# Patient Record
Sex: FEMALE | Race: WHITE | Employment: UNEMPLOYED | URBAN - METROPOLITAN AREA
[De-identification: names, ages, dates, MRNs, and addresses within clinical notes are randomized per-mention and may not be internally consistent; named-entity substitution may affect disease eponyms.]

---

## 2017-10-17 ENCOUNTER — HOSPITAL ENCOUNTER (OUTPATIENT)
Age: 57
Discharge: HOME | End: 2017-10-17
Payer: COMMERCIAL

## 2017-10-17 VITALS
SYSTOLIC BLOOD PRESSURE: 125 MMHG | DIASTOLIC BLOOD PRESSURE: 79 MMHG | OXYGEN SATURATION: 97 % | HEART RATE: 75 BPM | RESPIRATION RATE: 20 BRPM

## 2017-10-17 DIAGNOSIS — G57.02 PIRIFORMIS SYNDROME OF LEFT SIDE: Primary | ICD-10-CM

## 2017-10-17 PROCEDURE — 99203 OFFICE O/P NEW LOW 30 MIN: CPT

## 2017-10-17 PROCEDURE — 99204 OFFICE O/P NEW MOD 45 MIN: CPT

## 2017-10-17 RX ORDER — ARIPIPRAZOLE 5 MG/1
5 TABLET ORAL DAILY
COMMUNITY
End: 2018-02-16

## 2017-10-17 RX ORDER — DULOXETIN HYDROCHLORIDE 60 MG/1
60 CAPSULE, DELAYED RELEASE ORAL DAILY
COMMUNITY
End: 2018-02-16

## 2017-10-17 RX ORDER — CYCLOBENZAPRINE HCL 10 MG
10 TABLET ORAL 3 TIMES DAILY PRN
Qty: 20 TABLET | Refills: 0 | Status: SHIPPED | OUTPATIENT
Start: 2017-10-17 | End: 2017-10-23

## 2017-10-17 RX ORDER — ZOLMITRIPTAN 5 MG/1
5 TABLET, FILM COATED ORAL AS NEEDED
COMMUNITY
End: 2017-10-24

## 2017-10-17 RX ORDER — HYDROXYZINE PAMOATE 50 MG/1
50 CAPSULE ORAL DAILY
COMMUNITY
End: 2017-11-15

## 2017-10-17 RX ORDER — PREDNISONE 10 MG/1
TABLET ORAL
Qty: 30 TABLET | Refills: 0 | Status: SHIPPED | OUTPATIENT
Start: 2017-10-17 | End: 2017-11-15

## 2017-10-17 NOTE — ED PROVIDER NOTES
Patient Seen in: [facility]    History   Patient presents with:  Back Pain (musculoskeletal)    Stated Complaint: back pain x 4 days    HPI    Patient is a very pleasant 16-year-old female.   Patient has a medical history of migr reviewed from today and agreed except as otherwise stated in HPI.     Physical Exam   ED Triage Vitals [10/17/17 1142]  BP: 125/79  Pulse: 75  Resp: 20  Temp: n/a  Temp src: Oral  SpO2: 97 %  O2 Device: n/a    Current:/79   Pulse 75   Resp 20   LMP  ( possible for a visit        Medications Prescribed:  Current Discharge Medication List    START taking these medications    predniSONE 10 MG Oral Tab  60mg for two days, 50mg x1, 40mg x1, 30mg x1, 20mg x1, 10mg x2, 5mg x2.   Qty: 30 tablet Refills: 0    Cyc

## 2017-10-17 NOTE — ED INITIAL ASSESSMENT (HPI)
Left lower back pain - since Saturday. Denies any injury. Pt takes naproxen 550 mg once per day  She uses it for migraines. pain on the left buttock  Radiates to left calf. descrbed as sharp shooting constant.

## 2017-10-23 ENCOUNTER — OFFICE VISIT (OUTPATIENT)
Dept: FAMILY MEDICINE CLINIC | Facility: CLINIC | Age: 57
End: 2017-10-23

## 2017-10-23 VITALS
HEART RATE: 70 BPM | TEMPERATURE: 98 F | RESPIRATION RATE: 18 BRPM | WEIGHT: 259 LBS | OXYGEN SATURATION: 95 % | BODY MASS INDEX: 47.66 KG/M2 | DIASTOLIC BLOOD PRESSURE: 84 MMHG | HEIGHT: 62 IN | SYSTOLIC BLOOD PRESSURE: 126 MMHG

## 2017-10-23 DIAGNOSIS — M54.16 LUMBAR RADICULOPATHY: Primary | ICD-10-CM

## 2017-10-23 DIAGNOSIS — Z12.31 SCREENING MAMMOGRAM, ENCOUNTER FOR: ICD-10-CM

## 2017-10-23 DIAGNOSIS — Z23 NEEDS FLU SHOT: ICD-10-CM

## 2017-10-23 PROCEDURE — 99203 OFFICE O/P NEW LOW 30 MIN: CPT | Performed by: EMERGENCY MEDICINE

## 2017-10-23 PROCEDURE — 90471 IMMUNIZATION ADMIN: CPT | Performed by: EMERGENCY MEDICINE

## 2017-10-23 PROCEDURE — 90686 IIV4 VACC NO PRSV 0.5 ML IM: CPT | Performed by: EMERGENCY MEDICINE

## 2017-10-23 RX ORDER — NAPROXEN SODIUM 550 MG/1
1 TABLET ORAL 2 TIMES DAILY PRN
Refills: 3 | COMMUNITY
Start: 2017-10-14 | End: 2018-03-15 | Stop reason: ALTCHOICE

## 2017-10-23 RX ORDER — HYDROCODONE BITARTRATE AND ACETAMINOPHEN 5; 325 MG/1; MG/1
1-2 TABLET ORAL EVERY 6 HOURS PRN
Qty: 20 TABLET | Refills: 0 | Status: SHIPPED | OUTPATIENT
Start: 2017-10-23 | End: 2018-02-08

## 2017-10-23 RX ORDER — METAXALONE 800 MG/1
1 TABLET ORAL EVERY 8 HOURS PRN
Refills: 0 | COMMUNITY
Start: 2017-10-17 | End: 2017-11-15

## 2017-10-23 RX ORDER — PROPRANOLOL HYDROCHLORIDE 80 MG/1
1 CAPSULE, EXTENDED RELEASE ORAL DAILY
Refills: 8 | COMMUNITY
Start: 2017-10-06 | End: 2017-10-24

## 2017-10-23 RX ORDER — DIAZEPAM 5 MG/1
1 TABLET ORAL NIGHTLY
Refills: 0 | COMMUNITY
Start: 2017-09-21 | End: 2018-02-16

## 2017-10-23 NOTE — PROGRESS NOTES
Chief Complaint:   Patient presents with:  Establish Care: f/u IC sciatica LT side    HPI:   This is a 65year old female       LOW BACK PAIN  C/O low back pain. Started about 2 weeks ago. Gradual in onset. NO history of fall or injury.  No weakness or numb mg by mouth 3 (three) times daily as needed for Itching. Disp:  Rfl:    Zolmitriptan (ZOMIG) 5 MG Oral Tab Take 5 mg by mouth as needed for Migraine.  Disp:  Rfl:    predniSONE 10 MG Oral Tab 60mg for two days, 50mg x1, 40mg x1, 30mg x1, 20mg x1, 10mg x2, 5 patellar DTRs elicited bilaterally          ASSESSMENT AND PLAN:   Diagnoses and all orders for this visit:    Lumbar radiculopathy  -     MRI SPINE LUMBAR (CPT=72148); Future  -     HYDROcodone-acetaminophen (NORCO) 5-325 MG Oral Tab;  Take 1-2 tablets by

## 2017-10-23 NOTE — PATIENT INSTRUCTIONS
Thank you for choosing [facility]  To Do:  FOR HENRI LYN  1. Arrange for MRI  2. Follow up in 2 weeks for annual exam  3. Continue with muscle relaxant muscle spasm  4. Take Norco for severe pain  5.  Finish off Prednisone, then you may medical care. Always follow your healthcare professional's instructions.

## 2017-10-24 ENCOUNTER — OFFICE VISIT (OUTPATIENT)
Dept: NEUROLOGY | Facility: CLINIC | Age: 57
End: 2017-10-24

## 2017-10-24 VITALS
HEART RATE: 85 BPM | DIASTOLIC BLOOD PRESSURE: 80 MMHG | SYSTOLIC BLOOD PRESSURE: 116 MMHG | WEIGHT: 260 LBS | HEIGHT: 62 IN | RESPIRATION RATE: 18 BRPM | BODY MASS INDEX: 47.84 KG/M2

## 2017-10-24 DIAGNOSIS — G21.19 PARKINSONISM DUE TO DRUGS (HCC): ICD-10-CM

## 2017-10-24 DIAGNOSIS — G43.709 CHRONIC MIGRAINE W/O AURA W/O STATUS MIGRAINOSUS, NOT INTRACTABLE: Primary | ICD-10-CM

## 2017-10-24 PROBLEM — M54.16 LUMBAR RADICULOPATHY: Status: ACTIVE | Noted: 2017-10-24

## 2017-10-24 PROCEDURE — 99244 OFF/OP CNSLTJ NEW/EST MOD 40: CPT | Performed by: OTHER

## 2017-10-24 RX ORDER — PROPRANOLOL HYDROCHLORIDE 120 MG/1
120 CAPSULE, EXTENDED RELEASE ORAL DAILY
Qty: 30 CAPSULE | Refills: 2 | Status: SHIPPED | OUTPATIENT
Start: 2017-10-24 | End: 2018-02-22

## 2017-10-24 RX ORDER — ZOLMITRIPTAN 5 MG/1
5 TABLET, FILM COATED ORAL AS NEEDED
Qty: 9 TABLET | Refills: 2 | Status: SHIPPED | OUTPATIENT
Start: 2017-10-24 | End: 2018-02-22

## 2017-10-24 RX ORDER — BENZTROPINE MESYLATE 1 MG/1
1 TABLET ORAL 2 TIMES DAILY
COMMUNITY
End: 2018-02-16

## 2017-10-24 NOTE — PATIENT INSTRUCTIONS
Refill policies:    • Allow 2-3 business days for refills; controlled substances may take longer.   • Contact your pharmacy at least 5 days prior to running out of medication and have them send an electronic request or submit request through the [facility] have a procedure or additional testing performed. [facility]) will contact your insurance carrier to obtain pre-certification or prior authorization.     Unfortunately, [facility] has seen an increase in denial of payment even though the p

## 2017-10-25 NOTE — PROGRESS NOTES
HPI:    Patient ID: Abdiel Machuca is a 65year old female. PCP: Dr Jerod Degroot    HPI    Ygnacio Closs is a 65year old female who presents to establish care with us for migraines. She moved from [place] this summer.  She has long history of migraine hea are negative. Current Outpatient Prescriptions:  Propranolol HCl  MG Oral Capsule SR 24 Hr Take 1 capsule (120 mg total) by mouth daily.  Disp: 30 capsule Rfl: 2   Zolmitriptan (ZOMIG) 5 MG Oral Tab Take 1 tablet (5 mg total) by mouth as nee intact. V: Normal facial sensation   VII: Face is symmetric with normal strength. VIII: Normal hearing bilaterally. IX, X: Symmetric palate elevation. Uvula in midline. XI: Normal sternocleidomastoid and trapezius strength.    XII: Tongue is in midl This Visit:  Signed Prescriptions Disp Refills    Propranolol HCl  MG Oral Capsule SR 24 Hr 30 capsule 2      Sig: Take 1 capsule (120 mg total) by mouth daily.       Zolmitriptan (ZOMIG) 5 MG Oral Tab 9 tablet 2      Sig: Take 1 tablet (5 mg total) b

## 2017-10-30 ENCOUNTER — TELEPHONE (OUTPATIENT)
Dept: FAMILY MEDICINE CLINIC | Facility: CLINIC | Age: 57
End: 2017-10-30

## 2017-10-30 NOTE — TELEPHONE ENCOUNTER
Patient called back and stated she called [facility] to schedule something and see if there is anything else they are needing and was told she is to large to fit into their machine and she would need to keep her appointment with our office for the MRI.

## 2017-10-30 NOTE — TELEPHONE ENCOUNTER
Patient called about the MRI for lumbar spine that she needs to do. Her insurance called her to let her know of a much cheaper place to get her MRI done at.      [address],   [facility]  Ph: [phone]    The insu

## 2017-11-01 ENCOUNTER — HOSPITAL ENCOUNTER (OUTPATIENT)
Dept: MRI IMAGING | Age: 57
Discharge: HOME | End: 2017-11-01
Attending: EMERGENCY MEDICINE
Payer: COMMERCIAL

## 2017-11-01 DIAGNOSIS — M54.16 LUMBAR RADICULOPATHY: ICD-10-CM

## 2017-11-01 PROCEDURE — 72148 MRI LUMBAR SPINE W/O DYE: CPT | Performed by: EMERGENCY MEDICINE

## 2017-11-06 ENCOUNTER — TELEPHONE (OUTPATIENT)
Dept: FAMILY MEDICINE CLINIC | Facility: CLINIC | Age: 57
End: 2017-11-06

## 2017-11-06 NOTE — TELEPHONE ENCOUNTER
Spoke with patient regarding test results. Advised her to follow up in the clinic. Patient has appointment scheduled for 11/15/17.

## 2017-11-06 NOTE — TELEPHONE ENCOUNTER
----- Message from Shayy Hamlin MD sent at 11/6/2017 12:11 PM CST -----  Degenerative disk changes  No stenosis  Follow up in clinic for recheck and Annual physical

## 2017-11-13 ENCOUNTER — HOSPITAL ENCOUNTER (OUTPATIENT)
Dept: MAMMOGRAPHY | Age: 57
Discharge: HOME | End: 2017-11-13
Attending: EMERGENCY MEDICINE
Payer: COMMERCIAL

## 2017-11-13 DIAGNOSIS — Z12.31 SCREENING MAMMOGRAM, ENCOUNTER FOR: ICD-10-CM

## 2017-11-13 PROCEDURE — 77063 BREAST TOMOSYNTHESIS BI: CPT | Performed by: EMERGENCY MEDICINE

## 2017-11-13 PROCEDURE — 77067 SCR MAMMO BI INCL CAD: CPT | Performed by: EMERGENCY MEDICINE

## 2017-11-15 ENCOUNTER — LAB ENCOUNTER (OUTPATIENT)
Dept: LAB | Age: 57
End: 2017-11-15
Attending: EMERGENCY MEDICINE
Payer: COMMERCIAL

## 2017-11-15 ENCOUNTER — OFFICE VISIT (OUTPATIENT)
Dept: FAMILY MEDICINE CLINIC | Facility: CLINIC | Age: 57
End: 2017-11-15

## 2017-11-15 VITALS
BODY MASS INDEX: 48 KG/M2 | TEMPERATURE: 98 F | HEART RATE: 68 BPM | WEIGHT: 260.75 LBS | SYSTOLIC BLOOD PRESSURE: 124 MMHG | DIASTOLIC BLOOD PRESSURE: 76 MMHG | RESPIRATION RATE: 16 BRPM

## 2017-11-15 DIAGNOSIS — Z99.89 OSA ON CPAP: ICD-10-CM

## 2017-11-15 DIAGNOSIS — R73.03 PREDIABETES: ICD-10-CM

## 2017-11-15 DIAGNOSIS — R10.11 RUQ ABDOMINAL PAIN: Primary | ICD-10-CM

## 2017-11-15 DIAGNOSIS — G47.33 OSA ON CPAP: ICD-10-CM

## 2017-11-15 DIAGNOSIS — R10.11 RUQ ABDOMINAL PAIN: ICD-10-CM

## 2017-11-15 DIAGNOSIS — E78.5 DYSLIPIDEMIA: ICD-10-CM

## 2017-11-15 DIAGNOSIS — E55.9 VITAMIN D DEFICIENCY: ICD-10-CM

## 2017-11-15 PROCEDURE — 82150 ASSAY OF AMYLASE: CPT | Performed by: EMERGENCY MEDICINE

## 2017-11-15 PROCEDURE — 36415 COLL VENOUS BLD VENIPUNCTURE: CPT | Performed by: EMERGENCY MEDICINE

## 2017-11-15 PROCEDURE — 81003 URINALYSIS AUTO W/O SCOPE: CPT | Performed by: EMERGENCY MEDICINE

## 2017-11-15 PROCEDURE — 99214 OFFICE O/P EST MOD 30 MIN: CPT | Performed by: EMERGENCY MEDICINE

## 2017-11-15 PROCEDURE — 85025 COMPLETE CBC W/AUTO DIFF WBC: CPT | Performed by: EMERGENCY MEDICINE

## 2017-11-15 PROCEDURE — 80053 COMPREHEN METABOLIC PANEL: CPT | Performed by: EMERGENCY MEDICINE

## 2017-11-15 PROCEDURE — 83690 ASSAY OF LIPASE: CPT | Performed by: EMERGENCY MEDICINE

## 2017-11-15 RX ORDER — ACETAMINOPHEN AND CODEINE PHOSPHATE 300; 30 MG/1; MG/1
1-2 TABLET ORAL EVERY 4 HOURS PRN
Qty: 30 TABLET | Refills: 0 | Status: SHIPPED | OUTPATIENT
Start: 2017-11-15 | End: 2018-02-08

## 2017-11-15 RX ORDER — ERGOCALCIFEROL 1.25 MG/1
50000 CAPSULE ORAL WEEKLY
Qty: 12 CAPSULE | Refills: 0 | Status: SHIPPED | OUTPATIENT
Start: 2017-11-15 | End: 2018-03-15 | Stop reason: ALTCHOICE

## 2017-11-15 NOTE — PROGRESS NOTES
Chief Complaint:   Patient presents with:  Test Results  Abdominal Pain: Right side symptoms started about 4 weeks ago    HPI:   This is a 65year old female     FF UP LABS    RIGHT UP BACK PAIN  Started 2 months ago. Pain worse with eating.  Wraps around b daily. Disp:  Rfl:      No current facility-administered medications on file prior to visit.     Counseling given: Not Answered               REVIEW OF SYSTEMS:   Review of systems significant for low back pain  The rest of the review of systems is negative Just recently got CPAP machine. Start CPAP  Vitamin D deficiency  -     ergocalciferol 61728 units Oral Cap; Take 1 capsule (50,000 Units total) by mouth once a week. For 12 weeks    Start high dose Vit D once a week for 12 weeks, Rx in pharmacy.  After 12

## 2017-11-15 NOTE — PATIENT INSTRUCTIONS
Thank you for choosing [facility]  To Do:  FOR HENRI LYN  1. Start high dose Vit D once a week for 12 weeks, Rx in pharmacy. After 12 weeks start over the counter Vit D 2000 for another 12 weeks. Repeat Vit D levels in 6 months  2.  Low

## 2017-11-16 ENCOUNTER — HOSPITAL ENCOUNTER (OUTPATIENT)
Dept: ULTRASOUND IMAGING | Age: 57
Discharge: HOME | End: 2017-11-16
Attending: EMERGENCY MEDICINE
Payer: COMMERCIAL

## 2017-11-16 DIAGNOSIS — R10.11 RUQ ABDOMINAL PAIN: ICD-10-CM

## 2017-11-16 PROCEDURE — 76700 US EXAM ABDOM COMPLETE: CPT | Performed by: EMERGENCY MEDICINE

## 2017-11-20 ENCOUNTER — TELEPHONE (OUTPATIENT)
Dept: FAMILY MEDICINE CLINIC | Facility: CLINIC | Age: 57
End: 2017-11-20

## 2017-11-20 ENCOUNTER — MED REC SCAN ONLY (OUTPATIENT)
Dept: FAMILY MEDICINE CLINIC | Facility: CLINIC | Age: 57
End: 2017-11-20

## 2017-11-20 NOTE — TELEPHONE ENCOUNTER
----- Message from Reza Neal MD sent at 11/20/2017 11:37 AM CST -----  No evidence to gall stone  pls ask patient to follow up for recheck if symptoms are persistent or not improving

## 2017-11-20 NOTE — TELEPHONE ENCOUNTER
Spoke with patient who states she is still having abdominal pain, rating it a 6/10. Pt was asked to follow up in office, pt states she has an appt with Dr. Gilda Valles tomorrow morning.  Pt states she will follow up with Dr. Gilda Valles from now on due to location and

## 2017-11-21 ENCOUNTER — APPOINTMENT (OUTPATIENT)
Dept: LAB | Age: 57
End: 2017-11-21
Attending: INTERNAL MEDICINE
Payer: COMMERCIAL

## 2017-11-21 ENCOUNTER — OFFICE VISIT (OUTPATIENT)
Dept: INTERNAL MEDICINE CLINIC | Facility: CLINIC | Age: 57
End: 2017-11-21

## 2017-11-21 VITALS
HEART RATE: 76 BPM | BODY MASS INDEX: 48.17 KG/M2 | OXYGEN SATURATION: 98 % | TEMPERATURE: 98 F | DIASTOLIC BLOOD PRESSURE: 80 MMHG | WEIGHT: 261.75 LBS | HEIGHT: 62 IN | RESPIRATION RATE: 16 BRPM | SYSTOLIC BLOOD PRESSURE: 116 MMHG

## 2017-11-21 DIAGNOSIS — M51.26 BULGING LUMBAR DISC: ICD-10-CM

## 2017-11-21 DIAGNOSIS — F41.1 GAD (GENERALIZED ANXIETY DISORDER): ICD-10-CM

## 2017-11-21 DIAGNOSIS — F33.1 MDD (MAJOR DEPRESSIVE DISORDER), RECURRENT EPISODE, MODERATE (HCC): ICD-10-CM

## 2017-11-21 DIAGNOSIS — M54.16 LUMBAR RADICULOPATHY: ICD-10-CM

## 2017-11-21 DIAGNOSIS — R73.01 IFG (IMPAIRED FASTING GLUCOSE): ICD-10-CM

## 2017-11-21 DIAGNOSIS — M47.816 ARTHRITIS, LUMBAR SPINE: ICD-10-CM

## 2017-11-21 DIAGNOSIS — R10.11 POSTPRANDIAL ABDOMINAL PAIN IN RIGHT UPPER QUADRANT: Primary | ICD-10-CM

## 2017-11-21 DIAGNOSIS — E78.00 HYPERCHOLESTEROLEMIA: ICD-10-CM

## 2017-11-21 DIAGNOSIS — E66.01 MORBID OBESITY WITH BMI OF 45.0-49.9, ADULT (HCC): ICD-10-CM

## 2017-11-21 DIAGNOSIS — K57.30 DIVERTICULOSIS OF LARGE INTESTINE WITHOUT HEMORRHAGE: ICD-10-CM

## 2017-11-21 DIAGNOSIS — Z82.49 FAMILY HISTORY OF PREMATURE CORONARY HEART DISEASE: ICD-10-CM

## 2017-11-21 DIAGNOSIS — G25.0 TREMOR, ESSENTIAL: ICD-10-CM

## 2017-11-21 DIAGNOSIS — G43.709 CHRONIC MIGRAINE WITHOUT AURA WITHOUT STATUS MIGRAINOSUS, NOT INTRACTABLE: ICD-10-CM

## 2017-11-21 DIAGNOSIS — E55.9 VITAMIN D DEFICIENCY: ICD-10-CM

## 2017-11-21 PROBLEM — M51.36 BULGING LUMBAR DISC: Status: ACTIVE | Noted: 2017-11-21

## 2017-11-21 PROBLEM — M51.369 BULGING LUMBAR DISC: Status: ACTIVE | Noted: 2017-11-21

## 2017-11-21 PROCEDURE — 83036 HEMOGLOBIN GLYCOSYLATED A1C: CPT | Performed by: INTERNAL MEDICINE

## 2017-11-21 PROCEDURE — 36415 COLL VENOUS BLD VENIPUNCTURE: CPT | Performed by: INTERNAL MEDICINE

## 2017-11-21 PROCEDURE — 99204 OFFICE O/P NEW MOD 45 MIN: CPT | Performed by: INTERNAL MEDICINE

## 2017-11-21 NOTE — PROGRESS NOTES
Patient presents with:  Establish Care: chronic medical conditions      HPI: The patient is a 66 y/o WF, new patient, here to establish PCP and to discuss chronic medical conditions as follows:    1.  Chronic postprandial RUQ pain x > 2 months - Her recent of premature coronary heart disease     IFG (impaired fasting glucose)     Arthritis, lumbar spine (HCC)     Bulging lumbar disc     Tremor, essential     MDD (major depressive disorder), recurrent episode, moderate (HCC)     ISHA (generalized anxiety disor Relation Age of Onset   • Hypertension Father    • Heart Attack Father    • Heart Disease Father    • ALS [OTHER] Mother    • Cancer Sister      melanoma         Immunization History  Administered            Date(s) Administered    Flulaval 0.5 ml 6 months first.  4. Hypercholesterolemia and fam hx of premature CAD in her father - Obtain Ultrafast Heart Scan. I gave her information on scheduling and taking advantage of the current $75 offer. 5. Chronic migraines - Mgmt per Dr. Thais Villeda.   6. Lumbar arthriti

## 2017-11-21 NOTE — PATIENT INSTRUCTIONS
HIDA Scan    A HIDA scan is an imaging test. It can be used to check for problems in the liver, gallbladder, and bile ducts. During the test, a small amount of radioactive substance (tracer) is injected into a vein in your arm or hand.  Pictures are then · Based on your healthcare provider's practices, you may be given a substance by mouth or injected thru a vein that causes the gallbladder to contract and release bile. Be sure to let the technologist know if you feel discomfort.   · In some cases, pain med A coronary calcium scan is a test that looks at the arteries that supply blood to the heart muscle. These are called the coronary arteries. The scan checks for plaque buildup along the walls of these arteries.  Plaque is made up of calcium, fat, cholesterol A CT scan exposes you to a certain amount of radiation. The benefits of the scan likely outweigh the risks for you. You and your healthcare provider can discuss this further. Date Last Reviewed: 5/1/2016  © 2079-8788 [employer].  [address] [place] [address]

## 2017-11-27 ENCOUNTER — HOSPITAL ENCOUNTER (OUTPATIENT)
Dept: NUCLEAR MEDICINE | Facility: HOSPITAL | Age: 57
Discharge: HOME | End: 2017-11-27
Attending: INTERNAL MEDICINE
Payer: COMMERCIAL

## 2017-11-27 DIAGNOSIS — R10.11 POSTPRANDIAL ABDOMINAL PAIN IN RIGHT UPPER QUADRANT: ICD-10-CM

## 2017-11-27 PROCEDURE — 36410 VNPNXR 3YR/> PHY/QHP DX/THER: CPT

## 2017-11-27 PROCEDURE — 76937 US GUIDE VASCULAR ACCESS: CPT

## 2017-11-27 PROCEDURE — 78226 HEPATOBILIARY SYSTEM IMAGING: CPT | Performed by: INTERNAL MEDICINE

## 2017-11-28 ENCOUNTER — HOSPITAL ENCOUNTER (EMERGENCY)
Facility: HOSPITAL | Age: 57
Discharge: HOME | End: 2017-11-28
Attending: EMERGENCY MEDICINE
Payer: COMMERCIAL

## 2017-11-28 ENCOUNTER — APPOINTMENT (OUTPATIENT)
Dept: CT IMAGING | Facility: HOSPITAL | Age: 57
End: 2017-11-28
Attending: EMERGENCY MEDICINE
Payer: COMMERCIAL

## 2017-11-28 ENCOUNTER — APPOINTMENT (OUTPATIENT)
Dept: GENERAL RADIOLOGY | Facility: HOSPITAL | Age: 57
End: 2017-11-28
Attending: EMERGENCY MEDICINE
Payer: COMMERCIAL

## 2017-11-28 VITALS
OXYGEN SATURATION: 96 % | HEART RATE: 65 BPM | BODY MASS INDEX: 47.48 KG/M2 | RESPIRATION RATE: 18 BRPM | SYSTOLIC BLOOD PRESSURE: 103 MMHG | WEIGHT: 258 LBS | TEMPERATURE: 97 F | DIASTOLIC BLOOD PRESSURE: 65 MMHG | HEIGHT: 62 IN

## 2017-11-28 DIAGNOSIS — R10.11 CHRONIC RUQ PAIN: Primary | ICD-10-CM

## 2017-11-28 DIAGNOSIS — G89.29 CHRONIC RUQ PAIN: Primary | ICD-10-CM

## 2017-11-28 PROCEDURE — 83690 ASSAY OF LIPASE: CPT | Performed by: EMERGENCY MEDICINE

## 2017-11-28 PROCEDURE — 93010 ELECTROCARDIOGRAM REPORT: CPT

## 2017-11-28 PROCEDURE — 99285 EMERGENCY DEPT VISIT HI MDM: CPT

## 2017-11-28 PROCEDURE — 96375 TX/PRO/DX INJ NEW DRUG ADDON: CPT

## 2017-11-28 PROCEDURE — 85025 COMPLETE CBC W/AUTO DIFF WBC: CPT | Performed by: EMERGENCY MEDICINE

## 2017-11-28 PROCEDURE — 74177 CT ABD & PELVIS W/CONTRAST: CPT | Performed by: EMERGENCY MEDICINE

## 2017-11-28 PROCEDURE — 96374 THER/PROPH/DIAG INJ IV PUSH: CPT

## 2017-11-28 PROCEDURE — 81003 URINALYSIS AUTO W/O SCOPE: CPT | Performed by: EMERGENCY MEDICINE

## 2017-11-28 PROCEDURE — 96372 THER/PROPH/DIAG INJ SC/IM: CPT

## 2017-11-28 PROCEDURE — 80053 COMPREHEN METABOLIC PANEL: CPT | Performed by: EMERGENCY MEDICINE

## 2017-11-28 PROCEDURE — 85378 FIBRIN DEGRADE SEMIQUANT: CPT | Performed by: EMERGENCY MEDICINE

## 2017-11-28 PROCEDURE — 71010 XR CHEST AP PORTABLE  (CPT=71010): CPT | Performed by: EMERGENCY MEDICINE

## 2017-11-28 PROCEDURE — S0028 INJECTION, FAMOTIDINE, 20 MG: HCPCS | Performed by: EMERGENCY MEDICINE

## 2017-11-28 PROCEDURE — 93005 ELECTROCARDIOGRAM TRACING: CPT

## 2017-11-28 PROCEDURE — 84484 ASSAY OF TROPONIN QUANT: CPT | Performed by: EMERGENCY MEDICINE

## 2017-11-28 RX ORDER — MAGNESIUM HYDROXIDE/ALUMINUM HYDROXICE/SIMETHICONE 120; 1200; 1200 MG/30ML; MG/30ML; MG/30ML
30 SUSPENSION ORAL ONCE
Status: COMPLETED | OUTPATIENT
Start: 2017-11-28 | End: 2017-11-28

## 2017-11-28 RX ORDER — FAMOTIDINE 20 MG/1
20 TABLET ORAL 2 TIMES DAILY
Qty: 60 TABLET | Refills: 0 | Status: SHIPPED | OUTPATIENT
Start: 2017-11-28 | End: 2017-12-28

## 2017-11-28 RX ORDER — HYDROCODONE BITARTRATE AND ACETAMINOPHEN 5; 325 MG/1; MG/1
1-2 TABLET ORAL EVERY 4 HOURS PRN
Qty: 12 TABLET | Refills: 0 | Status: SHIPPED | OUTPATIENT
Start: 2017-11-28 | End: 2017-12-14

## 2017-11-28 RX ORDER — MORPHINE SULFATE 4 MG/ML
4 INJECTION, SOLUTION INTRAMUSCULAR; INTRAVENOUS ONCE
Status: COMPLETED | OUTPATIENT
Start: 2017-11-28 | End: 2017-11-28

## 2017-11-28 RX ORDER — DICYCLOMINE HYDROCHLORIDE 10 MG/ML
20 INJECTION INTRAMUSCULAR ONCE
Status: COMPLETED | OUTPATIENT
Start: 2017-11-28 | End: 2017-11-28

## 2017-11-28 RX ORDER — FAMOTIDINE 10 MG/ML
20 INJECTION, SOLUTION INTRAVENOUS ONCE
Status: COMPLETED | OUTPATIENT
Start: 2017-11-28 | End: 2017-11-28

## 2017-11-28 NOTE — ED NOTES
Unable to get IV x 1. Pt stated it took 7 sticks yesterday and it was a PICC nurse who placed IV. ILYA Bales, aware of need for US IV. Ultrasound placed in room at this time.

## 2017-11-28 NOTE — ED INITIAL ASSESSMENT (HPI)
Pt w/ RUQ pain for months. Increasing pain at this time. Had a nuclear med test yesterday. Pt having pain to chest upon palpation.

## 2017-11-29 NOTE — ED PROVIDER NOTES
Patient Seen in: [facility] Emergency Department    History   Patient presents with:  Abdomen/Flank Pain (GI/)    Stated Complaint:     HPI    61-year-old female, medical history as noted below, here for evaluation of right upper quadrant abdomina BMI 47.19 kg/m²         Physical Exam      Constitutional: Pt is oriented to person, place, and time. Appears well-developed and well-nourished. Head: Normocephalic and atraumatic.    Nose: Nose normal.   Eyes: EOM are normal. Pupils are equal, round, an Please view results for these tests on the individual orders. RAINBOW DRAW BLUE   RAINBOW DRAW LAVENDER   RAINBOW DRAW LIGHT GREEN   RAINBOW DRAW GOLD     EKG    Rate, intervals and axes as noted on EKG Report.   Rate: 71  Rhythm: Sinus Rhythm  Readin Prescribed:  Discharge Medication List as of 11/28/2017  6:57 PM    START taking these medications    famoTIDine 20 MG Oral Tab  Take 1 tablet (20 mg total) by mouth 2 (two) times daily. , Normal, Disp-60 tablet, R-0    !! HYDROcodone-acetaminophen 5-325 MG

## 2017-12-13 ENCOUNTER — HOSPITAL ENCOUNTER (OUTPATIENT)
Dept: CT IMAGING | Age: 57
Discharge: HOME | End: 2017-12-13
Attending: INTERNAL MEDICINE

## 2017-12-13 DIAGNOSIS — Z13.9 SPECIAL SCREENING: ICD-10-CM

## 2017-12-14 ENCOUNTER — OFFICE VISIT (OUTPATIENT)
Dept: NEUROLOGY | Facility: CLINIC | Age: 57
End: 2017-12-14

## 2017-12-14 VITALS
DIASTOLIC BLOOD PRESSURE: 80 MMHG | TEMPERATURE: 98 F | RESPIRATION RATE: 16 BRPM | BODY MASS INDEX: 48.76 KG/M2 | OXYGEN SATURATION: 96 % | HEART RATE: 80 BPM | SYSTOLIC BLOOD PRESSURE: 108 MMHG | WEIGHT: 265 LBS | HEIGHT: 62 IN

## 2017-12-14 DIAGNOSIS — G21.19 PARKINSONISM DUE TO DRUGS (HCC): ICD-10-CM

## 2017-12-14 DIAGNOSIS — G43.709 CHRONIC MIGRAINE WITHOUT AURA WITHOUT STATUS MIGRAINOSUS, NOT INTRACTABLE: Primary | ICD-10-CM

## 2017-12-14 PROCEDURE — 99213 OFFICE O/P EST LOW 20 MIN: CPT | Performed by: OTHER

## 2017-12-14 RX ORDER — LORAZEPAM 0.5 MG/1
TABLET ORAL
Refills: 0 | COMMUNITY
Start: 2017-12-01 | End: 2018-02-16

## 2017-12-14 NOTE — PROGRESS NOTES
HPI:    Patient ID: Stephon Dumont is a 65year old female. PCP: Dr Yamileth Knox    HPI    Patient presented for migraine follow up.  States migraines remains the same and no significant change with increase dose of Inderal.     Jacklyn Vann is a 65 year Respiratory: Negative. Cardiovascular: Negative. Gastrointestinal: Negative. Endocrine: Negative. Genitourinary: Negative. Musculoskeletal: Negative. Neurological: Positive for tremors and headaches. Hematological: Negative.     Psychi breath sounds normal.   Abdominal: Soft. Bowel sounds are normal.   Skin: Skin is warm and dry. Psychiatric:  normal mood and affect. Neurological  Mental status: Awake, alert and oriented to time, place and person.   Speech is fluent with intact compre

## 2017-12-21 ENCOUNTER — OFFICE VISIT (OUTPATIENT)
Dept: INTERNAL MEDICINE CLINIC | Facility: CLINIC | Age: 57
End: 2017-12-21

## 2017-12-21 VITALS
OXYGEN SATURATION: 94 % | BODY MASS INDEX: 48.58 KG/M2 | WEIGHT: 264 LBS | TEMPERATURE: 98 F | RESPIRATION RATE: 16 BRPM | HEIGHT: 62 IN | SYSTOLIC BLOOD PRESSURE: 110 MMHG | HEART RATE: 80 BPM | DIASTOLIC BLOOD PRESSURE: 80 MMHG

## 2017-12-21 DIAGNOSIS — E66.01 MORBID OBESITY WITH BMI OF 45.0-49.9, ADULT (HCC): Primary | ICD-10-CM

## 2017-12-21 DIAGNOSIS — Z51.81 THERAPEUTIC DRUG MONITORING: ICD-10-CM

## 2017-12-21 PROCEDURE — 99213 OFFICE O/P EST LOW 20 MIN: CPT | Performed by: INTERNAL MEDICINE

## 2017-12-21 RX ORDER — PHENTERMINE HYDROCHLORIDE 37.5 MG/1
37.5 TABLET ORAL
Qty: 30 TABLET | Refills: 0 | Status: SHIPPED | OUTPATIENT
Start: 2017-12-21 | End: 2018-01-22

## 2017-12-21 NOTE — PROGRESS NOTES
Patient presents with:  Obesity: Start MD-supervised medical weight loss programming  Nasal Congestion: x4 days       HPI: The pt presents today to start MD-supervised medical weight loss programming for morbid obesity.   She's tried and failed various diet capsule (120 mg total) by mouth daily. , Disp: 30 capsule, Rfl: 2  •  Zolmitriptan (ZOMIG) 5 MG Oral Tab, Take 1 tablet (5 mg total) by mouth as needed for Migraine. , Disp: 9 tablet, Rfl: 2  •  diazepam 5 MG Oral Tab, Take 1 tablet by mouth nightly., Disp: of my ability. Jeovanny Spain. Gilda Valles MD  Diplomate, American Board of Internal Medicine  [facility]  [address], [address], [facility], [address]  T: [id number]; F: [phone]     Meds & Refills for this Visit:  Signed Prescriptions Disp

## 2017-12-28 ENCOUNTER — OFFICE VISIT (OUTPATIENT)
Dept: INTERNAL MEDICINE CLINIC | Facility: CLINIC | Age: 57
End: 2017-12-28

## 2017-12-28 VITALS
HEART RATE: 98 BPM | TEMPERATURE: 98 F | BODY MASS INDEX: 48.86 KG/M2 | WEIGHT: 265.5 LBS | HEIGHT: 62 IN | SYSTOLIC BLOOD PRESSURE: 124 MMHG | RESPIRATION RATE: 18 BRPM | OXYGEN SATURATION: 94 % | DIASTOLIC BLOOD PRESSURE: 82 MMHG

## 2017-12-28 DIAGNOSIS — J40 BRONCHITIS: Primary | ICD-10-CM

## 2017-12-28 DIAGNOSIS — R30.0 DYSURIA: ICD-10-CM

## 2017-12-28 PROCEDURE — 99214 OFFICE O/P EST MOD 30 MIN: CPT | Performed by: PHYSICIAN ASSISTANT

## 2017-12-28 PROCEDURE — 81003 URINALYSIS AUTO W/O SCOPE: CPT | Performed by: PHYSICIAN ASSISTANT

## 2017-12-28 RX ORDER — ALBUTEROL SULFATE 90 UG/1
1-2 AEROSOL, METERED RESPIRATORY (INHALATION)
Qty: 1 INHALER | Refills: 0 | Status: SHIPPED | OUTPATIENT
Start: 2017-12-28 | End: 2018-01-27

## 2017-12-28 RX ORDER — CODEINE PHOSPHATE AND GUAIFENESIN 10; 100 MG/5ML; MG/5ML
5 SOLUTION ORAL EVERY 6 HOURS PRN
Qty: 118 ML | Refills: 0 | Status: SHIPPED | OUTPATIENT
Start: 2017-12-28 | End: 2018-01-27

## 2017-12-28 RX ORDER — AZITHROMYCIN 250 MG/1
TABLET, FILM COATED ORAL
Qty: 6 TABLET | Refills: 0 | Status: SHIPPED | OUTPATIENT
Start: 2017-12-28 | End: 2018-01-27

## 2017-12-28 NOTE — PROGRESS NOTES
HPI:  Russel Stockton is a 65year old female who presents for URI symptoms x 7 days. C/o sore throat, hoarse voice, nasal congestion, cough, mild wheezing. Denies fever, chills, sweats, SOB. Has had a couple episodes of diarrhea.   Denies abd pain conjunctiva are clear  HEENT: normocephalic, atraumatic, TMs clear & flat bilaterally, oropharynx clear, MMM, turbinates normal  NECK: supple, no lymphadenopathy  LUNGS: regular breathing rate and effort, mild expiratory wheezing heard throughout, no crack

## 2017-12-28 NOTE — PATIENT INSTRUCTIONS
Bronchitis:  - start using inhaler - 1-2 puffs every 4-6 hours as needed for chest tightness, shortness of breath, wheezing  - cough medicine as needed (may cause drowsiness - do not drive when taking this)  - if no improvement in the next few days start a

## 2018-01-08 ENCOUNTER — TELEPHONE (OUTPATIENT)
Dept: INTERNAL MEDICINE CLINIC | Facility: CLINIC | Age: 58
End: 2018-01-08

## 2018-01-08 DIAGNOSIS — R10.11 RIGHT UPPER QUADRANT ABDOMINAL PAIN: ICD-10-CM

## 2018-01-08 DIAGNOSIS — G43.709 CHRONIC MIGRAINE WITHOUT AURA WITHOUT STATUS MIGRAINOSUS, NOT INTRACTABLE: Primary | ICD-10-CM

## 2018-01-08 DIAGNOSIS — M54.9 BACK PAIN, UNSPECIFIED BACK LOCATION, UNSPECIFIED BACK PAIN LATERALITY, UNSPECIFIED CHRONICITY: ICD-10-CM

## 2018-01-24 NOTE — TELEPHONE ENCOUNTER
Phentermine 37.5 1 tab daily filled 12-21-17 30 with 0 refills     LOV 12-21-17     RTC 1 month for weight loss f/u    Next apt is 1-27-18

## 2018-01-25 RX ORDER — PHENTERMINE HYDROCHLORIDE 37.5 MG/1
TABLET ORAL
Qty: 30 TABLET | Refills: 0 | Status: SHIPPED | OUTPATIENT
Start: 2018-01-25 | End: 2018-03-15

## 2018-01-27 ENCOUNTER — OFFICE VISIT (OUTPATIENT)
Dept: INTERNAL MEDICINE CLINIC | Facility: CLINIC | Age: 58
End: 2018-01-27

## 2018-01-27 VITALS
TEMPERATURE: 98 F | HEIGHT: 62 IN | SYSTOLIC BLOOD PRESSURE: 90 MMHG | HEART RATE: 66 BPM | BODY MASS INDEX: 47.29 KG/M2 | RESPIRATION RATE: 13 BRPM | OXYGEN SATURATION: 98 % | DIASTOLIC BLOOD PRESSURE: 62 MMHG | WEIGHT: 257 LBS

## 2018-01-27 DIAGNOSIS — Z51.81 THERAPEUTIC DRUG MONITORING: ICD-10-CM

## 2018-01-27 DIAGNOSIS — E66.01 MORBID OBESITY WITH BMI OF 45.0-49.9, ADULT (HCC): Primary | ICD-10-CM

## 2018-01-27 PROCEDURE — 99213 OFFICE O/P EST LOW 20 MIN: CPT | Performed by: INTERNAL MEDICINE

## 2018-01-27 NOTE — PROGRESS NOTES
Patient presents with: Follow - Up  Weight Loss       HPI: The pt presents today for physician-supervised medical weight loss programming and assessment for the diagnosis of overweight and/or obesity status.   Has been on FDA-approved prescription medicati capsule, Rfl: 0  •  Propranolol HCl  MG Oral Capsule SR 24 Hr, Take 1 capsule (120 mg total) by mouth daily. , Disp: 30 capsule, Rfl: 2  •  Zolmitriptan (ZOMIG) 5 MG Oral Tab, Take 1 tablet (5 mg total) by mouth as needed for Migraine. , Disp: 9 tablet given.  3. Continue to work on diet, exercise, stressor reduction, and other formal weight loss measures. 4. RTC 1 month for physician-supervised medical weight loss programming. Zeke Honeycutt.  Nani Epps MD  Diplomate, [address] [place] [address] Board of Internal Medicine  [facility]

## 2018-02-08 ENCOUNTER — OFFICE VISIT (OUTPATIENT)
Dept: INTERNAL MEDICINE CLINIC | Facility: CLINIC | Age: 58
End: 2018-02-08

## 2018-02-08 VITALS
HEIGHT: 62 IN | RESPIRATION RATE: 22 BRPM | WEIGHT: 256.5 LBS | SYSTOLIC BLOOD PRESSURE: 110 MMHG | TEMPERATURE: 102 F | HEART RATE: 104 BPM | OXYGEN SATURATION: 95 % | BODY MASS INDEX: 47.2 KG/M2 | DIASTOLIC BLOOD PRESSURE: 68 MMHG

## 2018-02-08 DIAGNOSIS — J01.90 ACUTE RHINOSINUSITIS: Primary | ICD-10-CM

## 2018-02-08 DIAGNOSIS — M54.50 ACUTE LEFT-SIDED LOW BACK PAIN WITHOUT SCIATICA: ICD-10-CM

## 2018-02-08 PROCEDURE — 99214 OFFICE O/P EST MOD 30 MIN: CPT | Performed by: INTERNAL MEDICINE

## 2018-02-08 RX ORDER — AMOXICILLIN AND CLAVULANATE POTASSIUM 875; 125 MG/1; MG/1
1 TABLET, FILM COATED ORAL 2 TIMES DAILY
Qty: 14 TABLET | Refills: 0 | Status: SHIPPED | OUTPATIENT
Start: 2018-02-08 | End: 2018-02-15

## 2018-02-08 RX ORDER — ACETAMINOPHEN AND CODEINE PHOSPHATE 300; 30 MG/1; MG/1
1-2 TABLET ORAL EVERY 6 HOURS PRN
Qty: 30 TABLET | Refills: 0 | Status: SHIPPED | OUTPATIENT
Start: 2018-02-08 | End: 2018-02-13 | Stop reason: ALTCHOICE

## 2018-02-08 NOTE — PROGRESS NOTES
Crist Seip is a 65year old female. HPI:   Patient presents with:  Sinus Problem: Est Pt. c/c x 3 days sore throat, headache, sinus pressure, cough  Patient presents with URI sympotms for past 3 days.   Sore throat, headache, sinus pain/pressur nightly., Disp: , Rfl: 0  •  Naproxen Sodium 550 MG Oral Tab, Take 1 tablet by mouth 2 (two) times daily as needed. , Disp: , Rfl: 3  •  ARIpiprazole (ABILIFY) 5 MG Oral Tab, Take 5 mg by mouth daily. , Disp: , Rfl:   •  DULoxetine HCl 60 MG Oral Cap DR Part prescribed. Additionally recommended OTC steroid nasal sprays. 2.  Acute left-sided low back pain without sciatica  Hurt back shoveling snow. Some tenderness to palpation. Tylenol #3 refilled, #30 refills 0.     Patient Care Team:  David Gallagher MD a

## 2018-02-08 NOTE — PATIENT INSTRUCTIONS
- Start antibiotics today. Take 1 tablet twice daily with food for 1 week. - Also use steroid nasal sprays such as fluticasone, mometasone, Flonase, Nasocort, Nasonex, Rhinocort. Use twice daily for the next week.   - Use Tylenol with codeine as needed f

## 2018-02-13 ENCOUNTER — OFFICE VISIT (OUTPATIENT)
Dept: INTERNAL MEDICINE CLINIC | Facility: CLINIC | Age: 58
End: 2018-02-13

## 2018-02-13 ENCOUNTER — HOSPITAL ENCOUNTER (OUTPATIENT)
Dept: GENERAL RADIOLOGY | Age: 58
Discharge: HOME | End: 2018-02-13
Attending: PHYSICIAN ASSISTANT
Payer: COMMERCIAL

## 2018-02-13 VITALS
WEIGHT: 251.75 LBS | TEMPERATURE: 98 F | DIASTOLIC BLOOD PRESSURE: 60 MMHG | OXYGEN SATURATION: 97 % | BODY MASS INDEX: 46.33 KG/M2 | RESPIRATION RATE: 18 BRPM | SYSTOLIC BLOOD PRESSURE: 100 MMHG | HEART RATE: 76 BPM | HEIGHT: 62 IN

## 2018-02-13 DIAGNOSIS — R05.9 COUGH: ICD-10-CM

## 2018-02-13 DIAGNOSIS — J01.90 ACUTE SINUSITIS, RECURRENCE NOT SPECIFIED, UNSPECIFIED LOCATION: ICD-10-CM

## 2018-02-13 DIAGNOSIS — R05.9 COUGH: Primary | ICD-10-CM

## 2018-02-13 DIAGNOSIS — R19.7 DIARRHEA, UNSPECIFIED TYPE: ICD-10-CM

## 2018-02-13 DIAGNOSIS — R06.2 WHEEZING: ICD-10-CM

## 2018-02-13 PROCEDURE — 71046 X-RAY EXAM CHEST 2 VIEWS: CPT | Performed by: PHYSICIAN ASSISTANT

## 2018-02-13 PROCEDURE — 94640 AIRWAY INHALATION TREATMENT: CPT | Performed by: PHYSICIAN ASSISTANT

## 2018-02-13 PROCEDURE — 99214 OFFICE O/P EST MOD 30 MIN: CPT | Performed by: PHYSICIAN ASSISTANT

## 2018-02-13 RX ORDER — ALBUTEROL SULFATE 90 UG/1
1-2 AEROSOL, METERED RESPIRATORY (INHALATION)
Qty: 1 INHALER | Refills: 0 | Status: SHIPPED | OUTPATIENT
Start: 2018-02-13 | End: 2018-03-15 | Stop reason: ALTCHOICE

## 2018-02-13 RX ORDER — IPRATROPIUM BROMIDE AND ALBUTEROL SULFATE 2.5; .5 MG/3ML; MG/3ML
3 SOLUTION RESPIRATORY (INHALATION) ONCE
Status: COMPLETED | OUTPATIENT
Start: 2018-02-13 | End: 2018-02-13

## 2018-02-13 RX ORDER — CODEINE PHOSPHATE AND GUAIFENESIN 10; 100 MG/5ML; MG/5ML
5 SOLUTION ORAL EVERY 6 HOURS PRN
Qty: 118 ML | Refills: 0 | Status: SHIPPED | OUTPATIENT
Start: 2018-02-13 | End: 2018-03-15 | Stop reason: ALTCHOICE

## 2018-02-13 RX ADMIN — IPRATROPIUM BROMIDE AND ALBUTEROL SULFATE 3 ML: 2.5; .5 SOLUTION RESPIRATORY (INHALATION) at 11:16:00

## 2018-02-13 NOTE — PROGRESS NOTES
HPI:  Michelle Murphy is a 65year old female who presents for URI symptoms x 7-10. C/o nasal congestion, sinus pressure, headache, cough (mostly dry), chest tightness, SOB, wheezing. Denies fever, chills, sweats, sore throat.   Seen last week and dx no suspicious lesions  EYES: EOMI, conjunctiva are clear  HEENT: normocephalic, atraumatic, TMs clear & flat bilaterally, oropharynx clear, turbinates pink and swollen with clear rhinorrhea noted  NECK: supple, no lymphadenopathy  LUNGS: regular breathing

## 2018-02-13 NOTE — PATIENT INSTRUCTIONS
Diarrhea:  - increase fluid intake  - bland diet (toast, applesauce, bananas, crackers, broth soups, jello)  -STOP Augmentin    **follow up visit with Neeru Kidd on Friday if symptoms not improving    Viral or Bacterial Bronchitis with Wheezing (Adult)  - sta · You may use over-the-counter medicine to control fever or pain, unless another medicine was prescribed.  Note: If you have chronic liver or kidney disease or have ever had a stomach ulcer or gastrointestinal bleeding, talk with your healthcare provider be · Coughing up increasing amounts of colored sputum  · Weakness, drowsiness, headache, facial pain, ear pain, or a stiff neck  Call 911  Call 911 if any of these occur.   · Coughing up blood  · Worsening weakness, drowsiness, headache, or stiff neck  · Incre

## 2018-02-19 ENCOUNTER — TELEPHONE (OUTPATIENT)
Dept: INTERNAL MEDICINE CLINIC | Facility: CLINIC | Age: 58
End: 2018-02-19

## 2018-02-19 DIAGNOSIS — H57.9 EYE DISORDER: Primary | ICD-10-CM

## 2018-02-19 NOTE — TELEPHONE ENCOUNTER
Referral placed, notify pt. Aneesh Deng. Jean Gray MD  Diplomate, American Board of Internal Medicine  [facility]  [address] [facility], [address], [facility], [address]  T: [phone]; F: Grisel [phone]

## 2018-02-19 NOTE — TELEPHONE ENCOUNTER
[facility] called requesting referral for opthalmology - Dr. José Brock.  Patient has appointment scheduled for tomorrow  Ph: [phone]  Fax: [phone]

## 2018-02-22 ENCOUNTER — TELEPHONE (OUTPATIENT)
Dept: NEUROLOGY | Facility: CLINIC | Age: 58
End: 2018-02-22

## 2018-02-22 ENCOUNTER — OFFICE VISIT (OUTPATIENT)
Dept: NEUROLOGY | Facility: CLINIC | Age: 58
End: 2018-02-22

## 2018-02-22 VITALS
OXYGEN SATURATION: 96 % | HEART RATE: 97 BPM | RESPIRATION RATE: 20 BRPM | BODY MASS INDEX: 46.51 KG/M2 | SYSTOLIC BLOOD PRESSURE: 108 MMHG | DIASTOLIC BLOOD PRESSURE: 64 MMHG | TEMPERATURE: 99 F | HEIGHT: 62 IN | WEIGHT: 252.75 LBS

## 2018-02-22 DIAGNOSIS — G43.709 CHRONIC MIGRAINE W/O AURA W/O STATUS MIGRAINOSUS, NOT INTRACTABLE: Primary | ICD-10-CM

## 2018-02-22 DIAGNOSIS — G43.709 CHRONIC MIGRAINE WITHOUT AURA WITHOUT STATUS MIGRAINOSUS, NOT INTRACTABLE: Primary | ICD-10-CM

## 2018-02-22 PROCEDURE — 99213 OFFICE O/P EST LOW 20 MIN: CPT | Performed by: OTHER

## 2018-02-22 RX ORDER — PROPRANOLOL HYDROCHLORIDE 120 MG/1
120 CAPSULE, EXTENDED RELEASE ORAL DAILY
Qty: 30 CAPSULE | Refills: 2 | Status: SHIPPED | OUTPATIENT
Start: 2018-02-22 | End: 2018-05-25

## 2018-02-22 RX ORDER — ZOLMITRIPTAN 5 MG/1
5 TABLET, FILM COATED ORAL AS NEEDED
Qty: 9 TABLET | Refills: 2 | Status: SHIPPED | OUTPATIENT
Start: 2018-02-22 | End: 2018-06-23

## 2018-02-22 NOTE — TELEPHONE ENCOUNTER
Pt in office today and wants to restart Botox. Pt previously getting Botox injections in [place], before moving to [place].

## 2018-02-22 NOTE — PROGRESS NOTES
HPI:    Patient ID: Twin Warren is a 65year old female. HPI    Patient presented for follow up for migraines. States there is no change in her headache.  We increase dose of Inderal ER to 120 mg daily but there has been no significant improvemen Visit:  ARIpiprazole (ABILIFY) 5 MG Oral Tab Take 0.5 tablets (2.5 mg total) by mouth daily. Disp: 15 tablet Rfl: 1   diazepam 10 MG Oral Tab Take 1 tablet (10 mg total) by mouth nightly.  Take a half to a full tablet Nightly for insomnia Disp: 30 tablet Rf rigidity at left wrist. Tremors noted in both hand, more prominent and resting tremors in the left upper extremity. Strength is normal and symmetric.   Reflexes: symmetric and present  Coordination: Intact finger to nose and heel to shin test. Normal rapid trial and failure of additional medication and, if approved, potentially her  purchase of the medication to be administered in the office. No orders of the defined types were placed in this encounter.       Meds This Visit:  No prescriptions r

## 2018-02-28 NOTE — TELEPHONE ENCOUNTER
Referral #[id number] codes ,94152 for 4 visits from 2/22/18 - 8/22/18. Ok to schedule for visit Botox visit with Dr Roma Hutchinson in end of  March and let Aysha Armenta know so medication can be ordered.

## 2018-03-07 ENCOUNTER — LABORATORY ENCOUNTER (OUTPATIENT)
Dept: LAB | Age: 58
End: 2018-03-07
Attending: INTERNAL MEDICINE
Payer: COMMERCIAL

## 2018-03-07 DIAGNOSIS — R14.1 GAS PAIN: ICD-10-CM

## 2018-03-07 DIAGNOSIS — R13.10 DYSPHAGIA, UNSPECIFIED TYPE: Primary | ICD-10-CM

## 2018-03-07 DIAGNOSIS — R10.13 EPIGASTRIC PAIN: ICD-10-CM

## 2018-03-07 PROCEDURE — 88305 TISSUE EXAM BY PATHOLOGIST: CPT

## 2018-03-15 ENCOUNTER — OFFICE VISIT (OUTPATIENT)
Dept: INTERNAL MEDICINE CLINIC | Facility: CLINIC | Age: 58
End: 2018-03-15

## 2018-03-15 VITALS
HEART RATE: 72 BPM | WEIGHT: 250.75 LBS | DIASTOLIC BLOOD PRESSURE: 66 MMHG | TEMPERATURE: 99 F | RESPIRATION RATE: 16 BRPM | BODY MASS INDEX: 46.74 KG/M2 | HEIGHT: 61.5 IN | SYSTOLIC BLOOD PRESSURE: 98 MMHG

## 2018-03-15 DIAGNOSIS — E66.01 MORBID OBESITY WITH BMI OF 45.0-49.9, ADULT (HCC): Primary | ICD-10-CM

## 2018-03-15 DIAGNOSIS — Z51.81 THERAPEUTIC DRUG MONITORING: ICD-10-CM

## 2018-03-15 PROCEDURE — 99213 OFFICE O/P EST LOW 20 MIN: CPT | Performed by: INTERNAL MEDICINE

## 2018-03-15 RX ORDER — ZOLPIDEM TARTRATE 10 MG/1
TABLET ORAL
Refills: 3 | COMMUNITY
Start: 2018-01-10 | End: 2018-04-03 | Stop reason: ALTCHOICE

## 2018-03-15 RX ORDER — PHENTERMINE HYDROCHLORIDE 37.5 MG/1
TABLET ORAL
Qty: 30 TABLET | Refills: 0 | Status: SHIPPED | OUTPATIENT
Start: 2018-03-15 | End: 2018-04-11

## 2018-03-15 RX ORDER — OMEPRAZOLE 40 MG/1
CAPSULE, DELAYED RELEASE ORAL
Refills: 3 | COMMUNITY
Start: 2018-02-15 | End: 2018-04-10

## 2018-03-15 NOTE — PROGRESS NOTES
Patient presents with:  Weight Check       HPI: The pt presents today for physician-supervised medical weight loss programming and assessment for the diagnosis of overweight and/or obesity status.   Has been on FDA-approved prescription medication with Phen tablet, Rfl: 2  •  ARIpiprazole (ABILIFY) 5 MG Oral Tab, Take 0.5 tablets (2.5 mg total) by mouth daily. , Disp: 15 tablet, Rfl: 1  •  diazepam 10 MG Oral Tab, Take 1 tablet (10 mg total) by mouth nightly.  Take a half to a full tablet Nightly for insomnia, TAKE 1 TABLET BY MOUTH EVERY MORNING BEFORE BREAKFAST AS DIRECTED           Imaging & Consults:  None

## 2018-04-03 ENCOUNTER — OFFICE VISIT (OUTPATIENT)
Dept: INTERNAL MEDICINE CLINIC | Facility: CLINIC | Age: 58
End: 2018-04-03

## 2018-04-03 VITALS
RESPIRATION RATE: 16 BRPM | WEIGHT: 246.25 LBS | DIASTOLIC BLOOD PRESSURE: 70 MMHG | HEIGHT: 61.5 IN | HEART RATE: 88 BPM | OXYGEN SATURATION: 97 % | TEMPERATURE: 98 F | BODY MASS INDEX: 45.9 KG/M2 | SYSTOLIC BLOOD PRESSURE: 128 MMHG

## 2018-04-03 DIAGNOSIS — H92.01 RIGHT EAR PAIN: ICD-10-CM

## 2018-04-03 DIAGNOSIS — J02.9 PHARYNGITIS, UNSPECIFIED ETIOLOGY: Primary | ICD-10-CM

## 2018-04-03 DIAGNOSIS — J01.90 ACUTE SINUSITIS, RECURRENCE NOT SPECIFIED, UNSPECIFIED LOCATION: ICD-10-CM

## 2018-04-03 DIAGNOSIS — T14.8XXA SUPERFICIAL LACERATION OF SKIN: ICD-10-CM

## 2018-04-03 PROCEDURE — 87880 STREP A ASSAY W/OPTIC: CPT | Performed by: PHYSICIAN ASSISTANT

## 2018-04-03 PROCEDURE — 99214 OFFICE O/P EST MOD 30 MIN: CPT | Performed by: PHYSICIAN ASSISTANT

## 2018-04-03 NOTE — PATIENT INSTRUCTIONS
Nipple Bleeding / Laceration:  - clean daily with soap & water  - watch for signs of infection (redness, pain, swelling, drainage)  - call if no improvement in the next week    Viral Pharyngitis (Sore Throat)  - may take tylenol (acetaminophen) 1,000 mg Medicines for an adult: You may use acetaminophen or ibuprofen to control pain or fever, unless another medicine was prescribed for this.  If you have chronic liver or kidney disease or ever had a stomach ulcer or GI bleeding, talk with your healthcare prov

## 2018-04-03 NOTE — PROGRESS NOTES
HPI:  Early Tim is a 65year old female who presents for URI & GI symptoms x 3 days. C/o nasal congestion, sinus pressure, headache, R>L ear pain, sore throat. Denies fever, chills, sweats, cough, chest tightness, SOB, wheezing.   Has taken a si       Comment: x 2  No date: D & C  2015: NEEDLE BIOPSY RIGHT      Comment: benign breast  No date: OTHER SURGICAL HISTORY      Comment: C3-C4 anterior discectomy  2532-1462: OTHER SURGICAL HISTORY      Comment: ECT for depression  Family Histo of L nipple: clean daily with soap & water. Counseled on S&S of infection. The patient indicates understanding of these issues and agrees to the plan. The patient is asked to return if sx's worsen or persist more than 5-7 days.

## 2018-04-11 ENCOUNTER — OFFICE VISIT (OUTPATIENT)
Dept: NEUROLOGY | Facility: CLINIC | Age: 58
End: 2018-04-11

## 2018-04-11 VITALS
HEART RATE: 72 BPM | SYSTOLIC BLOOD PRESSURE: 120 MMHG | WEIGHT: 243 LBS | BODY MASS INDEX: 46 KG/M2 | DIASTOLIC BLOOD PRESSURE: 49 MMHG

## 2018-04-11 DIAGNOSIS — G43.709 CHRONIC MIGRAINE W/O AURA W/O STATUS MIGRAINOSUS, NOT INTRACTABLE: Primary | ICD-10-CM

## 2018-04-11 PROCEDURE — 64615 CHEMODENERV MUSC MIGRAINE: CPT | Performed by: OTHER

## 2018-04-11 NOTE — PATIENT INSTRUCTIONS
Refill policies:    • Allow 2-3 business days for refills; controlled substances may take longer.   • Contact your pharmacy at least 5 days prior to running out of medication and have them send an electronic request or submit request through the [facility] for the entire amount billed. Precertification and Prior Authorizations  If your physician has recommended that you have a procedure or additional testing performed.   [facility] [employer] ([facility]) will contact your insurance carrier to obtain pr

## 2018-04-12 RX ORDER — PHENTERMINE HYDROCHLORIDE 37.5 MG/1
TABLET ORAL
Qty: 30 TABLET | Refills: 0 | Status: SHIPPED | OUTPATIENT
Start: 2018-04-12 | End: 2018-05-04

## 2018-05-01 ENCOUNTER — OFFICE VISIT (OUTPATIENT)
Dept: SURGERY | Facility: CLINIC | Age: 58
End: 2018-05-01

## 2018-05-01 VITALS
SYSTOLIC BLOOD PRESSURE: 118 MMHG | BODY MASS INDEX: 45.88 KG/M2 | DIASTOLIC BLOOD PRESSURE: 84 MMHG | HEART RATE: 68 BPM | HEIGHT: 61 IN | WEIGHT: 243 LBS

## 2018-05-01 DIAGNOSIS — M47.816 ARTHRITIS, LUMBAR SPINE: ICD-10-CM

## 2018-05-01 DIAGNOSIS — M54.16 LUMBAR RADICULOPATHY: Primary | ICD-10-CM

## 2018-05-01 DIAGNOSIS — M53.3 SACROILIAC JOINT DYSFUNCTION OF BOTH SIDES: ICD-10-CM

## 2018-05-01 PROBLEM — M47.817 SPONDYLOSIS OF LUMBOSACRAL REGION WITHOUT MYELOPATHY OR RADICULOPATHY: Status: ACTIVE | Noted: 2018-05-01

## 2018-05-01 PROCEDURE — 99203 OFFICE O/P NEW LOW 30 MIN: CPT | Performed by: ANESTHESIOLOGY

## 2018-05-01 NOTE — PROGRESS NOTES
HPI:    Patient ID: Dwayne Amaya is a 65year old female. HPI    Review of Systems         Current Outpatient Prescriptions:  DULoxetine HCl 30 MG Oral Cap DR Particles Take 3 capsules (90 mg total) by mouth daily.  Disp: 270 capsule Rfl: 0   PHEN Aggravating Factors:    Sitting, Walking and Other bending    Past Treatments for Current Pain Condition:   Other pain meds    Prior diagnostic testing for your pain:  yes

## 2018-05-01 NOTE — H&P
Name: Michelle Murphy   : 1960   DOS: 2018     Chief complaint: Low back pain    History of present illness:  Michelle Murphy is a 65year old female with a one-year history of low back pain and left-sided radicular symptoms.   The patie (insomnia). Disp: 30 tablet Rfl: 1   Propranolol HCl  MG Oral Capsule SR 24 Hr Take 1 capsule (120 mg total) by mouth daily. Disp: 30 capsule Rfl: 2   Zolmitriptan (ZOMIG) 5 MG Oral Tab Take 1 tablet (5 mg total) by mouth as needed for Migraine.  Disp bilaterally. Facet loading positive bilaterally. Positive Antolin, positive pelvic thrust, positive SI joint compression.   Motor Examination:   (R)   (L)   Hip Abduction:               5    5   Hip Flexion:    5    5   Knee Extension:   5    5   Knee Fle

## 2018-05-01 NOTE — PATIENT INSTRUCTIONS
Refill policies:    • Allow 2-3 business days for refills; controlled substances may take longer.   • Contact your pharmacy at least 5 days prior to running out of medication and have them send an electronic request or submit request through the “request re entire amount billed. Precertification and Prior Authorizations: If your physician has recommended that you have a procedure or additional testing performed.   [facility]) will contact your insurance carrier to obtain pre-certi update us prior to the procedure if you are experiencing any symptoms of infection such as cough, fever, chills, urinary symptoms, or have recently been prescribed antibiotics, have open wounds, have recently had surgery or dental procedures.         Day of days  • Coumadin       5 days  • Procedure may be cancelled if INR is elevated.    • Excedrin (with aspirin) 7 days  • Plavix (Clopidogrel)  • Epidural ____ - 10 days  • Others 7 days   NSAIDs: 24 hours    • Ibuprofen (Motrin, Advil, Vicoprofen), Naproxen ( TO SEVEN DAYS PRIOR TO PROCEDURE**

## 2018-05-02 ENCOUNTER — TELEPHONE (OUTPATIENT)
Dept: SURGERY | Facility: CLINIC | Age: 58
End: 2018-05-02

## 2018-05-02 NOTE — TELEPHONE ENCOUNTER
Spoke with pt and rescheduled injections d/t to allow pt to have a  available. Questions r/t injection procedure answered. No additional needs at this time. Pt now scheduled for 5-21-18 arriving at 11:30 and 6-11-18 arriving at 11:30.   Updated pre-pr

## 2018-05-04 ENCOUNTER — OFFICE VISIT (OUTPATIENT)
Dept: INTERNAL MEDICINE CLINIC | Facility: CLINIC | Age: 58
End: 2018-05-04

## 2018-05-04 VITALS
BODY MASS INDEX: 45.27 KG/M2 | SYSTOLIC BLOOD PRESSURE: 112 MMHG | HEART RATE: 74 BPM | WEIGHT: 239.75 LBS | HEIGHT: 61 IN | RESPIRATION RATE: 22 BRPM | DIASTOLIC BLOOD PRESSURE: 76 MMHG | TEMPERATURE: 99 F

## 2018-05-04 DIAGNOSIS — Z51.81 THERAPEUTIC DRUG MONITORING: ICD-10-CM

## 2018-05-04 DIAGNOSIS — E66.01 MORBID OBESITY WITH BMI OF 45.0-49.9, ADULT (HCC): Primary | ICD-10-CM

## 2018-05-04 PROCEDURE — 99213 OFFICE O/P EST LOW 20 MIN: CPT | Performed by: INTERNAL MEDICINE

## 2018-05-04 RX ORDER — PHENTERMINE HYDROCHLORIDE 37.5 MG/1
TABLET ORAL
Qty: 30 TABLET | Refills: 0 | Status: SHIPPED | OUTPATIENT
Start: 2018-05-04 | End: 2018-06-08

## 2018-05-04 NOTE — PROGRESS NOTES
Patient presents with:  Weight Check: follow up       HPI: The pt presents today for physician-supervised medical weight loss programming and assessment for the diagnosis of overweight and/or obesity status.   Has been on FDA-approved prescription medicatio Take one capsule (20 mg total) by mouth once daily, 30 minutes prior to breakfast., Disp: 90 capsule, Rfl: 1  •  Propranolol HCl  MG Oral Capsule SR 24 Hr, Take 1 capsule (120 mg total) by mouth daily. , Disp: 30 capsule, Rfl: 2  •  Zolmitriptan (ZOMI

## 2018-05-08 ENCOUNTER — TELEPHONE (OUTPATIENT)
Dept: NEUROLOGY | Facility: CLINIC | Age: 58
End: 2018-05-08

## 2018-05-16 ENCOUNTER — TELEPHONE (OUTPATIENT)
Dept: SURGERY | Facility: CLINIC | Age: 58
End: 2018-05-16

## 2018-05-16 NOTE — TELEPHONE ENCOUNTER
Spoke to patient, confirmed procedure date of 05/21 and to be checked in at outpatient registration at 11:30 am. Patient instructed to call pre-procedure line before procedure at [phone].  Patient instructed to call office if there are additional quest

## 2018-05-16 NOTE — TELEPHONE ENCOUNTER
Spoke with pt who wanted to clarify the type of sedation she is having at her upcoming injection. Discussed sedation levels and explained IV sedation to pt. Pt had no additional questions or needs at this time.

## 2018-05-18 ENCOUNTER — TELEPHONE (OUTPATIENT)
Dept: INTERNAL MEDICINE CLINIC | Facility: CLINIC | Age: 58
End: 2018-05-18

## 2018-05-18 NOTE — TELEPHONE ENCOUNTER
Contacted patient and she provided information for her Colonoscopy. I contacted Gastroenterology in [place] Dr. Rodney Hogue. J Luis Sal ph.: [phone]. They are faxing over the colonoscopy report dated March 10, 2014.  Patient is due for repeat Colonoscopy

## 2018-05-21 ENCOUNTER — SURGERY (OUTPATIENT)
Age: 58
End: 2018-05-21

## 2018-05-21 ENCOUNTER — APPOINTMENT (OUTPATIENT)
Dept: GENERAL RADIOLOGY | Facility: HOSPITAL | Age: 58
End: 2018-05-21
Attending: ANESTHESIOLOGY
Payer: COMMERCIAL

## 2018-05-21 ENCOUNTER — HOSPITAL ENCOUNTER (OUTPATIENT)
Facility: HOSPITAL | Age: 58
Setting detail: HOSPITAL OUTPATIENT SURGERY
Discharge: HOME | End: 2018-05-21
Attending: ANESTHESIOLOGY | Admitting: ANESTHESIOLOGY
Payer: COMMERCIAL

## 2018-05-21 VITALS
OXYGEN SATURATION: 94 % | DIASTOLIC BLOOD PRESSURE: 65 MMHG | TEMPERATURE: 98 F | HEART RATE: 70 BPM | RESPIRATION RATE: 18 BRPM | SYSTOLIC BLOOD PRESSURE: 103 MMHG

## 2018-05-21 DIAGNOSIS — M47.816 ARTHRITIS, LUMBAR SPINE: ICD-10-CM

## 2018-05-21 DIAGNOSIS — M53.3 SACROILIAC JOINT DYSFUNCTION OF BOTH SIDES: ICD-10-CM

## 2018-05-21 PROCEDURE — 99152 MOD SED SAME PHYS/QHP 5/>YRS: CPT | Performed by: ANESTHESIOLOGY

## 2018-05-21 PROCEDURE — 3E0U33Z INTRODUCTION OF ANTI-INFLAMMATORY INTO JOINTS, PERCUTANEOUS APPROACH: ICD-10-PCS | Performed by: ANESTHESIOLOGY

## 2018-05-21 PROCEDURE — 3E0U3BZ INTRODUCTION OF ANESTHETIC AGENT INTO JOINTS, PERCUTANEOUS APPROACH: ICD-10-PCS | Performed by: ANESTHESIOLOGY

## 2018-05-21 RX ORDER — MIDAZOLAM HYDROCHLORIDE 1 MG/ML
INJECTION INTRAMUSCULAR; INTRAVENOUS AS NEEDED
Status: DISCONTINUED | OUTPATIENT
Start: 2018-05-21 | End: 2018-05-21 | Stop reason: HOSPADM

## 2018-05-21 RX ORDER — SODIUM CHLORIDE, SODIUM LACTATE, POTASSIUM CHLORIDE, CALCIUM CHLORIDE 600; 310; 30; 20 MG/100ML; MG/100ML; MG/100ML; MG/100ML
100 INJECTION, SOLUTION INTRAVENOUS CONTINUOUS
Status: DISCONTINUED | OUTPATIENT
Start: 2018-05-21 | End: 2018-05-21

## 2018-05-21 RX ORDER — LIDOCAINE HYDROCHLORIDE 10 MG/ML
INJECTION, SOLUTION EPIDURAL; INFILTRATION; INTRACAUDAL; PERINEURAL AS NEEDED
Status: DISCONTINUED | OUTPATIENT
Start: 2018-05-21 | End: 2018-05-21 | Stop reason: HOSPADM

## 2018-05-21 RX ORDER — METHYLPREDNISOLONE ACETATE 40 MG/ML
INJECTION, SUSPENSION INTRA-ARTICULAR; INTRALESIONAL; INTRAMUSCULAR; SOFT TISSUE AS NEEDED
Status: DISCONTINUED | OUTPATIENT
Start: 2018-05-21 | End: 2018-05-21 | Stop reason: HOSPADM

## 2018-05-21 RX ORDER — DIPHENHYDRAMINE HYDROCHLORIDE 50 MG/ML
50 INJECTION INTRAMUSCULAR; INTRAVENOUS ONCE AS NEEDED
Status: CANCELLED | OUTPATIENT
Start: 2018-05-21 | End: 2018-05-21

## 2018-05-21 RX ORDER — BUPIVACAINE HYDROCHLORIDE 5 MG/ML
INJECTION, SOLUTION EPIDURAL; INTRACAUDAL AS NEEDED
Status: DISCONTINUED | OUTPATIENT
Start: 2018-05-21 | End: 2018-05-21 | Stop reason: HOSPADM

## 2018-05-21 RX ORDER — ONDANSETRON 2 MG/ML
4 INJECTION INTRAMUSCULAR; INTRAVENOUS ONCE AS NEEDED
Status: CANCELLED | OUTPATIENT
Start: 2018-05-21 | End: 2018-05-21

## 2018-05-21 NOTE — H&P
History & Physical Examination    Patient Name: Rd Staley  MRN: [id number]  [facility]: [id number]  YOB: 1960    Pre-Operative Diagnosis:  Arthritis, lumbar spine ([address] [M46.96]  Sacroiliac joint dysfunction of both sides [M53.3]    Present History   Problem Relation Age of Onset   • Hypertension Father    • Heart Attack Father    • Heart Disease Father    • Alcohol and Other Disorders Associated Father    • Depression Father    • ALS [OTHER] Mother    • Personality Disorder Daughter    • Can

## 2018-05-21 NOTE — OPERATIVE REPORT
[facility]  Operative Report  2018     Kiana Chou Patient Status:  Hospital Outpatient Surgery    1960 MRN [id number]   [facility] SURGERY Attending Mandi Trujillo MD   Hosp Day # 0 PCP Gay Velazquez MD     Indication: D approximately 1 mL of 1% lidocaine with 10 mg of methylprednisolone was injected into each joint without complication. The needle was withdrawn with stylet in situ after being flushed with 0.5 mL lidocaine.   The contralateral injections were performed in

## 2018-05-25 DIAGNOSIS — G43.709 CHRONIC MIGRAINE WITHOUT AURA WITHOUT STATUS MIGRAINOSUS, NOT INTRACTABLE: Primary | ICD-10-CM

## 2018-05-28 RX ORDER — PROPRANOLOL HYDROCHLORIDE 120 MG/1
CAPSULE, EXTENDED RELEASE ORAL
Qty: 30 CAPSULE | Refills: 2 | Status: SHIPPED | OUTPATIENT
Start: 2018-05-28 | End: 2018-07-23

## 2018-06-06 ENCOUNTER — TELEPHONE (OUTPATIENT)
Dept: SURGERY | Facility: CLINIC | Age: 58
End: 2018-06-06

## 2018-06-06 NOTE — TELEPHONE ENCOUNTER
Spoke with pt and answered questions r/t IV sedation and holding food/drink for 6 hours prior to procedure. Pt expressed appreciation and had no additional questions or needs.

## 2018-06-06 NOTE — TELEPHONE ENCOUNTER
Left message for patient, confirmed procedure date of 6/11/18 and to be checked in at outpatient registration at 1130 am. Patient instructed to call pre-procedure line before procedure at [phone].  Patient instructed to call office if there are additio

## 2018-06-08 ENCOUNTER — OFFICE VISIT (OUTPATIENT)
Dept: INTERNAL MEDICINE CLINIC | Facility: CLINIC | Age: 58
End: 2018-06-08

## 2018-06-08 ENCOUNTER — TELEPHONE (OUTPATIENT)
Dept: INTERNAL MEDICINE CLINIC | Facility: CLINIC | Age: 58
End: 2018-06-08

## 2018-06-08 VITALS
BODY MASS INDEX: 42.91 KG/M2 | SYSTOLIC BLOOD PRESSURE: 104 MMHG | TEMPERATURE: 98 F | HEART RATE: 63 BPM | OXYGEN SATURATION: 97 % | HEIGHT: 61 IN | RESPIRATION RATE: 18 BRPM | DIASTOLIC BLOOD PRESSURE: 74 MMHG | WEIGHT: 227.25 LBS

## 2018-06-08 DIAGNOSIS — E66.01 MORBID OBESITY WITH BMI OF 45.0-49.9, ADULT (HCC): Primary | ICD-10-CM

## 2018-06-08 DIAGNOSIS — Z51.81 THERAPEUTIC DRUG MONITORING: ICD-10-CM

## 2018-06-08 PROCEDURE — 99213 OFFICE O/P EST LOW 20 MIN: CPT | Performed by: INTERNAL MEDICINE

## 2018-06-08 RX ORDER — PHENTERMINE HYDROCHLORIDE 37.5 MG/1
TABLET ORAL
Qty: 30 TABLET | Refills: 0 | Status: SHIPPED | OUTPATIENT
Start: 2018-06-08 | End: 2018-07-16

## 2018-06-08 NOTE — TELEPHONE ENCOUNTER
Information needed For medical release form: Dr. Kathy Osborne and  Dr. Idalia Hernández and fax [phone]         Documentation       Indigo Lezama.  Toya Inman [phone]  Lauren Jean-Baptiste

## 2018-06-08 NOTE — TELEPHONE ENCOUNTER
Pt signed release of record for pap. Will call our office with Doctors name so request could be faxed to them.

## 2018-06-08 NOTE — PROGRESS NOTES
Patient presents with:  Weight Management       HPI: The pt presents today for physician-supervised medical weight loss programming and assessment for the diagnosis of overweight and/or obesity status.   Has been on FDA-approved prescription medication with Particles, Take 3 capsules (90 mg total) by mouth daily. , Disp: 270 capsule, Rfl: 0  •  Dicyclomine HCl 10 MG Oral Cap, Take 1 capsule (10 mg total) by mouth 2 (two) times daily as needed. , Disp: 180 capsule, Rfl: 1  •  omeprazole 20 MG Oral Capsule Delaye MOUTH EVERY MORNING BEFORE BREKAFAST AS DIRECTED           Imaging & Consults:  None

## 2018-06-08 NOTE — TELEPHONE ENCOUNTER
Information needed For medical release form: Dr. Breanna Rosales and  Dr. Oc Dillon and fax [phone]

## 2018-06-11 ENCOUNTER — SURGERY (OUTPATIENT)
Age: 58
End: 2018-06-11

## 2018-06-11 ENCOUNTER — APPOINTMENT (OUTPATIENT)
Dept: GENERAL RADIOLOGY | Facility: HOSPITAL | Age: 58
End: 2018-06-11
Attending: ANESTHESIOLOGY
Payer: COMMERCIAL

## 2018-06-11 ENCOUNTER — HOSPITAL ENCOUNTER (OUTPATIENT)
Facility: HOSPITAL | Age: 58
Setting detail: HOSPITAL OUTPATIENT SURGERY
Discharge: HOME | End: 2018-06-11
Attending: ANESTHESIOLOGY | Admitting: ANESTHESIOLOGY
Payer: COMMERCIAL

## 2018-06-11 VITALS
TEMPERATURE: 98 F | SYSTOLIC BLOOD PRESSURE: 115 MMHG | HEART RATE: 68 BPM | DIASTOLIC BLOOD PRESSURE: 71 MMHG | RESPIRATION RATE: 18 BRPM | OXYGEN SATURATION: 98 %

## 2018-06-11 DIAGNOSIS — M53.3 SACROILIAC JOINT DYSFUNCTION OF BOTH SIDES: ICD-10-CM

## 2018-06-11 DIAGNOSIS — M47.816 ARTHRITIS, LUMBAR SPINE: ICD-10-CM

## 2018-06-11 PROCEDURE — 99152 MOD SED SAME PHYS/QHP 5/>YRS: CPT | Performed by: ANESTHESIOLOGY

## 2018-06-11 PROCEDURE — 3E0U33Z INTRODUCTION OF ANTI-INFLAMMATORY INTO JOINTS, PERCUTANEOUS APPROACH: ICD-10-PCS | Performed by: ANESTHESIOLOGY

## 2018-06-11 RX ORDER — ONDANSETRON 2 MG/ML
4 INJECTION INTRAMUSCULAR; INTRAVENOUS ONCE AS NEEDED
Status: DISCONTINUED | OUTPATIENT
Start: 2018-06-11 | End: 2018-06-11

## 2018-06-11 RX ORDER — BUPIVACAINE HYDROCHLORIDE 5 MG/ML
INJECTION, SOLUTION EPIDURAL; INTRACAUDAL AS NEEDED
Status: DISCONTINUED | OUTPATIENT
Start: 2018-06-11 | End: 2018-06-11 | Stop reason: HOSPADM

## 2018-06-11 RX ORDER — SODIUM CHLORIDE, SODIUM LACTATE, POTASSIUM CHLORIDE, CALCIUM CHLORIDE 600; 310; 30; 20 MG/100ML; MG/100ML; MG/100ML; MG/100ML
100 INJECTION, SOLUTION INTRAVENOUS CONTINUOUS
Status: DISCONTINUED | OUTPATIENT
Start: 2018-06-11 | End: 2018-06-11

## 2018-06-11 RX ORDER — LIDOCAINE HYDROCHLORIDE 10 MG/ML
INJECTION, SOLUTION EPIDURAL; INFILTRATION; INTRACAUDAL; PERINEURAL AS NEEDED
Status: DISCONTINUED | OUTPATIENT
Start: 2018-06-11 | End: 2018-06-11 | Stop reason: HOSPADM

## 2018-06-11 RX ORDER — DIPHENHYDRAMINE HYDROCHLORIDE 50 MG/ML
50 INJECTION INTRAMUSCULAR; INTRAVENOUS ONCE AS NEEDED
Status: DISCONTINUED | OUTPATIENT
Start: 2018-06-11 | End: 2018-06-11

## 2018-06-11 RX ORDER — METHYLPREDNISOLONE ACETATE 40 MG/ML
INJECTION, SUSPENSION INTRA-ARTICULAR; INTRALESIONAL; INTRAMUSCULAR; SOFT TISSUE AS NEEDED
Status: DISCONTINUED | OUTPATIENT
Start: 2018-06-11 | End: 2018-06-11 | Stop reason: HOSPADM

## 2018-06-11 RX ORDER — MIDAZOLAM HYDROCHLORIDE 1 MG/ML
INJECTION INTRAMUSCULAR; INTRAVENOUS AS NEEDED
Status: DISCONTINUED | OUTPATIENT
Start: 2018-06-11 | End: 2018-06-11 | Stop reason: HOSPADM

## 2018-06-11 NOTE — H&P
History & Physical Examination    Patient Name: Vikki Arenas  MRN: [id number]  CSN: [id number]  YOB: 1960    Pre-Operative Diagnosis:  Arthritis, lumbar spine ([facility] [address] [M46.96]  Sacroiliac joint dysfunction of both sides [M53.3]    Present anterior discectomy  [id number]: OTHER SURGICAL HISTORY      Comment: ECT for depression  Family History   Problem Relation Age of Onset   • Hypertension Father    • Heart Attack Father    • Heart Disease Father    • Alcohol and Other Disorders Associated F

## 2018-06-21 ENCOUNTER — OFFICE VISIT (OUTPATIENT)
Dept: SURGERY | Facility: CLINIC | Age: 58
End: 2018-06-21

## 2018-06-21 VITALS
OXYGEN SATURATION: 98 % | WEIGHT: 227.25 LBS | BODY MASS INDEX: 42.91 KG/M2 | DIASTOLIC BLOOD PRESSURE: 86 MMHG | SYSTOLIC BLOOD PRESSURE: 120 MMHG | HEART RATE: 66 BPM | HEIGHT: 61 IN

## 2018-06-21 DIAGNOSIS — M47.817 SPONDYLOSIS OF LUMBOSACRAL REGION WITHOUT MYELOPATHY OR RADICULOPATHY: ICD-10-CM

## 2018-06-21 DIAGNOSIS — M53.3 SACROILIAC JOINT DYSFUNCTION OF BOTH SIDES: ICD-10-CM

## 2018-06-21 DIAGNOSIS — M54.16 LUMBAR RADICULOPATHY: Primary | ICD-10-CM

## 2018-06-21 PROCEDURE — 99213 OFFICE O/P EST LOW 20 MIN: CPT | Performed by: ANESTHESIOLOGY

## 2018-06-21 NOTE — PATIENT INSTRUCTIONS
Refill policies:    • Allow 2-3 business days for refills; controlled substances may take longer.   • Contact your pharmacy at least 5 days prior to running out of medication and have them send an electronic request or submit request through the “request re entire amount billed. Precertification and Prior Authorizations: If your physician has recommended that you have a procedure or additional testing performed.   [facility]) will contact your insurance carrier to obtain pre-certi

## 2018-06-21 NOTE — PROGRESS NOTES
HPI:    Patient ID: Marisa Santamaria is a 63year old female.     HPI    Review of Systems         Current Outpatient Prescriptions:  Phentermine HCl 37.5 MG Oral Tab TAKE 1 TABLET BY MOUTH EVERY MORNING BEFORE BREKAFAST AS DIRECTED Disp: 30 tablet Rfl:

## 2018-06-21 NOTE — PROGRESS NOTES
Name: Rossi Byers   : 1960   DOS: 2018     Pain Clinic Follow Up Visit:   Patient presents with:   Follow - Up: post injection, received approximately 100%relief      Rossi Byers is a 63year old female with low back and SI join attention span intact. Inspection:  Ambulates with well-coordinated, fluid, non-antalgic gait.   Gait is normal.  Neck: Full range of motion  Back: Nontender    IMAGES:     No new imaging    ASSESSMENT AND PLAN:   Lumbar radiculopathy  (primary encounter

## 2018-06-25 RX ORDER — ZOLMITRIPTAN 5 MG/1
TABLET, FILM COATED ORAL
Qty: 9 TABLET | Refills: 0 | Status: SHIPPED | OUTPATIENT
Start: 2018-06-25 | End: 2018-07-23

## 2018-06-26 ENCOUNTER — OFFICE VISIT (OUTPATIENT)
Dept: INTERNAL MEDICINE CLINIC | Facility: CLINIC | Age: 58
End: 2018-06-26

## 2018-06-26 VITALS
WEIGHT: 220.5 LBS | SYSTOLIC BLOOD PRESSURE: 106 MMHG | BODY MASS INDEX: 41.63 KG/M2 | RESPIRATION RATE: 18 BRPM | TEMPERATURE: 98 F | HEIGHT: 61 IN | HEART RATE: 59 BPM | DIASTOLIC BLOOD PRESSURE: 78 MMHG | OXYGEN SATURATION: 98 %

## 2018-06-26 DIAGNOSIS — E66.01 MORBID OBESITY WITH BMI OF 40.0-44.9, ADULT (HCC): ICD-10-CM

## 2018-06-26 DIAGNOSIS — B37.2 INTERTRIGINOUS CANDIDIASIS: Primary | ICD-10-CM

## 2018-06-26 PROCEDURE — 99214 OFFICE O/P EST MOD 30 MIN: CPT | Performed by: PHYSICIAN ASSISTANT

## 2018-06-26 RX ORDER — NYSTATIN 100000 U/G
CREAM TOPICAL
Qty: 30 G | Refills: 2 | Status: SHIPPED | OUTPATIENT
Start: 2018-06-26 | End: 2018-08-28 | Stop reason: ALTCHOICE

## 2018-06-26 NOTE — PROGRESS NOTES
Rossi Byers is a 63year old female. HPI:   Patient presents for eval of a rash under bilat breasts and under  scar which she has had for many years. C/o red, moist skin. Denies itching, pain, burning, bleeding.   Has tried baby powde lesions  HEART: regular rate and rhythm  LUNGS: clear bilat    ASSESSMENT AND PLAN:   # Intertriginous candidiasis: start nystatin cream.  # Morbid obesity: wt loss applauded. Cont phentermine per Dr. Clay Campbell and cont to work on lifestyle changes.     The pa

## 2018-06-26 NOTE — PATIENT INSTRUCTIONS
Rash:  - start nystatin cream -- thin layer twice a day x 2-4 weeks (until clear)  - then may use twice a day as needed

## 2018-06-27 PROBLEM — E66.01 MORBID OBESITY WITH BMI OF 40.0-44.9, ADULT (HCC): Status: ACTIVE | Noted: 2017-11-21

## 2018-07-02 ENCOUNTER — TELEPHONE (OUTPATIENT)
Dept: INTERNAL MEDICINE CLINIC | Facility: CLINIC | Age: 58
End: 2018-07-02

## 2018-07-02 NOTE — TELEPHONE ENCOUNTER
Please callback patient she feels she is having allergic reaction to cream lilliana recently prescribed

## 2018-07-02 NOTE — TELEPHONE ENCOUNTER
Stop nystatin cream though I doubt she is reacting to this if the rash is only on her hands and forearms as she is using the cream under her breasts. May use OTC hydrocortisone cream BID for the current rash on hands.   If no improvement in the next few da

## 2018-07-02 NOTE — TELEPHONE ENCOUNTER
Spoke to patient, seen Elio Cobb on 6/26/18 patient  c/o cluster's of red raised rash with slight itching on B hands and B forearms has dots no itching, sx's started on Friday .  Please advise

## 2018-07-03 ENCOUNTER — OFFICE VISIT (OUTPATIENT)
Dept: INTERNAL MEDICINE CLINIC | Facility: CLINIC | Age: 58
End: 2018-07-03

## 2018-07-03 VITALS
WEIGHT: 217 LBS | OXYGEN SATURATION: 98 % | TEMPERATURE: 98 F | HEART RATE: 56 BPM | RESPIRATION RATE: 18 BRPM | SYSTOLIC BLOOD PRESSURE: 124 MMHG | HEIGHT: 61 IN | BODY MASS INDEX: 40.97 KG/M2 | DIASTOLIC BLOOD PRESSURE: 68 MMHG

## 2018-07-03 DIAGNOSIS — B37.2 CANDIDAL SKIN INFECTION: ICD-10-CM

## 2018-07-03 DIAGNOSIS — R21 RASH: Primary | ICD-10-CM

## 2018-07-03 PROCEDURE — 99213 OFFICE O/P EST LOW 20 MIN: CPT | Performed by: INTERNAL MEDICINE

## 2018-07-03 NOTE — PATIENT INSTRUCTIONS
Triamcinolone cream for 7 days.  Can extend to 10 days for arms/hands     Benadryl 25mg at bedtime as needed for drowsiness

## 2018-07-03 NOTE — PROGRESS NOTES
[facility] Internal Medicine Office Note  Chief Complaint:   Patient presents with:  Rash: follow up from last office visit.  Rash still not improving, underneath bilateral breast area and above incision line from csection       HPI:   This is a 5 Daughter    • Cancer Sister      melanoma        I reviewed her's Past Medical History, Past Surgical History, Family History and   Social History updated shows  Smoking status: Never Smoker                                                              Smok (Approximate)   SpO2 98%   Breastfeeding? No   BMI 41.00 kg/m²  Estimated body mass index is 41 kg/m² as calculated from the following:    Height as of this encounter: 61\". Weight as of this encounter: 217 lb. Vital signs reviewed.  Appears stated age acetonide 0.1 % External Cream 60 g 0      Sig: Apply topically 2 (two) times daily as needed.            Imaging & Consults:  DERM - INTERNAL    Annual Physical due on 05/21/1963  Pap Smear,3 Years due on 05/21/1992  Patient/Caregiver Education: Patient/Ca

## 2018-07-03 NOTE — TELEPHONE ENCOUNTER
Patient returning phone call. Briefly relayed message below, however patient wanted to come in to be seen, since nothing has been helping her improve her condition.     Appointment scheduled with Dr. Jena Navarrete today at 9:30 AM

## 2018-07-16 ENCOUNTER — OFFICE VISIT (OUTPATIENT)
Dept: INTERNAL MEDICINE CLINIC | Facility: CLINIC | Age: 58
End: 2018-07-16

## 2018-07-16 VITALS
HEIGHT: 61 IN | TEMPERATURE: 99 F | WEIGHT: 209.25 LBS | RESPIRATION RATE: 18 BRPM | DIASTOLIC BLOOD PRESSURE: 70 MMHG | HEART RATE: 78 BPM | SYSTOLIC BLOOD PRESSURE: 100 MMHG | BODY MASS INDEX: 39.5 KG/M2 | OXYGEN SATURATION: 97 %

## 2018-07-16 DIAGNOSIS — E66.01 SEVERE OBESITY (BMI 35.0-39.9) WITH COMORBIDITY (HCC): Primary | ICD-10-CM

## 2018-07-16 DIAGNOSIS — G43.709 CHRONIC MIGRAINE WITHOUT AURA WITHOUT STATUS MIGRAINOSUS, NOT INTRACTABLE: ICD-10-CM

## 2018-07-16 DIAGNOSIS — Z51.81 THERAPEUTIC DRUG MONITORING: ICD-10-CM

## 2018-07-16 PROCEDURE — 99213 OFFICE O/P EST LOW 20 MIN: CPT | Performed by: INTERNAL MEDICINE

## 2018-07-16 RX ORDER — PHENTERMINE HYDROCHLORIDE 37.5 MG/1
37.5 TABLET ORAL
Qty: 30 TABLET | Refills: 0 | Status: SHIPPED | OUTPATIENT
Start: 2018-07-16 | End: 2018-09-20

## 2018-07-16 NOTE — PROGRESS NOTES
Patient presents with:  Weight Management      HPI: The pt presents today for physician-supervised medical weight loss programming and assessment for the diagnosis of overweight and/or obesity status.   Has been on FDA-approved prescription medication with a thin layer to affected area twice a day as needed, Disp: 30 g, Rfl: 2  •  ZOLMITRIPTAN 5 MG Oral Tab, TAKE 1 TABLET( 5 MG TOTAL) BY MOUTH AS NEEDED FOR MIGRAINE, Disp: 9 tablet, Rfl: 0  •  diazepam 10 MG Oral Tab, Take a half tablet in the morning and fu injections, as she's out of referrals. 5. RTC 1 month for physician-supervised medical weight loss programming. Trevon Morrow. Gwendlyn Jeans, MD  Diplomate, American Board of Internal Medicine  [address] [facility]  [address] [facility],[address]Th Floor, [address], [facility], [address] [place] [address]

## 2018-07-23 ENCOUNTER — OFFICE VISIT (OUTPATIENT)
Dept: NEUROLOGY | Facility: CLINIC | Age: 58
End: 2018-07-23

## 2018-07-23 VITALS
DIASTOLIC BLOOD PRESSURE: 70 MMHG | HEART RATE: 66 BPM | RESPIRATION RATE: 16 BRPM | BODY MASS INDEX: 40.22 KG/M2 | SYSTOLIC BLOOD PRESSURE: 100 MMHG | HEIGHT: 61 IN | WEIGHT: 213 LBS

## 2018-07-23 DIAGNOSIS — G43.709 CHRONIC MIGRAINE WITHOUT AURA WITHOUT STATUS MIGRAINOSUS, NOT INTRACTABLE: ICD-10-CM

## 2018-07-23 PROCEDURE — 64615 CHEMODENERV MUSC MIGRAINE: CPT | Performed by: OTHER

## 2018-07-23 RX ORDER — PROPRANOLOL HYDROCHLORIDE 120 MG/1
CAPSULE, EXTENDED RELEASE ORAL
Qty: 30 CAPSULE | Refills: 2 | Status: SHIPPED | OUTPATIENT
Start: 2018-07-23 | End: 2018-10-19

## 2018-07-23 RX ORDER — ZOLMITRIPTAN 5 MG/1
TABLET, FILM COATED ORAL
Qty: 9 TABLET | Refills: 2 | Status: SHIPPED | OUTPATIENT
Start: 2018-07-23 | End: 2019-02-13

## 2018-07-23 NOTE — PATIENT INSTRUCTIONS
Refill policies:    • Allow 2-3 business days for refills; controlled substances may take longer.   • Contact your pharmacy at least 5 days prior to running out of medication and have them send an electronic request or submit request through the “request re entire amount billed. Precertification and Prior Authorizations: If your physician has recommended that you have a procedure or additional testing performed.   [facility]) will contact your insurance carrier to obtain pre-certi

## 2018-08-09 ENCOUNTER — TELEPHONE (OUTPATIENT)
Dept: INTERNAL MEDICINE CLINIC | Facility: CLINIC | Age: 58
End: 2018-08-09

## 2018-08-09 NOTE — TELEPHONE ENCOUNTER
Patient stated that she was prescribed Phentermine HCl 37.5 MG Oral Tab for weight loss and diazepam 10 MG Oral Tab for her anxiety and OCD. She stated that she is having a flair up with her OCD and Anxiety.  She also stated that since taking the Phentermin

## 2018-08-09 NOTE — TELEPHONE ENCOUNTER
I spoke with patient. All questions answered. Beto Leonardo. Loretta Friedman MD  Diplomate, American Board of Internal Medicine  [facility]  [address]  [address] [facility],4Th Floor, [address], [facility], [address] [place] [address]  T: [id number]; F: [address]

## 2018-08-20 ENCOUNTER — OFFICE VISIT (OUTPATIENT)
Dept: INTERNAL MEDICINE CLINIC | Facility: CLINIC | Age: 58
End: 2018-08-20

## 2018-08-20 VITALS
DIASTOLIC BLOOD PRESSURE: 70 MMHG | TEMPERATURE: 99 F | OXYGEN SATURATION: 97 % | RESPIRATION RATE: 16 BRPM | BODY MASS INDEX: 41.07 KG/M2 | HEART RATE: 68 BPM | HEIGHT: 61 IN | WEIGHT: 217.5 LBS | SYSTOLIC BLOOD PRESSURE: 102 MMHG

## 2018-08-20 DIAGNOSIS — E66.01 MORBID OBESITY WITH BMI OF 40.0-44.9, ADULT (HCC): Primary | ICD-10-CM

## 2018-08-20 DIAGNOSIS — Z51.81 THERAPEUTIC DRUG MONITORING: ICD-10-CM

## 2018-08-20 DIAGNOSIS — E66.01 MORBID OBESITY WITH BMI OF 40.0-44.9, ADULT (HCC): ICD-10-CM

## 2018-08-20 PROBLEM — F42.2 MIXED OBSESSIONAL THOUGHTS AND ACTS: Status: ACTIVE | Noted: 2018-08-20

## 2018-08-20 PROCEDURE — 99213 OFFICE O/P EST LOW 20 MIN: CPT | Performed by: INTERNAL MEDICINE

## 2018-08-20 RX ORDER — NAPROXEN 500 MG/1
500 TABLET ORAL 2 TIMES DAILY WITH MEALS
COMMUNITY
End: 2019-03-05

## 2018-08-20 RX ORDER — PHENTERMINE HYDROCHLORIDE 37.5 MG/1
37.5 TABLET ORAL
Qty: 30 TABLET | Refills: 0 | Status: SHIPPED | OUTPATIENT
Start: 2018-08-20 | End: 2018-09-20

## 2018-08-21 NOTE — PROGRESS NOTES
Patient presents with:  Weight Check       HPI: The pt presents today for physician-supervised medical weight loss programming and assessment for the diagnosis of overweight and/or obesity status.   Has been on FDA-approved prescription medication with Phen Comment: occ, 6x a year      PE:  /70 (BP Location: Left arm, Patient Position: Sitting, Cuff Size: adult)   Pulse 68   Temp 98.5 °F (36.9 °C) (Oral)   Resp 16   Ht 61\"   Wt 217 lb 8 oz   SpO2 97%   BMI 41.10 kg/m²   Wt Readings from Last 6 Encounte

## 2018-08-28 PROBLEM — F41.9 ANXIETY DISORDER, UNSPECIFIED: Status: ACTIVE | Noted: 2018-08-28

## 2018-09-20 ENCOUNTER — OFFICE VISIT (OUTPATIENT)
Dept: INTERNAL MEDICINE CLINIC | Facility: CLINIC | Age: 58
End: 2018-09-20

## 2018-09-20 VITALS
TEMPERATURE: 98 F | HEART RATE: 64 BPM | DIASTOLIC BLOOD PRESSURE: 70 MMHG | SYSTOLIC BLOOD PRESSURE: 104 MMHG | HEIGHT: 61.75 IN | BODY MASS INDEX: 36.94 KG/M2 | WEIGHT: 200.75 LBS | RESPIRATION RATE: 16 BRPM

## 2018-09-20 DIAGNOSIS — E66.09 CLASS 2 OBESITY DUE TO EXCESS CALORIES WITHOUT SERIOUS COMORBIDITY WITH BODY MASS INDEX (BMI) OF 37.0 TO 37.9 IN ADULT: Primary | ICD-10-CM

## 2018-09-20 DIAGNOSIS — Z12.31 SCREENING MAMMOGRAM, ENCOUNTER FOR: ICD-10-CM

## 2018-09-20 DIAGNOSIS — Z23 FLU VACCINE NEED: ICD-10-CM

## 2018-09-20 DIAGNOSIS — Z51.81 THERAPEUTIC DRUG MONITORING: ICD-10-CM

## 2018-09-20 PROBLEM — E66.01 MORBID OBESITY WITH BMI OF 40.0-44.9, ADULT (HCC): Status: RESOLVED | Noted: 2017-11-21 | Resolved: 2018-09-20

## 2018-09-20 PROBLEM — E66.812 CLASS 2 OBESITY DUE TO EXCESS CALORIES WITHOUT SERIOUS COMORBIDITY WITH BODY MASS INDEX (BMI) OF 37.0 TO 37.9 IN ADULT: Status: ACTIVE | Noted: 2018-07-16

## 2018-09-20 PROCEDURE — 90686 IIV4 VACC NO PRSV 0.5 ML IM: CPT | Performed by: INTERNAL MEDICINE

## 2018-09-20 PROCEDURE — 99213 OFFICE O/P EST LOW 20 MIN: CPT | Performed by: INTERNAL MEDICINE

## 2018-09-20 PROCEDURE — 90471 IMMUNIZATION ADMIN: CPT | Performed by: INTERNAL MEDICINE

## 2018-09-20 RX ORDER — PHENTERMINE HYDROCHLORIDE 37.5 MG/1
37.5 TABLET ORAL
Qty: 30 TABLET | Refills: 0 | Status: SHIPPED | OUTPATIENT
Start: 2018-09-20 | End: 2018-10-18

## 2018-09-20 NOTE — PROGRESS NOTES
Patient presents with:  Weight Check      HPI: Ivory Hernandez presents today for Obesity follow up. She's been on Phentermine for 8 months now. Starting weight was 265 pounds.   She's lost now 60 pounds on this medication in addition to many lifestyle measures she' Disp: 270 capsule, Rfl: 0  •  omeprazole 20 MG Oral Capsule Delayed Release, Take one capsule (20 mg total) by mouth once daily, 30 minutes prior to breakfast., Disp: 90 capsule, Rfl: 1  •  naproxen 500 MG Oral Tab, Take 500 mg by mouth 2 (two) times daily Signed Prescriptions Disp Refills   • Phentermine HCl 37.5 MG Oral Tab 30 tablet 0     Sig: Take 1 tablet (37.5 mg total) by mouth every morning before breakfast.       Imaging & Consults:  FLULAVAL INFLUENZA VACCINE QUAD PRESERVATIVE FREE 0.5 ML  NOEMY

## 2018-09-21 ENCOUNTER — TELEPHONE (OUTPATIENT)
Dept: SURGERY | Facility: CLINIC | Age: 58
End: 2018-09-21

## 2018-10-15 ENCOUNTER — OFFICE VISIT (OUTPATIENT)
Dept: NEUROLOGY | Facility: CLINIC | Age: 58
End: 2018-10-15

## 2018-10-15 ENCOUNTER — TELEPHONE (OUTPATIENT)
Dept: NEUROLOGY | Facility: CLINIC | Age: 58
End: 2018-10-15

## 2018-10-15 VITALS
SYSTOLIC BLOOD PRESSURE: 110 MMHG | HEART RATE: 78 BPM | WEIGHT: 195 LBS | DIASTOLIC BLOOD PRESSURE: 68 MMHG | BODY MASS INDEX: 36 KG/M2 | RESPIRATION RATE: 18 BRPM

## 2018-10-15 DIAGNOSIS — G43.709 CHRONIC MIGRAINE WITHOUT AURA WITHOUT STATUS MIGRAINOSUS, NOT INTRACTABLE: Primary | ICD-10-CM

## 2018-10-15 PROCEDURE — 64615 CHEMODENERV MUSC MIGRAINE: CPT | Performed by: OTHER

## 2018-10-15 NOTE — PATIENT INSTRUCTIONS
Refill policies:    • Allow 2-3 business days for refills; controlled substances may take longer.   • Contact your pharmacy at least 5 days prior to running out of medication and have them send an electronic request or submit request through the “request re entire amount billed. Precertification and Prior Authorizations: If your physician has recommended that you have a procedure or additional testing performed.   [facility]) will contact your insurance carrier to obtain pre-certi

## 2018-10-15 NOTE — PROGRESS NOTES
Botox Reauthorization Questions:  1. Has the patient experienced a reduction of at least 7 headache days or 100 hours per month since starting Botox? yes  a. If yes, by what percentage? 70%  2.  Has the intensity and frequency of migraines decreased since

## 2018-10-18 ENCOUNTER — OFFICE VISIT (OUTPATIENT)
Dept: INTERNAL MEDICINE CLINIC | Facility: CLINIC | Age: 58
End: 2018-10-18

## 2018-10-18 VITALS
DIASTOLIC BLOOD PRESSURE: 60 MMHG | RESPIRATION RATE: 12 BRPM | HEART RATE: 62 BPM | WEIGHT: 191 LBS | OXYGEN SATURATION: 98 % | BODY MASS INDEX: 35 KG/M2 | SYSTOLIC BLOOD PRESSURE: 88 MMHG | TEMPERATURE: 99 F

## 2018-10-18 DIAGNOSIS — Z51.81 THERAPEUTIC DRUG MONITORING: ICD-10-CM

## 2018-10-18 DIAGNOSIS — E66.09 CLASS 2 OBESITY DUE TO EXCESS CALORIES WITHOUT SERIOUS COMORBIDITY WITH BODY MASS INDEX (BMI) OF 35.0 TO 35.9 IN ADULT: ICD-10-CM

## 2018-10-18 PROCEDURE — 99213 OFFICE O/P EST LOW 20 MIN: CPT | Performed by: INTERNAL MEDICINE

## 2018-10-18 RX ORDER — PHENTERMINE HYDROCHLORIDE 37.5 MG/1
37.5 TABLET ORAL
Qty: 30 TABLET | Refills: 0 | Status: SHIPPED | OUTPATIENT
Start: 2018-10-18 | End: 2018-11-18

## 2018-10-18 NOTE — PROGRESS NOTES
Patient presents with:  Weight Check      HPI: Roxane Davis presents today for Obesity f/u. She's been on Phentermine for 9 months now. Starting weight was 265#. She's now lost #74 pounds on this medication. Working on diet/exercise.   Would like to continue w/ every morning before breakfast., Disp: 30 tablet, Rfl: 0  •  naproxen 500 MG Oral Tab, Take 500 mg by mouth 2 (two) times daily with meals.  PRN , Disp: , Rfl:   •  Zolmitriptan 5 MG Oral Tab, TAKE 1 TABLET( 5 MG TOTAL) BY MOUTH AS NEEDED FOR MIGRAINE, Disp Consults:  None

## 2018-10-19 DIAGNOSIS — G43.709 CHRONIC MIGRAINE WITHOUT AURA WITHOUT STATUS MIGRAINOSUS, NOT INTRACTABLE: ICD-10-CM

## 2018-10-19 NOTE — TELEPHONE ENCOUNTER
Medication: PROPRANOLOL HCL  MG Oral Capsule SR 24 Hr    Date of last refill: 7/23/18  (#30/2)  [place] n[place]a    Last office visit: 10/15/2018 for Botox  Due back to clinic per last office note:  3 months for Botox  Date next office visit scheduled:    Future Appointments   Date Time Provider Robyn Avelina   10/24/2018 10:40 AM Mani Simpson NP SGINP [facility] SUB GI   11/5/2018 11:40 AM Sandee Nurse, GABRIELLE LOMGWD [facility] [place]   11/26/2018  1:00 PM Carol Schafer MD EMG 8 [facility] [place]   1/16/2019  1:40 PM Eloisa Schmitt MD ENINAPER [facility] [place]       Last OV note recommendation: Per Dr. Eloisa Schmitt MD:    Procedure note for Botox    Per OV note from 2/22/18:  Plan: Continue Inderal  mg and Zomig 5 mg prn. Mild improvement. We will proceed with Botox therapy. She was getting this in [place] for past 7 years and it was very effective. We cannot increase the dose of Inderal further as her BP runs on lower side.

## 2018-10-20 RX ORDER — PROPRANOLOL HYDROCHLORIDE 120 MG/1
CAPSULE, EXTENDED RELEASE ORAL
Qty: 30 CAPSULE | Refills: 2 | Status: SHIPPED | OUTPATIENT
Start: 2018-10-20 | End: 2019-01-21

## 2018-10-24 PROBLEM — K76.0 FATTY LIVER: Status: ACTIVE | Noted: 2018-10-24

## 2018-10-24 PROBLEM — R10.13 EPIGASTRIC ABDOMINAL PAIN: Status: ACTIVE | Noted: 2018-10-24

## 2018-11-18 DIAGNOSIS — Z51.81 THERAPEUTIC DRUG MONITORING: ICD-10-CM

## 2018-11-18 DIAGNOSIS — E66.09 CLASS 2 OBESITY DUE TO EXCESS CALORIES WITHOUT SERIOUS COMORBIDITY WITH BODY MASS INDEX (BMI) OF 35.0 TO 35.9 IN ADULT: ICD-10-CM

## 2018-11-19 RX ORDER — PHENTERMINE HYDROCHLORIDE 37.5 MG/1
37.5 TABLET ORAL
Qty: 30 TABLET | Refills: 0 | Status: SHIPPED | OUTPATIENT
Start: 2018-11-19 | End: 2018-12-20

## 2018-11-19 NOTE — TELEPHONE ENCOUNTER
Refill requested:   Requested Prescriptions     Pending Prescriptions Disp Refills   • Phentermine HCl 37.5 MG Oral Tab 30 tablet 0     Sig: Take 1 tablet (37.5 mg total) by mouth every morning before breakfast.     Non protocol medication      Last refill [address]    [facility]  [facility]  [address]  [place] [phone]

## 2018-11-20 RX ORDER — PHENTERMINE HYDROCHLORIDE 37.5 MG/1
37.5 TABLET ORAL
Qty: 30 TABLET | Refills: 0 | Status: CANCELLED | OUTPATIENT
Start: 2018-11-20

## 2018-11-26 ENCOUNTER — OFFICE VISIT (OUTPATIENT)
Dept: INTERNAL MEDICINE CLINIC | Facility: CLINIC | Age: 58
End: 2018-11-26

## 2018-11-26 VITALS
HEART RATE: 68 BPM | DIASTOLIC BLOOD PRESSURE: 72 MMHG | TEMPERATURE: 98 F | RESPIRATION RATE: 16 BRPM | BODY MASS INDEX: 32.44 KG/M2 | HEIGHT: 61.75 IN | SYSTOLIC BLOOD PRESSURE: 100 MMHG | WEIGHT: 176.25 LBS

## 2018-11-26 DIAGNOSIS — R41.3 MEMORY DISORDER: ICD-10-CM

## 2018-11-26 DIAGNOSIS — E66.09 CLASS 1 OBESITY DUE TO EXCESS CALORIES WITHOUT SERIOUS COMORBIDITY WITH BODY MASS INDEX (BMI) OF 32.0 TO 32.9 IN ADULT: Primary | ICD-10-CM

## 2018-11-26 DIAGNOSIS — Z51.81 THERAPEUTIC DRUG MONITORING: ICD-10-CM

## 2018-11-26 PROBLEM — E66.811 CLASS 1 OBESITY DUE TO EXCESS CALORIES WITHOUT SERIOUS COMORBIDITY WITH BODY MASS INDEX (BMI) OF 32.0 TO 32.9 IN ADULT: Status: ACTIVE | Noted: 2018-07-16

## 2018-11-26 PROCEDURE — 96127 BRIEF EMOTIONAL/BEHAV ASSMT: CPT | Performed by: INTERNAL MEDICINE

## 2018-11-26 PROCEDURE — 99213 OFFICE O/P EST LOW 20 MIN: CPT | Performed by: INTERNAL MEDICINE

## 2018-11-26 RX ORDER — DULOXETIN HYDROCHLORIDE 30 MG/1
90 CAPSULE, DELAYED RELEASE ORAL DAILY
COMMUNITY
End: 2019-01-16

## 2018-11-26 NOTE — PROGRESS NOTES
Patient presents with:  Weight Management        HPI: Andrey Farrell presents today for 1-month f/u MD-supervised medical weight loss programming. She's been on Phentermine for 11 months now. Starting weight was 265#. She's now lost 89# during this time!   She leydi times daily as needed.  (Patient taking differently: Take 10 mg by mouth daily.  ), Disp: 60 capsule, Rfl: 3  •  omeprazole 20 MG Oral Capsule Delayed Release, Take 1 capsule (20 mg total) by mouth every other day., Disp: 48 capsule, Rfl: 1  •  Propranolol MD  Diplomate, American Board of Internal Medicine  [facility]  [address]  [address] [facility],4Th Floor, [address], [facility], [address] [place] [address]  T: [id number]; F: [address]     Meds & Refills for this Visit:  Requested Prescriptions      No prescriptions requested or o

## 2018-11-26 NOTE — PATIENT INSTRUCTIONS
Odette Brown,  1. Keep Phentermine going. We will do this for at least thru the end December and possibly extend it thru January followed by a hard stop. 2. I love the idea of integrating exercise into your life routine. Start slow. A fitness expert can help.

## 2018-11-27 ENCOUNTER — TELEPHONE (OUTPATIENT)
Dept: NEUROLOGY | Facility: CLINIC | Age: 58
End: 2018-11-27

## 2018-11-27 ENCOUNTER — HOSPITAL ENCOUNTER (OUTPATIENT)
Dept: MAMMOGRAPHY | Age: 58
Discharge: HOME | End: 2018-11-27
Attending: INTERNAL MEDICINE
Payer: COMMERCIAL

## 2018-11-27 DIAGNOSIS — Z12.31 SCREENING MAMMOGRAM, ENCOUNTER FOR: ICD-10-CM

## 2018-11-27 PROCEDURE — 77067 SCR MAMMO BI INCL CAD: CPT | Performed by: INTERNAL MEDICINE

## 2018-11-27 PROCEDURE — 77063 BREAST TOMOSYNTHESIS BI: CPT | Performed by: INTERNAL MEDICINE

## 2018-11-28 ENCOUNTER — TELEPHONE (OUTPATIENT)
Dept: INTERNAL MEDICINE CLINIC | Facility: CLINIC | Age: 58
End: 2018-11-28

## 2018-11-29 ENCOUNTER — APPOINTMENT (OUTPATIENT)
Dept: LAB | Facility: HOSPITAL | Age: 58
End: 2018-11-29
Attending: INTERNAL MEDICINE
Payer: COMMERCIAL

## 2018-11-29 DIAGNOSIS — Z12.11 SPECIAL SCREENING FOR MALIGNANT NEOPLASMS, COLON: Primary | ICD-10-CM

## 2018-11-29 PROCEDURE — 82274 ASSAY TEST FOR BLOOD FECAL: CPT

## 2018-11-30 DIAGNOSIS — R41.3 MEMORY DISORDER: Primary | ICD-10-CM

## 2018-11-30 NOTE — PROGRESS NOTES
Call patient. Insurance says that we need to use Dr. Jun Dewitt instead of Dr. Juan Carlos Marcial. Dr. Jhoan Dodge is not in-network. I've re-submitted a referral for Dr. Joan Mackenzie. Please get her Dr. Gallagher Pleasant information. Jeovanny Spain.  [address], [address] [facility] [place]

## 2018-11-30 NOTE — PROGRESS NOTES
[facility]  Referred to Provider Information:  Provider Address Phone   Mamie Márquez, [address] [facility] [address] [place] [phone]

## 2018-12-05 ENCOUNTER — OFFICE VISIT (OUTPATIENT)
Dept: INTERNAL MEDICINE CLINIC | Facility: CLINIC | Age: 58
End: 2018-12-05

## 2018-12-05 ENCOUNTER — APPOINTMENT (OUTPATIENT)
Dept: LAB | Age: 58
End: 2018-12-05
Attending: PHYSICIAN ASSISTANT
Payer: COMMERCIAL

## 2018-12-05 VITALS
TEMPERATURE: 99 F | SYSTOLIC BLOOD PRESSURE: 106 MMHG | BODY MASS INDEX: 32.57 KG/M2 | HEART RATE: 79 BPM | OXYGEN SATURATION: 97 % | DIASTOLIC BLOOD PRESSURE: 70 MMHG | RESPIRATION RATE: 16 BRPM | HEIGHT: 61 IN | WEIGHT: 172.5 LBS

## 2018-12-05 DIAGNOSIS — E66.09 CLASS 1 OBESITY DUE TO EXCESS CALORIES WITHOUT SERIOUS COMORBIDITY WITH BODY MASS INDEX (BMI) OF 32.0 TO 32.9 IN ADULT: ICD-10-CM

## 2018-12-05 DIAGNOSIS — Z11.59 NEED FOR HEPATITIS C SCREENING TEST: ICD-10-CM

## 2018-12-05 DIAGNOSIS — Z12.4 SCREENING FOR MALIGNANT NEOPLASM OF CERVIX: ICD-10-CM

## 2018-12-05 DIAGNOSIS — Z00.00 ANNUAL PHYSICAL EXAM: ICD-10-CM

## 2018-12-05 DIAGNOSIS — Z00.00 ANNUAL PHYSICAL EXAM: Primary | ICD-10-CM

## 2018-12-05 PROCEDURE — 80048 BASIC METABOLIC PNL TOTAL CA: CPT

## 2018-12-05 PROCEDURE — 84450 TRANSFERASE (AST) (SGOT): CPT

## 2018-12-05 PROCEDURE — 80061 LIPID PANEL: CPT

## 2018-12-05 PROCEDURE — 99396 PREV VISIT EST AGE 40-64: CPT | Performed by: PHYSICIAN ASSISTANT

## 2018-12-05 PROCEDURE — 84460 ALANINE AMINO (ALT) (SGPT): CPT

## 2018-12-05 PROCEDURE — 88175 CYTOPATH C/V AUTO FLUID REDO: CPT | Performed by: PHYSICIAN ASSISTANT

## 2018-12-05 PROCEDURE — 90715 TDAP VACCINE 7 YRS/> IM: CPT | Performed by: PHYSICIAN ASSISTANT

## 2018-12-05 PROCEDURE — 86803 HEPATITIS C AB TEST: CPT

## 2018-12-05 PROCEDURE — 83036 HEMOGLOBIN GLYCOSYLATED A1C: CPT

## 2018-12-05 PROCEDURE — 36415 COLL VENOUS BLD VENIPUNCTURE: CPT

## 2018-12-05 PROCEDURE — 90471 IMMUNIZATION ADMIN: CPT | Performed by: PHYSICIAN ASSISTANT

## 2018-12-05 NOTE — PATIENT INSTRUCTIONS
Continue to work on diet & exercise. Please get the updated shingles vaccine [facility] at the pharmacy if available -- this is a **2** part vaccine. Please have a copy of your record faxed to Mona's office at [phone].     Follow up with Dr. Gianna Lew

## 2018-12-05 NOTE — PROGRESS NOTES
Terry Jenkins is a 63year old female who presents for a complete physical exam.   HPI:   Pt presents for annual wellness screening. Diet - has been working on smaller portions, more fruits & veggies, mostly lean meats.   Exercise - recently joined compulsive disorder)    • Sleep apnea     has never been on CPAP   • Sleep disturbance    • Tremor    • Wears glasses    • Weight gain       Past Surgical History:   Procedure Laterality Date   •       x 2   • D & C     • LUMBAR FACET INJECTION BI Left arm, Patient Position: Sitting, Cuff Size: large)   Pulse 79   Temp 98.5 °F (36.9 °C) (Oral)   Resp 16   Ht 61\"   Wt 172 lb 8 oz   SpO2 97%   Breastfeeding?  No   BMI 32.59 kg/m²   GENERAL: A&O, well developed, well nourished, in no apparent distress will screen q 3 years  # Colon CA screening: c-scope at age 49 per pt -- record requested. Just did FIT test -- results pending. # Bone density screening: counseled on dietary ca, vit d, regular WB exercise.   # Vaccines: rec yearly flu shot, TdaP given t

## 2018-12-08 DIAGNOSIS — E87.6 HYPOKALEMIA: Primary | ICD-10-CM

## 2018-12-11 ENCOUNTER — TELEPHONE (OUTPATIENT)
Dept: INTERNAL MEDICINE CLINIC | Facility: CLINIC | Age: 58
End: 2018-12-11

## 2018-12-11 DIAGNOSIS — Z12.11 SCREENING FOR MALIGNANT NEOPLASM OF COLON: Primary | ICD-10-CM

## 2018-12-17 ENCOUNTER — APPOINTMENT (OUTPATIENT)
Dept: LAB | Age: 58
End: 2018-12-17
Attending: PHYSICIAN ASSISTANT
Payer: COMMERCIAL

## 2018-12-17 ENCOUNTER — TELEPHONE (OUTPATIENT)
Dept: INTERNAL MEDICINE CLINIC | Facility: CLINIC | Age: 58
End: 2018-12-17

## 2018-12-17 DIAGNOSIS — E87.6 HYPOKALEMIA: ICD-10-CM

## 2018-12-17 DIAGNOSIS — Z12.11 SCREENING FOR COLORECTAL CANCER: Primary | ICD-10-CM

## 2018-12-17 DIAGNOSIS — Z12.12 SCREENING FOR COLORECTAL CANCER: Primary | ICD-10-CM

## 2018-12-17 PROCEDURE — 36415 COLL VENOUS BLD VENIPUNCTURE: CPT

## 2018-12-17 PROCEDURE — 84132 ASSAY OF SERUM POTASSIUM: CPT

## 2018-12-17 NOTE — TELEPHONE ENCOUNTER
Referral placed for Dr. Moira Barboza. Sunil Ford. Kimberley Watts MD  Diplomate, American Board of Internal Medicine  [address] [facility]  [address]  [address] [facility],4Th Floor, [address], [facility] & [facility], [address]  T: [id number]; F: [address]

## 2018-12-19 ENCOUNTER — PATIENT MESSAGE (OUTPATIENT)
Dept: INTERNAL MEDICINE CLINIC | Facility: CLINIC | Age: 58
End: 2018-12-19

## 2018-12-20 DIAGNOSIS — Z51.81 THERAPEUTIC DRUG MONITORING: ICD-10-CM

## 2018-12-20 DIAGNOSIS — E66.09 CLASS 2 OBESITY DUE TO EXCESS CALORIES WITHOUT SERIOUS COMORBIDITY WITH BODY MASS INDEX (BMI) OF 35.0 TO 35.9 IN ADULT: ICD-10-CM

## 2018-12-20 RX ORDER — PHENTERMINE HYDROCHLORIDE 37.5 MG/1
TABLET ORAL
Qty: 30 TABLET | Refills: 0 | Status: SHIPPED | OUTPATIENT
Start: 2018-12-20 | End: 2019-01-04

## 2018-12-20 NOTE — TELEPHONE ENCOUNTER
Phentermine 37.5 mg 1 tab daily filled     LOV 12-5-18     return here in 1 month for weight check with Dr. Sheila Ames    Next apt 1-4-19     Labs 12-5-18

## 2018-12-21 NOTE — TELEPHONE ENCOUNTER
Referral Request     From  Michelle Murphy To  [facility] Clinical Staff Sent  12/21/2018  9:36 AM   ----- Message from Generic, Mychart sent at 12/21/2018  9:36 AM CST -----     Hello.  I still need a referral continuance for Guero Martines to manage my psy

## 2018-12-21 NOTE — TELEPHONE ENCOUNTER
1. Tell her Leighannjacqueline is an excellent Advanced Nurse Practitioner. Referral placed. 2. Ask her if her insurance has been paying for her therapist at [facility]. I wasn't aware if they were or not, plus I think it's out of network.

## 2018-12-22 ENCOUNTER — TELEPHONE (OUTPATIENT)
Dept: INTERNAL MEDICINE CLINIC | Facility: CLINIC | Age: 58
End: 2018-12-22

## 2018-12-22 NOTE — TELEPHONE ENCOUNTER
Pt would like Rx printed and singed came in to office and was wondering why her My Chart states that its approved but never got a call that its ready for  would like a call once its Redy

## 2018-12-24 NOTE — TELEPHONE ENCOUNTER
Pt informed referral was placed for [employer]. Pt states  Gabriel Walker with [address]  is in network and insurance has been paying. Would like referral placed.

## 2018-12-24 NOTE — TELEPHONE ENCOUNTER
I'm not sure how to do the referral to the outside therapist.  It looks like originally the referral may've come from Guillaume Owen to this particular Provider. Please see how we do this. Thee Ruano.  Nidhi Joseph MD  Diplomate, [address] [place] [address] Board of Internal Medicine

## 2018-12-26 NOTE — TELEPHONE ENCOUNTER
[facility] Clinical Staff; P [facility] Referral Pool             Per member's plan, Mental Health is a self-referral. Please refer member to [facility]) at [address] [facility] at [phone]

## 2018-12-27 ENCOUNTER — TELEPHONE (OUTPATIENT)
Dept: INTERNAL MEDICINE CLINIC | Facility: CLINIC | Age: 58
End: 2018-12-27

## 2018-12-27 ENCOUNTER — OFFICE VISIT (OUTPATIENT)
Dept: INTERNAL MEDICINE CLINIC | Facility: CLINIC | Age: 58
End: 2018-12-27

## 2018-12-27 VITALS
BODY MASS INDEX: 31.63 KG/M2 | HEIGHT: 61 IN | HEART RATE: 68 BPM | RESPIRATION RATE: 16 BRPM | SYSTOLIC BLOOD PRESSURE: 98 MMHG | WEIGHT: 167.5 LBS | DIASTOLIC BLOOD PRESSURE: 60 MMHG | TEMPERATURE: 98 F

## 2018-12-27 DIAGNOSIS — M25.511 ARTHRALGIA OF RIGHT ACROMIOCLAVICULAR JOINT: ICD-10-CM

## 2018-12-27 DIAGNOSIS — R42 DIZZINESS: Primary | ICD-10-CM

## 2018-12-27 DIAGNOSIS — R07.89 STERNAL PAIN: ICD-10-CM

## 2018-12-27 DIAGNOSIS — F41.1 GAD (GENERALIZED ANXIETY DISORDER): ICD-10-CM

## 2018-12-27 PROCEDURE — 99214 OFFICE O/P EST MOD 30 MIN: CPT | Performed by: INTERNAL MEDICINE

## 2018-12-27 RX ORDER — DIAZEPAM 5 MG/1
5 TABLET ORAL 3 TIMES DAILY PRN
Qty: 60 TABLET | Refills: 0 | Status: SHIPPED | OUTPATIENT
Start: 2018-12-27 | End: 2019-01-16

## 2018-12-27 RX ORDER — DIAZEPAM 5 MG/1
5 TABLET ORAL 3 TIMES DAILY PRN
Qty: 90 TABLET | Refills: 0 | Status: CANCELLED | OUTPATIENT
Start: 2018-12-27

## 2018-12-27 RX ORDER — MELOXICAM 7.5 MG/1
7.5 TABLET ORAL DAILY
Qty: 30 TABLET | Refills: 0 | Status: SHIPPED | OUTPATIENT
Start: 2018-12-27 | End: 2019-01-04

## 2018-12-27 RX ORDER — MELOXICAM 7.5 MG/1
7.5 TABLET ORAL DAILY
Qty: 90 TABLET | Refills: 0 | OUTPATIENT
Start: 2018-12-27

## 2018-12-27 NOTE — TELEPHONE ENCOUNTER
Pt stated that yesterday while she was working our, she began to feel light headed/dizzy, HA, SOB, R side neck pain and R side collar bone pain. States that she stopped working out, drank some apple juice and BP was taken which was 110/54.      Still feelin

## 2018-12-27 NOTE — TELEPHONE ENCOUNTER
Pt called stating her bp is 100/54, she almost passed out yesterday and has collar bone pain when she moves her right arm. Pt would like to be since but is unvailable between 10;30-12;30 for mental health appt. Please call to discuss.  No appt available tod

## 2018-12-27 NOTE — PATIENT INSTRUCTIONS
- Start anti-inflammatory (meloxicam). Take tablet daily with food for the next week. - Stay well hydrated and don't skip any meals  - Follow up with Dr. Joan Grace as scheduled. It was a pleasure seeing you in the clinic today.   Thank you for choosing t

## 2018-12-27 NOTE — PROGRESS NOTES
Fátima Hollingsworth is a 63year old female. HPI:   Patient presents with:  Dizziness: x2 days   Pain: x1 day sternum, collarbone and right side of neck   Patient presents with several symptoms.     Felt some dizziness/lightheadedness while walking at the mouth 2 (two) times daily as needed.  (Patient taking differently: Take 10 mg by mouth daily.  ), Disp: 60 capsule, Rfl: 3  •  omeprazole 20 MG Oral Capsule Delayed Release, Take 1 capsule (20 mg total) by mouth every other day., Disp: 48 capsule, Rfl: 1  • Pulse 68   Temp 98.3 °F (36.8 °C) (Oral)   Resp 16   Ht 61\"   Wt 167 lb 8 oz   BMI 31.65 kg/m²   GENERAL: Alert and oriented, well developed, well nourished,in no apparent distress  HEENT: atraumatic, PERRLA, EOMI, normal lid and conjunctiva  LUNGS: penelope Psychiatrist/APN (Psychiatry)  Ida Butler DO (GASTROENTEROLOGY)  The patient indicates understanding of these issues and agrees to the plan. The patient is asked to return to clinic in 1 week with Dr. Trixie Knight MD as already scheduled.     Christin

## 2019-01-04 ENCOUNTER — OFFICE VISIT (OUTPATIENT)
Dept: INTERNAL MEDICINE CLINIC | Facility: CLINIC | Age: 59
End: 2019-01-04

## 2019-01-04 ENCOUNTER — HOSPITAL ENCOUNTER (OUTPATIENT)
Dept: GENERAL RADIOLOGY | Age: 59
Discharge: HOME | End: 2019-01-04
Attending: INTERNAL MEDICINE
Payer: COMMERCIAL

## 2019-01-04 VITALS
RESPIRATION RATE: 16 BRPM | SYSTOLIC BLOOD PRESSURE: 86 MMHG | DIASTOLIC BLOOD PRESSURE: 58 MMHG | BODY MASS INDEX: 32.24 KG/M2 | HEIGHT: 61 IN | WEIGHT: 170.75 LBS | HEART RATE: 72 BPM | TEMPERATURE: 98 F

## 2019-01-04 DIAGNOSIS — Z51.81 THERAPEUTIC DRUG MONITORING: ICD-10-CM

## 2019-01-04 DIAGNOSIS — M25.511 RIGHT SHOULDER PAIN, UNSPECIFIED CHRONICITY: ICD-10-CM

## 2019-01-04 DIAGNOSIS — E66.09 CLASS 1 OBESITY DUE TO EXCESS CALORIES WITHOUT SERIOUS COMORBIDITY WITH BODY MASS INDEX (BMI) OF 32.0 TO 32.9 IN ADULT: Primary | ICD-10-CM

## 2019-01-04 DIAGNOSIS — M25.511 RIGHT SHOULDER PAIN, UNSPECIFIED CHRONICITY: Primary | ICD-10-CM

## 2019-01-04 PROCEDURE — 99214 OFFICE O/P EST MOD 30 MIN: CPT | Performed by: INTERNAL MEDICINE

## 2019-01-04 PROCEDURE — 73030 X-RAY EXAM OF SHOULDER: CPT | Performed by: INTERNAL MEDICINE

## 2019-01-04 RX ORDER — PHENTERMINE HYDROCHLORIDE 37.5 MG/1
TABLET ORAL
Qty: 30 TABLET | Refills: 0 | Status: SHIPPED | OUTPATIENT
Start: 2019-01-19 | End: 2019-02-01 | Stop reason: ALTCHOICE

## 2019-01-04 NOTE — PROGRESS NOTES
Patient presents with:  Weight Check: 1 month follow up        HPI: The pt presents today for physician-supervised medical weight loss programming and assessment for the diagnosis of overweight and/or obesity status.   Has been on FDA-approved prescription Outpatient Medications:   •  diazepam 5 MG Oral Tab, Take 1 tablet (5 mg total) by mouth 3 (three) times daily as needed for Anxiety (panic attacks, insomnia). , Disp: 60 tablet, Rfl: 0  •  Meloxicam 7.5 MG Oral Tab, Take 1 tablet (7.5 mg total) by mouth da obese  HEENT - PERRL, EOMI, OP is clear  Lungs - CTAB  CV - RRR, nl s1, s2, no M  R shoulder - (+) Empty can; (-) Yergason's; (-) Drop Arm; (+) Neer's; (+) Hawkin's; (-) Clunk  Psych - nl mood/affect      A/P:  Class 1 obesity due to excess calories withou

## 2019-01-04 NOTE — PROGRESS NOTES
Patient notified of shoulder X-ray results via [employer] and referral placed to see Dr. Danielle Wilder. Vishal Bonilla. Allan Martinez MD  Diplomate, American Board of Internal Medicine  [facility]  [address] [place] [address],[address], [address], [facility], [address] [place] [address]  T: [id number]; F:

## 2019-01-04 NOTE — PATIENT INSTRUCTIONS
Stevo Cardoso,    1. Get X-ray done of your shoulder. My suspicion is the rotator cuff. Plan B will involve seeing an Ortho doc. I will MyChart the results and updated game plan. 2. We will do 1 more month of Phentermine. This will end in mid-February.   3. See

## 2019-01-08 ENCOUNTER — OFFICE VISIT (OUTPATIENT)
Dept: SURGERY | Facility: CLINIC | Age: 59
End: 2019-01-08

## 2019-01-08 VITALS
BODY MASS INDEX: 31 KG/M2 | DIASTOLIC BLOOD PRESSURE: 64 MMHG | TEMPERATURE: 97 F | WEIGHT: 166 LBS | SYSTOLIC BLOOD PRESSURE: 90 MMHG

## 2019-01-08 DIAGNOSIS — Z86.010 ENCOUNTER FOR COLONOSCOPY DUE TO HISTORY OF ADENOMATOUS COLONIC POLYPS: Primary | ICD-10-CM

## 2019-01-08 DIAGNOSIS — Z12.11 ENCOUNTER FOR COLONOSCOPY DUE TO HISTORY OF ADENOMATOUS COLONIC POLYPS: Primary | ICD-10-CM

## 2019-01-08 PROBLEM — Z86.0101 ENCOUNTER FOR COLONOSCOPY DUE TO HISTORY OF ADENOMATOUS COLONIC POLYPS: Status: ACTIVE | Noted: 2019-01-08

## 2019-01-08 RX ORDER — POLYETHYLENE GLYCOL 3350, SODIUM CHLORIDE, SODIUM BICARBONATE, POTASSIUM CHLORIDE 420; 11.2; 5.72; 1.48 G/4L; G/4L; G/4L; G/4L
POWDER, FOR SOLUTION ORAL
Qty: 1 BOTTLE | Refills: 0 | Status: SHIPPED | OUTPATIENT
Start: 2019-01-08 | End: 2019-03-05 | Stop reason: ALTCHOICE

## 2019-01-08 NOTE — H&P
New Patient Visit Note       Active Problems      1.  Encounter for colonoscopy due to history of adenomatous colonic polyps        Chief Complaint   Patient presents with:  Colonoscopy Screening: NP, referred by Dr. Kimberley Watts; colonoscopy consult, last cscope MD at [facility] MAIN OR   • NEEDLE BIOPSY RIGHT  05/2015    benign breast   • OTHER SURGICAL HISTORY      C3-C4 anterior discectomy   • OTHER SURGICAL HISTORY  [id number]    ECT for depression   • OTHER SURGICAL HISTORY  2018    [facility]   • SACROILIAC JOINT IN 20 MG Oral Tab Take 1 tablet (20 mg total) by mouth nightly.  Disp: 30 tablet Rfl: 2   triamcinolone acetonide 0.1 % External Cream Apply thin layer to affected area twice a day as needed Disp: 45 g Rfl: 1   DULoxetine HCl 30 MG Oral Cap DR Particles Take 9 97.1 °F (36.2 °C) (Oral)   Wt 166 lb   BMI 31.37 kg/m²   Physical Exam   Constitutional: She is oriented to person, place, and time. She appears well-developed and well-nourished.    Cardiovascular: Normal rate, regular rhythm, normal heart sounds and intac

## 2019-01-14 ENCOUNTER — TELEPHONE (OUTPATIENT)
Dept: NEUROLOGY | Facility: CLINIC | Age: 59
End: 2019-01-14

## 2019-01-16 ENCOUNTER — TELEPHONE (OUTPATIENT)
Dept: SURGERY | Facility: CLINIC | Age: 59
End: 2019-01-16

## 2019-01-21 ENCOUNTER — TELEPHONE (OUTPATIENT)
Dept: INTERNAL MEDICINE CLINIC | Facility: CLINIC | Age: 59
End: 2019-01-21

## 2019-01-21 ENCOUNTER — OFFICE VISIT (OUTPATIENT)
Dept: NEUROLOGY | Facility: CLINIC | Age: 59
End: 2019-01-21

## 2019-01-21 VITALS
RESPIRATION RATE: 16 BRPM | HEART RATE: 68 BPM | DIASTOLIC BLOOD PRESSURE: 76 MMHG | BODY MASS INDEX: 31 KG/M2 | WEIGHT: 166 LBS | SYSTOLIC BLOOD PRESSURE: 124 MMHG

## 2019-01-21 DIAGNOSIS — G43.709 CHRONIC MIGRAINE WITHOUT AURA WITHOUT STATUS MIGRAINOSUS, NOT INTRACTABLE: Primary | ICD-10-CM

## 2019-01-21 PROCEDURE — 99213 OFFICE O/P EST LOW 20 MIN: CPT | Performed by: OTHER

## 2019-01-21 PROCEDURE — 64615 CHEMODENERV MUSC MIGRAINE: CPT | Performed by: OTHER

## 2019-01-21 RX ORDER — PROPRANOLOL HYDROCHLORIDE 80 MG/1
80 CAPSULE, EXTENDED RELEASE ORAL DAILY
Qty: 30 CAPSULE | Refills: 2 | Status: SHIPPED | OUTPATIENT
Start: 2019-01-21 | End: 2019-02-20

## 2019-01-21 NOTE — PROGRESS NOTES
HPI:    Patient ID: Janny Hinds is a 63year old female. HPI      Patient is a 63year old female who presented for follow up for migraines and c/o worsening tremors/anxiety. Patient is on Botox injections and states it has been very effective. Tobacco Use      Smoking status: Never Smoker      Smokeless tobacco: Never Used    Alcohol use: Yes      Comment: 6 drinks/year    Drug use: No               Review of Systems   Constitutional: Negative. HENT: Negative. Eyes: Negative.     Respirato 1/26/2019] diazepam 5 MG Oral Tab Take 1 tablet (5 mg total) by mouth 3 (three) times daily as needed for Anxiety (panic attacks, insomnia).  Disp: 90 tablet Rfl: 1   [START ON 2/26/2019] DULoxetine HCl 30 MG Oral Cap DR Particles Take 3 capsules (90 mg tot intact.   Cranial nerves II-XII: grossly intact  Sensory : Intact to all modalities including light touch, pinprick, vibration and proprioception  Motor: Mild rigidity at left wrist. Tremors noted in both hand, more prominent and resting tremors in the left

## 2019-01-28 ENCOUNTER — APPOINTMENT (OUTPATIENT)
Dept: LAB | Age: 59
End: 2019-01-28
Attending: Other
Payer: COMMERCIAL

## 2019-01-28 DIAGNOSIS — G31.84 MILD NEUROCOGNITIVE DISORDER DUE TO MULTIPLE ETIOLOGIES: ICD-10-CM

## 2019-01-28 LAB
HAV AB SERPL IA-ACNC: 308 PG/ML (ref 193–986)
TSI SER-ACNC: 1.66 MIU/ML (ref 0.35–5.5)

## 2019-01-28 PROCEDURE — 84443 ASSAY THYROID STIM HORMONE: CPT

## 2019-01-28 PROCEDURE — 82607 VITAMIN B-12: CPT

## 2019-01-28 PROCEDURE — 36415 COLL VENOUS BLD VENIPUNCTURE: CPT

## 2019-01-28 NOTE — PATIENT INSTRUCTIONS
Refill policies:    • Allow 2-3 business days for refills; controlled substances may take longer.   • Contact your pharmacy at least 5 days prior to running out of medication and have them send an electronic request or submit request through the “request re been approved by your insurer. Depending on your insurance carrier, approval may take 3-10 days. It is highly recommended patients contact their insurance carrier directly to determine coverage.   If test is done without insurance authorization, patient ma head for the EEG and 2 on your chest for an EKG tracing. · Head will be measured using a washable grease pencil. · Head will be prepped with a mild abrasive for good conductivity. · Electrodes are applied with paste and gauze.   Paste is water soluble so

## 2019-01-28 NOTE — PROGRESS NOTES
HPI:    Patient ID: Caron Thornton is a 63year old female. HPI  Allgera Sales presented for evaluation of memory and cognitive impairment. She follows with me for chronic migraines and on Botox treatment.  She noted concerns with her memory since past 6-7 y disc deterioration   • History of depression    • Irregular bowel habits    • Migraines    • Nausea    • OCD (obsessive compulsive disorder)    • Sleep apnea     has never been on CPAP   • Sleep disturbance    • Tremor    • Wears glasses    • Weight gain total) by mouth daily. Disp: 30 capsule Rfl: 2   diazepam 5 MG Oral Tab Take 1 tablet (5 mg total) by mouth 3 (three) times daily as needed for Anxiety (panic attacks, insomnia).  Disp: 90 tablet Rfl: 1   [START ON 2/26/2019] DULoxetine HCl 30 MG Oral Cap D commands. Intact naming and repetition. Normal attention and impaired short term memory. Cranial nerves:   II, III, IV, VI :Pupils round, equal and reactive to light  and accommodation bilaterally. Extraocular muscle intact. Visual fields intact.    V: Referrals:  None       [facility]#[id number]

## 2019-01-29 ENCOUNTER — TELEPHONE (OUTPATIENT)
Dept: NEUROLOGY | Facility: CLINIC | Age: 59
End: 2019-01-29

## 2019-02-01 ENCOUNTER — OFFICE VISIT (OUTPATIENT)
Dept: INTERNAL MEDICINE CLINIC | Facility: CLINIC | Age: 59
End: 2019-02-01

## 2019-02-01 VITALS
RESPIRATION RATE: 16 BRPM | DIASTOLIC BLOOD PRESSURE: 71 MMHG | HEIGHT: 61 IN | HEART RATE: 72 BPM | BODY MASS INDEX: 29.55 KG/M2 | WEIGHT: 156.5 LBS | TEMPERATURE: 98 F | SYSTOLIC BLOOD PRESSURE: 106 MMHG

## 2019-02-01 DIAGNOSIS — E66.3 OVERWEIGHT (BMI 25.0-29.9): Primary | ICD-10-CM

## 2019-02-01 DIAGNOSIS — Z51.81 THERAPEUTIC DRUG MONITORING: ICD-10-CM

## 2019-02-01 PROBLEM — E66.09 CLASS 1 OBESITY DUE TO EXCESS CALORIES WITHOUT SERIOUS COMORBIDITY WITH BODY MASS INDEX (BMI) OF 32.0 TO 32.9 IN ADULT: Status: RESOLVED | Noted: 2018-07-16 | Resolved: 2019-02-01

## 2019-02-01 PROBLEM — E66.811 CLASS 1 OBESITY DUE TO EXCESS CALORIES WITHOUT SERIOUS COMORBIDITY WITH BODY MASS INDEX (BMI) OF 32.0 TO 32.9 IN ADULT: Status: RESOLVED | Noted: 2018-07-16 | Resolved: 2019-02-01

## 2019-02-01 PROCEDURE — 99213 OFFICE O/P EST LOW 20 MIN: CPT | Performed by: INTERNAL MEDICINE

## 2019-02-01 RX ORDER — ESCITALOPRAM OXALATE 20 MG/1
20 TABLET ORAL NIGHTLY
Refills: 1 | COMMUNITY
Start: 2019-01-30 | End: 2019-05-07 | Stop reason: ALTCHOICE

## 2019-02-01 NOTE — PROGRESS NOTES
Patient presents with:  Medication Follow-Up: 1 month follow up        HPI: The pt presents today for physician-supervised medical weight loss programming and assessment for the diagnosis of overweight and/or obesity status.   Has been on FDA-approved presc Propranolol HCl ER 80 MG Oral Capsule SR 24 Hr, Take 1 capsule (80 mg total) by mouth daily. , Disp: 30 capsule, Rfl: 2  •  diazepam 5 MG Oral Tab, Take 1 tablet (5 mg total) by mouth 3 (three) times daily as needed for Anxiety (panic attacks, insomnia). , D EOMI, OP is clear  Lungs - CTAB  CV - RRR, nl s1, s2, no M  Psych - nl mood/affect      A/P:  Overweight (bmi 25.0-29.9)  (primary encounter diagnosis)  Adult bmi 29.0-29.9 kg/sq m  Therapeutic drug monitoring      1. Finish remaining Phentermine pills.   2

## 2019-02-06 ENCOUNTER — TELEPHONE (OUTPATIENT)
Dept: NEUROLOGY | Facility: CLINIC | Age: 59
End: 2019-02-06

## 2019-02-07 ENCOUNTER — PATIENT MESSAGE (OUTPATIENT)
Dept: INTERNAL MEDICINE CLINIC | Facility: CLINIC | Age: 59
End: 2019-02-07

## 2019-02-11 ENCOUNTER — TELEPHONE (OUTPATIENT)
Dept: NEUROLOGY | Facility: CLINIC | Age: 59
End: 2019-02-11

## 2019-02-11 ENCOUNTER — PROCEDURE VISIT (OUTPATIENT)
Dept: NEUROLOGY | Facility: CLINIC | Age: 59
End: 2019-02-11

## 2019-02-11 DIAGNOSIS — R41.3 MEMORY IMPAIRMENT: Primary | ICD-10-CM

## 2019-02-11 PROCEDURE — 95816 EEG AWAKE AND DROWSY: CPT | Performed by: OTHER

## 2019-02-12 ENCOUNTER — OFFICE VISIT (OUTPATIENT)
Dept: INTERNAL MEDICINE CLINIC | Facility: CLINIC | Age: 59
End: 2019-02-12

## 2019-02-12 VITALS
DIASTOLIC BLOOD PRESSURE: 70 MMHG | RESPIRATION RATE: 16 BRPM | BODY MASS INDEX: 30.02 KG/M2 | SYSTOLIC BLOOD PRESSURE: 110 MMHG | WEIGHT: 159 LBS | HEIGHT: 61 IN | HEART RATE: 86 BPM

## 2019-02-12 DIAGNOSIS — E53.8 B12 DEFICIENCY: ICD-10-CM

## 2019-02-12 DIAGNOSIS — E55.9 VITAMIN D DEFICIENCY: ICD-10-CM

## 2019-02-12 DIAGNOSIS — E66.01 OBESITY, CLASS III, BMI 40-49.9 (MORBID OBESITY) (HCC): ICD-10-CM

## 2019-02-12 DIAGNOSIS — Z51.81 THERAPEUTIC DRUG MONITORING: Primary | ICD-10-CM

## 2019-02-12 PROCEDURE — 99214 OFFICE O/P EST MOD 30 MIN: CPT | Performed by: INTERNAL MEDICINE

## 2019-02-12 RX ORDER — PHENTERMINE HYDROCHLORIDE 37.5 MG/1
37.5 TABLET ORAL
COMMUNITY
End: 2019-03-05

## 2019-02-12 RX ORDER — PEN NEEDLE, DIABETIC 30 GX3/16"
1 NEEDLE, DISPOSABLE MISCELLANEOUS DAILY
Qty: 90 EACH | Refills: 0 | Status: SHIPPED | OUTPATIENT
Start: 2019-02-12 | End: 2019-05-13

## 2019-02-12 NOTE — PROCEDURES
ELECTROENCEPHALOGRAM REPORT      Patient Name: Ion Wilkinson  Chart ID: [id number]  Ordering Physician: Louise Raymundo MD Date of Test: 2/6/2019  Referring Physician:   Patient Diagnosis: Memory loss    HISTORY  PT IS A 64 YO FEMALE WHO PRESENTS F

## 2019-02-12 NOTE — PROGRESS NOTES
Patient given printed RX to fill at pharmacy. Informed to take there and to activate savings card before she goes to pharmacy. patient teaching on [address] performed. Patient aware of the directions of how to titrate the dose on this medication.    Sta

## 2019-02-12 NOTE — PROGRESS NOTES
HISTORY OF PRESENT ILLNESS  Patient presents with:  Weight Problem: tried Phentermine still on for over a year      Melissa More is a 63year old female new to our office today for initiation of medical weight loss program.  Patient presents today w Family or personal history of Pancreatic issues / Medullary Thyroid Cancer: negative    History of bariatric surgery: negative     [address] reviewed: obesity in parent/s or sibling: YES     REVIEW OF SYSTEMS  GENERAL: feels well otherwise,   NECK: denies thic 11/28/2017    TP 7.1 11/28/2017    ALB 3.6 11/28/2017     12/05/2018    K 4.1 12/17/2018     12/05/2018    CO2 25.0 12/05/2018     Lab Results   Component Value Date    EAG 94 12/05/2018    A1C 4.9 12/05/2018     Lab Results   Component Value D mg by mouth 2 (two) times daily with meals. PRN  Disp:  Rfl:    Zolmitriptan 5 MG Oral Tab TAKE 1 TABLET( 5 MG TOTAL) BY MOUTH AS NEEDED FOR MIGRAINE Disp: 9 tablet Rfl: 2     No current facility-administered medications on file prior to visit.      ASSESSM above.  · Counseled on comprehensive weight loss plan including attention to nutrition, exercise and behavior/stress management for success. See patient instruction below for more details. · Weight Loss Consent to treat reviewed and signed.     Patient Ins

## 2019-02-13 ENCOUNTER — TELEPHONE (OUTPATIENT)
Dept: NEUROLOGY | Facility: CLINIC | Age: 59
End: 2019-02-13

## 2019-02-13 DIAGNOSIS — G43.709 CHRONIC MIGRAINE WITHOUT AURA WITHOUT STATUS MIGRAINOSUS, NOT INTRACTABLE: Primary | ICD-10-CM

## 2019-02-13 NOTE — TELEPHONE ENCOUNTER
Patient dropped off MRI disc. Uploaded to PACS, confirmed by radiology. Gave patient disc back.  Patient also dropped of MRI reports placed in Dr folder for review

## 2019-02-14 ENCOUNTER — APPOINTMENT (OUTPATIENT)
Dept: LAB | Facility: HOSPITAL | Age: 59
End: 2019-02-14
Attending: INTERNAL MEDICINE
Payer: COMMERCIAL

## 2019-02-14 DIAGNOSIS — E55.9 VITAMIN D DEFICIENCY: ICD-10-CM

## 2019-02-14 LAB — VIT D+METAB SERPL-MCNC: 17.3 NG/ML (ref 30–100)

## 2019-02-14 PROCEDURE — 82306 VITAMIN D 25 HYDROXY: CPT

## 2019-02-14 PROCEDURE — 36415 COLL VENOUS BLD VENIPUNCTURE: CPT

## 2019-02-14 RX ORDER — ZOLMITRIPTAN 5 MG/1
TABLET, FILM COATED ORAL
Qty: 9 TABLET | Refills: 0 | Status: SHIPPED | OUTPATIENT
Start: 2019-02-14 | End: 2019-05-07

## 2019-02-19 ENCOUNTER — OFFICE VISIT (OUTPATIENT)
Dept: INTERNAL MEDICINE CLINIC | Facility: CLINIC | Age: 59
End: 2019-02-19

## 2019-02-19 DIAGNOSIS — E66.9 OBESITY (BMI 30.0-34.9): Primary | ICD-10-CM

## 2019-02-19 PROCEDURE — 97802 MEDICAL NUTRITION INDIV IN: CPT | Performed by: DIETITIAN, REGISTERED

## 2019-02-19 NOTE — PROGRESS NOTES
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    with 32 and 35 yo daug THEN 1.8 mg daily for 7 days, THEN 2.4 mg daily for 7 days, THEN 3 mg daily. Disp: 5 pen Rfl: 1   Insulin Pen Needle (PEN NEEDLES) 32G X 4 MM Does not apply Misc 1 each by Does not apply route daily.  Disp: 90 each Rfl: 0   escitalopram 20 MG Oral Tab Take Desirable <100 mg/dL   Borderline 100-129 mg/dL   High     >=130mg/dL         HDL Cholesterol   Date Value Ref Range Status   12/05/2018 53 40 - 59 mg/dL Final     Comment:       Interpretive Information:   An HDL cholesterol <40 mg/dL is low and constit Comprehensive nutrition education and evaluation provided for weight loss. Pt has developed pattern of eating only lunch, dinner and typically a bedtime snack of Chex Mix.   Calorie intake is low and protein intake is highly inadequate at <20 gms on most d

## 2019-02-25 ENCOUNTER — MED REC SCAN ONLY (OUTPATIENT)
Dept: NEUROLOGY | Facility: CLINIC | Age: 59
End: 2019-02-25

## 2019-02-27 ENCOUNTER — MED REC SCAN ONLY (OUTPATIENT)
Dept: INTERNAL MEDICINE CLINIC | Facility: CLINIC | Age: 59
End: 2019-02-27

## 2019-03-01 ENCOUNTER — PATIENT MESSAGE (OUTPATIENT)
Dept: INTERNAL MEDICINE CLINIC | Facility: CLINIC | Age: 59
End: 2019-03-01

## 2019-03-04 NOTE — TELEPHONE ENCOUNTER
From: Abdiel Machuca  To: Calleen Denver, MD  Sent: 3/1/2019 11:04 AM CST  Subject: Visit Follow-up Question    Side effects of Saxenda dose 1.2 have become intolerable. I’m very disappointed. , but I am unable to function with following: migraine level he

## 2019-03-05 ENCOUNTER — OFFICE VISIT (OUTPATIENT)
Dept: NEUROLOGY | Facility: CLINIC | Age: 59
End: 2019-03-05

## 2019-03-05 VITALS
WEIGHT: 152 LBS | BODY MASS INDEX: 28.7 KG/M2 | SYSTOLIC BLOOD PRESSURE: 102 MMHG | HEART RATE: 60 BPM | HEIGHT: 61 IN | RESPIRATION RATE: 16 BRPM | DIASTOLIC BLOOD PRESSURE: 68 MMHG

## 2019-03-05 DIAGNOSIS — G43.709 CHRONIC MIGRAINE WITHOUT AURA WITHOUT STATUS MIGRAINOSUS, NOT INTRACTABLE: ICD-10-CM

## 2019-03-05 DIAGNOSIS — G31.84 MILD NEUROCOGNITIVE DISORDER DUE TO MULTIPLE ETIOLOGIES: ICD-10-CM

## 2019-03-05 PROCEDURE — 99213 OFFICE O/P EST LOW 20 MIN: CPT | Performed by: OTHER

## 2019-03-05 RX ORDER — MEMANTINE HYDROCHLORIDE 10 MG/1
10 TABLET ORAL DAILY
Qty: 30 TABLET | Refills: 2 | Status: SHIPPED | OUTPATIENT
Start: 2019-03-05 | End: 2019-06-04

## 2019-03-05 RX ORDER — PROPRANOLOL HYDROCHLORIDE 80 MG/1
80 TABLET ORAL DAILY
Qty: 30 TABLET | Refills: 2 | Status: SHIPPED | OUTPATIENT
Start: 2019-03-05 | End: 2019-07-21

## 2019-03-05 RX ORDER — NAPROXEN 500 MG/1
500 TABLET ORAL 2 TIMES DAILY WITH MEALS
Qty: 60 TABLET | Refills: 2 | Status: SHIPPED | OUTPATIENT
Start: 2019-03-05 | End: 2019-07-11

## 2019-03-05 RX ORDER — PROPRANOLOL HYDROCHLORIDE 80 MG/1
80 TABLET ORAL DAILY
COMMUNITY
End: 2019-03-05

## 2019-03-05 NOTE — PROGRESS NOTES
HPI:    Patient ID: Kala Oglesby is a 63year old female. Follow up visit  Patient here for follow up for mild neurocognitive disorder and migraines headaches.  Since last visit no changes reported  EEG negative for epileptiform discharges but s and medications      HISTORY:  Past Medical History:   Diagnosis Date   • Abdominal pain    • Anxiety    • Back pain    • Calculus of kidney    • Depression    • Diverticulosis    • Hemorrhoids    • History of cervical discectomy     Anterior Cervical Disc difficulty and headaches. Hematological: Negative. Psychiatric/Behavioral: Positive for decreased concentration. All other systems reviewed and are negative.               Current Outpatient Medications:  Cyanocobalamin (VITAMIN B 12 OR) Take 500 mcg by mouth every other day. Disp: 48 capsule Rfl: 1     Allergies:No Known Allergies   PHYSICAL EXAM:   Physical Exam    Blood pressure 102/68, pulse 60, resp. rate 16, height 61\", weight 152 lb, not currently breastfeeding.     Vitals reviewed  General: wel results. Will proceed with MRI brain w and w/o contrast  Start Memantine 10 mg daily.  Side effect explained  Continue psychotherapy and follow ups with your Psychiatrist. Minimize use of Diazepam  Continue Propronolol ER for both migraines and tremor contr

## 2019-03-11 ENCOUNTER — OFFICE VISIT (OUTPATIENT)
Dept: INTERNAL MEDICINE CLINIC | Facility: CLINIC | Age: 59
End: 2019-03-11

## 2019-03-11 VITALS
HEART RATE: 67 BPM | TEMPERATURE: 97 F | BODY MASS INDEX: 27.94 KG/M2 | WEIGHT: 148 LBS | DIASTOLIC BLOOD PRESSURE: 46 MMHG | OXYGEN SATURATION: 98 % | RESPIRATION RATE: 16 BRPM | HEIGHT: 61 IN | SYSTOLIC BLOOD PRESSURE: 103 MMHG

## 2019-03-11 DIAGNOSIS — J32.0 RIGHT MAXILLARY SINUSITIS: Primary | ICD-10-CM

## 2019-03-11 DIAGNOSIS — E66.3 OVERWEIGHT (BMI 25.0-29.9): ICD-10-CM

## 2019-03-11 PROCEDURE — 99214 OFFICE O/P EST MOD 30 MIN: CPT | Performed by: PHYSICIAN ASSISTANT

## 2019-03-11 RX ORDER — CLINDAMYCIN HYDROCHLORIDE 300 MG/1
300 CAPSULE ORAL 3 TIMES DAILY
Qty: 21 CAPSULE | Refills: 0 | Status: SHIPPED | OUTPATIENT
Start: 2019-03-11 | End: 2019-03-18

## 2019-03-11 NOTE — PROGRESS NOTES
HPI:  Kala Oglesby is a 63year old female who presents for swelling of R cheek x 3 days. Awoke with swelling of R cheek, very tender to touch. Mild R sided lymph node tenderness. C/o R upper tooth pain. No recent dental work.   Mild cough, nasal (ALS) Mother    • Personality Disorder Daughter    • Cancer Sister         melanoma     Social History    Tobacco Use      Smoking status: Never Smoker      Smokeless tobacco: Never Used    Alcohol use: Yes      Frequency: Monthly or less      Drinks per s

## 2019-03-12 ENCOUNTER — OFFICE VISIT (OUTPATIENT)
Dept: INTERNAL MEDICINE CLINIC | Facility: CLINIC | Age: 59
End: 2019-03-12

## 2019-03-12 VITALS
BODY MASS INDEX: 28.13 KG/M2 | RESPIRATION RATE: 16 BRPM | WEIGHT: 149 LBS | DIASTOLIC BLOOD PRESSURE: 80 MMHG | HEIGHT: 61 IN | HEART RATE: 80 BPM | SYSTOLIC BLOOD PRESSURE: 118 MMHG

## 2019-03-12 DIAGNOSIS — E66.9 OBESITY (BMI 30.0-34.9): ICD-10-CM

## 2019-03-12 DIAGNOSIS — Z51.81 THERAPEUTIC DRUG MONITORING: Primary | ICD-10-CM

## 2019-03-12 DIAGNOSIS — L30.4 INTERTRIGO: ICD-10-CM

## 2019-03-12 DIAGNOSIS — E55.9 VITAMIN D DEFICIENCY: ICD-10-CM

## 2019-03-12 DIAGNOSIS — L98.7 EXCESS SKIN: ICD-10-CM

## 2019-03-12 DIAGNOSIS — Z71.3 DIETARY COUNSELING: ICD-10-CM

## 2019-03-12 DIAGNOSIS — Z71.82 EXERCISE COUNSELING: ICD-10-CM

## 2019-03-12 PROCEDURE — 99214 OFFICE O/P EST MOD 30 MIN: CPT | Performed by: INTERNAL MEDICINE

## 2019-03-12 PROCEDURE — 99402 PREV MED CNSL INDIV APPRX 30: CPT | Performed by: INTERNAL MEDICINE

## 2019-03-12 NOTE — PROGRESS NOTES
Preventative Counseling for Diet and Exercise     /80   Pulse 80   Resp 16   Ht 61\"   Wt 149 lb   BMI 28.15 kg/m²   Body mass index is 28.15 kg/m².     Wt Readings from Last 6 Encounters:  03/12/19 : 149 lb  03/11/19 : 148 lb  03/05/19 : 152 lb  02/1

## 2019-03-12 NOTE — PROGRESS NOTES
HISTORY OF PRESENT ILLNESS  Patient presents with:  Weight Check: down 10 lb      Boris Machado is a 63year old female here for follow up in medical weight loss program.     Denies chest pain, shortness of breath, dizziness, blurred vision, headache 12/05/2018    OSMOCALC 288 12/05/2018    ALKPHO 95 11/28/2017    AST 25 12/05/2018    ALT 13 (L) 12/05/2018    BILT 0.6 11/28/2017    TP 7.1 11/28/2017    ALB 3.6 11/28/2017     12/05/2018    K 4.1 12/17/2018     12/05/2018    CO2 25.0 12/05/20 now 0.6 again) Disp: 5 pen Rfl: 1   Insulin Pen Needle (PEN NEEDLES) 32G X 4 MM Does not apply Misc 1 each by Does not apply route daily. Disp: 90 each Rfl: 0   escitalopram 20 MG Oral Tab Take 20 mg by mouth nightly.    Disp:  Rfl: 1   DULoxetine HCl 30 MG treat reviewed and signed. There are no Patient Instructions on file for this visit. Return in about 4 weeks (around 4/9/2019) for weight management. Patient verbalizes understanding.     Maxine Monte MD  3/12/2019

## 2019-03-14 ENCOUNTER — OFFICE VISIT (OUTPATIENT)
Dept: INTERNAL MEDICINE CLINIC | Facility: CLINIC | Age: 59
End: 2019-03-14

## 2019-03-14 VITALS
WEIGHT: 148.75 LBS | RESPIRATION RATE: 16 BRPM | SYSTOLIC BLOOD PRESSURE: 90 MMHG | BODY MASS INDEX: 28.08 KG/M2 | HEIGHT: 61 IN | HEART RATE: 68 BPM | TEMPERATURE: 98 F | DIASTOLIC BLOOD PRESSURE: 64 MMHG

## 2019-03-14 DIAGNOSIS — Z51.81 THERAPEUTIC DRUG MONITORING: ICD-10-CM

## 2019-03-14 DIAGNOSIS — E66.3 OVERWEIGHT (BMI 25.0-29.9): Primary | ICD-10-CM

## 2019-03-14 DIAGNOSIS — J01.00 ACUTE MAXILLARY SINUSITIS, RECURRENCE NOT SPECIFIED: ICD-10-CM

## 2019-03-14 DIAGNOSIS — M75.81 TENDINITIS OF RIGHT ROTATOR CUFF: ICD-10-CM

## 2019-03-14 PROBLEM — Z86.010 ENCOUNTER FOR COLONOSCOPY DUE TO HISTORY OF ADENOMATOUS COLONIC POLYPS: Status: RESOLVED | Noted: 2019-01-08 | Resolved: 2019-03-14

## 2019-03-14 PROBLEM — Z12.11 ENCOUNTER FOR COLONOSCOPY DUE TO HISTORY OF ADENOMATOUS COLONIC POLYPS: Status: RESOLVED | Noted: 2019-01-08 | Resolved: 2019-03-14

## 2019-03-14 PROBLEM — Z86.0101 ENCOUNTER FOR COLONOSCOPY DUE TO HISTORY OF ADENOMATOUS COLONIC POLYPS: Status: RESOLVED | Noted: 2019-01-08 | Resolved: 2019-03-14

## 2019-03-14 PROBLEM — Z12.11 ENCOUNTER FOR SCREENING COLONOSCOPY: Status: RESOLVED | Noted: 2019-01-08 | Resolved: 2019-03-14

## 2019-03-14 PROCEDURE — 99214 OFFICE O/P EST MOD 30 MIN: CPT | Performed by: INTERNAL MEDICINE

## 2019-03-14 RX ORDER — FLUTICASONE PROPIONATE 50 MCG
2 SPRAY, SUSPENSION (ML) NASAL
Qty: 1 BOTTLE | Refills: 0 | Status: SHIPPED | OUTPATIENT
Start: 2019-03-14 | End: 2020-01-16

## 2019-03-14 RX ORDER — DIAZEPAM 5 MG/1
5 TABLET ORAL EVERY EVENING
Refills: 0 | COMMUNITY
Start: 2019-03-11 | End: 2019-12-17 | Stop reason: ALTCHOICE

## 2019-03-14 NOTE — PROGRESS NOTES
Patient presents with:  Weight Check  Other: sinus issue; R shoulder tendonitis      HPI: Briseydadonovan Beckie presents today for eval of 3 issues:  1. Overweight status and therapeutic drug monitoring - She's established w/ Dr. Mag shrestha at [facility].   She's on [facility] Temp 97.6 °F (36.4 °C) (Oral)   Resp 16   Ht 61\"   Wt 148 lb 12 oz   BMI 28.11 kg/m²   Wt Readings from Last 6 Encounters:  03/14/19 : 148 lb 12 oz  03/12/19 : 149 lb  03/11/19 : 148 lb  03/05/19 : 152 lb  02/12/19 : 159 lb  02/01/19 : 156 lb 8 oz  Gen -

## 2019-03-21 ENCOUNTER — HOSPITAL ENCOUNTER (OUTPATIENT)
Dept: MRI IMAGING | Age: 59
Discharge: HOME | End: 2019-03-21
Attending: Other
Payer: COMMERCIAL

## 2019-03-21 DIAGNOSIS — G31.84 MILD NEUROCOGNITIVE DISORDER DUE TO MULTIPLE ETIOLOGIES: ICD-10-CM

## 2019-03-21 DIAGNOSIS — G43.709 CHRONIC MIGRAINE WITHOUT AURA WITHOUT STATUS MIGRAINOSUS, NOT INTRACTABLE: ICD-10-CM

## 2019-03-28 NOTE — TELEPHONE ENCOUNTER
Declined rx to pharmacy      Refill requested:   Requested Prescriptions     Pending Prescriptions Disp Refills   • Meloxicam 7.5 MG Oral Tab [Pharmacy Med Name: MELOXICAM 7.5MG TABLETS] 90 tablet 0     Sig: Take 1 tablet (7.5 mg total) by mouth daily.
not if taking pain medication/Yes

## 2019-03-29 ENCOUNTER — HOSPITAL ENCOUNTER (OUTPATIENT)
Dept: MRI IMAGING | Facility: HOSPITAL | Age: 59
Discharge: HOME | End: 2019-03-29
Attending: Other
Payer: COMMERCIAL

## 2019-03-29 ENCOUNTER — HOSPITAL ENCOUNTER (OUTPATIENT)
Dept: PERIOP | Facility: HOSPITAL | Age: 59
Discharge: HOME | End: 2019-03-29
Attending: Other
Payer: COMMERCIAL

## 2019-03-29 PROCEDURE — 36410 VNPNXR 3YR/> PHY/QHP DX/THER: CPT

## 2019-03-29 PROCEDURE — 70553 MRI BRAIN STEM W/O & W/DYE: CPT | Performed by: OTHER

## 2019-03-29 PROCEDURE — 76937 US GUIDE VASCULAR ACCESS: CPT

## 2019-03-29 PROCEDURE — A9575 INJ GADOTERATE MEGLUMI 0.1ML: HCPCS | Performed by: OTHER

## 2019-04-02 ENCOUNTER — TELEPHONE (OUTPATIENT)
Dept: NEUROLOGY | Facility: CLINIC | Age: 59
End: 2019-04-02

## 2019-04-02 NOTE — TELEPHONE ENCOUNTER
Per Epic review, patient is active on MyChart. Patient was sent a message with the following results and encouraged to reach out to [facility] with any questions or concerns. Oneal Claude     ----- Message from Mariia Fu MD sent at 4/1/2019  2:44 PM CDT -----  Dawood Villanueva

## 2019-04-04 ENCOUNTER — OFFICE VISIT (OUTPATIENT)
Dept: INTERNAL MEDICINE CLINIC | Facility: CLINIC | Age: 59
End: 2019-04-04

## 2019-04-04 VITALS — BODY MASS INDEX: 28 KG/M2 | WEIGHT: 145.63 LBS

## 2019-04-04 DIAGNOSIS — E66.3 OVERWEIGHT WITH BODY MASS INDEX (BMI) 25.0-29.9: Primary | ICD-10-CM

## 2019-04-04 DIAGNOSIS — R73.01 IMPAIRED FASTING GLUCOSE: ICD-10-CM

## 2019-04-04 PROCEDURE — 97803 MED NUTRITION INDIV SUBSEQ: CPT | Performed by: DIETITIAN, REGISTERED

## 2019-04-04 NOTE — PROGRESS NOTES
FOLLOW UP NUTRITION CONSULTATION    Nutrition Assessment    Number of consultations with dietitian: 2    Height:  Ht Readings from Last 1 Encounters:  03/14/19 : 61\"      Weight:   Wt Readings from Last 2 Encounters:  04/04/19 : 145 lb 9.6 oz  03/14/19

## 2019-04-05 ENCOUNTER — OFFICE VISIT (OUTPATIENT)
Dept: INTERNAL MEDICINE CLINIC | Facility: CLINIC | Age: 59
End: 2019-04-05

## 2019-04-05 VITALS
BODY MASS INDEX: 27.38 KG/M2 | OXYGEN SATURATION: 98 % | DIASTOLIC BLOOD PRESSURE: 60 MMHG | WEIGHT: 145 LBS | RESPIRATION RATE: 12 BRPM | HEART RATE: 66 BPM | TEMPERATURE: 98 F | SYSTOLIC BLOOD PRESSURE: 88 MMHG | HEIGHT: 61 IN

## 2019-04-05 DIAGNOSIS — M54.50 ACUTE MIDLINE LOW BACK PAIN WITHOUT SCIATICA: Primary | ICD-10-CM

## 2019-04-05 PROCEDURE — 99214 OFFICE O/P EST MOD 30 MIN: CPT | Performed by: PHYSICIAN ASSISTANT

## 2019-04-05 RX ORDER — SODIUM FLUORIDE 6.1 MG/ML
GEL, DENTIFRICE DENTAL
Refills: 2 | COMMUNITY
Start: 2019-03-12 | End: 2020-09-30

## 2019-04-05 NOTE — PROGRESS NOTES
HPI:   Kvng Monte is a 63year old female who is here for LBP x 2 weeks. Began after having intercourse. Denies specific injury. C/o mid low back pain - describes as an aching pain.   Denies radiation of pain, LE numbness, tingling, weakness, less      Drinks per session: 1 or 2      Binge frequency: Never      Comment: 6 drinks/year    Drug use: No         REVIEW OF SYSTEMS:   GENERAL: denies fever, chills  SKIN: denies any unusual skin lesions  : denies dysuria, hematurial  MUSCULOSKELETAL:

## 2019-04-05 NOTE — PATIENT INSTRUCTIONS
Low back pain:  - ice / heat (10-15 minutes at a time)  - naproxen twice a day with food  - may take tylenol (acetaminophen) 1,000 mg every 8 hours as needed (do not exceed 4,000 mg daily)  - start PT  - activity as tolerated

## 2019-04-08 ENCOUNTER — PATIENT MESSAGE (OUTPATIENT)
Dept: INTERNAL MEDICINE CLINIC | Facility: CLINIC | Age: 59
End: 2019-04-08

## 2019-04-08 RX ORDER — HYDROCODONE BITARTRATE AND ACETAMINOPHEN 5; 325 MG/1; MG/1
1 TABLET ORAL EVERY 6 HOURS PRN
Qty: 12 TABLET | Refills: 0 | Status: SHIPPED | OUTPATIENT
Start: 2019-04-08 | End: 2019-04-23

## 2019-04-08 NOTE — TELEPHONE ENCOUNTER
From: Abdiel Machuca  To: ONIEL Garza  Sent: 4/8/2019 8:48 AM CDT  Subject: Prescription Question    Marek Humble, My lower back pain is worsening and I want to avoid an Er visit. Are you still willing to prescribe something for the pain.  I have the

## 2019-04-09 ENCOUNTER — OFFICE VISIT (OUTPATIENT)
Dept: INTERNAL MEDICINE CLINIC | Facility: CLINIC | Age: 59
End: 2019-04-09

## 2019-04-09 VITALS
HEART RATE: 80 BPM | HEIGHT: 61 IN | DIASTOLIC BLOOD PRESSURE: 76 MMHG | BODY MASS INDEX: 27.56 KG/M2 | WEIGHT: 146 LBS | SYSTOLIC BLOOD PRESSURE: 118 MMHG | RESPIRATION RATE: 16 BRPM

## 2019-04-09 DIAGNOSIS — Z71.3 DIETARY COUNSELING: ICD-10-CM

## 2019-04-09 DIAGNOSIS — E66.01 OBESITY, CLASS III, BMI 40-49.9 (MORBID OBESITY) (HCC): ICD-10-CM

## 2019-04-09 DIAGNOSIS — Z51.81 THERAPEUTIC DRUG MONITORING: Primary | ICD-10-CM

## 2019-04-09 PROCEDURE — 99401 PREV MED CNSL INDIV APPRX 15: CPT | Performed by: INTERNAL MEDICINE

## 2019-04-09 PROCEDURE — 99213 OFFICE O/P EST LOW 20 MIN: CPT | Performed by: INTERNAL MEDICINE

## 2019-04-09 NOTE — PROGRESS NOTES
HISTORY OF PRESENT ILLNESS  Patient presents with:  Weight Check: down 3 lb      Keny Chavez is a 63year old female here for follow up in medical weight loss program.     Denies chest pain, shortness of breath, dizziness, blurred vision, headache, 11/28/2017     12/05/2018    K 4.1 12/17/2018     12/05/2018    CO2 25.0 12/05/2018     Lab Results   Component Value Date    EAG 94 12/05/2018    A1C 4.9 12/05/2018     Lab Results   Component Value Date    CHOLEST 208 (H) 12/05/2018    TRIG 1 THEN 2.4 mg daily for 7 days, THEN 3 mg daily.  (Patient taking differently: Inject 0.6 1 week 1.2 5-6 days of all 3 days now 0.6 again) Disp: 5 pen Rfl: 1   Insulin Pen Needle (PEN NEEDLES) 32G X 4 MM Does not apply Misc 1 each by Does not apply route geovanna behavior/stress management for success. See patient instruction below for more details. · Weight Loss Consent to treat reviewed and signed. There are no Patient Instructions on file for this visit. Return in about 1 month (around 5/7/2019).     P

## 2019-04-10 ENCOUNTER — HOSPITAL ENCOUNTER (OUTPATIENT)
Dept: GENERAL RADIOLOGY | Facility: HOSPITAL | Age: 59
Discharge: HOME | End: 2019-04-10
Attending: PHYSICIAN ASSISTANT
Payer: COMMERCIAL

## 2019-04-10 DIAGNOSIS — M54.50 ACUTE MIDLINE LOW BACK PAIN WITHOUT SCIATICA: ICD-10-CM

## 2019-04-10 PROCEDURE — 72114 X-RAY EXAM L-S SPINE BENDING: CPT | Performed by: PHYSICIAN ASSISTANT

## 2019-04-10 NOTE — PROGRESS NOTES
Preventative Counseling for Diet and Exercise     /76   Pulse 80   Resp 16   Ht 61\"   Wt 146 lb   BMI 27.59 kg/m²   Body mass index is 27.59 kg/m².     Wt Readings from Last 6 Encounters:  04/09/19 : 146 lb  04/05/19 : 145 lb  04/04/19 : 145 lb 9.6 o

## 2019-04-13 ENCOUNTER — PATIENT MESSAGE (OUTPATIENT)
Dept: NEUROLOGY | Facility: CLINIC | Age: 59
End: 2019-04-13

## 2019-04-15 NOTE — TELEPHONE ENCOUNTER
From: Abdiel Machuca  To: Naveed Siegel MD  Sent: 4/13/2019 7:20 AM CDT  Subject: Other    Hello, please provide info on the referral request you initiated dated 4/10/19 to 7/9/19 .  It is of course for Neurology and has a status of “pending revie

## 2019-04-20 ENCOUNTER — HOSPITAL ENCOUNTER (OUTPATIENT)
Dept: MRI IMAGING | Facility: HOSPITAL | Age: 59
Discharge: HOME | End: 2019-04-20
Attending: PHYSICIAN ASSISTANT
Payer: COMMERCIAL

## 2019-04-20 DIAGNOSIS — M51.36 DDD (DEGENERATIVE DISC DISEASE), LUMBAR: ICD-10-CM

## 2019-04-20 DIAGNOSIS — M47.816 FACET ARTHROPATHY, LUMBAR: ICD-10-CM

## 2019-04-20 DIAGNOSIS — M54.50 MIDLINE LOW BACK PAIN WITHOUT SCIATICA, UNSPECIFIED CHRONICITY: ICD-10-CM

## 2019-04-20 PROCEDURE — 72148 MRI LUMBAR SPINE W/O DYE: CPT | Performed by: PHYSICIAN ASSISTANT

## 2019-04-22 ENCOUNTER — TELEPHONE (OUTPATIENT)
Dept: INTERNAL MEDICINE CLINIC | Facility: CLINIC | Age: 59
End: 2019-04-22

## 2019-04-22 DIAGNOSIS — M54.5 LOW BACK PAIN, UNSPECIFIED BACK PAIN LATERALITY, UNSPECIFIED CHRONICITY, WITH SCIATICA PRESENCE UNSPECIFIED: Primary | ICD-10-CM

## 2019-04-22 NOTE — TELEPHONE ENCOUNTER
----- Message from ONIEL Horton sent at 4/22/2019  2:56 PM CDT -----  New disc herniation at L4-5 causing pressure on R L5 nerve root.   At her visit she was not having any symptoms in her legs but she needs to watch for this (pain, numbness, tingling,

## 2019-04-22 NOTE — TELEPHONE ENCOUNTER
Pt called in stating that she took 1 tablet of hydrocodone-acetaminophen 5-325 mg and did not feel any relief from medication. Would like to know if she can increase to 1.5 or 2 tablets of medications? Please advise. TY!

## 2019-04-23 RX ORDER — HYDROCODONE BITARTRATE AND ACETAMINOPHEN 5; 325 MG/1; MG/1
TABLET ORAL
Qty: 12 TABLET | Refills: 0 | COMMUNITY
Start: 2019-04-23 | End: 2019-07-11

## 2019-04-23 NOTE — TELEPHONE ENCOUNTER
Pt provided with new dosing instructions of hydrocodone-acetaminophen 5-325 mg 1.5-2 tabs Q8H prn. Understanding verbalized.

## 2019-04-24 PROBLEM — Z82.0: Status: ACTIVE | Noted: 2019-04-24

## 2019-04-24 PROBLEM — Z81.8: Status: ACTIVE | Noted: 2019-04-24

## 2019-04-30 ENCOUNTER — APPOINTMENT (OUTPATIENT)
Dept: LAB | Facility: HOSPITAL | Age: 59
End: 2019-04-30
Attending: INTERNAL MEDICINE
Payer: COMMERCIAL

## 2019-04-30 ENCOUNTER — HOSPITAL ENCOUNTER (OUTPATIENT)
Dept: CT IMAGING | Facility: HOSPITAL | Age: 59
Discharge: HOME | End: 2019-04-30
Attending: INTERNAL MEDICINE
Payer: COMMERCIAL

## 2019-04-30 DIAGNOSIS — R10.811 RIGHT UPPER QUADRANT ABDOMINAL TENDERNESS WITHOUT REBOUND TENDERNESS: ICD-10-CM

## 2019-04-30 PROCEDURE — 36415 COLL VENOUS BLD VENIPUNCTURE: CPT

## 2019-04-30 PROCEDURE — 80048 BASIC METABOLIC PNL TOTAL CA: CPT

## 2019-04-30 PROCEDURE — 74177 CT ABD & PELVIS W/CONTRAST: CPT | Performed by: INTERNAL MEDICINE

## 2019-05-01 ENCOUNTER — HOSPITAL ENCOUNTER (OUTPATIENT)
Dept: PHYSICAL THERAPY | Facility: HOSPITAL | Age: 59
Setting detail: THERAPIES SERIES
Discharge: HOME | End: 2019-05-01
Attending: INTERNAL MEDICINE
Payer: COMMERCIAL

## 2019-05-01 DIAGNOSIS — M75.81 TENDINITIS OF RIGHT ROTATOR CUFF: ICD-10-CM

## 2019-05-01 PROCEDURE — 97110 THERAPEUTIC EXERCISES: CPT

## 2019-05-01 PROCEDURE — 97161 PT EVAL LOW COMPLEX 20 MIN: CPT

## 2019-05-01 NOTE — PROGRESS NOTES
UPPER EXTREMITY EVALUATION:   Referring Physician: Dr. Allan Martinez  Diagnosis: Right RTC tendonitis.      Date of Service: 5/1/2019     PATIENT SUMMARY   Ion Wilkinosn is a 63year old y/o female who presents to therapy today with complaints of the onset o skilled Physical Therapy to address the above impairments to decrease pain and allow improvement with above noted functional problems. Precautions:  None  OBJECTIVE:   Observation/Posture: forward head, protracted B scapulas.     Cervical Screen:  AROM: Potential:good  Patient/Family/Caregiver was advised of these findings, precautions, and treatment options and has agreed to actively participate in planning and for this course of care. Thank you for your referral.   Please co-sign this note.    If you

## 2019-05-03 ENCOUNTER — HOSPITAL ENCOUNTER (OUTPATIENT)
Dept: PHYSICAL THERAPY | Facility: HOSPITAL | Age: 59
Setting detail: THERAPIES SERIES
Discharge: HOME | End: 2019-05-03
Attending: INTERNAL MEDICINE
Payer: COMMERCIAL

## 2019-05-03 PROCEDURE — 97110 THERAPEUTIC EXERCISES: CPT

## 2019-05-03 PROCEDURE — 97140 MANUAL THERAPY 1/> REGIONS: CPT

## 2019-05-03 NOTE — PROGRESS NOTES
Dx: Right RTC tendonitis. Authorized # of Visits:  8         Next MD visit: none scheduled  Fall Risk: standard         Precautions: n/a             Subjective: Reports that her right shoulder is tight/painful this morning.    Dull and intermittentl

## 2019-05-07 ENCOUNTER — OFFICE VISIT (OUTPATIENT)
Dept: NEUROLOGY | Facility: CLINIC | Age: 59
End: 2019-05-07

## 2019-05-07 VITALS
WEIGHT: 145 LBS | BODY MASS INDEX: 27 KG/M2 | HEART RATE: 76 BPM | DIASTOLIC BLOOD PRESSURE: 74 MMHG | SYSTOLIC BLOOD PRESSURE: 110 MMHG | RESPIRATION RATE: 16 BRPM

## 2019-05-07 DIAGNOSIS — G43.709 CHRONIC MIGRAINE WITHOUT AURA WITHOUT STATUS MIGRAINOSUS, NOT INTRACTABLE: ICD-10-CM

## 2019-05-07 PROCEDURE — 64615 CHEMODENERV MUSC MIGRAINE: CPT | Performed by: OTHER

## 2019-05-07 RX ORDER — ZOLMITRIPTAN 5 MG/1
TABLET, FILM COATED ORAL
Qty: 9 TABLET | Refills: 2 | Status: SHIPPED | OUTPATIENT
Start: 2019-05-07 | End: 2019-07-11

## 2019-05-08 ENCOUNTER — HOSPITAL ENCOUNTER (OUTPATIENT)
Dept: PHYSICAL THERAPY | Facility: HOSPITAL | Age: 59
Setting detail: THERAPIES SERIES
Discharge: HOME | End: 2019-05-08
Attending: INTERNAL MEDICINE
Payer: COMMERCIAL

## 2019-05-08 PROCEDURE — 97140 MANUAL THERAPY 1/> REGIONS: CPT

## 2019-05-08 PROCEDURE — 97110 THERAPEUTIC EXERCISES: CPT

## 2019-05-08 NOTE — PROGRESS NOTES
Dx: Right RTC tendonitis. Authorized # of Visits:  8         Next MD visit: none scheduled  Fall Risk: standard         Precautions: n/a             Subjective: Reports that her right shoulder is tight/painful this morning.    Dull and intermittentl therapy. Charges: Rosalee Holter.        Total Timed Treatment: 44 min  Total Treatment Time: 44 min

## 2019-05-10 ENCOUNTER — HOSPITAL ENCOUNTER (OUTPATIENT)
Dept: PHYSICAL THERAPY | Facility: HOSPITAL | Age: 59
Setting detail: THERAPIES SERIES
Discharge: HOME | End: 2019-05-10
Attending: INTERNAL MEDICINE
Payer: COMMERCIAL

## 2019-05-10 PROCEDURE — 97110 THERAPEUTIC EXERCISES: CPT

## 2019-05-10 PROCEDURE — 97140 MANUAL THERAPY 1/> REGIONS: CPT

## 2019-05-10 NOTE — PROGRESS NOTES
Dx: Right RTC tendonitis. Authorized # of Visits:  8         Next MD visit: none scheduled  Fall Risk: standard         Precautions: n/a             Subjective: Reports that her right shoulder is less painful overall.     Dull and intermittently sha ROM.   2 x 10.  2  X 10. R HBB and SE stretching gently while left side lying. X.  AA Scaption R x 10 with PT assist.  X.  Active R Scaption x 10 reps. .       Skilled Services: education, manual therapy. Charges: Alfonso Hernandez.        Total Timed Joe

## 2019-05-13 ENCOUNTER — HOSPITAL ENCOUNTER (OUTPATIENT)
Dept: PHYSICAL THERAPY | Facility: HOSPITAL | Age: 59
Setting detail: THERAPIES SERIES
Discharge: HOME | End: 2019-05-13
Attending: PHYSICIAN ASSISTANT
Payer: COMMERCIAL

## 2019-05-13 ENCOUNTER — OFFICE VISIT (OUTPATIENT)
Dept: INTERNAL MEDICINE CLINIC | Facility: CLINIC | Age: 59
End: 2019-05-13

## 2019-05-13 VITALS
WEIGHT: 143 LBS | DIASTOLIC BLOOD PRESSURE: 74 MMHG | BODY MASS INDEX: 27 KG/M2 | SYSTOLIC BLOOD PRESSURE: 122 MMHG | RESPIRATION RATE: 16 BRPM | HEIGHT: 61 IN | HEART RATE: 88 BPM

## 2019-05-13 DIAGNOSIS — M54.50 ACUTE MIDLINE LOW BACK PAIN WITHOUT SCIATICA: ICD-10-CM

## 2019-05-13 DIAGNOSIS — F43.9 STRESS AT HOME: ICD-10-CM

## 2019-05-13 DIAGNOSIS — Z51.81 THERAPEUTIC DRUG MONITORING: Primary | ICD-10-CM

## 2019-05-13 DIAGNOSIS — E66.01 OBESITY, CLASS III, BMI 40-49.9 (MORBID OBESITY) (HCC): ICD-10-CM

## 2019-05-13 PROCEDURE — 99214 OFFICE O/P EST MOD 30 MIN: CPT | Performed by: INTERNAL MEDICINE

## 2019-05-13 PROCEDURE — 97140 MANUAL THERAPY 1/> REGIONS: CPT

## 2019-05-13 PROCEDURE — 97110 THERAPEUTIC EXERCISES: CPT

## 2019-05-13 RX ORDER — DULOXETIN HYDROCHLORIDE 60 MG/1
60 CAPSULE, DELAYED RELEASE ORAL DAILY
Refills: 0 | COMMUNITY
Start: 2019-04-29

## 2019-05-13 NOTE — PROGRESS NOTES
HISTORY OF PRESENT ILLNESS  Patient presents with:  Weight Check: down 3 lb/saxenda 1.2 mg      Florinda Conner is a 63year old female here for follow up in medical weight loss program.     Denies chest pain, shortness of breath, dizziness, blurred vi 11/28/2017    .0 11/28/2017     Lab Results   Component Value Date    GLU 77 04/30/2019    BUN 14 04/30/2019    BUNCREA 16.7 04/30/2019    CREATSERUM 0.84 04/30/2019    ANIONGAP 2 04/30/2019    GFR 84 11/28/2017    GFRNAA 77 04/30/2019    GFRAA 89 0 times daily. Disp:  Rfl: 0   Fluticasone Propionate 50 MCG/ACT Nasal Suspension 2 sprays by Each Nare route daily as needed (sinusitis). Disp: 1 Bottle Rfl: 0   Cyanocobalamin (VITAMIN B 12 OR) Take 500 mcg by mouth daily.  Disp:  Rfl:    Propranolol HCl success. See patient instruction below for more details. · Weight Loss Consent to treat reviewed and signed.     2. Stress at home   -reviewed some stress behavior techniques  -discussed deep breathing techniques   -reviewed concept of urge jar to counter

## 2019-05-13 NOTE — PROGRESS NOTES
Dx: Right RTC tendonitis.          Authorized # of Visits:  8         Next MD visit: none scheduled  Fall Risk: standard         Precautions: n/a             Subjective:  Notes experiencing constant, uncomfortable pain right shoulder that's rated 4/10 this while supine and while left side lying. PROM R shoulder while supine. Supine protraction R x 10 with cueing prn. . 2 x 10.  2 x 10 with cueing prn. . 3 x 10 with cueing prn. .      Seated SR x 10 with cueing and as HEP. X 10.   X 10 reps with cueing to

## 2019-05-14 RX ORDER — PEN NEEDLE, DIABETIC 30 GX3/16"
1 NEEDLE, DISPOSABLE MISCELLANEOUS DAILY
Qty: 90 EACH | Refills: 2 | Status: SHIPPED | OUTPATIENT
Start: 2019-05-14 | End: 2019-06-03

## 2019-05-15 ENCOUNTER — HOSPITAL ENCOUNTER (OUTPATIENT)
Dept: PHYSICAL THERAPY | Facility: HOSPITAL | Age: 59
Setting detail: THERAPIES SERIES
Discharge: HOME | End: 2019-05-15
Attending: INTERNAL MEDICINE
Payer: COMMERCIAL

## 2019-05-15 PROCEDURE — 97140 MANUAL THERAPY 1/> REGIONS: CPT

## 2019-05-15 PROCEDURE — 97110 THERAPEUTIC EXERCISES: CPT

## 2019-05-15 NOTE — PROGRESS NOTES
Dx: Right RTC tendonitis. Authorized # of Visits:  8         Next MD visit: none scheduled  Fall Risk: standard         Precautions: n/a             Subjective:  Notes experiencing a dull, aching pain right shoulder that's rated 3/10 this morning. tendon, UT/LS mm.. X. X.     PROM right shoulder while supine and while left side lying. X. PROM R shoulder while supine and while left side lying. PROM R shoulder while supine. PROM R shoulder while supine and while L side lying.       Supine protraction

## 2019-05-16 ENCOUNTER — OFFICE VISIT (OUTPATIENT)
Dept: SURGERY | Facility: CLINIC | Age: 59
End: 2019-05-16

## 2019-05-16 ENCOUNTER — OFFICE VISIT (OUTPATIENT)
Dept: INTERNAL MEDICINE CLINIC | Facility: CLINIC | Age: 59
End: 2019-05-16

## 2019-05-16 VITALS
SYSTOLIC BLOOD PRESSURE: 90 MMHG | WEIGHT: 142.38 LBS | HEIGHT: 61 IN | BODY MASS INDEX: 26.88 KG/M2 | HEART RATE: 82 BPM | DIASTOLIC BLOOD PRESSURE: 60 MMHG

## 2019-05-16 DIAGNOSIS — E66.3 OVERWEIGHT WITH BODY MASS INDEX (BMI) 25.0-29.9: Primary | ICD-10-CM

## 2019-05-16 DIAGNOSIS — R73.01 IFG (IMPAIRED FASTING GLUCOSE): ICD-10-CM

## 2019-05-16 DIAGNOSIS — M47.817 SPONDYLOSIS OF LUMBOSACRAL REGION WITHOUT MYELOPATHY OR RADICULOPATHY: ICD-10-CM

## 2019-05-16 DIAGNOSIS — K76.0 FATTY LIVER: ICD-10-CM

## 2019-05-16 DIAGNOSIS — M47.817 SPONDYLOSIS OF LUMBOSACRAL REGION WITHOUT MYELOPATHY OR RADICULOPATHY: Primary | ICD-10-CM

## 2019-05-16 PROCEDURE — 99203 OFFICE O/P NEW LOW 30 MIN: CPT | Performed by: NEUROLOGICAL SURGERY

## 2019-05-16 PROCEDURE — 97803 MED NUTRITION INDIV SUBSEQ: CPT | Performed by: DIETITIAN, REGISTERED

## 2019-05-16 RX ORDER — CYCLOBENZAPRINE HCL 10 MG
10 TABLET ORAL 3 TIMES DAILY PRN
Qty: 90 TABLET | Refills: 1 | Status: SHIPPED | OUTPATIENT
Start: 2019-05-16 | End: 2020-11-06

## 2019-05-16 RX ORDER — MELOXICAM 15 MG/1
15 TABLET ORAL DAILY
Qty: 30 TABLET | Refills: 1 | Status: SHIPPED | OUTPATIENT
Start: 2019-05-16 | End: 2019-07-15

## 2019-05-16 RX ORDER — MELOXICAM 15 MG/1
TABLET ORAL
Qty: 90 TABLET | Refills: 1 | OUTPATIENT
Start: 2019-05-16

## 2019-05-16 NOTE — H&P
Neurosurgery Clinic Visit  2019    Frances Hanson PCP:  Marisol Pagan MD    1960 MRN [id number]       CHIEF COMPLAINT:  Patient presents with:  New Patient: Low Back Pain       HISTORY OF PRESENT ILLNESS:  Frances Hanson is a(n) 63 yea Rfl: 2   Insulin Pen Needle (PEN NEEDLES) 32G X 4 MM Does not apply Misc 1 each by Does not apply route daily. Disp: 90 each Rfl: 2   DULoxetine HCl 60 MG Oral Cap DR Particles Take 2 capsules by mouth daily.  Disp:  Rfl: 0   Liraglutide -Weight Management pain/belching    • Hemorrhoids    • History of cervical discectomy     Anterior Cervical Discectomy C3-C4 and disc deterioration   • History of depression    • Irregular bowel habits    • Loss of appetite    • Migraines    • Nausea    • OCD (obsessive comp disease. A detailed skin exam was not performed. Heart:  Regular rate and rhythm  Lungs: Clear to auscultation bilaterally. Abdomen:  Soft, non-tender to palpation. Non-distended, with positive bowel sounds. Rectal and genital:  Exams are deferred.   Quintin Bhagat anomaly      ASSESSMENT AND PLAN:  21-year-old female with low back pain  At this point would recommend some conservative treatments  I ordered her physical therapy for her back  I ordered her Flexeril for muscle relaxer and instructed to take it around th

## 2019-05-16 NOTE — TELEPHONE ENCOUNTER
90 day supply Meloxicam not apporpriate, Pt just seen in office today as new Pt     Medication denied.

## 2019-05-16 NOTE — PROGRESS NOTES
Location of Pain: Pt states that she has lower back pain. Pt states that she has no issues with radiating pain. Pt states that she has no issues with numbness and tingeing. Pt states that she has issues with balance.  Pt states that she has issues with gait

## 2019-05-20 ENCOUNTER — APPOINTMENT (OUTPATIENT)
Dept: PHYSICAL THERAPY | Facility: HOSPITAL | Age: 59
End: 2019-05-20
Payer: COMMERCIAL

## 2019-05-20 VITALS — WEIGHT: 142.38 LBS | BODY MASS INDEX: 27 KG/M2

## 2019-05-21 NOTE — PROGRESS NOTES
FOLLOW UP NUTRITION CONSULTATION    Nutrition Assessment    Number of consultations with dietitian: 3    Height:  Ht Readings from Last 1 Encounters:  05/16/19 : 61\"      Weight:   Wt Readings from Last 2 Encounters:  05/16/19 : 142 lb 6.4 oz  05/20/19

## 2019-05-22 ENCOUNTER — HOSPITAL ENCOUNTER (OUTPATIENT)
Dept: PHYSICAL THERAPY | Facility: HOSPITAL | Age: 59
Setting detail: THERAPIES SERIES
Discharge: HOME | End: 2019-05-22
Attending: INTERNAL MEDICINE
Payer: COMMERCIAL

## 2019-05-22 PROCEDURE — 97140 MANUAL THERAPY 1/> REGIONS: CPT

## 2019-05-22 PROCEDURE — 97110 THERAPEUTIC EXERCISES: CPT

## 2019-05-29 ENCOUNTER — HOSPITAL ENCOUNTER (OUTPATIENT)
Dept: PHYSICAL THERAPY | Facility: HOSPITAL | Age: 59
Setting detail: THERAPIES SERIES
Discharge: HOME | End: 2019-05-29
Attending: INTERNAL MEDICINE
Payer: COMMERCIAL

## 2019-05-29 PROCEDURE — 97110 THERAPEUTIC EXERCISES: CPT

## 2019-05-29 PROCEDURE — 97140 MANUAL THERAPY 1/> REGIONS: CPT

## 2019-05-29 NOTE — PROGRESS NOTES
Dx: Right RTC tendonitis.          Authorized # of Visits:  8         Next MD visit: none scheduled  Fall Risk: standard         Precautions: n/a            Progress Note:     Subjective:  Notes experiencing a constant throbbing pain right shoulder that's r low back at the next PT session.        Date: 5/3/2019 TX#: 2/8 Date:  5/8/19             TX#: 3/8   Date: 5/10/19               TX#: 4/8 Date:  5/13/19             TX#: 5/ Date: 5/15/19              TX#: 6/8 Date: 5/22/19              TX#: 7/8 Date:  5/29/ R HBB and SE stretching gently while left side lying. X.  AA Scaption R x 10 with PT assist.  X.  Active R Scaption x 10 reps. . R HBB and SE stretching by PT. X. Gently. Gentle HBB and SE stretching by PT and as HEP.       Skilled Services: education,

## 2019-06-03 NOTE — PROGRESS NOTES
HPI:    Patient ID: Uyen Pacheco is a 63year old female. Follow up visit  Patient is here for follow up along with her  for mild neurocognitive disorder and migraines headaches. MRI brain and  EEG obtained was negative.  Reports since pa SURGICAL HISTORY      C3-C4 anterior discectomy   • OTHER SURGICAL HISTORY  [id number]    ECT for depression   • OTHER SURGICAL HISTORY  [facility]   • SACROILIAC JOINT INJECTION BILATERAL Bilateral 6/11/2018    Performed by Noelle Cool MD at [facility] Rfl: 2   Dicyclomine HCl 10 MG Oral Cap Take 1 capsule (10 mg total) by mouth 2 (two) times daily as needed.  (Patient taking differently: Take 10 mg by mouth daily.  ) Disp: 60 capsule Rfl: 3   omeprazole 20 MG Oral Capsule Delayed Release Take one capsule naming and repetition. Normal attention and impaired short term memory. Cranial nerves 2-12: grossly intact  Sensory : Intact to all modalities including light touch, pinprick, vibration and proprioception  Motor: Normal tone in all extremities.  Mild pos

## 2019-06-03 NOTE — PROGRESS NOTES
The patient is here for migraines. Patient has noticed a decrease in frequency of migraines since her last OV.

## 2019-06-04 DIAGNOSIS — F06.70 MILD NEUROCOGNITIVE DISORDER DUE TO MULTIPLE ETIOLOGIES: Primary | ICD-10-CM

## 2019-06-06 ENCOUNTER — HOSPITAL ENCOUNTER (OUTPATIENT)
Dept: PHYSICAL THERAPY | Facility: HOSPITAL | Age: 59
Setting detail: THERAPIES SERIES
Discharge: HOME | End: 2019-06-06
Attending: PHYSICIAN ASSISTANT
Payer: COMMERCIAL

## 2019-06-06 DIAGNOSIS — M54.50 ACUTE MIDLINE LOW BACK PAIN WITHOUT SCIATICA: ICD-10-CM

## 2019-06-06 PROCEDURE — 97110 THERAPEUTIC EXERCISES: CPT

## 2019-06-06 PROCEDURE — 97162 PT EVAL MOD COMPLEX 30 MIN: CPT

## 2019-06-06 RX ORDER — MEMANTINE HYDROCHLORIDE 10 MG/1
TABLET ORAL
Qty: 30 TABLET | Refills: 2 | Status: SHIPPED | OUTPATIENT
Start: 2019-06-06 | End: 2019-07-11

## 2019-06-07 ENCOUNTER — TELEPHONE (OUTPATIENT)
Dept: INTERNAL MEDICINE CLINIC | Facility: CLINIC | Age: 59
End: 2019-06-07

## 2019-06-07 NOTE — PROGRESS NOTES
SPINE EVALUATION:   Referring Physician: Dr. Imelda Negro  Diagnosis: Low back pain; lumbar spondylosis.       Date of Service: 6/6/2019     PATIENT SUMMARY   Vikki Arenas is a 63year old y/o female who presents to therapy today with complaints of th with strong pain. Extension: 5 with strong pain. Supine passive HF is 110 degrees B. .. Accessory motion: hypomobile L1-L5  and R UPAs. Palpation: tenderness B piriformis muscle, R TL paraspinal mm. .     Strength:   LE   Hip flexion: R 5/5 with education; Home exercise program instruction.     Education or treatment limitation: None  Rehab Potential:good    FOTO: 54/100  Patient/Family/Caregiver was advised of these findings, precautions, and treatment options and has agreed to actively participat

## 2019-06-07 NOTE — TELEPHONE ENCOUNTER
Patient calling for same day appointment with lilliana.  She found lump in her breast this am please callback to triage

## 2019-06-07 NOTE — TELEPHONE ENCOUNTER
Pt stated that this AM she found a palpable, lump which moves under L breast that is tender to the touch.      Does not have a family hx of breast cancer, recent mammogram 11/2018 was WNL, denies swelling/redness/drainage to/from site, recent injury, nipple

## 2019-06-10 ENCOUNTER — OFFICE VISIT (OUTPATIENT)
Dept: INTERNAL MEDICINE CLINIC | Facility: CLINIC | Age: 59
End: 2019-06-10

## 2019-06-10 VITALS
HEIGHT: 61 IN | DIASTOLIC BLOOD PRESSURE: 57 MMHG | WEIGHT: 165.5 LBS | OXYGEN SATURATION: 99 % | BODY MASS INDEX: 31.25 KG/M2 | RESPIRATION RATE: 12 BRPM | SYSTOLIC BLOOD PRESSURE: 97 MMHG | HEART RATE: 72 BPM | TEMPERATURE: 98 F

## 2019-06-10 DIAGNOSIS — F50.81 BINGE EATING DISORDER: ICD-10-CM

## 2019-06-10 DIAGNOSIS — N63.24 BREAST LUMP ON LEFT SIDE AT 7 O'CLOCK POSITION: Primary | ICD-10-CM

## 2019-06-10 DIAGNOSIS — F41.1 GAD (GENERALIZED ANXIETY DISORDER): ICD-10-CM

## 2019-06-10 DIAGNOSIS — F33.1 MDD (MAJOR DEPRESSIVE DISORDER), RECURRENT EPISODE, MODERATE (HCC): ICD-10-CM

## 2019-06-10 DIAGNOSIS — E66.9 OBESITY (BMI 30.0-34.9): ICD-10-CM

## 2019-06-10 DIAGNOSIS — R41.9 NEUROCOGNITIVE DISORDER: ICD-10-CM

## 2019-06-10 PROBLEM — E66.811 OBESITY (BMI 30.0-34.9): Status: ACTIVE | Noted: 2019-06-10

## 2019-06-10 PROCEDURE — 99214 OFFICE O/P EST MOD 30 MIN: CPT | Performed by: PHYSICIAN ASSISTANT

## 2019-06-10 RX ORDER — BUPROPION HYDROCHLORIDE 150 MG/1
TABLET ORAL
Refills: 0 | COMMUNITY
Start: 2019-06-07 | End: 2019-06-20 | Stop reason: DRUGHIGH

## 2019-06-10 NOTE — PATIENT INSTRUCTIONS
Please call Jaden Flores at [phone] to set up the following tests:  - diagnostic mammogram of left breast

## 2019-06-10 NOTE — PROGRESS NOTES
Fátima Hollingsworth is a 63year old female. HPI:   Patient presents for multiple issues. Mood - very down over the past month. Sig family stress. Both daughters dealing with mental health issues.   Her oldest daughter has moved out and left her four COLONOSCOPY     • D & C     • EGD     • LUMBAR FACET INJECTION BILATERAL Bilateral 5/21/2018    Performed by Shauna Bingham MD at [facility] MAIN OR   • NEEDLE BIOPSY RIGHT  05/2015    benign breast   • OTHER SURGICAL HISTORY      C3-C4 anterior discectomy   • OTHER cm mass palpated at 7:00 position of inferior/medial L breast  AXILLA: no LAD  EXTREMITIES: no cyanosis, clubbing, or edema  NEURO: no focal deficits  PSYCH: flattened speech and affect, sig psychomotor slowing, appropriate judgement and insight, pt is ple

## 2019-06-18 ENCOUNTER — OFFICE VISIT (OUTPATIENT)
Dept: UROLOGY | Facility: HOSPITAL | Age: 59
End: 2019-06-18
Attending: OBSTETRICS & GYNECOLOGY
Payer: COMMERCIAL

## 2019-06-18 VITALS
DIASTOLIC BLOOD PRESSURE: 70 MMHG | WEIGHT: 165 LBS | HEIGHT: 61 IN | SYSTOLIC BLOOD PRESSURE: 100 MMHG | BODY MASS INDEX: 31.15 KG/M2

## 2019-06-18 DIAGNOSIS — N81.84 PELVIC MUSCLE WASTING: ICD-10-CM

## 2019-06-18 DIAGNOSIS — R35.1 NOCTURIA: ICD-10-CM

## 2019-06-18 DIAGNOSIS — N95.2 POSTMENOPAUSAL ATROPHIC VAGINITIS: ICD-10-CM

## 2019-06-18 DIAGNOSIS — M79.10 MYALGIA: ICD-10-CM

## 2019-06-18 DIAGNOSIS — N94.10 DYSPAREUNIA, FEMALE: Primary | ICD-10-CM

## 2019-06-18 DIAGNOSIS — R39.11 URINARY HESITANCY: ICD-10-CM

## 2019-06-18 PROCEDURE — 99201 HC OUTPT EVAL AND MGNT NEW PT LEVEL 1: CPT

## 2019-06-18 PROCEDURE — 87086 URINE CULTURE/COLONY COUNT: CPT | Performed by: OBSTETRICS & GYNECOLOGY

## 2019-06-18 RX ORDER — ESTRADIOL 0.1 MG/G
CREAM VAGINAL
Qty: 1 TUBE | Refills: 0 | Status: SHIPPED | OUTPATIENT
Start: 2019-06-18 | End: 2019-06-28

## 2019-06-18 NOTE — PROGRESS NOTES
Josh Sevilla,   6/18/2019     Referred by Dr. Nani Epps  Pt here with self    Patient presents with: Other: Refered by Dr Nani Epps;  takes several minutes to void. has been going on for 6 months Previous ECT  30 treatments over 15 months.      She presents w Depression Father    • Other (ALS) Mother    • Personality Disorder Daughter    • Dementia Maternal Grandmother    • Cancer Sister         melanoma      Social History    Tobacco Use      Smoking status: Never Smoker      Smokeless tobacco: Never Used    A (sinusitis). Disp: 1 Bottle Rfl: 0   Cyanocobalamin (VITAMIN B 12 OR) Take 500 mcg by mouth daily. Disp:  Rfl:    Propranolol HCl 80 MG Oral Tab Take 1 tablet (80 mg total) by mouth daily.  Disp: 30 tablet Rfl: 2   naproxen 500 MG Oral Tab Take 1 tablet (50 wasting    (M79.10) Myalgia      Discussion Items:   Urodynamics and cystoscopy for evaluation of LUTS  Pelvic muscle rehabilitation including pelvic floor PT  Topical estrogen therapy for treating UGA  Discussed dietary and behavioral modification, discus

## 2019-06-19 ENCOUNTER — TELEPHONE (OUTPATIENT)
Dept: INTERNAL MEDICINE CLINIC | Facility: CLINIC | Age: 59
End: 2019-06-19

## 2019-06-19 NOTE — TELEPHONE ENCOUNTER
Patient called left vmail would like to know if there are any guidance programs for binge eating pt stated she has an appt this week but this is an urgent matter

## 2019-06-19 NOTE — TELEPHONE ENCOUNTER
Gave patient # for 24 hour help line Binge Eating - she is outside and cannot write down. Would like sent to her in my chart. Will also forward this to Lizette Delgado per Dr. Leonora Saucedo to ask for help.

## 2019-06-20 ENCOUNTER — OFFICE VISIT (OUTPATIENT)
Dept: INTERNAL MEDICINE CLINIC | Facility: CLINIC | Age: 59
End: 2019-06-20

## 2019-06-20 ENCOUNTER — HOSPITAL ENCOUNTER (OUTPATIENT)
Dept: PHYSICAL THERAPY | Facility: HOSPITAL | Age: 59
Setting detail: THERAPIES SERIES
Discharge: HOME | End: 2019-06-20
Attending: PHYSICIAN ASSISTANT
Payer: COMMERCIAL

## 2019-06-20 VITALS
HEIGHT: 61 IN | HEART RATE: 76 BPM | BODY MASS INDEX: 30.78 KG/M2 | RESPIRATION RATE: 16 BRPM | SYSTOLIC BLOOD PRESSURE: 96 MMHG | DIASTOLIC BLOOD PRESSURE: 60 MMHG | WEIGHT: 163 LBS

## 2019-06-20 DIAGNOSIS — F33.1 MDD (MAJOR DEPRESSIVE DISORDER), RECURRENT EPISODE, MODERATE (HCC): ICD-10-CM

## 2019-06-20 DIAGNOSIS — F41.1 GAD (GENERALIZED ANXIETY DISORDER): ICD-10-CM

## 2019-06-20 DIAGNOSIS — Z51.81 THERAPEUTIC DRUG MONITORING: Primary | ICD-10-CM

## 2019-06-20 DIAGNOSIS — F50.81 BINGE EATING DISORDER: ICD-10-CM

## 2019-06-20 DIAGNOSIS — E66.9 OBESITY (BMI 30.0-34.9): ICD-10-CM

## 2019-06-20 PROCEDURE — 97140 MANUAL THERAPY 1/> REGIONS: CPT

## 2019-06-20 PROCEDURE — 97110 THERAPEUTIC EXERCISES: CPT

## 2019-06-20 PROCEDURE — 99214 OFFICE O/P EST MOD 30 MIN: CPT | Performed by: INTERNAL MEDICINE

## 2019-06-20 RX ORDER — BUPROPION HYDROCHLORIDE 300 MG/1
150 TABLET ORAL DAILY
Refills: 0 | COMMUNITY
Start: 2019-06-19 | End: 2020-02-04

## 2019-06-20 RX ORDER — LISDEXAMFETAMINE DIMESYLATE 30 MG/1
30 CAPSULE ORAL DAILY
Refills: 0 | COMMUNITY
Start: 2019-06-11 | End: 2019-08-12

## 2019-06-20 NOTE — PROGRESS NOTES
Dx: Lumbar spondylosis without sciatica. Insurance (Authorized # of Visits):  8 initially. Authorizing Physician: Dr. Kayce Harvey.   Next MD visit: none scheduled  Fall Risk: standard         Precautions: n/a             Subjective: States hook lying; Piriformis stretch; ADIM hook lying, and seated sciatic nerve glides. Charges: ex2.man1.        Total Timed Treatment: 44 min  Total Treatment Time: 44 min

## 2019-06-20 NOTE — PROGRESS NOTES
HISTORY OF PRESENT ILLNESS  Patient presents with:  Weight Check: up 20 lbs      Kiana Chou is a 63year old female here for follow up in medical weight loss program.     Denies chest pain, shortness of breath, dizziness, blurred vision, headache, 12/05/2018    BILT 0.6 11/28/2017    TP 7.1 11/28/2017    ALB 3.6 11/28/2017     04/30/2019    K 3.7 04/30/2019     04/30/2019    CO2 33.0 (H) 04/30/2019     Lab Results   Component Value Date    EAG 94 12/05/2018    A1C 4.9 12/05/2018     Lab prior to breakfast. Disp: 45 capsule Rfl: 3   HYDROcodone-acetaminophen 5-325 MG Oral Tab Take 1.5 or 2 tabs every 8 hours as needed for pain Disp: 12 tablet Rfl: 0   PREVIDENT 5000 DRY MOUTH 1.1 % Dental Gel USE UTD Disp:  Rfl: 2   diazepam 5 MG Oral Tab dietitian and psychologist as recommended. · Discussed the role of sleep and stress in weight management. · Labs orders as above.   · Counseled on comprehensive weight loss plan including attention to nutrition, exercise and behavior/stress management for

## 2019-06-26 ENCOUNTER — OFFICE VISIT (OUTPATIENT)
Dept: INTERNAL MEDICINE CLINIC | Facility: CLINIC | Age: 59
End: 2019-06-26

## 2019-06-26 VITALS
WEIGHT: 160 LBS | DIASTOLIC BLOOD PRESSURE: 70 MMHG | HEIGHT: 61 IN | RESPIRATION RATE: 16 BRPM | SYSTOLIC BLOOD PRESSURE: 112 MMHG | HEART RATE: 70 BPM | BODY MASS INDEX: 30.21 KG/M2

## 2019-06-26 DIAGNOSIS — E66.9 OBESITY (BMI 30.0-34.9): ICD-10-CM

## 2019-06-26 DIAGNOSIS — Z51.81 THERAPEUTIC DRUG MONITORING: Primary | ICD-10-CM

## 2019-06-26 DIAGNOSIS — T73.3XXA FATIGUE DUE TO EXCESSIVE EXERTION, INITIAL ENCOUNTER: ICD-10-CM

## 2019-06-26 DIAGNOSIS — F50.81 BINGE EATING DISORDER: ICD-10-CM

## 2019-06-26 PROCEDURE — 99214 OFFICE O/P EST MOD 30 MIN: CPT | Performed by: INTERNAL MEDICINE

## 2019-06-26 RX ORDER — LISDEXAMFETAMINE DIMESYLATE 30 MG/1
30 CAPSULE ORAL DAILY
Qty: 30 CAPSULE | Refills: 0 | Status: CANCELLED | OUTPATIENT
Start: 2019-06-26

## 2019-06-26 NOTE — PROGRESS NOTES
HISTORY OF PRESENT ILLNESS  Patient presents with:  Weight Check: lost 3 pounds      Marisa Santamaria is a 63year old female here for follow up in medical weight loss program.     Denies chest pain, shortness of breath, dizziness, blurred vision, heada 11/28/2017     04/30/2019    K 3.7 04/30/2019     04/30/2019    CO2 33.0 (H) 04/30/2019     Lab Results   Component Value Date    EAG 94 12/05/2018    A1C 4.9 12/05/2018     Lab Results   Component Value Date    CHOLEST 208 (H) 12/05/2018    TR HYDROcodone-acetaminophen 5-325 MG Oral Tab Take 1.5 or 2 tabs every 8 hours as needed for pain Disp: 12 tablet Rfl: 0   PREVIDENT 5000 DRY MOUTH 1.1 % Dental Gel USE UTD Disp:  Rfl: 2   diazepam 5 MG Oral Tab Take 5 mg by mouth.  3 and 5PM 7 Mg at bedtim and behavior/stress management for success. See patient instruction below for more details. · Weight Loss Consent to treat reviewed and signed. There are no Patient Instructions on file for this visit. Return in about 1 month (around 7/24/2019).

## 2019-06-28 ENCOUNTER — TELEPHONE (OUTPATIENT)
Dept: UROLOGY | Facility: HOSPITAL | Age: 59
End: 2019-06-28

## 2019-06-28 RX ORDER — ESTRADIOL 0.1 MG/G
CREAM VAGINAL
Qty: 1 TUBE | Refills: 0 | Status: SHIPPED | OUTPATIENT
Start: 2019-06-28 | End: 2019-09-28

## 2019-06-28 NOTE — TELEPHONE ENCOUNTER
Pt called, LM that her new prescription for Estrace was not available at pharmacy. Receipt confirmed by pharmacy not received per Epic, -orders resent via Epic, receipt confirmed message received.   Called pt and LMOR that rx re-sent

## 2019-07-01 ENCOUNTER — HOSPITAL ENCOUNTER (OUTPATIENT)
Dept: MAMMOGRAPHY | Facility: HOSPITAL | Age: 59
Discharge: HOME | End: 2019-07-01
Attending: PHYSICIAN ASSISTANT
Payer: COMMERCIAL

## 2019-07-01 DIAGNOSIS — N63.24 BREAST LUMP ON LEFT SIDE AT 7 O'CLOCK POSITION: ICD-10-CM

## 2019-07-01 PROCEDURE — 77065 DX MAMMO INCL CAD UNI: CPT | Performed by: PHYSICIAN ASSISTANT

## 2019-07-01 PROCEDURE — 77061 BREAST TOMOSYNTHESIS UNI: CPT | Performed by: PHYSICIAN ASSISTANT

## 2019-07-01 PROCEDURE — 76642 ULTRASOUND BREAST LIMITED: CPT | Performed by: PHYSICIAN ASSISTANT

## 2019-07-05 DIAGNOSIS — G31.84 MILD NEUROCOGNITIVE DISORDER DUE TO MULTIPLE ETIOLOGIES: ICD-10-CM

## 2019-07-05 RX ORDER — MEMANTINE HYDROCHLORIDE 10 MG/1
TABLET ORAL
Qty: 30 TABLET | Refills: 0 | OUTPATIENT
Start: 2019-07-05

## 2019-07-05 NOTE — TELEPHONE ENCOUNTER
Spoke with the pharmacist who states that there are refills on file and to disregard.      Medication: MEMANTINE HCL 10 MG Oral Tab    Date of last refill: 06/06/19 (#30/2)  [place]    Last office visit: 6/3/2019  Due Propronolol ER for both migraines and tremor control  Due for Botox injections for migraines in August     Discussed the above with patient and her  and answer all questions to the best of my ability

## 2019-07-09 ENCOUNTER — HOSPITAL ENCOUNTER (OUTPATIENT)
Dept: PHYSICAL THERAPY | Facility: HOSPITAL | Age: 59
Setting detail: THERAPIES SERIES
Discharge: HOME | End: 2019-07-09
Attending: PHYSICIAN ASSISTANT
Payer: COMMERCIAL

## 2019-07-09 ENCOUNTER — OFFICE VISIT (OUTPATIENT)
Dept: SURGERY | Facility: CLINIC | Age: 59
End: 2019-07-09

## 2019-07-09 VITALS — HEART RATE: 72 BPM | SYSTOLIC BLOOD PRESSURE: 122 MMHG | DIASTOLIC BLOOD PRESSURE: 74 MMHG

## 2019-07-09 DIAGNOSIS — M47.817 SPONDYLOSIS OF LUMBOSACRAL REGION WITHOUT MYELOPATHY OR RADICULOPATHY: Primary | ICD-10-CM

## 2019-07-09 DIAGNOSIS — M54.16 LUMBAR RADICULOPATHY: ICD-10-CM

## 2019-07-09 PROCEDURE — 97110 THERAPEUTIC EXERCISES: CPT

## 2019-07-09 PROCEDURE — 97140 MANUAL THERAPY 1/> REGIONS: CPT

## 2019-07-09 PROCEDURE — 99213 OFFICE O/P EST LOW 20 MIN: CPT | Performed by: PHYSICIAN ASSISTANT

## 2019-07-09 NOTE — PROGRESS NOTES
Neurosurgery Clinic Visit  2019    Rsusel Stockton PCP:  Luba Myaberry MD    1960 MRN [id number]     HISTORY OF PRESENT ILLNESS:  Russel Stockton is a(n) 63year old female who is here for follow-up for low back pain.    She has been [employer]

## 2019-07-09 NOTE — PROGRESS NOTES
Pt is here for follow up. Pt states that she has been having left sided pain in the lower back for the past week. Pt states that she has issues with radiaing pain in the left leg. Pt states that she has no issues with numbness and tingling.  Pt states that

## 2019-07-09 NOTE — PROGRESS NOTES
Dx: Lumbar spondylosis without sciatica. Insurance (Authorized # of Visits):  8 initially. Authorizing Physician: Dr. Jennifer Ford.   Next MD visit: none scheduled  Fall Risk: standard         Precautions: n/a             Subjective: States by PT.  30 seconds x 2 l/r. [employer] Printer HSS with ankle pumps while hook lying x 10 l/r. .. X 20 l/r. Amy Printer ADIM hook lying with ball squeezing 5 seconds x 10 reps. .. 5 second holds x 10 reps. .        RACHEL hook lying with green band x 10.   2 x 10 reps. .      Left s

## 2019-07-11 ENCOUNTER — APPOINTMENT (OUTPATIENT)
Dept: PHYSICAL THERAPY | Facility: HOSPITAL | Age: 59
End: 2019-07-11
Attending: PHYSICIAN ASSISTANT
Payer: COMMERCIAL

## 2019-07-11 ENCOUNTER — ANESTHESIA EVENT (OUTPATIENT)
Dept: ENDOSCOPY | Facility: HOSPITAL | Age: 59
End: 2019-07-11
Payer: COMMERCIAL

## 2019-07-11 ENCOUNTER — HOSPITAL ENCOUNTER (OUTPATIENT)
Facility: HOSPITAL | Age: 59
Setting detail: OBSERVATION
Discharge: HOME | End: 2019-07-12
Attending: EMERGENCY MEDICINE | Admitting: HOSPITALIST
Payer: COMMERCIAL

## 2019-07-11 ENCOUNTER — ANESTHESIA (OUTPATIENT)
Dept: ENDOSCOPY | Facility: HOSPITAL | Age: 59
End: 2019-07-11
Payer: COMMERCIAL

## 2019-07-11 ENCOUNTER — TELEPHONE (OUTPATIENT)
Dept: INTERNAL MEDICINE CLINIC | Facility: CLINIC | Age: 59
End: 2019-07-11

## 2019-07-11 ENCOUNTER — TELEPHONE (OUTPATIENT)
Dept: PHYSICAL THERAPY | Facility: HOSPITAL | Age: 59
End: 2019-07-11

## 2019-07-11 DIAGNOSIS — R10.13 EPIGASTRIC PAIN: ICD-10-CM

## 2019-07-11 DIAGNOSIS — K92.0 HEMATEMESIS, PRESENCE OF NAUSEA NOT SPECIFIED: Primary | ICD-10-CM

## 2019-07-11 DIAGNOSIS — K92.0 HEMATEMESIS WITH NAUSEA: ICD-10-CM

## 2019-07-11 LAB
ALBUMIN SERPL-MCNC: 4 G/DL (ref 3.4–5)
ALBUMIN/GLOB SERPL: 1.1 {RATIO} (ref 1–2)
ALP LIVER SERPL-CCNC: 102 U/L (ref 46–118)
ALT SERPL-CCNC: 16 U/L (ref 13–56)
ANION GAP SERPL CALC-SCNC: 6 MMOL/L (ref 0–18)
ANTIBODY SCREEN: NEGATIVE
AST SERPL-CCNC: 18 U/L (ref 15–37)
BASOPHILS # BLD AUTO: 0.03 X10(3) UL (ref 0–0.2)
BASOPHILS NFR BLD AUTO: 0.4 %
BILIRUB SERPL-MCNC: 0.6 MG/DL (ref 0.1–2)
BILIRUB UR QL STRIP.AUTO: NEGATIVE
BUN BLD-MCNC: 17 MG/DL (ref 7–18)
BUN/CREAT SERPL: 16.2 (ref 10–20)
CALCIUM BLD-MCNC: 9.4 MG/DL (ref 8.5–10.1)
CHLORIDE SERPL-SCNC: 103 MMOL/L (ref 98–112)
CLARITY UR REFRACT.AUTO: CLEAR
CO2 SERPL-SCNC: 30 MMOL/L (ref 21–32)
CREAT BLD-MCNC: 1.05 MG/DL (ref 0.55–1.02)
DEPRECATED RDW RBC AUTO: 43.9 FL (ref 35.1–46.3)
EOSINOPHIL # BLD AUTO: 0.24 X10(3) UL (ref 0–0.7)
EOSINOPHIL NFR BLD AUTO: 3 %
ERYTHROCYTE [DISTWIDTH] IN BLOOD BY AUTOMATED COUNT: 12.9 % (ref 11–15)
EST. AVERAGE GLUCOSE BLD GHB EST-MCNC: 91 MG/DL (ref 68–126)
GLOBULIN PLAS-MCNC: 3.6 G/DL (ref 2.8–4.4)
GLUCOSE BLD-MCNC: 76 MG/DL (ref 70–99)
GLUCOSE UR STRIP.AUTO-MCNC: NEGATIVE MG/DL
HBA1C MFR BLD HPLC: 4.8 % (ref ?–5.7)
HCT VFR BLD AUTO: 38.6 % (ref 35–48)
HGB BLD-MCNC: 11.8 G/DL (ref 12–16)
HGB BLD-MCNC: 12.4 G/DL (ref 12–16)
IMM GRANULOCYTES # BLD AUTO: 0.03 X10(3) UL (ref 0–1)
IMM GRANULOCYTES NFR BLD: 0.4 %
KETONES UR STRIP.AUTO-MCNC: NEGATIVE MG/DL
LEUKOCYTE ESTERASE UR QL STRIP.AUTO: NEGATIVE
LYMPHOCYTES # BLD AUTO: 0.7 X10(3) UL (ref 1–4)
LYMPHOCYTES NFR BLD AUTO: 8.6 %
M PROTEIN MFR SERPL ELPH: 7.6 G/DL (ref 6.4–8.2)
MCH RBC QN AUTO: 29.8 PG (ref 26–34)
MCHC RBC AUTO-ENTMCNC: 32.1 G/DL (ref 31–37)
MCV RBC AUTO: 92.8 FL (ref 80–100)
MONOCYTES # BLD AUTO: 0.62 X10(3) UL (ref 0.1–1)
MONOCYTES NFR BLD AUTO: 7.6 %
NEUTROPHILS # BLD AUTO: 6.5 X10 (3) UL (ref 1.5–7.7)
NEUTROPHILS # BLD AUTO: 6.5 X10(3) UL (ref 1.5–7.7)
NEUTROPHILS NFR BLD AUTO: 80 %
NITRITE UR QL STRIP.AUTO: NEGATIVE
OSMOLALITY SERPL CALC.SUM OF ELEC: 288 MOSM/KG (ref 275–295)
PH UR STRIP.AUTO: 8 [PH] (ref 4.5–8)
PLATELET # BLD AUTO: 222 10(3)UL (ref 150–450)
POTASSIUM SERPL-SCNC: 4 MMOL/L (ref 3.5–5.1)
PROT UR STRIP.AUTO-MCNC: NEGATIVE MG/DL
RBC # BLD AUTO: 4.16 X10(6)UL (ref 3.8–5.3)
RH BLOOD TYPE: POSITIVE
SODIUM SERPL-SCNC: 139 MMOL/L (ref 136–145)
SP GR UR STRIP.AUTO: 1.01 (ref 1–1.03)
UROBILINOGEN UR STRIP.AUTO-MCNC: <2 MG/DL
WBC # BLD AUTO: 8.1 X10(3) UL (ref 4–11)

## 2019-07-11 PROCEDURE — 99220 INITIAL OBSERVATION CARE,LEVL III: CPT | Performed by: HOSPITALIST

## 2019-07-11 RX ORDER — DIAZEPAM 5 MG/1
5 TABLET ORAL ONCE
Status: COMPLETED | OUTPATIENT
Start: 2019-07-11 | End: 2019-07-11

## 2019-07-11 RX ORDER — MEMANTINE HYDROCHLORIDE 10 MG/1
10 TABLET ORAL DAILY
COMMUNITY
End: 2019-08-27

## 2019-07-11 RX ORDER — DIAZEPAM 5 MG/1
5 TABLET ORAL NIGHTLY
COMMUNITY
End: 2019-12-10

## 2019-07-11 RX ORDER — RIZATRIPTAN BENZOATE 10 MG/1
10 TABLET, ORALLY DISINTEGRATING ORAL ONCE
Status: COMPLETED | OUTPATIENT
Start: 2019-07-11 | End: 2019-07-11

## 2019-07-11 RX ORDER — ZOLMITRIPTAN 5 MG/1
5 TABLET, FILM COATED ORAL AS NEEDED
COMMUNITY
End: 2019-12-26

## 2019-07-11 RX ORDER — SODIUM CHLORIDE 9 MG/ML
INJECTION, SOLUTION INTRAVENOUS CONTINUOUS
Status: DISCONTINUED | OUTPATIENT
Start: 2019-07-11 | End: 2019-07-12

## 2019-07-11 RX ORDER — DEXTROSE MONOHYDRATE 25 G/50ML
50 INJECTION, SOLUTION INTRAVENOUS
Status: DISCONTINUED | OUTPATIENT
Start: 2019-07-11 | End: 2019-07-11

## 2019-07-11 RX ORDER — DIAZEPAM 5 MG/1
7.5 TABLET ORAL NIGHTLY
Status: DISCONTINUED | OUTPATIENT
Start: 2019-07-11 | End: 2019-07-12

## 2019-07-11 RX ORDER — ONDANSETRON 2 MG/ML
4 INJECTION INTRAMUSCULAR; INTRAVENOUS EVERY 6 HOURS PRN
Status: DISCONTINUED | OUTPATIENT
Start: 2019-07-11 | End: 2019-07-12

## 2019-07-11 RX ORDER — SODIUM CHLORIDE 9 MG/ML
INJECTION, SOLUTION INTRAVENOUS CONTINUOUS
Status: ACTIVE | OUTPATIENT
Start: 2019-07-11 | End: 2019-07-11

## 2019-07-11 RX ORDER — ONDANSETRON 2 MG/ML
4 INJECTION INTRAMUSCULAR; INTRAVENOUS EVERY 4 HOURS PRN
Status: DISCONTINUED | OUTPATIENT
Start: 2019-07-11 | End: 2019-07-11

## 2019-07-11 NOTE — ANESTHESIA PREPROCEDURE EVALUATION
PRE-OP EVALUATION    Patient Name: Boris Machado    Pre-op Diagnosis: * No pre-op diagnosis entered *    * No procedures listed *    * No surgeons found in log *    Pre-op vitals reviewed.   Temp: 97.5 °F (36.4 °C)  Pulse: 56  Resp: 16  BP: 108/72  Sp prior to breakfast. Disp: 45 capsule Rfl: 3   PREVIDENT 5000 DRY MOUTH 1.1 % Dental Gel USE UTD Disp:  Rfl: 2   diazepam 5 MG Oral Tab Take 5 mg by mouth every evening.  At 1500 PM.  Disp:  Rfl: 0   Fluticasone Propionate 50 MCG/ACT Nasal Suspension 2 spray 07/11/2019    MCH 29.8 07/11/2019    MCHC 32.1 07/11/2019    RDW 12.9 07/11/2019    .0 07/11/2019     Lab Results   Component Value Date     07/11/2019    K 4.0 07/11/2019     07/11/2019    CO2 30.0 07/11/2019    BUN 17 07/11/2019    CRE

## 2019-07-11 NOTE — ED NOTES
Up to bathroom steady gait. Urine obtained and sent. Pt denies diarrhea or vomiting at this time.  Denies abd. pain

## 2019-07-11 NOTE — PLAN OF CARE
Patient here with blood in emesis and diarrhea x4 within past 24 hours. No blood reported in stool. Stool sample to be collected; contact plus precautions initiated per BPA. No emesis since arrival to floor.   Started on clear liquids, became nauseous an tolerated  - Evaluate effectiveness of GI medications  - Encourage mobilization and activity  - Obtain nutritional consult as needed  - Establish a toileting routine/schedule  - Consider collaborating with pharmacy to review patient's medication profile  O

## 2019-07-11 NOTE — ED PROVIDER NOTES
Patient Seen in: [facility] Emergency Department    History   Patient presents with:  GI Bleeding (gastrointestinal)  Nausea/Vomiting/Diarrhea (gastrointestinal)    Stated Complaint: Vomiting blood    HPI    61-year-old female presents to the emerge History    Tobacco Use      Smoking status: Never Smoker      Smokeless tobacco: Never Used    Alcohol use: Yes      Frequency: Monthly or less      Drinks per session: 1 or 2      Binge frequency: Never      Comment: 6 drinks/year    Drug use: No      Rev 0.70 (*)     All other components within normal limits   CBC WITH DIFFERENTIAL WITH PLATELET    Narrative: The following orders were created for panel order CBC WITH DIFFERENTIAL WITH PLATELET.   Procedure                               Abnormality List              Present on Admission  Date Reviewed: 7/9/2019          ICD-10-CM Noted POA    * (Principal) Hematemesis, presence of nausea not specified K92.0 7/11/2019     Hematemesis K92.0 7/11/2019 Unknown

## 2019-07-11 NOTE — PROGRESS NOTES
NURSING ADMISSION NOTE      Patient admitted via Cart  Oriented to room. Safety precautions initiated. Bed in low position. Call light in reach. Patient is able to return demonstration of correct use of call light. Admission navigator completed.

## 2019-07-11 NOTE — H&P
[facility] HOSPITALIST  History and Physical     [facility] Patient Status:  Observation    1960 MRN [id number]   [facility] 3NE-A Attending Ann Casillas MD   Hosp Day # 0 PCP Andrew Talbot MD     Chief Complaint: hematemesis NEEDLE BIOPSY RIGHT  05/2015    benign breast   • OTHER SURGICAL HISTORY      C3-C4 anterior discectomy   • OTHER SURGICAL HISTORY  [id number]    ECT for depression   • OTHER SURGICAL HISTORY  2018    [facility]   • SACROILIAC JOINT INJECTION BILATERAL Geraldo 2 (two) times daily as needed.  Disp: 60 capsule Rfl: 3   omeprazole 20 MG Oral Capsule Delayed Release Take one capsule (20 mg total) by mouth every other day, 30 minutes prior to breakfast. Disp: 45 capsule Rfl: 3   PREVIDENT 5000 DRY MOUTH 1.1 % Dental G 4.0      K 4.0      CO2 30.0   ALKPHO 102   AST 18   ALT 16   BILT 0.6   TP 7.6     No results for input(s): PTP, INR in the last 168 hours. No results for input(s): TROP, CK in the last 168 hours. Imaging: Imaging data reviewed in Epic.   ASS

## 2019-07-11 NOTE — CONSULTS
[facility]                       Gastroenterology Consultation-[facility] Gastroenterology    Zuleika Gandhi Patient Status:  Emergency    1960 MRN [id number]   Location [address] Attending Christiane Cali MD depression    • Irregular bowel habits    • Loss of appetite    • Migraines    • Nausea    • OCD (obsessive compulsive disorder)    • Pain in joints    • Sleep apnea     has never been on CPAP   • Sleep disturbance    • Tendonitis of shoulder, right 03/201 Rheumatologic: + chronic arthritis, myalgias, arthralgias            Genitourinary:  The patient reports no history of recurrent urinary tract infections, hematuria, dysuria, or nephrolithiasis           Psychiatric: + depression, anxiety           Oncolog RBC 4.16   HGB 12.4   HCT 38.6   MCV 92.8   MCH 29.8   MCHC 32.1   RDW 12.9   NEPRELIM 6.50   WBC 8.1   .0       Recent Labs   Lab 07/11/19  0856   ALT 16   AST 18       Imaging:     EGD: 3/2018: Dr Shiv Blanchard  Indication: dysphagia, abd bloating, ep tomorrow. Rest of management per APN note.        Alice Pretty  Gastroenterology/[facility]

## 2019-07-11 NOTE — ED INITIAL ASSESSMENT (HPI)
Pt here after stating she has been vomiting blood x 4 since last night with loose stool x 4. +LUQ pain . Increased stress at home.

## 2019-07-11 NOTE — TELEPHONE ENCOUNTER
Pt paged this morning at 7:30am that she has had 4 episodes of vomiting since 1am with \"red tomato like\" emesis and she notes she did not eat any red foods yesterday. She estimates 8 oz of emesis at each episode.     She sees Dr. Calero and has a history

## 2019-07-12 VITALS
TEMPERATURE: 98 F | RESPIRATION RATE: 15 BRPM | SYSTOLIC BLOOD PRESSURE: 124 MMHG | HEART RATE: 63 BPM | OXYGEN SATURATION: 99 % | HEIGHT: 61 IN | DIASTOLIC BLOOD PRESSURE: 73 MMHG | WEIGHT: 163 LBS | BODY MASS INDEX: 30.78 KG/M2

## 2019-07-12 LAB
ANION GAP SERPL CALC-SCNC: 4 MMOL/L (ref 0–18)
BASOPHILS # BLD AUTO: 0.02 X10(3) UL (ref 0–0.2)
BASOPHILS NFR BLD AUTO: 0.3 %
BUN BLD-MCNC: 13 MG/DL (ref 7–18)
BUN/CREAT SERPL: 14.4 (ref 10–20)
CALCIUM BLD-MCNC: 8.7 MG/DL (ref 8.5–10.1)
CHLORIDE SERPL-SCNC: 110 MMOL/L (ref 98–112)
CO2 SERPL-SCNC: 28 MMOL/L (ref 21–32)
CREAT BLD-MCNC: 0.9 MG/DL (ref 0.55–1.02)
DEPRECATED RDW RBC AUTO: 44.8 FL (ref 35.1–46.3)
EOSINOPHIL # BLD AUTO: 0.33 X10(3) UL (ref 0–0.7)
EOSINOPHIL NFR BLD AUTO: 5.2 %
ERYTHROCYTE [DISTWIDTH] IN BLOOD BY AUTOMATED COUNT: 13 % (ref 11–15)
GLUCOSE BLD-MCNC: 82 MG/DL (ref 70–99)
HAV IGM SER QL: 2.5 MG/DL (ref 1.6–2.6)
HCT VFR BLD AUTO: 35.5 % (ref 35–48)
HGB BLD-MCNC: 11.3 G/DL (ref 12–16)
HGB BLD-MCNC: 11.3 G/DL (ref 12–16)
IMM GRANULOCYTES # BLD AUTO: 0.02 X10(3) UL (ref 0–1)
IMM GRANULOCYTES NFR BLD: 0.3 %
LYMPHOCYTES # BLD AUTO: 0.79 X10(3) UL (ref 1–4)
LYMPHOCYTES NFR BLD AUTO: 12.5 %
MCH RBC QN AUTO: 29.8 PG (ref 26–34)
MCHC RBC AUTO-ENTMCNC: 31.8 G/DL (ref 31–37)
MCV RBC AUTO: 93.7 FL (ref 80–100)
MONOCYTES # BLD AUTO: 0.5 X10(3) UL (ref 0.1–1)
MONOCYTES NFR BLD AUTO: 7.9 %
NEUTROPHILS # BLD AUTO: 4.67 X10 (3) UL (ref 1.5–7.7)
NEUTROPHILS # BLD AUTO: 4.67 X10(3) UL (ref 1.5–7.7)
NEUTROPHILS NFR BLD AUTO: 73.8 %
OSMOLALITY SERPL CALC.SUM OF ELEC: 293 MOSM/KG (ref 275–295)
PLATELET # BLD AUTO: 179 10(3)UL (ref 150–450)
POTASSIUM SERPL-SCNC: 4.3 MMOL/L (ref 3.5–5.1)
RBC # BLD AUTO: 3.79 X10(6)UL (ref 3.8–5.3)
SODIUM SERPL-SCNC: 142 MMOL/L (ref 136–145)
WBC # BLD AUTO: 6.3 X10(3) UL (ref 4–11)

## 2019-07-12 PROCEDURE — 99217 OBSERVATION CARE DISCHARGE: CPT | Performed by: HOSPITALIST

## 2019-07-12 PROCEDURE — 0DJ08ZZ INSPECTION OF UPPER INTESTINAL TRACT, VIA NATURAL OR ARTIFICIAL OPENING ENDOSCOPIC: ICD-10-PCS | Performed by: INTERNAL MEDICINE

## 2019-07-12 RX ORDER — RIZATRIPTAN BENZOATE 10 MG/1
10 TABLET, ORALLY DISINTEGRATING ORAL ONCE
Status: COMPLETED | OUTPATIENT
Start: 2019-07-12 | End: 2019-07-12

## 2019-07-12 RX ORDER — OMEPRAZOLE 20 MG/1
20 CAPSULE, DELAYED RELEASE ORAL
Qty: 60 CAPSULE | Refills: 1 | Status: SHIPPED | OUTPATIENT
Start: 2019-07-12 | End: 2019-07-15

## 2019-07-12 RX ORDER — PANTOPRAZOLE SODIUM 40 MG/1
40 TABLET, DELAYED RELEASE ORAL
Qty: 60 TABLET | Refills: 1 | Status: SHIPPED | OUTPATIENT
Start: 2019-07-12 | End: 2019-07-12

## 2019-07-12 RX ORDER — DIAZEPAM 5 MG/ML
2.5 INJECTION, SOLUTION INTRAMUSCULAR; INTRAVENOUS EVERY 6 HOURS PRN
Status: DISCONTINUED | OUTPATIENT
Start: 2019-07-12 | End: 2019-07-12

## 2019-07-12 RX ORDER — SODIUM CHLORIDE, SODIUM LACTATE, POTASSIUM CHLORIDE, CALCIUM CHLORIDE 600; 310; 30; 20 MG/100ML; MG/100ML; MG/100ML; MG/100ML
INJECTION, SOLUTION INTRAVENOUS CONTINUOUS
Status: CANCELLED | OUTPATIENT
Start: 2019-07-12

## 2019-07-12 RX ORDER — NALOXONE HYDROCHLORIDE 0.4 MG/ML
80 INJECTION, SOLUTION INTRAMUSCULAR; INTRAVENOUS; SUBCUTANEOUS AS NEEDED
Status: CANCELLED | OUTPATIENT
Start: 2019-07-12 | End: 2019-07-12

## 2019-07-12 NOTE — PLAN OF CARE
NURSING DISCHARGE NOTE    Discharged Home via Wheelchair. Accompanied by Spouse  Belongings Taken by patient/family. Discharge paperwork discussed with patient, stated understanding. IV removed.  Patient was in stable condition when patient left the

## 2019-07-12 NOTE — PLAN OF CARE
Assumed care at 0730. A&O x 4. On RA tolerating well. NSR on tele. EGD is planned for 1530. Possible d/c today debating on results of EGD. Hgb q8h. NPO at midnight. IV valium ordered for patient. Staff will continue to monitor.        Problem: Patient/Famil medication profile  Outcome: Progressing

## 2019-07-12 NOTE — ANESTHESIA POSTPROCEDURE EVALUATION
1530 [facility] Patient Status:  Observation   Age/Gender 63year old female MRN [id number]   Location [facility]. Attending Farzana Owusu MD   [facility] Day # 0 PCP Gay Velazquez MD       Anesthesia Post-op Note    Procedure

## 2019-07-12 NOTE — PROGRESS NOTES
[facility] HOSPITALIST  Progress Note     Early Tim Patient Status:  Observation    1960 MRN [id number]   [facility] 3NE-A Attending Kathy Barnes MD   Hosp Day # 0 PCP Licha Driscoll MD     Chief Complaint: hematemesis   S:  St results and GI Severiano Leash, MD

## 2019-07-12 NOTE — PLAN OF CARE
Resumed care at 1930  Pt A&Ox4 calm and cooperative  VS WNL, NSR, RA  Up ad karissa steady gait  Denies emesis and or bowel movements  EGD planned for today  Consent on chart  Pt remains CARRIEO  [email or web address] left ac      Problem: ANXIETY  Goal: Will report anxiet

## 2019-07-12 NOTE — PLAN OF CARE
Problem: Patient/Family Goals  Goal: Patient/Family Long Term Goal  Description  Patient's Long Term Goal: manage anxiety and depression    Interventions:  - psych liason consult  - See additional Care Plan goals for specific interventions  - resume depr mobilization and activity  - Obtain nutritional consult as needed  - Establish a toileting routine/schedule  - Consider collaborating with pharmacy to review patient's medication profile  7/12/2019 1634 by Christiano Jackson RN  Outcome: Completed  7/12/2019

## 2019-07-13 NOTE — DISCHARGE SUMMARY
[facility] HOSPITALIST  DISCHARGE SUMMARY     Crist Seip Patient Status:  Observation    1960 MRN [id number]   [facility] 3NE-A Attending No att. providers found   Hosp Day # 0 PCP Parveen Seo MD     Date of Admission: 2019 discharged in good condition.     Procedures during hospitalization:   • EGD     Incidental or significant findings and recommendations (brief descriptions):  • Per Brief Synopsis of Hospital Course    Lab/Test results pending at Discharge:   · bx of EGD Meloxicam 15 MG Tabs      Take 1 tablet (15 mg total) by mouth daily. Quantity:  30 tablet  Refills:  1     Memantine HCl 10 MG Tabs  Commonly known as:  NAMENDA      Take 10 mg by mouth daily.    Refills:  0     PREVIDENT 5000 DRY MOUTH 1.1 % Gel  Gene

## 2019-07-15 ENCOUNTER — PATIENT OUTREACH (OUTPATIENT)
Dept: CASE MANAGEMENT | Age: 59
End: 2019-07-15

## 2019-07-15 ENCOUNTER — OFFICE VISIT (OUTPATIENT)
Dept: INTERNAL MEDICINE CLINIC | Facility: CLINIC | Age: 59
End: 2019-07-15

## 2019-07-15 VITALS
DIASTOLIC BLOOD PRESSURE: 72 MMHG | HEART RATE: 76 BPM | WEIGHT: 156.25 LBS | SYSTOLIC BLOOD PRESSURE: 98 MMHG | OXYGEN SATURATION: 99 % | TEMPERATURE: 98 F | HEIGHT: 61 IN | BODY MASS INDEX: 29.5 KG/M2 | RESPIRATION RATE: 12 BRPM

## 2019-07-15 DIAGNOSIS — K27.9 PUD (PEPTIC ULCER DISEASE): ICD-10-CM

## 2019-07-15 DIAGNOSIS — K92.0 HEMATEMESIS, PRESENCE OF NAUSEA NOT SPECIFIED: Primary | ICD-10-CM

## 2019-07-15 PROBLEM — E66.9 OBESITY (BMI 30.0-34.9): Status: RESOLVED | Noted: 2019-06-10 | Resolved: 2019-07-15

## 2019-07-15 PROBLEM — E66.811 OBESITY (BMI 30.0-34.9): Status: RESOLVED | Noted: 2019-06-10 | Resolved: 2019-07-15

## 2019-07-15 PROCEDURE — 99495 TRANSJ CARE MGMT MOD F2F 14D: CPT | Performed by: INTERNAL MEDICINE

## 2019-07-16 ENCOUNTER — TELEPHONE (OUTPATIENT)
Dept: SURGERY | Facility: CLINIC | Age: 59
End: 2019-07-16

## 2019-07-16 ENCOUNTER — OFFICE VISIT (OUTPATIENT)
Dept: UROLOGY | Facility: HOSPITAL | Age: 59
End: 2019-07-16
Attending: OBSTETRICS & GYNECOLOGY
Payer: COMMERCIAL

## 2019-07-16 ENCOUNTER — HOSPITAL ENCOUNTER (OUTPATIENT)
Dept: PHYSICAL THERAPY | Facility: HOSPITAL | Age: 59
Setting detail: THERAPIES SERIES
End: 2019-07-16
Attending: PHYSICIAN ASSISTANT
Payer: COMMERCIAL

## 2019-07-16 VITALS — SYSTOLIC BLOOD PRESSURE: 118 MMHG | DIASTOLIC BLOOD PRESSURE: 78 MMHG

## 2019-07-16 DIAGNOSIS — R39.11 URINARY HESITANCY: Primary | ICD-10-CM

## 2019-07-16 LAB
CONTROL RUN WITHIN 24 HOURS?: YES
LEUKOCYTE ESTERASE URINE: NEGATIVE
NITRITE URINE: NEGATIVE

## 2019-07-16 PROCEDURE — 51741 ELECTRO-UROFLOWMETRY FIRST: CPT

## 2019-07-16 PROCEDURE — 51729 CYSTOMETROGRAM W/VP&UP: CPT

## 2019-07-16 PROCEDURE — 81002 URINALYSIS NONAUTO W/O SCOPE: CPT

## 2019-07-16 PROCEDURE — 51797 INTRAABDOMINAL PRESSURE TEST: CPT

## 2019-07-16 PROCEDURE — 51784 ANAL/URINARY MUSCLE STUDY: CPT

## 2019-07-16 NOTE — TELEPHONE ENCOUNTER
Spoke to pt to confirm that there is only an XR for rt shoulder. Advised that she would need to be evaluated and then will possibly need an MRI. Dr Nikole Rossi will determine at [facility]. Pt verbalized understanding.

## 2019-07-16 NOTE — TELEPHONE ENCOUNTER
Pt states PCP would like physician to review rt rotator cuff prior to coming in for OV.  Any questions please contact pt

## 2019-07-16 NOTE — TELEPHONE ENCOUNTER
X-ray reviewed from January. X-ray will not show any rotator cuff pathology. It is negative for any acute fracture. If concern for rotator cuff pathology, will need MRI.

## 2019-07-16 NOTE — PROCEDURES
Patient here for urodynamic testing. Procedure explained and confirmed by patient. See evaluation form for results. Both verbal and written discharge instructions were given.   Patient tolerated procedure well and will follow up with Dr. Joanna Tubbs PM.  Disp:  Rfl: 0   Fluticasone Propionate 50 MCG/ACT Nasal Suspension 2 sprays by Each Nare route daily as needed (sinusitis). Disp: 1 Bottle Rfl: 0   Propranolol HCl 80 MG Oral Tab Take 1 tablet (80 mg total) by mouth daily.  Disp: 30 tablet Rfl: 2 Pressures:    Volume Leak Point Pressure Leak?     Cough Valsalva      100mL 125 84    cm H2O []  Yes [x]  No   200mL 132 85    cm H2O []  Yes [x]  No   300mL 130 72    cm H2O []  Yes [x]  No   350mL 116 67cm H2O []  Yes [x]  No   Maximum Cystomatric Capaci

## 2019-07-16 NOTE — PATIENT INSTRUCTIONS
[facility]  [address] [place] [address],[address]  [place], [address] [facility]  Office: [phone]      Urodynamic Testing Discharge Instructions: There are NO dietary or activity restrictions. You may resume your normal schedule.       You may hav

## 2019-07-17 ENCOUNTER — OFFICE VISIT (OUTPATIENT)
Dept: INTERNAL MEDICINE CLINIC | Facility: CLINIC | Age: 59
End: 2019-07-17

## 2019-07-17 VITALS
RESPIRATION RATE: 16 BRPM | DIASTOLIC BLOOD PRESSURE: 70 MMHG | HEIGHT: 61 IN | SYSTOLIC BLOOD PRESSURE: 110 MMHG | WEIGHT: 156 LBS | HEART RATE: 70 BPM | BODY MASS INDEX: 29.45 KG/M2

## 2019-07-17 DIAGNOSIS — Z51.81 THERAPEUTIC DRUG MONITORING: Primary | ICD-10-CM

## 2019-07-17 DIAGNOSIS — F50.81 BINGE EATING DISORDER: ICD-10-CM

## 2019-07-17 DIAGNOSIS — F33.1 MDD (MAJOR DEPRESSIVE DISORDER), RECURRENT EPISODE, MODERATE (HCC): ICD-10-CM

## 2019-07-17 DIAGNOSIS — E66.9 OBESITY (BMI 30.0-34.9): ICD-10-CM

## 2019-07-17 PROCEDURE — 99214 OFFICE O/P EST MOD 30 MIN: CPT | Performed by: INTERNAL MEDICINE

## 2019-07-17 NOTE — PROGRESS NOTES
HISTORY OF PRESENT ILLNESS  Patient presents with:  Weight Check: lost 4 pounds      Caron Thornton is a 63year old female here for follow up in medical weight loss program.     Denies chest pain, shortness of breath, dizziness, blurred vision, heada 293 07/12/2019    ALKPHO 102 07/11/2019    AST 18 07/11/2019    ALT 16 07/11/2019    BILT 0.6 07/11/2019    TP 7.6 07/11/2019    ALB 4.0 07/11/2019    GLOBULIN 3.6 07/11/2019     07/12/2019    K 4.3 07/12/2019     07/12/2019    CO2 28.0 07/12/2 PREVIDENT 5000 DRY MOUTH 1.1 % Dental Gel USE UTD Disp:  Rfl: 2   diazepam 5 MG Oral Tab Take 5 mg by mouth every evening.  At 1500 PM.  Disp:  Rfl: 0   Fluticasone Propionate 50 MCG/ACT Nasal Suspension 2 sprays by Each Nare route daily as needed (sinusi

## 2019-07-18 ENCOUNTER — OFFICE VISIT (OUTPATIENT)
Dept: PAIN CLINIC | Facility: CLINIC | Age: 59
End: 2019-07-18

## 2019-07-18 ENCOUNTER — TELEPHONE (OUTPATIENT)
Dept: PAIN CLINIC | Facility: CLINIC | Age: 59
End: 2019-07-18

## 2019-07-18 ENCOUNTER — APPOINTMENT (OUTPATIENT)
Dept: PHYSICAL THERAPY | Facility: HOSPITAL | Age: 59
End: 2019-07-18
Attending: PHYSICIAN ASSISTANT
Payer: COMMERCIAL

## 2019-07-18 VITALS
HEART RATE: 78 BPM | SYSTOLIC BLOOD PRESSURE: 104 MMHG | DIASTOLIC BLOOD PRESSURE: 68 MMHG | WEIGHT: 156 LBS | HEIGHT: 61 IN | BODY MASS INDEX: 29.45 KG/M2

## 2019-07-18 DIAGNOSIS — M47.817 SPONDYLOSIS OF LUMBOSACRAL REGION WITHOUT MYELOPATHY OR RADICULOPATHY: ICD-10-CM

## 2019-07-18 DIAGNOSIS — M25.511 CHRONIC RIGHT SHOULDER PAIN: Primary | ICD-10-CM

## 2019-07-18 DIAGNOSIS — G89.29 CHRONIC RIGHT SHOULDER PAIN: Primary | ICD-10-CM

## 2019-07-18 PROCEDURE — 99213 OFFICE O/P EST LOW 20 MIN: CPT | Performed by: ANESTHESIOLOGY

## 2019-07-18 NOTE — PROGRESS NOTES
Spoke with patient regarding injection(s). Reviewed prior auth process and pre-op instructions.  Patient verbalized understanding, and agreeable to holding NSAIDs medications as indicated in instructions listed in AVS, although patient states he does not ta

## 2019-07-18 NOTE — PATIENT INSTRUCTIONS
[address]  PRE-PROCEDURE INSTRUCTIONS WITH IV SEDATION            ** TO AVOID CANCELLATION AND/OR RESCHEDULING: PLEASE CALL [facility] PRE-PROCEDURE LINE -[phone] FOR DETAILED INSTRUCTIONS FIVE TO SEVEN DAYS PRIOR TO PROCEDURE**        Pr Number of days you need to be off for the following medications:  ? Aggrenox 10 days   ? Agrylin (Anagrelide) 10 days   ? Enbrel (Etanercept) 24 hours   ? Fragmin (Dalteparin) 24 hours   ? Pletal (Cilostazol) 7 days  ? Effient (Prasugrel) 7 days  ?  Pradax If you are a diabetic, please increase the frequency of your glucose monitoring after the procedure as this may cause a temporary increase in your blood sugar.   Contact your primary care physician if your blood sugar rises as you may require some medicatio The facet joint injection involves inserting a needle through skin and deeper tissues. There is some pain involved. However the skin and deeper tissues are numbed with a local anesthetic before inserting the injection needle.  Once numbed, placing the injec If the first facet joint injection does not relieve your symptoms within one week, a second injection maybe recommended.  Similarly, if the second facet joint injection does not completely relieve your symptoms in 1 to 2 weeks a third injection maybe recomm

## 2019-07-18 NOTE — TELEPHONE ENCOUNTER
Medical clearance needed- no    Pt seen in OV today by Dr. Amos Smith and recommended for facet (X 1). Please begin PA process for procedure(s).      Laterality: bilateral  Level(s): L3-5    Pt informed callback will be given when PA feedback received and proced

## 2019-07-18 NOTE — PROGRESS NOTES
Patient presents in office today with reported pain in bilateral low back, left side radiating to left hip and thigh. Right rotator cuff pain. Would like to discuss injection interventions.      Current pain level reported = 6/10 in low back, 8/10 in right

## 2019-07-18 NOTE — TELEPHONE ENCOUNTER
Created [facility] Referral for Bilateral FACET ([id number])   Uploaded Clinicals   Referral #:[id number]  Case Pending

## 2019-07-18 NOTE — PROGRESS NOTES
Name: Kiki Warren   : 1960   DOS: 2019     Pain Clinic Follow Up Visit:   Patient presents with: Follow - Up      Kiki Warren is a 63year old female who presents today for evaluation of axial low back pain.   The patient had s Solution Pen-injector Inject 3 mg into the skin daily. As directed  Disp:  Rfl:    Dicyclomine HCl 10 MG Oral Cap Take 1 capsule (10 mg total) by mouth 2 (two) times daily as needed.  Disp: 60 capsule Rfl: 3   PREVIDENT 5000 DRY MOUTH 1.1 % Dental Gel USE U order MRI of the right shoulder. Radiology orders and consultations:MRI SHOULDER, RIGHT (CPT=73221)  The patient indicates understanding of these issues and agrees to the plan. No follow-ups on file.     Kaleb Madden MD, 7/18/2019, 10:00 AM

## 2019-07-21 DIAGNOSIS — G43.709 CHRONIC MIGRAINE WITHOUT AURA WITHOUT STATUS MIGRAINOSUS, NOT INTRACTABLE: Primary | ICD-10-CM

## 2019-07-21 DIAGNOSIS — M47.817 SPONDYLOSIS OF LUMBOSACRAL REGION WITHOUT MYELOPATHY OR RADICULOPATHY: ICD-10-CM

## 2019-07-22 ENCOUNTER — TELEPHONE (OUTPATIENT)
Dept: SURGERY | Facility: CLINIC | Age: 59
End: 2019-07-22

## 2019-07-22 RX ORDER — PROPRANOLOL HYDROCHLORIDE 80 MG/1
TABLET ORAL
Qty: 30 TABLET | Refills: 2 | Status: SHIPPED | OUTPATIENT
Start: 2019-07-22 | End: 2019-09-25

## 2019-07-22 RX ORDER — MELOXICAM 15 MG/1
TABLET ORAL
Qty: 90 TABLET | Refills: 2 | Status: SHIPPED | OUTPATIENT
Start: 2019-07-22 | End: 2019-07-23 | Stop reason: SINTOL

## 2019-07-22 NOTE — TELEPHONE ENCOUNTER
pt has some questions regarding the upcoming injections. She wants to know the experience without anesthia vs with.  You can call or mychart your response

## 2019-07-22 NOTE — TELEPHONE ENCOUNTER
Medication: Meloxicam 15 Mg     Date of last refill: 5/16/19  30 Tabs 1 Refill     [place]     Last office visit: 7/9/19     Due back to clinic per last office note: Post injections     Date next office visit schedule

## 2019-07-23 ENCOUNTER — OFFICE VISIT (OUTPATIENT)
Dept: UROLOGY | Facility: HOSPITAL | Age: 59
End: 2019-07-23
Attending: OBSTETRICS & GYNECOLOGY
Payer: COMMERCIAL

## 2019-07-23 ENCOUNTER — APPOINTMENT (OUTPATIENT)
Dept: PHYSICAL THERAPY | Facility: HOSPITAL | Age: 59
End: 2019-07-23
Attending: PHYSICIAN ASSISTANT
Payer: COMMERCIAL

## 2019-07-23 VITALS
SYSTOLIC BLOOD PRESSURE: 106 MMHG | HEIGHT: 61 IN | WEIGHT: 156 LBS | BODY MASS INDEX: 29.45 KG/M2 | DIASTOLIC BLOOD PRESSURE: 68 MMHG

## 2019-07-23 DIAGNOSIS — N81.84 PELVIC MUSCLE WASTING: ICD-10-CM

## 2019-07-23 DIAGNOSIS — R39.11 URINARY HESITANCY: Primary | ICD-10-CM

## 2019-07-23 DIAGNOSIS — N95.2 POSTMENOPAUSAL ATROPHIC VAGINITIS: ICD-10-CM

## 2019-07-23 DIAGNOSIS — R35.1 NOCTURIA: ICD-10-CM

## 2019-07-23 PROCEDURE — 99211 OFF/OP EST MAY X REQ PHY/QHP: CPT

## 2019-07-23 NOTE — TELEPHONE ENCOUNTER
Spoke with patient and scheduled injection(s). Reviewed pre-op instructions. Patient verbalized understanding, and agreeable to holding NSAIDs (although patient states she does not take any) medications as indicated in instructions listed below.   Encourage ? Please bring your Insurance Card, Photo ID, List of Current Medications and Referral (if applicable) to your appointment. ? Please park in the [address] and follow the signs to the [employer]. ? Check in at [facility] ([address]  Zackary Jeffery Most insurances are now requiring a preauthorization for all procedures. In the event that your insurance does not authorize your procedure within 48 hours of the scheduled date, your procedure will be cancelled and rescheduled to a later date.   Please c A facet joint injection is an injection of an anti-inflammatory medication, or steroid into the facet joints. The facet joints are part of the bony framework of the spine.  They are small bony projections from one vertebra meeting with similar bony projecti How is the facet joint injection performed? It is done with the patient on their stomach. All patients receiving sedation are monitored with EKG, blood pressure cuff and oxygen monitoring device. Patients not receiving sedation are monitored as needed.  Ritika Million Most patients do not receive more than three injections in a six-month period. This is because the effect of the medication injected frequently lasts for six months or more.  If three injections have not helped you very much, you may be a candidate for a di

## 2019-07-23 NOTE — TELEPHONE ENCOUNTER
Prior authorization request completed for: Bilateral FACET   Authorization #[id number]     Authorization dates: 07/18/19 thru 10/16/19  CPT codes approved: 86864,94186,65898  Number of visits/dates of service approved: 1  Physician: Luma Telles     Patient can be s

## 2019-07-23 NOTE — PROGRESS NOTES
Pt presents w/ initial c/o hesitancy  Urodynamic testing undergone without complication.   Results reviewed with patient  85/20cc & 450/8cc  No DO  No ELLIS  [facility] 400cc    Discussed with patient mgmt options for bladder sx    Thorough discussion of surgical ri

## 2019-07-25 ENCOUNTER — APPOINTMENT (OUTPATIENT)
Dept: PHYSICAL THERAPY | Facility: HOSPITAL | Age: 59
End: 2019-07-25
Attending: PHYSICIAN ASSISTANT
Payer: COMMERCIAL

## 2019-07-25 ENCOUNTER — TELEPHONE (OUTPATIENT)
Dept: NEUROLOGY | Facility: CLINIC | Age: 59
End: 2019-07-25

## 2019-07-26 NOTE — TELEPHONE ENCOUNTER
Per reason for call comment from [facility], pt wishes to do local for upcoming procedure. Updated case info for 8/5/19 at 1100 to reflect local anesthesia instead of conscious sedation. [employer] message sent w/ pre-procedure instructions.

## 2019-07-29 ENCOUNTER — TELEPHONE (OUTPATIENT)
Dept: SURGERY | Facility: CLINIC | Age: 59
End: 2019-07-29

## 2019-07-29 NOTE — TELEPHONE ENCOUNTER
Spoke w/ pt regarding question about MRI. Encouraged pt to contact central scheduling to confirm open option available to patient. Pt verbalized understanding and agreeable to plan. Pt inquiring about upcoming procedure and instructions.   Reviewed loc Medication:   Number of days you need to be off for the following medications:  ? Aggrenox 10 days   ? Agrylin (Anagrelide) 10 days   ? Enbrel (Etanercept) 24 hours   ? Fragmin (Dalteparin) 24 hours   ? Pletal (Cilostazol) 7 days  ?  Effient (Prasugrel) 7 If you are a diabetic, please increase the frequency of your glucose monitoring after the procedure as this may cause a temporary increase in your blood sugar.   Contact your primary care physician if your blood sugar rises as you may require some medicatio

## 2019-07-31 ENCOUNTER — TELEPHONE (OUTPATIENT)
Dept: PAIN CLINIC | Facility: CLINIC | Age: 59
End: 2019-07-31

## 2019-07-31 NOTE — TELEPHONE ENCOUNTER
Spoke to patient, confirmed procedure date of 8/5/19 and to be checked in at outpatient registration at 10:00am am/pm.Patient instructed to call pre-procedure line before procedure at [phone].  Patient instructed to call office if there are additional

## 2019-08-05 ENCOUNTER — HOSPITAL ENCOUNTER (OUTPATIENT)
Facility: HOSPITAL | Age: 59
Setting detail: HOSPITAL OUTPATIENT SURGERY
Discharge: HOME | End: 2019-08-05
Attending: ANESTHESIOLOGY | Admitting: ANESTHESIOLOGY
Payer: COMMERCIAL

## 2019-08-05 ENCOUNTER — APPOINTMENT (OUTPATIENT)
Dept: GENERAL RADIOLOGY | Facility: HOSPITAL | Age: 59
End: 2019-08-05
Attending: ANESTHESIOLOGY
Payer: COMMERCIAL

## 2019-08-05 VITALS
RESPIRATION RATE: 16 BRPM | DIASTOLIC BLOOD PRESSURE: 67 MMHG | OXYGEN SATURATION: 100 % | HEART RATE: 80 BPM | TEMPERATURE: 98 F | SYSTOLIC BLOOD PRESSURE: 107 MMHG

## 2019-08-05 DIAGNOSIS — G89.29 CHRONIC RIGHT SHOULDER PAIN: ICD-10-CM

## 2019-08-05 DIAGNOSIS — M25.511 CHRONIC RIGHT SHOULDER PAIN: ICD-10-CM

## 2019-08-05 DIAGNOSIS — M47.817 SPONDYLOSIS OF LUMBOSACRAL REGION WITHOUT MYELOPATHY OR RADICULOPATHY: ICD-10-CM

## 2019-08-05 PROCEDURE — 3E0U3BZ INTRODUCTION OF ANESTHETIC AGENT INTO JOINTS, PERCUTANEOUS APPROACH: ICD-10-PCS | Performed by: ANESTHESIOLOGY

## 2019-08-05 PROCEDURE — 3E0U33Z INTRODUCTION OF ANTI-INFLAMMATORY INTO JOINTS, PERCUTANEOUS APPROACH: ICD-10-PCS | Performed by: ANESTHESIOLOGY

## 2019-08-05 RX ORDER — DIPHENHYDRAMINE HYDROCHLORIDE 50 MG/ML
50 INJECTION INTRAMUSCULAR; INTRAVENOUS ONCE AS NEEDED
Status: DISCONTINUED | OUTPATIENT
Start: 2019-08-05 | End: 2019-08-05

## 2019-08-05 RX ORDER — ONDANSETRON 2 MG/ML
4 INJECTION INTRAMUSCULAR; INTRAVENOUS ONCE AS NEEDED
Status: DISCONTINUED | OUTPATIENT
Start: 2019-08-05 | End: 2019-08-05

## 2019-08-05 RX ORDER — BUPIVACAINE HYDROCHLORIDE 5 MG/ML
INJECTION, SOLUTION EPIDURAL; INTRACAUDAL AS NEEDED
Status: DISCONTINUED | OUTPATIENT
Start: 2019-08-05 | End: 2019-08-05

## 2019-08-05 RX ORDER — METHYLPREDNISOLONE ACETATE 40 MG/ML
INJECTION, SUSPENSION INTRA-ARTICULAR; INTRALESIONAL; INTRAMUSCULAR; SOFT TISSUE AS NEEDED
Status: DISCONTINUED | OUTPATIENT
Start: 2019-08-05 | End: 2019-08-05

## 2019-08-05 RX ORDER — LIDOCAINE HYDROCHLORIDE 10 MG/ML
INJECTION, SOLUTION EPIDURAL; INFILTRATION; INTRACAUDAL; PERINEURAL AS NEEDED
Status: DISCONTINUED | OUTPATIENT
Start: 2019-08-05 | End: 2019-08-05

## 2019-08-05 NOTE — OPERATIVE REPORT
[facility]  Operative Report  2019     Zuleika Gandhi Patient Status:  Hospital Outpatient Surgery    1960 MRN [id number]   [facility] ENDOSCOPY Attending Amrik Warren MD   Hosp Day # 0 PCP Arminda Novoa MD     Indication: patient tolerated procedure very well. The patient was observed until discharge criteria met. Discharge instructions were given and patient was released to a responsible adult. Complications: None. Follow up:  The patient was followed in the pain c

## 2019-08-05 NOTE — H&P
History & Physical Examination    Patient Name: Wolf Lora  MRN: [id number]  [facility]: [id number]  YOB: 1960    Pre-Operative Diagnosis:  Chronic right shoulder pain [M25.511, G89.29]  Spondylosis of lumbosacral region without myelopathy o daily as needed (sinusitis).  Disp: 1 Bottle Rfl: 0 Not Taking   triamcinolone acetonide 0.1 % External Cream Apply thin layer to affected area twice a day as needed Disp: 45 g Rfl: 1 Taking       No current facility-administered medications for this encoun History    Tobacco Use      Smoking status: Never Smoker      Smokeless tobacco: Never Used    Alcohol use: Yes      Frequency: Monthly or less      Drinks per session: 1 or 2      Binge frequency: Never      Comment: 6 drinks/year      SYSTEM Check if Rev

## 2019-08-09 ENCOUNTER — HOSPITAL ENCOUNTER (OUTPATIENT)
Dept: MRI IMAGING | Age: 59
Discharge: HOME | End: 2019-08-09
Attending: ANESTHESIOLOGY
Payer: COMMERCIAL

## 2019-08-09 DIAGNOSIS — G89.29 CHRONIC RIGHT SHOULDER PAIN: ICD-10-CM

## 2019-08-09 DIAGNOSIS — F06.70 MILD NEUROCOGNITIVE DISORDER DUE TO MULTIPLE ETIOLOGIES: ICD-10-CM

## 2019-08-09 DIAGNOSIS — M25.511 CHRONIC RIGHT SHOULDER PAIN: ICD-10-CM

## 2019-08-09 PROCEDURE — 73221 MRI JOINT UPR EXTREM W/O DYE: CPT | Performed by: ANESTHESIOLOGY

## 2019-08-09 RX ORDER — MEMANTINE HYDROCHLORIDE 10 MG/1
TABLET ORAL
Qty: 30 TABLET | Refills: 0 | Status: SHIPPED | OUTPATIENT
Start: 2019-08-09 | End: 2019-08-21

## 2019-08-12 ENCOUNTER — OFFICE VISIT (OUTPATIENT)
Dept: INTERNAL MEDICINE CLINIC | Facility: CLINIC | Age: 59
End: 2019-08-12

## 2019-08-12 VITALS
DIASTOLIC BLOOD PRESSURE: 62 MMHG | WEIGHT: 150 LBS | RESPIRATION RATE: 16 BRPM | BODY MASS INDEX: 28.32 KG/M2 | HEIGHT: 61 IN | SYSTOLIC BLOOD PRESSURE: 110 MMHG | HEART RATE: 70 BPM

## 2019-08-12 DIAGNOSIS — E66.9 OBESITY (BMI 30.0-34.9): ICD-10-CM

## 2019-08-12 DIAGNOSIS — F50.81 BINGE EATING DISORDER: ICD-10-CM

## 2019-08-12 DIAGNOSIS — Z51.81 THERAPEUTIC DRUG MONITORING: Primary | ICD-10-CM

## 2019-08-12 DIAGNOSIS — F33.1 MDD (MAJOR DEPRESSIVE DISORDER), RECURRENT EPISODE, MODERATE (HCC): ICD-10-CM

## 2019-08-12 PROCEDURE — 99214 OFFICE O/P EST MOD 30 MIN: CPT | Performed by: INTERNAL MEDICINE

## 2019-08-12 RX ORDER — BUSPIRONE HYDROCHLORIDE 7.5 MG/1
TABLET ORAL
Refills: 0 | COMMUNITY
Start: 2019-07-29 | End: 2019-10-18 | Stop reason: ALTCHOICE

## 2019-08-12 NOTE — PROGRESS NOTES
HISTORY OF PRESENT ILLNESS  Patient presents with:  Weight Check: lost 6 pounds      Zuleika Gandhi is a 63year old female here for follow up in medical weight loss program.     Denies chest pain, shortness of breath, dizziness, blurred vision, heada AST 18 07/11/2019    ALT 16 07/11/2019    BILT 0.6 07/11/2019    TP 7.6 07/11/2019    ALB 4.0 07/11/2019    GLOBULIN 3.6 07/11/2019     07/12/2019    K 4.3 07/12/2019     07/12/2019    CO2 28.0 07/12/2019     Lab Results   Component Value Date 5000 DRY MOUTH 1.1 % Dental Gel USE UTD Disp:  Rfl: 2   diazepam 5 MG Oral Tab Take 5 mg by mouth every evening. At 1500 PM.  Disp:  Rfl: 0   Fluticasone Propionate 50 MCG/ACT Nasal Suspension 2 sprays by Each Nare route daily as needed (sinusitis).  Disp:

## 2019-08-15 ENCOUNTER — TELEPHONE (OUTPATIENT)
Dept: SURGERY | Facility: CLINIC | Age: 59
End: 2019-08-15

## 2019-08-15 RX ORDER — HYDROCODONE BITARTRATE AND ACETAMINOPHEN 5; 325 MG/1; MG/1
1 TABLET ORAL EVERY 8 HOURS PRN
Qty: 10 TABLET | Refills: 0 | Status: SHIPPED | OUTPATIENT
Start: 2019-08-15 | End: 2019-08-27

## 2019-08-15 NOTE — TELEPHONE ENCOUNTER
Patient calling with pain to:  Same area as facet injections (lumbar), but is now radiating to front of left thigh, hip area, down about 6 inches in center and slightly to the left. Also has pain to right thigh, but to a lesser degree.  Pain increases with

## 2019-08-15 NOTE — TELEPHONE ENCOUNTER
Spoke to pt to advise a short-term script for Norco will be provided and Dr Esposito May will re-evaluate on . Advised against taking w/ cyclobenzaprine, but can take one or the other if she obtains relief from the muscle relaxer.  Advised pt script must be picked

## 2019-08-21 NOTE — PROGRESS NOTES
HPI:    Patient ID: Lion Reyes is a 63year old female. Patient presents for follow up for mild neurocognitive disorder and migraines headaches. Reports since last week her anxiety flared up due to some personal issues at home.  She also noti Attack Father    • Heart Disease Father    • Alcohol and Other Disorders Associated Father    • Depression Father    • Other (ALS) Mother    • Personality Disorder Daughter    • Dementia Maternal Grandmother    • Cancer Sister         melanoma      Social MG/GM Vaginal Cream Apply 1/2 gram vaginally 2 times per week. Disp: 1 Tube Rfl: 0   buPROPion HCl ER, XL, 300 MG Oral Tablet 24 Hr Take 300 mg by mouth daily.  Disp:  Rfl: 0   Cyclobenzaprine HCl 10 MG Oral Tab Take 1 tablet (10 mg total) by mouth 3 (three present  Coordination: Intact   Gait: Normal based and steady             ASSESSMENT/PLAN:   (G31.84) Mild neurocognitive disorder due to multiple etiologies  (primary encounter diagnosis)    (G43.709) Chronic migraine without aura without status migrainos

## 2019-08-27 ENCOUNTER — TELEPHONE (OUTPATIENT)
Dept: PAIN CLINIC | Facility: CLINIC | Age: 59
End: 2019-08-27

## 2019-08-27 ENCOUNTER — OFFICE VISIT (OUTPATIENT)
Dept: PAIN CLINIC | Facility: CLINIC | Age: 59
End: 2019-08-27

## 2019-08-27 VITALS
SYSTOLIC BLOOD PRESSURE: 104 MMHG | WEIGHT: 148 LBS | HEIGHT: 61 IN | BODY MASS INDEX: 27.94 KG/M2 | HEART RATE: 80 BPM | OXYGEN SATURATION: 99 % | DIASTOLIC BLOOD PRESSURE: 72 MMHG

## 2019-08-27 DIAGNOSIS — M47.817 SPONDYLOSIS OF LUMBOSACRAL REGION WITHOUT MYELOPATHY OR RADICULOPATHY: Primary | ICD-10-CM

## 2019-08-27 PROCEDURE — 99214 OFFICE O/P EST MOD 30 MIN: CPT | Performed by: ANESTHESIOLOGY

## 2019-08-27 RX ORDER — HYDROCODONE BITARTRATE AND ACETAMINOPHEN 5; 325 MG/1; MG/1
1 TABLET ORAL EVERY 8 HOURS PRN
Qty: 10 TABLET | Refills: 0 | Status: SHIPPED | OUTPATIENT
Start: 2019-08-29 | End: 2020-01-16

## 2019-08-27 NOTE — PROGRESS NOTES
Spoke with patient regarding injection(s). Reviewed prior auth process and pre-op instructions. Patient verbalized understanding. Discussed medications with patient. No  listed (in pre-op instructions) medications to hold.  Patient declined use of  OTC NSAI

## 2019-08-27 NOTE — PROGRESS NOTES
Name: Frances Hanson   : 1960   DOS: 2019     Pain Clinic Follow Up Visit:   Patient presents with:   Follow - Up      Frances Hanson is a 63year old female with a history of lumbar degenerative disc disease and facet pain, status po as needed for Muscle spasms. Disp: 90 tablet Rfl: 1   DULoxetine HCl 60 MG Oral Cap DR Particles Take 120 mg by mouth daily. Disp:  Rfl: 0   Dicyclomine HCl 10 MG Oral Cap Take 1 capsule (10 mg total) by mouth 2 (two) times daily as needed.  Disp: 60 caps recommended rest, ice, and time. We can follow-up to discuss possible subacromial bursa injection after her RFA's are completed. She has been using a half a tablet of Norco per day for symptomatic relief.   She is going to [place] over the Labor Day wemarilyn

## 2019-08-27 NOTE — PATIENT INSTRUCTIONS
Refill policies:    • Allow 2-3 business days for refills; controlled substances may take longer.   • Contact your pharmacy at least 5 days prior to running out of medication and have them send an electronic request or submit request through the “request re Depending on your insurance carrier, approval may take 3-10 days. It is highly recommended patients contact their insurance carrier directly to determine coverage.   If test is done without insurance authorization, patient may be responsible for the entire AND/OR RESCHEDULING: PLEASE CALL [facility] PRE-PROCEDURE LINE [phone] FOR DETAILED INSTRUCTIONS FIVE TO SEVEN DAYS PRIOR TO PROCEDURE**        Prior to the procedure:  Please update us prior to the procedure if you are experiencing any symptoms of infect (Etanercept) 24 hours   • Fragmin (Dalteparin) 24 hours   • Pletal (Cilostazol) 7 days  • Effient (Prasugrel) 7 days  • Pradaxa 10 days  • Trental 7 days  • Eliquis (Apixaban) 3 days  • Xarelto (Rivaroxaban) 3 days  • Lovenox (Enoxaparin) 24 hours  • Aspir sugar rises as you may require some medication adjustment. It is normal to have increased pain at injection site for up to 3-5 days after procedure, you can        use heat or ice (20 minutes on 20 minutes off) for comfort.     ** TO AVOID CANCELLATI usually are located. Thus, the X-ray is used to identify those bony landmarks. Once the introducer needle is in a good position by X-ray, a special electrically active needle tip is inserted.  With this special needle tip in good position, electrical stimul radiofrequency ablation? Initially there will be muscle soreness for up to a week afterward. Ice or heat packs will usually control this discomfort. After that first several days, your pain may be gone or diminished.    What should I do after the radiofreq occasionally bruising. The nerves to be ablated may be near blood vessels or other nerves that can be potentially damaged. Electricity is also used during the procedure raising the possibility of an electrical burn.  Great care is taken when placing the rad

## 2019-08-27 NOTE — TELEPHONE ENCOUNTER
Medical clearance needed- no    Pt seen in OV today by Dr. Bob Pérez and recommended for RFAs (X 2). Please begin PA process for procedure(s).      Laterality: right then left  Level(s): L2-5    Pt informed callback will be given when PA feedback received and p

## 2019-08-27 NOTE — TELEPHONE ENCOUNTER
Created [facility] Referral for RFA [id number]) x2  Uploaded Clinicals   Referral #:[id number]  Case Pending

## 2019-08-27 NOTE — PROGRESS NOTES
Patient presents in office today with reported pain in low back middle to right side and into left and right buttocks and into bilateral thighs. Constant pain is in middle/lower back.      Current pain level reported = 8/10    Last reported dose of HYDROCOD

## 2019-08-29 NOTE — TELEPHONE ENCOUNTER
Prior authorization request completed for: Radiofrequency Ablations of Lumbar Medial Branches, Right then Left   Authorization #[id number]  Authorization dates: 08/27/2019-11/25/2019  CPT codes approved: 35641, 86839  Number of visits/dates of service approv

## 2019-08-29 NOTE — TELEPHONE ENCOUNTER
Called patient. Spoke with patient and scheduled injection(s). Reviewed pre-op instructions. Patient verbalized understanding. Discussed medications with patient. No  listed (in pre-op instructions) medications to hold.  Patient declined use of  OTC NSAIDs **To reduce the risk of infection, we ask that you bathe within 24 hours prior to your procedure. **      Day of Procedure:   Do not eat or drink anything (including water) 8 hours prior to your procedure.   ? If you take morning blood pressure medicat ? Plavix (Clopidogrel)  ? Epidural ____ - 10 days  ? Others 7 days   NSAIDs: 24 hours  Meloxicam -- Cervical procedures require 3 days  ?  Ibuprofen (Motrin, Advil, Vicoprofen), Naproxen (Naprosyn, Aleve), Piroxcam (Feldene), Meloxicam (Mobic)--(Cervical pr Radiofrequency Ablation  What is radiofrequency ablation? Radiofrequency ablation is a procedure using radio waves or electric current to generate sufficient heat to interrupt nerve conduction on a semi-permanent basis.   Am I a candidate for radiofrequen The skin on the back or neck is cleaned with antiseptic solution and then the procedure is carried out. The skin is numbed with a local anesthetic. Then X-ray or fluoroscopy is used to guide placement of the introducer needles.  Since nerves cannot actually No. This procedure is done under local anesthesia. Most of the patients also receive intravenous sedation, which makes the procedure easier to tolerate.  It is necessary for you to be awake enough to communicate easily with the physician during the procedur It is sometimes difficult to predict if the radiofrequency ablation will indeed help you or not.  Generally speaking, the patients who have responded well to trial blocks will have better results than those who responded less well from diagnostic or trial i

## 2019-09-03 ENCOUNTER — PATIENT MESSAGE (OUTPATIENT)
Dept: INTERNAL MEDICINE CLINIC | Facility: CLINIC | Age: 59
End: 2019-09-03

## 2019-09-03 NOTE — TELEPHONE ENCOUNTER
From: Anthony Baker  To: Kalyn Cueto MD  Sent: 9/3/2019 1:12 PM CDT  Subject: Non-Urgent Medical Question    Hello! My lower back pain [id number] days after my last steroid injection by Dr. Isabel Sandoval.  I returned to exercise at the [address] [place] [address]

## 2019-09-04 ENCOUNTER — TELEPHONE (OUTPATIENT)
Dept: PAIN CLINIC | Facility: CLINIC | Age: 59
End: 2019-09-04

## 2019-09-04 NOTE — TELEPHONE ENCOUNTER
Left message for patient, confirmed procedure date of 9/9 and to be checked in at outpatient registration at 10:30am. Patient instructed to call pre-procedure line before procedure at [phone].  Patient instructed to call office if there are additional

## 2019-09-09 ENCOUNTER — OFFICE VISIT (OUTPATIENT)
Dept: INTERNAL MEDICINE CLINIC | Facility: CLINIC | Age: 59
End: 2019-09-09

## 2019-09-09 ENCOUNTER — APPOINTMENT (OUTPATIENT)
Dept: GENERAL RADIOLOGY | Facility: HOSPITAL | Age: 59
End: 2019-09-09
Attending: ANESTHESIOLOGY
Payer: COMMERCIAL

## 2019-09-09 ENCOUNTER — HOSPITAL ENCOUNTER (OUTPATIENT)
Facility: HOSPITAL | Age: 59
Setting detail: HOSPITAL OUTPATIENT SURGERY
Discharge: HOME | End: 2019-09-09
Attending: ANESTHESIOLOGY | Admitting: ANESTHESIOLOGY
Payer: COMMERCIAL

## 2019-09-09 VITALS
HEIGHT: 61 IN | HEART RATE: 76 BPM | DIASTOLIC BLOOD PRESSURE: 70 MMHG | SYSTOLIC BLOOD PRESSURE: 104 MMHG | RESPIRATION RATE: 16 BRPM | WEIGHT: 145 LBS | BODY MASS INDEX: 27.38 KG/M2

## 2019-09-09 VITALS
HEART RATE: 66 BPM | SYSTOLIC BLOOD PRESSURE: 108 MMHG | TEMPERATURE: 98 F | OXYGEN SATURATION: 100 % | DIASTOLIC BLOOD PRESSURE: 65 MMHG | RESPIRATION RATE: 18 BRPM

## 2019-09-09 DIAGNOSIS — F50.81 BINGE EATING DISORDER: ICD-10-CM

## 2019-09-09 DIAGNOSIS — E66.9 OBESITY (BMI 30-39.9): ICD-10-CM

## 2019-09-09 DIAGNOSIS — Z51.81 ENCOUNTER FOR THERAPEUTIC DRUG MONITORING: Primary | ICD-10-CM

## 2019-09-09 DIAGNOSIS — F33.1 MDD (MAJOR DEPRESSIVE DISORDER), RECURRENT EPISODE, MODERATE (HCC): ICD-10-CM

## 2019-09-09 DIAGNOSIS — M47.817 SPONDYLOSIS OF LUMBOSACRAL REGION WITHOUT MYELOPATHY OR RADICULOPATHY: ICD-10-CM

## 2019-09-09 PROCEDURE — 99152 MOD SED SAME PHYS/QHP 5/>YRS: CPT | Performed by: ANESTHESIOLOGY

## 2019-09-09 PROCEDURE — 99214 OFFICE O/P EST MOD 30 MIN: CPT | Performed by: INTERNAL MEDICINE

## 2019-09-09 PROCEDURE — 3E0T3TZ INTRODUCTION OF DESTRUCTIVE AGENT INTO PERIPHERAL NERVES AND PLEXI, PERCUTANEOUS APPROACH: ICD-10-PCS | Performed by: ANESTHESIOLOGY

## 2019-09-09 RX ORDER — METHYLPREDNISOLONE ACETATE 40 MG/ML
INJECTION, SUSPENSION INTRA-ARTICULAR; INTRALESIONAL; INTRAMUSCULAR; SOFT TISSUE AS NEEDED
Status: DISCONTINUED | OUTPATIENT
Start: 2019-09-09 | End: 2019-09-09

## 2019-09-09 RX ORDER — MIDAZOLAM HYDROCHLORIDE 1 MG/ML
INJECTION INTRAMUSCULAR; INTRAVENOUS AS NEEDED
Status: DISCONTINUED | OUTPATIENT
Start: 2019-09-09 | End: 2019-09-09

## 2019-09-09 RX ORDER — LIRAGLUTIDE 6 MG/ML
INJECTION, SOLUTION SUBCUTANEOUS
Refills: 0 | OUTPATIENT
Start: 2019-09-09

## 2019-09-09 RX ORDER — NALOXONE HYDROCHLORIDE 0.4 MG/ML
80 INJECTION, SOLUTION INTRAMUSCULAR; INTRAVENOUS; SUBCUTANEOUS AS NEEDED
Status: DISCONTINUED | OUTPATIENT
Start: 2019-09-09 | End: 2019-09-09

## 2019-09-09 RX ORDER — SODIUM CHLORIDE, SODIUM LACTATE, POTASSIUM CHLORIDE, CALCIUM CHLORIDE 600; 310; 30; 20 MG/100ML; MG/100ML; MG/100ML; MG/100ML
100 INJECTION, SOLUTION INTRAVENOUS CONTINUOUS
Status: DISCONTINUED | OUTPATIENT
Start: 2019-09-09 | End: 2019-09-09

## 2019-09-09 RX ORDER — ONDANSETRON 2 MG/ML
4 INJECTION INTRAMUSCULAR; INTRAVENOUS ONCE AS NEEDED
Status: DISCONTINUED | OUTPATIENT
Start: 2019-09-09 | End: 2019-09-09

## 2019-09-09 RX ORDER — SODIUM CHLORIDE, SODIUM LACTATE, POTASSIUM CHLORIDE, CALCIUM CHLORIDE 600; 310; 30; 20 MG/100ML; MG/100ML; MG/100ML; MG/100ML
INJECTION, SOLUTION INTRAVENOUS CONTINUOUS
Status: DISCONTINUED | OUTPATIENT
Start: 2019-09-09 | End: 2019-09-09

## 2019-09-09 RX ORDER — DIPHENHYDRAMINE HYDROCHLORIDE 50 MG/ML
50 INJECTION INTRAMUSCULAR; INTRAVENOUS ONCE AS NEEDED
Status: DISCONTINUED | OUTPATIENT
Start: 2019-09-09 | End: 2019-09-09

## 2019-09-09 RX ORDER — LIDOCAINE HYDROCHLORIDE 10 MG/ML
INJECTION, SOLUTION EPIDURAL; INFILTRATION; INTRACAUDAL; PERINEURAL AS NEEDED
Status: DISCONTINUED | OUTPATIENT
Start: 2019-09-09 | End: 2019-09-09

## 2019-09-09 NOTE — PROGRESS NOTES
HISTORY OF PRESENT ILLNESS  Patient presents with:  Weight Check: down 5 lbs      Castillo Gabbi is a 63year old female here for follow up in medical weight loss program.     Denies chest pain, shortness of breath, dizziness, blurred vision, headache 8.7 07/12/2019    OSMOCALC 293 07/12/2019    ALKPHO 102 07/11/2019    AST 18 07/11/2019    ALT 16 07/11/2019    BILT 0.6 07/11/2019    TP 7.6 07/11/2019    ALB 4.0 07/11/2019    GLOBULIN 3.6 07/11/2019     07/12/2019    K 4.3 07/12/2019     07/ Dicyclomine HCl 10 MG Oral Cap Take 1 capsule (10 mg total) by mouth 2 (two) times daily as needed. Disp: 60 capsule Rfl: 3   PREVIDENT 5000 DRY MOUTH 1.1 % Dental Gel USE UTD Disp:  Rfl: 2   diazepam 5 MG Oral Tab Take 5 mg by mouth every evening.  At 15 Patient Instructions on file for this visit. Return in about 6 weeks (around 10/21/2019) for weight management. Patient verbalizes understanding.     Katherine Mcpherson MD  3/12/2019

## 2019-09-09 NOTE — TELEPHONE ENCOUNTER
Requesting Feliz  LOV: 9/9/19  RTC: one month  Last Relevant Labs: na  Filled: 9/9/19 #5 pens with 0 refills    Future Appointments   Date Time Provider Robyn Quan   9/10/2019  1:00 PM Clement Block MD [facility] [place]   10/22/2019  9:30

## 2019-09-09 NOTE — H&P
History & Physical Examination    Patient Name: Ana Bryan  MRN: [id number]  CSN: [id number]  YOB: 1960    Pre-Operative Diagnosis:  Spondylosis of lumbosacral region without myelopathy or radiculopathy [M47.817]    Present Illness: P Take 5 mg by mouth every evening. At 1500 PM.  Disp:  Rfl: 0 Taking   Fluticasone Propionate 50 MCG/ACT Nasal Suspension 2 sprays by Each Nare route daily as needed (sinusitis).  Disp: 1 Bottle Rfl: 0 Taking   triamcinolone acetonide 0.1 % External Cream Ap MD at [facility] MAIN OR     Family History   Problem Relation Age of Onset   • Hypertension Father    • Heart Attack Father    • Heart Disease Father    • Alcohol and Other Disorders Associated Father    • Depression Father    • Other (ALS) Mother    • Personalit

## 2019-09-10 ENCOUNTER — OFFICE VISIT (OUTPATIENT)
Dept: NEUROLOGY | Facility: CLINIC | Age: 59
End: 2019-09-10

## 2019-09-10 VITALS
RESPIRATION RATE: 16 BRPM | BODY MASS INDEX: 27 KG/M2 | WEIGHT: 145 LBS | SYSTOLIC BLOOD PRESSURE: 120 MMHG | HEART RATE: 66 BPM | DIASTOLIC BLOOD PRESSURE: 68 MMHG

## 2019-09-10 DIAGNOSIS — G43.709 CHRONIC MIGRAINE WITHOUT AURA WITHOUT STATUS MIGRAINOSUS, NOT INTRACTABLE: Primary | ICD-10-CM

## 2019-09-10 PROCEDURE — 64615 CHEMODENERV MUSC MIGRAINE: CPT | Performed by: OTHER

## 2019-09-18 ENCOUNTER — TELEPHONE (OUTPATIENT)
Dept: PAIN CLINIC | Facility: CLINIC | Age: 59
End: 2019-09-18

## 2019-09-18 NOTE — TELEPHONE ENCOUNTER
Spoke to patient, confirmed procedure date of 9/23/19 and to be checked in at outpatient registration at 8:00am. Patient instructed to call pre-procedure line before procedure at [phone].  Patient instructed to call office if there are additional quest

## 2019-09-23 ENCOUNTER — APPOINTMENT (OUTPATIENT)
Dept: GENERAL RADIOLOGY | Facility: HOSPITAL | Age: 59
End: 2019-09-23
Attending: ANESTHESIOLOGY
Payer: COMMERCIAL

## 2019-09-23 ENCOUNTER — HOSPITAL ENCOUNTER (OUTPATIENT)
Facility: HOSPITAL | Age: 59
Setting detail: HOSPITAL OUTPATIENT SURGERY
Discharge: HOME | End: 2019-09-23
Attending: ANESTHESIOLOGY | Admitting: ANESTHESIOLOGY
Payer: COMMERCIAL

## 2019-09-23 VITALS
OXYGEN SATURATION: 100 % | RESPIRATION RATE: 18 BRPM | DIASTOLIC BLOOD PRESSURE: 62 MMHG | TEMPERATURE: 97 F | HEART RATE: 70 BPM | SYSTOLIC BLOOD PRESSURE: 107 MMHG

## 2019-09-23 DIAGNOSIS — M47.817 SPONDYLOSIS OF LUMBOSACRAL REGION WITHOUT MYELOPATHY OR RADICULOPATHY: ICD-10-CM

## 2019-09-23 PROCEDURE — 3E0T3TZ INTRODUCTION OF DESTRUCTIVE AGENT INTO PERIPHERAL NERVES AND PLEXI, PERCUTANEOUS APPROACH: ICD-10-PCS | Performed by: ANESTHESIOLOGY

## 2019-09-23 PROCEDURE — 99152 MOD SED SAME PHYS/QHP 5/>YRS: CPT | Performed by: ANESTHESIOLOGY

## 2019-09-23 RX ORDER — DIPHENHYDRAMINE HYDROCHLORIDE 50 MG/ML
50 INJECTION INTRAMUSCULAR; INTRAVENOUS ONCE AS NEEDED
Status: DISCONTINUED | OUTPATIENT
Start: 2019-09-23 | End: 2019-09-23

## 2019-09-23 RX ORDER — METHYLPREDNISOLONE ACETATE 40 MG/ML
INJECTION, SUSPENSION INTRA-ARTICULAR; INTRALESIONAL; INTRAMUSCULAR; SOFT TISSUE AS NEEDED
Status: DISCONTINUED | OUTPATIENT
Start: 2019-09-23 | End: 2019-09-23

## 2019-09-23 RX ORDER — SODIUM CHLORIDE, SODIUM LACTATE, POTASSIUM CHLORIDE, CALCIUM CHLORIDE 600; 310; 30; 20 MG/100ML; MG/100ML; MG/100ML; MG/100ML
100 INJECTION, SOLUTION INTRAVENOUS CONTINUOUS
Status: DISCONTINUED | OUTPATIENT
Start: 2019-09-23 | End: 2019-09-23

## 2019-09-23 RX ORDER — ONDANSETRON 2 MG/ML
4 INJECTION INTRAMUSCULAR; INTRAVENOUS ONCE AS NEEDED
Status: DISCONTINUED | OUTPATIENT
Start: 2019-09-23 | End: 2019-09-23

## 2019-09-23 RX ORDER — LIDOCAINE HYDROCHLORIDE 10 MG/ML
INJECTION, SOLUTION EPIDURAL; INFILTRATION; INTRACAUDAL; PERINEURAL AS NEEDED
Status: DISCONTINUED | OUTPATIENT
Start: 2019-09-23 | End: 2019-09-23

## 2019-09-23 RX ORDER — MIDAZOLAM HYDROCHLORIDE 1 MG/ML
INJECTION INTRAMUSCULAR; INTRAVENOUS AS NEEDED
Status: DISCONTINUED | OUTPATIENT
Start: 2019-09-23 | End: 2019-09-23

## 2019-09-23 NOTE — OPERATIVE REPORT
[facility]  Operative Report  2019     Early Tim Patient Status:  Hospital Outpatient Surgery    1960 MRN [id number]   [facility] ENDOSCOPY Attending Dania Marr MD   Hosp Day # 0 PCP Licha Driscoll MD     Indication: superior articular process at left L2-L3 level . The needle was then walked off the bony tissue and advanced 2 to 3 mm to lie along the path of the L2 medial branch nerve. AP and lateral radiographs were obtained to document proper needle position.   Sens

## 2019-09-23 NOTE — H&P
History & Physical Examination    Patient Name: Rossi Byers  MRN: [id number]  [facility]: [id number]  YOB: 1960    Pre-Operative Diagnosis:  Spondylosis of lumbosacral region without myelopathy or radiculopathy [M47.817]    Present Illness: P MG Oral Tab Take 5 mg by mouth every evening. At 1500 PM.  Disp:  Rfl: 0 Taking   Fluticasone Propionate 50 MCG/ACT Nasal Suspension 2 sprays by Each Nare route daily as needed (sinusitis).  Disp: 1 Bottle Rfl: 0 Taking   triamcinolone acetonide 0.1 % Exter RADIOFREQUENCY ABLATION OF THORACIC OR LUMBAR MEDIAL BRANCH Right 9/9/2019    Performed by Amrik Warren MD at [address] Bilateral 6/11/2018    Performed by Amrik Warren MD at [place] [address]

## 2019-09-24 ENCOUNTER — TELEPHONE (OUTPATIENT)
Dept: SURGERY | Facility: CLINIC | Age: 59
End: 2019-09-24

## 2019-09-24 NOTE — TELEPHONE ENCOUNTER
Spoke to pt who states she has a constant throbbing HA in rt temple area. Has been taking ES Tylenol, was able to sleep. Does not present like migraine.  Spoke to Dr Vaibhav Almanza who states it is most likely a SE of steroid, but not a significant concern as pt did

## 2019-09-25 DIAGNOSIS — G43.709 CHRONIC MIGRAINE WITHOUT AURA WITHOUT STATUS MIGRAINOSUS, NOT INTRACTABLE: ICD-10-CM

## 2019-09-25 RX ORDER — PROPRANOLOL HYDROCHLORIDE 80 MG/1
TABLET ORAL
Qty: 30 TABLET | Refills: 2 | Status: SHIPPED | OUTPATIENT
Start: 2019-09-25 | End: 2019-12-24

## 2019-09-25 NOTE — TELEPHONE ENCOUNTER
Medication: PROPRANOLOL HCL 80 MG Oral Tab    Date of last refill: 07/22/19 (#30/2)  [place]    Last office visit: 9/10/2019  Due back to clinic per last office note:  Around 12/03/19  Date next office visit scheduled:    Future Appointments   Date Time Provider Robyn Avelina   10/18/2019  8:30 AM Arin Alba MD [facility] [place]   10/22/2019  9:30 AM Erica Epps MD [facility] [place]   10/22/2019 12:20 PM Manolo Escamilla MD [facility] [address]   12/17/2019  9:40 AM Valentin Betancourt MD ENINAPER [facility] [place]       Last OV note recommendation: Per Dr. Anuradha Watson MD:    BOTOX injection for chronic migraine

## 2019-10-01 ENCOUNTER — TELEPHONE (OUTPATIENT)
Dept: INTERNAL MEDICINE CLINIC | Facility: CLINIC | Age: 59
End: 2019-10-01

## 2019-10-01 NOTE — PROGRESS NOTES
Patient presents with:  Hospital F/U: [facility]/ possible panic attack      HPI: Nava Aaron presents today for [facility] f/u. She presented over the week on 9/27/19 w/ a possible panic attack versus a TIA. Brief episode duration.   Workup capsule, Rfl: 1  •  diazepam 5 MG Oral Tab, Take 7.5 mg by mouth nightly., Disp: , Rfl:   •  Zolmitriptan 5 MG Oral Tab, Take 5 mg by mouth as needed for Migraine. , Disp: , Rfl:   •  Estradiol (ESTRACE) 0.1 MG/GM Vaginal Cream, Apply 1/2 gram vaginally 2 t kg)  08/21/19 : 148 lb (67.1 kg)  08/12/19 : 150 lb (68 kg)  Gen - A&Ox3, NAD  HEENT - PERRL, EOMI, OP is clear  Neck - supple, no JVD, no thyromegaly; 2+ carotids and no bruits  Lungs - CTAB  CV - RRR, nl s1, s2  Abd - soft, NABS, NT, ND  Ext - no c/c/e

## 2019-10-01 NOTE — TELEPHONE ENCOUNTER
Records Requested from: [facility]  Fax: [phone]      Confirmation was received. Copy of form made and placed in teal accordion file. Original form sent to scanning.

## 2019-10-01 NOTE — TELEPHONE ENCOUNTER
Received medical records from [address] [place] [address] in [address] [place] [address] for review

## 2019-10-02 RX ORDER — ESTRADIOL 0.1 MG/G
CREAM VAGINAL
Qty: 42.5 G | Refills: 3 | Status: SHIPPED | OUTPATIENT
Start: 2019-10-02 | End: 2022-01-14 | Stop reason: ALTCHOICE

## 2019-10-13 ENCOUNTER — PATIENT MESSAGE (OUTPATIENT)
Dept: PAIN CLINIC | Facility: CLINIC | Age: 59
End: 2019-10-13

## 2019-10-18 ENCOUNTER — OFFICE VISIT (OUTPATIENT)
Dept: NEUROLOGY | Facility: CLINIC | Age: 59
End: 2019-10-18

## 2019-10-18 ENCOUNTER — OFFICE VISIT (OUTPATIENT)
Dept: PAIN CLINIC | Facility: CLINIC | Age: 59
End: 2019-10-18

## 2019-10-18 VITALS — WEIGHT: 153 LBS | BODY MASS INDEX: 28 KG/M2

## 2019-10-18 VITALS — HEART RATE: 64 BPM | DIASTOLIC BLOOD PRESSURE: 66 MMHG | SYSTOLIC BLOOD PRESSURE: 104 MMHG | RESPIRATION RATE: 16 BRPM

## 2019-10-18 DIAGNOSIS — G31.84 MILD NEUROCOGNITIVE DISORDER DUE TO MULTIPLE ETIOLOGIES: ICD-10-CM

## 2019-10-18 DIAGNOSIS — M53.3 SACROILIAC JOINT DYSFUNCTION OF BOTH SIDES: ICD-10-CM

## 2019-10-18 DIAGNOSIS — M47.817 SPONDYLOSIS OF LUMBOSACRAL REGION WITHOUT MYELOPATHY OR RADICULOPATHY: ICD-10-CM

## 2019-10-18 DIAGNOSIS — M47.816 ARTHRITIS, LUMBAR SPINE: ICD-10-CM

## 2019-10-18 DIAGNOSIS — R29.6 FREQUENT FALLS: ICD-10-CM

## 2019-10-18 DIAGNOSIS — G89.4 CHRONIC PAIN SYNDROME: Primary | ICD-10-CM

## 2019-10-18 DIAGNOSIS — R41.3 MEMORY DISORDER: ICD-10-CM

## 2019-10-18 DIAGNOSIS — G43.709 CHRONIC MIGRAINE WITHOUT AURA WITHOUT STATUS MIGRAINOSUS, NOT INTRACTABLE: ICD-10-CM

## 2019-10-18 PROCEDURE — 99214 OFFICE O/P EST MOD 30 MIN: CPT | Performed by: ANESTHESIOLOGY

## 2019-10-18 PROCEDURE — 99214 OFFICE O/P EST MOD 30 MIN: CPT | Performed by: OTHER

## 2019-10-18 RX ORDER — DEXTROAMPHETAMINE SACCHARATE, AMPHETAMINE ASPARTATE, DEXTROAMPHETAMINE SULFATE AND AMPHETAMINE SULFATE 5; 5; 5; 5 MG/1; MG/1; MG/1; MG/1
TABLET ORAL DAILY
COMMUNITY
End: 2020-01-16

## 2019-10-18 RX ORDER — BUSPIRONE HYDROCHLORIDE 10 MG/1
5 TABLET ORAL 2 TIMES DAILY
COMMUNITY
End: 2020-03-26 | Stop reason: DRUGHIGH

## 2019-10-18 NOTE — PROGRESS NOTES
Patient presents in office today with reported pain in lumbar region with current pain level reported = 2/10. Right shoulder pain also reported, pain moves to middle of breastbone. 5/10 reported pain.  Patient presents with vomiting and states she has a margie

## 2019-10-18 NOTE — PROGRESS NOTES
Name: Jonathan Seo   : 1960   DOS: 10/18/2019     Pain Clinic Follow Up Visit:   Patient presents with:   Follow - Up      Jonathan Seo is a 63year old female with a history of axial low back pain, following up after radiofrequency ab DR Particles, Take 120 mg by mouth daily. , Disp: , Rfl: 0  Dicyclomine HCl 10 MG Oral Cap, Take 1 capsule (10 mg total) by mouth 2 (two) times daily as needed. , Disp: 60 capsule, Rfl: 3  PREVIDENT 5000 DRY MOUTH 1.1 % Dental Gel, USE UTD, Disp: , Rfl: 2 methods to managing her pain. Given that she does have anxiety and depression, I did refer her to Dr. Gaudencio Fortune for evaluation. From an interventional standpoint, I think she is doing excellent. She has great response to radiofrequency ablation.   At this

## 2019-10-18 NOTE — PROGRESS NOTES
HPI:    Patient ID: Kiana Chou is a 63year old female. Patient presents for follow up for mild neurocognitive disorder and migraines headaches.  States memory problem remains the same She saw her Psychiatrist and made some changes in the [facility] breast   • OTHER SURGICAL HISTORY      C3-C4 anterior discectomy   • OTHER SURGICAL HISTORY  [id number]    ECT for depression   • OTHER SURGICAL HISTORY  2018    [facility]   • OTHER SURGICAL HISTORY  09/09/2019    radiofrequency abalsion lumbar   • RADIOF WEEK, Disp: 42.5 g, Rfl: 3  PROPRANOLOL HCL 80 MG Oral Tab, TAKE 1 TABLET(80 MG) BY MOUTH DAILY, Disp: 30 tablet, Rfl: 2  Liraglutide -Weight Management (SAXENDA) 18 MG/3ML Subcutaneous Solution Pen-injector, Inject 3 mg into the skin daily.  As directed, D Normal rate, regular rhythm and normal heart sounds. Pulmonary/Chest: Effort normal and breath sounds normal.   Abdominal: Soft. Bowel sounds are normal.   Skin: Skin is warm and dry. Psychiatric:  normal mood and affect.    Neurological   Awake, alert in counseling and coordination of care      Vidhya Rojas MD  [facility]          No orders of the defined types were placed in this encounter.       Meds This Visit:  Requested Prescriptions      No prescriptions requested or ordere

## 2019-10-22 ENCOUNTER — OFFICE VISIT (OUTPATIENT)
Dept: INTERNAL MEDICINE CLINIC | Facility: CLINIC | Age: 59
End: 2019-10-22

## 2019-10-22 VITALS
RESPIRATION RATE: 16 BRPM | BODY MASS INDEX: 28.89 KG/M2 | HEART RATE: 80 BPM | WEIGHT: 153 LBS | DIASTOLIC BLOOD PRESSURE: 68 MMHG | SYSTOLIC BLOOD PRESSURE: 118 MMHG | HEIGHT: 61 IN

## 2019-10-22 DIAGNOSIS — G31.84 MCI (MILD COGNITIVE IMPAIRMENT): ICD-10-CM

## 2019-10-22 DIAGNOSIS — F50.81 BINGE EATING DISORDER: ICD-10-CM

## 2019-10-22 DIAGNOSIS — E66.9 OBESITY (BMI 30-39.9): ICD-10-CM

## 2019-10-22 DIAGNOSIS — M51.26 BULGING LUMBAR DISC: ICD-10-CM

## 2019-10-22 DIAGNOSIS — Z51.81 ENCOUNTER FOR THERAPEUTIC DRUG MONITORING: Primary | ICD-10-CM

## 2019-10-22 PROCEDURE — 99214 OFFICE O/P EST MOD 30 MIN: CPT | Performed by: INTERNAL MEDICINE

## 2019-10-22 NOTE — PROGRESS NOTES
HISTORY OF PRESENT ILLNESS  Patient presents with:  Weight Check: up 8 lb      Regina Salazar is a 63year old female here for follow up in medical weight loss program.     Denies chest pain, shortness of breath, dizziness, blurred vision, headache, p 07/12/2019    GFRAA 82 07/12/2019    CA 8.7 07/12/2019    OSMOCALC 293 07/12/2019    ALKPHO 102 07/11/2019    AST 18 07/11/2019    ALT 16 07/11/2019    BILT 0.6 07/11/2019    TP 7.6 07/11/2019    ALB 4.0 07/11/2019    GLOBULIN 3.6 07/11/2019     07/1 Rfl: 1  DULoxetine HCl 60 MG Oral Cap DR Particles, Take 120 mg by mouth daily. , Disp: , Rfl: 0  Dicyclomine HCl 10 MG Oral Cap, Take 1 capsule (10 mg total) by mouth 2 (two) times daily as needed. , Disp: 60 capsule, Rfl: 3  PREVIDENT 5000 DRY MOUTH 1.1 recommended. · Discussed the role of sleep and stress in weight management. · Labs orders as above. · Counseled on comprehensive weight loss plan including attention to nutrition, exercise and behavior/stress management for success.  See patient instruct

## 2019-10-22 NOTE — PROGRESS NOTES
Patient presents with: Follow - Up: 3 month follow up - ISHA and MDD  Other: patient is falling on weekly basis and having memory issues  Weight Check: Has gained weight. Will be seeing Dr. Mary Davis later today.       HPI: Jacky eBrnard presents today for eval of multi Vaginal Cream, APPLY AS DIRECTED VAGINALLY 2 TIMES PER WEEK, Disp: 42.5 g, Rfl: 3  •  PROPRANOLOL HCL 80 MG Oral Tab, TAKE 1 TABLET(80 MG) BY MOUTH DAILY, Disp: 30 tablet, Rfl: 2  •  Liraglutide -Weight Management (SAXENDA) 18 MG/3ML Subcutaneous Solution Binge frequency: Never      Comment: 6 drinks/year    Drug use: No      PE:  /52 (BP Location: Right arm, Patient Position: Sitting, Cuff Size: large)   Pulse 71   Temp 98.1 °F (36.7 °C) (Oral)   Resp 16   Ht 62\"   Wt 157 lb 12 oz (71.6 kg)   SpO as outlined above. She was also afforded the time and opportunity to ask questions, which were then answered to the best of my ability. Gretchen Alfaro.  Puneet Carolina MD  Diplomate, [address] [place] [address] Board of Internal Medicine  Member, [facility]

## 2019-10-29 ENCOUNTER — HOSPITAL ENCOUNTER (OUTPATIENT)
Dept: BONE DENSITY | Age: 59
Discharge: HOME | End: 2019-10-29
Attending: INTERNAL MEDICINE
Payer: COMMERCIAL

## 2019-10-29 DIAGNOSIS — Z13.820 SCREENING FOR OSTEOPOROSIS: ICD-10-CM

## 2019-10-29 PROCEDURE — 77080 DXA BONE DENSITY AXIAL: CPT | Performed by: INTERNAL MEDICINE

## 2019-11-06 ENCOUNTER — HOSPITAL ENCOUNTER (OUTPATIENT)
Dept: MRI IMAGING | Facility: HOSPITAL | Age: 59
Discharge: HOME | End: 2019-11-06
Attending: Other
Payer: COMMERCIAL

## 2019-11-06 DIAGNOSIS — R29.6 FREQUENT FALLS: ICD-10-CM

## 2019-11-06 PROCEDURE — 72141 MRI NECK SPINE W/O DYE: CPT | Performed by: OTHER

## 2019-11-06 PROCEDURE — 72146 MRI CHEST SPINE W/O DYE: CPT | Performed by: OTHER

## 2019-11-08 ENCOUNTER — TELEPHONE (OUTPATIENT)
Dept: NEUROLOGY | Facility: CLINIC | Age: 59
End: 2019-11-08

## 2019-11-08 NOTE — TELEPHONE ENCOUNTER
Called pt and left VM (ok per HIPAA) relaying the results below and also reminder her of her next appt scheduled for 12/17/19 at 9:40 AM and advised her to call the office with any further questions.     ----- Message from Danni Bonds MD sent at 11/8

## 2019-11-12 NOTE — TELEPHONE ENCOUNTER
From: Florinda Conner  To: Liza Washington MD  Sent: 11/12/2019 10:44 AM CST  Subject: Visit Follow-up Question    All will be well! I am seeking contact info for [employer].  It is still my desire to move my mental health care to [facility]

## 2019-11-12 NOTE — TELEPHONE ENCOUNTER
Please make sure she has Meggan's info to schedule an appt. Referral placed. Vince Fisher. Claudine Elliott MD  Diplomate, American Board of Internal Medicine  Member, [employer] of [address] [facility]  [address] [facility],[address], [address], [employer]

## 2019-11-14 NOTE — PROGRESS NOTES
Dx: Right RTC tendonitis.          Authorized # of Visits:  8         Next MD visit: none scheduled  Fall Risk: standard         Precautions: n/a             Subjective:  Notes experiencing a dull, aching pain right shoulder that's ranged from 3-7/10 over t mobs while left side lying. Right scapula mobs while left side lying. Right scapula mobs by PT.     stm right shoulder area by PT gently.    stm R shoulder area by PT.  stm R shoulder area including pectoralis tendon, UT/LS mm.. X. X. stm R shoulder area i none

## 2019-11-19 ENCOUNTER — OFFICE VISIT (OUTPATIENT)
Dept: NEUROLOGY | Facility: CLINIC | Age: 59
End: 2019-11-19

## 2019-11-19 VITALS
WEIGHT: 155 LBS | BODY MASS INDEX: 29 KG/M2 | DIASTOLIC BLOOD PRESSURE: 68 MMHG | SYSTOLIC BLOOD PRESSURE: 124 MMHG | RESPIRATION RATE: 16 BRPM | HEART RATE: 70 BPM

## 2019-11-19 DIAGNOSIS — G31.84 MILD NEUROCOGNITIVE DISORDER DUE TO MULTIPLE ETIOLOGIES: ICD-10-CM

## 2019-11-19 DIAGNOSIS — R26.89 IMPAIRMENT OF BALANCE: ICD-10-CM

## 2019-11-19 DIAGNOSIS — M50.30 DEGENERATIVE DISC DISEASE, CERVICAL: ICD-10-CM

## 2019-11-19 PROCEDURE — 99213 OFFICE O/P EST LOW 20 MIN: CPT | Performed by: OTHER

## 2019-11-19 NOTE — PROGRESS NOTES
HPI:    Patient ID: Kvng Monte is a 63year old female. Patient presents for follow up and to discuss MRI cervical and thoracic spine results. States balance problem remains the same.  MRI shows mild to moderate multilevel degenerative changes discectomy   • OTHER SURGICAL HISTORY  [id number]    ECT for depression   • OTHER SURGICAL HISTORY  2018    [facility]   • OTHER SURGICAL HISTORY  09/09/2019    radiofrequency abalsion lumbar   • RADIOFREQUENCY ABLATION OF THORACIC OR LUMBAR MEDIAL BRANCH Management (SAXENDA) 18 MG/3ML Subcutaneous Solution Pen-injector Inject 3 mg into the skin daily. As directed 5 pen 2   • amphetamine-dextroamphetamine 20 MG Oral Tab Take by mouth daily.      • busPIRone HCl 10 MG Oral Tab Take 5 mg by mouth 2 (two) times Effort normal and breath sounds normal.   Abdominal: Soft. Bowel sounds are normal.   Skin: Skin is warm and dry. Psychiatric:  normal mood and affect. Neurological   Awake, alert and oriented to time, place and person.  Speech is dysfluent with some st

## 2019-12-02 ENCOUNTER — HOSPITAL ENCOUNTER (OUTPATIENT)
Dept: PHYSICAL THERAPY | Facility: HOSPITAL | Age: 59
Setting detail: THERAPIES SERIES
Discharge: HOME | End: 2019-12-02
Attending: INTERNAL MEDICINE
Payer: COMMERCIAL

## 2019-12-02 DIAGNOSIS — R29.6 FREQUENT FALLS: ICD-10-CM

## 2019-12-02 PROCEDURE — 97162 PT EVAL MOD COMPLEX 30 MIN: CPT

## 2019-12-02 PROCEDURE — 97110 THERAPEUTIC EXERCISES: CPT

## 2019-12-05 ENCOUNTER — APPOINTMENT (OUTPATIENT)
Dept: PHYSICAL THERAPY | Facility: HOSPITAL | Age: 59
End: 2019-12-05
Payer: COMMERCIAL

## 2019-12-09 ENCOUNTER — APPOINTMENT (OUTPATIENT)
Dept: PHYSICAL THERAPY | Facility: HOSPITAL | Age: 59
End: 2019-12-09
Attending: Other
Payer: COMMERCIAL

## 2019-12-10 ENCOUNTER — OFFICE VISIT (OUTPATIENT)
Dept: INTERNAL MEDICINE CLINIC | Facility: CLINIC | Age: 59
End: 2019-12-10

## 2019-12-10 ENCOUNTER — APPOINTMENT (OUTPATIENT)
Dept: LAB | Age: 59
End: 2019-12-10
Attending: NURSE PRACTITIONER
Payer: COMMERCIAL

## 2019-12-10 VITALS
HEIGHT: 61 IN | WEIGHT: 146.5 LBS | HEART RATE: 82 BPM | OXYGEN SATURATION: 98 % | BODY MASS INDEX: 27.66 KG/M2 | TEMPERATURE: 98 F | RESPIRATION RATE: 18 BRPM | SYSTOLIC BLOOD PRESSURE: 98 MMHG | DIASTOLIC BLOOD PRESSURE: 60 MMHG

## 2019-12-10 DIAGNOSIS — B07.9 WART ON THUMB: ICD-10-CM

## 2019-12-10 DIAGNOSIS — K11.7 XEROSTOMIA: ICD-10-CM

## 2019-12-10 DIAGNOSIS — R79.89 LOW VITAMIN D LEVEL: ICD-10-CM

## 2019-12-10 DIAGNOSIS — L72.0 EPIDERMOID CYST OF SKIN OF CHEST: ICD-10-CM

## 2019-12-10 DIAGNOSIS — K59.00 CONSTIPATION, UNSPECIFIED CONSTIPATION TYPE: ICD-10-CM

## 2019-12-10 DIAGNOSIS — J06.9 VIRAL UPPER RESPIRATORY TRACT INFECTION: ICD-10-CM

## 2019-12-10 DIAGNOSIS — Z12.4 PAP SMEAR FOR CERVICAL CANCER SCREENING: ICD-10-CM

## 2019-12-10 DIAGNOSIS — D64.9 ANEMIA, UNSPECIFIED TYPE: ICD-10-CM

## 2019-12-10 DIAGNOSIS — Z01.419 WELL WOMAN EXAM WITH ROUTINE GYNECOLOGICAL EXAM: Primary | ICD-10-CM

## 2019-12-10 DIAGNOSIS — R13.10 DYSPHAGIA, UNSPECIFIED TYPE: ICD-10-CM

## 2019-12-10 PROCEDURE — 88175 CYTOPATH C/V AUTO FLUID REDO: CPT | Performed by: NURSE PRACTITIONER

## 2019-12-10 PROCEDURE — 82607 VITAMIN B-12: CPT

## 2019-12-10 PROCEDURE — 99396 PREV VISIT EST AGE 40-64: CPT | Performed by: NURSE PRACTITIONER

## 2019-12-10 PROCEDURE — 36415 COLL VENOUS BLD VENIPUNCTURE: CPT

## 2019-12-10 PROCEDURE — 87624 HPV HI-RISK TYP POOLED RSLT: CPT | Performed by: NURSE PRACTITIONER

## 2019-12-10 PROCEDURE — 99214 OFFICE O/P EST MOD 30 MIN: CPT | Performed by: NURSE PRACTITIONER

## 2019-12-10 PROCEDURE — 82306 VITAMIN D 25 HYDROXY: CPT

## 2019-12-10 RX ORDER — ZOLPIDEM TARTRATE 10 MG/1
12.5 TABLET ORAL
Refills: 0 | COMMUNITY
Start: 2019-11-27 | End: 2020-01-15 | Stop reason: ALTCHOICE

## 2019-12-10 NOTE — PROGRESS NOTES
Patient presents with:  Wellness Visit: WWE and PAP        HPI:  Pt c/o maxillary sinus pain, sore throat, ear pressure, low energy, headaches, and body aches since yesterday. Vomit x 1 yesterday. No fevers, mild chills.     Pt c/o right palmar thumb wart p • Irregular bowel habits    • Loss of appetite    • Migraines    • Nausea    • OCD (obsessive compulsive disorder)    • Pain in joints    • Sleep apnea     has never been on CPAP   • Sleep disturbance    • Tendonitis of shoulder, right 03/2019   • Tremor (two) times daily. • Estradiol 0.1 MG/GM Vaginal Cream APPLY AS DIRECTED VAGINALLY 2 TIMES PER WEEK 42.5 g 3   • HYDROcodone-acetaminophen 5-325 MG Oral Tab Take 1 tablet by mouth every 8 (eight) hours as needed for Pain.  10 tablet 0   • Memantine HCl acuity. CARDIOVASCULAR: Denies chest pain with exertion, no PND, orthopnea, or edema. No decrease in exercise tolerance. PULMONARY: Denies extreme shortness of breath with activity. Denies wheezing. GASTROINTESTINAL: Constipation as above.  Denies odynop rales.   BREASTS: no masses, no nipple discharge, no nodes; normal skin  ABDOMEN: soft without distention, NABS x 4 quadrants. No HSM; no masses; no bruits.  Chronic mild tenderness to LLQ  GENITOURINARY: normal external genitalia; no vaginal discharge; bim continues can f/u with her GI      RHM  Pap smear done. Clinical Breast exam done.   Mammogram done 7/2019  Colonoscopy: due 2021  Labs ordered  DEXA: done 10/2019      Patient understood above plan and agreed to do labs within the next 30 days as well as hydrated by drinking 6 to 8 glasses of fluids per day (water, soft drinks, juices, tea, or soup). Extra fluids will help loosen secretions in the nose and lungs.   · Over-the-counter cold medicines will not shorten the length of time you’re sick, but they m particularly true for smaller warts that are not causing symptoms. · Cryotherapy (liquid nitrogen). This kills skin cells by freezing them. It kills the warts and destroys skin infected by the wart-causing virus.  This is done in your healthcare provider’s sexual contact. And they may cause genital or cervical cancer. After having your warts treated, new warts may still appear. Don’t be discouraged. Warts often come back. See your healthcare provider again to discuss this.  Your provider can tell you about t

## 2019-12-10 NOTE — PATIENT INSTRUCTIONS
Viral Upper Respiratory Illness (Adult)    You have a viral upper respiratory illness (URI), which is another term for the common cold. This illness is contagious during the first few days. It is spread through the air by coughing and sneezing.  It may al decongestants if you have high blood pressure.)  Follow-up care  Follow up with your healthcare provider, or as advised.   When to seek medical advice  Call your healthcare provider right away if any of these occur:  · Cough with lots of colored sputum (muc provider's office. But some prescriptions may be applied at home. · Over-the-counter (OTC) topical treatments. OTC medicines that most often contain salicylic acid may be an option. These patches, liquids, and creams are used at home.  The medicine is appl as a substitute for professional medical care. Always follow your healthcare professional's instructions.     **May try Salivea toothpaste for dry mouth

## 2019-12-12 ENCOUNTER — HOSPITAL ENCOUNTER (OUTPATIENT)
Dept: PHYSICAL THERAPY | Facility: HOSPITAL | Age: 59
Setting detail: THERAPIES SERIES
Discharge: HOME | End: 2019-12-12
Attending: Other
Payer: COMMERCIAL

## 2019-12-12 PROCEDURE — 97112 NEUROMUSCULAR REEDUCATION: CPT

## 2019-12-12 PROCEDURE — 97110 THERAPEUTIC EXERCISES: CPT

## 2019-12-12 NOTE — PROGRESS NOTES
Dx: frequent falls         Insurance (Authorized # of Visits):  8 visits until 12/31           Authorizing Physician: Dr. Devan Monsalve  Next MD visit: none scheduled  Fall Risk: standard         Precautions: n/a              Progress Summary  Pt has attended 2 letter via fax as soon as possible to [phone]. I certify the need for these services furnished under this plan of treatment and while under my care.     X___________________________________________________ Date____________________    Certification Fr

## 2019-12-16 ENCOUNTER — HOSPITAL ENCOUNTER (OUTPATIENT)
Dept: PHYSICAL THERAPY | Facility: HOSPITAL | Age: 59
Setting detail: THERAPIES SERIES
Discharge: HOME | End: 2019-12-16
Attending: INTERNAL MEDICINE
Payer: COMMERCIAL

## 2019-12-16 PROCEDURE — 97110 THERAPEUTIC EXERCISES: CPT

## 2019-12-16 PROCEDURE — 97112 NEUROMUSCULAR REEDUCATION: CPT

## 2019-12-16 NOTE — PROGRESS NOTES
Dx: frequent falls         Insurance (Authorized # of Visits):  8 visits until 12/31           Authorizing Physician: Dr. Bob Little  Next MD visit: none scheduled  Fall Risk: standard         Precautions: n/a             Subjective: Patient reports that she step ups x2'  Small tilt board side to side and A/P x2' each  Narrow base of support perturbations x2'                    HEP: sit to stand, heel raises, toe raises, static balance tandem stance    Charges: therex x1, neuro x2       Total Timed Treatment:

## 2019-12-17 ENCOUNTER — OFFICE VISIT (OUTPATIENT)
Dept: NEUROLOGY | Facility: CLINIC | Age: 59
End: 2019-12-17

## 2019-12-17 VITALS
DIASTOLIC BLOOD PRESSURE: 62 MMHG | WEIGHT: 145 LBS | HEART RATE: 80 BPM | SYSTOLIC BLOOD PRESSURE: 94 MMHG | BODY MASS INDEX: 27 KG/M2 | RESPIRATION RATE: 16 BRPM

## 2019-12-17 DIAGNOSIS — G43.709 CHRONIC MIGRAINE WITHOUT AURA WITHOUT STATUS MIGRAINOSUS, NOT INTRACTABLE: Primary | ICD-10-CM

## 2019-12-17 PROCEDURE — 64615 CHEMODENERV MUSC MIGRAINE: CPT | Performed by: OTHER

## 2019-12-17 RX ORDER — LORAZEPAM 2 MG/1
2 TABLET ORAL EVERY 4 HOURS PRN
COMMUNITY
End: 2020-09-30

## 2019-12-18 ENCOUNTER — OFFICE VISIT (OUTPATIENT)
Dept: INTERNAL MEDICINE CLINIC | Facility: CLINIC | Age: 59
End: 2019-12-18

## 2019-12-18 VITALS
BODY MASS INDEX: 27.56 KG/M2 | DIASTOLIC BLOOD PRESSURE: 70 MMHG | SYSTOLIC BLOOD PRESSURE: 122 MMHG | RESPIRATION RATE: 16 BRPM | HEIGHT: 61 IN | WEIGHT: 146 LBS | HEART RATE: 74 BPM

## 2019-12-18 DIAGNOSIS — F41.1 GAD (GENERALIZED ANXIETY DISORDER): ICD-10-CM

## 2019-12-18 DIAGNOSIS — E66.9 OBESITY (BMI 30-39.9): ICD-10-CM

## 2019-12-18 DIAGNOSIS — Z51.81 ENCOUNTER FOR THERAPEUTIC DRUG MONITORING: Primary | ICD-10-CM

## 2019-12-18 DIAGNOSIS — F50.81 BINGE EATING DISORDER: ICD-10-CM

## 2019-12-18 PROCEDURE — 99214 OFFICE O/P EST MOD 30 MIN: CPT | Performed by: INTERNAL MEDICINE

## 2019-12-18 NOTE — PROGRESS NOTES
HISTORY OF PRESENT ILLNESS  Patient presents with:  Weight Check: down 7 lb      Twin Warren is a 63year old female here for follow up in medical weight loss program.     Denies chest pain, shortness of breath, dizziness, blurred vision, headache, GLU 82 07/12/2019    BUN 13 07/12/2019    BUNCREA 14.4 07/12/2019    CREATSERUM 0.90 07/12/2019    ANIONGAP 4 07/12/2019    GFR 84 11/28/2017    GFRNAA 71 07/12/2019    GFRAA 82 07/12/2019    CA 8.7 07/12/2019    OSMOCALC 293 07/12/2019    ALKPHO 102 07 BY MOUTH DAILY, Disp: 30 tablet, Rfl: 5  OMEPRAZOLE 20 MG Oral Capsule Delayed Release, TAKE ONE CAPSULE BY MOUTH TWICE DAILY BEFORE A MEAL, Disp: 180 capsule, Rfl: 1  Zolmitriptan 5 MG Oral Tab, Take 5 mg by mouth as needed for Migraine. , Disp: , Rfl:   b reviewed with patient. Medication contraindications: n/a  · Follow up with dietitian and psychologist as recommended. · Discussed the role of sleep and stress in weight management. · Labs orders as above.   · Counseled on comprehensive weight loss plan i

## 2019-12-19 ENCOUNTER — HOSPITAL ENCOUNTER (OUTPATIENT)
Dept: PHYSICAL THERAPY | Facility: HOSPITAL | Age: 59
Setting detail: THERAPIES SERIES
Discharge: HOME | End: 2019-12-19
Attending: INTERNAL MEDICINE
Payer: COMMERCIAL

## 2019-12-19 PROCEDURE — 97112 NEUROMUSCULAR REEDUCATION: CPT

## 2019-12-19 PROCEDURE — 97110 THERAPEUTIC EXERCISES: CPT

## 2019-12-19 RX ORDER — CEFDINIR 300 MG/1
300 CAPSULE ORAL 2 TIMES DAILY
Qty: 20 CAPSULE | Refills: 0 | Status: SHIPPED | OUTPATIENT
Start: 2019-12-19 | End: 2019-12-29

## 2019-12-19 NOTE — PROGRESS NOTES
Dx: frequent falls         Insurance (Authorized # of Visits):  8 visits until 12/31           Authorizing Physician: Dr. Jeronimo Perez  Next MD visit: none scheduled  Fall Risk: standard         Precautions: n/a             Subjective: Patient reports that she x2'  Small tilt board side to side and A/P x2' each  Narrow base of support perturbations x2' Neuro:  Static balance tandem stance x3'  Dynamic balance ex: high stepping, sidestepping, tandem walking  Airex marching x2'  Airex step ups x2'  Tandem stance b

## 2019-12-23 ENCOUNTER — HOSPITAL ENCOUNTER (OUTPATIENT)
Dept: PHYSICAL THERAPY | Facility: HOSPITAL | Age: 59
Setting detail: THERAPIES SERIES
Discharge: HOME | End: 2019-12-23
Attending: INTERNAL MEDICINE
Payer: COMMERCIAL

## 2019-12-23 ENCOUNTER — APPOINTMENT (OUTPATIENT)
Dept: PHYSICAL THERAPY | Facility: HOSPITAL | Age: 59
End: 2019-12-23
Payer: COMMERCIAL

## 2019-12-23 PROCEDURE — 97110 THERAPEUTIC EXERCISES: CPT

## 2019-12-23 PROCEDURE — 97112 NEUROMUSCULAR REEDUCATION: CPT

## 2019-12-23 NOTE — PROGRESS NOTES
Dx: frequent falls         Insurance (Authorized # of Visits):  8 visits until 12/31           Authorizing Physician: Dr. Sia Hillman  Next MD visit: none scheduled  Fall Risk: standard         Precautions: n/a             Subjective: Patient reports that Noelle Wakefield side to side and A/P x2' each  Narrow base of support perturbations x2' Neuro:  Static balance tandem stance x3'  Dynamic balance ex: high stepping, sidestepping, tandem walking  Airex marching x2'  Airex step ups x2'  Tandem stance ball toss x3'  BOSU for

## 2019-12-24 DIAGNOSIS — G43.709 CHRONIC MIGRAINE WITHOUT AURA WITHOUT STATUS MIGRAINOSUS, NOT INTRACTABLE: ICD-10-CM

## 2019-12-24 RX ORDER — PROPRANOLOL HYDROCHLORIDE 80 MG/1
TABLET ORAL
Qty: 30 TABLET | Refills: 2 | Status: SHIPPED | OUTPATIENT
Start: 2019-12-24 | End: 2020-03-23

## 2019-12-24 NOTE — TELEPHONE ENCOUNTER
Medication: Propranolol     Date of last refill: 9/25/19 for #30/2 additional refills  [place]    Last office visit: 12/17/19  Due back to clinic per last office note:  3 months  Date next office visit scheduled:  3/

## 2019-12-25 DIAGNOSIS — G43.009 MIGRAINE WITHOUT AURA AND WITHOUT STATUS MIGRAINOSUS, NOT INTRACTABLE: Primary | ICD-10-CM

## 2019-12-26 DIAGNOSIS — E55.9 VITAMIN D DEFICIENCY: Primary | ICD-10-CM

## 2019-12-26 RX ORDER — ZOLMITRIPTAN 5 MG/1
TABLET, FILM COATED ORAL
Qty: 9 TABLET | Refills: 2 | Status: SHIPPED | OUTPATIENT
Start: 2019-12-26 | End: 2020-06-26

## 2019-12-26 RX ORDER — ERGOCALCIFEROL 1.25 MG/1
50000 CAPSULE ORAL WEEKLY
Qty: 4 CAPSULE | Refills: 5 | Status: SHIPPED | OUTPATIENT
Start: 2019-12-26 | End: 2020-06-29

## 2019-12-26 NOTE — TELEPHONE ENCOUNTER
Medication: ZOLMITRIPTAN 5 MG Oral Tab    Date of last refill: 05/07/19 (#9/2)  [place]    Last office visit: 12/17/2019  Due back to clinic per last office note:  Around 03/17/20  Date next office visit scheduled:

## 2019-12-31 ENCOUNTER — TELEPHONE (OUTPATIENT)
Dept: INTERNAL MEDICINE CLINIC | Facility: CLINIC | Age: 59
End: 2019-12-31

## 2019-12-31 DIAGNOSIS — M54.16 LUMBAR RADICULOPATHY: Primary | ICD-10-CM

## 2019-12-31 DIAGNOSIS — R29.6 FREQUENT FALLS: ICD-10-CM

## 2019-12-31 NOTE — TELEPHONE ENCOUNTER
Orders signed for 4 more PT visits. Notify PT team.    Thee Ruano. Nidhi Joseph MD  Diplomate, American Board of Internal Medicine  Member, [employer] College of [address] [facility]  [address] [facility],[address], [address], [facility], [address] [place] [address]  T: [phone].

## 2019-12-31 NOTE — TELEPHONE ENCOUNTER
----- Message from Meggan Gooden PTA sent at 12/29/2019  3:46 PM CST -----  Regarding: Requesting date extension and an additional 4 visits to the [facility] INsurance authorization  PT is requesting an date extension into 2020 as well as an additio

## 2020-01-12 ENCOUNTER — APPOINTMENT (OUTPATIENT)
Dept: ULTRASOUND IMAGING | Age: 60
End: 2020-01-12
Attending: FAMILY MEDICINE
Payer: COMMERCIAL

## 2020-01-12 ENCOUNTER — HOSPITAL ENCOUNTER (OUTPATIENT)
Age: 60
Discharge: HOME | End: 2020-01-12
Attending: FAMILY MEDICINE
Payer: COMMERCIAL

## 2020-01-12 VITALS
BODY MASS INDEX: 28.32 KG/M2 | HEART RATE: 74 BPM | TEMPERATURE: 97 F | WEIGHT: 150 LBS | HEIGHT: 61 IN | RESPIRATION RATE: 16 BRPM | SYSTOLIC BLOOD PRESSURE: 114 MMHG | OXYGEN SATURATION: 100 % | DIASTOLIC BLOOD PRESSURE: 65 MMHG

## 2020-01-12 DIAGNOSIS — R10.11 ABDOMINAL PAIN, RIGHT UPPER QUADRANT: ICD-10-CM

## 2020-01-12 DIAGNOSIS — H10.021 OTHER MUCOPURULENT CONJUNCTIVITIS OF RIGHT EYE: Primary | ICD-10-CM

## 2020-01-12 LAB
#MXD IC: 0.7 X10ˆ3/UL (ref 0.1–1)
ALBUMIN SERPL-MCNC: 3.2 G/DL (ref 3.4–5)
ALBUMIN/GLOB SERPL: 0.8 {RATIO} (ref 1–2)
ALP LIVER SERPL-CCNC: 62 U/L (ref 46–118)
ALT SERPL-CCNC: 13 U/L (ref 13–56)
ANION GAP SERPL CALC-SCNC: 6 MMOL/L (ref 0–18)
AST SERPL-CCNC: 17 U/L (ref 15–37)
BILIRUB SERPL-MCNC: 0.4 MG/DL (ref 0.1–2)
BUN BLD-MCNC: 14 MG/DL (ref 7–18)
BUN/CREAT SERPL: 16.5 (ref 10–20)
CALCIUM BLD-MCNC: 8.7 MG/DL (ref 8.5–10.1)
CHLORIDE SERPL-SCNC: 109 MMOL/L (ref 98–112)
CO2 SERPL-SCNC: 29 MMOL/L (ref 21–32)
CREAT BLD-MCNC: 0.7 MG/DL (ref 0.55–1.02)
CREAT BLD-MCNC: 0.85 MG/DL (ref 0.55–1.02)
GLOBULIN PLAS-MCNC: 3.9 G/DL (ref 2.8–4.4)
GLUCOSE BLD-MCNC: 109 MG/DL (ref 70–99)
GLUCOSE BLD-MCNC: 114 MG/DL (ref 70–99)
HCT VFR BLD AUTO: 38.9 % (ref 35–48)
HGB BLD-MCNC: 12.4 G/DL (ref 12–16)
ISTAT BUN: 13 MG/DL (ref 8–20)
ISTAT CHLORIDE: 104 MMOL/L (ref 101–111)
ISTAT HEMATOCRIT: 39 % (ref 34–50)
ISTAT IONIZED CALCIUM FOR CHEM 8: 1.16 MMOL/L (ref 1.12–1.32)
ISTAT POTASSIUM: 3.7 MMOL/L (ref 3.6–5.1)
ISTAT SODIUM: 141 MMOL/L (ref 136–145)
LIPASE SERPL-CCNC: 182 U/L (ref 73–393)
LYMPHOCYTES # BLD AUTO: 1.2 X10ˆ3/UL (ref 1–4)
LYMPHOCYTES NFR BLD AUTO: 18.6 %
M PROTEIN MFR SERPL ELPH: 7.1 G/DL (ref 6.4–8.2)
MCH RBC QN AUTO: 28.6 PG (ref 26–34)
MCHC RBC AUTO-ENTMCNC: 31.9 G/DL (ref 31–37)
MCV RBC AUTO: 89.8 FL (ref 80–100)
MIXED CELL %: 11.2 %
NEUTROPHILS # BLD AUTO: 4.3 X10ˆ3/UL (ref 1.5–7.7)
NEUTROPHILS NFR BLD AUTO: 70.2 %
OSMOLALITY SERPL CALC.SUM OF ELEC: 299 MOSM/KG (ref 275–295)
PATIENT FASTING Y/N/NP: YES
PLATELET # BLD AUTO: 258 X10ˆ3/UL (ref 150–450)
POCT BILIRUBIN URINE: NEGATIVE
POCT GLUCOSE URINE: NEGATIVE MG/DL
POCT KETONE URINE: NEGATIVE MG/DL
POCT LEUKOCYTE ESTERASE URINE: NEGATIVE
POCT NITRITE URINE: NEGATIVE
POCT PH URINE: 7.5 (ref 5–8)
POCT PROTEIN URINE: NEGATIVE MG/DL
POCT SPECIFIC GRAVITY URINE: 1.01
POCT URINE COLOR: YELLOW
POCT UROBILINOGEN URINE: 0.2 MG/DL
POTASSIUM SERPL-SCNC: 3.8 MMOL/L (ref 3.5–5.1)
RBC # BLD AUTO: 4.33 X10ˆ6/UL (ref 3.8–5.3)
SODIUM SERPL-SCNC: 144 MMOL/L (ref 136–145)
WBC # BLD AUTO: 6.2 X10ˆ3/UL (ref 4–11)

## 2020-01-12 PROCEDURE — 76700 US EXAM ABDOM COMPLETE: CPT | Performed by: FAMILY MEDICINE

## 2020-01-12 PROCEDURE — 81002 URINALYSIS NONAUTO W/O SCOPE: CPT | Performed by: FAMILY MEDICINE

## 2020-01-12 PROCEDURE — 36415 COLL VENOUS BLD VENIPUNCTURE: CPT

## 2020-01-12 PROCEDURE — 80047 BASIC METABLC PNL IONIZED CA: CPT

## 2020-01-12 PROCEDURE — 99214 OFFICE O/P EST MOD 30 MIN: CPT

## 2020-01-12 PROCEDURE — 85025 COMPLETE CBC W/AUTO DIFF WBC: CPT | Performed by: FAMILY MEDICINE

## 2020-01-12 PROCEDURE — 83690 ASSAY OF LIPASE: CPT | Performed by: FAMILY MEDICINE

## 2020-01-12 PROCEDURE — 80053 COMPREHEN METABOLIC PANEL: CPT | Performed by: FAMILY MEDICINE

## 2020-01-12 RX ORDER — ONDANSETRON 4 MG/1
4 TABLET, ORALLY DISINTEGRATING ORAL EVERY 4 HOURS PRN
Qty: 10 TABLET | Refills: 0 | Status: SHIPPED | OUTPATIENT
Start: 2020-01-12 | End: 2020-01-16

## 2020-01-12 RX ORDER — TOBRAMYCIN 3 MG/ML
2 SOLUTION/ DROPS OPHTHALMIC EVERY 4 HOURS
Qty: 1 BOTTLE | Refills: 0 | Status: SHIPPED | OUTPATIENT
Start: 2020-01-12 | End: 2020-02-04 | Stop reason: ALTCHOICE

## 2020-01-12 NOTE — ED INITIAL ASSESSMENT (HPI)
Complains of right eye irritation with some mild drainage onset yesterday. Also, complains of bilateral eye itching. States awoke today with right neck soreness and discomfort to the RUQ. Denies injury or fever.

## 2020-01-12 NOTE — ED NOTES
Patient called due to a couple of questions. Patient questioned what type of foods she can eat and would like to verify that she needs to follow up with a surgeon. Patient instructed to call her surgeon tomorrow for a appointment  this week.  To go to the E

## 2020-01-12 NOTE — ED PROVIDER NOTES
Patient Seen in: [facility]      History   Patient presents with:   Eye Visual Problem  Neck Pain  Abdomen/Flank Pain    Stated Complaint: RIGHT EYE SWOLLEN    HPI  59-year-old female with a history of anxiety, depression and margie DO Abdelrahman at [facility] ENDOSCOPY   • LUMBAR FACET INJECTION BILATERAL Bilateral 8/5/2019    Performed by Sami Alfonso MD at [facility] ENDOSCOPY   • LUMBAR FACET INJECTION BILATERAL Bilateral 5/21/2018    Performed by Sami Alfonso MD at [facility] MAIN OR   • NEEDLE BIOPSY [address] [place] [address] Drive External ear normal.      Left Ear: External ear normal.      Nose: Nose normal.   Eyes:      Conjunctiva/sclera:      Right eye: Right conjunctiva is injected. Pupils: Pupils are equal, round, and reactive to light.    Neck:      Musculoskeletal: Norm ultrasound technologist the sonographic Bruce Bardwell sign is negative. 2. Incidental simple cyst along the inferior pole the left kidney measuring 0.8 cm. I discussed results with patient, patient was recommended to have [place] diet.  Take Zofran ODT as needed

## 2020-01-13 ENCOUNTER — TELEPHONE (OUTPATIENT)
Dept: INTERNAL MEDICINE CLINIC | Facility: CLINIC | Age: 60
End: 2020-01-13

## 2020-01-13 ENCOUNTER — OFFICE VISIT (OUTPATIENT)
Dept: PHYSICAL THERAPY | Facility: HOSPITAL | Age: 60
End: 2020-01-13
Attending: INTERNAL MEDICINE
Payer: COMMERCIAL

## 2020-01-13 DIAGNOSIS — K80.10 CALCULUS OF GALLBLADDER WITH CHOLECYSTITIS WITHOUT BILIARY OBSTRUCTION, UNSPECIFIED CHOLECYSTITIS ACUITY: ICD-10-CM

## 2020-01-13 DIAGNOSIS — R10.11 RIGHT UPPER QUADRANT PAIN: Primary | ICD-10-CM

## 2020-01-13 PROCEDURE — 97112 NEUROMUSCULAR REEDUCATION: CPT

## 2020-01-13 PROCEDURE — 97110 THERAPEUTIC EXERCISES: CPT

## 2020-01-13 NOTE — TELEPHONE ENCOUNTER
Patient is requesting a referral to Dr. Adi Ruiz [address] [facility],[address], [place], [address] [phone]. She was in the urgent care for a luan bladder attack. She was told that she has luan stones and that she needs to see a surgeon.  She stated th

## 2020-01-13 NOTE — TELEPHONE ENCOUNTER
Order signed, notify pt. If she can't get in to see Dr. Petra Marr then she can see one of his partners. Kathia Fernando. Marita Paul MD  Diplomate, American Board of Internal Medicine  Member, [employer] of [address]  [address]

## 2020-01-13 NOTE — PROGRESS NOTES
Dx: frequent falls         Insurance (Authorized # of Visits):  8 visits until 12/31           Authorizing Physician: Dr. Adeline Enrique  Next MD visit: none scheduled  Fall Risk: standard         Precautions: n/a             Subjective: Patient reports that she tandem walking  Forward BOSU lunges x20  Airex marching x2'  Airex step ups x2'  Small tilt board side to side and A/P x2' each  Narrow base of support perturbations x2' Neuro:  Static balance tandem stance x3'  Dynamic balance ex: high stepping, sidestepp

## 2020-01-14 ENCOUNTER — OFFICE VISIT (OUTPATIENT)
Dept: PHYSICAL THERAPY | Facility: HOSPITAL | Age: 60
End: 2020-01-14
Attending: INTERNAL MEDICINE
Payer: COMMERCIAL

## 2020-01-14 DIAGNOSIS — R29.6 FREQUENT FALLS: ICD-10-CM

## 2020-01-14 PROCEDURE — 97112 NEUROMUSCULAR REEDUCATION: CPT

## 2020-01-14 PROCEDURE — 97110 THERAPEUTIC EXERCISES: CPT

## 2020-01-14 NOTE — PROGRESS NOTES
Dx: frequent falls         Insurance (Authorized # of Visits):  8 visits until 12/31           Authorizing Physician: Dr. Cheyenne Bolaños  Next MD visit: none scheduled  Fall Risk: standard         Precautions: n/a             Subjective: Patient reports she is a high stepping, sidestepping, tandem walking  Forward BOSU lunges x20  Airex marching x2'  Airex step ups x2'  Small tilt board side to side and A/P x2' each  Narrow base of support perturbations x2' Neuro:  Static balance tandem stance x3'  Dynamic balance

## 2020-01-15 ENCOUNTER — OFFICE VISIT (OUTPATIENT)
Dept: SURGERY | Facility: CLINIC | Age: 60
End: 2020-01-15

## 2020-01-15 VITALS
DIASTOLIC BLOOD PRESSURE: 76 MMHG | WEIGHT: 148 LBS | HEIGHT: 61 IN | SYSTOLIC BLOOD PRESSURE: 115 MMHG | TEMPERATURE: 98 F | BODY MASS INDEX: 27.94 KG/M2 | HEART RATE: 76 BPM

## 2020-01-15 DIAGNOSIS — R10.13 EPIGASTRIC ABDOMINAL PAIN: Primary | ICD-10-CM

## 2020-01-15 DIAGNOSIS — K81.1 CHRONIC CHOLECYSTITIS: ICD-10-CM

## 2020-01-15 PROCEDURE — 99243 OFF/OP CNSLTJ NEW/EST LOW 30: CPT | Performed by: SURGERY

## 2020-01-15 RX ORDER — METHYLPHENIDATE HYDROCHLORIDE 20 MG/1
TABLET ORAL
COMMUNITY
Start: 2020-01-03 | End: 2020-06-15

## 2020-01-15 RX ORDER — PEN NEEDLE, DIABETIC 32GX 5/32"
NEEDLE, DISPOSABLE MISCELLANEOUS
COMMUNITY
Start: 2019-12-29 | End: 2021-04-22

## 2020-01-15 RX ORDER — ONDANSETRON 4 MG/1
4 TABLET, FILM COATED ORAL EVERY 8 HOURS PRN
Qty: 20 TABLET | Refills: 0 | Status: SHIPPED | OUTPATIENT
Start: 2020-01-15 | End: 2020-02-27

## 2020-01-15 RX ORDER — ZOLPIDEM TARTRATE 12.5 MG/1
1 TABLET, FILM COATED, EXTENDED RELEASE ORAL NIGHTLY
COMMUNITY
Start: 2020-01-08 | End: 2021-02-16

## 2020-01-15 NOTE — H&P
New Patient Visit Note       Active Problems      1. Epigastric abdominal pain    2. Chronic cholecystitis        Chief Complaint   Patient presents with:  Gallstones: NEW PATIENT REFERRED FOR GALLSTONES.  ULTRASOUND COMPLETED 1/12 - PT STATES HER LAST AND been reviewed by me today.     Past Medical History:   Diagnosis Date   • Abdominal pain    • Anxiety    • Arthritis    • Back pain    • Calculus of kidney    • Constipation    • Depression    • Diverticulosis    • Fatigue    • Flatulence/gas pain/belching • Heart Disease Father    • Alcohol and Other Disorders Associated Father    • Depression Father    • Other (ALS) Mother    • Personality Disorder Daughter    • Dementia Maternal Grandmother    • Cancer Sister         melanoma     Social History    Socio [place]) Oral Cap, Take 1 capsule (50,000 Units total) by mouth once a week., Disp: 4 capsule, Rfl: 5  •  PROPRANOLOL HCL 80 MG Oral Tab, TAKE 1 TABLET(80 MG) BY MOUTH DAILY, Disp: 30 tablet, Rfl: 2  •  OMEPRAZOLE 20 MG Oral Capsule Delayed Release, TAKE ONE CA tablet, Rfl: 1  •  Fluticasone Propionate 50 MCG/ACT Nasal Suspension, 2 sprays by Each Nare route daily as needed (sinusitis).  (Patient not taking: Reported on 1/12/2020 ), Disp: 1 Bottle, Rfl: 0      Review of Systems  The Review of Systems has been revi no fluid wave, no abdominal bruit, no ascites, no pulsatile midline mass and no mass. There is no splenomegaly or hepatomegaly. There is tenderness.  There is no rigidity, no rebound, no guarding, no CVA tenderness, no tenderness at McBurney's point and neg

## 2020-01-15 NOTE — H&P (VIEW-ONLY)
New Patient Visit Note       Active Problems      1. Epigastric abdominal pain    2. Chronic cholecystitis        Chief Complaint   Patient presents with:  Gallstones: NEW PATIENT REFERRED FOR GALLSTONES.  ULTRASOUND COMPLETED 1/12 - PT STATES HER LAST AND The past medical and past surgical history have been reviewed by me today.     Past Medical History:   Diagnosis Date   • Abdominal pain    • Anxiety    • Arthritis    • Back pain    • Calculus of kidney    • Constipation    • Depression    • Diverticulosis • Hypertension Father    • Heart Attack Father    • Heart Disease Father    • Alcohol and Other Disorders Associated Father    • Depression Father    • Other (ALS) Mother    • Personality Disorder Daughter    • Dementia Maternal Grandmother    • Cancer Sis •  ZOLMITRIPTAN 5 MG Oral Tab, TAKE 1 TABLET BY MOUTH AS NEEDED FOR MIGRAINE, Disp: 9 tablet, Rfl: 2  •  ergocalciferol 1.25 MG (50934 UT) Oral Cap, Take 1 capsule (50,000 Units total) by mouth once a week., Disp: 4 capsule, Rfl: 5  •  PROPRANOLOL HCL 80 M •  Cyclobenzaprine HCl 10 MG Oral Tab, Take 1 tablet (10 mg total) by mouth 3 (three) times daily as needed for Muscle spasms.  (Patient not taking: Reported on 1/12/2020 ), Disp: 90 tablet, Rfl: 1  •  Fluticasone Propionate 50 MCG/ACT Nasal Suspension, 2 s Pulmonary/Chest: No accessory muscle usage. No respiratory distress. She has no decreased breath sounds. She has no wheezes. She has no rhonchi. She has no rales. Abdominal: Soft. Normal appearance.  She exhibits no shifting dullness, no distension, no pu None    Follow Up  Return in about 4 weeks (around 2/12/2020).     Karina Pruett MD

## 2020-01-16 ENCOUNTER — APPOINTMENT (OUTPATIENT)
Dept: LAB | Facility: REFERENCE LAB | Age: 60
End: 2020-01-16
Attending: FAMILY MEDICINE
Payer: COMMERCIAL

## 2020-01-16 ENCOUNTER — TELEPHONE (OUTPATIENT)
Dept: SURGERY | Facility: CLINIC | Age: 60
End: 2020-01-16

## 2020-01-16 DIAGNOSIS — F41.9 ANXIETY: ICD-10-CM

## 2020-01-16 DIAGNOSIS — K81.1 CHRONIC CHOLECYSTITIS: ICD-10-CM

## 2020-01-16 DIAGNOSIS — R41.3 MEMORY DISORDER: ICD-10-CM

## 2020-01-16 DIAGNOSIS — K81.1 CHRONIC CHOLECYSTITIS: Primary | ICD-10-CM

## 2020-01-16 DIAGNOSIS — F32.A DEPRESSION, UNSPECIFIED DEPRESSION TYPE: ICD-10-CM

## 2020-01-16 DIAGNOSIS — K25.9 MULTIPLE GASTRIC ULCERS: ICD-10-CM

## 2020-01-16 DIAGNOSIS — R53.82 CHRONIC FATIGUE: ICD-10-CM

## 2020-01-16 PROBLEM — K80.20 GALL BLADDER STONES: Status: ACTIVE | Noted: 2020-01-12

## 2020-01-16 LAB — DHEA-S SERPL-MCNC: 25.7 UG/DL

## 2020-01-16 PROCEDURE — 82627 DEHYDROEPIANDROSTERONE: CPT

## 2020-01-16 PROCEDURE — 84140 ASSAY OF PREGNENOLONE: CPT

## 2020-01-16 PROCEDURE — 36415 COLL VENOUS BLD VENIPUNCTURE: CPT

## 2020-01-16 PROCEDURE — 86628 CANDIDA ANTIBODY: CPT

## 2020-01-16 NOTE — PROGRESS NOTES
Boris Machado is a 63year old female. Patient presents with: Integrative Medicine Consult: referred by Dr Margaux Carranza      HPI:     Has been working with Dr. Jose Rafael Ureña for the past year. Has lost over 100 lbs. Has been using Saxenda.   Would like to los • History of cervical discectomy     Anterior Cervical Discectomy C3-C4 and disc deterioration   • History of depression    • Irregular bowel habits    • Loss of appetite    • Migraines    • Nausea    • OCD (obsessive compulsive disorder)    • Pain in join • Estradiol 0.1 MG/GM Vaginal Cream APPLY AS DIRECTED VAGINALLY 2 TIMES PER WEEK 42.5 g 3   • Memantine HCl 10 MG Oral Tab TAKE 1 TABLET(10 MG) BY MOUTH DAILY 30 tablet 5   • buPROPion HCl ER, XL, 300 MG Oral Tablet 24 Hr Take 150 mg by mouth daily.     0 Intimate partner violence:        Fear of current or ex partner: Not on file        Emotionally abused: Not on file        Physically abused: Not on file        Forced sexual activity: Not on file    Other Topics      Concerns:        Caffeine Concern: Neurological: She is alert and oriented to person, place, and time. No cranial nerve deficit. Gait normal.   Skin: Skin is warm and dry. Psychiatric: Affect normal.       ASSESSMENT AND PLAN:       1.  Anxiety  - DEHYDROEPIANDROSTERONE SULFATE; Future  - The products and items listed below (the “Products”)  and their claims have not been evaluated by the Food and Drug Administration. Dietary products are not intended to treat, prevent, mitigate or cure disease.  Ultimately, you must draw your own conclusion An exercise program that I love is called Classical Stretch. It can work for a variety of fitness levels. It tones and stretches in alignment with the way your body functions. And it works the whole body.  You can find it on [employer] (Lauralyn Dan This test is not covered by insurance and costs $275. See [email or web address] [email or web address]/ for more information. PLEASE NOTE: As this is a lab test done by an outside company, these results do not pull into your [employer] lab results online.  The results w

## 2020-01-16 NOTE — PATIENT INSTRUCTIONS
I have complete tone in the body's ability to heal and transform. The products and items listed below (the “Products”)  and their claims have not been evaluated by the Food and Drug Administration.  Dietary products are not intended to treat, prevent, m An exercise program that I love is called Classical Stretch. It can work for a variety of fitness levels. It tones and stretches in alignment with the way your body functions. And it works the whole body.  You can find it on [employer] (Thornell Free This test is not covered by insurance and costs $275. See [email or web address] for more information. PLEASE NOTE: As this is a lab test done by an outside company, these results do not pull into your [employer] lab results online.  The results w

## 2020-01-18 LAB
CANDIDA ANTIBODY IGA: 1.46 EV
CANDIDA ANTIBODY IGG: 1.55 EV
CANDIDA ANTIBODY IGM: 0.81 EV

## 2020-01-19 LAB — PREGNENOLONE BY MS/MS, SERUM: 28 NG/DL

## 2020-01-28 ENCOUNTER — APPOINTMENT (OUTPATIENT)
Dept: PHYSICAL THERAPY | Facility: HOSPITAL | Age: 60
End: 2020-01-28
Attending: INTERNAL MEDICINE
Payer: COMMERCIAL

## 2020-01-29 ENCOUNTER — TELEPHONE (OUTPATIENT)
Dept: PHYSICAL THERAPY | Facility: HOSPITAL | Age: 60
End: 2020-01-29

## 2020-01-30 ENCOUNTER — APPOINTMENT (OUTPATIENT)
Dept: PHYSICAL THERAPY | Facility: HOSPITAL | Age: 60
End: 2020-01-30
Attending: INTERNAL MEDICINE
Payer: COMMERCIAL

## 2020-02-04 ENCOUNTER — OFFICE VISIT (OUTPATIENT)
Dept: INTERNAL MEDICINE CLINIC | Facility: CLINIC | Age: 60
End: 2020-02-04

## 2020-02-04 VITALS
RESPIRATION RATE: 16 BRPM | WEIGHT: 162 LBS | HEIGHT: 61 IN | SYSTOLIC BLOOD PRESSURE: 118 MMHG | DIASTOLIC BLOOD PRESSURE: 70 MMHG | BODY MASS INDEX: 30.58 KG/M2 | HEART RATE: 90 BPM

## 2020-02-04 DIAGNOSIS — Z51.81 ENCOUNTER FOR THERAPEUTIC DRUG MONITORING: Primary | ICD-10-CM

## 2020-02-04 DIAGNOSIS — F50.81 BINGE EATING DISORDER: ICD-10-CM

## 2020-02-04 DIAGNOSIS — E66.9 OBESITY (BMI 30-39.9): ICD-10-CM

## 2020-02-04 DIAGNOSIS — R41.3 MEMORY DISORDER: ICD-10-CM

## 2020-02-04 DIAGNOSIS — F41.9 ANXIETY: ICD-10-CM

## 2020-02-04 DIAGNOSIS — F41.1 GAD (GENERALIZED ANXIETY DISORDER): ICD-10-CM

## 2020-02-04 PROBLEM — R10.13 EPIGASTRIC ABDOMINAL PAIN: Status: RESOLVED | Noted: 2018-10-24 | Resolved: 2020-02-04

## 2020-02-04 PROCEDURE — 99214 OFFICE O/P EST MOD 30 MIN: CPT | Performed by: INTERNAL MEDICINE

## 2020-02-04 RX ORDER — QUETIAPINE 25 MG/1
50 TABLET, FILM COATED ORAL NIGHTLY
COMMUNITY
End: 2020-06-29 | Stop reason: DRUGHIGH

## 2020-02-04 NOTE — PROGRESS NOTES
HISTORY OF PRESENT ILLNESS  Patient presents with:  Weight Check: up 16 lb, holdind saxenda gallbladder sx scheduled 2/6/2020      Abdiel Machuca is a 63year old female here for follow up in medical weight loss program.     Denies chest pain, shortne CREATSERUM 0.85 01/12/2020    ANIONGAP 6 01/12/2020    GFR 84 11/28/2017    GFRNAA 96 01/12/2020    GFRAA 110 01/12/2020    CA 8.7 01/12/2020    OSMOCALC 299 (H) 01/12/2020    ALKPHO 62 01/12/2020    AST 17 01/12/2020    ALT 13 01/12/2020    BILT 0.4 01/12 capsule, Rfl: 1  LORazepam 2 MG Oral Tab, Take 2 mg by mouth every 4 (four) hours as needed for Anxiety. , Disp: , Rfl:   OnabotulinumtoxinA (BOTOX) 200 units Injection Recon Soln, Inject 200 Units as directed once., Disp: , Rfl:   DICYCLOMINE HCL 10 MG Ora · Up 16 lb from previous   · Net negative: 103 lb   · Currently undergoing RFA and struggling with pain  · -advised of side effects and adverse effects of this medication  · Memory issues have been worse with worsening mood.    · Medication use and side e

## 2020-02-04 NOTE — PROGRESS NOTES
Patient presents with:  Medication Follow-Up      HPI: Dianacaleb Harvey presents today for MDD and ISHA f/u. Still working w/ I (LCSW and Psychiatry). She's gotten into Integrative Medicine as well w/ Dr. Jayne Grullon.   She and I connect every few months on her overall w 5 MG Oral Tab, TAKE 1 TABLET BY MOUTH AS NEEDED FOR MIGRAINE, Disp: 9 tablet, Rfl: 2  •  ergocalciferol 1.25 MG (28895 UT) Oral Cap, Take 1 capsule (50,000 Units total) by mouth once a week., Disp: 4 capsule, Rfl: 5  •  PROPRANOLOL HCL 80 MG Oral Tab, TAKE Used    Alcohol use: Yes      Frequency: Monthly or less      Drinks per session: 1 or 2      Binge frequency: Never      Comment: 6 drinks/year    Drug use: No      PE:  BP 94/60 (BP Location: Right arm, Patient Position: Sitting, Cuff Size: adult)   Puls Prescriptions      No prescriptions requested or ordered in this encounter       Imaging & Consults:  None

## 2020-02-06 ENCOUNTER — HOSPITAL ENCOUNTER (OUTPATIENT)
Facility: HOSPITAL | Age: 60
Setting detail: HOSPITAL OUTPATIENT SURGERY
Discharge: HOME | End: 2020-02-06
Attending: SURGERY | Admitting: SURGERY
Payer: COMMERCIAL

## 2020-02-06 ENCOUNTER — APPOINTMENT (OUTPATIENT)
Dept: GENERAL RADIOLOGY | Facility: HOSPITAL | Age: 60
End: 2020-02-06
Attending: SURGERY
Payer: COMMERCIAL

## 2020-02-06 ENCOUNTER — ANESTHESIA (OUTPATIENT)
Dept: SURGERY | Facility: HOSPITAL | Age: 60
End: 2020-02-06
Payer: COMMERCIAL

## 2020-02-06 ENCOUNTER — ANESTHESIA EVENT (OUTPATIENT)
Dept: SURGERY | Facility: HOSPITAL | Age: 60
End: 2020-02-06
Payer: COMMERCIAL

## 2020-02-06 VITALS
RESPIRATION RATE: 18 BRPM | HEART RATE: 76 BPM | HEIGHT: 61 IN | OXYGEN SATURATION: 97 % | TEMPERATURE: 98 F | WEIGHT: 160.94 LBS | BODY MASS INDEX: 30.39 KG/M2 | DIASTOLIC BLOOD PRESSURE: 58 MMHG | SYSTOLIC BLOOD PRESSURE: 114 MMHG

## 2020-02-06 DIAGNOSIS — K81.1 CHRONIC CHOLECYSTITIS: ICD-10-CM

## 2020-02-06 PROCEDURE — 0FT44ZZ RESECTION OF GALLBLADDER, PERCUTANEOUS ENDOSCOPIC APPROACH: ICD-10-PCS | Performed by: SURGERY

## 2020-02-06 PROCEDURE — 47563 LAPARO CHOLECYSTECTOMY/GRAPH: CPT | Performed by: PHYSICIAN ASSISTANT

## 2020-02-06 PROCEDURE — 74300 X-RAY BILE DUCTS/PANCREAS: CPT | Performed by: SURGERY

## 2020-02-06 PROCEDURE — 47563 LAPARO CHOLECYSTECTOMY/GRAPH: CPT | Performed by: SURGERY

## 2020-02-06 PROCEDURE — BF031ZZ PLAIN RADIOGRAPHY OF GALLBLADDER AND BILE DUCTS USING LOW OSMOLAR CONTRAST: ICD-10-PCS | Performed by: SURGERY

## 2020-02-06 RX ORDER — HYDROMORPHONE HYDROCHLORIDE 1 MG/ML
0.4 INJECTION, SOLUTION INTRAMUSCULAR; INTRAVENOUS; SUBCUTANEOUS EVERY 5 MIN PRN
Status: DISCONTINUED | OUTPATIENT
Start: 2020-02-06 | End: 2020-02-06

## 2020-02-06 RX ORDER — MIDAZOLAM HYDROCHLORIDE 1 MG/ML
1 INJECTION INTRAMUSCULAR; INTRAVENOUS EVERY 5 MIN PRN
Status: DISCONTINUED | OUTPATIENT
Start: 2020-02-06 | End: 2020-02-06

## 2020-02-06 RX ORDER — ONDANSETRON 2 MG/ML
4 INJECTION INTRAMUSCULAR; INTRAVENOUS AS NEEDED
Status: DISCONTINUED | OUTPATIENT
Start: 2020-02-06 | End: 2020-02-06

## 2020-02-06 RX ORDER — KETOROLAC TROMETHAMINE 30 MG/ML
INJECTION, SOLUTION INTRAMUSCULAR; INTRAVENOUS AS NEEDED
Status: DISCONTINUED | OUTPATIENT
Start: 2020-02-06 | End: 2020-02-06 | Stop reason: SURG

## 2020-02-06 RX ORDER — ACETAMINOPHEN 500 MG
1000 TABLET ORAL ONCE
Status: CANCELLED | OUTPATIENT
Start: 2020-02-06 | End: 2020-02-06

## 2020-02-06 RX ORDER — HYDROCODONE BITARTRATE AND ACETAMINOPHEN 5; 325 MG/1; MG/1
TABLET ORAL
Qty: 20 TABLET | Refills: 0 | Status: SHIPPED | OUTPATIENT
Start: 2020-02-06 | End: 2020-02-27

## 2020-02-06 RX ORDER — SODIUM CHLORIDE, SODIUM LACTATE, POTASSIUM CHLORIDE, CALCIUM CHLORIDE 600; 310; 30; 20 MG/100ML; MG/100ML; MG/100ML; MG/100ML
INJECTION, SOLUTION INTRAVENOUS CONTINUOUS
Status: DISCONTINUED | OUTPATIENT
Start: 2020-02-06 | End: 2020-02-06

## 2020-02-06 RX ORDER — DOCUSATE SODIUM 100 MG/1
100 CAPSULE, LIQUID FILLED ORAL 3 TIMES DAILY
Qty: 90 CAPSULE | Refills: 0 | Status: SHIPPED | OUTPATIENT
Start: 2020-02-06 | End: 2020-02-27

## 2020-02-06 RX ORDER — MEPERIDINE HYDROCHLORIDE 25 MG/ML
12.5 INJECTION INTRAMUSCULAR; INTRAVENOUS; SUBCUTANEOUS AS NEEDED
Status: DISCONTINUED | OUTPATIENT
Start: 2020-02-06 | End: 2020-02-06

## 2020-02-06 RX ORDER — INDOCYANINE GREEN AND WATER 25 MG
5 KIT INJECTION ONCE
Status: COMPLETED | OUTPATIENT
Start: 2020-02-06 | End: 2020-02-06

## 2020-02-06 RX ORDER — DIPHENHYDRAMINE HYDROCHLORIDE 50 MG/ML
12.5 INJECTION INTRAMUSCULAR; INTRAVENOUS AS NEEDED
Status: DISCONTINUED | OUTPATIENT
Start: 2020-02-06 | End: 2020-02-06

## 2020-02-06 RX ORDER — ONDANSETRON 2 MG/ML
INJECTION INTRAMUSCULAR; INTRAVENOUS
Status: COMPLETED
Start: 2020-02-06 | End: 2020-02-06

## 2020-02-06 RX ORDER — NEOSTIGMINE METHYLSULFATE 1 MG/ML
INJECTION INTRAVENOUS AS NEEDED
Status: DISCONTINUED | OUTPATIENT
Start: 2020-02-06 | End: 2020-02-06 | Stop reason: SURG

## 2020-02-06 RX ORDER — HYDROCODONE BITARTRATE AND ACETAMINOPHEN 5; 325 MG/1; MG/1
2 TABLET ORAL AS NEEDED
Status: DISCONTINUED | OUTPATIENT
Start: 2020-02-06 | End: 2020-02-06

## 2020-02-06 RX ORDER — METOPROLOL TARTRATE 5 MG/5ML
2.5 INJECTION INTRAVENOUS ONCE
Status: DISCONTINUED | OUTPATIENT
Start: 2020-02-06 | End: 2020-02-06

## 2020-02-06 RX ORDER — HEPARIN SODIUM 5000 [USP'U]/ML
INJECTION, SOLUTION INTRAVENOUS; SUBCUTANEOUS
Status: COMPLETED
Start: 2020-02-06 | End: 2020-02-06

## 2020-02-06 RX ORDER — DEXAMETHASONE SODIUM PHOSPHATE 4 MG/ML
VIAL (ML) INJECTION AS NEEDED
Status: DISCONTINUED | OUTPATIENT
Start: 2020-02-06 | End: 2020-02-06 | Stop reason: SURG

## 2020-02-06 RX ORDER — NALOXONE HYDROCHLORIDE 0.4 MG/ML
80 INJECTION, SOLUTION INTRAMUSCULAR; INTRAVENOUS; SUBCUTANEOUS AS NEEDED
Status: DISCONTINUED | OUTPATIENT
Start: 2020-02-06 | End: 2020-02-06

## 2020-02-06 RX ORDER — ACETAMINOPHEN 500 MG
1000 TABLET ORAL ONCE
Status: DISCONTINUED | OUTPATIENT
Start: 2020-02-06 | End: 2020-02-06 | Stop reason: HOSPADM

## 2020-02-06 RX ORDER — ACETAMINOPHEN 500 MG
1000 TABLET ORAL ONCE AS NEEDED
Status: DISCONTINUED | OUTPATIENT
Start: 2020-02-06 | End: 2020-02-06

## 2020-02-06 RX ORDER — ONDANSETRON 2 MG/ML
INJECTION INTRAMUSCULAR; INTRAVENOUS AS NEEDED
Status: DISCONTINUED | OUTPATIENT
Start: 2020-02-06 | End: 2020-02-06 | Stop reason: SURG

## 2020-02-06 RX ORDER — LABETALOL HYDROCHLORIDE 5 MG/ML
5 INJECTION, SOLUTION INTRAVENOUS EVERY 5 MIN PRN
Status: DISCONTINUED | OUTPATIENT
Start: 2020-02-06 | End: 2020-02-06

## 2020-02-06 RX ORDER — BUPIVACAINE HYDROCHLORIDE AND EPINEPHRINE 5; 5 MG/ML; UG/ML
INJECTION, SOLUTION EPIDURAL; INTRACAUDAL; PERINEURAL AS NEEDED
Status: DISCONTINUED | OUTPATIENT
Start: 2020-02-06 | End: 2020-02-06 | Stop reason: HOSPADM

## 2020-02-06 RX ORDER — ROCURONIUM BROMIDE 10 MG/ML
INJECTION, SOLUTION INTRAVENOUS AS NEEDED
Status: DISCONTINUED | OUTPATIENT
Start: 2020-02-06 | End: 2020-02-06 | Stop reason: SURG

## 2020-02-06 RX ORDER — GLYCOPYRROLATE 0.2 MG/ML
INJECTION, SOLUTION INTRAMUSCULAR; INTRAVENOUS AS NEEDED
Status: DISCONTINUED | OUTPATIENT
Start: 2020-02-06 | End: 2020-02-06 | Stop reason: SURG

## 2020-02-06 RX ORDER — HYDROCODONE BITARTRATE AND ACETAMINOPHEN 5; 325 MG/1; MG/1
1 TABLET ORAL AS NEEDED
Status: DISCONTINUED | OUTPATIENT
Start: 2020-02-06 | End: 2020-02-06

## 2020-02-06 RX ORDER — LIDOCAINE HYDROCHLORIDE 10 MG/ML
INJECTION, SOLUTION EPIDURAL; INFILTRATION; INTRACAUDAL; PERINEURAL AS NEEDED
Status: DISCONTINUED | OUTPATIENT
Start: 2020-02-06 | End: 2020-02-06 | Stop reason: SURG

## 2020-02-06 RX ORDER — HEPARIN SODIUM 5000 [USP'U]/ML
5000 INJECTION, SOLUTION INTRAVENOUS; SUBCUTANEOUS ONCE
Status: COMPLETED | OUTPATIENT
Start: 2020-02-06 | End: 2020-02-06

## 2020-02-06 RX ORDER — HYDROMORPHONE HYDROCHLORIDE 1 MG/ML
0.5 INJECTION, SOLUTION INTRAMUSCULAR; INTRAVENOUS; SUBCUTANEOUS EVERY 5 MIN PRN
Status: DISCONTINUED | OUTPATIENT
Start: 2020-02-06 | End: 2020-02-06

## 2020-02-06 RX ADMIN — LIDOCAINE HYDROCHLORIDE 50 MG: 10 INJECTION, SOLUTION EPIDURAL; INFILTRATION; INTRACAUDAL; PERINEURAL at 15:06:00

## 2020-02-06 RX ADMIN — ONDANSETRON 4 MG: 2 INJECTION INTRAMUSCULAR; INTRAVENOUS at 16:27:00

## 2020-02-06 RX ADMIN — DEXAMETHASONE SODIUM PHOSPHATE 8 MG: 4 MG/ML VIAL (ML) INJECTION at 15:06:00

## 2020-02-06 RX ADMIN — ROCURONIUM BROMIDE 10 MG: 10 INJECTION, SOLUTION INTRAVENOUS at 15:55:00

## 2020-02-06 RX ADMIN — NEOSTIGMINE METHYLSULFATE 5 MG: 1 INJECTION INTRAVENOUS at 16:34:00

## 2020-02-06 RX ADMIN — ROCURONIUM BROMIDE 40 MG: 10 INJECTION, SOLUTION INTRAVENOUS at 15:06:00

## 2020-02-06 RX ADMIN — KETOROLAC TROMETHAMINE 30 MG: 30 INJECTION, SOLUTION INTRAMUSCULAR; INTRAVENOUS at 16:26:00

## 2020-02-06 RX ADMIN — SODIUM CHLORIDE, SODIUM LACTATE, POTASSIUM CHLORIDE, CALCIUM CHLORIDE: 600; 310; 30; 20 INJECTION, SOLUTION INTRAVENOUS at 16:22:00

## 2020-02-06 RX ADMIN — GLYCOPYRROLATE 0.8 MG: 0.2 INJECTION, SOLUTION INTRAMUSCULAR; INTRAVENOUS at 16:34:00

## 2020-02-06 NOTE — ANESTHESIA POSTPROCEDURE EVALUATION
[address] Patient Status:  Hospital Outpatient Surgery   Age/Gender 63year old female MRN [id number]   [facility] SURGERY Attending Lucas Arteaga MD   Hosp Day # 0 PCP Beatrice Peck MD       Anesthesia Post-op

## 2020-02-06 NOTE — ANESTHESIA PREPROCEDURE EVALUATION
PRE-OP EVALUATION    Patient Name: No Kelly    Pre-op Diagnosis: Chronic cholecystitis [K81.1]    Procedure(s):  ROBOTIC LAPAROSCOPIC CHOLECYSTECTOMY WITH CHOLANGIOGRAM    Surgeon(s) and Role:     Rachelle Mendez MD - Primary    Pre-op annie Tartrate ER 12.5 MG Oral Tab CR, Take 1 tablet by mouth nightly., Disp: , Rfl:   Methylphenidate HCl 20 MG Oral Tab, , Disp: , Rfl:   [DISCONTINUED] Brexpiprazole (REXULTI) 2 MG Oral Tab, Take 1 mg by mouth daily.   , Disp: , Rfl:   [DISCONTINUED] Tobramyci 2        Allergies: Patient has no known allergies. Anesthesia Evaluation    Patient summary reviewed. Anesthetic Complications  (-) history of anesthetic complications         GI/Hepatic/Renal    Negative GI/hepatic/renal ROS.                     C Available pre-op labs reviewed.   Lab Results   Component Value Date    MCV 89.8 01/12/2020    MCHC 31.9 01/12/2020     Lab Results   Component Value Date     01/12/2020    K 3.8 01/12/2020     01/12/2020    CO2 29.0 01/12/2020    BUN 14 01/

## 2020-02-06 NOTE — BRIEF OP NOTE
Pre-Operative Diagnosis: Chronic cholecystitis [K81.1]     Post-Operative Diagnosis: Chronic cholecystitis [K81.1]      Procedure Performed:   Procedure(s):  ROBOTIC LAPAROSCOPIC CHOLECYSTECTOMY WITH CHOLANGIOGRAM    Surgeon(s) and Role:     * Jordi Vasquez

## 2020-02-06 NOTE — ANESTHESIA PROCEDURE NOTES
Airway  Urgency: elective    Airway not difficult    General Information and Staff    Patient location during procedure: OR  Anesthesiologist: Leidy Apodaca MD  Performed: anesthesiologist     Indications and Patient Condition  Indications for airway manag

## 2020-02-06 NOTE — OPERATIVE REPORT
OPERATIVE REPORT   PREOPERATIVE DIAGNOSIS: Chronic Cholecystitis and cholelithiasis. POSTOPERATIVE DIAGNOSIS: Chronic Cholecystitis and cholelithiasis.    PROCEDURE PERFORMED: Laparoscopic cholecystectomy with intraoperative cholangiogram.   ASSISTANT: Ms essential to the proper conduct of this case, particularly in the performance of the cholangiogram, as well as the careful dissection necessary in and around the hilum of the gallbladder.    DESCRIPTION OF PROCEDURE: The patient was brought to the operating was placed on the cystic artery on the specimen side, three towards the patient side, and the cystic artery was divided with the Endoshears. Next, the gallbladder was elevated from the gallbladder fossa using electrocautery.  Once the gallbladder was elevat

## 2020-02-06 NOTE — INTERVAL H&P NOTE
Pre-op Diagnosis: Chronic cholecystitis [K81.1]    The above referenced H&P was reviewed by Aliza Ibrahim MD on 2/6/2020, the patient was examined and no significant changes have occurred in the patient's condition since the H&P was performed.   I discu

## 2020-02-10 ENCOUNTER — MED REC SCAN ONLY (OUTPATIENT)
Dept: SURGERY | Facility: CLINIC | Age: 60
End: 2020-02-10

## 2020-02-11 ENCOUNTER — OFFICE VISIT (OUTPATIENT)
Dept: SURGERY | Facility: CLINIC | Age: 60
End: 2020-02-11

## 2020-02-11 VITALS — TEMPERATURE: 98 F | SYSTOLIC BLOOD PRESSURE: 110 MMHG | DIASTOLIC BLOOD PRESSURE: 75 MMHG | HEART RATE: 78 BPM

## 2020-02-11 DIAGNOSIS — Z90.49 HISTORY OF CHOLECYSTECTOMY: Primary | ICD-10-CM

## 2020-02-11 PROBLEM — K81.1 CHRONIC CHOLECYSTITIS: Status: RESOLVED | Noted: 2020-01-15 | Resolved: 2020-02-11

## 2020-02-11 PROBLEM — K80.20 GALL BLADDER STONES: Status: RESOLVED | Noted: 2020-01-12 | Resolved: 2020-02-11

## 2020-02-11 PROCEDURE — 99024 POSTOP FOLLOW-UP VISIT: CPT | Performed by: PHYSICIAN ASSISTANT

## 2020-02-11 NOTE — PROGRESS NOTES
Post Operative Visit Note       Active Problems  1. History of cholecystectomy         Chief Complaint   Patient presents with:  Gallbladder: post op robotic lap melina PBP 2/6 -- States pain has been managable.  Denies constipation, diarrhea, n/v, fever or ENDOSCOPY   • LUMBAR FACET INJECTION BILATERAL Bilateral 5/21/2018    Performed by Zane Gonzales MD at [address]   • NEEDLE BIOPSY RIGHT  05/2015    benign breast   • OTHER SURGICAL HISTORY      C3-C4 anterior discectomy   • OTHER SURGICAL HISTORY  2018 passed        Weight Concern: Yes          due to depression meds    Social History Narrative      Caring for grandson James Sal - aged 12 yo [1/2020])       Current Outpatient Medications:   •  HYDROcodone-acetaminophen (NORCO) 5-325 MG Oral Tab, Take 1 or 2 Rfl:   •  DICYCLOMINE HCL 10 MG Oral Cap, TAKE 1 CAPSULE(10 MG) BY MOUTH TWICE DAILY AS NEEDED, Disp: 60 capsule, Rfl: 5  •  busPIRone HCl 10 MG Oral Tab, Take 5 mg by mouth 2 (two) times daily. , Disp: , Rfl:   •  Estradiol 0.1 MG/GM Vaginal Cream, APPLY A is oriented to person, place, and time. She appears well-developed and well-nourished. HENT:   Head: Normocephalic and atraumatic. Cardiovascular: Normal rate and regular rhythm.    Pulmonary/Chest: Effort normal and breath sounds normal.   Abdominal: S

## 2020-02-13 ENCOUNTER — TELEPHONE (OUTPATIENT)
Dept: INTERNAL MEDICINE CLINIC | Facility: CLINIC | Age: 60
End: 2020-02-13

## 2020-02-13 ENCOUNTER — OFFICE VISIT (OUTPATIENT)
Dept: INTERNAL MEDICINE CLINIC | Facility: CLINIC | Age: 60
End: 2020-02-13

## 2020-02-13 VITALS
WEIGHT: 162.5 LBS | SYSTOLIC BLOOD PRESSURE: 98 MMHG | BODY MASS INDEX: 30.68 KG/M2 | DIASTOLIC BLOOD PRESSURE: 68 MMHG | HEIGHT: 61 IN | HEART RATE: 72 BPM | TEMPERATURE: 99 F

## 2020-02-13 DIAGNOSIS — K14.9 DISORDER OF TONGUE: Primary | ICD-10-CM

## 2020-02-13 DIAGNOSIS — L98.9 SKIN DISORDER: ICD-10-CM

## 2020-02-13 PROCEDURE — 99213 OFFICE O/P EST LOW 20 MIN: CPT | Performed by: INTERNAL MEDICINE

## 2020-02-13 NOTE — PROGRESS NOTES
Patient presents with: Other: dark spot under tongue      HPI: Fiona Land presents today for dark spot under tongue. Noticed on Thursday last week while she was getting Gen Surg as the nurse told her about it.   Size of an eraser top, round, smooth edges, dark QUEtiapine Fumarate 25 MG Oral Tab, Take 25 mg by mouth nightly., Disp: , Rfl:   •  REXULTI 1 MG Oral Tab, TK 1 T PO QAM, Disp: , Rfl:   •  Calcium Carbonate-Vitamin D 500-125 MG-UNIT Oral Tab, Take by mouth., Disp: , Rfl:   •  Zolpidem Tartrate ER 12.5 M tablet (10 mg total) by mouth 3 (three) times daily as needed for Muscle spasms. , Disp: 90 tablet, Rfl: 1  •  DULoxetine HCl 60 MG Oral Cap DR Particles, Take 120 mg by mouth daily.   , Disp: , Rfl: 0  •  PREVIDENT 5000 DRY MOUTH 1.1 % Dental Gel, USE UTD, encounter       Imaging & Consults:  DERM - INTERNAL

## 2020-02-27 NOTE — PROGRESS NOTES
Abdiel Machuca is a 63year old female. Patient presents with: Integrative Medicine Consult: 6 wk f/u      HPI:     Caring for her grandson who has ADHD. Had her GB removed and recovering well. Having some pain with physical activity.    Applying Medication Sig Dispense Refill   • MISC NATURAL PRODUCTS OR Take by mouth. Deplin L-Methyfolate CA      • QUEtiapine Fumarate 25 MG Oral Tab Take 25 mg by mouth nightly.      • REXULTI 1 MG Oral Tab TK 1 T PO QAM     • Calcium Carbonate-Vitamin D 500-125 MG • triamcinolone acetonide 0.1 % External Cream Apply thin layer to affected area twice a day as needed 45 g 1       SOCIAL HISTORY:   Social History    Socioeconomic History      Marital status:       Spouse name: Not on file      Number of children Occupational Exposure: Not Asked        Hobby Hazards: Not Asked        Sleep Concern: Not Asked        Back Care: Not Asked        Bike Helmet: Not Asked        Self-Exams: Not Asked    Social History Narrative      Caring for grandson Fabiana Cross - aged Counseled on and discussed diet and supplement recommendations in detail. Referred to Biofeedback for further management of symptoms. Lura Nyhan and information regarding this treatment provided.    Referred for MNT to support and guide diet plan and lifestyl Where to Find: [employer] Eat LocalparvinGezlong/quincyealcharbel  Directions: 5 drops under the tongue twice daily  Why: Bradley Chase is a blend of full spectrum hemp oil and other essential nutrients    Here are you recommendations based on your micronutrient results:    1.  Ziggy Parada Return in about 2 months (around 4/27/2020) for Integrative Medicine - Established (30 min). Patient affirmed understanding of plan and all questions were answered.      Brenda Price, DO

## 2020-02-27 NOTE — PATIENT INSTRUCTIONS
I have complete tone in the body's ability to heal and transform. The products and items listed below (the “Products”)  and their claims have not been evaluated by the Food and Drug Administration.  Dietary products are not intended to treat, prevent, m 2. Super K by [employer]    Directions: 1 softgel daily  Where to Find: www.lifeextension. com  Why: Vitamin K helps maintain your body’s calcium balance, which affects everything from arterial health to bone density.  But it’s hard to get sufficient vit

## 2020-02-29 DIAGNOSIS — G31.84 MILD NEUROCOGNITIVE DISORDER DUE TO MULTIPLE ETIOLOGIES: ICD-10-CM

## 2020-03-02 RX ORDER — MEMANTINE HYDROCHLORIDE 10 MG/1
TABLET ORAL
Qty: 30 TABLET | Refills: 2 | Status: SHIPPED | OUTPATIENT
Start: 2020-03-02 | End: 2020-05-26

## 2020-03-02 NOTE — TELEPHONE ENCOUNTER
Medication: MEMANTINE HCL 10 MG Oral Tab    Date of last refill: 08/21/19 (#30/5)  [place]    Last office visit: 12/17/2019  Due back to clinic per last office note:  Around 03/17/2020  Date next office visit schedul

## 2020-03-10 ENCOUNTER — OFFICE VISIT (OUTPATIENT)
Dept: INTERNAL MEDICINE CLINIC | Facility: CLINIC | Age: 60
End: 2020-03-10

## 2020-03-10 VITALS
BODY MASS INDEX: 30.78 KG/M2 | SYSTOLIC BLOOD PRESSURE: 118 MMHG | DIASTOLIC BLOOD PRESSURE: 78 MMHG | WEIGHT: 163 LBS | RESPIRATION RATE: 16 BRPM | HEART RATE: 78 BPM | HEIGHT: 61 IN

## 2020-03-10 DIAGNOSIS — F33.1 MDD (MAJOR DEPRESSIVE DISORDER), RECURRENT EPISODE, MODERATE (HCC): ICD-10-CM

## 2020-03-10 DIAGNOSIS — E66.09 CLASS 1 OBESITY DUE TO EXCESS CALORIES WITHOUT SERIOUS COMORBIDITY WITH BODY MASS INDEX (BMI) OF 30.0 TO 30.9 IN ADULT: ICD-10-CM

## 2020-03-10 DIAGNOSIS — Z51.81 ENCOUNTER FOR THERAPEUTIC DRUG MONITORING: Primary | ICD-10-CM

## 2020-03-10 DIAGNOSIS — R63.2 BINGE EATING: ICD-10-CM

## 2020-03-10 PROCEDURE — 99214 OFFICE O/P EST MOD 30 MIN: CPT | Performed by: INTERNAL MEDICINE

## 2020-03-10 NOTE — PROGRESS NOTES
HISTORY OF PRESENT ILLNESS  Patient presents with:  Weight Check: up 1 lb      Lion Reyes is a 63year old female here for follow up in medical weight loss program.     Denies chest pain, shortness of breath, dizziness, blurred vision, headache, p 01/12/2020    GFR 84 11/28/2017    GFRNAA 96 01/12/2020    GFRAA 110 01/12/2020    CA 8.7 01/12/2020    OSMOCALC 299 (H) 01/12/2020    ALKPHO 62 01/12/2020    AST 17 01/12/2020    ALT 13 01/12/2020    BILT 0.4 01/12/2020    TP 7.1 01/12/2020    ALB 3.2 (L) MOUTH TWICE DAILY BEFORE A MEAL, Disp: 180 capsule, Rfl: 1  LORazepam 2 MG Oral Tab, Take 2 mg by mouth every 4 (four) hours as needed for Anxiety. , Disp: , Rfl:   OnabotulinumtoxinA (BOTOX) 200 units Injection Recon Soln, Inject 200 Units as directed once brain over binge. · Continue saxenda at this time. · -advised of side effects and adverse effects of this medication  · Medication use and side effects reviewed with patient.   Medication contraindications: n/a  · Follow up with dietitian and psychologi

## 2020-03-23 DIAGNOSIS — G43.709 CHRONIC MIGRAINE WITHOUT AURA WITHOUT STATUS MIGRAINOSUS, NOT INTRACTABLE: ICD-10-CM

## 2020-03-23 NOTE — TELEPHONE ENCOUNTER
Medication: Propranolol    Date of last refill: 12/24/19 (#30/2)  [place]     Last office visit: 12/17/2019  Due back to clinic per last office note:  3 months  Date next office visit scheduled:    Future Appointments

## 2020-03-24 RX ORDER — PROPRANOLOL HYDROCHLORIDE 80 MG/1
TABLET ORAL
Qty: 30 TABLET | Refills: 2 | Status: SHIPPED | OUTPATIENT
Start: 2020-03-24 | End: 2020-06-18

## 2020-03-26 ENCOUNTER — OFFICE VISIT (OUTPATIENT)
Dept: NEUROLOGY | Facility: CLINIC | Age: 60
End: 2020-03-26

## 2020-03-26 VITALS
HEART RATE: 74 BPM | DIASTOLIC BLOOD PRESSURE: 72 MMHG | BODY MASS INDEX: 33 KG/M2 | RESPIRATION RATE: 16 BRPM | SYSTOLIC BLOOD PRESSURE: 124 MMHG | WEIGHT: 172 LBS

## 2020-03-26 DIAGNOSIS — G43.709 CHRONIC MIGRAINE WITHOUT AURA WITHOUT STATUS MIGRAINOSUS, NOT INTRACTABLE: Primary | ICD-10-CM

## 2020-03-26 PROCEDURE — 64615 CHEMODENERV MUSC MIGRAINE: CPT | Performed by: OTHER

## 2020-03-26 PROCEDURE — 99212 OFFICE O/P EST SF 10 MIN: CPT | Performed by: OTHER

## 2020-03-26 RX ORDER — LISDEXAMFETAMINE DIMESYLATE 30 MG/1
1 CAPSULE ORAL DAILY
COMMUNITY
Start: 2020-03-19 | End: 2020-06-29 | Stop reason: DRUGHIGH

## 2020-03-26 RX ORDER — BUSPIRONE HYDROCHLORIDE 15 MG/1
1 TABLET ORAL 2 TIMES DAILY
COMMUNITY
Start: 2020-03-16 | End: 2020-08-04 | Stop reason: DRUGHIGH

## 2020-03-26 NOTE — PROGRESS NOTES
HPI:    Patient ID: Jonathan Seo is a 63year old female. Patient presents for follow up for chronic migraines. States headache have increased little bit but could be due to Botox wearing off and weather changes. No major migraines.    States m 9/23/2019    Performed by Zane Gonzales MD at [address] [place] [address] 9/9/2019    Performed by Zane Gonzales MD at [facility] ENDOSCOPY   • REMOVAL GALLBLADDER  02/2020   • SACROILIAC JOINT INJECTION BILATER MG Oral Cap Take 1 tablet by mouth daily. • Propranolol HCl 80 MG Oral Tab TAKE 1 TABLET(80 MG) BY MOUTH DAILY 30 tablet 2   • Liraglutide -Weight Management (SAXENDA) 18 MG/3ML Subcutaneous Solution Pen-injector Inject 3 mg into the skin daily.  As dir Physical Exam    Blood pressure 124/72, pulse 74, resp. rate 16, weight 172 lb (78 kg), not currently breastfeeding. Vitals reviewed  General: well developed, well nourished  HEENT: Normocephalic and atraumatic.  Moist mucus membrane  Cardiovascular: N encounter.       Meds This Visit:  Requested Prescriptions      No prescriptions requested or ordered in this encounter       Imaging & Referrals:  None       ID#[id number]    Migraine

## 2020-03-26 NOTE — PROCEDURES
PROCEDURE:   BOTOX injection for chronic migraine: Using alcohol prep, fixed site protocol performed.  Botox was reconstituted to the following concentration: 5 units per 0.1 ml.   Muscles, number of sites, units used and Side injected were as follows: number of sites, units used and Side injected were as follows:   Units used Side   Procerus 5 units center   Corrugators 2 sites 10 units R/L   Frontalis 4 sites 20 units R/L   Temporalis   4 sites each side 20 units R/L   Cervical paraspinals   2 sites ea

## 2020-04-01 ENCOUNTER — MED REC SCAN ONLY (OUTPATIENT)
Dept: INTERNAL MEDICINE CLINIC | Facility: CLINIC | Age: 60
End: 2020-04-01

## 2020-04-17 ENCOUNTER — TELEPHONE (OUTPATIENT)
Dept: INTERNAL MEDICINE CLINIC | Facility: CLINIC | Age: 60
End: 2020-04-17

## 2020-04-17 NOTE — OPERATIVE REPORT
"Subjective:   Jordon Mclean is a 35 y.o. male here today for anxiety and depression in a recent history of a possible anal fissure.    MERCEDEZ (generalized anxiety disorder)  This is a 35-year-old male here today to discuss anxiety.  Chronic history of of both anxiety and depression.  He is doing better on Effexor.  Currently on 75 mg daily.  Has no side effects taking the medication.  Depression is stable.  Still has some anxiety.  Believes anxiety may be more affected now with the pandemic.  He is interested potentially an increase in the dose.  He is currently working.  His wife was furloughed.  She is home with the 2 children for schooling.    Anal fissure  Last month he had a week history of rectal bleeding with searing pain.  Thought maybe had a hemorrhoid.  Symptoms have resolved.  States that there was blood on the stool with bowel movements.       Current medicines (including changes today)  Current Outpatient Medications   Medication Sig Dispense Refill   • venlafaxine XR (EFFEXOR-XR) 150 MG extended-release capsule Take 1 Cap by mouth every day. 90 Cap 1   • traZODone (DESYREL) 50 MG Tab Take 1 Tab by mouth every bedtime. 90 Tab 3     No current facility-administered medications for this visit.      He  has no past medical history on file.    Social History and Family History were reviewed and updated.    ROS   No chest pain, no shortness of breath, no abdominal pain and all other systems were reviewed and are negative.       Objective:     /72   Pulse 80   Temp 36.9 °C (98.5 °F) (Temporal)   Resp 16   Ht 1.753 m (5' 9\")   Wt 102.1 kg (225 lb)   SpO2 94%  Body mass index is 33.23 kg/m².   Physical Exam:  Constitutional: Alert, no distress.  Skin: Warm, dry, good turgor, no rashes in visible areas.  Eye: Equal, round and reactive, conjunctiva clear, lids normal.  ENMT: Lips without lesions, good dentition, oropharynx clear.  Neck: Trachea midline, no masses.   Respiratory: Unlabored " [facility]  Operative Report  2018     [address] Patient Status:  Hospital Outpatient Surgery    1960 MRN [id number]   [facility] SURGERY Attending Abel Maurer MD   Hosp Day # 0 PCP Meir Rodriguez MD     Indication: D respiratory effort, lungs appear clear, no wheezes.  Cardiovascular: Regular rate and rhythm.  Psych: Alert and oriented x3, normal affect and mood.        Assessment and Plan:   The following treatment plan was discussed    1. MERCEDEZ (generalized anxiety disorder)  Chronic condition.  Stable but still symptomatic therefore will increase venlafaxine 150 mg daily.  Expect no significant side effects.  May contact me if medication too strong possibly provide a 37.5 mg capsule with the 75 mg capsule.  - venlafaxine XR (EFFEXOR-XR) 150 MG extended-release capsule; Take 1 Cap by mouth every day.  Dispense: 90 Cap; Refill: 1    2. Current mild episode of major depressive disorder without prior episode (HCC)  Chronic condition.  Stable.  Renewed medication but increase dose given his anxiety 250 mg daily.  - venlafaxine XR (EFFEXOR-XR) 150 MG extended-release capsule; Take 1 Cap by mouth every day.  Dispense: 90 Cap; Refill: 1    3. Anal fissure  Acute, new onset condition.  Resolved.  Appears that he had an anal fissure.  Advised take a stool softener such as Colace.  Increase dietary fiber.  Exercise routinely.      Followup: Return in about 6 months (around 10/17/2020), or if symptoms worsen or fail to improve.    Please note that this dictation was created using voice recognition software. I have made every reasonable attempt to correct obvious errors, but I expect that there are errors of grammar and possibly content that I did not discover before finalizing the note.            were confirmed by AP and lateral views of fluoroscopy, 1 cc Omnipaque-240 was injected into the sacroiliac joint. There was a nice sacroiliac joint arthrogram revealed. After that methylprednisolone 40 mg with 2 cc of 1% lidocaine was injected.  The needle

## 2020-04-17 NOTE — TELEPHONE ENCOUNTER
I spoke w/ Caron Rivera this afternoon to verify a few facts as they relate to her disability form. My questions were answered. Form completed. Zeke Honeycutt.  Nani Epps MD  Diplomate, [address] [place] [address] Board of Internal Medicine  Member, [address] [facility]

## 2020-04-21 ENCOUNTER — TELEPHONE (OUTPATIENT)
Dept: INTERNAL MEDICINE CLINIC | Facility: CLINIC | Age: 60
End: 2020-04-21

## 2020-04-21 ENCOUNTER — VIRTUAL PHONE E/M (OUTPATIENT)
Dept: INTERNAL MEDICINE CLINIC | Facility: CLINIC | Age: 60
End: 2020-04-21

## 2020-04-21 DIAGNOSIS — Z51.81 ENCOUNTER FOR THERAPEUTIC DRUG MONITORING: Primary | ICD-10-CM

## 2020-04-21 DIAGNOSIS — R63.2 BINGE EATING: ICD-10-CM

## 2020-04-21 DIAGNOSIS — E66.09 CLASS 1 OBESITY DUE TO EXCESS CALORIES WITHOUT SERIOUS COMORBIDITY WITH BODY MASS INDEX (BMI) OF 30.0 TO 30.9 IN ADULT: ICD-10-CM

## 2020-04-21 DIAGNOSIS — K57.30 DIVERTICULOSIS OF LARGE INTESTINE WITHOUT HEMORRHAGE: ICD-10-CM

## 2020-04-21 DIAGNOSIS — R10.11 ABDOMINAL PAIN, RIGHT UPPER QUADRANT: Primary | ICD-10-CM

## 2020-04-21 PROCEDURE — 99213 OFFICE O/P EST LOW 20 MIN: CPT | Performed by: INTERNAL MEDICINE

## 2020-04-21 NOTE — PROGRESS NOTES
[facility] Weight Management check in/follow up encounter  Virtual/Telephone Check-In    Alex Katz verbally consents to a Virtual/Telephone Check-In service on 04/21/20      Patient understands and accepts financial responsibility for any de

## 2020-04-21 NOTE — TELEPHONE ENCOUNTER
Reason for the order/referral:Follow Up   PCP: Alex Jordan   Refer to Provider: Paula Rojo   Specialty:Gastro   Patient Insurance: Payor: [facility] DINA/ROX / Plan: Mari PATEL / Product Type: HMO /   Duration/Summary of Symptoms: Ongoing   Is this a result of an injury:

## 2020-04-22 NOTE — TELEPHONE ENCOUNTER
Referral signed. Notify pt. Sanjuanita English. Oseas Cantu MD  Diplomate, American Board of Internal Medicine  Member, American College of [address] [facility]  [address] [facility],[address] Floor, [address], [address], [address]  T: [phone]; F: Grisel 5

## 2020-04-24 ENCOUNTER — LABORATORY ENCOUNTER (OUTPATIENT)
Dept: LAB | Age: 60
End: 2020-04-24
Attending: INTERNAL MEDICINE
Payer: COMMERCIAL

## 2020-04-24 DIAGNOSIS — R10.13 EPIGASTRIC PAIN: ICD-10-CM

## 2020-04-24 PROCEDURE — 36415 COLL VENOUS BLD VENIPUNCTURE: CPT

## 2020-04-24 PROCEDURE — 85025 COMPLETE CBC W/AUTO DIFF WBC: CPT

## 2020-04-24 PROCEDURE — 83690 ASSAY OF LIPASE: CPT

## 2020-04-24 PROCEDURE — 80053 COMPREHEN METABOLIC PANEL: CPT

## 2020-05-05 PROBLEM — M51.36 BULGING LUMBAR DISC: Status: RESOLVED | Noted: 2017-11-21 | Resolved: 2020-05-05

## 2020-05-05 PROBLEM — M53.3 SACROILIAC JOINT DYSFUNCTION OF BOTH SIDES: Status: RESOLVED | Noted: 2018-05-01 | Resolved: 2020-05-05

## 2020-05-05 PROBLEM — M54.16 LUMBAR RADICULOPATHY: Status: RESOLVED | Noted: 2017-10-24 | Resolved: 2020-05-05

## 2020-05-05 PROBLEM — M54.50 CHRONIC BILATERAL LOW BACK PAIN WITHOUT SCIATICA: Status: ACTIVE | Noted: 2018-05-01

## 2020-05-05 PROBLEM — K92.0 HEMATEMESIS, PRESENCE OF NAUSEA NOT SPECIFIED: Status: RESOLVED | Noted: 2019-07-11 | Resolved: 2020-05-05

## 2020-05-05 PROBLEM — M51.26 BULGING LUMBAR DISC: Status: RESOLVED | Noted: 2017-11-21 | Resolved: 2020-05-05

## 2020-05-05 PROBLEM — G89.29 CHRONIC BILATERAL LOW BACK PAIN WITHOUT SCIATICA: Status: ACTIVE | Noted: 2018-05-01

## 2020-05-05 PROBLEM — M51.369 BULGING LUMBAR DISC: Status: RESOLVED | Noted: 2017-11-21 | Resolved: 2020-05-05

## 2020-05-05 PROBLEM — M47.816 ARTHRITIS, LUMBAR SPINE: Status: RESOLVED | Noted: 2017-11-21 | Resolved: 2020-05-05

## 2020-05-07 NOTE — PROGRESS NOTES
Wolf Lora is a 63year old female. Patient presents with: Follow - Up      HPI:     Having more depression and anxiety over the past 4 mos. Feels that she finally has her mental health medications in a good place.    Working to eliminate dairy • Back pain    • Calculus of kidney    • Constipation    • Depression    • Diverticulosis    • Fatigue    • Flatulence/gas pain/belching    • Hemorrhoids    • History of cervical discectomy     Anterior Cervical Discectomy C3-C4 and disc deterioration   • • ZOLMITRIPTAN 5 MG Oral Tab TAKE 1 TABLET BY MOUTH AS NEEDED FOR MIGRAINE 9 tablet 2   • ergocalciferol 1.25 MG (41834 UT) Oral Cap Take 1 capsule (50,000 Units total) by mouth once a week.  4 capsule 5   • OMEPRAZOLE 20 MG Oral Capsule Delayed Release RASHAAD Gets together: Not on file        Attends Orthodox service: Not on file        Active member of club or organization: Not on file        Attends meetings of clubs or organizations: Not on file        Relationship status: Not on file      Intimate pa General: No edema. Neurological: She is alert and oriented to person, place, and time. No cranial nerve deficit. Gait normal.   Skin: Skin is warm and dry. Psychiatric: Affect normal.       ASSESSMENT AND PLAN:       1. Anxiety    2.  Memory disorder The products and items listed below (the “Products”)  and their claims have not been evaluated by the Food and Drug Administration. Dietary products are not intended to treat, prevent, mitigate or cure disease.  Ultimately, you must draw your own conclusion Evelyn Nunez [employer]  Magno [address] [place]    Directions: Start taking 1 drop daily in water. Increase dose by 1 drop every week until taking 3 drops three times per day  Where: [employer], [employer] or www.[employer]. [employer]        Return in about 2 mon

## 2020-05-07 NOTE — PATIENT INSTRUCTIONS
I have complete tone in the body's ability to heal and transform. The products and items listed below (the “Products”)  and their claims have not been evaluated by the Food and Drug Administration.  Dietary products are not intended to treat, prevent, m Science  Directions: 1 tablet daily  Where to Find: www.[employer]    Rosa Susi [employer]  Cecy Reusing [place]    Directions: Start taking 1 drop daily in water.  Increase dose by 1 drop every week until taking 3 drops three times per day  Where: [employer],

## 2020-05-08 ENCOUNTER — LAB ENCOUNTER (OUTPATIENT)
Dept: LAB | Age: 60
End: 2020-05-08
Attending: INTERNAL MEDICINE
Payer: COMMERCIAL

## 2020-05-08 DIAGNOSIS — E55.9 VITAMIN D DEFICIENCY: ICD-10-CM

## 2020-05-08 DIAGNOSIS — R73.01 IFG (IMPAIRED FASTING GLUCOSE): ICD-10-CM

## 2020-05-08 DIAGNOSIS — E66.09 CLASS 1 OBESITY DUE TO EXCESS CALORIES WITHOUT SERIOUS COMORBIDITY WITH BODY MASS INDEX (BMI) OF 34.0 TO 34.9 IN ADULT: ICD-10-CM

## 2020-05-08 PROCEDURE — 82306 VITAMIN D 25 HYDROXY: CPT

## 2020-05-08 PROCEDURE — 83036 HEMOGLOBIN GLYCOSYLATED A1C: CPT

## 2020-05-08 PROCEDURE — 36415 COLL VENOUS BLD VENIPUNCTURE: CPT

## 2020-05-08 PROCEDURE — 80061 LIPID PANEL: CPT

## 2020-05-08 PROCEDURE — 84443 ASSAY THYROID STIM HORMONE: CPT

## 2020-05-08 PROCEDURE — 81001 URINALYSIS AUTO W/SCOPE: CPT

## 2020-05-08 PROCEDURE — 87086 URINE CULTURE/COLONY COUNT: CPT

## 2020-05-23 DIAGNOSIS — G31.84 MILD NEUROCOGNITIVE DISORDER DUE TO MULTIPLE ETIOLOGIES: ICD-10-CM

## 2020-05-26 RX ORDER — MEMANTINE HYDROCHLORIDE 10 MG/1
TABLET ORAL
Qty: 30 TABLET | Refills: 2 | Status: SHIPPED | OUTPATIENT
Start: 2020-05-26 | End: 2020-08-17

## 2020-05-26 NOTE — TELEPHONE ENCOUNTER
Medication: MEMANTINE HCL 10 MG    Date of last refill: 3/2/20 (#30/2)  [place]     Last office visit: 3/26/2020  Due back to clinic per last office note:  3 MONTHS  Date next office visit scheduled:    Future Appointmen

## 2020-06-11 NOTE — TELEPHONE ENCOUNTER
This patient needs referral for Intensive Outpatient Program at [facility]. This was recommended by her [facility] Psychiatrist. Jacinda Johnson is aware. Apparently we have to generate the referral. Please inquire how we are to proceed.

## 2020-06-14 DIAGNOSIS — E55.9 VITAMIN D DEFICIENCY: ICD-10-CM

## 2020-06-14 NOTE — TELEPHONE ENCOUNTER
NotedSamuel Ramires MD  Diplomate, American Board of Internal Medicine  Member, American College of [address] [facility]  [address] [facility],4[address] Floor, [address], [address], [address]  T: [phone]; F: Hafmitzy 5

## 2020-06-17 ENCOUNTER — TELEPHONE (OUTPATIENT)
Dept: SURGERY | Facility: CLINIC | Age: 60
End: 2020-06-17

## 2020-06-17 RX ORDER — ERGOCALCIFEROL 1.25 MG/1
CAPSULE ORAL
Qty: 4 CAPSULE | Refills: 5 | OUTPATIENT
Start: 2020-06-17

## 2020-06-18 DIAGNOSIS — G43.709 CHRONIC MIGRAINE WITHOUT AURA WITHOUT STATUS MIGRAINOSUS, NOT INTRACTABLE: ICD-10-CM

## 2020-06-18 RX ORDER — PROPRANOLOL HYDROCHLORIDE 80 MG/1
TABLET ORAL
Qty: 30 TABLET | Refills: 2 | Status: SHIPPED | OUTPATIENT
Start: 2020-06-18 | End: 2020-09-16

## 2020-06-18 NOTE — TELEPHONE ENCOUNTER
Medication: PROPRANOLOL HCL 80 MG Oral Tab    Date of last refill: 03/24/2020 (#30/2)  [place]    Last office visit: 3/26/2020  Due back to clinic per last office note:  Around 06/26/2020  Date next office visit sche etiologies, likely related to long standing depression and previous multiple ECT treatment and psychotropic medications as reported in the neuropsychology evaluation results. ContinueMemantine 10 mg daily.  Continue psychotherapy and follow ups with your P

## 2020-06-26 ENCOUNTER — TELEPHONE (OUTPATIENT)
Dept: NEUROLOGY | Facility: CLINIC | Age: 60
End: 2020-06-26

## 2020-06-26 DIAGNOSIS — G43.009 MIGRAINE WITHOUT AURA AND WITHOUT STATUS MIGRAINOSUS, NOT INTRACTABLE: ICD-10-CM

## 2020-06-26 RX ORDER — ZOLMITRIPTAN 5 MG/1
5 TABLET, FILM COATED ORAL AS NEEDED
Qty: 9 TABLET | Refills: 2 | Status: SHIPPED | OUTPATIENT
Start: 2020-06-26 | End: 2020-09-30

## 2020-06-26 NOTE — TELEPHONE ENCOUNTER
Medication: Zolmitriptan     Date of last refill: 12/26/19 for #9/2 additional refills  [place]    Last office visit: 3/26/2020  Due back to clinic per last office note:  3 months  Date next office visit scheduled:

## 2020-06-29 ENCOUNTER — OFFICE VISIT (OUTPATIENT)
Dept: NEUROLOGY | Facility: CLINIC | Age: 60
End: 2020-06-29

## 2020-06-29 VITALS
DIASTOLIC BLOOD PRESSURE: 72 MMHG | SYSTOLIC BLOOD PRESSURE: 114 MMHG | WEIGHT: 189 LBS | RESPIRATION RATE: 16 BRPM | BODY MASS INDEX: 35 KG/M2 | HEART RATE: 74 BPM

## 2020-06-29 DIAGNOSIS — G43.709 CHRONIC MIGRAINE WITHOUT AURA WITHOUT STATUS MIGRAINOSUS, NOT INTRACTABLE: Primary | ICD-10-CM

## 2020-06-29 PROCEDURE — 64615 CHEMODENERV MUSC MIGRAINE: CPT | Performed by: OTHER

## 2020-06-29 PROCEDURE — 3074F SYST BP LT 130 MM HG: CPT | Performed by: OTHER

## 2020-06-29 PROCEDURE — 3078F DIAST BP <80 MM HG: CPT | Performed by: OTHER

## 2020-06-29 RX ORDER — LISDEXAMFETAMINE DIMESYLATE 50 MG
50 CAPSULE ORAL EVERY MORNING
COMMUNITY
Start: 2020-06-24 | End: 2020-09-30

## 2020-06-29 RX ORDER — QUETIAPINE 50 MG/1
50 TABLET, FILM COATED ORAL NIGHTLY
COMMUNITY
Start: 2020-06-25 | End: 2021-03-19

## 2020-07-14 ENCOUNTER — TELEPHONE (OUTPATIENT)
Dept: ADMINISTRATIVE | Age: 60
End: 2020-07-14

## 2020-07-22 ENCOUNTER — TELEPHONE (OUTPATIENT)
Dept: INTERNAL MEDICINE CLINIC | Facility: CLINIC | Age: 60
End: 2020-07-22

## 2020-07-22 NOTE — TELEPHONE ENCOUNTER
Pt states since Monday, she has been experiencing nausea, diarrhea, HA, fatigues, body aches, ST and temp of 99.4 w/o meds. Daughter was exposed to covid and is avaiting test resutls.      Denies chills, tez, rash, cough, sob, loss of taste/smell, vomiting a

## 2020-07-22 NOTE — TELEPHONE ENCOUNTER
Patient experiencing nausea, headache, body aches ans sore throat, fever around 99.4 ongoing.  She wants to discuss symptoms further with nurse to triage

## 2020-07-23 ENCOUNTER — TELEMEDICINE (OUTPATIENT)
Dept: INTERNAL MEDICINE CLINIC | Facility: CLINIC | Age: 60
End: 2020-07-23

## 2020-07-23 DIAGNOSIS — Z12.31 SCREENING MAMMOGRAM, ENCOUNTER FOR: ICD-10-CM

## 2020-07-23 DIAGNOSIS — Z20.822 ENCOUNTER BY TELEHEALTH FOR SUSPECTED COVID-19: Primary | ICD-10-CM

## 2020-07-23 PROCEDURE — 99213 OFFICE O/P EST LOW 20 MIN: CPT | Performed by: INTERNAL MEDICINE

## 2020-07-24 ENCOUNTER — LAB ENCOUNTER (OUTPATIENT)
Dept: LAB | Facility: HOSPITAL | Age: 60
End: 2020-07-24
Attending: INTERNAL MEDICINE
Payer: COMMERCIAL

## 2020-07-24 DIAGNOSIS — Z20.822 ENCOUNTER BY TELEHEALTH FOR SUSPECTED COVID-19: ICD-10-CM

## 2020-07-25 LAB — SARS-COV-2 RNA RESP QL NAA+PROBE: NOT DETECTED

## 2020-07-27 ENCOUNTER — PATIENT MESSAGE (OUTPATIENT)
Dept: INTERNAL MEDICINE CLINIC | Facility: CLINIC | Age: 60
End: 2020-07-27

## 2020-07-27 NOTE — TELEPHONE ENCOUNTER
From: Kala Oglesby  To: Sole Schneider MD  Sent: 7/27/2020 8:42 AM CDT  Subject: Test Results Question    I have a question about SARS-CoV-2 (COVID-19) resulted on 7/25/20, 9:18 PM.    Are you confident in the test result?  Still exhibiting symptoms lik

## 2020-08-04 ENCOUNTER — OFFICE VISIT (OUTPATIENT)
Dept: INTERNAL MEDICINE CLINIC | Facility: CLINIC | Age: 60
End: 2020-08-04

## 2020-08-04 VITALS
OXYGEN SATURATION: 97 % | HEIGHT: 61 IN | RESPIRATION RATE: 16 BRPM | WEIGHT: 182 LBS | SYSTOLIC BLOOD PRESSURE: 90 MMHG | DIASTOLIC BLOOD PRESSURE: 60 MMHG | HEART RATE: 80 BPM | TEMPERATURE: 98 F | BODY MASS INDEX: 34.36 KG/M2

## 2020-08-04 DIAGNOSIS — E78.00 HYPERCHOLESTEROLEMIA: Primary | ICD-10-CM

## 2020-08-04 DIAGNOSIS — F33.1 MDD (MAJOR DEPRESSIVE DISORDER), RECURRENT EPISODE, MODERATE (HCC): ICD-10-CM

## 2020-08-04 DIAGNOSIS — M54.50 CHRONIC BILATERAL LOW BACK PAIN WITHOUT SCIATICA: ICD-10-CM

## 2020-08-04 DIAGNOSIS — F41.1 GAD (GENERALIZED ANXIETY DISORDER): ICD-10-CM

## 2020-08-04 DIAGNOSIS — E66.09 CLASS 1 OBESITY DUE TO EXCESS CALORIES WITHOUT SERIOUS COMORBIDITY WITH BODY MASS INDEX (BMI) OF 34.0 TO 34.9 IN ADULT: ICD-10-CM

## 2020-08-04 DIAGNOSIS — G89.29 CHRONIC BILATERAL LOW BACK PAIN WITHOUT SCIATICA: ICD-10-CM

## 2020-08-04 DIAGNOSIS — Z82.49 FAMILY HISTORY OF PREMATURE CORONARY HEART DISEASE: ICD-10-CM

## 2020-08-04 DIAGNOSIS — F42.2 MIXED OBSESSIONAL THOUGHTS AND ACTS: ICD-10-CM

## 2020-08-04 PROBLEM — R73.01 IFG (IMPAIRED FASTING GLUCOSE): Status: RESOLVED | Noted: 2017-11-21 | Resolved: 2020-08-04

## 2020-08-04 PROBLEM — E66.811 CLASS 1 OBESITY DUE TO EXCESS CALORIES WITHOUT SERIOUS COMORBIDITY WITH BODY MASS INDEX (BMI) OF 34.0 TO 34.9 IN ADULT: Status: ACTIVE | Noted: 2020-08-04

## 2020-08-04 PROCEDURE — 3074F SYST BP LT 130 MM HG: CPT | Performed by: INTERNAL MEDICINE

## 2020-08-04 PROCEDURE — 99214 OFFICE O/P EST MOD 30 MIN: CPT | Performed by: INTERNAL MEDICINE

## 2020-08-04 PROCEDURE — 3008F BODY MASS INDEX DOCD: CPT | Performed by: INTERNAL MEDICINE

## 2020-08-04 PROCEDURE — 3078F DIAST BP <80 MM HG: CPT | Performed by: INTERNAL MEDICINE

## 2020-08-04 RX ORDER — BUSPIRONE HYDROCHLORIDE 10 MG/1
20 TABLET ORAL DAILY
COMMUNITY
Start: 2020-07-20

## 2020-08-04 NOTE — PROGRESS NOTES
Patient presents with: Follow - Up: 3-month follow-up      HPI: Sara Mayo presents today for 3-month f/u select chronic medical conditions as follows:    1. Hypercholesterolemia - Not on statin. She does have a fam hx of CAD. Due for updated FLP.  Last Heart Sc QUEtiapine Fumarate 50 MG Oral Tab, Take 50 mg by mouth nightly., Disp: , Rfl:   •  VYVANSE 50 MG Oral Cap, Take 50 mg by mouth every morning., Disp: , Rfl:   •  Zolmitriptan 5 MG Oral Tab, Take 1 tablet (5 mg total) by mouth as needed for Migraine. , Disp: triamcinolone acetonide 0.1 % External Cream, Apply thin layer to affected area twice a day as needed, Disp: 45 g, Rfl: 1    Social History    Tobacco Use      Smoking status: Never Smoker      Smokeless tobacco: Never Used    Alcohol use: Yes      Magalie Pagan prescription medication and w/o significant SEs. 3. Class 1 obesity - She's had some weight loss success again since late June. Down 7#. Trying to improve lifestyle habits. She did reach out to Dr. Isabel Benedict, her weight loss doctor.    4. Chronic B LBP 2/2 lumb

## 2020-08-17 DIAGNOSIS — G31.84 MILD NEUROCOGNITIVE DISORDER DUE TO MULTIPLE ETIOLOGIES: ICD-10-CM

## 2020-08-17 NOTE — TELEPHONE ENCOUNTER
Medication: MEMANTINE HCL 10 MG    Date of last refill: 5/26/20 (#30/2)  [place]     Last office visit: 6/29/2020  Due back to clinic per last office note:  3 months  Date next office visit scheduled:    Future Appointme [facility]   1/11/2021  6:00 PM [facility] ADULT [address] [facility]   1/18/2021  6:00 PM [facility] ADULT [facility]       Last OV note recommendation: Per Dr. Santo Tejada MD:    Post injections instructions:   Expect response to start o

## 2020-08-18 RX ORDER — MEMANTINE HYDROCHLORIDE 10 MG/1
TABLET ORAL
Qty: 30 TABLET | Refills: 1 | Status: SHIPPED | OUTPATIENT
Start: 2020-08-18 | End: 2020-10-22

## 2020-09-06 ENCOUNTER — HOSPITAL ENCOUNTER (OUTPATIENT)
Age: 60
Discharge: HOME | End: 2020-09-06
Attending: EMERGENCY MEDICINE
Payer: COMMERCIAL

## 2020-09-06 VITALS
WEIGHT: 190 LBS | HEART RATE: 81 BPM | DIASTOLIC BLOOD PRESSURE: 71 MMHG | TEMPERATURE: 97 F | HEIGHT: 61 IN | SYSTOLIC BLOOD PRESSURE: 126 MMHG | RESPIRATION RATE: 20 BRPM | OXYGEN SATURATION: 98 % | BODY MASS INDEX: 35.87 KG/M2

## 2020-09-06 DIAGNOSIS — J01.90 ACUTE SINUSITIS, RECURRENCE NOT SPECIFIED, UNSPECIFIED LOCATION: Primary | ICD-10-CM

## 2020-09-06 PROCEDURE — 99213 OFFICE O/P EST LOW 20 MIN: CPT

## 2020-09-06 PROCEDURE — 99214 OFFICE O/P EST MOD 30 MIN: CPT

## 2020-09-06 RX ORDER — AZITHROMYCIN 250 MG/1
TABLET, FILM COATED ORAL
Qty: 1 PACKAGE | Refills: 0 | Status: SHIPPED | OUTPATIENT
Start: 2020-09-06 | End: 2020-09-11

## 2020-09-06 NOTE — ED PROVIDER NOTES
Patient Seen in: [facility] [place]      History   Patient presents with:  Cough/URI    Stated Complaint: SINUS PRESSURE/HEADACHE X 3 DAYS    HPI    64-year-old female complaining of sinus pressure headache the patient states that start • RADIOFREQUENCY ABLATION OF THORACIC OR LUMBAR MEDIAL BRANCH Left 9/23/2019    Performed by Abel Maurer MD at [facility] ENDOSCOPY   • [address] Right 9/9/2019    Performed by Abel Maurer MD at [facility] ENDOSCOPY MDM     Patient was given a prescription for Zithromax advised her to start this in 2 to 3 days if not better meanwhile use over-the-counter medication such as Mucinex with decongestant Flonase Tylenol. This is probable viral sinusitis.

## 2020-09-10 ENCOUNTER — TELEPHONE (OUTPATIENT)
Dept: INTERNAL MEDICINE CLINIC | Facility: CLINIC | Age: 60
End: 2020-09-10

## 2020-09-10 RX ORDER — AZITHROMYCIN 250 MG/1
TABLET, FILM COATED ORAL
Qty: 6 TABLET | Refills: 0 | Status: SHIPPED | OUTPATIENT
Start: 2020-09-10 | End: 2020-09-15

## 2020-09-10 RX ORDER — DEXTROMETHORPHAN HYDROBROMIDE AND PROMETHAZINE HYDROCHLORIDE 15; 6.25 MG/5ML; MG/5ML
5 SYRUP ORAL 4 TIMES DAILY PRN
Qty: 180 ML | Refills: 0 | Status: SHIPPED | OUTPATIENT
Start: 2020-09-10 | End: 2020-09-30

## 2020-09-10 NOTE — TELEPHONE ENCOUNTER
I spoke w/ patient today. She's been c/o sinus pressure and congestion x > 1 week. There is yellow-green sputum from cough. Script for Promethazine-DM and Z-lori given. Sanjuanita English.  Oseas Cantu MD  Diplomate, [address] [place] [address] Board of Internal Medicine  Member, [facility]

## 2020-09-16 DIAGNOSIS — G43.709 CHRONIC MIGRAINE WITHOUT AURA WITHOUT STATUS MIGRAINOSUS, NOT INTRACTABLE: ICD-10-CM

## 2020-09-16 RX ORDER — PROPRANOLOL HYDROCHLORIDE 80 MG/1
TABLET ORAL
Qty: 30 TABLET | Refills: 2 | Status: SHIPPED | OUTPATIENT
Start: 2020-09-16 | End: 2020-12-15

## 2020-09-16 NOTE — TELEPHONE ENCOUNTER
Medication: PROPRANOLOL HCL 80 MG Oral Tab    Date of last refill: 06/18/2020 (#30/2)  [place]    Last office visit: 6/29/2020  Due back to clinic per last office note:  Around 09/29/2020  Date next office visit sche Jovani   1/18/2021  6:00 PM [facility] ADULT [address] [facility]       Last OV note recommendation: Per Dr. Kirsten Alberto MD:    BOTOX injection for chronic migraine

## 2020-09-19 ENCOUNTER — HOSPITAL ENCOUNTER (OUTPATIENT)
Age: 60
Discharge: HOME | End: 2020-09-19
Attending: EMERGENCY MEDICINE
Payer: COMMERCIAL

## 2020-09-19 VITALS
HEIGHT: 61 IN | HEART RATE: 89 BPM | RESPIRATION RATE: 20 BRPM | WEIGHT: 190.06 LBS | OXYGEN SATURATION: 98 % | DIASTOLIC BLOOD PRESSURE: 79 MMHG | SYSTOLIC BLOOD PRESSURE: 138 MMHG | BODY MASS INDEX: 35.88 KG/M2 | TEMPERATURE: 98 F

## 2020-09-19 DIAGNOSIS — R68.84 JAW PAIN: Primary | ICD-10-CM

## 2020-09-19 DIAGNOSIS — R19.7 DIARRHEA, UNSPECIFIED TYPE: ICD-10-CM

## 2020-09-19 DIAGNOSIS — R10.13 EPIGASTRIC PAIN: ICD-10-CM

## 2020-09-19 LAB
#MXD IC: 0.8 X10ˆ3/UL (ref 0.1–1)
CREAT BLD-MCNC: 0.8 MG/DL (ref 0.55–1.02)
GLUCOSE BLD-MCNC: 98 MG/DL (ref 70–99)
HCT VFR BLD AUTO: 40.7 %
HGB BLD-MCNC: 13.2 G/DL
ISTAT BUN: 18 MG/DL (ref 7–18)
ISTAT CHLORIDE: 105 MMOL/L (ref 98–112)
ISTAT HEMATOCRIT: 40 %
ISTAT IONIZED CALCIUM FOR CHEM 8: 1.19 MMOL/L (ref 1.12–1.32)
ISTAT POTASSIUM: 3.9 MMOL/L (ref 3.6–5.1)
ISTAT SODIUM: 141 MMOL/L (ref 136–145)
ISTAT TCO2: 26 MMOL/L (ref 21–32)
LYMPHOCYTES # BLD AUTO: 1.4 X10ˆ3/UL (ref 1–4)
LYMPHOCYTES NFR BLD AUTO: 18.6 %
MCH RBC QN AUTO: 29.3 PG (ref 26–34)
MCHC RBC AUTO-ENTMCNC: 32.4 G/DL (ref 31–37)
MCV RBC AUTO: 90.2 FL (ref 80–100)
MIXED CELL %: 11 %
NEUTROPHILS # BLD AUTO: 5.5 X10ˆ3/UL (ref 1.5–7.7)
NEUTROPHILS NFR BLD AUTO: 70.4 %
PLATELET # BLD AUTO: 269 X10ˆ3/UL (ref 150–450)
RBC # BLD AUTO: 4.51 X10ˆ6/UL
WBC # BLD AUTO: 7.7 X10ˆ3/UL (ref 4–11)

## 2020-09-19 PROCEDURE — 99213 OFFICE O/P EST LOW 20 MIN: CPT

## 2020-09-19 PROCEDURE — 36415 COLL VENOUS BLD VENIPUNCTURE: CPT

## 2020-09-19 PROCEDURE — 99214 OFFICE O/P EST MOD 30 MIN: CPT

## 2020-09-19 PROCEDURE — 85025 COMPLETE CBC W/AUTO DIFF WBC: CPT | Performed by: EMERGENCY MEDICINE

## 2020-09-19 PROCEDURE — 80047 BASIC METABLC PNL IONIZED CA: CPT

## 2020-09-19 RX ORDER — OMEPRAZOLE 40 MG/1
40 CAPSULE, DELAYED RELEASE ORAL 2 TIMES DAILY
Qty: 30 CAPSULE | Refills: 0 | Status: SHIPPED | OUTPATIENT
Start: 2020-09-19 | End: 2020-09-30 | Stop reason: DRUGHIGH

## 2020-09-19 NOTE — ED INITIAL ASSESSMENT (HPI)
Left jaw pain -  Pt was seen here 3 weeks ago. Pt had 2  Doses of of zpack. Pt clenches  Jaw at night. States she is anxious  Abdominal pain - had diarrhea today,  3x today. Took imodium.

## 2020-09-19 NOTE — ED INITIAL ASSESSMENT (HPI)
Left jaw pain -  Pt was seen here 3 weeks ago. Pt had 2  Doses of of zpack. Pt clenches  Jaw at night. States she is anxious  Abdominal pain - x 3 weeks ,had diarrhea today,  3x today. Took imodium.

## 2020-09-19 NOTE — ED PROVIDER NOTES
Patient Seen in: [facility]      History   Patient presents with:  Jaw Pain  Abdominal Pain    Stated Complaint: Facial and abdominal pain 3 wks    HPI    26-year-old female presents to ED with 2 separate complaints, #1) left-si cervical discectomy     Anterior Cervical Discectomy C3-C4 and disc deterioration   • History of depression    • Irregular bowel habits    • Loss of appetite    • Migraines    • Nausea    • OCD (obsessive compulsive disorder)    • Pain in joints    • Parkpiper less      Drinks per session: 1 or 2      Binge frequency: Never      Comment: 6 drinks/year    Drug use: No             Review of Systems    Positive for stated complaint: Facial and abdominal pain 3 wks  Other systems are as noted in HPI.   Constitutional Mental Status: She is alert and oriented to person, place, and time. Mental status is at baseline.       Comments: Lifts all extremities against gravity, face symmetric, speech clear    Psychiatric:         Mood and Affect: Mood normal.             [facility] appointment as soon as possible for a visit             Medications Prescribed:  Discharge Medication List as of 9/19/2020  3:09 PM    START taking these medications    !!  Omeprazole 40 MG Oral Capsule Delayed Release  Take 1 capsule (40 mg total) by mouth

## 2020-09-24 ENCOUNTER — APPOINTMENT (OUTPATIENT)
Dept: GENERAL RADIOLOGY | Age: 60
End: 2020-09-24
Payer: COMMERCIAL

## 2020-09-24 ENCOUNTER — APPOINTMENT (OUTPATIENT)
Dept: GENERAL RADIOLOGY | Age: 60
End: 2020-09-24
Attending: PHYSICIAN ASSISTANT
Payer: COMMERCIAL

## 2020-09-24 ENCOUNTER — HOSPITAL ENCOUNTER (OUTPATIENT)
Age: 60
Discharge: HOME | End: 2020-09-24
Payer: COMMERCIAL

## 2020-09-24 VITALS
HEART RATE: 83 BPM | TEMPERATURE: 98 F | RESPIRATION RATE: 20 BRPM | BODY MASS INDEX: 36.82 KG/M2 | OXYGEN SATURATION: 99 % | WEIGHT: 195 LBS | SYSTOLIC BLOOD PRESSURE: 96 MMHG | HEIGHT: 61 IN | DIASTOLIC BLOOD PRESSURE: 54 MMHG

## 2020-09-24 DIAGNOSIS — S62.617A CLOSED DISPLACED FRACTURE OF PROXIMAL PHALANX OF LEFT LITTLE FINGER, INITIAL ENCOUNTER: Primary | ICD-10-CM

## 2020-09-24 DIAGNOSIS — S50.02XA CONTUSION OF LEFT ELBOW, INITIAL ENCOUNTER: ICD-10-CM

## 2020-09-24 PROCEDURE — 73080 X-RAY EXAM OF ELBOW: CPT | Performed by: PHYSICIAN ASSISTANT

## 2020-09-24 PROCEDURE — 29125 APPL SHORT ARM SPLINT STATIC: CPT

## 2020-09-24 PROCEDURE — 99214 OFFICE O/P EST MOD 30 MIN: CPT

## 2020-09-24 PROCEDURE — 73130 X-RAY EXAM OF HAND: CPT

## 2020-09-24 NOTE — ED PROVIDER NOTES
Patient Seen in: [facility]      History   Patient presents with:  Arm or Hand Injury    Stated Complaint: Left hand injury 4 days    HPI  CHIEF COMPLAINT: Left hand pain status post fall    HISTORY OF PRESENT ILLNESS: Patient i • Irregular bowel habits    • Loss of appetite    • Migraines    • Nausea    • OCD (obsessive compulsive disorder)    • Pain in joints    • Parkinsonism (HCC)    • Sleep apnea     has never been on CPAP   • Sleep disturbance    • Tendonitis of shoulder, ri Review of Systems    Positive for stated complaint: Left hand injury 4 days  Other systems are as noted in HPI. Constitutional and vital signs reviewed. All other systems reviewed and negative except as noted above.     Physical Exam     ED Triage Patient was screened and evaluated during this visit. I determined, within reasonable clinical confidence and prior to discharge, that an emergency medical condition was not or was no longer present.   There was no indication for further evaluation, treat

## 2020-09-24 NOTE — ED INITIAL ASSESSMENT (HPI)
On Monday pt was parked next to a curb and tripped on curb and broke fall with L hand; pt also injured L thigh, L cheek and R hand - all pain resolving except L hand    No loc/vom

## 2020-09-27 ENCOUNTER — TELEPHONE (OUTPATIENT)
Dept: INTERNAL MEDICINE CLINIC | Facility: CLINIC | Age: 60
End: 2020-09-27

## 2020-09-27 DIAGNOSIS — S62.619A CLOSED FRACTURE OF PROXIMAL PHALANX OF DIGIT OF LEFT HAND, INITIAL ENCOUNTER: Primary | ICD-10-CM

## 2020-09-27 NOTE — TELEPHONE ENCOUNTER
Lona Barr, [address] [facility] Clinical Staff   Cc: P [facility] Referral Pool             PCP: Steven   Refer to Provider (first and last name): Briscoe Bernheim   Specialty: orthopedic hand specialist   Patient Insurance: No coverage found.  [facility]   Duration/

## 2020-09-28 NOTE — TELEPHONE ENCOUNTER
Order signed. Javid Hermosillo. Heather Joyce MD  Diplomate, American Board of Internal Medicine  Member, [employer] College of [address] [facility]  [address] [facility], [address], [address]  T: [phone]; F: Grisel 5

## 2020-09-30 ENCOUNTER — OFFICE VISIT (OUTPATIENT)
Dept: NEUROLOGY | Facility: CLINIC | Age: 60
End: 2020-09-30

## 2020-09-30 VITALS
BODY MASS INDEX: 39 KG/M2 | WEIGHT: 205 LBS | DIASTOLIC BLOOD PRESSURE: 68 MMHG | SYSTOLIC BLOOD PRESSURE: 118 MMHG | HEART RATE: 70 BPM | RESPIRATION RATE: 16 BRPM

## 2020-09-30 DIAGNOSIS — G43.009 MIGRAINE WITHOUT AURA AND WITHOUT STATUS MIGRAINOSUS, NOT INTRACTABLE: ICD-10-CM

## 2020-09-30 DIAGNOSIS — F03.90 MAJOR NEUROCOGNITIVE DISORDER (HCC): ICD-10-CM

## 2020-09-30 PROCEDURE — 3078F DIAST BP <80 MM HG: CPT | Performed by: OTHER

## 2020-09-30 PROCEDURE — 3074F SYST BP LT 130 MM HG: CPT | Performed by: OTHER

## 2020-09-30 PROCEDURE — 99213 OFFICE O/P EST LOW 20 MIN: CPT | Performed by: OTHER

## 2020-09-30 PROCEDURE — 64615 CHEMODENERV MUSC MIGRAINE: CPT | Performed by: OTHER

## 2020-09-30 RX ORDER — ZOLMITRIPTAN 5 MG/1
5 TABLET, FILM COATED ORAL AS NEEDED
Qty: 9 TABLET | Refills: 2 | Status: SHIPPED | OUTPATIENT
Start: 2020-09-30 | End: 2021-05-19

## 2020-09-30 NOTE — PROCEDURES
Procedure: Botox injection -chemodenervation  Consent: obtained after explanation of procedure detail, benefits and risks     Indication:   -chronic migraine patient who suffers 15 or more days with headache lasting 4 hours a day or longer.      Procedu

## 2020-09-30 NOTE — PROGRESS NOTES
HPI:    Patient ID: Hamida Lopez is a 62year old female. PCP: Dr Misa Murphy      Patient presents for follow up for chronic migraines and to discuss with Neuro-psychology results. States headaches has improved significantly with Botox injections.   States m • LUMBAR FACET INJECTION BILATERAL Bilateral 5/21/2018    Performed by Ru Burnett MD at [address]   • NEEDLE BIOPSY RIGHT  05/2015    benign breast   • OTHER SURGICAL HISTORY      C3-C4 anterior discectomy   • OTHER SURGICAL HISTORY  2018    [facility] systems reviewed and are negative. Current Outpatient Medications   Medication Sig Dispense Refill   • Zolmitriptan 5 MG Oral Tab Take 1 tablet (5 mg total) by mouth as needed for Migraine.  9 tablet 2   • DICYCLOMINE HCL 10 MG Oral Cap TAKE 1 CA normal mood and affect. Neurological   Awake, alert and oriented to time, place and person. Speech is dysfluent with some stuttering. Able to follow commands. Intact naming and repetition. Normal attention and impaired short term memory.    Cranial nerve

## 2020-10-05 ENCOUNTER — HOSPITAL ENCOUNTER (OUTPATIENT)
Dept: MAMMOGRAPHY | Age: 60
Discharge: HOME | End: 2020-10-05
Attending: INTERNAL MEDICINE
Payer: COMMERCIAL

## 2020-10-05 DIAGNOSIS — Z12.31 SCREENING MAMMOGRAM, ENCOUNTER FOR: ICD-10-CM

## 2020-10-05 PROCEDURE — 77067 SCR MAMMO BI INCL CAD: CPT | Performed by: INTERNAL MEDICINE

## 2020-10-05 PROCEDURE — 77063 BREAST TOMOSYNTHESIS BI: CPT | Performed by: INTERNAL MEDICINE

## 2020-10-07 ENCOUNTER — IMMUNIZATION (OUTPATIENT)
Dept: INTERNAL MEDICINE CLINIC | Facility: CLINIC | Age: 60
End: 2020-10-07

## 2020-10-07 DIAGNOSIS — Z23 NEED FOR VACCINATION: ICD-10-CM

## 2020-10-07 PROCEDURE — 90471 IMMUNIZATION ADMIN: CPT | Performed by: INTERNAL MEDICINE

## 2020-10-07 PROCEDURE — 90686 IIV4 VACC NO PRSV 0.5 ML IM: CPT | Performed by: INTERNAL MEDICINE

## 2020-10-12 ENCOUNTER — TELEPHONE (OUTPATIENT)
Dept: INTERNAL MEDICINE CLINIC | Facility: CLINIC | Age: 60
End: 2020-10-12

## 2020-10-12 DIAGNOSIS — H40.9 GLAUCOMA, UNSPECIFIED GLAUCOMA TYPE, UNSPECIFIED LATERALITY: Primary | ICD-10-CM

## 2020-10-12 NOTE — TELEPHONE ENCOUNTER
Referral pending approval if appropriate. Thank you! PCP: Steven   Refer to Provider (first and last name): David Sosa   Specialty:opjthalmology   Patient Insurance: No coverage found.  [facility]   Duration/Summary of Symptoms:   Is this a result of an

## 2020-10-13 NOTE — TELEPHONE ENCOUNTER
Order signed, notify pt. Thee Ruano. Nidhi Joseph MD  Diplomate, American Board of Internal Medicine  Member, [address] [place] [address] [email or web address] ([email or web address])  Affiliate Physician, [facility] ([email or web address])

## 2020-10-21 DIAGNOSIS — G31.84 MILD NEUROCOGNITIVE DISORDER DUE TO MULTIPLE ETIOLOGIES: ICD-10-CM

## 2020-10-22 RX ORDER — MEMANTINE HYDROCHLORIDE 10 MG/1
TABLET ORAL
Qty: 30 TABLET | Refills: 2 | Status: SHIPPED | OUTPATIENT
Start: 2020-10-22 | End: 2021-01-14

## 2020-10-22 NOTE — TELEPHONE ENCOUNTER
Medication: MEMANTINE HCL 10 MG Oral Tab    Date of last refill: 08/18/2020 (#30/1)  [place]    Last office visit: 9/30/2020  Due back to clinic per last office note:  Around 12/23/2020  Date next office visit [address]   1/11/2021  6:00 PM [facility] ADULT [facility]   1/18/2021  6:00 PM [facility] ADULT [facility]       Last OV note recommendation: Per Dr. Arnold Barrera MD:    (F01.50) Major neurocognitive disorder [facility]

## 2020-11-03 ENCOUNTER — LAB ENCOUNTER (OUTPATIENT)
Dept: LAB | Age: 60
End: 2020-11-03
Attending: INTERNAL MEDICINE
Payer: COMMERCIAL

## 2020-11-03 DIAGNOSIS — E78.00 HYPERCHOLESTEROLEMIA: ICD-10-CM

## 2020-11-03 PROCEDURE — 80061 LIPID PANEL: CPT

## 2020-11-03 PROCEDURE — 36415 COLL VENOUS BLD VENIPUNCTURE: CPT

## 2020-11-06 ENCOUNTER — OFFICE VISIT (OUTPATIENT)
Dept: INTERNAL MEDICINE CLINIC | Facility: CLINIC | Age: 60
End: 2020-11-06

## 2020-11-06 VITALS
HEIGHT: 61 IN | HEART RATE: 80 BPM | OXYGEN SATURATION: 99 % | SYSTOLIC BLOOD PRESSURE: 110 MMHG | TEMPERATURE: 98 F | WEIGHT: 202 LBS | DIASTOLIC BLOOD PRESSURE: 80 MMHG | BODY MASS INDEX: 38.14 KG/M2

## 2020-11-06 DIAGNOSIS — H53.9 VISUAL DISORDER: ICD-10-CM

## 2020-11-06 DIAGNOSIS — E66.09 CLASS 2 OBESITY DUE TO EXCESS CALORIES WITHOUT SERIOUS COMORBIDITY WITH BODY MASS INDEX (BMI) OF 38.0 TO 38.9 IN ADULT: Primary | ICD-10-CM

## 2020-11-06 PROBLEM — E66.812 CLASS 2 OBESITY DUE TO EXCESS CALORIES WITHOUT SERIOUS COMORBIDITY WITH BODY MASS INDEX (BMI) OF 38.0 TO 38.9 IN ADULT: Status: ACTIVE | Noted: 2020-11-06

## 2020-11-06 PROBLEM — E66.811 CLASS 1 OBESITY DUE TO EXCESS CALORIES WITHOUT SERIOUS COMORBIDITY WITH BODY MASS INDEX (BMI) OF 34.0 TO 34.9 IN ADULT: Status: RESOLVED | Noted: 2020-08-04 | Resolved: 2020-11-06

## 2020-11-06 PROBLEM — E66.811 CLASS 1 OBESITY DUE TO EXCESS CALORIES WITHOUT SERIOUS COMORBIDITY WITH BODY MASS INDEX (BMI) OF 32.0 TO 32.9 IN ADULT: Status: RESOLVED | Noted: 2018-07-16 | Resolved: 2020-11-06

## 2020-11-06 PROCEDURE — 99213 OFFICE O/P EST LOW 20 MIN: CPT | Performed by: INTERNAL MEDICINE

## 2020-11-06 PROCEDURE — 3079F DIAST BP 80-89 MM HG: CPT | Performed by: INTERNAL MEDICINE

## 2020-11-06 PROCEDURE — 3074F SYST BP LT 130 MM HG: CPT | Performed by: INTERNAL MEDICINE

## 2020-11-06 PROCEDURE — 3008F BODY MASS INDEX DOCD: CPT | Performed by: INTERNAL MEDICINE

## 2020-11-06 RX ORDER — PHENTERMINE HYDROCHLORIDE 37.5 MG/1
37.5 TABLET ORAL
Qty: 30 TABLET | Refills: 2 | Status: SHIPPED | OUTPATIENT
Start: 2020-11-06 | End: 2021-02-16

## 2020-11-06 RX ORDER — CYCLOBENZAPRINE HCL 10 MG
10 TABLET ORAL 3 TIMES DAILY PRN
Qty: 90 TABLET | Refills: 1 | Status: SHIPPED | OUTPATIENT
Start: 2020-11-06

## 2020-11-06 NOTE — PROGRESS NOTES
Patient presents with: Follow - Up      HPI: Damion Hoover presents today for Class 2 obesity f/u. She's been struggling w/ her weight and would like to get back on Phentermine. She is healthy enough to restart this medication and is w/o contraindications.  She's b kg)  09/30/20 : 205 lb (93 kg)  09/29/20 : 200 lb (90.7 kg)  09/24/20 : 195 lb (88.5 kg)  09/19/20 : 190 lb 0.6 oz (86.2 kg)  09/06/20 : 190 lb (86.2 kg)  Gen - A&Ox3, NAD, obese  HEENT - PERRL, EOMI, OP is clear  Lungs - CTAB  CV - RRR, nl s1, s2  Abd - s

## 2020-11-16 ENCOUNTER — PATIENT MESSAGE (OUTPATIENT)
Dept: INTERNAL MEDICINE CLINIC | Facility: CLINIC | Age: 60
End: 2020-11-16

## 2020-11-16 DIAGNOSIS — G43.709 CHRONIC MIGRAINE WITHOUT AURA WITHOUT STATUS MIGRAINOSUS, NOT INTRACTABLE: Primary | ICD-10-CM

## 2020-11-16 DIAGNOSIS — E66.09 CLASS 2 OBESITY DUE TO EXCESS CALORIES WITHOUT SERIOUS COMORBIDITY WITH BODY MASS INDEX (BMI) OF 38.0 TO 38.9 IN ADULT: ICD-10-CM

## 2020-11-16 NOTE — TELEPHONE ENCOUNTER
From: Jeanie Smith  To: Luba Mayberry MD  Sent: 11/16/2020 11:38 AM CST  Subject: Referral Request    Hi, has a referral to Dr. Jeramie Richards [facility] ophthalmologist been approved? Thanks.     --Seema Santos  5/21/1960

## 2020-11-24 ENCOUNTER — PATIENT MESSAGE (OUTPATIENT)
Dept: INTERNAL MEDICINE CLINIC | Facility: CLINIC | Age: 60
End: 2020-11-24

## 2020-11-25 NOTE — TELEPHONE ENCOUNTER
From: Marilyn Ferrara  To: Conor Monahan MD  Sent: 11/24/2020 6:56 PM CST  Subject: Other    Canceling 12/7 appointment and will reschedule for January when new PPO insurance takes effect. Be well.   --Stiven Sanchez

## 2020-12-12 ENCOUNTER — LAB ENCOUNTER (OUTPATIENT)
Dept: LAB | Age: 60
End: 2020-12-12
Attending: INTERNAL MEDICINE
Payer: COMMERCIAL

## 2020-12-12 DIAGNOSIS — Z01.818 PREOP EXAMINATION: ICD-10-CM

## 2020-12-15 DIAGNOSIS — G43.709 CHRONIC MIGRAINE WITHOUT AURA WITHOUT STATUS MIGRAINOSUS, NOT INTRACTABLE: ICD-10-CM

## 2020-12-15 PROBLEM — R10.13 ABDOMINAL PAIN, EPIGASTRIC: Status: ACTIVE | Noted: 2020-12-15

## 2020-12-15 PROBLEM — Z12.11 SPECIAL SCREENING FOR MALIGNANT NEOPLASMS, COLON: Status: ACTIVE | Noted: 2020-12-15

## 2020-12-15 PROCEDURE — 88305 TISSUE EXAM BY PATHOLOGIST: CPT | Performed by: INTERNAL MEDICINE

## 2020-12-15 RX ORDER — PROPRANOLOL HYDROCHLORIDE 80 MG/1
TABLET ORAL
Qty: 30 TABLET | Refills: 2 | Status: SHIPPED | OUTPATIENT
Start: 2020-12-15 | End: 2020-12-30

## 2020-12-15 RX ORDER — PROPRANOLOL HYDROCHLORIDE 80 MG/1
TABLET ORAL
Qty: 30 TABLET | Refills: 2 | Status: SHIPPED | OUTPATIENT
Start: 2020-12-15 | End: 2020-12-30 | Stop reason: DRUGHIGH

## 2020-12-15 NOTE — TELEPHONE ENCOUNTER
Medication: PROPRANOLOL HCL 80 MG Oral Tab    Date of last refill: 9/16/20 (#30/2)  [place]    Last office visit: 9/30/20  Due back to clinic per last office note:  12 weeks  Date next office visit scheduled:     Iain

## 2020-12-15 NOTE — TELEPHONE ENCOUNTER
Medication: PROPRANOLOL HCL 80 MG Oral Tab    Date of last refill: 9/16/20 (#30/2)  [place]    Last office visit: 9/30/20  Due back to clinic per last office note:  12 weeks  Date next office visit scheduled:     Iain the plan.

## 2020-12-25 NOTE — PATIENT INSTRUCTIONS
Maxillary sinus pain:  - start antibiotic (clindamycin) 300 mg - 1 capsule three times a day with food  - naproxen 500 mg - 1 tablet twice a day with food as needed  - ice (10-15 minutes at a time) as needed S/P endoscopy  (2009)

## 2020-12-30 ENCOUNTER — OFFICE VISIT (OUTPATIENT)
Dept: NEUROLOGY | Facility: CLINIC | Age: 60
End: 2020-12-30

## 2020-12-30 VITALS
RESPIRATION RATE: 18 BRPM | HEART RATE: 72 BPM | WEIGHT: 212 LBS | BODY MASS INDEX: 40 KG/M2 | DIASTOLIC BLOOD PRESSURE: 72 MMHG | SYSTOLIC BLOOD PRESSURE: 128 MMHG

## 2020-12-30 DIAGNOSIS — G43.709 CHRONIC MIGRAINE WITHOUT AURA WITHOUT STATUS MIGRAINOSUS, NOT INTRACTABLE: Primary | ICD-10-CM

## 2020-12-30 PROCEDURE — 3078F DIAST BP <80 MM HG: CPT | Performed by: OTHER

## 2020-12-30 PROCEDURE — 64615 CHEMODENERV MUSC MIGRAINE: CPT | Performed by: OTHER

## 2020-12-30 PROCEDURE — 3074F SYST BP LT 130 MM HG: CPT | Performed by: OTHER

## 2020-12-30 RX ORDER — LORAZEPAM 2 MG/1
1 TABLET ORAL AS NEEDED
COMMUNITY
Start: 2020-11-20

## 2020-12-30 RX ORDER — PROPRANOLOL HYDROCHLORIDE 60 MG/1
60 TABLET ORAL DAILY
Qty: 30 TABLET | Refills: 5 | Status: SHIPPED | OUTPATIENT
Start: 2020-12-30 | End: 2021-06-09

## 2020-12-30 RX ORDER — BUSPIRONE HYDROCHLORIDE 30 MG/1
1 TABLET ORAL DAILY
COMMUNITY
Start: 2020-11-22

## 2020-12-30 NOTE — PROGRESS NOTES
Botox Reauthorization Questions:  1. Has the patient experienced a reduction in frequency of migraines since starting Botox? yes  a. If yes, by what percentage? 75%  2. Has the intensity of migraines decreased since starting Botox? yes  a.  If yes, by what

## 2021-01-12 ENCOUNTER — ORDER TRANSCRIPTION (OUTPATIENT)
Dept: ADMINISTRATIVE | Facility: HOSPITAL | Age: 61
End: 2021-01-12

## 2021-01-12 DIAGNOSIS — Z01.818 PREOP EXAMINATION: ICD-10-CM

## 2021-01-12 DIAGNOSIS — Z11.59 ENCOUNTER FOR SCREENING FOR OTHER VIRAL DISEASES: Primary | ICD-10-CM

## 2021-01-13 PROBLEM — F33.9 CHRONIC RECURRENT MAJOR DEPRESSIVE DISORDER: Status: ACTIVE | Noted: 2021-01-13

## 2021-01-13 PROBLEM — F33.9 CHRONIC RECURRENT MAJOR DEPRESSIVE DISORDER (HCC): Status: ACTIVE | Noted: 2021-01-13

## 2021-01-13 PROBLEM — F41.1 GENERALIZED ANXIETY DISORDER: Status: ACTIVE | Noted: 2021-01-13

## 2021-01-13 PROBLEM — F33.2 SEVERE RECURRENT MAJOR DEPRESSION WITHOUT PSYCHOTIC FEATURES (HCC): Status: ACTIVE | Noted: 2021-01-13

## 2021-01-14 DIAGNOSIS — G31.84 MILD NEUROCOGNITIVE DISORDER DUE TO MULTIPLE ETIOLOGIES: ICD-10-CM

## 2021-01-14 RX ORDER — MEMANTINE HYDROCHLORIDE 10 MG/1
TABLET ORAL
Qty: 30 TABLET | Refills: 2 | Status: SHIPPED | OUTPATIENT
Start: 2021-01-14 | End: 2021-04-14

## 2021-01-14 NOTE — TELEPHONE ENCOUNTER
Medication: MEMANTINE HCL 10 MG Oral Tab    Date of last refill: 10/22/2020 (#30/2)  [place]    Last office visit: 12/30/2020  Due back to clinic per last office note:  12 weeks  Date next office visit scheduled:

## 2021-01-20 ENCOUNTER — LAB ENCOUNTER (OUTPATIENT)
Dept: LAB | Age: 61
End: 2021-01-20
Attending: INTERNAL MEDICINE
Payer: COMMERCIAL

## 2021-01-20 DIAGNOSIS — R19.7 DIARRHEA, UNSPECIFIED TYPE: ICD-10-CM

## 2021-01-20 DIAGNOSIS — R10.13 EPIGASTRIC PAIN: ICD-10-CM

## 2021-01-20 LAB
ALBUMIN SERPL-MCNC: 3.6 G/DL (ref 3.4–5)
ALBUMIN/GLOB SERPL: 1.1 {RATIO} (ref 1–2)
ALP LIVER SERPL-CCNC: 108 U/L
ALT SERPL-CCNC: 12 U/L
ANION GAP SERPL CALC-SCNC: 3 MMOL/L (ref 0–18)
AST SERPL-CCNC: 21 U/L (ref 15–37)
BASOPHILS # BLD AUTO: 0.02 X10(3) UL (ref 0–0.2)
BASOPHILS NFR BLD AUTO: 0.3 %
BILIRUB SERPL-MCNC: 0.5 MG/DL (ref 0.1–2)
BUN BLD-MCNC: 12 MG/DL (ref 7–18)
BUN/CREAT SERPL: 11.8 (ref 10–20)
CALCIUM BLD-MCNC: 9.1 MG/DL (ref 8.5–10.1)
CHLORIDE SERPL-SCNC: 107 MMOL/L (ref 98–112)
CO2 SERPL-SCNC: 29 MMOL/L (ref 21–32)
CREAT BLD-MCNC: 1.02 MG/DL
DEPRECATED RDW RBC AUTO: 42.5 FL (ref 35.1–46.3)
EOSINOPHIL # BLD AUTO: 0.15 X10(3) UL (ref 0–0.7)
EOSINOPHIL NFR BLD AUTO: 2.5 %
ERYTHROCYTE [DISTWIDTH] IN BLOOD BY AUTOMATED COUNT: 12.8 % (ref 11–15)
GLOBULIN PLAS-MCNC: 3.2 G/DL (ref 2.8–4.4)
GLUCOSE BLD-MCNC: 82 MG/DL (ref 70–99)
HCT VFR BLD AUTO: 41.5 %
HGB BLD-MCNC: 12.7 G/DL
IMM GRANULOCYTES # BLD AUTO: 0.02 X10(3) UL (ref 0–1)
IMM GRANULOCYTES NFR BLD: 0.3 %
LIPASE SERPL-CCNC: 139 U/L (ref 73–393)
LYMPHOCYTES # BLD AUTO: 1.23 X10(3) UL (ref 1–4)
LYMPHOCYTES NFR BLD AUTO: 20.5 %
M PROTEIN MFR SERPL ELPH: 6.8 G/DL (ref 6.4–8.2)
MCH RBC QN AUTO: 28 PG (ref 26–34)
MCHC RBC AUTO-ENTMCNC: 30.6 G/DL (ref 31–37)
MCV RBC AUTO: 91.4 FL
MONOCYTES # BLD AUTO: 0.53 X10(3) UL (ref 0.1–1)
MONOCYTES NFR BLD AUTO: 8.8 %
NEUTROPHILS # BLD AUTO: 4.06 X10 (3) UL (ref 1.5–7.7)
NEUTROPHILS # BLD AUTO: 4.06 X10(3) UL (ref 1.5–7.7)
NEUTROPHILS NFR BLD AUTO: 67.6 %
OSMOLALITY SERPL CALC.SUM OF ELEC: 287 MOSM/KG (ref 275–295)
PATIENT FASTING Y/N/NP: YES
PLATELET # BLD AUTO: 234 10(3)UL (ref 150–450)
POTASSIUM SERPL-SCNC: 4.3 MMOL/L (ref 3.5–5.1)
RBC # BLD AUTO: 4.54 X10(6)UL
SODIUM SERPL-SCNC: 139 MMOL/L (ref 136–145)
WBC # BLD AUTO: 6 X10(3) UL (ref 4–11)

## 2021-01-20 PROCEDURE — 85025 COMPLETE CBC W/AUTO DIFF WBC: CPT

## 2021-01-20 PROCEDURE — 87045 FECES CULTURE AEROBIC BACT: CPT

## 2021-01-20 PROCEDURE — 87427 SHIGA-LIKE TOXIN AG IA: CPT

## 2021-01-20 PROCEDURE — 87493 C DIFF AMPLIFIED PROBE: CPT

## 2021-01-20 PROCEDURE — 80053 COMPREHEN METABOLIC PANEL: CPT

## 2021-01-20 PROCEDURE — 87046 STOOL CULTR AEROBIC BACT EA: CPT

## 2021-01-20 PROCEDURE — 83690 ASSAY OF LIPASE: CPT

## 2021-01-20 PROCEDURE — 36415 COLL VENOUS BLD VENIPUNCTURE: CPT

## 2021-01-21 LAB — C DIFF TOX B STL QL: NEGATIVE

## 2021-01-22 ENCOUNTER — OFFICE VISIT (OUTPATIENT)
Dept: INTERNAL MEDICINE CLINIC | Facility: CLINIC | Age: 61
End: 2021-01-22
Payer: COMMERCIAL

## 2021-01-22 VITALS
HEART RATE: 73 BPM | WEIGHT: 211 LBS | DIASTOLIC BLOOD PRESSURE: 82 MMHG | OXYGEN SATURATION: 99 % | RESPIRATION RATE: 12 BRPM | TEMPERATURE: 98 F | BODY MASS INDEX: 39.84 KG/M2 | HEIGHT: 61 IN | SYSTOLIC BLOOD PRESSURE: 114 MMHG

## 2021-01-22 DIAGNOSIS — S39.012A ACUTE MYOFASCIAL STRAIN OF LUMBAR REGION, INITIAL ENCOUNTER: Primary | ICD-10-CM

## 2021-01-22 PROCEDURE — 3079F DIAST BP 80-89 MM HG: CPT | Performed by: INTERNAL MEDICINE

## 2021-01-22 PROCEDURE — 3008F BODY MASS INDEX DOCD: CPT | Performed by: INTERNAL MEDICINE

## 2021-01-22 PROCEDURE — 3074F SYST BP LT 130 MM HG: CPT | Performed by: INTERNAL MEDICINE

## 2021-01-22 PROCEDURE — 99213 OFFICE O/P EST LOW 20 MIN: CPT | Performed by: INTERNAL MEDICINE

## 2021-01-22 NOTE — PATIENT INSTRUCTIONS
Back Sprain or Strain     Injury to the muscles (strain) or ligaments (sprain) around the spine can be troubling.  Injury may occur after a sudden forceful twisting or bending such as in a car accident, after a simple awkward movement, or after lifting so · You can alternate the ice and heat. Talk with your healthcare provider to find out the best treatment or therapy for your back pain. · Therapeutic massage can help relax the back muscles without stretching them. · Be aware of safe lifting methods.  Nkechi Block Call your healthcare provider right away if any of the following occur:  · Pain gets worse or spreads to your arms or legs  · Weakness or numbness in one or both arms or legs  · Numbness in the groin or genital area  Sarai last reviewed this educational

## 2021-01-22 NOTE — PROGRESS NOTES
Patient presents with:  Low Back Pain      HPI: The patient presents for back pain evaluation:  Onset/Duration = at least 1 week  Location = L-sided, low back  Radiation = not much  Mechanism of injury = repetition of bending  Quality of pain = ache  Pain 3  •  MEMANTINE HCL 10 MG Oral Tab, TAKE 1 TABLET BY MOUTH DAILY, Disp: 30 tablet, Rfl: 2  •  busPIRone HCl 30 MG Oral Tab, Take 1 tablet by mouth daily. , Disp: , Rfl:   •  LORazepam 2 MG Oral Tab, Take 1 tablet by mouth as needed. , Disp: , Rfl:   •  Propr Currently      Alcohol/week: 0.0 standard drinks      Frequency: Monthly or less      Drinks per session: 1 or 2      Binge frequency: Never      Comment: 6 drinks/year    Drug use: Never      PE:  /82 (BP Location: Left arm, Patient Position: Sittin

## 2021-01-24 ENCOUNTER — LAB ENCOUNTER (OUTPATIENT)
Dept: LAB | Facility: HOSPITAL | Age: 61
End: 2021-01-24
Attending: OTOLARYNGOLOGY
Payer: COMMERCIAL

## 2021-01-24 DIAGNOSIS — Z01.818 PREOP EXAMINATION: ICD-10-CM

## 2021-01-24 DIAGNOSIS — Z11.59 ENCOUNTER FOR SCREENING FOR OTHER VIRAL DISEASES: ICD-10-CM

## 2021-01-25 LAB — SARS-COV-2 RNA RESP QL NAA+PROBE: NOT DETECTED

## 2021-01-27 ENCOUNTER — HOSPITAL ENCOUNTER (OUTPATIENT)
Dept: GENERAL RADIOLOGY | Facility: HOSPITAL | Age: 61
Discharge: HOME | End: 2021-01-27
Attending: OTOLARYNGOLOGY
Payer: COMMERCIAL

## 2021-01-27 DIAGNOSIS — R19.8 GLOBUS PHARYNGEUS: ICD-10-CM

## 2021-01-27 PROCEDURE — 92611 MOTION FLUOROSCOPY/SWALLOW: CPT

## 2021-01-27 PROCEDURE — 74230 X-RAY XM SWLNG FUNCJ C+: CPT | Performed by: OTOLARYNGOLOGY

## 2021-01-27 NOTE — PROGRESS NOTES
ADULT VIDEOFLUOROSCOPIC SWALLOWING STUDY    Admission Date: 1/27/2021  Evaluation Date: 01/27/21  Radiologist: Dr. Sheeba Dang: Regular  Diet Recommendations - Liquid:  Thin    Further Follow-up:              Me 2x/week)  History obtained from patient and chart. Patient reported choking on food 2x/weekly, reflux and globus sensation for a few months. She reports chronic throat clearing not only during the context of swallowing, increased effort to swallow.   She observed retrograde flow. No observed deficits for patients reported sensory concerns. Question whether there may be some esophageal involvement and possible referred sensation.                 PLAN: Follow up with referring physician; consider Shanna Farooq

## 2021-02-04 ENCOUNTER — LAB ENCOUNTER (OUTPATIENT)
Dept: LAB | Age: 61
End: 2021-02-04
Attending: INTERNAL MEDICINE
Payer: COMMERCIAL

## 2021-02-04 ENCOUNTER — NURSE ONLY (OUTPATIENT)
Dept: INTERNAL MEDICINE CLINIC | Facility: CLINIC | Age: 61
End: 2021-02-04
Payer: COMMERCIAL

## 2021-02-04 DIAGNOSIS — Z00.00 LABORATORY EXAMINATION ORDERED AS PART OF A ROUTINE GENERAL MEDICAL EXAMINATION: Primary | ICD-10-CM

## 2021-02-04 DIAGNOSIS — R69 DIAGNOSIS UNKNOWN: Primary | ICD-10-CM

## 2021-02-04 LAB
AMB EXT CHOLESTEROL, TOTAL: 175 MG/DL
AMB EXT COVID-19 IGG: NEGATIVE
AMB EXT CREATININE: 1.06 MG/DL
AMB EXT GLUCOSE: 90 MG/DL
AMB EXT HDL CHOLESTEROL: 56 MG/DL
AMB EXT HEMATOCRIT: 40.3
AMB EXT HEMOGLOBIN: 13.3
AMB EXT HGBA1C: 5 %
AMB EXT LDL CHOLESTEROL, DIRECT: 96 MG/DL
AMB EXT MCV: 86.3
AMB EXT PLATELETS: 248
AMB EXT TRIGLYCERIDES: 125 MG/DL
AMB EXT TSH: 1.96 MIU/ML
AMB EXT WBC: 5.5 X10(3)UL
BILIRUB UR QL STRIP.AUTO: NEGATIVE
CLARITY UR REFRACT.AUTO: CLEAR
COLOR UR AUTO: COLORLESS
GLUCOSE UR STRIP.AUTO-MCNC: NEGATIVE MG/DL
KETONES UR STRIP.AUTO-MCNC: NEGATIVE MG/DL
LEUKOCYTE ESTERASE UR QL STRIP.AUTO: NEGATIVE
NITRITE UR QL STRIP.AUTO: NEGATIVE
PH UR STRIP.AUTO: 6 [PH] (ref 4.5–8)
PROT UR STRIP.AUTO-MCNC: NEGATIVE MG/DL
RBC UR QL AUTO: NEGATIVE
SP GR UR STRIP.AUTO: <1.005 (ref 1–1.03)
UROBILINOGEN UR STRIP.AUTO-MCNC: <2 MG/DL

## 2021-02-04 PROCEDURE — 92551 PURE TONE HEARING TEST AIR: CPT | Performed by: INTERNAL MEDICINE

## 2021-02-04 PROCEDURE — 93000 ELECTROCARDIOGRAM COMPLETE: CPT | Performed by: INTERNAL MEDICINE

## 2021-02-04 PROCEDURE — 81003 URINALYSIS AUTO W/O SCOPE: CPT | Performed by: INTERNAL MEDICINE

## 2021-02-04 NOTE — PROGRESS NOTES
*LOOKIN' BODY RESULTS    YES:       NO:x       REASON TEST NOT PERFORMED: Lookin body not working today        HEIGHT:5'2   WEIGHT:209.9 LB  _____________________________________________________________________________  *VENIPUNCTURE    YES: x      NO:

## 2021-02-12 ENCOUNTER — MED REC SCAN ONLY (OUTPATIENT)
Dept: INTERNAL MEDICINE CLINIC | Facility: CLINIC | Age: 61
End: 2021-02-12

## 2021-02-16 ENCOUNTER — OFFICE VISIT (OUTPATIENT)
Dept: INTERNAL MEDICINE CLINIC | Facility: CLINIC | Age: 61
End: 2021-02-16
Payer: COMMERCIAL

## 2021-02-16 VITALS
TEMPERATURE: 97 F | HEIGHT: 62 IN | OXYGEN SATURATION: 97 % | BODY MASS INDEX: 38.62 KG/M2 | HEART RATE: 79 BPM | RESPIRATION RATE: 16 BRPM | SYSTOLIC BLOOD PRESSURE: 120 MMHG | WEIGHT: 209.88 LBS | DIASTOLIC BLOOD PRESSURE: 76 MMHG

## 2021-02-16 DIAGNOSIS — Z23 NEED FOR SHINGLES VACCINE: ICD-10-CM

## 2021-02-16 DIAGNOSIS — E55.9 VITAMIN D INSUFFICIENCY: ICD-10-CM

## 2021-02-16 DIAGNOSIS — E66.09 CLASS 2 OBESITY DUE TO EXCESS CALORIES WITHOUT SERIOUS COMORBIDITY WITH BODY MASS INDEX (BMI) OF 38.0 TO 38.9 IN ADULT: ICD-10-CM

## 2021-02-16 DIAGNOSIS — F33.2 SEVERE RECURRENT MAJOR DEPRESSION WITHOUT PSYCHOTIC FEATURES (HCC): ICD-10-CM

## 2021-02-16 DIAGNOSIS — F41.1 GAD (GENERALIZED ANXIETY DISORDER): ICD-10-CM

## 2021-02-16 DIAGNOSIS — Z00.00 ROUTINE GENERAL MEDICAL EXAMINATION AT A HEALTH CARE FACILITY: Primary | ICD-10-CM

## 2021-02-16 DIAGNOSIS — R79.82 ELEVATED C-REACTIVE PROTEIN (CRP): ICD-10-CM

## 2021-02-16 PROCEDURE — 90750 HZV VACC RECOMBINANT IM: CPT | Performed by: INTERNAL MEDICINE

## 2021-02-16 PROCEDURE — 3008F BODY MASS INDEX DOCD: CPT | Performed by: INTERNAL MEDICINE

## 2021-02-16 PROCEDURE — 3078F DIAST BP <80 MM HG: CPT | Performed by: INTERNAL MEDICINE

## 2021-02-16 PROCEDURE — 3074F SYST BP LT 130 MM HG: CPT | Performed by: INTERNAL MEDICINE

## 2021-02-16 PROCEDURE — 90471 IMMUNIZATION ADMIN: CPT | Performed by: INTERNAL MEDICINE

## 2021-02-16 PROCEDURE — 99396 PREV VISIT EST AGE 40-64: CPT | Performed by: INTERNAL MEDICINE

## 2021-02-16 RX ORDER — TRAZODONE HYDROCHLORIDE 50 MG/1
100 TABLET ORAL NIGHTLY
COMMUNITY
Start: 2021-02-11 | End: 2021-03-19 | Stop reason: DRUGHIGH

## 2021-02-16 NOTE — PROGRESS NOTES
Patient presents with: Well Adult      HPI: Amina Grimm presents for [facility]. [facility] (see results below).  She's been dealing with some ongoing health challenges but is making it 2018    [facility]   • OTHER SURGICAL HISTORY  09/09/2019    radiofrequency abalsion lumbar   • RADIOFREQUENCY ABLATION OF THORACIC OR LUMBAR MEDIAL BRANCH Left 9/23/2019    Performed by Roberto Milan MD at [facility] ENDOSCOPY   • [address] capsule, Rfl: 1  •  BD PEN NEEDLE BHAVESH U/F 32G X 4 MM Does not apply Misc, U D UTD, Disp: , Rfl:   •  Estradiol 0.1 MG/GM Vaginal Cream, APPLY AS DIRECTED VAGINALLY 2 TIMES PER WEEK, Disp: 42.5 g, Rfl: 3  •  DULoxetine HCl 60 MG Oral Cap DR Particles, adult)   Pulse 79   Temp 97.4 °F (36.3 °C) (Temporal)   Resp 16   Ht 5' 2\" (1.575 m)   Wt 209 lb 14.4 oz (95.2 kg)   SpO2 97%   BMI 38.39 kg/m²   Wt Readings from Last 6 Encounters:  02/16/21 : 209 lb 14.4 oz (95.2 kg)  01/22/21 : 211 lb (95.7 kg)  01/13/ body mass index (bmi) of 38.0 to 38.9 in adult  Severe recurrent major depression without psychotic features (hcc)  Keshawn (generalized anxiety disorder)    1. Female CPX - Stable health. Still some opportunities to make health improvements.   2. HM/Stefani - Rony Harvey

## 2021-02-16 NOTE — PATIENT INSTRUCTIONS
Devyn Lujan,  1. Think of the low hanging fruit --> Move more, eat better, stress less, get some sleep, continue to nurture social connections, and the mind-body relationship.   2. Probiotics yes, Vitamin D yes, and fish oil yes!!!  3. Regarding the Saxenda, go ah

## 2021-02-19 PROBLEM — F33.1 MDD (MAJOR DEPRESSIVE DISORDER), RECURRENT EPISODE, MODERATE (HCC): Status: RESOLVED | Noted: 2017-11-21 | Resolved: 2021-02-19

## 2021-02-19 PROBLEM — E78.00 HYPERCHOLESTEROLEMIA: Status: RESOLVED | Noted: 2020-08-04 | Resolved: 2021-02-19

## 2021-02-19 PROBLEM — F33.9 CHRONIC RECURRENT MAJOR DEPRESSIVE DISORDER (HCC): Status: RESOLVED | Noted: 2021-01-13 | Resolved: 2021-02-19

## 2021-02-19 PROBLEM — Z12.11 SPECIAL SCREENING FOR MALIGNANT NEOPLASMS, COLON: Status: RESOLVED | Noted: 2020-12-15 | Resolved: 2021-02-19

## 2021-02-19 PROBLEM — E55.9 VITAMIN D INSUFFICIENCY: Status: ACTIVE | Noted: 2017-11-21

## 2021-02-19 PROBLEM — R79.82 ELEVATED C-REACTIVE PROTEIN (CRP): Status: ACTIVE | Noted: 2021-02-19

## 2021-02-19 PROBLEM — F33.9 CHRONIC RECURRENT MAJOR DEPRESSIVE DISORDER: Status: RESOLVED | Noted: 2021-01-13 | Resolved: 2021-02-19

## 2021-02-19 PROBLEM — F41.1 GENERALIZED ANXIETY DISORDER: Status: RESOLVED | Noted: 2021-01-13 | Resolved: 2021-02-19

## 2021-02-19 PROBLEM — R10.13 ABDOMINAL PAIN, EPIGASTRIC: Status: RESOLVED | Noted: 2020-12-15 | Resolved: 2021-02-19

## 2021-03-09 ENCOUNTER — LAB ENCOUNTER (OUTPATIENT)
Dept: LAB | Age: 61
End: 2021-03-09
Attending: INTERNAL MEDICINE
Payer: COMMERCIAL

## 2021-03-09 DIAGNOSIS — R19.7 DIARRHEA, UNSPECIFIED TYPE: ICD-10-CM

## 2021-03-09 DIAGNOSIS — K21.9 GASTROESOPHAGEAL REFLUX DISEASE WITHOUT ESOPHAGITIS: ICD-10-CM

## 2021-03-09 PROCEDURE — 83993 ASSAY FOR CALPROTECTIN FECAL: CPT

## 2021-03-09 PROCEDURE — 82656 EL-1 FECAL QUAL/SEMIQ: CPT

## 2021-03-09 PROCEDURE — 82705 FATS/LIPIDS FECES QUAL: CPT

## 2021-03-09 PROCEDURE — 87493 C DIFF AMPLIFIED PROBE: CPT

## 2021-03-10 LAB — C DIFF TOX B STL QL: NEGATIVE

## 2021-03-11 LAB
CALPROTECTIN STL-MCNT: 29.2 ΜG/G (ref ?–50)
NEUTRAL FAT, FECAL: NORMAL
SPLIT FAT, FECAL: NORMAL

## 2021-03-12 ENCOUNTER — HOSPITAL ENCOUNTER (OUTPATIENT)
Dept: MRI IMAGING | Facility: HOSPITAL | Age: 61
Discharge: HOME | End: 2021-03-12
Attending: INTERNAL MEDICINE
Payer: COMMERCIAL

## 2021-03-12 DIAGNOSIS — R10.13 EPIGASTRIC PAIN: ICD-10-CM

## 2021-03-12 LAB — PANCREATIC ELASTASE , FECAL: 763 UG/G

## 2021-03-12 PROCEDURE — 74183 MRI ABD W/O CNTR FLWD CNTR: CPT | Performed by: INTERNAL MEDICINE

## 2021-03-12 PROCEDURE — 76376 3D RENDER W/INTRP POSTPROCES: CPT | Performed by: INTERNAL MEDICINE

## 2021-03-12 PROCEDURE — A9575 INJ GADOTERATE MEGLUMI 0.1ML: HCPCS | Performed by: INTERNAL MEDICINE

## 2021-03-15 DIAGNOSIS — G43.709 CHRONIC MIGRAINE WITHOUT AURA WITHOUT STATUS MIGRAINOSUS, NOT INTRACTABLE: ICD-10-CM

## 2021-03-15 RX ORDER — PROPRANOLOL HYDROCHLORIDE 80 MG/1
TABLET ORAL
Qty: 30 TABLET | Refills: 2 | OUTPATIENT
Start: 2021-03-15

## 2021-03-15 NOTE — TELEPHONE ENCOUNTER
Per Epic review, 80mg of Propranolol discontinued by provider and lower dosing, 60mg ordered. Spoke with pharmacy. Verified that they have 60mg on file. Will fill that.        Refused Rx for 80mg

## 2021-03-19 ENCOUNTER — OFFICE VISIT (OUTPATIENT)
Dept: INTERNAL MEDICINE CLINIC | Facility: CLINIC | Age: 61
End: 2021-03-19
Payer: COMMERCIAL

## 2021-03-19 VITALS
SYSTOLIC BLOOD PRESSURE: 112 MMHG | WEIGHT: 218.38 LBS | DIASTOLIC BLOOD PRESSURE: 70 MMHG | TEMPERATURE: 97 F | HEIGHT: 61 IN | RESPIRATION RATE: 16 BRPM | HEART RATE: 74 BPM | OXYGEN SATURATION: 96 % | BODY MASS INDEX: 41.23 KG/M2

## 2021-03-19 DIAGNOSIS — E66.01 CLASS 3 SEVERE OBESITY DUE TO EXCESS CALORIES WITHOUT SERIOUS COMORBIDITY WITH BODY MASS INDEX (BMI) OF 40.0 TO 44.9 IN ADULT (HCC): ICD-10-CM

## 2021-03-19 DIAGNOSIS — K91.5 POST-CHOLECYSTECTOMY SYNDROME: Primary | ICD-10-CM

## 2021-03-19 DIAGNOSIS — R16.0 HEPATOMEGALY: ICD-10-CM

## 2021-03-19 PROBLEM — E66.813 CLASS 3 SEVERE OBESITY DUE TO EXCESS CALORIES WITHOUT SERIOUS COMORBIDITY WITH BODY MASS INDEX (BMI) OF 40.0 TO 44.9 IN ADULT (HCC): Status: ACTIVE | Noted: 2020-11-06

## 2021-03-19 PROBLEM — E66.813 CLASS 3 SEVERE OBESITY DUE TO EXCESS CALORIES WITHOUT SERIOUS COMORBIDITY WITH BODY MASS INDEX (BMI) OF 40.0 TO 44.9 IN ADULT: Status: ACTIVE | Noted: 2020-11-06

## 2021-03-19 PROCEDURE — 3078F DIAST BP <80 MM HG: CPT | Performed by: INTERNAL MEDICINE

## 2021-03-19 PROCEDURE — 3008F BODY MASS INDEX DOCD: CPT | Performed by: INTERNAL MEDICINE

## 2021-03-19 PROCEDURE — 3074F SYST BP LT 130 MM HG: CPT | Performed by: INTERNAL MEDICINE

## 2021-03-19 PROCEDURE — 99215 OFFICE O/P EST HI 40 MIN: CPT | Performed by: INTERNAL MEDICINE

## 2021-03-19 RX ORDER — TRAZODONE HYDROCHLORIDE 100 MG/1
100 TABLET ORAL NIGHTLY
COMMUNITY
Start: 2021-03-15 | End: 2021-09-07

## 2021-03-19 RX ORDER — CHOLESTYRAMINE 4 G/9G
4 POWDER, FOR SUSPENSION ORAL DAILY
Qty: 90 EACH | Refills: 0 | Status: SHIPPED | OUTPATIENT
Start: 2021-03-19 | End: 2021-04-18

## 2021-03-19 NOTE — PATIENT INSTRUCTIONS
Iman Kearney,  1. Continue taking your Dicyclomine 4 times per day as scheduled. 2. Add peppermint oil 2 capsules BID to the mix. This should add more anti-spasm technique to the situation.   3. Start Cholestyramine oral powder once daily and this will serve as a

## 2021-03-19 NOTE — PROGRESS NOTES
Patient presents with:  Abdominal Pain: Chronic RUQ pain      HPI: Conrado Jo presents today for eval of ongoing RUQ that's been intermittent for at least the last several months, if not a bit longer. She gets flare-ups from time to time.  Pain severity is 4-5/10  No visible dilatation or filling defect.     PANCREAS:  Pancreas reveals no evidence of focal mass lesions, pancreatitis or abnormal enhancement.  There is suggestion of a persisting dorsal duct traversing anterior to the CBD suggestive of pancreas divisu Maternal family history of dementia     Multiple gastric ulcers     Class 3 severe obesity due to excess calories without serious comorbidity with body mass index (BMI) of 40.0 to 44.9 in adult [facility])     Severe recurrent major depression without psychotic to breakfast., Disp: 90 tablet, Rfl: 3  •  Dicyclomine HCl 10 MG Oral Cap, Take 1 capsule (10 mg total) by mouth 4 (four) times daily before meals and nightly., Disp: 120 capsule, Rfl: 3  •  MEMANTINE HCL 10 MG Oral Tab, TAKE 1 TABLET BY MOUTH DAILY, Disp: SpO2 96%   BMI 41.26 kg/m²     Body Composition Analysis via [employer] Professional machine done on 3/18/21:  Current Body Weight: 219.6 lbs. Total Body Fat: 46.0 % and 100.1 lbs. Visceral Fat: 18.  Fat-Free Mass: 53.9 % and 117.4 lbs.   Total Body my ability. Sadiq Hurley. Clay Campbell MD  Diplomate, American Board of Internal Medicine  Member, [address] [place] [address] ([email or web address])  Affiliate Physician, [facility] ([email or web address])     Meds & Refill

## 2021-03-24 ENCOUNTER — IMMUNIZATION (OUTPATIENT)
Dept: LAB | Age: 61
End: 2021-03-24
Attending: HOSPITALIST
Payer: COMMERCIAL

## 2021-03-24 DIAGNOSIS — Z23 NEED FOR VACCINATION: Primary | ICD-10-CM

## 2021-03-24 PROCEDURE — 0001A SARSCOV2 VAC 30MCG/0.3ML IM: CPT

## 2021-04-07 ENCOUNTER — OFFICE VISIT (OUTPATIENT)
Dept: NEUROLOGY | Facility: CLINIC | Age: 61
End: 2021-04-07
Payer: COMMERCIAL

## 2021-04-07 ENCOUNTER — TELEPHONE (OUTPATIENT)
Dept: SURGERY | Facility: CLINIC | Age: 61
End: 2021-04-07

## 2021-04-07 VITALS
DIASTOLIC BLOOD PRESSURE: 68 MMHG | RESPIRATION RATE: 16 BRPM | BODY MASS INDEX: 39 KG/M2 | WEIGHT: 209 LBS | SYSTOLIC BLOOD PRESSURE: 128 MMHG | HEART RATE: 70 BPM

## 2021-04-07 DIAGNOSIS — G43.709 CHRONIC MIGRAINE WITHOUT AURA WITHOUT STATUS MIGRAINOSUS, NOT INTRACTABLE: Primary | ICD-10-CM

## 2021-04-07 PROCEDURE — 3078F DIAST BP <80 MM HG: CPT | Performed by: OTHER

## 2021-04-07 PROCEDURE — 3074F SYST BP LT 130 MM HG: CPT | Performed by: OTHER

## 2021-04-07 PROCEDURE — 64615 CHEMODENERV MUSC MIGRAINE: CPT | Performed by: OTHER

## 2021-04-07 NOTE — PROGRESS NOTES
Botox Reauthorization Questions:  1. Has the patient experienced a reduction in frequency of migraines since starting Botox? yes  a. If yes, by what percentage? 50%  2. Has the intensity of migraines decreased since starting Botox? yes  a.  If yes, by what

## 2021-04-14 ENCOUNTER — IMMUNIZATION (OUTPATIENT)
Dept: LAB | Age: 61
End: 2021-04-14
Attending: HOSPITALIST
Payer: COMMERCIAL

## 2021-04-14 DIAGNOSIS — G31.84 MILD NEUROCOGNITIVE DISORDER DUE TO MULTIPLE ETIOLOGIES: ICD-10-CM

## 2021-04-14 DIAGNOSIS — Z23 NEED FOR VACCINATION: Primary | ICD-10-CM

## 2021-04-14 PROCEDURE — 0002A SARSCOV2 VAC 30MCG/0.3ML IM: CPT

## 2021-04-14 RX ORDER — MEMANTINE HYDROCHLORIDE 10 MG/1
TABLET ORAL
Qty: 30 TABLET | Refills: 2 | Status: SHIPPED | OUTPATIENT
Start: 2021-04-14 | End: 2021-06-07

## 2021-04-14 NOTE — TELEPHONE ENCOUNTER
Medication: MEMANTINE HCL 10 MG     Date of last refill: 1/14/21 (#30/2)  [place]    Last office visit: 4/7/21  Due back to clinic per last office note:  3 months  Date next office visit scheduled:    Future Appointme

## 2021-04-22 ENCOUNTER — OFFICE VISIT (OUTPATIENT)
Dept: INTERNAL MEDICINE CLINIC | Facility: CLINIC | Age: 61
End: 2021-04-22
Payer: COMMERCIAL

## 2021-04-22 VITALS
HEIGHT: 61 IN | WEIGHT: 206.5 LBS | BODY MASS INDEX: 38.99 KG/M2 | RESPIRATION RATE: 16 BRPM | DIASTOLIC BLOOD PRESSURE: 78 MMHG | HEART RATE: 78 BPM | OXYGEN SATURATION: 96 % | SYSTOLIC BLOOD PRESSURE: 100 MMHG | TEMPERATURE: 98 F

## 2021-04-22 DIAGNOSIS — E66.01 CLASS 2 SEVERE OBESITY DUE TO EXCESS CALORIES WITH SERIOUS COMORBIDITY AND BODY MASS INDEX (BMI) OF 39.0 TO 39.9 IN ADULT (HCC): Primary | ICD-10-CM

## 2021-04-22 DIAGNOSIS — Z51.81 THERAPEUTIC DRUG MONITORING: ICD-10-CM

## 2021-04-22 PROBLEM — E66.812 CLASS 2 SEVERE OBESITY DUE TO EXCESS CALORIES WITH SERIOUS COMORBIDITY AND BODY MASS INDEX (BMI) OF 39.0 TO 39.9 IN ADULT (HCC): Status: ACTIVE | Noted: 2020-11-06

## 2021-04-22 PROCEDURE — 3078F DIAST BP <80 MM HG: CPT | Performed by: INTERNAL MEDICINE

## 2021-04-22 PROCEDURE — 3008F BODY MASS INDEX DOCD: CPT | Performed by: INTERNAL MEDICINE

## 2021-04-22 PROCEDURE — 3074F SYST BP LT 130 MM HG: CPT | Performed by: INTERNAL MEDICINE

## 2021-04-22 PROCEDURE — 99213 OFFICE O/P EST LOW 20 MIN: CPT | Performed by: INTERNAL MEDICINE

## 2021-04-22 RX ORDER — DICYCLOMINE HYDROCHLORIDE 10 MG/1
10 CAPSULE ORAL
COMMUNITY
Start: 2021-04-14

## 2021-04-22 RX ORDER — PEN NEEDLE, DIABETIC 32GX 5/32"
NEEDLE, DISPOSABLE MISCELLANEOUS
Qty: 100 EACH | Refills: 0 | Status: SHIPPED | OUTPATIENT
Start: 2021-04-22 | End: 2021-08-26 | Stop reason: ALTCHOICE

## 2021-04-22 NOTE — PROGRESS NOTES
Patient presents with:  Weight Check: follow up        HPI: The pt presents today for physician-supervised medical weight loss programming and assessment for the diagnosis of overweight and/or obesity status.   Has been on FDA-approved prescription medicati weeks, hold 2 weeks, repeat PRN. , Disp: 30 g, Rfl: 2  •  ketoconazole 2 % External Cream, Apply to AA of FACE BID when not using Hydrocortisone Cream., Disp: 30 g, Rfl: 2  •  traZODone HCl 100 MG Oral Tab, Take 150 mg by mouth nightly., Disp: , Rfl:   •  P SpO2 96%   BMI 39.02 kg/m²   Wt Readings from Last 6 Encounters:  04/22/21 : 206 lb 8 oz (93.7 kg)  04/07/21 : 209 lb (94.8 kg)  03/19/21 : 218 lb 6.2 oz (99.1 kg)  02/24/21 : 221 lb (100.2 kg)  02/16/21 : 209 lb 14.4 oz (95.2 kg)  01/22/21 : 211 lb (95.7

## 2021-05-18 ENCOUNTER — OFFICE VISIT (OUTPATIENT)
Dept: INTERNAL MEDICINE CLINIC | Facility: CLINIC | Age: 61
End: 2021-05-18
Payer: COMMERCIAL

## 2021-05-18 VITALS
BODY MASS INDEX: 38 KG/M2 | TEMPERATURE: 98 F | OXYGEN SATURATION: 96 % | HEART RATE: 84 BPM | DIASTOLIC BLOOD PRESSURE: 62 MMHG | SYSTOLIC BLOOD PRESSURE: 92 MMHG | HEIGHT: 61 IN

## 2021-05-18 DIAGNOSIS — Z51.81 THERAPEUTIC DRUG MONITORING: ICD-10-CM

## 2021-05-18 DIAGNOSIS — E66.01 CLASS 2 SEVERE OBESITY DUE TO EXCESS CALORIES WITH SERIOUS COMORBIDITY AND BODY MASS INDEX (BMI) OF 37.0 TO 37.9 IN ADULT (HCC): Primary | ICD-10-CM

## 2021-05-18 DIAGNOSIS — Z23 NEED FOR SHINGLES VACCINE: ICD-10-CM

## 2021-05-18 PROBLEM — G93.32 CHRONIC FATIGUE SYNDROME: Status: ACTIVE | Noted: 2021-05-18

## 2021-05-18 PROBLEM — R53.82 CHRONIC FATIGUE SYNDROME: Status: ACTIVE | Noted: 2021-05-18

## 2021-05-18 PROCEDURE — 3078F DIAST BP <80 MM HG: CPT | Performed by: INTERNAL MEDICINE

## 2021-05-18 PROCEDURE — 90471 IMMUNIZATION ADMIN: CPT | Performed by: INTERNAL MEDICINE

## 2021-05-18 PROCEDURE — 99213 OFFICE O/P EST LOW 20 MIN: CPT | Performed by: INTERNAL MEDICINE

## 2021-05-18 PROCEDURE — 3074F SYST BP LT 130 MM HG: CPT | Performed by: INTERNAL MEDICINE

## 2021-05-18 PROCEDURE — 90750 HZV VACC RECOMBINANT IM: CPT | Performed by: INTERNAL MEDICINE

## 2021-05-18 NOTE — PROGRESS NOTES
Patient presents with:  Medication Follow-Up: 1 month follow up        HPI: Fredi Sanjanaivan presents today for 1-month f/u MD-supervised medical weight loss programming. She remain son Jc Gillis + lifestyle measures.  She is sharing great news with me today that she and hydrocortisone 2.5 % External Cream, Apply to AA of FACE BID x 2 weeks, hold 2 weeks, repeat PRN. , Disp: 30 g, Rfl: 2  •  ketoconazole 2 % External Cream, Apply to AA of FACE BID when not using Hydrocortisone Cream., Disp: 30 g, Rfl: 2  •  traZODone HCl 10 Ht 5' 1\" (1.549 m)   SpO2 96%   BMI 37.79 kg/m²   Wt Readings from Last 6 Encounters:  04/26/21 : 200 lb (90.7 kg)  04/22/21 : 206 lb 8 oz (93.7 kg)  04/07/21 : 209 lb (94.8 kg)  03/19/21 : 218 lb 6.2 oz (99.1 kg)  02/24/21 : 221 lb (100.2 kg)  02/16/21

## 2021-05-19 DIAGNOSIS — G43.009 MIGRAINE WITHOUT AURA AND WITHOUT STATUS MIGRAINOSUS, NOT INTRACTABLE: ICD-10-CM

## 2021-05-19 RX ORDER — ZOLMITRIPTAN 5 MG/1
TABLET, FILM COATED ORAL
Qty: 9 TABLET | Refills: 2 | Status: SHIPPED | OUTPATIENT
Start: 2021-05-19 | End: 2021-07-12

## 2021-05-19 NOTE — TELEPHONE ENCOUNTER
Medication: Zolmitriptan 5 MG Oral Tab    Date of last refill: 9/30/2020 (#9/2)  [place]    Last office visit: 4/7/2021  Due back to clinic per last office note:  12 weeks  Date next office visit scheduled:    Future

## 2021-05-23 ENCOUNTER — TELEPHONE (OUTPATIENT)
Dept: INTERNAL MEDICINE CLINIC | Facility: CLINIC | Age: 61
End: 2021-05-23

## 2021-05-23 DIAGNOSIS — G43.709 CHRONIC MIGRAINE WITHOUT AURA WITHOUT STATUS MIGRAINOSUS, NOT INTRACTABLE: Primary | ICD-10-CM

## 2021-05-23 NOTE — TELEPHONE ENCOUNTER
I was paged by Michael Harvey this morning. She is complaining of ongoing headaches. Sees Dr. Miguelangel Johnson from Neurology. Headaches are becoming more consistent and frequent despite her being on various prophylaxis and treatment options. Plan:  1.  Obtain CT head w/o

## 2021-05-25 ENCOUNTER — HOSPITAL ENCOUNTER (OUTPATIENT)
Dept: NUCLEAR MEDICINE | Facility: HOSPITAL | Age: 61
Discharge: HOME | End: 2021-05-25
Attending: NURSE PRACTITIONER
Payer: COMMERCIAL

## 2021-05-25 DIAGNOSIS — R11.0 NAUSEA: ICD-10-CM

## 2021-05-25 PROCEDURE — 78264 GASTRIC EMPTYING IMG STUDY: CPT | Performed by: NURSE PRACTITIONER

## 2021-05-28 ENCOUNTER — HOSPITAL ENCOUNTER (OUTPATIENT)
Dept: CT IMAGING | Age: 61
Discharge: HOME | End: 2021-05-28
Attending: INTERNAL MEDICINE
Payer: COMMERCIAL

## 2021-05-28 DIAGNOSIS — G43.709 CHRONIC MIGRAINE WITHOUT AURA WITHOUT STATUS MIGRAINOSUS, NOT INTRACTABLE: ICD-10-CM

## 2021-05-28 PROCEDURE — 70450 CT HEAD/BRAIN W/O DYE: CPT | Performed by: INTERNAL MEDICINE

## 2021-06-02 ENCOUNTER — TELEPHONE (OUTPATIENT)
Dept: NEUROLOGY | Facility: CLINIC | Age: 61
End: 2021-06-02

## 2021-06-02 NOTE — TELEPHONE ENCOUNTER
Received medical records request from the law offices of [employer]. Requesting all records, except imaging, 3/27/20 - present. Sent to [employer]. Original sent to scanning.

## 2021-06-05 DIAGNOSIS — F06.70 MILD NEUROCOGNITIVE DISORDER DUE TO MULTIPLE ETIOLOGIES: ICD-10-CM

## 2021-06-07 RX ORDER — MEMANTINE HYDROCHLORIDE 10 MG/1
TABLET ORAL
Qty: 30 TABLET | Refills: 2 | Status: SHIPPED | OUTPATIENT
Start: 2021-06-07 | End: 2021-10-11

## 2021-06-09 ENCOUNTER — OFFICE VISIT (OUTPATIENT)
Dept: NEUROLOGY | Facility: CLINIC | Age: 61
End: 2021-06-09
Payer: COMMERCIAL

## 2021-06-09 ENCOUNTER — TELEPHONE (OUTPATIENT)
Dept: NEUROLOGY | Facility: CLINIC | Age: 61
End: 2021-06-09

## 2021-06-09 VITALS
SYSTOLIC BLOOD PRESSURE: 120 MMHG | WEIGHT: 196 LBS | BODY MASS INDEX: 37 KG/M2 | DIASTOLIC BLOOD PRESSURE: 76 MMHG | HEART RATE: 76 BPM | RESPIRATION RATE: 16 BRPM

## 2021-06-09 DIAGNOSIS — R51.9 NOCTURNAL HEADACHES: ICD-10-CM

## 2021-06-09 DIAGNOSIS — G43.709 CHRONIC MIGRAINE WITHOUT AURA WITHOUT STATUS MIGRAINOSUS, NOT INTRACTABLE: ICD-10-CM

## 2021-06-09 DIAGNOSIS — G43.709 CHRONIC MIGRAINE WITHOUT AURA WITHOUT STATUS MIGRAINOSUS, NOT INTRACTABLE: Primary | ICD-10-CM

## 2021-06-09 PROCEDURE — 99213 OFFICE O/P EST LOW 20 MIN: CPT | Performed by: OTHER

## 2021-06-09 PROCEDURE — 3074F SYST BP LT 130 MM HG: CPT | Performed by: OTHER

## 2021-06-09 PROCEDURE — 3078F DIAST BP <80 MM HG: CPT | Performed by: OTHER

## 2021-06-09 RX ORDER — DULOXETIN HYDROCHLORIDE 30 MG/1
30 CAPSULE, DELAYED RELEASE ORAL DAILY
COMMUNITY
End: 2021-08-15

## 2021-06-09 RX ORDER — PROPRANOLOL HYDROCHLORIDE 80 MG/1
1 CAPSULE, EXTENDED RELEASE ORAL EVERY EVENING
Qty: 30 CAPSULE | Refills: 2 | Status: SHIPPED | OUTPATIENT
Start: 2021-06-09 | End: 2021-09-01

## 2021-06-09 RX ORDER — UBROGEPANT 50 MG/1
TABLET ORAL
Qty: 10 TABLET | Refills: 0 | Status: SHIPPED | OUTPATIENT
Start: 2021-06-09 | End: 2021-07-09

## 2021-06-09 NOTE — PROGRESS NOTES
HPI:    Patient ID: Crist Seip is a 62year old female. HPI     For past couple months waking up headaches in middle of night pain behind eye and forehead, severe headache 7-8/10,   Patient presents with complaints of headaches.  She has history Painful swallowing 2020   • Painful urination 2018    Weak pelvic floor   • Parkinsonism (HCC)    • Peptic ulcer disease    • Problems with swallowing 2020    Occasional choking on food & need to clear throat   • Sleep apnea     has never been on CPAP   • Sig Dispense Refill   • Ondansetron HCl (ZOFRAN) 4 mg tablet Take 1 tablet (4 mg total) by mouth every 8 (eight) hours as needed for Nausea. 20 tablet 2   • DULoxetine HCl 30 MG Oral Cap DR Particles Take 30 mg by mouth daily.      • MEMANTINE HCL 10 MG Ora Metoclopramide HCl 5 MG Oral Tab Take 1 tablet (5 mg total) by mouth daily. (Patient not taking: Reported on 6/9/2021 ) 30 tablet 2     Allergies:No Known Allergies  PHYSICAL EXAM:   Physical Exam  Blood pressure 120/76, pulse 76, resp.  rate 16, weight 196 call if you have any questions. I will continue to follow with you and will make further recommendations based on her progress.      30 total minutes spent with patient >50% of visit was spent in counseling and coordination of care      James Betancourt,

## 2021-06-09 NOTE — PROGRESS NOTES
Patient states increase in headaches. Patient states for the past two month she is having headaches daily. Patient states pain behind the eyes. Patient states a \"clicking\" sound when turning her head, denies pain.

## 2021-06-09 NOTE — TELEPHONE ENCOUNTER
PA for [facility] TATTNALL submitted on CMM, Key: [facility].

## 2021-06-12 ENCOUNTER — LAB ENCOUNTER (OUTPATIENT)
Dept: LAB | Age: 61
End: 2021-06-12
Attending: OTOLARYNGOLOGY
Payer: COMMERCIAL

## 2021-06-12 DIAGNOSIS — K14.6 BURNING MOUTH SYNDROME: ICD-10-CM

## 2021-06-12 PROCEDURE — 82306 VITAMIN D 25 HYDROXY: CPT

## 2021-06-12 PROCEDURE — 84630 ASSAY OF ZINC: CPT

## 2021-06-12 PROCEDURE — 84207 ASSAY OF VITAMIN B-6: CPT

## 2021-06-12 PROCEDURE — 82607 VITAMIN B-12: CPT

## 2021-06-12 PROCEDURE — 36415 COLL VENOUS BLD VENIPUNCTURE: CPT

## 2021-06-15 ENCOUNTER — HOSPITAL ENCOUNTER (EMERGENCY)
Facility: HOSPITAL | Age: 61
Discharge: HOME | End: 2021-06-15
Attending: EMERGENCY MEDICINE
Payer: COMMERCIAL

## 2021-06-15 VITALS
HEIGHT: 61 IN | BODY MASS INDEX: 36.82 KG/M2 | WEIGHT: 195 LBS | HEART RATE: 75 BPM | TEMPERATURE: 97 F | RESPIRATION RATE: 18 BRPM | OXYGEN SATURATION: 98 % | DIASTOLIC BLOOD PRESSURE: 81 MMHG | SYSTOLIC BLOOD PRESSURE: 124 MMHG

## 2021-06-15 DIAGNOSIS — K31.84 GASTROPARESIS: Primary | ICD-10-CM

## 2021-06-15 PROCEDURE — 81001 URINALYSIS AUTO W/SCOPE: CPT | Performed by: EMERGENCY MEDICINE

## 2021-06-15 PROCEDURE — 85025 COMPLETE CBC W/AUTO DIFF WBC: CPT | Performed by: EMERGENCY MEDICINE

## 2021-06-15 PROCEDURE — 87086 URINE CULTURE/COLONY COUNT: CPT | Performed by: EMERGENCY MEDICINE

## 2021-06-15 PROCEDURE — 96361 HYDRATE IV INFUSION ADD-ON: CPT

## 2021-06-15 PROCEDURE — 80053 COMPREHEN METABOLIC PANEL: CPT | Performed by: EMERGENCY MEDICINE

## 2021-06-15 PROCEDURE — 99284 EMERGENCY DEPT VISIT MOD MDM: CPT

## 2021-06-15 PROCEDURE — 99285 EMERGENCY DEPT VISIT HI MDM: CPT

## 2021-06-15 PROCEDURE — 87147 CULTURE TYPE IMMUNOLOGIC: CPT | Performed by: EMERGENCY MEDICINE

## 2021-06-15 PROCEDURE — 96374 THER/PROPH/DIAG INJ IV PUSH: CPT

## 2021-06-15 PROCEDURE — 96375 TX/PRO/DX INJ NEW DRUG ADDON: CPT

## 2021-06-15 PROCEDURE — 83690 ASSAY OF LIPASE: CPT | Performed by: EMERGENCY MEDICINE

## 2021-06-15 RX ORDER — METOCLOPRAMIDE HYDROCHLORIDE 5 MG/ML
10 INJECTION INTRAMUSCULAR; INTRAVENOUS ONCE
Status: COMPLETED | OUTPATIENT
Start: 2021-06-15 | End: 2021-06-15

## 2021-06-15 RX ORDER — DIPHENHYDRAMINE HYDROCHLORIDE 50 MG/ML
25 INJECTION INTRAMUSCULAR; INTRAVENOUS ONCE
Status: COMPLETED | OUTPATIENT
Start: 2021-06-15 | End: 2021-06-15

## 2021-06-15 RX ORDER — METOCLOPRAMIDE 10 MG/1
10 TABLET ORAL 3 TIMES DAILY PRN
Qty: 20 TABLET | Refills: 0 | Status: SHIPPED | OUTPATIENT
Start: 2021-06-15 | End: 2021-07-15

## 2021-06-15 NOTE — ED QUICK NOTES
Patient up to restroom with steady gait. Urine sample obtained and sent to lab. Patient given ice chips.

## 2021-06-15 NOTE — ED INITIAL ASSESSMENT (HPI)
Patient here with c/o vomiting that started at [address] [place] [address], hx of gastroparesis. Patient also reports that she has a migraine and is unable to keep down her migraine medications.

## 2021-06-15 NOTE — ED PROVIDER NOTES
Patient Seen in: [facility] Emergency Department      History   Patient presents with:  Vomiting    Stated Complaint: Diagnosed with gastroparesis since 8PM and has been vomiting since then, also h*    HPI/Subjective:   HPI    Patient is a 57-year-o bleeding ulcer   • Wears glasses    • Weight gain    • Weight loss 2018    Lost 120 lbs under  supervision with phentermine              Past Surgical History:   Procedure Laterality Date   •       x 2   • CHOLECYSTECTOMY     • COLONOSCOPY Rhythm: Normal rate. Pulmonary:      Effort: Pulmonary effort is normal. No respiratory distress. Abdominal:      General: There is no distension. Palpations: Abdomen is soft. Tenderness: There is no abdominal tenderness.    Musculoskeletal: examination room and examined immediately due to patient's complex condition. The patient was then placed on a cardiac monitor and pulse oximetry was applied.   Patient had an IV established and labs were drawn.    -Labs were ordered and reviewed by myself 20 tablet Refills: 0    !! - Potential duplicate medications found. Please discuss with provider.

## 2021-06-15 NOTE — ED QUICK NOTES
Patient discharged to home with follow-up and reglan prescription discussed. Patient AOx3 with no signs of acute distress.

## 2021-06-15 NOTE — ED QUICK NOTES
[facility]. Patient here with c/o emesis. Patient sitting on cart, reports feeling \"better. \"   at bedside. Will continue to monitor.

## 2021-06-23 NOTE — PROGRESS NOTES
LMTCB on unidentified VM. Will inform pt to start daily supplement, will enter lab order upon pt's return call.

## 2021-06-23 NOTE — PROGRESS NOTES
Called pt at [phone] her of  lab report,is not on any supplementation.     Call pt w/ Estella's response at  above number,may leave message on recorder

## 2021-06-23 NOTE — PROGRESS NOTES
Please inform vitamin b6 is normal zinc is a little low please find out if taking any supplementation

## 2021-07-12 ENCOUNTER — OFFICE VISIT (OUTPATIENT)
Dept: NEUROLOGY | Facility: CLINIC | Age: 61
End: 2021-07-12
Payer: COMMERCIAL

## 2021-07-12 ENCOUNTER — TELEPHONE (OUTPATIENT)
Dept: NEUROLOGY | Facility: CLINIC | Age: 61
End: 2021-07-12

## 2021-07-12 VITALS
SYSTOLIC BLOOD PRESSURE: 110 MMHG | BODY MASS INDEX: 39 KG/M2 | RESPIRATION RATE: 16 BRPM | HEART RATE: 72 BPM | DIASTOLIC BLOOD PRESSURE: 68 MMHG | WEIGHT: 205 LBS

## 2021-07-12 DIAGNOSIS — G43.709 CHRONIC MIGRAINE WITHOUT AURA WITHOUT STATUS MIGRAINOSUS, NOT INTRACTABLE: Primary | ICD-10-CM

## 2021-07-12 PROCEDURE — 64615 CHEMODENERV MUSC MIGRAINE: CPT | Performed by: OTHER

## 2021-07-12 PROCEDURE — 3078F DIAST BP <80 MM HG: CPT | Performed by: OTHER

## 2021-07-12 PROCEDURE — 3074F SYST BP LT 130 MM HG: CPT | Performed by: OTHER

## 2021-07-12 NOTE — TELEPHONE ENCOUNTER
Spoke with provider, to dispense autoinjectors. Called pharmacy back and spoke with pharmacist, relayed above.  Verbalized understanding

## 2021-07-13 ENCOUNTER — OFFICE VISIT (OUTPATIENT)
Dept: INTERNAL MEDICINE CLINIC | Facility: CLINIC | Age: 61
End: 2021-07-13
Payer: COMMERCIAL

## 2021-07-13 VITALS
HEART RATE: 95 BPM | TEMPERATURE: 98 F | BODY MASS INDEX: 39 KG/M2 | DIASTOLIC BLOOD PRESSURE: 74 MMHG | HEIGHT: 61 IN | SYSTOLIC BLOOD PRESSURE: 110 MMHG

## 2021-07-13 DIAGNOSIS — E66.01 CLASS 2 SEVERE OBESITY DUE TO EXCESS CALORIES WITH SERIOUS COMORBIDITY AND BODY MASS INDEX (BMI) OF 38.0 TO 38.9 IN ADULT (HCC): Primary | ICD-10-CM

## 2021-07-13 PROCEDURE — 99214 OFFICE O/P EST MOD 30 MIN: CPT | Performed by: INTERNAL MEDICINE

## 2021-07-13 PROCEDURE — 3074F SYST BP LT 130 MM HG: CPT | Performed by: INTERNAL MEDICINE

## 2021-07-13 PROCEDURE — 3078F DIAST BP <80 MM HG: CPT | Performed by: INTERNAL MEDICINE

## 2021-07-13 RX ORDER — SEMAGLUTIDE 0.5 MG/.5ML
0.5 INJECTION, SOLUTION SUBCUTANEOUS WEEKLY
Qty: 2 ML | Refills: 0 | Status: SHIPPED | OUTPATIENT
Start: 2021-07-13 | End: 2021-08-06

## 2021-07-13 RX ORDER — SUMATRIPTAN SUCCINATE 6 MG/.5ML
INJECTION SUBCUTANEOUS
COMMUNITY
Start: 2021-07-12 | End: 2021-10-13

## 2021-07-13 NOTE — PROGRESS NOTES
Patient presents with:  Weight Check      HPI: Caron Rivera presents for MD-supervised medical weight loss programming follow up for the diagnosis of Class 2 obesity.  She's tried various prescription medication + intensive lifestyle therapies during her weight los every evening., Disp: 30 capsule, Rfl: 2  •  MEMANTINE HCL 10 MG Oral Tab, TAKE 1 TABLET BY MOUTH DAILY, Disp: 30 tablet, Rfl: 2  •  famotidine 40 MG Oral Tab, Take 1 tablet (40 mg total) by mouth 2 (two) times daily as needed for Heartburn., Disp: 90 tabl Position: Sitting, Cuff Size: adult)   Pulse 95   Temp 98.1 °F (36.7 °C) (Temporal)   Ht 5' 1\" (1.549 m)   BMI 38.73 kg/m²   Wt Readings from Last 6 Encounters:  07/12/21 : 205 lb (93 kg)  06/15/21 : 195 lb (88.5 kg)  06/09/21 : 196 lb (88.9 kg)  06/09/21

## 2021-07-30 ENCOUNTER — TELEPHONE (OUTPATIENT)
Dept: NEUROLOGY | Facility: CLINIC | Age: 61
End: 2021-07-30

## 2021-07-30 DIAGNOSIS — G43.709 CHRONIC MIGRAINE WITHOUT AURA WITHOUT STATUS MIGRAINOSUS, NOT INTRACTABLE: Primary | ICD-10-CM

## 2021-07-30 RX ORDER — METHYLPREDNISOLONE 4 MG/1
TABLET ORAL
Qty: 1 EACH | Refills: 0 | Status: SHIPPED | OUTPATIENT
Start: 2021-07-30 | End: 2021-08-15

## 2021-07-30 NOTE — TELEPHONE ENCOUNTER
Acute Migraine assessment:    Is this your typical migraine? Describe any change in character from past migraines.       Location of Pain (select all that apply):  moving around  # Days pain has been present:  3    Describe the pain:  Pressure and Throbbin

## 2021-08-07 ENCOUNTER — OFFICE VISIT (OUTPATIENT)
Dept: NUTRITION | Facility: HOSPITAL | Age: 61
End: 2021-08-07
Attending: INTERNAL MEDICINE
Payer: COMMERCIAL

## 2021-08-07 PROCEDURE — 97802 MEDICAL NUTRITION INDIV IN: CPT

## 2021-08-07 NOTE — PROGRESS NOTES
ADULT INITIAL OUTPATIENT NUTRITION CONSULTATION    Nutrition Assessment    Medical Diagnosis: gastroparesis    PMH: IBS, SIBO, obesity, enlarged liver, anxiety/depression, migraines, diverticulosis     Client Hx: 62year old female    Meds: noted    Labs: the FODMAP guidelines for future use and provided written materials and web sites for review. Encouraged logging intake and symptoms.  Pt aware of need to increase activity and does have self care coping mechanisms in place for stress/anxiety and depression

## 2021-08-15 ENCOUNTER — HOSPITAL ENCOUNTER (EMERGENCY)
Facility: HOSPITAL | Age: 61
Discharge: HOME | End: 2021-08-15
Attending: EMERGENCY MEDICINE
Payer: COMMERCIAL

## 2021-08-15 VITALS
TEMPERATURE: 98 F | HEART RATE: 71 BPM | SYSTOLIC BLOOD PRESSURE: 111 MMHG | DIASTOLIC BLOOD PRESSURE: 62 MMHG | BODY MASS INDEX: 38.89 KG/M2 | HEIGHT: 61 IN | OXYGEN SATURATION: 98 % | RESPIRATION RATE: 19 BRPM | WEIGHT: 206 LBS

## 2021-08-15 DIAGNOSIS — T78.40XA ALLERGIC REACTION, INITIAL ENCOUNTER: Primary | ICD-10-CM

## 2021-08-15 PROCEDURE — 99283 EMERGENCY DEPT VISIT LOW MDM: CPT

## 2021-08-15 RX ORDER — FAMOTIDINE 20 MG/1
20 TABLET ORAL 2 TIMES DAILY PRN
Qty: 30 TABLET | Refills: 0 | Status: SHIPPED | OUTPATIENT
Start: 2021-08-15 | End: 2021-08-23 | Stop reason: DRUGHIGH

## 2021-08-15 RX ORDER — LAMOTRIGINE 25 MG/1
25 TABLET ORAL 2 TIMES DAILY
COMMUNITY
End: 2021-08-26 | Stop reason: ALTCHOICE

## 2021-08-15 RX ORDER — PREDNISONE 20 MG/1
60 TABLET ORAL DAILY
Qty: 15 TABLET | Refills: 0 | Status: SHIPPED | OUTPATIENT
Start: 2021-08-15 | End: 2021-08-20

## 2021-08-15 RX ORDER — PREDNISONE 20 MG/1
60 TABLET ORAL ONCE
Status: COMPLETED | OUTPATIENT
Start: 2021-08-15 | End: 2021-08-15

## 2021-08-15 RX ORDER — DIPHENHYDRAMINE HCL 50 MG
50 CAPSULE ORAL ONCE
Status: COMPLETED | OUTPATIENT
Start: 2021-08-15 | End: 2021-08-15

## 2021-08-15 NOTE — ED PROVIDER NOTES
Patient Seen in: [facility] Emergency Department      History   Patient presents with:   Allergic Rxn Allergies    Stated Complaint: Allergic reaction    HPI/Subjective:   HPI    This is a 73-year-old female who arrives with rash that started about 2 with phentermine              Past Surgical History:   Procedure Laterality Date   •       x 2   • CHOLECYSTECTOMY     • COLONOSCOPY     • D & C     • ECT PROVIDED  [id number]   • EGD     • LAPAROSCOPIC CHOLECYSTECTOMY     • NEEDLE BIOPSY RIGHT  05 moving all extremities there is no focal findings. ED Course   Labs Reviewed - No data to display                MDM            The patient was given prednisone, Benadryl here.   No findings of Linder-Shahbaz's or difficulty breathing for this patien

## 2021-08-15 NOTE — ED INITIAL ASSESSMENT (HPI)
Pt to ED c/o \"raised rashes\" noted to abdomen since 0230 this AM, itching noted since 2300. Pt recently started on Lamotrigine PO. Denies SOB.

## 2021-08-16 ENCOUNTER — TELEPHONE (OUTPATIENT)
Dept: INTERNAL MEDICINE CLINIC | Facility: CLINIC | Age: 61
End: 2021-08-16

## 2021-08-17 NOTE — TELEPHONE ENCOUNTER
NotedPhoejoyce Arellano MD  Diplomate, American Board of Internal Medicine; Member, [facility] of Lifestyle Medicine  [address] [facility]  [address] [facility], [address] [place] [address],[address], [place], [address]  [phone] (phone); [phone] (fax)  Yasmeen Kelley

## 2021-08-17 NOTE — TELEPHONE ENCOUNTER
Pt called back and stated to be doing fine.   Pt seen at ER on 8/15/21 for possible allergic reaction to lamictal.     - Rash has subsided  - Compliant w prednisone / otc benadryl / pepcid     Pt stated no concerns at the moment and has a f/u apt scheduled

## 2021-09-01 DIAGNOSIS — G43.709 CHRONIC MIGRAINE WITHOUT AURA WITHOUT STATUS MIGRAINOSUS, NOT INTRACTABLE: Primary | ICD-10-CM

## 2021-09-01 RX ORDER — PROPRANOLOL HYDROCHLORIDE 80 MG/1
CAPSULE, EXTENDED RELEASE ORAL
Qty: 30 CAPSULE | Refills: 2 | Status: SHIPPED | OUTPATIENT
Start: 2021-09-01 | End: 2021-11-22

## 2021-09-01 NOTE — TELEPHONE ENCOUNTER
Medication: PROPRANOLOL HCL ER 80 MG Oral Capsule SR 24 Hr    Date of last refill: 06/09/2021 (#30/2)  [place]    Last office visit: 07/12/2021  Due back to clinic per last office note:  12 weeks  Date next office vi

## 2021-09-30 ENCOUNTER — OFFICE VISIT (OUTPATIENT)
Dept: INTERNAL MEDICINE CLINIC | Facility: CLINIC | Age: 61
End: 2021-09-30
Payer: COMMERCIAL

## 2021-09-30 VITALS
RESPIRATION RATE: 16 BRPM | HEIGHT: 61 IN | BODY MASS INDEX: 39.27 KG/M2 | TEMPERATURE: 98 F | SYSTOLIC BLOOD PRESSURE: 98 MMHG | DIASTOLIC BLOOD PRESSURE: 64 MMHG | WEIGHT: 208 LBS | HEART RATE: 68 BPM | OXYGEN SATURATION: 98 %

## 2021-09-30 DIAGNOSIS — Z51.81 THERAPEUTIC DRUG MONITORING: ICD-10-CM

## 2021-09-30 DIAGNOSIS — Z12.31 SCREENING MAMMOGRAM, ENCOUNTER FOR: ICD-10-CM

## 2021-09-30 DIAGNOSIS — E66.01 CLASS 2 SEVERE OBESITY DUE TO EXCESS CALORIES WITH SERIOUS COMORBIDITY AND BODY MASS INDEX (BMI) OF 39.0 TO 39.9 IN ADULT (HCC): Primary | ICD-10-CM

## 2021-09-30 DIAGNOSIS — G89.29 CHRONIC BILATERAL LOW BACK PAIN WITHOUT SCIATICA: ICD-10-CM

## 2021-09-30 DIAGNOSIS — M54.50 CHRONIC BILATERAL LOW BACK PAIN WITHOUT SCIATICA: ICD-10-CM

## 2021-09-30 DIAGNOSIS — Z23 FLU VACCINE NEED: ICD-10-CM

## 2021-09-30 PROCEDURE — 90686 IIV4 VACC NO PRSV 0.5 ML IM: CPT | Performed by: INTERNAL MEDICINE

## 2021-09-30 PROCEDURE — 99214 OFFICE O/P EST MOD 30 MIN: CPT | Performed by: INTERNAL MEDICINE

## 2021-09-30 PROCEDURE — 3078F DIAST BP <80 MM HG: CPT | Performed by: INTERNAL MEDICINE

## 2021-09-30 PROCEDURE — 90471 IMMUNIZATION ADMIN: CPT | Performed by: INTERNAL MEDICINE

## 2021-09-30 PROCEDURE — 3008F BODY MASS INDEX DOCD: CPT | Performed by: INTERNAL MEDICINE

## 2021-09-30 PROCEDURE — 3074F SYST BP LT 130 MM HG: CPT | Performed by: INTERNAL MEDICINE

## 2021-09-30 RX ORDER — CARBOXYMETHYLCELLULOSE/CITRIC 0.75 G
2.25 CAPSULE ORAL 2 TIMES DAILY
Qty: 168 CAPSULE | Refills: 2 | Status: SHIPPED | OUTPATIENT
Start: 2021-09-30 | End: 2022-01-06

## 2021-09-30 RX ORDER — ONDANSETRON 4 MG/1
4 TABLET, FILM COATED ORAL AS NEEDED
COMMUNITY
Start: 2021-08-28

## 2021-09-30 NOTE — PROGRESS NOTES
Patient presents with:  Weight Check      HPI: Michael Harvey presents today for 3-month f/u Class 2 obesity. She's about the same as previous assessment regarding her weight. She remains compliant w/ FFOWNU but might be at somewhat of a plateau.  There are other fac PROPRANOLOL HCL ER 80 MG Oral Capsule SR 24 Hr, TAKE 1 CAPSULE(80 MG) BY MOUTH EVERY EVENING, Disp: 30 capsule, Rfl: 2  •  DULoxetine 30 MG Oral Cap DR Particles, Take 30 mg by mouth daily. , Disp: , Rfl:   •  famoTIDine 40 MG Oral Tab, Take 1 tablet (40 mg Auto-injector, Inject 0.5 mL (0.5 mg total) into the skin once a week.  Month #2 (Patient not taking: Reported on 9/30/2021), Disp: 2 mL, Rfl: 0  •  semaglutide-weight management (WEGOVY) 1.7 MG/0.75ML Subcutaneous Solution Auto-injector, Inject 0.75 mL (1. acquired in sagittal and axial planes.         PATIENT STATED HISTORY: (As transcribed by Technologist)  Worsening low back pain radiating to buttocks which increases when bending or standing still.        FINDINGS:   Alignment of the lumbar spine is tej Janis Latif MD               Study Result    Narrative   PROCEDURE:  XR LUMBAR SPINE COMPLETE W/FLEX + EXT (CPT=72114)       TECHNIQUE:  AP, lateral, obliques, coned down view, and flexion/extension views of the spine were obtained.       COMPARISON:  None. opportunity to ask questions, which were then answered to the best of my ability. Gretchen Alfaro.  Puneet Carolina MD  Diplomate, [address] [place] [address] Board of Internal Medicine; Member, [employer] of [facility]  [facility]5 [facility], [address] [facility]

## 2021-10-10 ENCOUNTER — HOSPITAL ENCOUNTER (OUTPATIENT)
Dept: GENERAL RADIOLOGY | Age: 61
Discharge: HOME | End: 2021-10-10
Attending: INTERNAL MEDICINE
Payer: COMMERCIAL

## 2021-10-10 DIAGNOSIS — M54.50 CHRONIC BILATERAL LOW BACK PAIN WITHOUT SCIATICA: ICD-10-CM

## 2021-10-10 DIAGNOSIS — G89.29 CHRONIC BILATERAL LOW BACK PAIN WITHOUT SCIATICA: ICD-10-CM

## 2021-10-10 PROCEDURE — 72100 X-RAY EXAM L-S SPINE 2/3 VWS: CPT | Performed by: INTERNAL MEDICINE

## 2021-10-11 DIAGNOSIS — G31.84 MILD NEUROCOGNITIVE DISORDER DUE TO MULTIPLE ETIOLOGIES: ICD-10-CM

## 2021-10-11 RX ORDER — MEMANTINE HYDROCHLORIDE 10 MG/1
TABLET ORAL
Qty: 30 TABLET | Refills: 2 | Status: SHIPPED | OUTPATIENT
Start: 2021-10-11 | End: 2022-01-10

## 2021-10-11 NOTE — TELEPHONE ENCOUNTER
Medication: MEMANTINE HCL 10 MG Oral Tab    Date of last refill: 6/7/2021 (#30/2)  [place]    Last office visit: 7/12/2021  Due back to clinic per last office note:  12 weeks  Date next office visit scheduled:     Fut

## 2021-10-13 ENCOUNTER — OFFICE VISIT (OUTPATIENT)
Dept: NEUROLOGY | Facility: CLINIC | Age: 61
End: 2021-10-13
Payer: COMMERCIAL

## 2021-10-13 VITALS
HEART RATE: 70 BPM | RESPIRATION RATE: 16 BRPM | WEIGHT: 205 LBS | BODY MASS INDEX: 39 KG/M2 | DIASTOLIC BLOOD PRESSURE: 62 MMHG | SYSTOLIC BLOOD PRESSURE: 104 MMHG

## 2021-10-13 DIAGNOSIS — G43.709 CHRONIC MIGRAINE WITHOUT AURA WITHOUT STATUS MIGRAINOSUS, NOT INTRACTABLE: Primary | ICD-10-CM

## 2021-10-13 PROCEDURE — 3074F SYST BP LT 130 MM HG: CPT | Performed by: OTHER

## 2021-10-13 PROCEDURE — 3078F DIAST BP <80 MM HG: CPT | Performed by: OTHER

## 2021-10-13 PROCEDURE — 64615 CHEMODENERV MUSC MIGRAINE: CPT | Performed by: OTHER

## 2021-10-13 RX ORDER — SUMATRIPTAN SUCCINATE 6 MG/.5ML
6 INJECTION SUBCUTANEOUS ONCE AS NEEDED
Qty: 15 ML | Refills: 0 | Status: SHIPPED | OUTPATIENT
Start: 2021-10-13 | End: 2021-10-13

## 2021-10-13 RX ORDER — LURASIDONE HYDROCHLORIDE 60 MG/1
60 TABLET, FILM COATED ORAL NIGHTLY
COMMUNITY
Start: 2021-10-06

## 2021-10-23 ENCOUNTER — IMMUNIZATION (OUTPATIENT)
Dept: LAB | Facility: HOSPITAL | Age: 61
End: 2021-10-23
Attending: EMERGENCY MEDICINE
Payer: COMMERCIAL

## 2021-10-23 DIAGNOSIS — Z23 NEED FOR VACCINATION: Primary | ICD-10-CM

## 2021-10-23 PROCEDURE — 0003A SARSCOV2 VAC 30MCG/0.3ML IM: CPT

## 2021-10-25 NOTE — PROGRESS NOTES
Virtual Telephone Check-In    Early Tim verbally consents to a Virtual/Telephone Check-In visit on 05/05/20. Patient understands and accepts financial responsibility for any deductible, co-insurance and/or co-pays associated with this service. ulcers      Patient has no known allergies.       Current Outpatient Medications:   •  DICYCLOMINE HCL 10 MG Oral Cap, TAKE 1 CAPSULE(10 MG) BY MOUTH TWICE DAILY AS NEEDED, Disp: 60 capsule, Rfl: 0  •  Phytonadione (SUPERIORSOURCE K1 OR), Take by mouth., Maya Bradley Take 2 mg by mouth every 4 (four) hours as needed for Anxiety. , Disp: , Rfl:   •  Estradiol 0.1 MG/GM Vaginal Cream, APPLY AS DIRECTED VAGINALLY 2 TIMES PER WEEK, Disp: 42.5 g, Rfl: 3  •  Cyclobenzaprine HCl 10 MG Oral Tab, Take 1 tablet (10 mg total) by m MD  Diplomate, American Board of Internal Medicine  Member, [facility]  [address] [facility],[address], [address], [facility], [address] [place] [address]  T: [id number]; F: Grisel Koroma Xerosis Aggressive Treatment: I recommended application of Cetaphil or CeraVe numerous times a day going to bed to all dry areas. I also prescribed a topical steroid for twice daily use.

## 2021-11-05 ENCOUNTER — LAB ENCOUNTER (OUTPATIENT)
Dept: LAB | Age: 61
End: 2021-11-05
Attending: NURSE PRACTITIONER
Payer: COMMERCIAL

## 2021-11-05 DIAGNOSIS — R89.9 ABNORMAL LABORATORY TEST: ICD-10-CM

## 2021-11-05 DIAGNOSIS — R16.0 HEPATOMEGALY: ICD-10-CM

## 2021-11-05 PROCEDURE — 84630 ASSAY OF ZINC: CPT

## 2021-11-05 PROCEDURE — 36415 COLL VENOUS BLD VENIPUNCTURE: CPT

## 2021-11-05 PROCEDURE — 80053 COMPREHEN METABOLIC PANEL: CPT

## 2021-11-22 DIAGNOSIS — G43.709 CHRONIC MIGRAINE WITHOUT AURA WITHOUT STATUS MIGRAINOSUS, NOT INTRACTABLE: ICD-10-CM

## 2021-11-22 RX ORDER — PROPRANOLOL HYDROCHLORIDE 80 MG/1
CAPSULE, EXTENDED RELEASE ORAL
Qty: 30 CAPSULE | Refills: 2 | Status: SHIPPED | OUTPATIENT
Start: 2021-11-22

## 2021-11-22 NOTE — TELEPHONE ENCOUNTER
Medication: PROPRANOLOL HCL ER 80 MG Oral Capsule SR 24 Hr     Date of last refill: 09/01/2021 (#30/2)  [place]     Last office visit: 10/13/2021  Due back to clinic per last office note:  12 weeks  Date next office

## 2021-11-23 ENCOUNTER — HOSPITAL ENCOUNTER (OUTPATIENT)
Dept: MAMMOGRAPHY | Age: 61
Discharge: HOME | End: 2021-11-23
Attending: INTERNAL MEDICINE
Payer: COMMERCIAL

## 2021-11-23 DIAGNOSIS — Z12.31 SCREENING MAMMOGRAM, ENCOUNTER FOR: ICD-10-CM

## 2021-11-23 PROCEDURE — 77067 SCR MAMMO BI INCL CAD: CPT | Performed by: INTERNAL MEDICINE

## 2021-11-23 PROCEDURE — 77063 BREAST TOMOSYNTHESIS BI: CPT | Performed by: INTERNAL MEDICINE

## 2021-11-26 ENCOUNTER — TELEPHONE (OUTPATIENT)
Dept: INTERNAL MEDICINE CLINIC | Facility: CLINIC | Age: 61
End: 2021-11-26

## 2021-11-26 NOTE — TELEPHONE ENCOUNTER
Paperwork at Dr Mendy Staley. - DPIQJU not covered by pt's plan  - Aden Spicer is covered by pt's plan w prior-auth.

## 2021-11-30 NOTE — TELEPHONE ENCOUNTER
Please notify patient of what's going on. Arda Staff is the only option for coverage at this time. She tried it in the past. Would she be willing to give it another try? Kevin Lomeli. Jack Echeverria MD  Diplomate, American Board of Internal Medicine; Member, [employer] of [address], [address] [place] [address],[address], [facility], [address]  [phone] (phone); [phone] (fax)  [email or web address]

## 2021-12-10 PROBLEM — F33.9 RECURRENT MAJOR DEPRESSION RESISTANT TO TREATMENT (HCC): Status: ACTIVE | Noted: 2021-01-13

## 2021-12-10 PROBLEM — F33.2 SEVERE RECURRENT MAJOR DEPRESSION WITHOUT PSYCHOTIC FEATURES (HCC): Status: RESOLVED | Noted: 2021-01-13 | Resolved: 2021-12-10

## 2021-12-10 PROBLEM — F33.9 RECURRENT MAJOR DEPRESSION RESISTANT TO TREATMENT: Status: ACTIVE | Noted: 2021-01-13

## 2021-12-10 NOTE — PROGRESS NOTES
This is a telemedicine visit with live, interactive video and audio. Patient understands and accepts financial responsibility for any deductible, co-insurance and/or co-pays associated with this service. Patient presents with:   Other: Treatment resi Gastroparesis     Irritable bowel syndrome      HISTORY:  Past Medical History:   Diagnosis Date   • Abdominal pain    • Anxiety    • Arthritis    • Back pain    • Bad breath    • Bloating    • Calculus of kidney    • Constipation    • Depression    • Diar UNLISTED      Anterior cervical discectomy   • TONSILLECTOMY  ?       Family History   Problem Relation Age of Onset   • Hypertension Father    • Heart Attack Father          at 50 years   • Heart Disease Father    • Alcohol and Other Disorders Solution Auto-injector Inject 0.75 mL (1.7 mg total) into the skin once a week. Month #4 3 mL 0   • semaglutide-weight management (WEGOVY) 1 MG/0.5ML Subcutaneous Solution Auto-injector Inject 0.5 mL (1 mg total) into the skin once a week.  Month #3 2 mL 0 discussed the possibility of adding Brain Mapping/Neurofeedback with Dr. Eren Estes (resources given) and/or transcranial magnetic imaging. Resources given.    2. HM/Prev - She's interested in the [facility] Multi-Cancer Early Detection Test. PLAN = I've i

## 2021-12-27 ENCOUNTER — TELEPHONE (OUTPATIENT)
Dept: SURGERY | Facility: CLINIC | Age: 61
End: 2021-12-27

## 2022-01-03 ENCOUNTER — MED REC SCAN ONLY (OUTPATIENT)
Dept: INTERNAL MEDICINE CLINIC | Facility: CLINIC | Age: 62
End: 2022-01-03

## 2022-01-04 ENCOUNTER — TELEPHONE (OUTPATIENT)
Dept: SURGERY | Facility: CLINIC | Age: 62
End: 2022-01-04

## 2022-01-04 DIAGNOSIS — G43.709 CHRONIC MIGRAINE WITHOUT AURA WITHOUT STATUS MIGRAINOSUS, NOT INTRACTABLE: Primary | ICD-10-CM

## 2022-01-04 NOTE — TELEPHONE ENCOUNTER
To: MARIANNE MORRISON NURSE      From: Lester Mckenzie      Created: 1/4/2022 1:15 PM          1. Yes, 80%     2.  Yes, 70%

## 2022-01-05 DIAGNOSIS — E66.01 CLASS 2 SEVERE OBESITY DUE TO EXCESS CALORIES WITH SERIOUS COMORBIDITY AND BODY MASS INDEX (BMI) OF 39.0 TO 39.9 IN ADULT: ICD-10-CM

## 2022-01-06 RX ORDER — CARBOXYMETHYLCELLULOSE/CITRIC 0.75 G
CAPSULE ORAL
Qty: 180 CAPSULE | Refills: 0 | Status: SHIPPED | OUTPATIENT
Start: 2022-01-06 | End: 2022-05-06

## 2022-01-06 NOTE — TELEPHONE ENCOUNTER
Plenity welcome kit take 3 cap (1) pod by mouth bid    LOV 9/30/21   Add Plenity at the dose of 3 capsules before lunch and 3 capsules before dinner. Push lifestyle measures.   RTC 3 months  Next apt 1/14/22  Labs 6/15/21

## 2022-01-07 NOTE — TELEPHONE ENCOUNTER
Approved with new insurance [employer] as buy and bill    Approval #[id number] 4 visits from 1/7/22-1/7/23

## 2022-01-07 NOTE — TELEPHONE ENCOUNTER
Approved with new insurance [employer] as buy and bill    Approval #[id number] 4 visits from 1/7/22-1/7/23

## 2022-01-09 DIAGNOSIS — G31.84 MILD NEUROCOGNITIVE DISORDER DUE TO MULTIPLE ETIOLOGIES: ICD-10-CM

## 2022-01-10 RX ORDER — MEMANTINE HYDROCHLORIDE 10 MG/1
TABLET ORAL
Qty: 30 TABLET | Refills: 0 | Status: SHIPPED | OUTPATIENT
Start: 2022-01-10

## 2022-01-10 NOTE — TELEPHONE ENCOUNTER
Medication: MEMANTINE HCL 10 MG      Date of last refill: 10/11/21 (#30/2)  [place]      Last office visit: 10/13/2021  Due back to clinic per last office note:  12 weeks  Date next office visit scheduled:    Future Appo

## 2022-01-14 ENCOUNTER — OFFICE VISIT (OUTPATIENT)
Dept: NEUROLOGY | Facility: CLINIC | Age: 62
End: 2022-01-14
Payer: MEDICARE

## 2022-01-14 ENCOUNTER — OFFICE VISIT (OUTPATIENT)
Dept: INTERNAL MEDICINE CLINIC | Facility: CLINIC | Age: 62
End: 2022-01-14
Payer: MEDICARE

## 2022-01-14 VITALS
TEMPERATURE: 98 F | SYSTOLIC BLOOD PRESSURE: 118 MMHG | HEART RATE: 70 BPM | DIASTOLIC BLOOD PRESSURE: 82 MMHG | OXYGEN SATURATION: 93 % | HEIGHT: 61 IN | RESPIRATION RATE: 16 BRPM | BODY MASS INDEX: 41.5 KG/M2 | WEIGHT: 219.81 LBS

## 2022-01-14 VITALS
HEART RATE: 77 BPM | SYSTOLIC BLOOD PRESSURE: 90 MMHG | HEIGHT: 61 IN | BODY MASS INDEX: 40.97 KG/M2 | WEIGHT: 217 LBS | DIASTOLIC BLOOD PRESSURE: 60 MMHG | RESPIRATION RATE: 16 BRPM

## 2022-01-14 DIAGNOSIS — G43.709 CHRONIC MIGRAINE WITHOUT AURA WITHOUT STATUS MIGRAINOSUS, NOT INTRACTABLE: Primary | ICD-10-CM

## 2022-01-14 DIAGNOSIS — E66.01 CLASS 3 SEVERE OBESITY DUE TO EXCESS CALORIES WITH SERIOUS COMORBIDITY AND BODY MASS INDEX (BMI) OF 40.0 TO 44.9 IN ADULT (HCC): Primary | ICD-10-CM

## 2022-01-14 PROCEDURE — 3078F DIAST BP <80 MM HG: CPT | Performed by: OTHER

## 2022-01-14 PROCEDURE — 3008F BODY MASS INDEX DOCD: CPT | Performed by: INTERNAL MEDICINE

## 2022-01-14 PROCEDURE — 3074F SYST BP LT 130 MM HG: CPT | Performed by: INTERNAL MEDICINE

## 2022-01-14 PROCEDURE — 99213 OFFICE O/P EST LOW 20 MIN: CPT | Performed by: INTERNAL MEDICINE

## 2022-01-14 PROCEDURE — 3008F BODY MASS INDEX DOCD: CPT | Performed by: OTHER

## 2022-01-14 PROCEDURE — 3079F DIAST BP 80-89 MM HG: CPT | Performed by: INTERNAL MEDICINE

## 2022-01-14 PROCEDURE — 3074F SYST BP LT 130 MM HG: CPT | Performed by: OTHER

## 2022-01-14 PROCEDURE — 64615 CHEMODENERV MUSC MIGRAINE: CPT | Performed by: OTHER

## 2022-01-14 RX ORDER — PHENTERMINE HYDROCHLORIDE 37.5 MG/1
37.5 TABLET ORAL
Qty: 30 TABLET | Refills: 2 | Status: SHIPPED | OUTPATIENT
Start: 2022-01-14

## 2022-01-14 RX ORDER — ESKETAMINE HYDROCHLORIDE 28 MG/.2ML
SOLUTION NASAL
COMMUNITY
Start: 2022-01-06

## 2022-01-14 NOTE — PROGRESS NOTES
Patient presents with:  Weight Problem: MD-supervised medical weight loss programming      HPI: Gena Ming presents today for 3-month f/u MD-supervised medical weight loss programming.  She'd been on a combination of Wegovy + Plenity, although these are not neces Tab, Take 60 mg by mouth nightly., Disp: , Rfl:   ondansetron (ZOFRAN) 4 mg tablet, Take 4 mg by mouth as needed. , Disp: , Rfl:   traZODone 150 MG Oral Tab, Take 1 tablet (150 mg total) by mouth nightly., Disp: 30 tablet, Rfl: 0  Dicyclomine HCl 10 MG Oral (1 mg total) into the skin once a week. Month #3 (Patient not taking: Reported on 1/14/2022), Disp: 2 mL, Rfl: 0    No current facility-administered medications on file prior to visit.     Social History    Tobacco Use      Smoking status: Never Smoker [address], [address] [place] [address],[address], [place], [address]  [phone] (phone); [phone] (fax)  Arnold Morales. [email or web address]       Meds & Refills for this Visit:  Requested Prescriptions

## 2022-02-08 RX ORDER — MEMANTINE HYDROCHLORIDE 10 MG/1
TABLET ORAL
Qty: 90 TABLET | Refills: 0 | Status: SHIPPED | OUTPATIENT
Start: 2022-02-08

## 2022-03-15 RX ORDER — PROPRANOLOL HYDROCHLORIDE 80 MG/1
CAPSULE, EXTENDED RELEASE ORAL
Qty: 30 CAPSULE | Refills: 2 | Status: SHIPPED | OUTPATIENT
Start: 2022-03-15

## 2022-03-16 ENCOUNTER — TELEPHONE (OUTPATIENT)
Dept: INTERNAL MEDICINE CLINIC | Facility: CLINIC | Age: 62
End: 2022-03-16

## 2022-05-06 ENCOUNTER — OFFICE VISIT (OUTPATIENT)
Dept: NEUROLOGY | Facility: CLINIC | Age: 62
End: 2022-05-06
Payer: MEDICARE

## 2022-05-06 VITALS
DIASTOLIC BLOOD PRESSURE: 60 MMHG | SYSTOLIC BLOOD PRESSURE: 120 MMHG | BODY MASS INDEX: 41 KG/M2 | RESPIRATION RATE: 16 BRPM | HEART RATE: 64 BPM | WEIGHT: 218.19 LBS

## 2022-05-06 DIAGNOSIS — G31.84 MILD NEUROCOGNITIVE DISORDER DUE TO MULTIPLE ETIOLOGIES: ICD-10-CM

## 2022-05-06 DIAGNOSIS — G43.709 CHRONIC MIGRAINE WITHOUT AURA WITHOUT STATUS MIGRAINOSUS, NOT INTRACTABLE: ICD-10-CM

## 2022-05-06 PROBLEM — G30.9 ALZHEIMER'S DISEASE, UNSPECIFIED (HCC): Status: ACTIVE | Noted: 2022-05-06

## 2022-05-06 PROBLEM — F02.80 ALZHEIMER'S DISEASE, UNSPECIFIED (HCC): Status: ACTIVE | Noted: 2022-05-06

## 2022-05-06 PROCEDURE — 64615 CHEMODENERV MUSC MIGRAINE: CPT | Performed by: OTHER

## 2022-05-06 PROCEDURE — 99213 OFFICE O/P EST LOW 20 MIN: CPT | Performed by: OTHER

## 2022-05-06 PROCEDURE — 3074F SYST BP LT 130 MM HG: CPT | Performed by: OTHER

## 2022-05-06 PROCEDURE — 3078F DIAST BP <80 MM HG: CPT | Performed by: OTHER

## 2022-05-06 RX ORDER — MEMANTINE HYDROCHLORIDE 10 MG/1
10 TABLET ORAL DAILY
Qty: 90 TABLET | Refills: 1 | Status: SHIPPED | OUTPATIENT
Start: 2022-05-06

## 2022-05-06 NOTE — PROCEDURES
Procedure: Botox injection -chemodenervation  Consent: obtained after explanation of procedure detail, benefits and risks     Indication:   -chronic migraine patient who suffers 15 or more days with headache lasting 4 hours a day or longer. Procedure description:  -Chronic Migraine  Dilution is 100 units/2 ml with a final concentration of 5 units per 0.1 ml. A sterile 30-gauge, 0.5 inch needle as 0.1 ml (5 units) injection per each site. Injections were divided across 7 specific head/neck muscle areas as specified in the table below. All muscles were injected bilaterally with half the number of injection sites administered to the left, and half to the right side of the head and neck. 45 units wasted. Head/Neck Area Dose (number of sites)   Frontalis bilaterally 20 units divided in 4 sites    bilaterally 10 units divided in 2 sites   Procerus 5 unit in 1 site   Occipitalis bilaterally 30 units divided in 6 sites   Temporalis bilaterally 40 units divided in 8 sites   Trapezius bilaterally 30 units divided in 6 sites   Cervical Paraspinal bilaterally 20 units divided in 2 sites   Total Dose 155 units divided in 31 sites         The procedure was completed successfully without complication during and after the injections, and the patient tolerated well throughout the procedure. The recommended re-treatment schedule should be ~12 weeks from today.

## 2022-05-09 RX ORDER — MEMANTINE HYDROCHLORIDE 10 MG/1
TABLET ORAL
Qty: 90 TABLET | Refills: 1 | OUTPATIENT
Start: 2022-05-09

## 2022-05-09 NOTE — TELEPHONE ENCOUNTER
Sent patient message via [email or web address] to see if they would like to switch pharmacies. Awaiting reply for further action.

## 2022-05-11 ENCOUNTER — IMMUNIZATION (OUTPATIENT)
Dept: LAB | Age: 62
End: 2022-05-11
Attending: EMERGENCY MEDICINE
Payer: MEDICARE

## 2022-05-11 DIAGNOSIS — Z23 NEED FOR VACCINATION: Primary | ICD-10-CM

## 2022-05-11 PROCEDURE — 0054A SARSCOV2 VAC 30MCG TRS SUCR: CPT

## 2022-06-21 DIAGNOSIS — G43.709 CHRONIC MIGRAINE WITHOUT AURA WITHOUT STATUS MIGRAINOSUS, NOT INTRACTABLE: ICD-10-CM

## 2022-06-21 RX ORDER — PROPRANOLOL HYDROCHLORIDE 80 MG/1
1 CAPSULE, EXTENDED RELEASE ORAL EVERY EVENING
Qty: 30 CAPSULE | Refills: 2 | Status: SHIPPED | OUTPATIENT
Start: 2022-06-21

## 2022-08-08 ENCOUNTER — OFFICE VISIT (OUTPATIENT)
Dept: NEUROLOGY | Facility: CLINIC | Age: 62
End: 2022-08-08
Payer: MEDICARE

## 2022-08-08 VITALS
DIASTOLIC BLOOD PRESSURE: 68 MMHG | RESPIRATION RATE: 16 BRPM | SYSTOLIC BLOOD PRESSURE: 100 MMHG | HEIGHT: 61 IN | HEART RATE: 60 BPM | WEIGHT: 228.81 LBS | BODY MASS INDEX: 43.2 KG/M2

## 2022-08-08 DIAGNOSIS — G43.709 CHRONIC MIGRAINE WITHOUT AURA WITHOUT STATUS MIGRAINOSUS, NOT INTRACTABLE: Primary | ICD-10-CM

## 2022-08-08 NOTE — PROGRESS NOTES
Paperwork noting that patient may bear financial responsibility for procedure(s) performed in clinic today signed prior to proceeding with procedure(s). Furthermore, patient notified that they should contact their insurer to verify that your procedure/test has been approved and is a COVERED benefit. Although the [facility] staff does its due diligence, the insurance carrier gives the disclaimer that \"Although the procedure is authorized, this does not guarantee payment. \"    Ultimately the patient is responsible for payment.     Botox is:  [x] Buy and Bill  [] Patient Supplied

## 2022-08-10 ENCOUNTER — TELEPHONE (OUTPATIENT)
Dept: NEUROLOGY | Facility: CLINIC | Age: 62
End: 2022-08-10

## 2022-08-11 ENCOUNTER — LAB ENCOUNTER (OUTPATIENT)
Dept: LAB | Age: 62
End: 2022-08-11
Attending: INTERNAL MEDICINE
Payer: MEDICARE

## 2022-08-11 ENCOUNTER — OFFICE VISIT (OUTPATIENT)
Dept: INTERNAL MEDICINE CLINIC | Facility: CLINIC | Age: 62
End: 2022-08-11
Payer: MEDICARE

## 2022-08-11 VITALS
HEIGHT: 61 IN | BODY MASS INDEX: 43.43 KG/M2 | RESPIRATION RATE: 16 BRPM | SYSTOLIC BLOOD PRESSURE: 120 MMHG | HEART RATE: 78 BPM | DIASTOLIC BLOOD PRESSURE: 80 MMHG | WEIGHT: 230 LBS

## 2022-08-11 DIAGNOSIS — F33.9 RECURRENT MAJOR DEPRESSION RESISTANT TO TREATMENT (HCC): ICD-10-CM

## 2022-08-11 DIAGNOSIS — Z51.81 THERAPEUTIC DRUG MONITORING: ICD-10-CM

## 2022-08-11 DIAGNOSIS — E66.01 CLASS 2 SEVERE OBESITY DUE TO EXCESS CALORIES WITH SERIOUS COMORBIDITY AND BODY MASS INDEX (BMI) OF 39.0 TO 39.9 IN ADULT (HCC): Primary | ICD-10-CM

## 2022-08-11 DIAGNOSIS — R63.5 WEIGHT GAIN: ICD-10-CM

## 2022-08-11 DIAGNOSIS — E66.01 CLASS 2 SEVERE OBESITY DUE TO EXCESS CALORIES WITH SERIOUS COMORBIDITY AND BODY MASS INDEX (BMI) OF 39.0 TO 39.9 IN ADULT (HCC): ICD-10-CM

## 2022-08-11 DIAGNOSIS — K31.84 GASTROPARESIS: ICD-10-CM

## 2022-08-11 LAB
ALBUMIN SERPL-MCNC: 3.6 G/DL (ref 3.4–5)
ALBUMIN/GLOB SERPL: 1.1 {RATIO} (ref 1–2)
ALP LIVER SERPL-CCNC: 81 U/L
ALT SERPL-CCNC: 13 U/L
ANION GAP SERPL CALC-SCNC: 6 MMOL/L (ref 0–18)
AST SERPL-CCNC: 13 U/L (ref 15–37)
BASOPHILS # BLD AUTO: 0.03 X10(3) UL (ref 0–0.2)
BASOPHILS NFR BLD AUTO: 0.4 %
BILIRUB SERPL-MCNC: 0.4 MG/DL (ref 0.1–2)
BUN BLD-MCNC: 23 MG/DL (ref 7–18)
BUN/CREAT SERPL: 23.7 (ref 10–20)
CALCIUM BLD-MCNC: 8.9 MG/DL (ref 8.5–10.1)
CHLORIDE SERPL-SCNC: 108 MMOL/L (ref 98–112)
CO2 SERPL-SCNC: 25 MMOL/L (ref 21–32)
CREAT BLD-MCNC: 0.97 MG/DL
DEPRECATED RDW RBC AUTO: 43 FL (ref 35.1–46.3)
EOSINOPHIL # BLD AUTO: 0.16 X10(3) UL (ref 0–0.7)
EOSINOPHIL NFR BLD AUTO: 2.3 %
ERYTHROCYTE [DISTWIDTH] IN BLOOD BY AUTOMATED COUNT: 12.9 % (ref 11–15)
EST. AVERAGE GLUCOSE BLD GHB EST-MCNC: 103 MG/DL (ref 68–126)
FASTING STATUS PATIENT QL REPORTED: YES
FOLATE SERPL-MCNC: 12.1 NG/ML (ref 8.7–?)
GFR SERPLBLD BASED ON 1.73 SQ M-ARVRAT: 66 ML/MIN/1.73M2 (ref 60–?)
GLOBULIN PLAS-MCNC: 3.4 G/DL (ref 2.8–4.4)
GLUCOSE BLD-MCNC: 90 MG/DL (ref 70–99)
HBA1C MFR BLD: 5.2 % (ref ?–5.7)
HCT VFR BLD AUTO: 42.3 %
HGB BLD-MCNC: 13.2 G/DL
IMM GRANULOCYTES # BLD AUTO: 0.02 X10(3) UL (ref 0–1)
IMM GRANULOCYTES NFR BLD: 0.3 %
LYMPHOCYTES # BLD AUTO: 1.3 X10(3) UL (ref 1–4)
LYMPHOCYTES NFR BLD AUTO: 18.5 %
MCH RBC QN AUTO: 28.4 PG (ref 26–34)
MCHC RBC AUTO-ENTMCNC: 31.2 G/DL (ref 31–37)
MCV RBC AUTO: 91.2 FL
MONOCYTES # BLD AUTO: 0.73 X10(3) UL (ref 0.1–1)
MONOCYTES NFR BLD AUTO: 10.4 %
NEUTROPHILS # BLD AUTO: 4.78 X10 (3) UL (ref 1.5–7.7)
NEUTROPHILS # BLD AUTO: 4.78 X10(3) UL (ref 1.5–7.7)
NEUTROPHILS NFR BLD AUTO: 68.1 %
OSMOLALITY SERPL CALC.SUM OF ELEC: 291 MOSM/KG (ref 275–295)
PLATELET # BLD AUTO: 234 10(3)UL (ref 150–450)
POTASSIUM SERPL-SCNC: 4.4 MMOL/L (ref 3.5–5.1)
PROT SERPL-MCNC: 7 G/DL (ref 6.4–8.2)
RBC # BLD AUTO: 4.64 X10(6)UL
SODIUM SERPL-SCNC: 139 MMOL/L (ref 136–145)
T4 FREE SERPL-MCNC: 0.9 NG/DL (ref 0.8–1.7)
TSI SER-ACNC: 1.95 MIU/ML (ref 0.36–3.74)
VIT B12 SERPL-MCNC: 301 PG/ML (ref 193–986)
VIT D+METAB SERPL-MCNC: 34.7 NG/ML (ref 30–100)
WBC # BLD AUTO: 7 X10(3) UL (ref 4–11)

## 2022-08-11 PROCEDURE — 83036 HEMOGLOBIN GLYCOSYLATED A1C: CPT

## 2022-08-11 PROCEDURE — 36415 COLL VENOUS BLD VENIPUNCTURE: CPT

## 2022-08-11 PROCEDURE — 84439 ASSAY OF FREE THYROXINE: CPT

## 2022-08-11 PROCEDURE — 82607 VITAMIN B-12: CPT

## 2022-08-11 PROCEDURE — 82306 VITAMIN D 25 HYDROXY: CPT

## 2022-08-11 PROCEDURE — 85025 COMPLETE CBC W/AUTO DIFF WBC: CPT

## 2022-08-11 PROCEDURE — 3079F DIAST BP 80-89 MM HG: CPT | Performed by: INTERNAL MEDICINE

## 2022-08-11 PROCEDURE — 99214 OFFICE O/P EST MOD 30 MIN: CPT | Performed by: INTERNAL MEDICINE

## 2022-08-11 PROCEDURE — 84443 ASSAY THYROID STIM HORMONE: CPT

## 2022-08-11 PROCEDURE — 82746 ASSAY OF FOLIC ACID SERUM: CPT

## 2022-08-11 PROCEDURE — 3008F BODY MASS INDEX DOCD: CPT | Performed by: INTERNAL MEDICINE

## 2022-08-11 PROCEDURE — 3074F SYST BP LT 130 MM HG: CPT | Performed by: INTERNAL MEDICINE

## 2022-08-11 PROCEDURE — 80053 COMPREHEN METABOLIC PANEL: CPT

## 2022-08-18 ENCOUNTER — TELEPHONE (OUTPATIENT)
Dept: INTERNAL MEDICINE CLINIC | Facility: CLINIC | Age: 62
End: 2022-08-18

## 2022-08-18 RX ORDER — CYANOCOBALAMIN 500 UG/1
1 SPRAY NASAL WEEKLY
Qty: 4 EACH | Refills: 5 | Status: SHIPPED | OUTPATIENT
Start: 2022-08-18

## 2022-08-18 NOTE — TELEPHONE ENCOUNTER
Patient would like nascobal for treatment of her low B12 instead of shots. She is on Spravato which is another nasal spray for depression.   Checked for interaction and there is none on Web Md

## 2022-09-18 DIAGNOSIS — G43.709 CHRONIC MIGRAINE WITHOUT AURA WITHOUT STATUS MIGRAINOSUS, NOT INTRACTABLE: ICD-10-CM

## 2022-09-19 RX ORDER — PROPRANOLOL HYDROCHLORIDE 80 MG/1
1 CAPSULE, EXTENDED RELEASE ORAL EVERY EVENING
Qty: 90 CAPSULE | Refills: 0 | Status: SHIPPED | OUTPATIENT
Start: 2022-09-19

## 2022-09-24 ENCOUNTER — IMMUNIZATION (OUTPATIENT)
Dept: LAB | Age: 62
End: 2022-09-24
Attending: EMERGENCY MEDICINE

## 2022-09-24 DIAGNOSIS — Z23 NEED FOR VACCINATION: Primary | ICD-10-CM

## 2022-09-24 PROCEDURE — 0124A SARSCOV2 VAC BVL 30MCG/0.3ML: CPT

## 2022-10-14 ENCOUNTER — TELEPHONE (OUTPATIENT)
Dept: INTERNAL MEDICINE CLINIC | Facility: CLINIC | Age: 62
End: 2022-10-14

## 2022-10-14 ENCOUNTER — OFFICE VISIT (OUTPATIENT)
Dept: INTERNAL MEDICINE CLINIC | Facility: CLINIC | Age: 62
End: 2022-10-14
Payer: MEDICARE

## 2022-10-14 VITALS
RESPIRATION RATE: 16 BRPM | HEART RATE: 73 BPM | OXYGEN SATURATION: 97 % | SYSTOLIC BLOOD PRESSURE: 100 MMHG | WEIGHT: 225 LBS | BODY MASS INDEX: 42.48 KG/M2 | DIASTOLIC BLOOD PRESSURE: 68 MMHG | HEIGHT: 61 IN

## 2022-10-14 DIAGNOSIS — K76.0 FATTY LIVER: ICD-10-CM

## 2022-10-14 DIAGNOSIS — Z51.81 THERAPEUTIC DRUG MONITORING: Primary | ICD-10-CM

## 2022-10-14 DIAGNOSIS — F41.9 ANXIETY AND DEPRESSION: ICD-10-CM

## 2022-10-14 DIAGNOSIS — E66.01 CLASS 3 SEVERE OBESITY WITH BODY MASS INDEX (BMI) OF 40.0 TO 44.9 IN ADULT, UNSPECIFIED OBESITY TYPE, UNSPECIFIED WHETHER SERIOUS COMORBIDITY PRESENT (HCC): ICD-10-CM

## 2022-10-14 DIAGNOSIS — F32.A ANXIETY AND DEPRESSION: ICD-10-CM

## 2022-10-14 DIAGNOSIS — E53.8 VITAMIN B12 DEFICIENCY: ICD-10-CM

## 2022-10-14 PROCEDURE — 96372 THER/PROPH/DIAG INJ SC/IM: CPT | Performed by: PHYSICIAN ASSISTANT

## 2022-10-14 PROCEDURE — 3078F DIAST BP <80 MM HG: CPT | Performed by: PHYSICIAN ASSISTANT

## 2022-10-14 PROCEDURE — 3074F SYST BP LT 130 MM HG: CPT | Performed by: PHYSICIAN ASSISTANT

## 2022-10-14 PROCEDURE — 3008F BODY MASS INDEX DOCD: CPT | Performed by: PHYSICIAN ASSISTANT

## 2022-10-14 PROCEDURE — 99213 OFFICE O/P EST LOW 20 MIN: CPT | Performed by: PHYSICIAN ASSISTANT

## 2022-10-14 RX ORDER — CYANOCOBALAMIN 1000 UG/ML
1000 INJECTION INTRAMUSCULAR; SUBCUTANEOUS ONCE
Status: COMPLETED | OUTPATIENT
Start: 2022-10-14 | End: 2022-10-14

## 2022-10-14 RX ADMIN — CYANOCOBALAMIN 1000 MCG: 1000 INJECTION INTRAMUSCULAR; SUBCUTANEOUS at 10:01:00

## 2022-10-19 ENCOUNTER — ORDER TRANSCRIPTION (OUTPATIENT)
Dept: ADMINISTRATIVE | Facility: HOSPITAL | Age: 62
End: 2022-10-19

## 2022-10-19 DIAGNOSIS — Z12.31 ENCOUNTER FOR SCREENING MAMMOGRAM FOR MALIGNANT NEOPLASM OF BREAST: Primary | ICD-10-CM

## 2022-10-26 ENCOUNTER — LAB ENCOUNTER (OUTPATIENT)
Dept: LAB | Age: 62
End: 2022-10-26
Attending: INTERNAL MEDICINE
Payer: MEDICARE

## 2022-10-26 ENCOUNTER — NURSE ONLY (OUTPATIENT)
Dept: INTERNAL MEDICINE CLINIC | Facility: CLINIC | Age: 62
End: 2022-10-26
Payer: MEDICARE

## 2022-10-26 DIAGNOSIS — R69 DIAGNOSIS UNKNOWN: Primary | ICD-10-CM

## 2022-10-26 DIAGNOSIS — Z00.00 LABORATORY EXAMINATION ORDERED AS PART OF A ROUTINE GENERAL MEDICAL EXAMINATION: Primary | ICD-10-CM

## 2022-10-26 DIAGNOSIS — E60 ZINC DEFICIENCY: ICD-10-CM

## 2022-10-26 LAB
AMB EXT CHLORIDE: 104
AMB EXT CHOL/HDL RATIO: 3.4
AMB EXT CHOLESTEROL, TOTAL: 186 MG/DL
AMB EXT CMP ALT: 6 U/L
AMB EXT CMP AST: 18 U/L
AMB EXT GLUCOSE: 88 MG/DL
AMB EXT HDL CHOLESTEROL: 55 MG/DL
AMB EXT HEMATOCRIT: 39.7
AMB EXT HEMOGLOBIN: 12.9
AMB EXT HGBA1C: 4.8 %
AMB EXT LDL CHOLESTEROL, DIRECT: 108 MG/DL
AMB EXT MCV: 88
AMB EXT NON HDL CHOL: 131 MG/DL
AMB EXT POSTASSIUM: 4.3 MMOL/L
AMB EXT SODIUM: 139 MMOL/L
AMB EXT TRIGLYCERIDES: 119 MG/DL
AMB EXT TSH: 1.52 MIU/ML
AMB EXT WBC: 6.7 X10(3)UL

## 2022-10-26 PROCEDURE — 93923 UPR/LXTR ART STDY 3+ LVLS: CPT | Performed by: INTERNAL MEDICINE

## 2022-10-26 PROCEDURE — 92551 PURE TONE HEARING TEST AIR: CPT | Performed by: INTERNAL MEDICINE

## 2022-10-26 PROCEDURE — 84630 ASSAY OF ZINC: CPT

## 2022-10-26 PROCEDURE — 36415 COLL VENOUS BLD VENIPUNCTURE: CPT

## 2022-10-26 NOTE — PROGRESS NOTES
VENIPUNCTURE:x [facility]     ADD-ON TEST:    EKG:    SPIROMETRY:    URINE:@ CPE      VISION: @ CPE     Right Eye 20/      Left Eye 20/     Both Eyes: 20/      DAI:      Right Tibial Foot _130__ divided by highest arm _130__ = ___1       Right Dorsal Foot ___ divided by highest arm ___ = ___       Left Tibial Foot _134__ divided by highest arm __130_ = ___1.0       Left Dorsal Foot ___ divided by highest arm ___ = ___      ARM:   Right Brachial-122     Left Brachial-130      FOOT:   Right Tibial (Side)-130    Right Dorsal (Top)-      Left Tibial (Side)-134    Left Dorsal (Top)-      Greater than 1.3: results not reliable  1.01 to 1.3: correlated with history, especially if the patient has diabetes  0.97 to 1: normal  0.8 to 0.96: mild ischemia  0.4 to 0.79: moderate to severe ischemia  0.39 or less: severe ischemia; danger of limb loss

## 2022-10-28 ENCOUNTER — IMMUNIZATION (OUTPATIENT)
Dept: LAB | Age: 62
End: 2022-10-28
Attending: EMERGENCY MEDICINE
Payer: MEDICARE

## 2022-10-28 DIAGNOSIS — Z23 NEED FOR VACCINATION: Primary | ICD-10-CM

## 2022-10-28 LAB — ZINC SERUM: 68.4 UG/DL

## 2022-10-28 PROCEDURE — 90686 IIV4 VACC NO PRSV 0.5 ML IM: CPT

## 2022-10-28 PROCEDURE — 90471 IMMUNIZATION ADMIN: CPT

## 2022-10-30 DIAGNOSIS — F06.70 MILD NEUROCOGNITIVE DISORDER DUE TO MULTIPLE ETIOLOGIES: ICD-10-CM

## 2022-10-31 RX ORDER — MEMANTINE HYDROCHLORIDE 10 MG/1
TABLET ORAL
Qty: 30 TABLET | Refills: 0 | Status: SHIPPED | OUTPATIENT
Start: 2022-10-31

## 2022-11-03 NOTE — TELEPHONE ENCOUNTER
Called patient and informed her of the below lab results. Informed patient she should begin taking a 500 mcg B12 supplement. Patient gave verbal read back and denied and further questions or concerns.     ----- Message from Fátima Victor MD sent at 1/29 NPO from 7 pm until present  Meds @ 3:30 am w/ sip of water

## 2022-11-15 ENCOUNTER — NURSE ONLY (OUTPATIENT)
Dept: INTERNAL MEDICINE CLINIC | Facility: CLINIC | Age: 62
End: 2022-11-15
Payer: MEDICARE

## 2022-11-15 DIAGNOSIS — E53.8 VITAMIN B12 DEFICIENCY: Primary | ICD-10-CM

## 2022-11-16 PROCEDURE — 96372 THER/PROPH/DIAG INJ SC/IM: CPT | Performed by: PHYSICIAN ASSISTANT

## 2022-11-16 RX ORDER — CYANOCOBALAMIN 1000 UG/ML
1000 INJECTION, SOLUTION INTRAMUSCULAR; SUBCUTANEOUS ONCE
Status: COMPLETED | OUTPATIENT
Start: 2022-11-16 | End: 2022-11-16

## 2022-11-16 RX ADMIN — CYANOCOBALAMIN 1000 MCG: 1000 INJECTION, SOLUTION INTRAMUSCULAR; SUBCUTANEOUS at 08:14:00

## 2022-11-18 ENCOUNTER — OFFICE VISIT (OUTPATIENT)
Dept: INTERNAL MEDICINE CLINIC | Facility: CLINIC | Age: 62
End: 2022-11-18
Payer: MEDICARE

## 2022-11-18 VITALS
HEIGHT: 61 IN | BODY MASS INDEX: 42.31 KG/M2 | OXYGEN SATURATION: 98 % | SYSTOLIC BLOOD PRESSURE: 114 MMHG | TEMPERATURE: 97 F | HEART RATE: 62 BPM | DIASTOLIC BLOOD PRESSURE: 82 MMHG | WEIGHT: 224.13 LBS

## 2022-11-18 DIAGNOSIS — G89.29 CHRONIC BILATERAL LOW BACK PAIN WITHOUT SCIATICA: ICD-10-CM

## 2022-11-18 DIAGNOSIS — G25.0 TREMOR, ESSENTIAL: ICD-10-CM

## 2022-11-18 DIAGNOSIS — R79.82 ELEVATED C-REACTIVE PROTEIN (CRP): ICD-10-CM

## 2022-11-18 DIAGNOSIS — Z00.00 ENCOUNTER FOR ANNUAL HEALTH EXAMINATION: ICD-10-CM

## 2022-11-18 DIAGNOSIS — Z13.820 SCREENING FOR OSTEOPOROSIS: ICD-10-CM

## 2022-11-18 DIAGNOSIS — F03.90 MAJOR NEUROCOGNITIVE DISORDER (HCC): ICD-10-CM

## 2022-11-18 DIAGNOSIS — K31.84 GASTROPARESIS: ICD-10-CM

## 2022-11-18 DIAGNOSIS — Z12.31 SCREENING MAMMOGRAM FOR BREAST CANCER: ICD-10-CM

## 2022-11-18 DIAGNOSIS — F33.9 RECURRENT MAJOR DEPRESSION RESISTANT TO TREATMENT (HCC): ICD-10-CM

## 2022-11-18 DIAGNOSIS — M79.642 BILATERAL HAND PAIN: ICD-10-CM

## 2022-11-18 DIAGNOSIS — G43.709 CHRONIC MIGRAINE WITHOUT AURA WITHOUT STATUS MIGRAINOSUS, NOT INTRACTABLE: ICD-10-CM

## 2022-11-18 DIAGNOSIS — R43.1 ABNORMAL SMELL: ICD-10-CM

## 2022-11-18 DIAGNOSIS — M79.641 BILATERAL HAND PAIN: ICD-10-CM

## 2022-11-18 DIAGNOSIS — E66.01 CLASS 3 SEVERE OBESITY DUE TO EXCESS CALORIES WITH SERIOUS COMORBIDITY AND BODY MASS INDEX (BMI) OF 40.0 TO 44.9 IN ADULT (HCC): Primary | ICD-10-CM

## 2022-11-18 DIAGNOSIS — Z82.49 FAMILY HISTORY OF PREMATURE CORONARY HEART DISEASE: ICD-10-CM

## 2022-11-18 DIAGNOSIS — M54.50 CHRONIC BILATERAL LOW BACK PAIN WITHOUT SCIATICA: ICD-10-CM

## 2022-11-18 DIAGNOSIS — M79.672 BILATERAL FOOT PAIN: ICD-10-CM

## 2022-11-18 DIAGNOSIS — K25.9 MULTIPLE GASTRIC ULCERS: ICD-10-CM

## 2022-11-18 DIAGNOSIS — K57.30 DIVERTICULOSIS OF LARGE INTESTINE WITHOUT HEMORRHAGE: ICD-10-CM

## 2022-11-18 DIAGNOSIS — F41.1 GAD (GENERALIZED ANXIETY DISORDER): ICD-10-CM

## 2022-11-18 DIAGNOSIS — K58.2 IRRITABLE BOWEL SYNDROME WITH BOTH CONSTIPATION AND DIARRHEA: ICD-10-CM

## 2022-11-18 DIAGNOSIS — G93.32 CHRONIC FATIGUE SYNDROME: ICD-10-CM

## 2022-11-18 DIAGNOSIS — F42.2 MIXED OBSESSIONAL THOUGHTS AND ACTS: ICD-10-CM

## 2022-11-18 DIAGNOSIS — N95.1 MENOPAUSAL SYMPTOMS: ICD-10-CM

## 2022-11-18 DIAGNOSIS — K76.0 NAFLD (NONALCOHOLIC FATTY LIVER DISEASE): ICD-10-CM

## 2022-11-18 DIAGNOSIS — M79.671 BILATERAL FOOT PAIN: ICD-10-CM

## 2022-11-18 PROBLEM — Z81.8: Status: RESOLVED | Noted: 2019-04-24 | Resolved: 2022-11-18

## 2022-11-18 PROBLEM — R41.3 MEMORY DISORDER: Status: RESOLVED | Noted: 2018-11-26 | Resolved: 2022-11-18

## 2022-11-18 PROBLEM — G30.9 ALZHEIMER'S DISEASE, UNSPECIFIED (HCC): Status: RESOLVED | Noted: 2022-05-06 | Resolved: 2022-11-18

## 2022-11-18 PROBLEM — E55.9 VITAMIN D INSUFFICIENCY: Status: RESOLVED | Noted: 2017-11-21 | Resolved: 2022-11-18

## 2022-11-18 PROBLEM — E66.813 CLASS 3 SEVERE OBESITY DUE TO EXCESS CALORIES WITH SERIOUS COMORBIDITY AND BODY MASS INDEX (BMI) OF 40.0 TO 44.9 IN ADULT (HCC): Status: ACTIVE | Noted: 2020-11-06

## 2022-11-18 PROBLEM — E66.813 CLASS 3 SEVERE OBESITY DUE TO EXCESS CALORIES WITH SERIOUS COMORBIDITY AND BODY MASS INDEX (BMI) OF 40.0 TO 44.9 IN ADULT: Status: ACTIVE | Noted: 2020-11-06

## 2022-11-18 PROBLEM — F02.80 ALZHEIMER'S DISEASE, UNSPECIFIED (HCC): Status: RESOLVED | Noted: 2022-05-06 | Resolved: 2022-11-18

## 2022-11-18 LAB
ATRIAL RATE: 55 BPM
P AXIS: 52 DEGREES
P-R INTERVAL: 152 MS
Q-T INTERVAL: 418 MS
QRS DURATION: 86 MS
QTC CALCULATION (BEZET): 399 MS
R AXIS: 55 DEGREES
T AXIS: 13 DEGREES
VENTRICULAR RATE: 55 BPM

## 2022-11-18 PROCEDURE — 81001 URINALYSIS AUTO W/SCOPE: CPT | Performed by: INTERNAL MEDICINE

## 2022-11-25 ENCOUNTER — HOSPITAL ENCOUNTER (OUTPATIENT)
Dept: MAMMOGRAPHY | Age: 62
Discharge: HOME | End: 2022-11-25
Attending: INTERNAL MEDICINE
Payer: MEDICARE

## 2022-11-25 DIAGNOSIS — Z12.31 ENCOUNTER FOR SCREENING MAMMOGRAM FOR MALIGNANT NEOPLASM OF BREAST: ICD-10-CM

## 2022-11-25 PROCEDURE — 77067 SCR MAMMO BI INCL CAD: CPT | Performed by: INTERNAL MEDICINE

## 2022-11-25 PROCEDURE — 77063 BREAST TOMOSYNTHESIS BI: CPT | Performed by: INTERNAL MEDICINE

## 2022-11-30 ENCOUNTER — HOSPITAL ENCOUNTER (OUTPATIENT)
Dept: GENERAL RADIOLOGY | Age: 62
Discharge: HOME | End: 2022-11-30
Attending: INTERNAL MEDICINE
Payer: MEDICARE

## 2022-11-30 ENCOUNTER — APPOINTMENT (OUTPATIENT)
Dept: GENERAL RADIOLOGY | Age: 62
End: 2022-11-30
Attending: INTERNAL MEDICINE
Payer: MEDICARE

## 2022-11-30 ENCOUNTER — OFFICE VISIT (OUTPATIENT)
Dept: NEUROLOGY | Facility: CLINIC | Age: 62
End: 2022-11-30
Payer: MEDICARE

## 2022-11-30 VITALS
WEIGHT: 224 LBS | DIASTOLIC BLOOD PRESSURE: 64 MMHG | BODY MASS INDEX: 42 KG/M2 | HEART RATE: 60 BPM | RESPIRATION RATE: 16 BRPM | SYSTOLIC BLOOD PRESSURE: 104 MMHG

## 2022-11-30 DIAGNOSIS — G43.709 CHRONIC MIGRAINE WITHOUT AURA WITHOUT STATUS MIGRAINOSUS, NOT INTRACTABLE: Primary | ICD-10-CM

## 2022-11-30 DIAGNOSIS — M79.671 BILATERAL FOOT PAIN: ICD-10-CM

## 2022-11-30 DIAGNOSIS — M79.672 BILATERAL FOOT PAIN: ICD-10-CM

## 2022-11-30 DIAGNOSIS — M79.641 BILATERAL HAND PAIN: ICD-10-CM

## 2022-11-30 DIAGNOSIS — M79.642 BILATERAL HAND PAIN: ICD-10-CM

## 2022-11-30 PROCEDURE — 3074F SYST BP LT 130 MM HG: CPT | Performed by: OTHER

## 2022-11-30 PROCEDURE — 73630 X-RAY EXAM OF FOOT: CPT | Performed by: INTERNAL MEDICINE

## 2022-11-30 PROCEDURE — 3078F DIAST BP <80 MM HG: CPT | Performed by: OTHER

## 2022-11-30 PROCEDURE — 64615 CHEMODENERV MUSC MIGRAINE: CPT | Performed by: OTHER

## 2022-11-30 PROCEDURE — 73130 X-RAY EXAM OF HAND: CPT | Performed by: INTERNAL MEDICINE

## 2022-11-30 RX ORDER — VIT C/HESPERIDIN/BIOFLAVONOIDS 500-100 MG
1 TABLET ORAL AS DIRECTED
COMMUNITY
Start: 2021-03-13

## 2022-11-30 NOTE — PROGRESS NOTES
Paperwork noting that patient may bear financial responsibility for procedure(s) performed in clinic today signed prior to proceeding with procedure(s). Furthermore, patient notified that they should contact their insurer to verify that your procedure/test has been approved and is a COVERED benefit. Although the [facility] staff does its due diligence, the insurance carrier gives the disclaimer that \"Although the procedure is authorized, this does not guarantee payment. \"    Ultimately the patient is responsible for payment. Botox is:  [x] Buy and Bill  [] Patient Supplied      Botox Reauthorization Questions:  1. Has the patient experienced a reduction in frequency of migraines since starting Botox? yes  a. If yes, by what percentage? 75%  2. Has the intensity of migraines decreased since starting Botox? yes  a. If yes, by what percentage?  50%

## 2022-12-01 ENCOUNTER — OFFICE VISIT (OUTPATIENT)
Dept: INTERNAL MEDICINE CLINIC | Facility: CLINIC | Age: 62
End: 2022-12-01
Payer: MEDICARE

## 2022-12-01 ENCOUNTER — HOSPITAL ENCOUNTER (OUTPATIENT)
Dept: GENERAL RADIOLOGY | Age: 62
Discharge: HOME | End: 2022-12-01
Attending: INTERNAL MEDICINE
Payer: MEDICARE

## 2022-12-01 ENCOUNTER — HOSPITAL ENCOUNTER (OUTPATIENT)
Dept: BONE DENSITY | Age: 62
Discharge: HOME | End: 2022-12-01
Attending: INTERNAL MEDICINE
Payer: MEDICARE

## 2022-12-01 VITALS
WEIGHT: 218 LBS | HEIGHT: 61 IN | OXYGEN SATURATION: 98 % | HEART RATE: 82 BPM | BODY MASS INDEX: 41.16 KG/M2 | TEMPERATURE: 98 F | SYSTOLIC BLOOD PRESSURE: 106 MMHG | RESPIRATION RATE: 16 BRPM | DIASTOLIC BLOOD PRESSURE: 74 MMHG

## 2022-12-01 DIAGNOSIS — M25.462 PAIN AND SWELLING OF LEFT KNEE: ICD-10-CM

## 2022-12-01 DIAGNOSIS — M25.562 PAIN AND SWELLING OF LEFT KNEE: Primary | ICD-10-CM

## 2022-12-01 DIAGNOSIS — Z13.820 SCREENING FOR OSTEOPOROSIS: ICD-10-CM

## 2022-12-01 DIAGNOSIS — M25.462 PAIN AND SWELLING OF LEFT KNEE: Primary | ICD-10-CM

## 2022-12-01 DIAGNOSIS — Z01.89 ENCOUNTER FOR LOWER EXTREMITY COMPARISON IMAGING STUDY: ICD-10-CM

## 2022-12-01 DIAGNOSIS — M25.562 PAIN AND SWELLING OF LEFT KNEE: ICD-10-CM

## 2022-12-01 PROCEDURE — 3008F BODY MASS INDEX DOCD: CPT | Performed by: INTERNAL MEDICINE

## 2022-12-01 PROCEDURE — 99214 OFFICE O/P EST MOD 30 MIN: CPT | Performed by: INTERNAL MEDICINE

## 2022-12-01 PROCEDURE — 3074F SYST BP LT 130 MM HG: CPT | Performed by: INTERNAL MEDICINE

## 2022-12-01 PROCEDURE — 77080 DXA BONE DENSITY AXIAL: CPT | Performed by: INTERNAL MEDICINE

## 2022-12-01 PROCEDURE — 3078F DIAST BP <80 MM HG: CPT | Performed by: INTERNAL MEDICINE

## 2022-12-01 PROCEDURE — 73564 X-RAY EXAM KNEE 4 OR MORE: CPT | Performed by: INTERNAL MEDICINE

## 2022-12-01 PROCEDURE — 73562 X-RAY EXAM OF KNEE 3: CPT | Performed by: INTERNAL MEDICINE

## 2022-12-01 NOTE — PATIENT INSTRUCTIONS
Doroteo Cornell,     Let's get an X-ray of the knee done today. Let's keep in mind the possibility of additional knee imaging including an ultrasound or an MRI, but the X-ray should give us a foundation of what's going on. Nobody ever said anything bad about ice and compression when it comes to the knees. I will MyChart you the result of the knee X-ray with my take on it. 5. We might do a trial of some oral steroids (aka, a steroid pack) based on the results. I'll let you know and I can easily E-prescribe it to your pharmacy. 6. Last but not least, if we're not improving, then this would be a reason to either seek more detailed imaging and/or see a specialist.    I look forward to discussing more health opportunities with you in the near future. As always, I appreciate being part of your journey and I am here for any questions along the way. On behalf of all of our staff here with [facility], we sincerely thank you for placing your trust in our medical practice. Your partner in health,     Russell Spain. Анна Herzog MD  Diplomate, American Board of Internal Medicine  Member, American College of Lifestyle Medicine  Member, American Association for [address], [address] [place] [address],[address], Xiomara Kelsey, [address]  [phone] (phone); [phone] (fax)  Bartholome Litten. [email or web address]

## 2022-12-02 ENCOUNTER — HOSPITAL ENCOUNTER (OUTPATIENT)
Dept: CT IMAGING | Age: 62
Discharge: HOME | End: 2022-12-02
Attending: INTERNAL MEDICINE

## 2022-12-02 DIAGNOSIS — Z13.6 SCREENING FOR ISCHEMIC HEART DISEASE: ICD-10-CM

## 2022-12-03 PROBLEM — R91.1 SOLITARY PULMONARY NODULE ON LUNG CT: Status: ACTIVE | Noted: 2022-12-03

## 2022-12-13 ENCOUNTER — NURSE ONLY (OUTPATIENT)
Dept: INTERNAL MEDICINE CLINIC | Facility: CLINIC | Age: 62
End: 2022-12-13
Payer: MEDICARE

## 2022-12-13 DIAGNOSIS — E53.8 VITAMIN B12 DEFICIENCY: Primary | ICD-10-CM

## 2022-12-13 DIAGNOSIS — Z51.81 THERAPEUTIC DRUG MONITORING: ICD-10-CM

## 2022-12-13 PROCEDURE — 96372 THER/PROPH/DIAG INJ SC/IM: CPT | Performed by: INTERNAL MEDICINE

## 2022-12-13 RX ORDER — CYANOCOBALAMIN 1000 UG/ML
1000 INJECTION, SOLUTION INTRAMUSCULAR; SUBCUTANEOUS ONCE
Status: COMPLETED | OUTPATIENT
Start: 2022-12-13 | End: 2022-12-13

## 2022-12-13 RX ADMIN — CYANOCOBALAMIN 1000 MCG: 1000 INJECTION, SOLUTION INTRAMUSCULAR; SUBCUTANEOUS at 13:29:00

## 2022-12-18 ENCOUNTER — PATIENT MESSAGE (OUTPATIENT)
Dept: INTERNAL MEDICINE CLINIC | Facility: CLINIC | Age: 62
End: 2022-12-18

## 2022-12-19 ENCOUNTER — TELEPHONE (OUTPATIENT)
Dept: ORTHOPEDICS CLINIC | Facility: CLINIC | Age: 62
End: 2022-12-19

## 2022-12-19 ENCOUNTER — TELEPHONE (OUTPATIENT)
Dept: INTERNAL MEDICINE CLINIC | Facility: CLINIC | Age: 62
End: 2022-12-19

## 2022-12-19 NOTE — TELEPHONE ENCOUNTER
S/w pt   Instructions reviewed   [employer] message sent with contact info for Dr Cathy Jimenez RN

## 2022-12-19 NOTE — TELEPHONE ENCOUNTER
I would have her see Ortho (Dr. Laura Mcnamara from [facility]). [employer]. Gigi Vargas MD  Diplomate, American Board of Internal Medicine  Member, American College of Lifestyle Medicine  Member, American Association for [address], [address] [place] [address],[address], [facility], [address]  [phone] (phone); [phone] (fax)  Angella Bobo. [email or web address]

## 2022-12-19 NOTE — TELEPHONE ENCOUNTER
From: Augie Cisse  To: Gilberto Peralta MD  Sent: 12/18/2022 2:55 PM CST  Subject: Left Knee Pain/Inflamation    After 2 weeks L knee discomfort is progressing to pain. Despite RICE swelling has increased. Was continuing walking on treadmill but no incline and decreased pace/distance. Most recently rested 3 days, resumed walking, discomfort progressing to pain. You mentioned oral steroids, steroid shot, draining fluid, orthopedic. Next steps?   Thankfully, Wendy Esparza

## 2023-01-19 ENCOUNTER — OFFICE VISIT (OUTPATIENT)
Dept: INTERNAL MEDICINE CLINIC | Facility: CLINIC | Age: 63
End: 2023-01-19
Payer: MEDICARE

## 2023-01-19 VITALS
RESPIRATION RATE: 16 BRPM | BODY MASS INDEX: 41.54 KG/M2 | HEIGHT: 61 IN | WEIGHT: 220 LBS | SYSTOLIC BLOOD PRESSURE: 122 MMHG | HEART RATE: 78 BPM | DIASTOLIC BLOOD PRESSURE: 80 MMHG

## 2023-01-19 DIAGNOSIS — E53.8 VITAMIN B12 DEFICIENCY: ICD-10-CM

## 2023-01-19 DIAGNOSIS — Z51.81 THERAPEUTIC DRUG MONITORING: Primary | ICD-10-CM

## 2023-01-19 DIAGNOSIS — E66.01 CLASS 3 SEVERE OBESITY WITH BODY MASS INDEX (BMI) OF 40.0 TO 44.9 IN ADULT, UNSPECIFIED OBESITY TYPE, UNSPECIFIED WHETHER SERIOUS COMORBIDITY PRESENT (HCC): ICD-10-CM

## 2023-01-19 PROCEDURE — 99214 OFFICE O/P EST MOD 30 MIN: CPT | Performed by: INTERNAL MEDICINE

## 2023-01-19 PROCEDURE — 96372 THER/PROPH/DIAG INJ SC/IM: CPT | Performed by: INTERNAL MEDICINE

## 2023-01-19 RX ORDER — CYANOCOBALAMIN 1000 UG/ML
1000 INJECTION, SOLUTION INTRAMUSCULAR; SUBCUTANEOUS ONCE
Status: COMPLETED | OUTPATIENT
Start: 2023-01-19 | End: 2023-01-19

## 2023-01-19 RX ADMIN — CYANOCOBALAMIN 1000 MCG: 1000 INJECTION, SOLUTION INTRAMUSCULAR; SUBCUTANEOUS at 14:11:00

## 2023-01-22 ENCOUNTER — APPOINTMENT (OUTPATIENT)
Dept: GENERAL RADIOLOGY | Facility: HOSPITAL | Age: 63
End: 2023-01-22
Attending: EMERGENCY MEDICINE
Payer: MEDICARE

## 2023-01-22 ENCOUNTER — HOSPITAL ENCOUNTER (EMERGENCY)
Facility: HOSPITAL | Age: 63
Discharge: HOME | End: 2023-01-22
Attending: EMERGENCY MEDICINE
Payer: MEDICARE

## 2023-01-22 VITALS
BODY MASS INDEX: 41.16 KG/M2 | SYSTOLIC BLOOD PRESSURE: 107 MMHG | OXYGEN SATURATION: 96 % | RESPIRATION RATE: 16 BRPM | HEIGHT: 61 IN | HEART RATE: 71 BPM | WEIGHT: 218 LBS | TEMPERATURE: 99 F | DIASTOLIC BLOOD PRESSURE: 55 MMHG

## 2023-01-22 DIAGNOSIS — M25.562 ACUTE PAIN OF LEFT KNEE: ICD-10-CM

## 2023-01-22 DIAGNOSIS — M17.12 OSTEOARTHRITIS OF LEFT KNEE, UNSPECIFIED OSTEOARTHRITIS TYPE: Primary | ICD-10-CM

## 2023-01-22 PROCEDURE — 73562 X-RAY EXAM OF KNEE 3: CPT | Performed by: EMERGENCY MEDICINE

## 2023-01-22 PROCEDURE — 99283 EMERGENCY DEPT VISIT LOW MDM: CPT

## 2023-01-22 PROCEDURE — 99285 EMERGENCY DEPT VISIT HI MDM: CPT

## 2023-01-22 RX ORDER — HYDROCODONE BITARTRATE AND ACETAMINOPHEN 5; 325 MG/1; MG/1
2 TABLET ORAL ONCE
Status: COMPLETED | OUTPATIENT
Start: 2023-01-22 | End: 2023-01-22

## 2023-01-22 RX ORDER — HYDROCODONE BITARTRATE AND ACETAMINOPHEN 5; 325 MG/1; MG/1
1-2 TABLET ORAL EVERY 6 HOURS PRN
Qty: 20 TABLET | Refills: 0 | Status: SHIPPED | OUTPATIENT
Start: 2023-01-22

## 2023-01-22 NOTE — ED QUICK NOTES
Pt states she has had knee pain x 2 months, she rested for 6 days and then yesterday walked on the treadmill which caused an increase in pain and difficulty ambulating. Pt has a scheduled ortho apt on Tuesday.

## 2023-01-24 ENCOUNTER — OFFICE VISIT (OUTPATIENT)
Dept: ORTHOPEDICS CLINIC | Facility: CLINIC | Age: 63
End: 2023-01-24
Payer: MEDICARE

## 2023-01-24 ENCOUNTER — PATIENT MESSAGE (OUTPATIENT)
Dept: INTERNAL MEDICINE CLINIC | Facility: CLINIC | Age: 63
End: 2023-01-24

## 2023-01-24 VITALS — WEIGHT: 218 LBS | BODY MASS INDEX: 41.16 KG/M2 | HEIGHT: 61 IN

## 2023-01-24 DIAGNOSIS — S83.232A COMPLEX TEAR OF MEDIAL MENISCUS OF LEFT KNEE AS CURRENT INJURY, INITIAL ENCOUNTER: Primary | ICD-10-CM

## 2023-01-24 PROCEDURE — 99203 OFFICE O/P NEW LOW 30 MIN: CPT | Performed by: ORTHOPAEDIC SURGERY

## 2023-01-24 PROCEDURE — 3008F BODY MASS INDEX DOCD: CPT | Performed by: ORTHOPAEDIC SURGERY

## 2023-01-24 NOTE — TELEPHONE ENCOUNTER
From: Suzanne Hsu  To: Malik Villarreal MD  Sent: 1/24/2023 11:51 AM CST  Subject: Intention to prescribe . . .    Did you determine if you could prescribe a stimulant (other than Phentermine) while I take Spravato for treatment resistant depression?  TY DISPLAY PLAN FREE TEXT DISPLAY PLAN FREE TEXT DISPLAY PLAN FREE TEXT DISPLAY PLAN FREE TEXT DISPLAY PLAN FREE TEXT DISPLAY PLAN FREE TEXT DISPLAY PLAN FREE TEXT DISPLAY PLAN FREE TEXT DISPLAY PLAN FREE TEXT DISPLAY PLAN FREE TEXT DISPLAY PLAN FREE TEXT DISPLAY PLAN FREE TEXT DISPLAY PLAN FREE TEXT DISPLAY PLAN FREE TEXT DISPLAY PLAN FREE TEXT DISPLAY PLAN FREE TEXT DISPLAY PLAN FREE TEXT DISPLAY PLAN FREE TEXT DISPLAY PLAN FREE TEXT DISPLAY PLAN FREE TEXT DISPLAY PLAN FREE TEXT DISPLAY PLAN FREE TEXT DISPLAY PLAN FREE TEXT DISPLAY PLAN FREE TEXT DISPLAY PLAN FREE TEXT DISPLAY PLAN FREE TEXT DISPLAY PLAN FREE TEXT DISPLAY PLAN FREE TEXT DISPLAY PLAN FREE TEXT DISPLAY PLAN FREE TEXT DISPLAY PLAN FREE TEXT DISPLAY PLAN FREE TEXT DISPLAY PLAN FREE TEXT DISPLAY PLAN FREE TEXT DISPLAY PLAN FREE TEXT DISPLAY PLAN FREE TEXT DISPLAY PLAN FREE TEXT DISPLAY PLAN FREE TEXT DISPLAY PLAN FREE TEXT DISPLAY PLAN FREE TEXT DISPLAY PLAN FREE TEXT DISPLAY PLAN FREE TEXT DISPLAY PLAN FREE TEXT DISPLAY PLAN FREE TEXT DISPLAY PLAN FREE TEXT DISPLAY PLAN FREE TEXT DISPLAY PLAN FREE TEXT DISPLAY PLAN FREE TEXT DISPLAY PLAN FREE TEXT DISPLAY PLAN FREE TEXT DISPLAY PLAN FREE TEXT DISPLAY PLAN FREE TEXT DISPLAY PLAN FREE TEXT DISPLAY PLAN FREE TEXT DISPLAY PLAN FREE TEXT DISPLAY PLAN FREE TEXT DISPLAY PLAN FREE TEXT DISPLAY PLAN FREE TEXT DISPLAY PLAN FREE TEXT DISPLAY PLAN FREE TEXT

## 2023-01-24 NOTE — PROGRESS NOTES
Patient is a 78-year-old white female here for evaluation of her left knee. Patient states she has had some problems with her knees off and on but over the last several months she has had some increased pain and rather abrupt pain of her left knee. Patient did go to the ER recently because of the increased pain and had x-rays taken. I did review these x-rays as well as previous x-rays with her at which show her to have minimal degenerative changes of the knee. There is perhaps some joint space narrowing medially but the x-rays were not very well taken on the last images and ones that were taken earlier in the year showed minimal degenerative changes. Patient's exam shows her to have a mildly antalgic gait. She has tenderness on the medial aspect of the left knee. Loco test is positive. Lachman's negative. Collateral ligaments are stable to varus and valgus stress. Range of motion shows near full extension but lacking about 30 degrees of flexion. Patient guards substantially with attempted range of motion or Loco test.    Impression is that of a medial meniscus tear of the left knee. Second impression is that of chondromalacia patella and medial femoral condyle left knee. Recommendation is to go ahead with an MRI scan of the left knee. Follow-up after that is completed. Patient understands she might need to consider an arthroscopy of the knee. I do not think that arthritis is the cause of her present pain.

## 2023-01-25 ENCOUNTER — TELEPHONE (OUTPATIENT)
Dept: INTERNAL MEDICINE CLINIC | Facility: CLINIC | Age: 63
End: 2023-01-25

## 2023-01-25 RX ORDER — PHENTERMINE HYDROCHLORIDE 15 MG/1
15 CAPSULE ORAL EVERY MORNING
Qty: 30 CAPSULE | Refills: 1 | Status: SHIPPED | OUTPATIENT
Start: 2023-01-25

## 2023-01-25 NOTE — TELEPHONE ENCOUNTER
Izaiah campbell tried to call twice and the message keeps looping but doesn't let me leave a message  Does she have an extension for provdier or different way to get ahold of her

## 2023-01-31 ENCOUNTER — HOSPITAL ENCOUNTER (OUTPATIENT)
Dept: MRI IMAGING | Age: 63
Discharge: HOME | End: 2023-01-31
Attending: ORTHOPAEDIC SURGERY
Payer: MEDICARE

## 2023-01-31 DIAGNOSIS — S83.232A COMPLEX TEAR OF MEDIAL MENISCUS OF LEFT KNEE AS CURRENT INJURY, INITIAL ENCOUNTER: ICD-10-CM

## 2023-01-31 PROCEDURE — 73721 MRI JNT OF LWR EXTRE W/O DYE: CPT | Performed by: ORTHOPAEDIC SURGERY

## 2023-02-01 ENCOUNTER — TELEPHONE (OUTPATIENT)
Dept: ORTHOPEDICS CLINIC | Facility: CLINIC | Age: 63
End: 2023-02-01

## 2023-02-01 ENCOUNTER — OFFICE VISIT (OUTPATIENT)
Dept: ORTHOPEDICS CLINIC | Facility: CLINIC | Age: 63
End: 2023-02-01
Payer: MEDICARE

## 2023-02-01 VITALS — BODY MASS INDEX: 41.16 KG/M2 | HEIGHT: 61 IN | WEIGHT: 218 LBS

## 2023-02-01 DIAGNOSIS — M94.262 CHONDROMALACIA OF LEFT KNEE: ICD-10-CM

## 2023-02-01 DIAGNOSIS — M65.9 SYNOVITIS OF KNEE: ICD-10-CM

## 2023-02-01 DIAGNOSIS — S83.232A COMPLEX TEAR OF MEDIAL MENISCUS OF LEFT KNEE AS CURRENT INJURY, INITIAL ENCOUNTER: Primary | ICD-10-CM

## 2023-02-01 PROCEDURE — 3008F BODY MASS INDEX DOCD: CPT | Performed by: ORTHOPAEDIC SURGERY

## 2023-02-01 PROCEDURE — 99213 OFFICE O/P EST LOW 20 MIN: CPT | Performed by: ORTHOPAEDIC SURGERY

## 2023-02-01 PROCEDURE — 20610 DRAIN/INJ JOINT/BURSA W/O US: CPT | Performed by: ORTHOPAEDIC SURGERY

## 2023-02-01 RX ORDER — TRIAMCINOLONE ACETONIDE 40 MG/ML
40 INJECTION, SUSPENSION INTRA-ARTICULAR; INTRAMUSCULAR ONCE
Status: COMPLETED | OUTPATIENT
Start: 2023-02-01 | End: 2023-02-01

## 2023-02-01 RX ADMIN — TRIAMCINOLONE ACETONIDE 40 MG: 40 INJECTION, SUSPENSION INTRA-ARTICULAR; INTRAMUSCULAR at 10:14:00

## 2023-02-01 NOTE — TELEPHONE ENCOUNTER
Patient called regarding her knee. Patient had an injection of the left knee few hours ago and was doing well for the first couple hours and then started having some increased pain and some stiffness of the knee. Patient was requesting information or guidance on what to do at this time. I explained to her that it may be that the meniscus tear that is presumed to be there might have shifted positions with her walking with less pain and now giving her more problems. I did advise her to go ahead with ice for 15 to 20 minutes several times a day for the next day and then call me tomorrow to let me know how she is doing. She also has some hydrocodone available at home that she is going to use as needed. If the patient was getting worse I advised her to give me a call sooner.

## 2023-02-01 NOTE — PROGRESS NOTES
Patient is a 42-year-old female here for follow-up on her left knee. Patient had the MRI scan of her knee which shows her to have a moderate plus synovitis of the knee but very mild chondromalacia and some very small meniscal tears. Patient states that she is feeling better but still having some difficulties in walking. She was having substantial pain and difficulties and was requiring a walker to get around. Patient's exam shows a some tenderness on the medial aspect of the left knee. She has a moderately antalgic gait. She has short stride gait. Range of motion shows her to have near full extension and flexion to about 130 degrees. Ligaments are stable. Loco test is plus minus. Lachman's negative. Impression is that of medial meniscus tear with synovitis of the left knee. Recommendation is to go ahead with a cortisone injection which was done under sterile technique through an anterolateral approach. Patient tolerated the injection well and had some relief of her pain after the injection. Patient was advised that if this does not give her long-lasting relief and substantial relief then we should go ahead with an arthroscopy of her knee. Patient understands and will call back in a few weeks if not getting better.

## 2023-02-03 NOTE — TELEPHONE ENCOUNTER
SURGERY SCHEDULING SHEET    Natasha Reza  5/21/1960  [id number]    Procedure: Left  Knee Arthroscopy, Partial Medial Meniscectomy (56479) and Chondroplasty (26405)     Diagnosis:    Complex tear of medial meniscus of left knee as current injury, initial encounter S83.232A     Synovitis of knee M65.9     Chondromalacia of left knee M94.262      Anesthesia: General    Length of Surgery: 1 hr    Disposition: Outpatient    Special Equipment: NONE    Assist: Yes Keo Good PA-C    Pre-op Testing: PER ANESTHESIA GUIDELINES    Clearance: HISTORY AND PHYSICAL    Post op: Day after surgery    Mireya Segura MD  [facility] Orthopedic Surgery  Phone: [phone]  Fax: [phone]

## 2023-02-07 ENCOUNTER — PATIENT MESSAGE (OUTPATIENT)
Dept: ORTHOPEDICS CLINIC | Facility: CLINIC | Age: 63
End: 2023-02-07

## 2023-02-13 DIAGNOSIS — F06.70 MILD NEUROCOGNITIVE DISORDER DUE TO MULTIPLE ETIOLOGIES: ICD-10-CM

## 2023-02-13 DIAGNOSIS — G43.709 CHRONIC MIGRAINE WITHOUT AURA WITHOUT STATUS MIGRAINOSUS, NOT INTRACTABLE: ICD-10-CM

## 2023-02-14 RX ORDER — MEMANTINE HYDROCHLORIDE 10 MG/1
10 TABLET ORAL DAILY
Qty: 30 TABLET | Refills: 2 | Status: SHIPPED | OUTPATIENT
Start: 2023-02-14

## 2023-02-14 RX ORDER — PROPRANOLOL HYDROCHLORIDE 80 MG/1
1 CAPSULE, EXTENDED RELEASE ORAL EVERY EVENING
Qty: 90 CAPSULE | Refills: 0 | Status: SHIPPED | OUTPATIENT
Start: 2023-02-14

## 2023-02-21 ENCOUNTER — OFFICE VISIT (OUTPATIENT)
Dept: INTERNAL MEDICINE CLINIC | Facility: CLINIC | Age: 63
End: 2023-02-21
Payer: MEDICARE

## 2023-02-21 VITALS
WEIGHT: 212 LBS | DIASTOLIC BLOOD PRESSURE: 72 MMHG | OXYGEN SATURATION: 93 % | SYSTOLIC BLOOD PRESSURE: 110 MMHG | BODY MASS INDEX: 40.02 KG/M2 | HEIGHT: 61 IN | TEMPERATURE: 97 F | HEART RATE: 81 BPM | RESPIRATION RATE: 16 BRPM

## 2023-02-21 DIAGNOSIS — M94.262 CHONDROMALACIA, LEFT KNEE: ICD-10-CM

## 2023-02-21 DIAGNOSIS — E66.01 CLASS 3 SEVERE OBESITY DUE TO EXCESS CALORIES WITH SERIOUS COMORBIDITY AND BODY MASS INDEX (BMI) OF 40.0 TO 44.9 IN ADULT (HCC): ICD-10-CM

## 2023-02-21 DIAGNOSIS — S83.232A COMPLEX TEAR OF MEDIAL MENISCUS OF LEFT KNEE AS CURRENT INJURY, INITIAL ENCOUNTER: ICD-10-CM

## 2023-02-21 DIAGNOSIS — F33.9 RECURRENT MAJOR DEPRESSION RESISTANT TO TREATMENT (HCC): ICD-10-CM

## 2023-02-21 DIAGNOSIS — F03.90 MAJOR NEUROCOGNITIVE DISORDER (HCC): ICD-10-CM

## 2023-02-21 DIAGNOSIS — M65.9 SYNOVITIS OF LEFT KNEE: ICD-10-CM

## 2023-02-21 DIAGNOSIS — Z01.818 PREOPERATIVE CLEARANCE: Primary | ICD-10-CM

## 2023-02-21 LAB
ATRIAL RATE: 70 BPM
P AXIS: 38 DEGREES
P-R INTERVAL: 144 MS
Q-T INTERVAL: 420 MS
QRS DURATION: 86 MS
QTC CALCULATION (BEZET): 453 MS
R AXIS: 55 DEGREES
T AXIS: 15 DEGREES
VENTRICULAR RATE: 70 BPM

## 2023-02-21 PROCEDURE — 93000 ELECTROCARDIOGRAM COMPLETE: CPT | Performed by: INTERNAL MEDICINE

## 2023-02-21 PROCEDURE — 99214 OFFICE O/P EST MOD 30 MIN: CPT | Performed by: INTERNAL MEDICINE

## 2023-02-22 ENCOUNTER — TELEPHONE (OUTPATIENT)
Dept: ORTHOPEDICS CLINIC | Facility: CLINIC | Age: 63
End: 2023-02-22

## 2023-02-22 NOTE — TELEPHONE ENCOUNTER
Spoke with Katie Nam to inform her that the hospital would not allow Dr. Estefana Spurling to add on her case on 3/2/23 but we were able to add her case on 3/9/23. She stated understanding and would like to proceed with this as her surgical date. I was also able to get her rescheduled for her post op appointment as well. She stated understanding and had no further questions or concerns.

## 2023-02-22 NOTE — TELEPHONE ENCOUNTER
Called and left a message for Drew Mccoy asking that she give me a call back in regards to her upcoming surgery with Dr. jP Palm

## 2023-02-24 ENCOUNTER — TELEPHONE (OUTPATIENT)
Dept: INTERNAL MEDICINE CLINIC | Facility: CLINIC | Age: 63
End: 2023-02-24

## 2023-02-24 PROBLEM — M65.9 SYNOVITIS OF LEFT KNEE: Status: ACTIVE | Noted: 2023-02-24

## 2023-02-24 PROBLEM — M65.962 SYNOVITIS OF LEFT KNEE: Status: ACTIVE | Noted: 2023-02-24

## 2023-02-24 PROBLEM — S83.232A COMPLEX TEAR OF MEDIAL MENISCUS OF LEFT KNEE AS CURRENT INJURY: Status: ACTIVE | Noted: 2023-02-24

## 2023-02-24 PROBLEM — M94.262 CHONDROMALACIA, LEFT KNEE: Status: ACTIVE | Noted: 2023-02-24

## 2023-02-24 NOTE — TELEPHONE ENCOUNTER
Faxed patient's Pre-Operative Clearance to Cornelius Guevara fax number [phone] Attn: Buddy Brothers

## 2023-02-28 ENCOUNTER — OFFICE VISIT (OUTPATIENT)
Dept: ORTHOPEDICS CLINIC | Facility: CLINIC | Age: 63
End: 2023-02-28
Payer: MEDICARE

## 2023-02-28 VITALS — BODY MASS INDEX: 40.02 KG/M2 | WEIGHT: 212 LBS | HEIGHT: 61 IN

## 2023-02-28 DIAGNOSIS — M94.262 CHONDROMALACIA OF LEFT KNEE: ICD-10-CM

## 2023-02-28 DIAGNOSIS — S83.232A COMPLEX TEAR OF MEDIAL MENISCUS OF LEFT KNEE AS CURRENT INJURY, INITIAL ENCOUNTER: Primary | ICD-10-CM

## 2023-02-28 PROCEDURE — 99213 OFFICE O/P EST LOW 20 MIN: CPT | Performed by: ORTHOPAEDIC SURGERY

## 2023-02-28 PROCEDURE — 3008F BODY MASS INDEX DOCD: CPT | Performed by: ORTHOPAEDIC SURGERY

## 2023-02-28 RX ORDER — MEMANTINE HYDROCHLORIDE 10 MG/1
10 TABLET ORAL DAILY
COMMUNITY

## 2023-02-28 NOTE — PROGRESS NOTES
Patient is a 50-year-old white female here for follow-up on her left knee. Patient continues to have pain of the knee. Patient is planning on going ahead with arthroscopy as scheduled. Patient here with questions about the procedure as well as about recovery. Patient notes that this problem has been going on now probably for 3 to 4 months. Patient's exam shows have a mildly antalgic gait. Exam of her left knee shows her to have tenderness medially. She has positive Robert's test.  She has a mild effusion of the knee. Ligaments are stable. Lachman's negative. 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 Second impression is that of possible plica left knee. Recommendations are to go ahead with the arthroscopy of the knee. We did talk at length about the nature of the procedure as well as the risks and benefits. She understands that she may not have complete resolution of her symptoms however I expect her because of the fact that she has x-rays that showed minimal degenerative changes that she should do well.

## 2023-03-01 ENCOUNTER — OFFICE VISIT (OUTPATIENT)
Dept: NEUROLOGY | Facility: CLINIC | Age: 63
End: 2023-03-01
Payer: MEDICARE

## 2023-03-01 VITALS
HEART RATE: 72 BPM | RESPIRATION RATE: 16 BRPM | WEIGHT: 213 LBS | SYSTOLIC BLOOD PRESSURE: 120 MMHG | BODY MASS INDEX: 40 KG/M2 | DIASTOLIC BLOOD PRESSURE: 70 MMHG

## 2023-03-01 DIAGNOSIS — G43.709 CHRONIC MIGRAINE WITHOUT AURA WITHOUT STATUS MIGRAINOSUS, NOT INTRACTABLE: Primary | ICD-10-CM

## 2023-03-01 PROCEDURE — 99213 OFFICE O/P EST LOW 20 MIN: CPT | Performed by: OTHER

## 2023-03-01 PROCEDURE — 3074F SYST BP LT 130 MM HG: CPT | Performed by: OTHER

## 2023-03-01 PROCEDURE — 3078F DIAST BP <80 MM HG: CPT | Performed by: OTHER

## 2023-03-01 PROCEDURE — 64615 CHEMODENERV MUSC MIGRAINE: CPT | Performed by: OTHER

## 2023-03-01 NOTE — PROGRESS NOTES
Paperwork noting that patient may bear financial responsibility for procedure(s) performed in clinic today signed prior to proceeding with procedure(s). Furthermore, patient notified that they should contact their insurer to verify that your procedure/test has been approved and is a COVERED benefit. Although the [facility] staff does its due diligence, the insurance carrier gives the disclaimer that \"Although the procedure is authorized, this does not guarantee payment. \"    Ultimately the patient is responsible for payment. Botox is:  [x] Buy and Bill  [] Patient Supplied      Botox Reauthorization Questions:  1. Has the patient experienced a reduction in frequency of migraines since starting Botox? yes  a. If yes, by what percentage? 80%  2. Has the intensity of migraines decreased since starting Botox? yes  a. If yes, by what percentage?  80%

## 2023-03-02 ENCOUNTER — LABORATORY ENCOUNTER (OUTPATIENT)
Dept: LAB | Age: 63
End: 2023-03-02
Payer: MEDICARE

## 2023-03-02 DIAGNOSIS — Z01.818 PRE-OP TESTING: ICD-10-CM

## 2023-03-02 LAB
CHLORIDE SERPL-SCNC: 110 MMOL/L (ref 98–112)
CO2 SERPL-SCNC: 25 MMOL/L (ref 21–32)
POTASSIUM SERPL-SCNC: 4.9 MMOL/L (ref 3.5–5.1)
SODIUM SERPL-SCNC: 139 MMOL/L (ref 136–145)

## 2023-03-02 PROCEDURE — 36415 COLL VENOUS BLD VENIPUNCTURE: CPT

## 2023-03-02 PROCEDURE — 80051 ELECTROLYTE PANEL: CPT

## 2023-03-07 ENCOUNTER — LAB ENCOUNTER (OUTPATIENT)
Dept: LAB | Age: 63
End: 2023-03-07
Attending: ORTHOPAEDIC SURGERY
Payer: MEDICARE

## 2023-03-07 DIAGNOSIS — Z01.818 PRE-OP TESTING: ICD-10-CM

## 2023-03-08 LAB — SARS-COV-2 RNA RESP QL NAA+PROBE: NOT DETECTED

## 2023-03-09 ENCOUNTER — HOSPITAL ENCOUNTER (OUTPATIENT)
Facility: HOSPITAL | Age: 63
Setting detail: HOSPITAL OUTPATIENT SURGERY
Discharge: HOME | End: 2023-03-09
Attending: ORTHOPAEDIC SURGERY | Admitting: ORTHOPAEDIC SURGERY
Payer: MEDICARE

## 2023-03-09 ENCOUNTER — ANESTHESIA EVENT (OUTPATIENT)
Dept: SURGERY | Facility: HOSPITAL | Age: 63
End: 2023-03-09
Payer: MEDICARE

## 2023-03-09 ENCOUNTER — ANESTHESIA (OUTPATIENT)
Dept: SURGERY | Facility: HOSPITAL | Age: 63
End: 2023-03-09
Payer: MEDICARE

## 2023-03-09 VITALS
WEIGHT: 217.13 LBS | DIASTOLIC BLOOD PRESSURE: 46 MMHG | OXYGEN SATURATION: 96 % | RESPIRATION RATE: 18 BRPM | TEMPERATURE: 98 F | HEIGHT: 61 IN | SYSTOLIC BLOOD PRESSURE: 108 MMHG | BODY MASS INDEX: 40.99 KG/M2 | HEART RATE: 68 BPM

## 2023-03-09 DIAGNOSIS — Z20.822 ENCOUNTER FOR PREOPERATIVE SCREENING LABORATORY TESTING FOR COVID-19 VIRUS: Primary | ICD-10-CM

## 2023-03-09 DIAGNOSIS — S83.232A COMPLEX TEAR OF MEDIAL MENISCUS OF LEFT KNEE AS CURRENT INJURY, INITIAL ENCOUNTER: ICD-10-CM

## 2023-03-09 DIAGNOSIS — Z01.818 PRE-OP TESTING: ICD-10-CM

## 2023-03-09 DIAGNOSIS — Z01.812 ENCOUNTER FOR PREOPERATIVE SCREENING LABORATORY TESTING FOR COVID-19 VIRUS: Primary | ICD-10-CM

## 2023-03-09 PROCEDURE — 0SBD4ZZ EXCISION OF LEFT KNEE JOINT, PERCUTANEOUS ENDOSCOPIC APPROACH: ICD-10-PCS | Performed by: ORTHOPAEDIC SURGERY

## 2023-03-09 PROCEDURE — 0MNP4ZZ RELEASE LEFT KNEE BURSA AND LIGAMENT, PERCUTANEOUS ENDOSCOPIC APPROACH: ICD-10-PCS | Performed by: ORTHOPAEDIC SURGERY

## 2023-03-09 RX ORDER — MEPERIDINE HYDROCHLORIDE 25 MG/ML
12.5 INJECTION INTRAMUSCULAR; INTRAVENOUS; SUBCUTANEOUS AS NEEDED
Status: DISCONTINUED | OUTPATIENT
Start: 2023-03-09 | End: 2023-03-09

## 2023-03-09 RX ORDER — HYDROMORPHONE HYDROCHLORIDE 1 MG/ML
0.2 INJECTION, SOLUTION INTRAMUSCULAR; INTRAVENOUS; SUBCUTANEOUS EVERY 5 MIN PRN
Status: DISCONTINUED | OUTPATIENT
Start: 2023-03-09 | End: 2023-03-09

## 2023-03-09 RX ORDER — HYDROMORPHONE HYDROCHLORIDE 1 MG/ML
0.4 INJECTION, SOLUTION INTRAMUSCULAR; INTRAVENOUS; SUBCUTANEOUS EVERY 5 MIN PRN
Status: DISCONTINUED | OUTPATIENT
Start: 2023-03-09 | End: 2023-03-09

## 2023-03-09 RX ORDER — ACETAMINOPHEN 500 MG
1000 TABLET ORAL ONCE AS NEEDED
Status: COMPLETED | OUTPATIENT
Start: 2023-03-09 | End: 2023-03-09

## 2023-03-09 RX ORDER — MIDAZOLAM HYDROCHLORIDE 1 MG/ML
INJECTION INTRAMUSCULAR; INTRAVENOUS AS NEEDED
Status: DISCONTINUED | OUTPATIENT
Start: 2023-03-09 | End: 2023-03-09 | Stop reason: SURG

## 2023-03-09 RX ORDER — SCOLOPAMINE TRANSDERMAL SYSTEM 1 MG/1
1 PATCH, EXTENDED RELEASE TRANSDERMAL ONCE
Status: DISCONTINUED | OUTPATIENT
Start: 2023-03-09 | End: 2023-03-09 | Stop reason: HOSPADM

## 2023-03-09 RX ORDER — SODIUM CHLORIDE, SODIUM LACTATE, POTASSIUM CHLORIDE, CALCIUM CHLORIDE 600; 310; 30; 20 MG/100ML; MG/100ML; MG/100ML; MG/100ML
INJECTION, SOLUTION INTRAVENOUS CONTINUOUS
Status: DISCONTINUED | OUTPATIENT
Start: 2023-03-09 | End: 2023-03-09

## 2023-03-09 RX ORDER — HYDROCODONE BITARTRATE AND ACETAMINOPHEN 5; 325 MG/1; MG/1
1 TABLET ORAL EVERY 4 HOURS PRN
Qty: 30 TABLET | Refills: 0 | Status: SHIPPED | OUTPATIENT
Start: 2023-03-09 | End: 2023-03-14 | Stop reason: RX

## 2023-03-09 RX ORDER — HYDROMORPHONE HYDROCHLORIDE 1 MG/ML
INJECTION, SOLUTION INTRAMUSCULAR; INTRAVENOUS; SUBCUTANEOUS
Status: COMPLETED
Start: 2023-03-09 | End: 2023-03-09

## 2023-03-09 RX ORDER — ACETAMINOPHEN 500 MG
1000 TABLET ORAL ONCE
Status: DISCONTINUED | OUTPATIENT
Start: 2023-03-09 | End: 2023-03-09 | Stop reason: HOSPADM

## 2023-03-09 RX ORDER — LIDOCAINE HYDROCHLORIDE 10 MG/ML
INJECTION, SOLUTION EPIDURAL; INFILTRATION; INTRACAUDAL; PERINEURAL AS NEEDED
Status: DISCONTINUED | OUTPATIENT
Start: 2023-03-09 | End: 2023-03-09 | Stop reason: SURG

## 2023-03-09 RX ORDER — HYDROMORPHONE HYDROCHLORIDE 1 MG/ML
0.6 INJECTION, SOLUTION INTRAMUSCULAR; INTRAVENOUS; SUBCUTANEOUS EVERY 5 MIN PRN
Status: DISCONTINUED | OUTPATIENT
Start: 2023-03-09 | End: 2023-03-09

## 2023-03-09 RX ORDER — PROCHLORPERAZINE EDISYLATE 5 MG/ML
5 INJECTION INTRAMUSCULAR; INTRAVENOUS EVERY 8 HOURS PRN
Status: DISCONTINUED | OUTPATIENT
Start: 2023-03-09 | End: 2023-03-09

## 2023-03-09 RX ORDER — MIDAZOLAM HYDROCHLORIDE 1 MG/ML
1 INJECTION INTRAMUSCULAR; INTRAVENOUS EVERY 5 MIN PRN
Status: DISCONTINUED | OUTPATIENT
Start: 2023-03-09 | End: 2023-03-09

## 2023-03-09 RX ORDER — NALOXONE HYDROCHLORIDE 0.4 MG/ML
80 INJECTION, SOLUTION INTRAMUSCULAR; INTRAVENOUS; SUBCUTANEOUS AS NEEDED
Status: DISCONTINUED | OUTPATIENT
Start: 2023-03-09 | End: 2023-03-09

## 2023-03-09 RX ORDER — HYDROCODONE BITARTRATE AND ACETAMINOPHEN 5; 325 MG/1; MG/1
1 TABLET ORAL ONCE AS NEEDED
Status: COMPLETED | OUTPATIENT
Start: 2023-03-09 | End: 2023-03-09

## 2023-03-09 RX ORDER — POVIDONE-IODINE 10 MG/G
OINTMENT TOPICAL AS NEEDED
Status: DISCONTINUED | OUTPATIENT
Start: 2023-03-09 | End: 2023-03-09 | Stop reason: HOSPADM

## 2023-03-09 RX ORDER — CEFAZOLIN SODIUM/WATER 2 G/20 ML
SYRINGE (ML) INTRAVENOUS AS NEEDED
Status: DISCONTINUED | OUTPATIENT
Start: 2023-03-09 | End: 2023-03-09 | Stop reason: SURG

## 2023-03-09 RX ORDER — BUPIVACAINE HYDROCHLORIDE 5 MG/ML
INJECTION, SOLUTION EPIDURAL; INTRACAUDAL AS NEEDED
Status: DISCONTINUED | OUTPATIENT
Start: 2023-03-09 | End: 2023-03-09 | Stop reason: HOSPADM

## 2023-03-09 RX ORDER — METOPROLOL TARTRATE 5 MG/5ML
2.5 INJECTION INTRAVENOUS ONCE
Status: DISCONTINUED | OUTPATIENT
Start: 2023-03-09 | End: 2023-03-09

## 2023-03-09 RX ORDER — HYDROCODONE BITARTRATE AND ACETAMINOPHEN 5; 325 MG/1; MG/1
2 TABLET ORAL ONCE AS NEEDED
Status: COMPLETED | OUTPATIENT
Start: 2023-03-09 | End: 2023-03-09

## 2023-03-09 RX ORDER — KETOROLAC TROMETHAMINE 30 MG/ML
INJECTION, SOLUTION INTRAMUSCULAR; INTRAVENOUS AS NEEDED
Status: DISCONTINUED | OUTPATIENT
Start: 2023-03-09 | End: 2023-03-09 | Stop reason: SURG

## 2023-03-09 RX ORDER — ONDANSETRON 2 MG/ML
INJECTION INTRAMUSCULAR; INTRAVENOUS AS NEEDED
Status: DISCONTINUED | OUTPATIENT
Start: 2023-03-09 | End: 2023-03-09 | Stop reason: SURG

## 2023-03-09 RX ORDER — ONDANSETRON 2 MG/ML
4 INJECTION INTRAMUSCULAR; INTRAVENOUS EVERY 6 HOURS PRN
Status: DISCONTINUED | OUTPATIENT
Start: 2023-03-09 | End: 2023-03-09

## 2023-03-09 RX ORDER — DEXAMETHASONE SODIUM PHOSPHATE 4 MG/ML
VIAL (ML) INJECTION AS NEEDED
Status: DISCONTINUED | OUTPATIENT
Start: 2023-03-09 | End: 2023-03-09 | Stop reason: SURG

## 2023-03-09 RX ORDER — CEFAZOLIN SODIUM/WATER 2 G/20 ML
2 SYRINGE (ML) INTRAVENOUS ONCE
Status: DISCONTINUED | OUTPATIENT
Start: 2023-03-09 | End: 2023-03-09 | Stop reason: HOSPADM

## 2023-03-09 RX ADMIN — LIDOCAINE HYDROCHLORIDE 100 MG: 10 INJECTION, SOLUTION EPIDURAL; INFILTRATION; INTRACAUDAL; PERINEURAL at 16:04:00

## 2023-03-09 RX ADMIN — MIDAZOLAM HYDROCHLORIDE 2 MG: 1 INJECTION INTRAMUSCULAR; INTRAVENOUS at 16:01:00

## 2023-03-09 RX ADMIN — CEFAZOLIN SODIUM/WATER 2 G: 2 G/20 ML SYRINGE (ML) INTRAVENOUS at 16:07:00

## 2023-03-09 RX ADMIN — ONDANSETRON 4 MG: 2 INJECTION INTRAMUSCULAR; INTRAVENOUS at 16:10:00

## 2023-03-09 RX ADMIN — DEXAMETHASONE SODIUM PHOSPHATE 4 MG: 4 MG/ML VIAL (ML) INJECTION at 16:10:00

## 2023-03-09 RX ADMIN — KETOROLAC TROMETHAMINE 30 MG: 30 INJECTION, SOLUTION INTRAMUSCULAR; INTRAVENOUS at 16:50:00

## 2023-03-09 NOTE — INTERVAL H&P NOTE
Pre-op Diagnosis: Complex tear of medial meniscus of left knee as current injury, initial encounter [S83.232A]  Synovitis of knee [M65.9]  Chondromalacia of left knee [M94.262]    The above referenced H&P was reviewed by Julia Gutiérrez MD on 3/9/2023, the patient was examined and no significant changes have occurred in the patient's condition since the H&P was performed. I discussed with the patient and/or legal representative the potential benefits, risks and side effects of this procedure; the likelihood of the patient achieving goals; and potential problems that might occur during recuperation. I discussed reasonable alternatives to the procedure, including risks, benefits and side effects related to the alternatives and risks related to not receiving this procedure. We will proceed with procedure as planned.

## 2023-03-09 NOTE — ANESTHESIA PROCEDURE NOTES
Airway  Date/Time: 3/9/2023 4:05 PM  Urgency: elective      General Information and Staff    Patient location during procedure: OR  Anesthesiologist: Bev Hsu MD  Performed: anesthesiologist   Performed by: Bev Hsu MD  Authorized by: Bev Hsu MD      Indications and Patient Condition  Indications for airway management: anesthesia  Sedation level: deep  Preoxygenated: yes  Patient position: sniffing  Mask difficulty assessment: 0 - not attempted    Final Airway Details  Final airway type: supraglottic airway      Successful airway: classic  Size 3       Number of attempts at approach: 1

## 2023-03-09 NOTE — BRIEF OP NOTE
Pre-Operative Diagnosis: Complex tear of medial meniscus of left knee as current injury, initial encounter [S83.232A]  Synovitis of knee [M65.9]  Chondromalacia of left knee [M94.262]     Post-Operative Diagnosis: Left knee chondromalacia medial femoral condyle and patella, lateral pressure syndrome      Procedure Performed:   LEFT KNEE ARTHROSCOPY,  AND CHONDROPLASTY of patella and medial femoral condyle and lateral retinacular release    Surgeon(s) and Role:     Amarjit Kiran MD - Primary    Assistant(s):  PA: Shilpi Benton PA-C     Surgical Findings: See operative report      Specimen: na      Estimated Blood Loss: Blood Output: 5 mL (3/9/2023  4:53 PM)        Ochoa Bean PA-C  3/9/2023  5:07 PM

## 2023-03-09 NOTE — DISCHARGE INSTRUCTIONS
Post-operative Arthroscopy    Medication: Before you leave the hospital, you will be given a prescription for a pain reliever. This medication contains a narcotic with acetaminophen (Tylenol), so do not take any additional Tylenol. You may take Tylenol or Ibuprofen instead of the prescription as the pain subsides. If you take a daily Aspirin you may resume taking your Aspirin the evening of surgery. You received a drug called Toradol which is an Anti Inflammatory at: 4:50 pm  If you are allowed to take Anti inflammatories:    Do not take any Anti Inflammatory like Motrin, Aleve or Ibuprophen until after: 10:50 pm  Please report any suspected allergic reactions or bleeding issues to your doctor       Day of Surgery: When you return home, elevate the extremity on some pillows, if applicable. For shoulder arthroscopy, sleeping upright (such as in a recliner) may be more comfortable for the first few days. Place an ice bag over the dressing for about 20 minutes three or four times a day for the first 5 days while protecting the skin with a sterile dressing plus Ace wrap (for knee) or towel (for shoulder). Dressings: The dressing should remain in place for 48 hours. After 48 hours, remove the bulky Ace wrap and the gauze dressings. Apply a small amount of Betadine ointment to sutures and cover with a breathable band-aid. Showering: Before showering, apply waterproof band-aids to sutures to ensure these areas do not get wet. Once finished, remove band-aids, let area air dry, and apply another small amount of Betadine ointment and regular band-aids. Do not immerse or soak the surgical wound (no baths). You may continue to use the Ace bandage if it makes you more comfortable. Activity: You may walk on the leg as tolerated, unless instructed differently. You may use crutches or a cane as needed to minimize discomfort.      For knee arthroscopy: Flex and extend the knee, foot, and ankle to full range of motion as soon as possible to prevent stiffness. For shoulder arthroscopy: Flex and extend the hand, wrist, and elbow and begin pendulums as instructed for underarm hygiene and dressing. Follow up: You will be scheduled for a follow up office visit in 7-10 days to have your sutures taken out. Your plan for physical therapy, driving, and returning to work will be discussed at this appointment. Please don't hesitate to contact our office at [phone] if you have any post-operative questions or concerns.        HOW TO EMPTY YOUR DRAIN    Wash hands with soap and water  Hold drain so plug is upright and release it  Turn drain upside down into measuring container and squeeze drain until all the liquid is removed  While squeezing the drain, replace the plug into drain  Measure the liquid and record the amount in your diary twice a day  Be sure to bring your diary to your next visit with the doctor    Drain Record Sheet    Date Time Drain #1 Drain #2

## 2023-03-10 ENCOUNTER — OFFICE VISIT (OUTPATIENT)
Dept: ORTHOPEDICS CLINIC | Facility: CLINIC | Age: 63
End: 2023-03-10
Payer: MEDICARE

## 2023-03-10 ENCOUNTER — TELEPHONE (OUTPATIENT)
Dept: ORTHOPEDICS CLINIC | Facility: CLINIC | Age: 63
End: 2023-03-10

## 2023-03-10 VITALS — BODY MASS INDEX: 40.97 KG/M2 | HEIGHT: 61 IN | WEIGHT: 217 LBS

## 2023-03-10 DIAGNOSIS — Z48.89 AFTERCARE FOLLOWING SURGERY: ICD-10-CM

## 2023-03-10 DIAGNOSIS — M94.262 CHONDROMALACIA OF LEFT KNEE: Primary | ICD-10-CM

## 2023-03-10 PROCEDURE — 3008F BODY MASS INDEX DOCD: CPT | Performed by: ORTHOPAEDIC SURGERY

## 2023-03-10 PROCEDURE — 99024 POSTOP FOLLOW-UP VISIT: CPT | Performed by: ORTHOPAEDIC SURGERY

## 2023-03-10 RX ORDER — HYDROCODONE BITARTRATE AND ACETAMINOPHEN 5; 325 MG/1; MG/1
1 TABLET ORAL EVERY 4 HOURS PRN
Qty: 30 TABLET | Refills: 0 | Status: SHIPPED | OUTPATIENT
Start: 2023-03-10

## 2023-03-10 NOTE — PROGRESS NOTES
Patient is a 27-year-old white female here for follow-up on her left knee. Patient had the arthroscopy of the knee with chondroplasty of the patella and medial femoral condyle. She also had the lateral retinacular release performed. Patient daughter is with her today. Patient's exam shows her to have a minimally antalgic gait. Patient has intact straight leg raising. Good quad control. Wounds are clean and dry. Negative Homans. Flexion to about 90 degrees. Full extension. Patient's drain is in place. Impression is that of normal healing status post left knee arthroscopy with endoscopic surgery and chondroplasty as well as lateral retinacular release. Recommendations are for her to follow-up in a week for suture removal.  I did give her exercises for range of motion and strengthening. I did advise her to limit her activities for the next couple days to minimize swelling. Drain was pulled today as well. We will follow-up with her after the sutures are removed in an additional 2 or 3 weeks. Presently I do not think the patient is going to need physical therapy but we can reevaluate that in a week's time.

## 2023-03-10 NOTE — TELEPHONE ENCOUNTER
Patient is requesting prescription be sent to the [facility] at [address] in [place], Phone number is [phone]. States [facility] is out of medication. Please advise.

## 2023-03-10 NOTE — OPERATIVE REPORT
[address]    PATIENT'S NAME: Ann Perez   ATTENDING PHYSICIAN: Ignacio Hylton M.D. OPERATING PHYSICIAN: Ignacio Hylton M.D. PATIENT ACCOUNT#:   [de-identified]    LOCATION:  PACU [facility] PACU [facility]  MEDICAL RECORD #:   [id number]       YOB: 1960  ADMISSION DATE:       03/09/2023      OPERATION DATE:  03/09/2023    OPERATIVE REPORT      PREOPERATIVE DIAGNOSIS:  Medial meniscus tear; chondromalacia, patella. POSTOPERATIVE DIAGNOSIS:    1. Chondromalacia, medial femoral condyle and patella. 2.   Lateral pressure syndrome of the left knee. PROCEDURE:   Arthroscopy of the left knee, endoscopic chondroplasty of the patella and medial femoral condyle, lateral retinacular release. ASSISTANT:  Armando Rios PA-C and ONIEL Ni student. OPERATIVE TECHNIQUE:  Following administration of general anesthesia, the tourniquet and leg ochoa placed around the proximal left thigh, and the right leg was placed in a well leg ochoa. Left lower extremity was prepped and draped in the usual sterile fashion. Leg was exsanguinated with a Bolivar bandage and the tourniquet was inflated to 300 mmHg. Using the #11 blade, standard arthroscopic skin incisions were made and the semi-blunt trocar and inflow cannula were inserted through the superior medial incision to the knee joint. A mild amount of inflammatory fluid was expressed from the knee. The knee was inflated with irrigation fluid. The arthroscopic cannula and semi-blunt trocar were introduced through the anterolateral incision to the knee joint. The arthroscope was then inserted. The knee was examined beginning in thein the suprapatellar pouch. The pouch itself was free of loose bodies. The patella demonstrated coarse fibrillated cartilage predominantly along the lateral facet. However, there was also some fibrillated cartilage on the medial facet. The patella did track laterally and had a lateral tilt.   The trochlea demonstrated minor fine fibrillated cartilage in the trochlear groove. The gutters were free of loose bodies. The medial joint line was examined and a spinal needle was placed superior to the anterior horn of the medial meniscus. A #11 blade was used to make a 7 mm skin incision at this site. A semi-blunt trocar was used to establish a portal and a probe was inserted. The medial meniscus was probed, found to be free of any tears. The articular surface in the posterior aspect of the medial femoral condyle demonstrated coarse fibrillated cartilage. No subchondral bone was exposed. The intercondylar notch was examined. The anterior and posterior cruciate ligaments were intact. The ligamentum mucosum was debrided to facilitate visualization and passage of instruments. The lateral joint line was then examined. Lateral femoral condyle, lateral tibial plateau, and lateral meniscus were all probed and found to be free of any pathology. The attention was then at this time paid to the medial femoral condyle and a chondroplasty was performed using the 4 mm straight shaver. After this was completed, no subchondral bone was exposed and the remaining articular cartilage was stable. Attention was then paid to the patella and this was debrided through anteromedial, anterolateral, and superomedial approaches with both straight and angled crystal. After this was completed, the patella was debrided down to stable cartilage. No subchondral bone was exposed. The medial and lateral tilt of the patella pronounced lateral injury. It was elected to proceed with a lateral retinacular release. This was done using the arthroscopic meniscectomy tool through the anterolateral approach and the release was then performed beginning at the superior lateral corner of the patella at the juncture of the retinaculum and the lateral vastus lateralis muscle. The lateral release was performed without difficulty.   The patella was easily everted after this was completed. The knee was then irrigated free of any loose debris. The medium Hemovac was placed in the knee through the superior medial portal.  The wounds were closed with 3-0 nylon sutures in horizontal mattress fashion. The knee was injected with 30 mL 0.5% Marcaine plain. The wounds were covered with Betadine ointment, Adaptic gauze, 4 x 4 gauze, ABD dressing, sterile Webril, cotton roll and Ace bandage. Tourniquet was deflated after less than 1 hour tourniquet time. No complications. Sponge and needle counts were correct at the end of the procedure. Blood loss 5 mL. IV fluids, all crystalloid. Patient transferred to postanesthesia recovery room in stable condition.       Dictated By Louis Silva M.D.  d: 03/09/2023 17:02:58  t: 03/09/2023 18:37:20  Job [id number]  GT/

## 2023-03-22 ENCOUNTER — OFFICE VISIT (OUTPATIENT)
Dept: ORTHOPEDICS CLINIC | Facility: CLINIC | Age: 63
End: 2023-03-22
Payer: MEDICARE

## 2023-03-22 VITALS — WEIGHT: 217 LBS | HEIGHT: 61 IN | BODY MASS INDEX: 40.97 KG/M2

## 2023-03-22 DIAGNOSIS — Z98.890 STATUS POST ARTHROSCOPY OF LEFT KNEE: ICD-10-CM

## 2023-03-22 DIAGNOSIS — Z48.89 AFTERCARE FOLLOWING SURGERY: Primary | ICD-10-CM

## 2023-03-22 PROCEDURE — 99024 POSTOP FOLLOW-UP VISIT: CPT | Performed by: PHYSICIAN ASSISTANT

## 2023-03-22 PROCEDURE — 3008F BODY MASS INDEX DOCD: CPT | Performed by: PHYSICIAN ASSISTANT

## 2023-03-22 RX ORDER — OMEGA-3 FATTY ACIDS/FISH OIL 300-1000MG
CAPSULE ORAL
COMMUNITY

## 2023-03-22 NOTE — PROGRESS NOTES
[facility] Post Op Progress Note    Date of Surgery: 03/09/2023      Subjective: Rosendo Cortés is a 65year old female who is here for reevaluation of her left knee. She is approximately 2 weeks status post left knee arthroscopy with chondroplasty of the patella and medial femoral condyle and lateral retinacular release. She overall is doing well but notes some persistent swelling in the knee, and mild discomfort. She is here today for suture removal.  She has no other complaints. Objective: Exam of the left knee and lower extremity reveals that the portals are clean, dry, and intact. Sutures were removed. She has full extension and flexion to 125 degrees. She has moderate swelling and effusion of the knee. No calf tenderness upon palpation. Sensation is present to light touch. Assessment: Status post left knee arthroscopy with chondroplasty and lateral retinacular release      Plan: I discussed with the patient that typically the lateral release may cause some persistent swelling of the knee. She will continue to ice and elevate and slowly advance with activities. If pain and swelling worsens, she can consider going ahead with an aspiration of her effusion however overall she is doing well in the right now. She will follow-up with me in 2 to 4 weeks to see how she is progressing or sooner with questions or concerns.       Ovi Light, VANESA, PAGarettC Orthopedic Surgery   [facility]  [address]   t: [phone]  f: [phone]

## 2023-04-11 ENCOUNTER — OFFICE VISIT (OUTPATIENT)
Dept: ORTHOPEDICS CLINIC | Facility: CLINIC | Age: 63
End: 2023-04-11
Payer: MEDICARE

## 2023-04-11 VITALS — BODY MASS INDEX: 40.97 KG/M2 | HEIGHT: 61 IN | WEIGHT: 217 LBS

## 2023-04-11 DIAGNOSIS — Z48.89 AFTERCARE FOLLOWING SURGERY: Primary | ICD-10-CM

## 2023-04-11 PROCEDURE — 3008F BODY MASS INDEX DOCD: CPT | Performed by: ORTHOPAEDIC SURGERY

## 2023-04-11 PROCEDURE — 99024 POSTOP FOLLOW-UP VISIT: CPT | Performed by: ORTHOPAEDIC SURGERY

## 2023-04-11 NOTE — PROGRESS NOTES
Patient is a 58-year-old female here for follow-up on her left knee. Patient had the arthroscopy of her knee for a chondroplasty of the patella and also had a lateral retinacular release performed. Patient is doing fairly well. Still little concerned that she cannot go up and down stairs as normally as she would like. Patient's exam shows her to have a nonantalgic gait. She has straight leg raising intact without lag. She has mild swelling of the left knee. She has full extension of the knee and flexions about 130 degrees. Surgical scars well-healed. Impression is that of well-healing left knee status post chondroplasty of the patella and lateral retinacular release. I explained to the patient that with the lateral release and the chondroplasty of the patella that recovery from the arthroscopies can take little longer. I explained to her that she is doing very well at this stage and that she will continue to improve over the another 4 weeks time. Patient should follow-up with me in around 3 to 4 weeks time. Encouraged her to increase her activity slowly to tolerance. Stay away from high impact activities or sudden change of direction activities.

## 2023-04-13 ENCOUNTER — OFFICE VISIT (OUTPATIENT)
Dept: INTERNAL MEDICINE CLINIC | Facility: CLINIC | Age: 63
End: 2023-04-13
Payer: MEDICARE

## 2023-04-13 VITALS
WEIGHT: 218 LBS | SYSTOLIC BLOOD PRESSURE: 108 MMHG | HEART RATE: 70 BPM | HEIGHT: 61 IN | BODY MASS INDEX: 41.16 KG/M2 | RESPIRATION RATE: 16 BRPM | DIASTOLIC BLOOD PRESSURE: 64 MMHG

## 2023-04-13 DIAGNOSIS — E53.8 VITAMIN B12 DEFICIENCY: ICD-10-CM

## 2023-04-13 DIAGNOSIS — E66.01 CLASS 3 SEVERE OBESITY WITH BODY MASS INDEX (BMI) OF 40.0 TO 44.9 IN ADULT, UNSPECIFIED OBESITY TYPE, UNSPECIFIED WHETHER SERIOUS COMORBIDITY PRESENT (HCC): Primary | ICD-10-CM

## 2023-04-13 DIAGNOSIS — M94.262 CHONDROMALACIA, LEFT KNEE: ICD-10-CM

## 2023-04-13 DIAGNOSIS — F41.9 ANXIETY AND DEPRESSION: ICD-10-CM

## 2023-04-13 DIAGNOSIS — F32.A ANXIETY AND DEPRESSION: ICD-10-CM

## 2023-04-13 DIAGNOSIS — Z51.81 THERAPEUTIC DRUG MONITORING: ICD-10-CM

## 2023-04-13 PROCEDURE — 3074F SYST BP LT 130 MM HG: CPT | Performed by: INTERNAL MEDICINE

## 2023-04-13 PROCEDURE — 99214 OFFICE O/P EST MOD 30 MIN: CPT | Performed by: INTERNAL MEDICINE

## 2023-04-13 PROCEDURE — 3078F DIAST BP <80 MM HG: CPT | Performed by: INTERNAL MEDICINE

## 2023-04-13 PROCEDURE — 3008F BODY MASS INDEX DOCD: CPT | Performed by: INTERNAL MEDICINE

## 2023-04-13 RX ORDER — PHENTERMINE HYDROCHLORIDE 15 MG/1
15 CAPSULE ORAL EVERY MORNING
Qty: 30 CAPSULE | Refills: 1 | Status: CANCELLED | OUTPATIENT
Start: 2023-04-13

## 2023-04-13 RX ORDER — CYANOCOBALAMIN 1000 UG/ML
1000 INJECTION, SOLUTION INTRAMUSCULAR; SUBCUTANEOUS ONCE
Status: SHIPPED | OUTPATIENT
Start: 2023-04-13

## 2023-04-13 RX ORDER — PHENTERMINE HYDROCHLORIDE 15 MG/1
15 CAPSULE ORAL 2 TIMES DAILY
Qty: 60 CAPSULE | Refills: 2 | Status: SHIPPED | OUTPATIENT
Start: 2023-04-13

## 2023-05-02 ENCOUNTER — TELEPHONE (OUTPATIENT)
Dept: ORTHOPEDICS CLINIC | Facility: CLINIC | Age: 63
End: 2023-05-02

## 2023-05-02 NOTE — TELEPHONE ENCOUNTER
Future Appointments   Date Time Provider Robyn Quan   5/2/2023 11:00 AM Yudith Smith MD [facility] [id number]       [facility] to reschedule appt may be added to [facility] as per previous message.

## 2023-05-03 ENCOUNTER — OFFICE VISIT (OUTPATIENT)
Dept: ORTHOPEDICS CLINIC | Facility: CLINIC | Age: 63
End: 2023-05-03
Payer: MEDICARE

## 2023-05-03 VITALS — WEIGHT: 215 LBS | HEIGHT: 61 IN | BODY MASS INDEX: 40.59 KG/M2

## 2023-05-03 DIAGNOSIS — Z98.890 STATUS POST ARTHROSCOPY OF LEFT KNEE: ICD-10-CM

## 2023-05-03 DIAGNOSIS — Z48.89 AFTERCARE FOLLOWING SURGERY: Primary | ICD-10-CM

## 2023-05-03 PROCEDURE — 99024 POSTOP FOLLOW-UP VISIT: CPT | Performed by: PHYSICIAN ASSISTANT

## 2023-05-03 PROCEDURE — 3008F BODY MASS INDEX DOCD: CPT | Performed by: PHYSICIAN ASSISTANT

## 2023-05-03 NOTE — PROGRESS NOTES
[facility] Post Op Progress Note    Date of Surgery: 03/09/2023      Subjective: Minh Caban is a 65year old female who is here for follow-up of her left knee. She is approximately 2 months status post left knee arthroscopy with chondroplasty of the patella and medial femoral condyle and lateral retinacular release. She overall notes improvements but still feels difficulty getting in and out of a car or into bed. Pain is typically localized to the back of the knee. Objective: Exam of the left knee and lower extremity reveals that the portals are well-healed. She has no pain with flexion and extension. She has mild swelling and effusion. Sensation is present to light touch. No medial or lateral joint line tenderness. Assessment: Status post left knee arthroscopy with partial medial meniscectomy and chondroplasty with lateral retinacular release      Plan: Overall she is progressing well. She is walking 2 miles at a time on her treadmill with no issues. She will continue to slowly advance with activities. I gave her reassurance that she is doing well. If she would like to go ahead with physical therapy she will let us know prior to her next visit. We will otherwise follow-up with her in 1 month for final examination or sooner with questions or concerns. A student was present during the visit after the patient's consent.       Andres King, VANESA, PA-C Orthopedic Surgery   [facility]  [address]   t: [phone]  f: [phone]

## 2023-05-04 DIAGNOSIS — G43.709 CHRONIC MIGRAINE WITHOUT AURA WITHOUT STATUS MIGRAINOSUS, NOT INTRACTABLE: ICD-10-CM

## 2023-05-04 RX ORDER — MEMANTINE HYDROCHLORIDE 10 MG/1
10 TABLET ORAL DAILY
Qty: 60 TABLET | Refills: 2 | Status: SHIPPED | OUTPATIENT
Start: 2023-05-04

## 2023-05-04 RX ORDER — PROPRANOLOL HYDROCHLORIDE 80 MG/1
1 CAPSULE, EXTENDED RELEASE ORAL EVERY EVENING
Qty: 90 CAPSULE | Refills: 0 | Status: SHIPPED | OUTPATIENT
Start: 2023-05-04

## 2023-05-15 ENCOUNTER — NURSE ONLY (OUTPATIENT)
Dept: INTERNAL MEDICINE CLINIC | Facility: CLINIC | Age: 63
End: 2023-05-15
Payer: MEDICARE

## 2023-05-15 DIAGNOSIS — E53.8 VITAMIN B12 DEFICIENCY: Primary | ICD-10-CM

## 2023-05-15 PROCEDURE — 96372 THER/PROPH/DIAG INJ SC/IM: CPT | Performed by: PHYSICIAN ASSISTANT

## 2023-05-15 RX ORDER — CYANOCOBALAMIN 1000 UG/ML
1000 INJECTION, SOLUTION INTRAMUSCULAR; SUBCUTANEOUS ONCE
Status: COMPLETED | OUTPATIENT
Start: 2023-05-15 | End: 2023-05-15

## 2023-05-15 RX ADMIN — CYANOCOBALAMIN 1000 MCG: 1000 INJECTION, SOLUTION INTRAMUSCULAR; SUBCUTANEOUS at 09:17:00

## 2023-05-31 ENCOUNTER — OFFICE VISIT (OUTPATIENT)
Dept: ORTHOPEDICS CLINIC | Facility: CLINIC | Age: 63
End: 2023-05-31
Payer: MEDICARE

## 2023-05-31 DIAGNOSIS — Z98.890 STATUS POST ARTHROSCOPY OF LEFT KNEE: ICD-10-CM

## 2023-05-31 DIAGNOSIS — Z48.89 AFTERCARE FOLLOWING SURGERY: Primary | ICD-10-CM

## 2023-05-31 PROCEDURE — 20610 DRAIN/INJ JOINT/BURSA W/O US: CPT | Performed by: PHYSICIAN ASSISTANT

## 2023-05-31 PROCEDURE — 99024 POSTOP FOLLOW-UP VISIT: CPT | Performed by: PHYSICIAN ASSISTANT

## 2023-05-31 RX ORDER — TRIAMCINOLONE ACETONIDE 40 MG/ML
40 INJECTION, SUSPENSION INTRA-ARTICULAR; INTRAMUSCULAR ONCE
Status: COMPLETED | OUTPATIENT
Start: 2023-05-31 | End: 2023-05-31

## 2023-05-31 RX ADMIN — TRIAMCINOLONE ACETONIDE 40 MG: 40 INJECTION, SUSPENSION INTRA-ARTICULAR; INTRAMUSCULAR at 15:01:00

## 2023-05-31 NOTE — PROGRESS NOTES
[facility] Post Op Progress Note    Date of Surgery: 03/09/2023      Subjective: Gabriela Maldonado is a 59year old female who is here for follow-up of her left knee. She is approximately 3 months status post left knee arthroscopy with chondroplasty of the patella and medial femoral condyle and lateral retinacular release. She has continued to struggle with postoperative pain and swelling to the left knee. She states pain is localized to the anterior and medial aspect of the knee and is worse with going up and down stairs and getting up from a seated position. She states that at times it is debilitating and significantly affects her activities of daily living. She does take ibuprofen with no significant improvement in symptoms. She is here for further evaluation. Objective: Exam the left knee and lower extremity reveals the portals are well-healed. She has full extension and pain with flexion past 110 degrees. She has medial joint line tenderness, no lateral joint line tenderness. She has patellofemoral crepitus with range of motion. She does have a mild effusion. Sensation is present to light touch. Assessment: Left knee pain status post arthroscopy with chondroplasty and lateral retinacular release      Plan: I reviewed her intraoperative pictures with her did show some mild degenerative changes at the medial patellofemoral compartment. Due to her persistent pain I did offer cortisone injection versus formal therapy. She would like to try the cortisone injection today and this is reasonable. She will let me know if she would like to proceed with formal therapy as well for strengthening and conditioning of the the leg and knee. She will follow-up in 4 to 6 weeks for final recheck otherwise as needed if she is doing well. Risks, benefits, and alternatives to the cortisone injection were discussed including but not limited to needle infection, steroid flare up or failure to improve.  The patient would like to proceed and under meticulous sterile technique 4cc of Xylocaine, 4cc of Marcaine, and 40 mg of Kenalog were injected to the left knee via an anterolateral approach. A band aid was placed over the injection site and they tolerated the procedure well. Patient was instructed to follow up with any adverse reactions.           Karina Delong, VANESA, PA-C Orthopedic Surgery   [facility]  [address]   t: [phone]  f: [phone]

## 2023-06-05 ENCOUNTER — OFFICE VISIT (OUTPATIENT)
Dept: INTERNAL MEDICINE CLINIC | Facility: CLINIC | Age: 63
End: 2023-06-05
Payer: MEDICARE

## 2023-06-05 VITALS
OXYGEN SATURATION: 98 % | RESPIRATION RATE: 16 BRPM | SYSTOLIC BLOOD PRESSURE: 116 MMHG | WEIGHT: 217 LBS | DIASTOLIC BLOOD PRESSURE: 80 MMHG | BODY MASS INDEX: 40.97 KG/M2 | HEART RATE: 88 BPM | HEIGHT: 61 IN

## 2023-06-05 DIAGNOSIS — F41.9 ANXIETY AND DEPRESSION: ICD-10-CM

## 2023-06-05 DIAGNOSIS — G89.29 CHRONIC BILATERAL LOW BACK PAIN WITHOUT SCIATICA: ICD-10-CM

## 2023-06-05 DIAGNOSIS — Z51.81 THERAPEUTIC DRUG MONITORING: Primary | ICD-10-CM

## 2023-06-05 DIAGNOSIS — K76.0 FATTY LIVER: ICD-10-CM

## 2023-06-05 DIAGNOSIS — M54.50 CHRONIC BILATERAL LOW BACK PAIN WITHOUT SCIATICA: ICD-10-CM

## 2023-06-05 DIAGNOSIS — F32.A ANXIETY AND DEPRESSION: ICD-10-CM

## 2023-06-05 DIAGNOSIS — E66.01 CLASS 3 SEVERE OBESITY WITH BODY MASS INDEX (BMI) OF 40.0 TO 44.9 IN ADULT, UNSPECIFIED OBESITY TYPE, UNSPECIFIED WHETHER SERIOUS COMORBIDITY PRESENT (HCC): ICD-10-CM

## 2023-06-05 DIAGNOSIS — E53.8 VITAMIN B12 DEFICIENCY: ICD-10-CM

## 2023-06-05 DIAGNOSIS — F33.9 RECURRENT MAJOR DEPRESSION RESISTANT TO TREATMENT (HCC): ICD-10-CM

## 2023-06-05 PROCEDURE — 99214 OFFICE O/P EST MOD 30 MIN: CPT | Performed by: NURSE PRACTITIONER

## 2023-06-05 RX ORDER — FLUOXETINE 10 MG/1
CAPSULE ORAL
COMMUNITY
Start: 2023-05-17

## 2023-06-05 RX ORDER — CYANOCOBALAMIN 1000 UG/ML
1000 INJECTION, SOLUTION INTRAMUSCULAR; SUBCUTANEOUS ONCE
Status: SHIPPED | OUTPATIENT
Start: 2023-06-05

## 2023-06-06 NOTE — PATIENT INSTRUCTIONS
Next steps:  1. Fill your prescribed medication and take as discussed and prescribed: phentermine (try changing the timing)   2. Schedule a personal nutrition consultation with one of our registered dieticians  3. Look into aqua therapy  4. Get vitamin b12 injection today      Please try to work on the following dietary changes:  Daily protein recommendation to start:  grams  Daily carbohydrate: <105g  Daily calories: 1,300  1. Drink water with meals and throughout the day, cut down on soda and/or juice if consumed. Consider flavored water options like Bubbly, Spindrift, Hint and Pipe. 2.  Eat breakfast daily and focus on having protein with each meal, examples include: greek yogurt, cottage cheese, hard boiled egg, whole grain toast with peanut butter. 3.  Reduce refined carbohydrates and sugars which includes items such as sweets, as well as rice, pasta, and bread and make sure to choose whole grain options when having them with just 1 serving per meal about the size of your inner palm. 4.  Consume non starchy veggies daily working towards making them a good 50% of your daily food intake. Add them to lunch and dinner consistently. 5.  Start a daily probiotic: VSL#3 is recommended, (order on line at www.vsl3. com). Take 1 capsule daily with water for 30 days, then reduce to 1 every other day (this will reduce the cost). Capsules can be left out for 2 weeks, but then must be refrigerated. Please download oly My F?rsat Bu F?rsat Kellee Chang! Or Net Diary to monitor daily dietary intake and you will be able to see if you are eating the right amount of calories or too much or too little which would hinder weight loss. Additionally this will help to see your daily carbohydrate and protein intake. When you set the oly up choose 1-2 lbs/week as a goal.  Keeping a paper food journal is an option as well to remain accountable for your choices- this is the start to mindful eating!  A low calorie diet has been consistently shown to support weight loss. Continue or start exercising to help establish a routine. If not already exercising begin with 1 day and progress as able with long-term goal of 30 minutes 5 days a week at a minimum. Meditation daily can help manage and control stress. Chronic stress can make weight loss difficult. Exercising is one way to help with stress, but meditation using the CALM Rose or another comparable alternative can be done in your home or place of work with little time commitment. This Rose can also help work on behavior change and improve sleep. Check out the segment under Calm Masterclass and listen to The 4 Pillars of Health. A great way to begin learning about the foundation of lifestyle with practical tips to use in your every day. Check out www.yourweightmatters. org blog for continued daily support and education along this weight loss journey! Patient Resources:     Personal Training/Fitness Classes/Health Coaching     Jose R Austin and Judd Sophiaside @ [email or web address] Full fitness center with group fitness and personal training. Discount available as client of [facility]. Health Coaching and Personal Training with Nyasia Williamson at our [facility]- individual weekly coaching with option to add personal training and small group fitness classes targeted at weight loss- [phone] and/or email @ [email or web address]  360FIT Estela [email or web address] Group Fitness [phone] and/or email Catherine Coronado at [email or web address]  [address] [facility] with multiple locations: [employer] (www.Crispify. StudyApps), Eat The Frog Fitness (www.Shareaholic. StudyApps), Fit Body Bootcamp (www.Naehasbodybootcamp.StudyApps), [employer] (www.Mediamind. StudyApps), The Exercise  (www.exercisecoach.StudyApps)     Online Fitness  Fitness  on [employer] in 10 DVD series- www.bdqmk66OXA. "Ripl.io, Inc."  Sit and Be Fit - Chair exercise series Www.sitandbefit. org  Hip Hop Fit with You Liang at www.hiphopfit. net     Apps for on Landpoint 7 Minute Workout (orange box with white 7) - free on the go HIIT training rose  Peloton Rose @ wwwWhois     Nutrition Trackers and Tools  LoseIT! And My Fitness Pal apps and on line for tracking nutrition  NOOM - virtual health coaching  FitFoundation (healthy meals on the go) in [employer] @ www. njleluodwpnaz8c. Didier Trinidad MD @ www.Electricite du [place] and Rokatty Zenobia (keto and low carb plans recommended) @ www. Foodzie.SNY, Metabolic Meals @ www. Mercy ShipsMetabolicMeals. com - individual prepared meals to go  THE EMPTY JOINT, CNG-One, [employer], Every Plate, Klick2Contact- on line meal delivery programs for preparation at home  AK Stroz Friedberg in [place] for homemade meals to go @ www.mealISH. "Ripl.io, Inc."  Diet Doctor @ www. dietdoctor. "Ripl.io, Inc." - low carb swaps  YuEndocyte - meal prep and planning rose (www.yummly. com)     Stress Management/Behavior/Mindful Eating  CALM meditation rose (www.calmMetroGames)  Headspace  Am I Hungry? Mindful eating virtual  rose  Www.yourweightmatters. org - Obesity Action Coalition sponsored Blog posts daily  Motivation rose (black box with white \")- daily supportive messages sent to your phone     Books/Video Education/Podcasts  Mindless Eating by Isai Ortiz  Why We Get Sick by Louisa Driscoll (a book about insulin resistance)  Atomic Habits by Douglas Hughes (a book about taking small steps to promote greater behavior change)   Can't Hurt Me by Ilene Whitaker (a book exploring the power of discipline in achieving your goals)  The End of Dieting: How to Live for Life by Dr. Christian Weems M.D. or listen to The 1995 Selatra Episode 61: Understanding \"Nutritarian\" Eating w/Dr. Christian Weems  Your Body in Balance:  The [employer] of Food, Hormones, and Health by Dr. Sriram Loots  The Menopause Diet Plan by Sebastian Rodriguez and [facility] AT [facility]  The Complete Guide to fasting by Dr. Mary Frias, [address] [facility] by Luis A Potter, Ph.D, R.D. Weight Loss Surgery Will Not Treat Food Addiction by Carley Joiner Ph.D  The [address] [place] [address] on plant based nutrition  Fed Up - documentary about obesity (Free on [place])  The Truth About Sugar - documentary on sugar (Free on Utube, https://youtu. be/3T2wkgjRQ8v)  The Dr. Madina Jolly by Dr. Desire Riddle MD  Fitlosophy Fitspiration - journal to better health (found at [employer] in fitness aisle)  What Happened to You?- a look at the impact trauma has on behavior written by Rachel Copeland and Dr. Granado Staff Again by Yael Rose - discovering your true self after trauma  Carlo Cameron talk on NetTailored Fit, The Call to Courage  Podcasts: The Exam Room by the Physician's Committee, Nutrition Facts by Dr. Jennifer Garcia    We are here to support you with weight loss, but please remember that you still need your primary care provider for your routine health maintenance.

## 2023-06-19 ENCOUNTER — TELEPHONE (OUTPATIENT)
Dept: NEUROLOGY | Facility: CLINIC | Age: 63
End: 2023-06-19

## 2023-06-19 NOTE — TELEPHONE ENCOUNTER
Pt was told Provider's new schedule wasn't out at her last appt, placed on wait list. She was due on 5/24. Scheduled for 10/18, added to wait list as high priority. Endorsed to  to watch for sooner appt.

## 2023-06-27 RX ORDER — CEFUROXIME AXETIL 250 MG/1
6 TABLET ORAL ONCE AS NEEDED
Qty: 15 ML | Refills: 2 | Status: SHIPPED | OUTPATIENT
Start: 2023-06-27 | End: 2023-06-27

## 2023-07-10 ENCOUNTER — OFFICE VISIT (OUTPATIENT)
Dept: NEUROLOGY | Facility: CLINIC | Age: 63
End: 2023-07-10
Payer: MEDICARE

## 2023-07-10 VITALS
WEIGHT: 215 LBS | SYSTOLIC BLOOD PRESSURE: 122 MMHG | HEART RATE: 58 BPM | DIASTOLIC BLOOD PRESSURE: 68 MMHG | RESPIRATION RATE: 16 BRPM | BODY MASS INDEX: 41 KG/M2

## 2023-07-10 DIAGNOSIS — G43.709 CHRONIC MIGRAINE WITHOUT AURA WITHOUT STATUS MIGRAINOSUS, NOT INTRACTABLE: Primary | ICD-10-CM

## 2023-07-10 NOTE — PROGRESS NOTES
Paperwork noting that patient may bear financial responsibility for procedure(s) performed in clinic today signed prior to proceeding with procedure(s). Furthermore, patient notified that they should contact their insurer to verify that your procedure/test has been approved and is a COVERED benefit. Although the [facility] staff does its due diligence, the insurance carrier gives the disclaimer that \"Although the procedure is authorized, this does not guarantee payment. \"    Ultimately the patient is responsible for payment. Botox is:  [x] Buy and Bill  [] Patient Supplied        Botox Reauthorization Questions:  Has the patient experienced a reduction in frequency of migraines since starting Botox? yes  If yes, by what percentage? 80%  Has the intensity of migraines decreased since starting Botox?  yes  If yes, by what percentage? 75%

## 2023-08-10 ENCOUNTER — OFFICE VISIT (OUTPATIENT)
Dept: INTERNAL MEDICINE CLINIC | Facility: CLINIC | Age: 63
End: 2023-08-10
Payer: MEDICARE

## 2023-08-10 VITALS
DIASTOLIC BLOOD PRESSURE: 68 MMHG | RESPIRATION RATE: 16 BRPM | SYSTOLIC BLOOD PRESSURE: 104 MMHG | HEART RATE: 70 BPM | BODY MASS INDEX: 39.65 KG/M2 | WEIGHT: 210 LBS | HEIGHT: 61 IN

## 2023-08-10 DIAGNOSIS — E66.01 CLASS 3 SEVERE OBESITY WITH BODY MASS INDEX (BMI) OF 40.0 TO 44.9 IN ADULT, UNSPECIFIED OBESITY TYPE, UNSPECIFIED WHETHER SERIOUS COMORBIDITY PRESENT (HCC): ICD-10-CM

## 2023-08-10 DIAGNOSIS — Z51.81 THERAPEUTIC DRUG MONITORING: ICD-10-CM

## 2023-08-10 DIAGNOSIS — E53.8 B12 DEFICIENCY: Primary | ICD-10-CM

## 2023-08-10 PROCEDURE — 99214 OFFICE O/P EST MOD 30 MIN: CPT | Performed by: INTERNAL MEDICINE

## 2023-08-10 PROCEDURE — 96372 THER/PROPH/DIAG INJ SC/IM: CPT | Performed by: NURSE PRACTITIONER

## 2023-08-10 RX ORDER — CYANOCOBALAMIN 1000 UG/ML
1000 INJECTION, SOLUTION INTRAMUSCULAR; SUBCUTANEOUS ONCE
Status: CANCELLED | OUTPATIENT
Start: 2023-08-10 | End: 2023-08-10

## 2023-08-10 RX ORDER — FLUOXETINE HYDROCHLORIDE 20 MG/1
CAPSULE ORAL
COMMUNITY
Start: 2023-07-17

## 2023-08-10 RX ORDER — PHENTERMINE HYDROCHLORIDE 30 MG/1
30 CAPSULE ORAL EVERY MORNING
Qty: 30 CAPSULE | Refills: 2 | Status: SHIPPED | OUTPATIENT
Start: 2023-08-10

## 2023-08-10 RX ORDER — PHENTERMINE HYDROCHLORIDE 15 MG/1
15 CAPSULE ORAL 2 TIMES DAILY
Qty: 60 CAPSULE | Refills: 2 | Status: CANCELLED | OUTPATIENT
Start: 2023-08-10

## 2023-08-10 RX ADMIN — CYANOCOBALAMIN 1000 MCG: 1000 INJECTION, SOLUTION INTRAMUSCULAR; SUBCUTANEOUS at 14:38:00

## 2023-08-10 NOTE — PATIENT INSTRUCTIONS
GENETIUHK   Ozempic   Trulicity   Saxenda  Mounjaro    Fill out chart:     Covered    Covered after Step therapy?     Covered with prior authorization     Plan exclusion

## 2023-09-06 ENCOUNTER — DOCUMENTATION ONLY (OUTPATIENT)
Dept: SURGERY | Facility: CLINIC | Age: 63
End: 2023-09-06

## 2023-09-06 ENCOUNTER — OFFICE VISIT (OUTPATIENT)
Dept: SURGERY | Facility: CLINIC | Age: 63
End: 2023-09-06
Payer: MEDICARE

## 2023-09-06 VITALS
DIASTOLIC BLOOD PRESSURE: 72 MMHG | BODY MASS INDEX: 40.16 KG/M2 | HEART RATE: 75 BPM | OXYGEN SATURATION: 99 % | HEIGHT: 60.7 IN | SYSTOLIC BLOOD PRESSURE: 108 MMHG | WEIGHT: 210 LBS

## 2023-09-06 DIAGNOSIS — K25.9 MULTIPLE GASTRIC ULCERS: ICD-10-CM

## 2023-09-06 DIAGNOSIS — K31.84 GASTROPARESIS: ICD-10-CM

## 2023-09-06 DIAGNOSIS — K76.0 NAFLD (NONALCOHOLIC FATTY LIVER DISEASE): ICD-10-CM

## 2023-09-06 DIAGNOSIS — M54.50 CHRONIC BILATERAL LOW BACK PAIN WITHOUT SCIATICA: ICD-10-CM

## 2023-09-06 DIAGNOSIS — G89.29 CHRONIC BILATERAL LOW BACK PAIN WITHOUT SCIATICA: ICD-10-CM

## 2023-09-06 DIAGNOSIS — E66.01 CLASS 3 SEVERE OBESITY DUE TO EXCESS CALORIES WITH SERIOUS COMORBIDITY AND BODY MASS INDEX (BMI) OF 40.0 TO 44.9 IN ADULT (HCC): Primary | ICD-10-CM

## 2023-09-06 NOTE — PROGRESS NOTES
Oriented pt to the bariatric program; provided/reviewed bariatric packet of info, time line, referrals, etc; pt agreed and verbalized understanding; attended seminar on 7/17/23.

## 2023-09-07 ENCOUNTER — HOSPITAL ENCOUNTER (EMERGENCY)
Facility: HOSPITAL | Age: 63
Discharge: HOME | End: 2023-09-07
Attending: EMERGENCY MEDICINE
Payer: MEDICARE

## 2023-09-07 VITALS
DIASTOLIC BLOOD PRESSURE: 43 MMHG | HEIGHT: 61 IN | TEMPERATURE: 99 F | WEIGHT: 210 LBS | HEART RATE: 67 BPM | BODY MASS INDEX: 39.65 KG/M2 | OXYGEN SATURATION: 97 % | RESPIRATION RATE: 18 BRPM | SYSTOLIC BLOOD PRESSURE: 98 MMHG

## 2023-09-07 DIAGNOSIS — R51.9 NONINTRACTABLE HEADACHE, UNSPECIFIED CHRONICITY PATTERN, UNSPECIFIED HEADACHE TYPE: Primary | ICD-10-CM

## 2023-09-07 LAB
ALBUMIN SERPL-MCNC: 3.7 G/DL (ref 3.4–5)
ALBUMIN/GLOB SERPL: 1.2 {RATIO} (ref 1–2)
ALP LIVER SERPL-CCNC: 78 U/L
ALT SERPL-CCNC: 11 U/L
ANION GAP SERPL CALC-SCNC: 6 MMOL/L (ref 0–18)
AST SERPL-CCNC: 16 U/L (ref 15–37)
BASOPHILS # BLD AUTO: 0.02 X10(3) UL (ref 0–0.2)
BASOPHILS NFR BLD AUTO: 0.2 %
BILIRUB SERPL-MCNC: 0.8 MG/DL (ref 0.1–2)
BILIRUB UR QL STRIP.AUTO: NEGATIVE
BUN BLD-MCNC: 12 MG/DL (ref 7–18)
CALCIUM BLD-MCNC: 8.6 MG/DL (ref 8.5–10.1)
CHLORIDE SERPL-SCNC: 108 MMOL/L (ref 98–112)
CLARITY UR REFRACT.AUTO: CLEAR
CO2 SERPL-SCNC: 24 MMOL/L (ref 21–32)
COLOR UR AUTO: YELLOW
CREAT BLD-MCNC: 0.89 MG/DL
EGFRCR SERPLBLD CKD-EPI 2021: 73 ML/MIN/1.73M2 (ref 60–?)
EOSINOPHIL # BLD AUTO: 0.13 X10(3) UL (ref 0–0.7)
EOSINOPHIL NFR BLD AUTO: 1.6 %
ERYTHROCYTE [DISTWIDTH] IN BLOOD BY AUTOMATED COUNT: 13.1 %
GLOBULIN PLAS-MCNC: 3 G/DL (ref 2.8–4.4)
GLUCOSE BLD-MCNC: 107 MG/DL (ref 70–99)
GLUCOSE UR STRIP.AUTO-MCNC: NORMAL MG/DL
HCT VFR BLD AUTO: 39.1 %
HGB BLD-MCNC: 12.7 G/DL
HYALINE CASTS #/AREA URNS AUTO: PRESENT /LPF
IMM GRANULOCYTES # BLD AUTO: 0.04 X10(3) UL (ref 0–1)
IMM GRANULOCYTES NFR BLD: 0.5 %
KETONES UR STRIP.AUTO-MCNC: 10 MG/DL
LEUKOCYTE ESTERASE UR QL STRIP.AUTO: 75
LIPASE SERPL-CCNC: 32 U/L (ref 13–75)
LYMPHOCYTES # BLD AUTO: 0.37 X10(3) UL (ref 1–4)
LYMPHOCYTES NFR BLD AUTO: 4.5 %
MCH RBC QN AUTO: 29.1 PG (ref 26–34)
MCHC RBC AUTO-ENTMCNC: 32.5 G/DL (ref 31–37)
MCV RBC AUTO: 89.5 FL
MONOCYTES # BLD AUTO: 1.11 X10(3) UL (ref 0.1–1)
MONOCYTES NFR BLD AUTO: 13.4 %
NEUTROPHILS # BLD AUTO: 6.62 X10 (3) UL (ref 1.5–7.7)
NEUTROPHILS # BLD AUTO: 6.62 X10(3) UL (ref 1.5–7.7)
NEUTROPHILS NFR BLD AUTO: 79.8 %
NITRITE UR QL STRIP.AUTO: NEGATIVE
OSMOLALITY SERPL CALC.SUM OF ELEC: 286 MOSM/KG (ref 275–295)
PH UR STRIP.AUTO: 6 [PH] (ref 5–8)
PLATELET # BLD AUTO: 185 10(3)UL (ref 150–450)
POTASSIUM SERPL-SCNC: 4 MMOL/L (ref 3.5–5.1)
PROT SERPL-MCNC: 6.7 G/DL (ref 6.4–8.2)
PROT UR STRIP.AUTO-MCNC: NEGATIVE MG/DL
RBC # BLD AUTO: 4.37 X10(6)UL
RBC UR QL AUTO: NEGATIVE
SARS-COV-2 RNA RESP QL NAA+PROBE: NOT DETECTED
SODIUM SERPL-SCNC: 138 MMOL/L (ref 136–145)
SP GR UR STRIP.AUTO: 1.01 (ref 1–1.03)
UROBILINOGEN UR STRIP.AUTO-MCNC: 3 MG/DL
WBC # BLD AUTO: 8.3 X10(3) UL (ref 4–11)

## 2023-09-07 PROCEDURE — 99284 EMERGENCY DEPT VISIT MOD MDM: CPT

## 2023-09-07 PROCEDURE — 96361 HYDRATE IV INFUSION ADD-ON: CPT

## 2023-09-07 PROCEDURE — 96374 THER/PROPH/DIAG INJ IV PUSH: CPT

## 2023-09-07 PROCEDURE — 87086 URINE CULTURE/COLONY COUNT: CPT | Performed by: EMERGENCY MEDICINE

## 2023-09-07 PROCEDURE — 85025 COMPLETE CBC W/AUTO DIFF WBC: CPT | Performed by: EMERGENCY MEDICINE

## 2023-09-07 PROCEDURE — 81001 URINALYSIS AUTO W/SCOPE: CPT | Performed by: EMERGENCY MEDICINE

## 2023-09-07 PROCEDURE — 80053 COMPREHEN METABOLIC PANEL: CPT | Performed by: EMERGENCY MEDICINE

## 2023-09-07 PROCEDURE — 96375 TX/PRO/DX INJ NEW DRUG ADDON: CPT

## 2023-09-07 PROCEDURE — 83690 ASSAY OF LIPASE: CPT | Performed by: EMERGENCY MEDICINE

## 2023-09-07 RX ORDER — BUTALBITAL, ACETAMINOPHEN AND CAFFEINE 50; 325; 40 MG/1; MG/1; MG/1
2 TABLET ORAL ONCE
Status: COMPLETED | OUTPATIENT
Start: 2023-09-07 | End: 2023-09-07

## 2023-09-07 RX ORDER — METOCLOPRAMIDE HYDROCHLORIDE 5 MG/ML
10 INJECTION INTRAMUSCULAR; INTRAVENOUS ONCE
Status: COMPLETED | OUTPATIENT
Start: 2023-09-07 | End: 2023-09-07

## 2023-09-07 RX ORDER — BUTALBITAL, ACETAMINOPHEN AND CAFFEINE 50; 325; 40 MG/1; MG/1; MG/1
1-2 TABLET ORAL EVERY 6 HOURS PRN
Qty: 12 TABLET | Refills: 0 | Status: SHIPPED | OUTPATIENT
Start: 2023-09-07 | End: 2023-09-12

## 2023-09-07 RX ORDER — KETOROLAC TROMETHAMINE 15 MG/ML
15 INJECTION, SOLUTION INTRAMUSCULAR; INTRAVENOUS ONCE
Status: COMPLETED | OUTPATIENT
Start: 2023-09-07 | End: 2023-09-07

## 2023-09-07 RX ORDER — DIPHENHYDRAMINE HYDROCHLORIDE 50 MG/ML
25 INJECTION INTRAMUSCULAR; INTRAVENOUS ONCE
Status: COMPLETED | OUTPATIENT
Start: 2023-09-07 | End: 2023-09-07

## 2023-09-07 NOTE — ED INITIAL ASSESSMENT (HPI)
Patient reports she has some hay fever symptoms, describes as headache, facial pain when clenching her jaw.  + nausea, more unsteady on her feet. Thinks she has been having increased confusion since this spring, and worse since Tuesday.

## 2023-09-07 NOTE — ED QUICK NOTES
Patient reports 8/10 headache since yesterday, cough and nausea since this AM. Hx of migraines. No pain meds taken today. \"Difficulty connecting thoughts progressively worse since June\" Hx of cognitive impairment but this is \"more than that\" Denies changes in vision. Denies abd pain, dysuria, fever. Admits to frequent urination.

## 2023-09-08 ENCOUNTER — TELEPHONE (OUTPATIENT)
Dept: INTERNAL MEDICINE CLINIC | Facility: CLINIC | Age: 63
End: 2023-09-08

## 2023-09-08 NOTE — TELEPHONE ENCOUNTER
S/w pt   The migraine is much better. Has not required any medication since in the ER   Pt has appt with Dr Kassandra Mayer next month. Pt will be calling today to set up her annual visit with Dr Zora Mahoney    Pt to call with any further issues    Pt appreciated the call.      Janelle Cuello RN

## 2023-09-11 ENCOUNTER — TELEPHONE (OUTPATIENT)
Dept: SURGERY | Facility: CLINIC | Age: 63
End: 2023-09-11

## 2023-09-11 ENCOUNTER — OFFICE VISIT (OUTPATIENT)
Dept: INTERNAL MEDICINE CLINIC | Facility: CLINIC | Age: 63
End: 2023-09-11
Payer: MEDICARE

## 2023-09-11 VITALS — BODY MASS INDEX: 39.5 KG/M2 | HEIGHT: 61 IN | WEIGHT: 209.19 LBS

## 2023-09-11 DIAGNOSIS — K31.84 GASTROPARESIS: ICD-10-CM

## 2023-09-11 DIAGNOSIS — G89.29 CHRONIC BILATERAL LOW BACK PAIN WITHOUT SCIATICA: ICD-10-CM

## 2023-09-11 DIAGNOSIS — K76.0 NAFLD (NONALCOHOLIC FATTY LIVER DISEASE): ICD-10-CM

## 2023-09-11 DIAGNOSIS — K25.9 MULTIPLE GASTRIC ULCERS: ICD-10-CM

## 2023-09-11 DIAGNOSIS — E66.01 CLASS 3 SEVERE OBESITY DUE TO EXCESS CALORIES WITH SERIOUS COMORBIDITY AND BODY MASS INDEX (BMI) OF 40.0 TO 44.9 IN ADULT (HCC): Primary | ICD-10-CM

## 2023-09-11 DIAGNOSIS — M54.50 CHRONIC BILATERAL LOW BACK PAIN WITHOUT SCIATICA: ICD-10-CM

## 2023-09-11 PROCEDURE — 3008F BODY MASS INDEX DOCD: CPT

## 2023-09-11 PROCEDURE — 97802 MEDICAL NUTRITION INDIV IN: CPT

## 2023-09-11 NOTE — PROGRESS NOTES
[facility]  [address] [place] [address]  Dept: [phone]  Loc: [phone]    09/11/23    Bariatric Initial Nutrition Assessment    Dian Mccauley is a 59year old female.     Referring Physician: Katie Dos Santos  Reason for MNT Referral: Initial assessment for: Gastric Bypass      Assessment   Medical Diagnosis:  obesity grade III    Patient Active Problem List:     Chronic migraine without aura without status migrainosus, not intractable     Family history of premature coronary heart disease     Tremor, essential     ISHA (generalized anxiety disorder)     Diverticulosis of large intestine without hemorrhage     Chronic bilateral low back pain secondary to lumbar radiculopathy, inflammatory spondylopathy of lumbar spine, bulging lumbar discs, and sacroiliiac dysfunction     Mixed obsessional thoughts and acts     NAFLD (nonalcoholic fatty liver disease)     Multiple gastric ulcers     Class 3 severe obesity due to excess calories with serious comorbidity and body mass index (BMI) of 40.0 to 44.9 in adult [facility])     Recurrent major depression resistant to treatment (HCC)     Elevated high sensitivity C-reactive protein (hs-CRP)     Chronic fatigue syndrome     Gastroparesis     Irritable bowel syndrome     Major neurocognitive disorder (HCC)     Solitary pulmonary nodule on lung CT, R lung, 7 mm - seen on CT Heart Scan protocol dated 12/2/22     Complex tear of medial meniscus of left knee as current injury     Synovitis of left knee     Chondromalacia, left knee      Past Medical History:   Past Medical History:   Diagnosis Date    Abdominal pain     Anxiety     Arthritis     Back pain     Bad breath     Bleeding nose     Bloating     Calculus of kidney     Constipation     Depression     Diarrhea, unspecified     Disorder of liver     Fatty liver    Diverticulosis     Fatigue     Flatulence/gas pain/belching     Food intolerance 2019 Gastroparesis     Headache disorder 2005? Migraines    Heartburn     Hemorrhoids     History of cervical discectomy     Anterior Cervical Discectomy C3-C4 and disc deterioration    History of depression     History of eating disorder 2020    Binge eating    History of mental disorder 1994? Hoarseness, chronic     Indigestion     Irregular bowel habits     Itch of skin     Left knee injury     using walker    Loss of appetite     Migraines     Mouth sores 2010    Triggered by stress or poor diet    Nausea     Night sweats     OCD (obsessive compulsive disorder)     Pain in joints     Painful swallowing 2020    Painful urination 2018    Weak pelvic floor    Parkinsonism ([address]     Peptic ulcer disease     Problems with swallowing 2020    Occasional choking on food & need to clear throat    Sleep apnea     has never been on CPAP    Sleep disturbance     Stress 2010    Tendonitis of shoulder, right 03/2019    Tremor     Vomiting     Vomiting blood 2018? ER visit bleeding ulcer    Wears glasses     Weight gain     Weight loss 2018    Lost 120 lbs under  supervision with phentermine       Weight history:  Struggled with weight  most of adult life, Pt's highest adult weight 268 lbs at 63 y/o, Pt's lowest adult weight 125 lbs at 22 y/o, and Reason given for weight gain:  Struggled with weight most of his/her life, Physical or medical reasons: knee surgery this past Spring , and Emotional, stress eating    Patient has tried: Calorie counting : on own, Slim Fast, and Nutri-systems    Patient's most successful weight loss attempt was 2020. Lost 120 lbs  and kept it off 36 months. Patient has tried these medications for weight loss: Ionamin/Adipex/Phentermine and Saxenda    Patient has received these behavioral treatments for weight loss: None    Patient is being followed by Dr. Fran Woods for medical weight loss.     Patient has completed medical weight management No    Patient has support from: Family, Primary care physician, and Friends    Patient acknowledges binge eating behavior: Eats a larger amount of food than normal in a short period of time. , Feels a lack of control over the amount of food or rate of food eaten. , and Feels disgusted, depressed, or guilty after overeating. Patient engages in binge-purge behavior: no    Patient uses laxatives or vomits as a means of purging: no    Patient complains of: nausea diarrhea constipation heart burn flatuence stomach grumbling    Patient's motivation for bariatric surgery: \"Why not now?!  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 I want to be best suited to do that whenever the time comes. I will feel better, my mood is better (when I have lost weight)\". Allergies:    Sulfa Antibiotics       NAUSEA ONLY    Height:  Ht Readings from Last 1 Encounters:  09/07/23 : 5' 1\" (1.549 m)      Weight:  Wt Readings from Last 6 Encounters:  09/07/23 : 210 lb  09/06/23 : 210 lb  08/10/23 : 211 lb 6.4 oz  08/10/23 : 210 lb  07/10/23 : 215 lb  06/05/23 : 217 lb      IBW:  Patient weight not recorded  BMI: There is no height or weight on file to calculate BMI.  Obesity Grade III, greater than or equal to 40    Diet Rx: calorie restriction    Labs:    Lab Results   Component Value Date     (H) 09/07/2023    BUN 12 09/07/2023    BUNCREA 23.7 (H) 08/11/2022    CREATSERUM 0.89 09/07/2023    ANIONGAP 6 09/07/2023    GFR 84 11/28/2017    GFRNAA 54 (L) 11/05/2021    GFRAA 62 11/05/2021    CA 8.6 09/07/2023    OSMOCALC 286 09/07/2023    ALKPHO 78 09/07/2023    AST 16 09/07/2023    ALT 11 (L) 09/07/2023    BILT 0.8 09/07/2023    TP 6.7 09/07/2023    ALB 3.7 09/07/2023    GLOBULIN 3.0 09/07/2023     09/07/2023    K 4.0 09/07/2023     09/07/2023    CO2 24.0 09/07/2023     Lab Results   Component Value Date    MG 2.5 07/12/2019     No results found for: PHOS    Thyroid:    Lab Results   Component Value Date TSH 1.520 10/26/2022    TSH 1.950 08/11/2022    TSH 1.960 02/04/2021    T4F 0.9 08/11/2022       Iron Panel: No results found for: IRON, IRONTOT, TIBC, IRONSAT, TRANSFERRIN, TIBCP, IRONBIND, SAT, SATUR    Diabetes:    Lab Results   Component Value Date     08/11/2022    A1C 4.8 10/26/2022       Lipid Panel:   Lab Results   Component Value Date    XFTZCLI 133 10/26/2022    TRIG 119 10/26/2022    HDL 55 10/26/2022    LDL 95 11/03/2020    VLDL 19 11/03/2020    TCHDLRATIO 3.40 10/26/2022    [place] 131 10/26/2022       Vitamins/Minerals:  Lab Results   Component Value Date    B12 301 08/11/2022     Lab Results   Component Value Date    VITD 34.7 08/11/2022     No results found for: THIAMINE   No results found for: VITB1  Lab Results   Component Value Date/Time    FOLIC 52.8 43/97/0547 29:65 AM       Relevant Meds:      Current Outpatient Medications:     butalbital-acetaminophen-caffeine -40 MG Oral Tab, Take 1-2 tablets by mouth every 6 (six) hours as needed for Pain., Disp: 12 tablet, Rfl: 0    FLUoxetine 20 MG Oral Cap, TAKE 1 CAPSULE BY MOUTH EVERY MORNING LOWERING DOSE TO 20MG, Disp: , Rfl:     Phentermine HCl 30 MG Oral Cap, Take 1 capsule (30 mg total) by mouth every morning., Disp: 30 capsule, Rfl: 2    dicyclomine 10 MG Oral Cap, Take 1 capsule (10 mg total) by mouth 2 (two) times daily as needed. , Disp: 180 capsule, Rfl: 3    metoclopramide 5 MG Oral Tab, Take 1 tablet (5 mg total) by mouth daily. , Disp: 30 tablet, Rfl: 2    pantoprazole 40 MG Oral Tab EC, Take 1 tablet (40 mg total) by mouth before breakfast., Disp: 90 tablet, Rfl: 3    famotidine 40 MG Oral Tab, Take 1 tablet (40 mg total) by mouth daily as needed for Heartburn., Disp: 90 tablet, Rfl: 3    Propranolol HCl ER 80 MG Oral Capsule SR 24 Hr, Take 1 capsule (80 mg total) by mouth every evening., Disp: 90 capsule, Rfl: 0    memantine 10 MG Oral Tab, Take 1 tablet (10 mg total) by mouth daily. , Disp: 60 tablet, Rfl: 2    Ibuprofen (ADVIL) 200 MG Oral Cap, Take by mouth as needed. Taking 400mg BID, Disp: , Rfl:     SUMATRIPTAN SUCCINATE SC, Inject into the skin as needed. , Disp: , Rfl:     SPRAVATO, 84 MG DOSE, 28 MG/DEVICE Nasal Solution Therapy Pack, every 14 (fourteen) days. , Disp: , Rfl:     triamcinolone 0.1 % External Cream, Apply thin layer to affected area twice a day as needed, Disp: 45 g, Rfl: 1    traZODone 150 MG Oral Tab, Take 1 tablet (150 mg total) by mouth nightly., Disp: 30 tablet, Rfl: 0    hydrocortisone 2.5 % External Cream, Apply to AA of FACE BID x 2 weeks, hold 2 weeks, repeat PRN. , Disp: 30 g, Rfl: 2    ketoconazole 2 % External Cream, Apply to AA of FACE BID when not using Hydrocortisone Cream., Disp: 30 g, Rfl: 2    LORazepam 2 MG Oral Tab, Take 1 tablet (2 mg total) by mouth as needed. , Disp: , Rfl:     cyclobenzaprine 10 MG Oral Tab, Take 1 tablet (10 mg total) by mouth 3 (three) times daily as needed for Muscle spasms. , Disp: 90 tablet, Rfl: 1    busPIRone HCl 10 MG Oral Tab, Take 5 tablets (50 mg total) by mouth daily. 2 tab in the morning and 3 in the afternoon, Disp: , Rfl:     Vitamins/Minerals: Other: no  Food Intolerances:  Other: no  Food Allergies:  nkfa  Smoker:     Smoking status:   Never      Smokeless tobacco:   Never       Alcohol Intake:    Alcohol use   Not Currently      Comment:   6 drinks or less/year          Drugs:    Drug use:   Unknown       Estimated Kcal Needs (Brisa Taryn): 1425 kcal/day   Estimated Protein Needs:  48-57 grams/day  Estimated Fluid Needs: Aim for 64 ozs per day    Diet history:       Breakfast      AM Snack       Lunch     PM Snack     Dinner Evening Snack   2 scr eggs or peanut butter on  wheat toast skip [place] packs Crackers-wheat thins or triscuit Sweet potato with smart balance and Trivia brown sugar or soup broth based minestrone or vegetable (low sodium)           Bite size candy bars (2)   Meal pattern consists of 3 meals, 1-2 snacks and 0 protein supplements. Total Kcal: 1500  Excessive in: nothing  Inadequate in:  protein, vegetables, and fiber   Trigger Foods: no  Patient currently consumes caffeine: occasional  Patient currently consumes soda: more often than 3 times/week    Activity Level: active  Type: walk  Duration: 180-240 minutes  Frequency: per week    Other:  Met with pt for initial visit. Pt is being followed by Dr. Nilson Lucio for medical weight management. Per pt is taking phentermine and feels it is taking some of the edge off her appetite. Per pt has tried various diets in the past including but not limited to calorie counting : on own, Slim Fast, and Nutri-systems. Per pt has lost 120 lbs since 2020 and has gained back 1/3 of the weight since then. Per pt would like to continue to lose weight and be able to keep it off. Per pt struggles with sweet cravings. Discussed with patient proteins role with helping with limiting sweets. Pt verbalized understanding. Reviewed program binder with pt. Answered patient questions. Pt with follow up and body comp scheduled for next month. Nutrition Diagnosis: Morbid obesity: Improvement shown    Intervention     1. Nutrition Rx:  Reviewed  3 meal plan, Discussed portion control, Reviewed Bariatric Diet Stages 1-4, Discussed goals, and Obtain MVI  2. Coordination of care: attend support group prior to surgery; reminder for importance of multidisciplinary consultations. 3.  Materials given: [facility] Bariatric Manual and Goals Handout    Goals:   1. Keep a food record, My Net Diary, select the macros dashboard. 2.  Strive to consume at least 4-6 meals/snacks per day. Include protein and produce when you eat. Aim for 48-57 grams of protein (20-25% of overall calories) per day. Try to keep the carbohydrates at 120 grams per day or less. (Try edamame or black bean spaghetti versus regular pasta).   3.  Practice eating strategies, eat separately from drinking, avoid straws, chew food 20-30 times before swallowing. Make the meals last 30 minutes. 4.  Aim for 64 oz per day of water. (Try  Protein water, adding True Lemon, Crystal Light). 5.  Taper caffeine and carbonation. 6.  Exercise with a goal of 30 minutes per day for exercise (for example,walking). 7.  Add strength training 10 minutes 3 days per week. [employer]'s [employer] 10 minute workout or 7 minute workout    Monitor/Evaluate     Food/fluid intake/choices  Nutrition Rx  Anthropometric measurements  Body composition-InBody Testing at next appointment per surgeon recommendation  Updated labs  F/U MD plan of care  F/U to reinforce goals  F/U on vitamin/mineral supplementation  Review quizzes   for liquid protein diet prior to surgery      Additional RD visits required to review concepts? yes  Patient understands protein requirements? yes  Patient understand fluid requirements (amount and method of intake)? yes  Patient understands post-operative diet? yes  Patient ready for Liquid Protein Education?  no    Shasta Kennedy, DEREK, LDN  Face-to-face time spent with pt: 75 minutes

## 2023-09-11 NOTE — TELEPHONE ENCOUNTER
Informed pt, okay to see Dr Sanam Garcia or Dr Kori Ellison for cardiac clearance. Pt reported other cardiologists not available until next year. Pt thankful.

## 2023-09-11 NOTE — PATIENT INSTRUCTIONS
Goals: 1. Keep a food record, My Net Diary, select the macros dashboard. 2.  Strive to consume at least 4-6 meals/snacks per day. Include protein and produce when you eat. Aim for 48-57 grams of protein (20-25% of overall calories) per day. Try to keep the carbohydrates at 120 grams per day or less. (Try edamame or black bean spaghetti versus regular pasta). 3.  Practice eating strategies, eat separately from drinking, avoid straws, chew food 20-30 times before swallowing. Make the meals last 30 minutes. 4.  Aim for 64 oz per day of water. (Try  Protein water, adding True Lemon, Crystal Light). 5.  Taper caffeine and carbonation. 6.  Exercise with a goal of 30 minutes per day for exercise (for example,walking). 7.  Add strength training 10 minutes 3 days per week.    [employer] Tristine 10 minute workout or 7 minute workout

## 2023-09-14 ENCOUNTER — TELEPHONE (OUTPATIENT)
Dept: NEUROLOGY | Facility: CLINIC | Age: 63
End: 2023-09-14

## 2023-09-14 NOTE — PROCEDURES
PROCEDURE:   BOTOX injection for chronic migraine: Using alcohol prep, fixed site protocol performed.  Botox was reconstituted to the following concentration: 5 units per 0.1 ml.   Muscles, number of sites, units used and Side injected were as follows: neck with collar in place, no back midline tenderness, moving all extremities well

## 2023-09-18 ENCOUNTER — OFFICE VISIT (OUTPATIENT)
Facility: CLINIC | Age: 63
End: 2023-09-18
Payer: MEDICARE

## 2023-09-18 VITALS
HEART RATE: 68 BPM | DIASTOLIC BLOOD PRESSURE: 64 MMHG | BODY MASS INDEX: 39.08 KG/M2 | SYSTOLIC BLOOD PRESSURE: 108 MMHG | RESPIRATION RATE: 16 BRPM | WEIGHT: 207 LBS | OXYGEN SATURATION: 95 % | HEIGHT: 61 IN | TEMPERATURE: 99 F

## 2023-09-18 DIAGNOSIS — G47.33 OSA (OBSTRUCTIVE SLEEP APNEA): ICD-10-CM

## 2023-09-18 DIAGNOSIS — J30.9 ALLERGIC RHINITIS, UNSPECIFIED SEASONALITY, UNSPECIFIED TRIGGER: Primary | ICD-10-CM

## 2023-09-18 DIAGNOSIS — G47.00 INSOMNIA, UNSPECIFIED TYPE: ICD-10-CM

## 2023-09-18 PROCEDURE — 3008F BODY MASS INDEX DOCD: CPT | Performed by: INTERNAL MEDICINE

## 2023-09-18 PROCEDURE — 99204 OFFICE O/P NEW MOD 45 MIN: CPT | Performed by: INTERNAL MEDICINE

## 2023-09-18 PROCEDURE — 3074F SYST BP LT 130 MM HG: CPT | Performed by: INTERNAL MEDICINE

## 2023-09-18 PROCEDURE — 3078F DIAST BP <80 MM HG: CPT | Performed by: INTERNAL MEDICINE

## 2023-09-18 RX ORDER — AZELASTINE HYDROCHLORIDE 137 UG/1
1-2 SPRAY, METERED NASAL 2 TIMES DAILY
Qty: 1 EACH | Refills: 3 | Status: SHIPPED | OUTPATIENT
Start: 2023-09-18 | End: 2023-10-18

## 2023-09-19 ENCOUNTER — TELEPHONE (OUTPATIENT)
Dept: SURGERY | Facility: CLINIC | Age: 63
End: 2023-09-19

## 2023-09-28 ENCOUNTER — TELEPHONE (OUTPATIENT)
Dept: SURGERY | Facility: CLINIC | Age: 63
End: 2023-09-28

## 2023-10-16 ENCOUNTER — TELEPHONE (OUTPATIENT)
Dept: INTERNAL MEDICINE CLINIC | Facility: CLINIC | Age: 63
End: 2023-10-16

## 2023-10-16 NOTE — TELEPHONE ENCOUNTER
Patient states she feels like she is having trouble exhaling completely, she is always clearing her throat, and when she lays down her heart is racing. She states she has been having these symptoms for about 3 months. She states she knows what a panic attack and anxiety feel like, and these symptoms are not like that. Please call her, she would like an appointment with Dr. Zaria Soriano.

## 2023-10-16 NOTE — TELEPHONE ENCOUNTER
Please make OV for tomorrow at 12 pm.       Cullen Peters. Lindi Cogan, MD  Diplomate, American Board of Internal Medicine  Member, American College of Lifestyle Medicine  Member, American Association for Physician Leadership  [address] [facility],[address]  [address] [facility], [address] [place] [address],[address], [address] [facility], [address] [place] [address] Rd  [phone] (phone); [phone] (fax)  [email or web address]

## 2023-10-16 NOTE — TELEPHONE ENCOUNTER
S/w pt   Pt states for the last 3 months she has had a multitude of symptoms:    Decreased appetite  Fatigue  NP cough  Feels like she is choking when she lays down  Heart races at night  Has morning headaches  Also having periods of forgetfulness/confusion. Misplaces things or can't find paperwork. Pt has a H/o chronic fatigue   Pt is on Phentermine    Pt saw Dr Ruma Aquino as a requirement for bariatric surgery but pt has decided not to do the surgery. He suggested a sleep study    Pt denies stress at home  Has had panic attacks before but denies that feeling as this time. Pt has tested negative of covid in the past month.      Requesting OV  Currently no openings  Pt coming this week for [facility] labs  CPE on 11/13/23    To Dr Saeed Ramey RN

## 2023-10-17 ENCOUNTER — OFFICE VISIT (OUTPATIENT)
Dept: INTERNAL MEDICINE CLINIC | Facility: CLINIC | Age: 63
End: 2023-10-17
Payer: MEDICARE

## 2023-10-17 VITALS
WEIGHT: 203 LBS | SYSTOLIC BLOOD PRESSURE: 100 MMHG | HEART RATE: 69 BPM | OXYGEN SATURATION: 99 % | BODY MASS INDEX: 38.33 KG/M2 | TEMPERATURE: 97 F | DIASTOLIC BLOOD PRESSURE: 72 MMHG | RESPIRATION RATE: 16 BRPM | HEIGHT: 61 IN

## 2023-10-17 DIAGNOSIS — R00.0 RACING HEART BEAT: Primary | ICD-10-CM

## 2023-10-17 LAB
ATRIAL RATE: 59 BPM
P AXIS: 63 DEGREES
P-R INTERVAL: 142 MS
Q-T INTERVAL: 426 MS
QRS DURATION: 82 MS
QTC CALCULATION (BEZET): 421 MS
R AXIS: 36 DEGREES
T AXIS: 11 DEGREES
VENTRICULAR RATE: 59 BPM

## 2023-10-17 PROCEDURE — 3074F SYST BP LT 130 MM HG: CPT | Performed by: INTERNAL MEDICINE

## 2023-10-17 PROCEDURE — 99214 OFFICE O/P EST MOD 30 MIN: CPT | Performed by: INTERNAL MEDICINE

## 2023-10-17 PROCEDURE — 93000 ELECTROCARDIOGRAM COMPLETE: CPT | Performed by: INTERNAL MEDICINE

## 2023-10-17 PROCEDURE — 3078F DIAST BP <80 MM HG: CPT | Performed by: INTERNAL MEDICINE

## 2023-10-17 PROCEDURE — 3008F BODY MASS INDEX DOCD: CPT | Performed by: INTERNAL MEDICINE

## 2023-10-17 RX ORDER — BUTALBITAL, ACETAMINOPHEN AND CAFFEINE 50; 325; 40 MG/1; MG/1; MG/1
TABLET ORAL
COMMUNITY
Start: 2023-09-20

## 2023-10-18 ENCOUNTER — NURSE ONLY (OUTPATIENT)
Dept: INTERNAL MEDICINE CLINIC | Facility: CLINIC | Age: 63
End: 2023-10-18
Payer: MEDICARE

## 2023-10-18 DIAGNOSIS — Z00.00 LABORATORY EXAMINATION ORDERED AS PART OF A ROUTINE GENERAL MEDICAL EXAMINATION: Primary | ICD-10-CM

## 2023-10-18 LAB
AMB EXT BILIRUBIN, TOTAL: 0.4 MG/DL
AMB EXT BUN: 18 MG/DL
AMB EXT CALCIUM: 96
AMB EXT CARBON DIOXIDE: 23
AMB EXT CHLORIDE: 101
AMB EXT CHOL/HDL RATIO: 3.6
AMB EXT CHOLESTEROL, TOTAL: 206 MG/DL
AMB EXT CMP ALT: 7 U/L (ref 6–29)
AMB EXT CMP AST: 16 U/L (ref 10–35)
AMB EXT CREATININE: 0.9 MG/DL
AMB EXT GLUCOSE: 96 MG/DL
AMB EXT HDL CHOLESTEROL: 57 MG/DL
AMB EXT HEMATOCRIT: 38.7 (ref 35–45)
AMB EXT HEMOGLOBIN: 12.4 (ref 11.7–15.5)
AMB EXT HGBA1C: 4.7 %
AMB EXT LDL CHOLESTEROL, DIRECT: 127 MG/DL
AMB EXT MCV: 90 (ref 80–100)
AMB EXT NON HDL CHOL: 149 MG/DL
AMB EXT PLATELETS: 256 (ref 140–400)
AMB EXT POSTASSIUM: 4.4 MMOL/L (ref 3.5–5.1)
AMB EXT SODIUM: 136 MMOL/L (ref 136–145)
AMB EXT TOTAL PROTEIN: 6.4 (ref 6.1–8)
AMB EXT TRIGLYCERIDES: 111 MG/DL
AMB EXT TSH: 1.87 MIU/ML
AMB EXT WBC: 7.1 X10(3)UL
BILIRUB UR QL STRIP.AUTO: NEGATIVE
CLARITY UR REFRACT.AUTO: CLEAR
GLUCOSE UR STRIP.AUTO-MCNC: NORMAL MG/DL
HYALINE CASTS #/AREA URNS AUTO: PRESENT /LPF
KETONES UR STRIP.AUTO-MCNC: NEGATIVE MG/DL
LEUKOCYTE ESTERASE UR QL STRIP.AUTO: 250
NITRITE UR QL STRIP.AUTO: NEGATIVE
PH UR STRIP.AUTO: 5.5 [PH] (ref 5–8)
PROT UR STRIP.AUTO-MCNC: NEGATIVE MG/DL
RBC UR QL AUTO: NEGATIVE
SP GR UR STRIP.AUTO: 1.01 (ref 1–1.03)
UROBILINOGEN UR STRIP.AUTO-MCNC: NORMAL MG/DL

## 2023-10-18 PROCEDURE — 92551 PURE TONE HEARING TEST AIR: CPT | Performed by: INTERNAL MEDICINE

## 2023-10-18 PROCEDURE — 99173 VISUAL ACUITY SCREEN: CPT | Performed by: INTERNAL MEDICINE

## 2023-10-18 PROCEDURE — 93923 UPR/LXTR ART STDY 3+ LVLS: CPT | Performed by: INTERNAL MEDICINE

## 2023-10-18 PROCEDURE — 81001 URINALYSIS AUTO W/SCOPE: CPT | Performed by: INTERNAL MEDICINE

## 2023-10-18 NOTE — PROGRESS NOTES
VENIPUNCTURE: left hand stick landed    ADD-ON TEST:    EKG:    SPIROMETRY:    URINE: Yes      VISION: Heather Mendoza last eye exam 11/2022     Right Eye 20/20      Left Eye 20/20     Both Eyes: 20/20      DAI:      Right Tibial Foot _132__ divided by highest arm _124__ = ___1.0       Right Dorsal Foot ___ divided by highest arm ___ = ___       Left Tibial Foot __134_ divided by highest arm _124__ = __1.0_       Left Dorsal Foot ___ divided by highest arm ___ = ___      ARM:   Right Brachial-124     Left Brachial-118      FOOT:   Right Tibial (Side)-132    Right Dorsal (Top)-      Left Tibial (Side)-134    Left Dorsal (Top)-      Greater than 1.3: results not reliable  1.01 to 1.3: correlated with history, especially if the patient has diabetes  0.97 to 1: normal  0.8 to 0.96: mild ischemia  0.4 to 0.79: moderate to severe ischemia  0.39 or less: severe ischemia; danger of limb loss

## 2023-10-18 NOTE — ADDENDUM NOTE
Addended by: Patrick Kocher on: 4/13/2023 08:39 AM     Modules accepted: Orders PROVIDER:[TOKEN:[[id number]],FOLLOWUP:[2 weeks]]

## 2023-10-25 ENCOUNTER — TELEPHONE (OUTPATIENT)
Dept: NEUROLOGY | Facility: CLINIC | Age: 63
End: 2023-10-25

## 2023-10-25 ENCOUNTER — OFFICE VISIT (OUTPATIENT)
Dept: NEUROLOGY | Facility: CLINIC | Age: 63
End: 2023-10-25

## 2023-10-25 VITALS
DIASTOLIC BLOOD PRESSURE: 74 MMHG | HEART RATE: 98 BPM | SYSTOLIC BLOOD PRESSURE: 118 MMHG | WEIGHT: 205 LBS | BODY MASS INDEX: 39 KG/M2 | OXYGEN SATURATION: 97 % | RESPIRATION RATE: 16 BRPM

## 2023-10-25 DIAGNOSIS — S06.9X9S MAJOR NEUROCOGNITIVE DISORDER AS LATE EFFECT OF TRAUMATIC BRAIN INJURY WITH BEHAVIORAL DISTURBANCE (HCC): ICD-10-CM

## 2023-10-25 DIAGNOSIS — G43.709 CHRONIC MIGRAINE WITHOUT AURA WITHOUT STATUS MIGRAINOSUS, NOT INTRACTABLE: Primary | ICD-10-CM

## 2023-10-25 DIAGNOSIS — F02.818 MAJOR NEUROCOGNITIVE DISORDER AS LATE EFFECT OF TRAUMATIC BRAIN INJURY WITH BEHAVIORAL DISTURBANCE (HCC): ICD-10-CM

## 2023-10-25 PROCEDURE — 99213 OFFICE O/P EST LOW 20 MIN: CPT | Performed by: OTHER

## 2023-10-25 PROCEDURE — 3074F SYST BP LT 130 MM HG: CPT | Performed by: OTHER

## 2023-10-25 PROCEDURE — 64615 CHEMODENERV MUSC MIGRAINE: CPT | Performed by: OTHER

## 2023-10-25 PROCEDURE — 3078F DIAST BP <80 MM HG: CPT | Performed by: OTHER

## 2023-10-25 RX ORDER — MEMANTINE HYDROCHLORIDE 10 MG/1
10 TABLET ORAL 2 TIMES DAILY
Qty: 60 TABLET | Refills: 5 | Status: SHIPPED | OUTPATIENT
Start: 2023-10-25

## 2023-10-25 NOTE — PROGRESS NOTES
Paperwork noting that patient may bear financial responsibility for procedure(s) performed in clinic today signed prior to proceeding with procedure(s). Furthermore, patient notified that they should contact their insurer to verify that your procedure/test has been approved and is a COVERED benefit. Although the [facility] staff does its due diligence, the insurance carrier gives the disclaimer that \"Although the procedure is authorized, this does not guarantee payment. \"    Ultimately the patient is responsible for payment. Botox is:  [x] Buy and Bill  [] Patient Supplied      Botox Reauthorization Questions:  Has the patient experienced a reduction in frequency of migraines since starting Botox? yes  If yes, by what percentage? 80%  Has the intensity of migraines decreased since starting Botox?  yes  If yes, by what percentage? 75%

## 2023-10-26 DIAGNOSIS — G43.709 CHRONIC MIGRAINE WITHOUT AURA WITHOUT STATUS MIGRAINOSUS, NOT INTRACTABLE: ICD-10-CM

## 2023-10-26 RX ORDER — PROPRANOLOL HYDROCHLORIDE 80 MG/1
1 CAPSULE, EXTENDED RELEASE ORAL EVERY EVENING
Qty: 90 CAPSULE | Refills: 1 | Status: SHIPPED | OUTPATIENT
Start: 2023-10-26

## 2023-10-26 NOTE — TELEPHONE ENCOUNTER
Medication: PROPRANOLOL HCL ER 80 MG Oral Capsule SR 24 Hr      Date of last refill: 07/31/2023 (#90/0)  [place]     Last office visit: 10/25/2023  Due back to clinic per last office note:  Around 01/17/2024  Date next office visit scheduled:    Future Appointments   Date Time Provider Robyn Avelina   11/7/2023  9:20 AM Pedro Luis Gilliam MD EMGWEI [facility] [address]   11/13/2023  9:00 AM Serena Nash MD [facility] [place]   11/24/2023  7:00 AM [facility] CARD ECHO RM 1 ECARD ECHO [facility] AT [facility]   11/24/2023  8:00 AM [facility] CARD HOLTER RM 1 [facility] CARD HL [facility] AT [facility]   11/27/2023  8:20 AM Lo Bejarano DO SGINP [facility] SUB GI   12/26/2023 10:00 AM Radha Toro MD E[facility]G Pulm [facility] [place]   2/21/2024  9:40 AM Tito Rossi MD ENINAPER [facility] [place]   8/12/2024  8:30 AM Toby, KWAKU Puentes SGINP E[facility] SUB GI           Last OV note recommendation:    ASSESSMENT/PLAN:      (G43.709) Chronic migraine without aura without status migrainosus, not intractable  (primary encounter diagnosis)  Plan: onabotulinumtoxinA (Botox) injection 200 Units           (P79.2V1B,  F02.818) 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 Continue current management. On Botox and Propranolol for migraine prevention     2. Increase Memantine dose to 10 mg BID  Discuss cognitive therapy and home cognitive exercises     3. Continue antidepressant and CBT per behavioral health tea,        Follow up in about 3 months  See orders and medications filed with this encounter. The patient indicates understanding of these issues and agrees with the plan.

## 2023-11-01 ENCOUNTER — PATIENT MESSAGE (OUTPATIENT)
Dept: INTERNAL MEDICINE CLINIC | Facility: CLINIC | Age: 63
End: 2023-11-01

## 2023-11-05 NOTE — TELEPHONE ENCOUNTER
[place] should keep her appointment with me on 11/13. We have all of her CHL results by now. We can provide copies of everything at that time. It'll be our pleasure to see her one last time and also to help transition her to a new PCP. Glo Ceja. Freddy Ramsey MD  Diplomate, American Board of Internal Medicine  Member, American College of Lifestyle Medicine  Member, American Association for Physician Leadership  [address] [place] [address], [address] [place] [address],[address], [place], [address] [place] [address] Rd  [phone] (phone); [phone] (fax)  [email or web address]

## 2023-11-07 ENCOUNTER — OFFICE VISIT (OUTPATIENT)
Dept: INTERNAL MEDICINE CLINIC | Facility: CLINIC | Age: 63
End: 2023-11-07
Payer: MEDICARE

## 2023-11-07 VITALS
DIASTOLIC BLOOD PRESSURE: 60 MMHG | RESPIRATION RATE: 16 BRPM | SYSTOLIC BLOOD PRESSURE: 102 MMHG | HEIGHT: 61 IN | HEART RATE: 70 BPM | WEIGHT: 208 LBS | BODY MASS INDEX: 39.27 KG/M2

## 2023-11-07 DIAGNOSIS — E66.01 CLASS 3 SEVERE OBESITY DUE TO EXCESS CALORIES WITH SERIOUS COMORBIDITY AND BODY MASS INDEX (BMI) OF 40.0 TO 44.9 IN ADULT (HCC): Primary | ICD-10-CM

## 2023-11-07 DIAGNOSIS — K76.0 NAFLD (NONALCOHOLIC FATTY LIVER DISEASE): ICD-10-CM

## 2023-11-07 DIAGNOSIS — Z51.81 THERAPEUTIC DRUG MONITORING: ICD-10-CM

## 2023-11-07 DIAGNOSIS — K31.84 GASTROPARESIS: ICD-10-CM

## 2023-11-07 PROCEDURE — 99214 OFFICE O/P EST MOD 30 MIN: CPT | Performed by: INTERNAL MEDICINE

## 2023-11-07 PROCEDURE — 3078F DIAST BP <80 MM HG: CPT | Performed by: INTERNAL MEDICINE

## 2023-11-07 PROCEDURE — 3074F SYST BP LT 130 MM HG: CPT | Performed by: INTERNAL MEDICINE

## 2023-11-07 PROCEDURE — 3008F BODY MASS INDEX DOCD: CPT | Performed by: INTERNAL MEDICINE

## 2023-11-13 ENCOUNTER — OFFICE VISIT (OUTPATIENT)
Dept: INTERNAL MEDICINE CLINIC | Facility: CLINIC | Age: 63
End: 2023-11-13
Payer: MEDICARE

## 2023-11-13 VITALS
SYSTOLIC BLOOD PRESSURE: 100 MMHG | HEIGHT: 61 IN | BODY MASS INDEX: 38.73 KG/M2 | DIASTOLIC BLOOD PRESSURE: 62 MMHG | TEMPERATURE: 98 F | HEART RATE: 60 BPM | WEIGHT: 205.13 LBS | RESPIRATION RATE: 16 BRPM

## 2023-11-13 DIAGNOSIS — K58.2 IRRITABLE BOWEL SYNDROME WITH BOTH CONSTIPATION AND DIARRHEA: ICD-10-CM

## 2023-11-13 DIAGNOSIS — K76.0 NAFLD (NONALCOHOLIC FATTY LIVER DISEASE): ICD-10-CM

## 2023-11-13 DIAGNOSIS — F41.1 GAD (GENERALIZED ANXIETY DISORDER): ICD-10-CM

## 2023-11-13 DIAGNOSIS — K25.9 MULTIPLE GASTRIC ULCERS: ICD-10-CM

## 2023-11-13 DIAGNOSIS — G89.29 CHRONIC BILATERAL LOW BACK PAIN WITHOUT SCIATICA: ICD-10-CM

## 2023-11-13 DIAGNOSIS — G93.32 CHRONIC FATIGUE SYNDROME: ICD-10-CM

## 2023-11-13 DIAGNOSIS — E66.01 CLASS 2 SEVERE OBESITY DUE TO EXCESS CALORIES WITH SERIOUS COMORBIDITY AND BODY MASS INDEX (BMI) OF 38.0 TO 38.9 IN ADULT: ICD-10-CM

## 2023-11-13 DIAGNOSIS — Z00.00 ENCOUNTER FOR ANNUAL HEALTH EXAMINATION: Primary | ICD-10-CM

## 2023-11-13 DIAGNOSIS — K31.84 GASTROPARESIS: ICD-10-CM

## 2023-11-13 DIAGNOSIS — R91.1 SOLITARY PULMONARY NODULE ON LUNG CT: ICD-10-CM

## 2023-11-13 DIAGNOSIS — F03.90 MAJOR NEUROCOGNITIVE DISORDER (HCC): ICD-10-CM

## 2023-11-13 DIAGNOSIS — M54.50 CHRONIC BILATERAL LOW BACK PAIN WITHOUT SCIATICA: ICD-10-CM

## 2023-11-13 DIAGNOSIS — K57.30 DIVERTICULOSIS OF LARGE INTESTINE WITHOUT HEMORRHAGE: ICD-10-CM

## 2023-11-13 DIAGNOSIS — S83.232A COMPLEX TEAR OF MEDIAL MENISCUS OF LEFT KNEE AS CURRENT INJURY, INITIAL ENCOUNTER: ICD-10-CM

## 2023-11-13 DIAGNOSIS — M94.262 CHONDROMALACIA, LEFT KNEE: ICD-10-CM

## 2023-11-13 DIAGNOSIS — R79.82 ELEVATED C-REACTIVE PROTEIN (CRP): ICD-10-CM

## 2023-11-13 DIAGNOSIS — M65.9 SYNOVITIS OF LEFT KNEE: ICD-10-CM

## 2023-11-13 DIAGNOSIS — G43.709 CHRONIC MIGRAINE WITHOUT AURA WITHOUT STATUS MIGRAINOSUS, NOT INTRACTABLE: ICD-10-CM

## 2023-11-13 DIAGNOSIS — F33.9 RECURRENT MAJOR DEPRESSION RESISTANT TO TREATMENT (HCC): ICD-10-CM

## 2023-11-13 DIAGNOSIS — I51.5 CARDIAC CALCIFICATION (HCC): ICD-10-CM

## 2023-11-13 DIAGNOSIS — F42.2 MIXED OBSESSIONAL THOUGHTS AND ACTS: ICD-10-CM

## 2023-11-13 DIAGNOSIS — G25.0 TREMOR, ESSENTIAL: ICD-10-CM

## 2023-11-13 RX ORDER — ROSUVASTATIN CALCIUM 5 MG/1
5 TABLET, COATED ORAL
Qty: 45 TABLET | Refills: 3 | Status: SHIPPED | OUTPATIENT
Start: 2023-11-13

## 2023-11-14 ENCOUNTER — OFFICE VISIT (OUTPATIENT)
Dept: FAMILY MEDICINE CLINIC | Facility: CLINIC | Age: 63
End: 2023-11-14
Payer: MEDICARE

## 2023-11-14 VITALS
HEIGHT: 61 IN | DIASTOLIC BLOOD PRESSURE: 62 MMHG | HEART RATE: 70 BPM | BODY MASS INDEX: 39.72 KG/M2 | SYSTOLIC BLOOD PRESSURE: 90 MMHG | WEIGHT: 210.38 LBS | OXYGEN SATURATION: 95 % | TEMPERATURE: 97 F | RESPIRATION RATE: 16 BRPM

## 2023-11-14 DIAGNOSIS — R00.2 PALPITATIONS: ICD-10-CM

## 2023-11-14 DIAGNOSIS — R07.9 CHEST PAIN, UNSPECIFIED TYPE: ICD-10-CM

## 2023-11-14 DIAGNOSIS — Z12.4 CERVICAL CANCER SCREENING: ICD-10-CM

## 2023-11-14 DIAGNOSIS — R41.89 COGNITIVE DECLINE: ICD-10-CM

## 2023-11-14 DIAGNOSIS — Z12.31 BREAST CANCER SCREENING BY MAMMOGRAM: ICD-10-CM

## 2023-11-14 DIAGNOSIS — R91.1 INCIDENTAL PULMONARY NODULE: ICD-10-CM

## 2023-11-14 DIAGNOSIS — R41.3 MEMORY LOSS: Primary | ICD-10-CM

## 2023-11-16 PROBLEM — Z82.49 FAMILY HISTORY OF PREMATURE CORONARY HEART DISEASE: Status: RESOLVED | Noted: 2017-11-21 | Resolved: 2023-11-16

## 2023-11-16 PROBLEM — E66.01 CLASS 2 SEVERE OBESITY DUE TO EXCESS CALORIES WITH SERIOUS COMORBIDITY AND BODY MASS INDEX (BMI) OF 38.0 TO 38.9 IN ADULT  (HCC): Status: ACTIVE | Noted: 2020-11-06

## 2023-11-16 PROBLEM — I51.5 CARDIAC CALCIFICATION (HCC): Status: ACTIVE | Noted: 2023-11-16

## 2023-11-16 PROBLEM — E66.01 CLASS 2 SEVERE OBESITY DUE TO EXCESS CALORIES WITH SERIOUS COMORBIDITY AND BODY MASS INDEX (BMI) OF 38.0 TO 38.9 IN ADULT: Status: ACTIVE | Noted: 2020-11-06

## 2023-11-16 PROBLEM — E66.812 CLASS 2 SEVERE OBESITY DUE TO EXCESS CALORIES WITH SERIOUS COMORBIDITY AND BODY MASS INDEX (BMI) OF 38.0 TO 38.9 IN ADULT (HCC): Status: ACTIVE | Noted: 2020-11-06

## 2023-11-16 PROBLEM — E66.01 CLASS 2 SEVERE OBESITY DUE TO EXCESS CALORIES WITH SERIOUS COMORBIDITY AND BODY MASS INDEX (BMI) OF 38.0 TO 38.9 IN ADULT (HCC): Status: ACTIVE | Noted: 2020-11-06

## 2023-11-16 LAB
ATRIAL RATE: 60 BPM
P AXIS: 52 DEGREES
P-R INTERVAL: 142 MS
Q-T INTERVAL: 438 MS
QRS DURATION: 80 MS
QTC CALCULATION (BEZET): 438 MS
R AXIS: 39 DEGREES
T AXIS: -8 DEGREES
VENTRICULAR RATE: 60 BPM

## 2023-11-22 ENCOUNTER — OFFICE VISIT (OUTPATIENT)
Dept: ORTHOPEDICS CLINIC | Facility: CLINIC | Age: 63
End: 2023-11-22
Payer: MEDICARE

## 2023-11-22 VITALS — BODY MASS INDEX: 39.65 KG/M2 | HEIGHT: 61 IN | WEIGHT: 210 LBS

## 2023-11-22 DIAGNOSIS — M94.262 CHONDROMALACIA OF LEFT KNEE: Primary | ICD-10-CM

## 2023-11-22 PROCEDURE — 99214 OFFICE O/P EST MOD 30 MIN: CPT | Performed by: PHYSICIAN ASSISTANT

## 2023-11-22 PROCEDURE — 3008F BODY MASS INDEX DOCD: CPT | Performed by: PHYSICIAN ASSISTANT

## 2023-11-22 NOTE — PROGRESS NOTES
[facility] Progress Note      Chief Complaint:  Left knee pain      Subjective: Minh Caban is a 59year old female who is here for follow-up of her left knee. She underwent left knee arthroscopy by Dr. Marla Velazquez approximately 8 months ago. This included a chondroplasty of the medial and patellofemoral compartments with lateral retinacular release. She was struggling with postoperative pain. I did see her 6 months ago at which time a cortisone injection was administered to the left knee. She states that this provided relief for 2 months but pain has recurred. Pain is localized to the medial aspect of the knee. Is worse with activity including walking and going up and down stairs. She does note episodes of catching and instability. She also notes swelling and pressure in the back of the knee. For treatment, she has tried rest, and elevation. She is here for further evaluation. Objective: Exam of the left knee and lower extremity reveals the overlying skin is intact. She has well-healed portals. She has full extension and full flexion. She has pain with terminal flexion. She is significantly tender about the medial joint line and pes anserine bursa. She also has some lateral joint line tenderness as well. Minimal patellofemoral crepitus with range of motion. Stable ligamentous exam.  She does have discomfort with Steinmann and Robert. Assessment: Left knee chondromalacia      Plan: I reviewed her arthroscopic pictures which did show chondromalacia at the medial and patellofemoral compartments. I discussed treatment options including going ahead with a repeat cortisone injection or further evaluation with an MRI scan due to the severity of her pain. We can also try formal therapy for strengthening exercises. She would like to go ahead with MRI and this is reasonable to rule out meniscal pathology that would require surgical intervention.   If the MRI shows mainly just chondromalacia, we will go ahead with a cortisone injection and discussed viscosupplementation as well. She understands and will follow-up with me once MRI is completed to review the results. She will continue with conservative treatment in the interim.         Keo Good PA-C  Orthopedic Surgery   [facility]  [place] [address], [facility] [address]   t: [phone]  f: [phone]

## 2023-11-24 ENCOUNTER — HOSPITAL ENCOUNTER (OUTPATIENT)
Dept: CV DIAGNOSTICS | Facility: HOSPITAL | Age: 63
Discharge: HOME | End: 2023-11-24
Attending: INTERNAL MEDICINE
Payer: MEDICARE

## 2023-11-24 DIAGNOSIS — R00.0 RACING HEART BEAT: ICD-10-CM

## 2023-11-24 PROCEDURE — 93306 TTE W/DOPPLER COMPLETE: CPT | Performed by: INTERNAL MEDICINE

## 2023-11-24 PROCEDURE — 93242 EXT ECG>48HR<7D RECORDING: CPT | Performed by: INTERNAL MEDICINE

## 2023-11-24 PROCEDURE — 93243 EXT ECG>48HR<7D SCAN A/R: CPT | Performed by: INTERNAL MEDICINE

## 2023-12-02 ENCOUNTER — PATIENT MESSAGE (OUTPATIENT)
Dept: FAMILY MEDICINE CLINIC | Facility: CLINIC | Age: 63
End: 2023-12-02

## 2023-12-04 NOTE — TELEPHONE ENCOUNTER
From: Bo Page  To: Rena Mealing  Sent: 12/2/2023 1:57 PM CST  Subject: Authorization form for head MRI and chest CT needed    Sallie Coats with cognitive impairment  Sallie Coats followup chest CT requested after 7mm nodule was detected in the lung as an incidential finding during a heart scan last year.  Please initiate authorization    MRI head scheduled at [facility] 12/22/23  Chest CT scheduled at [facility] 12/22/23    Thank you, Sallie Coats

## 2023-12-08 ENCOUNTER — HOSPITAL ENCOUNTER (OUTPATIENT)
Dept: MRI IMAGING | Age: 63
Discharge: HOME | End: 2023-12-08
Attending: PHYSICIAN ASSISTANT
Payer: MEDICARE

## 2023-12-08 DIAGNOSIS — M94.262 CHONDROMALACIA OF LEFT KNEE: ICD-10-CM

## 2023-12-08 PROCEDURE — 73721 MRI JNT OF LWR EXTRE W/O DYE: CPT | Performed by: PHYSICIAN ASSISTANT

## 2023-12-09 ENCOUNTER — HOSPITAL ENCOUNTER (OUTPATIENT)
Dept: MAMMOGRAPHY | Age: 63
Discharge: HOME | End: 2023-12-09
Attending: STUDENT IN AN ORGANIZED HEALTH CARE EDUCATION/TRAINING PROGRAM
Payer: MEDICARE

## 2023-12-09 DIAGNOSIS — Z12.31 BREAST CANCER SCREENING BY MAMMOGRAM: ICD-10-CM

## 2023-12-09 PROCEDURE — 77067 SCR MAMMO BI INCL CAD: CPT | Performed by: STUDENT IN AN ORGANIZED HEALTH CARE EDUCATION/TRAINING PROGRAM

## 2023-12-09 PROCEDURE — 77063 BREAST TOMOSYNTHESIS BI: CPT | Performed by: STUDENT IN AN ORGANIZED HEALTH CARE EDUCATION/TRAINING PROGRAM

## 2023-12-11 NOTE — PROGRESS NOTES
Good morning [place] Hang,     Your mammogram result was benign, which is great. Your next routine screening mammogram would be recommended in 1 year.      Have a nice day,   Dr. Angelic Patel

## 2023-12-15 ENCOUNTER — OFFICE VISIT (OUTPATIENT)
Dept: FAMILY MEDICINE CLINIC | Facility: CLINIC | Age: 63
End: 2023-12-15
Payer: MEDICARE

## 2023-12-15 VITALS
HEART RATE: 61 BPM | OXYGEN SATURATION: 98 % | BODY MASS INDEX: 39.67 KG/M2 | SYSTOLIC BLOOD PRESSURE: 110 MMHG | HEIGHT: 61 IN | WEIGHT: 210.13 LBS | RESPIRATION RATE: 18 BRPM | TEMPERATURE: 97 F | DIASTOLIC BLOOD PRESSURE: 70 MMHG

## 2023-12-15 DIAGNOSIS — J02.9 SORE THROAT: ICD-10-CM

## 2023-12-15 DIAGNOSIS — G25.79 SEROTONIN SYNDROME: Primary | ICD-10-CM

## 2023-12-15 LAB
CONTROL LINE PRESENT WITH A CLEAR BACKGROUND (YES/NO): YES YES/NO
KIT LOT #: NORMAL NUMERIC
STREP GRP A CUL-SCR: POSITIVE

## 2023-12-15 RX ORDER — AMOXICILLIN 500 MG/1
500 CAPSULE ORAL 3 TIMES DAILY
Qty: 30 CAPSULE | Refills: 0 | Status: SHIPPED | OUTPATIENT
Start: 2023-12-15 | End: 2023-12-25

## 2023-12-15 RX ORDER — TRAZODONE HYDROCHLORIDE 100 MG/1
100 TABLET ORAL NIGHTLY
COMMUNITY
Start: 2023-12-06

## 2023-12-16 ENCOUNTER — LAB ENCOUNTER (OUTPATIENT)
Dept: LAB | Age: 63
End: 2023-12-16
Attending: FAMILY MEDICINE
Payer: MEDICARE

## 2023-12-16 DIAGNOSIS — G25.79 SEROTONIN SYNDROME: ICD-10-CM

## 2023-12-16 PROCEDURE — 36415 COLL VENOUS BLD VENIPUNCTURE: CPT

## 2023-12-16 PROCEDURE — 82542 COL CHROMOTOGRAPHY QUAL/QUAN: CPT

## 2023-12-16 PROCEDURE — 84260 ASSAY OF SEROTONIN: CPT

## 2023-12-18 ENCOUNTER — OFFICE VISIT (OUTPATIENT)
Dept: ORTHOPEDICS CLINIC | Facility: CLINIC | Age: 63
End: 2023-12-18
Payer: MEDICARE

## 2023-12-18 VITALS — BODY MASS INDEX: 39.65 KG/M2 | HEIGHT: 61 IN | WEIGHT: 210 LBS

## 2023-12-18 DIAGNOSIS — M17.12 PRIMARY OSTEOARTHRITIS OF LEFT KNEE: Primary | ICD-10-CM

## 2023-12-18 DIAGNOSIS — S83.242A OTHER TEAR OF MEDIAL MENISCUS OF LEFT KNEE, UNSPECIFIED WHETHER OLD OR CURRENT TEAR, INITIAL ENCOUNTER: ICD-10-CM

## 2023-12-18 PROCEDURE — 99214 OFFICE O/P EST MOD 30 MIN: CPT | Performed by: PHYSICIAN ASSISTANT

## 2023-12-18 PROCEDURE — 3008F BODY MASS INDEX DOCD: CPT | Performed by: PHYSICIAN ASSISTANT

## 2023-12-18 NOTE — PROGRESS NOTES
[facility] Progress Note      Chief Complaint:  Left knee pain      Subjective: Codi Corrales is a 59year old female who is here for follow-up of her left knee. She underwent left knee arthroscopy by Dr. Deena Stacy approximately 9 months ago. This included a chondroplasty of the medial and patellofemoral compartments with lateral retinacular release. She did well postoperatively but had recurrent pain few months after surgery. We did try cortisone injection about 5 months ago that was beneficial for short time. Pain and swelling then recurred and she was advised to go ahead with a MRI scan of the left knee. This is available for me to review today. Of note, she has trouble with major depressive disorder for the last 30 years. She was recently diagnosed with serotonin syndrome and was wondering if her increase in pain was related to this diagnosis. She is here for further evaluation. Objective: Exam of the left knee and lower extremity reveals the overlying skin is intact. She has full extension and flexion to 120 degrees without pain. Minimal patellofemoral crepitus with range of motion. Mild medial joint line tenderness. Sensation is present to light touch. Imaging: MRI scan of the left knee completed on 12/8/2023 was independently read and radiologically reviewed. It shows:   CONCLUSION:    1. There is again evidence of tricompartmental chondromalacia with suggestion of interval progression of disease within the medial compartment, where there is suggestion of mild osteoarthritis with a small moderate to high grade articular cartilage   defect within the mid weight-bearing surface of the medial femoral condyle, with secondary subchondral sclerosis and edema. 2. Stable appearing moderate grade radial tear along the junction of the posterior horn and posterior root ligament of the medial meniscus.   Secondary peripheral extrusion of medial meniscal tissue with bowing of the MCL fibers and grade 1 chronic MCL   sprain. 3. Mild distal quadriceps tendinosis and enthesopathy. 4. Mild joint effusion. Assessment: Left knee degenerative joint disease with medial meniscus tear      Plan: I discussed imaging findings with the patient are consistent with degenerative changes and a medial meniscus tear. Currently her symptoms are controlled with conservative management. She did get benefit from the cortisone injection back in May. She is wondering if her symptoms are related to her serotonin syndrome diagnosis. I explained that there are structural abnormalities on her MRI scan that would contribute to her pain findings. I discussed evaluation with Dr. Randy Ho to see if surgical intervention with a repeat arthroscopy would be beneficial.  She will continue to modify her activities in the meantime.   She will follow-up with him in 2 to 3 weeks or sooner with questions, concerns, or worsening symptoms        José Mcconnell PA-C  Orthopedic Surgery   [facility]  [address]   t: [phone]  f: [phone]

## 2023-12-21 ENCOUNTER — TELEPHONE (OUTPATIENT)
Dept: NEUROLOGY | Facility: CLINIC | Age: 63
End: 2023-12-21

## 2023-12-21 ENCOUNTER — OFFICE VISIT (OUTPATIENT)
Dept: NEUROLOGY | Facility: CLINIC | Age: 63
End: 2023-12-21
Payer: MEDICARE

## 2023-12-21 VITALS
HEART RATE: 58 BPM | WEIGHT: 206 LBS | SYSTOLIC BLOOD PRESSURE: 108 MMHG | BODY MASS INDEX: 39 KG/M2 | DIASTOLIC BLOOD PRESSURE: 66 MMHG | RESPIRATION RATE: 18 BRPM

## 2023-12-21 DIAGNOSIS — G43.709 CHRONIC MIGRAINE WITHOUT AURA WITHOUT STATUS MIGRAINOSUS, NOT INTRACTABLE: ICD-10-CM

## 2023-12-21 DIAGNOSIS — R41.0 CONFUSION: ICD-10-CM

## 2023-12-21 DIAGNOSIS — R29.90 MULTIPLE NEUROLOGICAL SYMPTOMS: ICD-10-CM

## 2023-12-21 LAB
SEROTONIN: 10 NG/ML
SRA HIGH DOSE HEPARIN: 1 %
SRA LOW DOSE HEPARIN: <1 %

## 2023-12-21 PROCEDURE — 3074F SYST BP LT 130 MM HG: CPT | Performed by: OTHER

## 2023-12-21 PROCEDURE — 99214 OFFICE O/P EST MOD 30 MIN: CPT | Performed by: OTHER

## 2023-12-21 PROCEDURE — 3078F DIAST BP <80 MM HG: CPT | Performed by: OTHER

## 2023-12-21 RX ORDER — RIMEGEPANT SULFATE 75 MG/75MG
75 TABLET, ORALLY DISINTEGRATING ORAL AS NEEDED
Qty: 10 TABLET | Refills: 2 | Status: SHIPPED | OUTPATIENT
Start: 2023-12-21

## 2023-12-21 NOTE — TELEPHONE ENCOUNTER
Received request for PA on Rimegepant (Nurtec).      Submitted through [employer]     Key [id number]

## 2023-12-21 NOTE — PROGRESS NOTES
Patient states she is here for Serotonin Syndrome- agitation, restlessness, insomnia, confusion, nervousness, nausea, diarrhea, headache, chills/ hot, irregular heart beat, trouble talking and swallowing, fainting, poor coordination and hallucinations since May.

## 2023-12-21 NOTE — TELEPHONE ENCOUNTER
PA for Nurtec (abortive) completed electronically, immediately authorized:   Authorization number: [id number]   Authorized from January 1, 2024 to December 31, 2024

## 2023-12-21 NOTE — TELEPHONE ENCOUNTER
Fax from [place] received with approval for [facility]    Fax dated: 12/21/23    Reference # [id number]     Start date: 1/1/23  End date: 12/31/23

## 2023-12-22 ENCOUNTER — HOSPITAL ENCOUNTER (OUTPATIENT)
Dept: CT IMAGING | Facility: HOSPITAL | Age: 63
Discharge: HOME | End: 2023-12-22
Attending: STUDENT IN AN ORGANIZED HEALTH CARE EDUCATION/TRAINING PROGRAM
Payer: MEDICARE

## 2023-12-22 ENCOUNTER — HOSPITAL ENCOUNTER (OUTPATIENT)
Dept: MRI IMAGING | Facility: HOSPITAL | Age: 63
Discharge: HOME | End: 2023-12-22
Attending: STUDENT IN AN ORGANIZED HEALTH CARE EDUCATION/TRAINING PROGRAM
Payer: MEDICARE

## 2023-12-22 DIAGNOSIS — R41.3 MEMORY LOSS: ICD-10-CM

## 2023-12-22 DIAGNOSIS — R91.1 INCIDENTAL PULMONARY NODULE: ICD-10-CM

## 2023-12-22 PROCEDURE — 70553 MRI BRAIN STEM W/O & W/DYE: CPT | Performed by: STUDENT IN AN ORGANIZED HEALTH CARE EDUCATION/TRAINING PROGRAM

## 2023-12-22 PROCEDURE — 71250 CT THORAX DX C-: CPT | Performed by: STUDENT IN AN ORGANIZED HEALTH CARE EDUCATION/TRAINING PROGRAM

## 2023-12-22 PROCEDURE — A9575 INJ GADOTERATE MEGLUMI 0.1ML: HCPCS | Performed by: STUDENT IN AN ORGANIZED HEALTH CARE EDUCATION/TRAINING PROGRAM

## 2023-12-22 RX ORDER — GADOTERATE MEGLUMINE 376.9 MG/ML
20 INJECTION INTRAVENOUS
Status: COMPLETED | OUTPATIENT
Start: 2023-12-22 | End: 2023-12-22

## 2023-12-22 RX ADMIN — GADOTERATE MEGLUMINE 18 ML: 376.9 INJECTION INTRAVENOUS at 08:47:00

## 2024-01-02 ENCOUNTER — PROCEDURE VISIT (OUTPATIENT)
Dept: NEUROLOGY | Facility: CLINIC | Age: 64
End: 2024-01-02

## 2024-01-02 ENCOUNTER — NURSE ONLY (OUTPATIENT)
Dept: ELECTROPHYSIOLOGY | Facility: HOSPITAL | Age: 64
End: 2024-01-02
Attending: Other
Payer: MEDICARE

## 2024-01-02 DIAGNOSIS — R29.90 MULTIPLE NEUROLOGICAL SYMPTOMS: Primary | ICD-10-CM

## 2024-01-02 DIAGNOSIS — R41.0 CONFUSION: ICD-10-CM

## 2024-01-02 PROCEDURE — 95819 EEG AWAKE AND ASLEEP: CPT | Performed by: OTHER

## 2024-01-02 PROCEDURE — 95819 EEG AWAKE AND ASLEEP: CPT

## 2024-01-02 NOTE — PROCEDURES
ELECTROENCEPHALOGRAM REPORT      Patient Name: Cb Webster    Chart ID: [id number]  Ordering Physician: James Betancourt MD    Date of Test: 1/2/2024  Patient Diagnosis: Multiple neurological symptoms         HX:A 64YO FEMALE WHO PRESENTS WITH INTERMITTENT CONFUSION AND WORSENING MEMORY ISSUE. PATIENT HAS BEEN MORE AGITATED, RESTLESS, DIFFICULTY CONCENTRATING. PER PATIENT SINCE LOWERING MEDICATION HAS HAD SOME IMPROVEMENT. SEROTONIN LEVEL WAS CHECKED AND CURRENTLY IN NORMAL RANGE.  PMH:MIGRAINE, TREMOR, MAJOR NEUROCOGNITIVE DISORDER, ISHA, DEPRESSION  RX:TRAZODONE, PROPRANOLOL, MEMANTINE, DICYCLOMINE, PANTOPRAZOLE, LORAZEPAM, CYCLOBENZAPRINE, BUSPIRONE, FAMOTIDINE, NURTEC      TECHNICAL SUMMARY  1. 10-20 International System.  2.  Bipolar and monopolar recording.  3.  Routine recording (awake and asleep)  4.  Portable - C.E.S.  5.  Impedance - 10 kilohms or less.    A routine EEG was obtained.  No sedation was given.    BACKGROUND:   Awake: During wakefulness a well developed, reactive, posterior dominant rhythm consisting of 8-9 hertz potentials of medium amplitude is seen symmetrically.  Low voltage beta activity is seen with a frontal predominance.      Sleep: Stage I and II demonstrated. Vertex waves and sleep spindles present.        EPILEPTIFORM ABNORMALITIES:  There is no definitive epileptiform activity seen in this recording      ACTIVATION PROCEDURES:   Hyperventilation and photic stimulation did not induced any abnormal responses      IMPRESSION:   This is a normal awake and asleep EEG study.  No epileptiform abnormalities seen.  This however does not exclude a seizure disorder.  Clinical correlation is advised.      Thank you for allowing us to participate in your patient's care.      James Betancourt MD  [facility] [place]

## 2024-01-04 ENCOUNTER — OFFICE VISIT (OUTPATIENT)
Dept: ORTHOPEDICS CLINIC | Facility: CLINIC | Age: 64
End: 2024-01-04
Payer: MEDICARE

## 2024-01-04 ENCOUNTER — OFFICE VISIT (OUTPATIENT)
Dept: FAMILY MEDICINE CLINIC | Facility: CLINIC | Age: 64
End: 2024-01-04
Payer: MEDICARE

## 2024-01-04 VITALS
OXYGEN SATURATION: 98 % | RESPIRATION RATE: 16 BRPM | WEIGHT: 210 LBS | DIASTOLIC BLOOD PRESSURE: 62 MMHG | HEART RATE: 62 BPM | HEIGHT: 61 IN | BODY MASS INDEX: 39.65 KG/M2 | SYSTOLIC BLOOD PRESSURE: 110 MMHG

## 2024-01-04 VITALS — BODY MASS INDEX: 39.65 KG/M2 | WEIGHT: 210 LBS | HEIGHT: 61 IN

## 2024-01-04 DIAGNOSIS — R41.3 MEMORY LOSS: ICD-10-CM

## 2024-01-04 DIAGNOSIS — G25.79 SEROTONIN SYNDROME: Primary | ICD-10-CM

## 2024-01-04 DIAGNOSIS — E66.09 CLASS 2 OBESITY DUE TO EXCESS CALORIES WITHOUT SERIOUS COMORBIDITY WITH BODY MASS INDEX (BMI) OF 39.0 TO 39.9 IN ADULT: ICD-10-CM

## 2024-01-04 DIAGNOSIS — S83.232D COMPLEX TEAR OF MEDIAL MENISCUS OF LEFT KNEE AS CURRENT INJURY, SUBSEQUENT ENCOUNTER: ICD-10-CM

## 2024-01-04 DIAGNOSIS — R00.2 PALPITATIONS: ICD-10-CM

## 2024-01-04 DIAGNOSIS — R91.1 INCIDENTAL PULMONARY NODULE: ICD-10-CM

## 2024-01-04 DIAGNOSIS — M23.204 OTHER OLD TEAR OF MEDIAL MENISCUS OF LEFT KNEE: Primary | ICD-10-CM

## 2024-01-04 DIAGNOSIS — M94.262 CHONDROMALACIA OF LEFT KNEE: ICD-10-CM

## 2024-01-04 DIAGNOSIS — R41.89 COGNITIVE DECLINE: ICD-10-CM

## 2024-01-04 DIAGNOSIS — R41.0 CONFUSION: ICD-10-CM

## 2024-01-04 DIAGNOSIS — M22.42 CHONDROMALACIA OF LEFT PATELLA: ICD-10-CM

## 2024-01-04 PROCEDURE — 3078F DIAST BP <80 MM HG: CPT | Performed by: STUDENT IN AN ORGANIZED HEALTH CARE EDUCATION/TRAINING PROGRAM

## 2024-01-04 PROCEDURE — 99214 OFFICE O/P EST MOD 30 MIN: CPT | Performed by: STUDENT IN AN ORGANIZED HEALTH CARE EDUCATION/TRAINING PROGRAM

## 2024-01-04 PROCEDURE — 3074F SYST BP LT 130 MM HG: CPT | Performed by: STUDENT IN AN ORGANIZED HEALTH CARE EDUCATION/TRAINING PROGRAM

## 2024-01-04 PROCEDURE — 3008F BODY MASS INDEX DOCD: CPT | Performed by: ORTHOPAEDIC SURGERY

## 2024-01-04 PROCEDURE — 3008F BODY MASS INDEX DOCD: CPT | Performed by: STUDENT IN AN ORGANIZED HEALTH CARE EDUCATION/TRAINING PROGRAM

## 2024-01-04 PROCEDURE — 99214 OFFICE O/P EST MOD 30 MIN: CPT | Performed by: ORTHOPAEDIC SURGERY

## 2024-01-04 RX ORDER — PHENTERMINE HYDROCHLORIDE 15 MG/1
15 CAPSULE ORAL EVERY MORNING
Qty: 30 CAPSULE | Refills: 0 | Status: SHIPPED | OUTPATIENT
Start: 2024-01-04 | End: 2024-02-03

## 2024-01-04 NOTE — PROGRESS NOTES
Subjective:      Chief Complaint   Patient presents with    Test Results     Follow up discuss test results      HISTORY OF PRESENT ILLNESS  HPI  HPI obtained per patient report.  Cb Webster is a pleasant 63 year old female presenting for follow-up to discuss her recent imaging results.   Her psychiatrist adjusted her medication regimen due to serotonin syndrome. She has been feeling better since; her palpitations and confusion have resolved. She continues to follow-up with her neurologist for her memory deficits.   She is interested in resuming phentermine. She was taking a 30-37.5mg of this medication for weight management prior to self-discontinuation due to her palpitations. She did not notice an improvement in her palpitations with discontinuation of this medication.     PAST PATIENT HISTORY  Past Medical History:   Diagnosis Date    Anxiety     Aortic regurgitation     mild    Arthritis     Back pain     Calculus of kidney     Cardiac calcification (HCC) - CAC score of 5.24 seen on 12/2/22 CT Heart Scan protocol 11/16/2023    Constipation     Depression     Disorder of liver     Fatty liver    Diverticulosis     Dizziness Last week    Fatigue     Flatulence/gas pain/belching     Food intolerance 2019    Frequent urination     Gastroparesis     GERD (gastroesophageal reflux disease)     Headache disorder 2005?    Migraines    Heart palpitations     Hemorrhoids     History of depression     History of eating disorder 2020    Binge eating    Hoarseness, chronic     Leg swelling     Migraines     Mouth sores 2010    Triggered by stress or poor diet    Nausea     OCD (obsessive compulsive disorder)     MODE (obstructive sleep apnea) 2017    Pain in joints     Painful swallowing 2020    Parkinsonism     Peptic ulcer disease     Problems with swallowing 2020    Sleep apnea     has never been on CPAP    Stool incontinence 10/23    Soft barely formed stool, not detecting urgency    Syncope 2 weeks    Tendonitis  of shoulder, right 2019    Tremor     Weight gain     10 lbs in 2 months - stop phentermine and decreased excercise after arthroscope of left knee    Weight loss     28 lb last year     Past Surgical History:   Procedure Laterality Date          x 2    CHOLECYSTECTOMY      COLONOSCOPY      D & C      ECT PROVIDED  [id number]    EGD      LAPAROSCOPIC CHOLECYSTECTOMY      NEEDLE BIOPSY RIGHT  2015    benign breast    OTHER SURGICAL HISTORY      C3-C4 anterior discectomy    OTHER SURGICAL HISTORY  2018    [facility]    OTHER SURGICAL HISTORY  2019    radiofrequency abalsion lumbar    REMOVAL GALLBLADDER  2020    SKIN SURGERY      SPINE SURGERY PROCEDURE UNLISTED      Anterior cervical discectomy    TONSILLECTOMY  1966?       CURRENT MEDICATIONS  Outpatient Medications Marked as Taking for the 24 encounter (Office Visit) with Colton Schaefer MD   Medication Sig Dispense Refill    Phentermine HCl 15 MG Oral Cap Take 1 capsule (15 mg total) by mouth every morning. 30 capsule 0    Rimegepant Sulfate (NURTEC) 75 MG Oral Tablet Dispersible Take 75 mg by mouth as needed. 10 tablet 2    traZODone 100 MG Oral Tab Take 1 tablet (100 mg total) by mouth nightly.      rosuvastatin 5 MG Oral Tab Take 1 tablet (5 mg total) by mouth Every Monday, Wednesday, and Friday. 45 tablet 3    Propranolol HCl ER 80 MG Oral Capsule SR 24 Hr Take 1 capsule (80 mg total) by mouth every evening. 90 capsule 1    memantine 10 MG Oral Tab Take 1 tablet (10 mg total) by mouth 2 (two) times daily. 60 tablet 5    butalbital-acetaminophen-caffeine -40 MG Oral Tab       FLUoxetine 20 MG Oral Cap TAKE 1 CAPSULE BY MOUTH EVERY MORNING LOWERING DOSE TO 20MG      dicyclomine 10 MG Oral Cap Take 1 capsule (10 mg total) by mouth 2 (two) times daily as needed. (Patient taking differently: Take 1 capsule (10 mg total) by mouth in the morning and 1 capsule (10 mg total) before bedtime.) 180 capsule 3    metoclopramide 5 MG Oral Tab  Take 1 tablet (5 mg total) by mouth daily. (Patient taking differently: Take 1 tablet (5 mg total) by mouth as needed.) 30 tablet 2    pantoprazole 40 MG Oral Tab EC Take 1 tablet (40 mg total) by mouth before breakfast. 90 tablet 3    famotidine 40 MG Oral Tab Take 1 tablet (40 mg total) by mouth daily as needed for Heartburn. 90 tablet 3    SPRAVATO, 84 MG DOSE, 28 MG/DEVICE Nasal Solution Therapy Pack 84 mg by Nasal route every 21 days.      triamcinolone 0.1 % External Cream Apply thin layer to affected area twice a day as needed 45 g 1    hydrocortisone 2.5 % External Cream Apply to AA of FACE BID x 2 weeks, hold 2 weeks, repeat PRN. 30 g 2    ketoconazole 2 % External Cream Apply to AA of FACE BID when not using Hydrocortisone Cream. 30 g 2    LORazepam 2 MG Oral Tab Take 1 tablet (2 mg total) by mouth as needed.      cyclobenzaprine 10 MG Oral Tab Take 1 tablet (10 mg total) by mouth 3 (three) times daily as needed for Muscle spasms. 90 tablet 1    busPIRone HCl 10 MG Oral Tab Take 5 tablets (50 mg total) by mouth daily. 1 1/2  tab in the morning and 1 1/2 in the afternoon         HEALTH MAINTENANCE  Immunization History   Administered Date(s) Administered    Covid-19 Vaccine Pfizer 30 mcg/0.3 ml 03/24/2021, 04/14/2021, 10/23/2021    Covid-19 Vaccine Pfizer Bivalent 30mcg/0.3mL 09/24/2022    Covid-19 Vaccine Pfizer Merrill-Sucrose 30 mcg/0.3 ml 05/11/2022    FLULAVAL 6 months & older 0.5 ml Prefilled syringe (79348) 10/23/2017, 09/20/2018, 10/01/2019, 10/07/2020, 09/30/2021, 10/28/2022    FLUZONE 6 months and older PFS 0.5 ml (79173) 10/23/2017, 09/20/2018, 10/13/2023    Pfizer Covid-19 Vaccine 30mcg/0.3ml 12yrs+ ([id number]) 10/13/2023    TDAP 12/05/2018    Zoster Vaccine Recombinant Adjuvanted (Shingrix) 02/16/2021, 05/18/2021       ALLERGIES AND DRUG REACTIONS  Allergies   Allergen Reactions    Sulfa Antibiotics NAUSEA ONLY       Family History   Problem Relation Age of Onset    Hypertension Father     Heart  Attack Father          at 48 years    Heart Disease Father     Alcohol and Other Disorders Associated Father     Depression Father     Other (ALS) Mother     Hypertension Mother         Diagnosed at 91 yo with ALS  at 91    Personality Disorder Daughter     Dementia Maternal Grandmother     Obesity Maternal Grandmother     Dementia Maternal Grandfather     Obesity Paternal Grandfather     Cancer Sister         Kidney, chronic lymp… leukemia    Ulcerative Colitis Brother     Cancer Brother         Kidney     Social History     Socioeconomic History    Marital status:    Occupational History    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[employer]   Tobacco Use    Smoking status: Never    Smokeless tobacco: Never   Vaping Use    Vaping Use: Never used   Substance and Sexual Activity    Alcohol use: Not Currently     Comment: 6 drinks or less/year    Drug use: Never   Other Topics Concern    Caffeine Concern No    Exercise Yes     Comment: walking    Seat Belt Yes    Special Diet No    Stress Concern Yes    Weight Concern Yes   Social History Narrative    Caring for grandson (Peter - aged 4 yo [2020])       Review of Systems   All other systems reviewed and are negative.         Objective:      /62 (BP Location: Left arm, Patient Position: Sitting)   Pulse 62   Resp 16   Ht 5' 1\" (1.549 m)   Wt 210 lb (95.3 kg)   LMP  (LMP Unknown)   SpO2 98%   BMI 39.68 kg/m²   Body mass index is 39.68 kg/m².    Physical Exam  Vitals reviewed.   Constitutional:       General: She is not in acute distress.     Appearance: She is not ill-appearing, toxic-appearing or diaphoretic.   HENT:      Head: Normocephalic and atraumatic.   Cardiovascular:      Rate and Rhythm: Normal rate.   Pulmonary:      Effort: Pulmonary effort is normal.   Abdominal:      General: There is no distension.   Musculoskeletal:      Cervical back: Neck supple.   Neurological:      Mental Status:  She is alert and oriented to person, place, and time.            Assessment and Plan:      1. Serotonin syndrome (Primary)  2. Confusion  3. Memory loss  4. Cognitive decline  5. Complex tear of medial meniscus of left knee as current injury, subsequent encounter  6. Palpitations  7. Body mass index 39.0-39.9, adult  -     Phentermine HCl; Take 1 capsule (15 mg total) by mouth every morning.  Dispense: 30 capsule; Refill: 0  8. Class 2 obesity due to excess calories without serious comorbidity with body mass index (BMI) of 39.0 to 39.9 in adult  9. Incidental pulmonary nodule    Return in about 1 month (around 2/4/2024) for supervisit.    Serotonin syndrome  - improved with her psychiatric medication regimen adjustment  - echo and Holter monitor unremarkable  - recommend continuation of follow-up with her neurologist for further treatment of her cognitive decline  - her brain MRI showed stable trace chronic microvascular ischemic changes. We reviewed her lipid panel results from 10/2023. She is currently taking rosuvastatin 5mg. We discussed vascular risk factor management for preventative treatment for her memory decline. Given her coronary calcium score of 5.24 in 12/2022, the ideal goal for her LDL cholesterol would be <70. Her ability to exercise is currently limited due to her L knee meniscal tear, for which she will be seeing her orthopedist this afternoon. We discussed cutting back on saturated fat intake to help lower her cholesterol levels. She would like to discuss resuming phentermine for weight management and improvement of cholesterol levels at this time and reevaluate her lipid panel. She would like to defer rosuvastatin dose increase until her orthopedic plan of care is updated    BMI>39  - was prescribed phentermine 30-37.5mg in the past  - we discussed the R/B/A of phentermine including possible increased risk of serotonin syndrome. A shared decision was made to restart at a low dose and monitor  closely for any symptoms/signs of serotonin syndrome. We will plan to follow-up in a few weeks for her annual physical with a pap, but recommend discontinuing this medication earlier if she develops any symptoms of adverse effects    Incidental pulmonary nodule  - on coronary scan in 12/2022  - CT chest showed stable 5mm R lung nodule. We discussed that no further monitoring is necessary    Patient verbalized understanding of assessment and recommendations. All questions and concerns were addressed.    Electronically signed by Colton Schaefer MD

## 2024-01-04 NOTE — PROGRESS NOTES
[facility] New Patient Note    Chief Complaint   Patient presents with    Consult     Left knee sx consult     HPI: The patient is a 63 year old female who presents with complaints of chronic left knee pain with exercise.  She has a history of left knee lateral release by Dr. Johnston in March.  Unfortunately, there was only partial improvement from lateral release.  She is now experiencing intermittent anterior lateral pain as well as posterior medial symptoms.  She defers the treadmill for exercise and weight loss.  This is resulted in increased symptoms prompting a follow-up MRI.  There was evidence of abnormality at the posterior horn of the medial meniscus not too different from her and regional preoperative MRI in January.  She presents to discuss possible surgical treatment options.  She is hopeful to be able to vacation in July to [place] with her  and would like to optimize her knee before this bucket list trip.    Past Medical History:   Diagnosis Date    Anxiety     Aortic regurgitation     mild    Arthritis     Back pain     Calculus of kidney     Cardiac calcification (HCC) - CAC score of 5.24 seen on 12/2/22 CT Heart Scan protocol 11/16/2023    Constipation     Depression     Disorder of liver     Fatty liver    Diverticulosis     Dizziness Last week    Fatigue     Flatulence/gas pain/belching     Food intolerance 2019    Frequent urination     Gastroparesis     GERD (gastroesophageal reflux disease)     Headache disorder 2005?    Migraines    Heart palpitations     Hemorrhoids     History of depression     History of eating disorder 2020    Binge eating    Hoarseness, chronic     Leg swelling     Migraines     Mouth sores 2010    Triggered by stress or poor diet    Nausea     OCD (obsessive compulsive disorder)     MODE (obstructive sleep apnea) 2017    Pain in joints     Painful swallowing 2020    Parkinsonism     Peptic ulcer disease     Problems with swallowing 2020    Sleep apnea      has never been on CPAP    Stool incontinence 10/23    Soft barely formed stool, not detecting urgency    Syncope 2 weeks    Tendonitis of shoulder, right 2019    Tremor     Weight gain     10 lbs in 2 months - stop phentermine and decreased excercise after arthroscope of left knee    Weight loss     28 lb last year     Past Surgical History:   Procedure Laterality Date          x 2    CHOLECYSTECTOMY      COLONOSCOPY      D & C      ECT PROVIDED  3219-1783    EGD      LAPAROSCOPIC CHOLECYSTECTOMY      NEEDLE BIOPSY RIGHT  2015    benign breast    OTHER SURGICAL HISTORY      C3-C4 anterior discectomy    OTHER SURGICAL HISTORY  2018    [facility]    OTHER SURGICAL HISTORY  2019    radiofrequency abalsion lumbar    REMOVAL GALLBLADDER  2020    SKIN SURGERY      SPINE SURGERY PROCEDURE UNLISTED      Anterior cervical discectomy    TONSILLECTOMY  1966?     Current Outpatient Medications   Medication Sig Dispense Refill    Phentermine HCl 15 MG Oral Cap Take 1 capsule (15 mg total) by mouth every morning. 30 capsule 0    Rimegepant Sulfate (NURTEC) 75 MG Oral Tablet Dispersible Take 75 mg by mouth as needed. 10 tablet 2    traZODone 100 MG Oral Tab Take 1 tablet (100 mg total) by mouth nightly.      rosuvastatin 5 MG Oral Tab Take 1 tablet (5 mg total) by mouth Every Monday, Wednesday, and Friday. 45 tablet 3    Propranolol HCl ER 80 MG Oral Capsule SR 24 Hr Take 1 capsule (80 mg total) by mouth every evening. 90 capsule 1    memantine 10 MG Oral Tab Take 1 tablet (10 mg total) by mouth 2 (two) times daily. 60 tablet 5    butalbital-acetaminophen-caffeine -40 MG Oral Tab       FLUoxetine 20 MG Oral Cap TAKE 1 CAPSULE BY MOUTH EVERY MORNING LOWERING DOSE TO 20MG      dicyclomine 10 MG Oral Cap Take 1 capsule (10 mg total) by mouth 2 (two) times daily as needed. (Patient taking differently: Take 1 capsule (10 mg total) by mouth in the morning and 1 capsule (10 mg total) before  bedtime.) 180 capsule 3    metoclopramide 5 MG Oral Tab Take 1 tablet (5 mg total) by mouth daily. (Patient taking differently: Take 1 tablet (5 mg total) by mouth as needed.) 30 tablet 2    pantoprazole 40 MG Oral Tab EC Take 1 tablet (40 mg total) by mouth before breakfast. 90 tablet 3    famotidine 40 MG Oral Tab Take 1 tablet (40 mg total) by mouth daily as needed for Heartburn. 90 tablet 3    SPRAVATO, 84 MG DOSE, 28 MG/DEVICE Nasal Solution Therapy Pack 84 mg by Nasal route every 21 days.      triamcinolone 0.1 % External Cream Apply thin layer to affected area twice a day as needed 45 g 1    hydrocortisone 2.5 % External Cream Apply to AA of FACE BID x 2 weeks, hold 2 weeks, repeat PRN. 30 g 2    ketoconazole 2 % External Cream Apply to AA of FACE BID when not using Hydrocortisone Cream. 30 g 2    LORazepam 2 MG Oral Tab Take 1 tablet (2 mg total) by mouth as needed.      cyclobenzaprine 10 MG Oral Tab Take 1 tablet (10 mg total) by mouth 3 (three) times daily as needed for Muscle spasms. 90 tablet 1    busPIRone HCl 10 MG Oral Tab Take 5 tablets (50 mg total) by mouth daily. 1 1/2  tab in the morning and 1 1/2 in the afternoon       Allergies   Allergen Reactions    Sulfa Antibiotics NAUSEA ONLY     Family History   Problem Relation Age of Onset    Hypertension Father     Heart Attack Father          at 48 years    Heart Disease Father     Alcohol and Other Disorders Associated Father     Depression Father     Other (ALS) Mother     Hypertension Mother         Diagnosed at 89 yo with ALS  at 91    Personality Disorder Daughter     Dementia Maternal Grandmother     Obesity Maternal Grandmother     Dementia Maternal Grandfather     Obesity Paternal Grandfather     Cancer Sister         Kidney, chronic lymp… leukemia    Ulcerative Colitis Brother     Cancer Brother         Kidney     Social History     Occupational History    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[employer]  [employer]   Tobacco Use    Smoking status: Never    Smokeless tobacco: Never   Vaping Use    Vaping Use: Never used   Substance and Sexual Activity    Alcohol use: Not Currently     Comment: 6 drinks or less/year    Drug use: Never    Sexual activity: Not on file        ROS:  Complete ROS reviewed by me and non-contributory to the chief complaint except as mentioned above.    Physical Exam:    Ht 5' 1\" (1.549 m)   Wt 210 lb (95.3 kg)   LMP  (LMP Unknown)   BMI 39.68 kg/m²   Constitutional: Well developed, well nourished 63 year old female  Psychological: NAD, alert and appropriate  Respiratory: Breathing comfortably on room air with RR of 10-14  Cardiac: Palpable distal pulses with pink warm extremities  Patient ambulates without assistive devices or antalgia.  Inspection reveals well-healed arthroscopic portals without discoloration, warmth or suspected effusion.  Range of motion is adequate 0 to 115 degrees with patellofemoral click and crepitus on the left more so than the right.  There is mild lateral retinacular tenderness and fairly exquisite posterior medial joint line tenderness although pes bursa is also sensitive.  Deep flexion is symptomatic medially.  Varus valgus stress reveals good endpoints.  Lachman and posterior drawer are negative.  Neurovascular status is intact on sensory, motor and perfusion assessment distally.      Imaging:      MRI left knee 12/8/2023:      Impression   CONCLUSION:    1. There is again evidence of tricompartmental chondromalacia with suggestion of interval progression of disease within the medial compartment, where there is suggestion of mild osteoarthritis with a small moderate to high grade articular cartilage  defect within the mid weight-bearing surface of the medial femoral condyle, with secondary subchondral sclerosis and edema.  2. Stable appearing moderate grade radial tear along the junction of the posterior horn and posterior root ligament of the medial  meniscus.  Secondary peripheral extrusion of medial meniscal tissue with bowing of the MCL fibers and grade 1 chronic MCL  sprain.  3. Mild distal quadriceps tendinosis and enthesopathy.  4. Mild joint effusion.     LOCATION:  [facility]     Dictated by (CST): Paul Rg  on 12/08/2023 at 3:00 PM             Assessment/Diagnoses:  Diagnoses and all orders for this visit:    Other old tear of medial meniscus of left knee    Chondromalacia of left knee    Chondromalacia of left patella      Plan:  I reviewed imaging and exam findings with the patient.  Recent MRI identifies persistent evidence of a medial meniscus tear although at her arthroscopy in March, there was no obvious macro tearing seen during surgery.  She relates that the knee has never become asymptomatic following surgery.  We therefore discussed treatment options including continued conservative care, corticosteroid injection versus repeat arthroscopy.  She relates her upcoming trip to [place] is a priority and full recovery from arthroscopy may take up to 12 weeks.  I therefore advised that she consider arthroscopy at some point before the end of March to allow full time for recovery leading up to her trip.  If surgery is not her desired course of treatment, temporary relief from corticosteroid injection may be sufficient shortly prior to her departure.  Risk benefits and alternatives to arthroscopy were discussed including but not limited to possible infection, bleeding, neurovascular injury as well as postop stiffness, continued pain and failed improvement despite arthroscopy.  Risks of anesthesia were also reviewed including but not limited to possible cardiac, pulmonary or cerebrovascular complications any of which could be serious.  Preoperative medical evaluation and clearance will be required prior to surgery.  For now, she will think about her treatment options and modify her exercise to exclude the treadmill and perhaps crosstraining on the  stationary or recumbent bike.  Swimming and water workouts will also be beneficial.  The long-term benefits of continued weight loss were also reviewed.  All questions were answered to her satisfaction.  She may follow-up with us for reassessment if symptoms worsen or she elects to proceed with either arthroscopic or injection options.      Carol Blank MD  [facility]      This document was partially prepared using Dragon Medical voice recognition software.

## 2024-02-01 ENCOUNTER — OFFICE VISIT (OUTPATIENT)
Dept: FAMILY MEDICINE CLINIC | Facility: CLINIC | Age: 64
End: 2024-02-01
Payer: MEDICARE

## 2024-02-01 VITALS
BODY MASS INDEX: 38.02 KG/M2 | SYSTOLIC BLOOD PRESSURE: 110 MMHG | TEMPERATURE: 98 F | RESPIRATION RATE: 16 BRPM | HEIGHT: 61 IN | DIASTOLIC BLOOD PRESSURE: 60 MMHG | WEIGHT: 201.38 LBS | HEART RATE: 61 BPM

## 2024-02-01 DIAGNOSIS — R41.89 COGNITIVE DECLINE: ICD-10-CM

## 2024-02-01 DIAGNOSIS — R41.3 MEMORY LOSS: ICD-10-CM
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
## 2024-02-01 DIAGNOSIS — E66.09 CLASS 2 OBESITY DUE TO EXCESS CALORIES WITHOUT SERIOUS COMORBIDITY WITH BODY MASS INDEX (BMI) OF 39.0 TO 39.9 IN ADULT: ICD-10-CM

## 2024-02-01 DIAGNOSIS — Z00.00 WELL ADULT EXAM: Primary | ICD-10-CM

## 2024-02-01 DIAGNOSIS — S83.232D COMPLEX TEAR OF MEDIAL MENISCUS OF LEFT KNEE AS CURRENT INJURY, SUBSEQUENT ENCOUNTER: ICD-10-CM

## 2024-02-01 DIAGNOSIS — R05.8 PRODUCTIVE COUGH: ICD-10-CM

## 2024-02-01 DIAGNOSIS — B34.9 VIRAL SYNDROME: ICD-10-CM

## 2024-02-01 DIAGNOSIS — D12.6 ADENOMATOUS POLYP OF COLON, UNSPECIFIED PART OF COLON: ICD-10-CM

## 2024-02-01 DIAGNOSIS — I25.10 CORONARY ARTERY CALCIFICATION: ICD-10-CM

## 2024-02-01 DIAGNOSIS — Z12.4 CERVICAL CANCER SCREENING: ICD-10-CM

## 2024-02-01 DIAGNOSIS — I25.84 CORONARY ARTERY CALCIFICATION: ICD-10-CM

## 2024-02-01 DIAGNOSIS — M79.642 PAIN OF LEFT HAND: ICD-10-CM

## 2024-02-01 PROCEDURE — 88175 CYTOPATH C/V AUTO FLUID REDO: CPT | Performed by: STUDENT IN AN ORGANIZED HEALTH CARE EDUCATION/TRAINING PROGRAM

## 2024-02-01 PROCEDURE — 87624 HPV HI-RISK TYP POOLED RSLT: CPT | Performed by: STUDENT IN AN ORGANIZED HEALTH CARE EDUCATION/TRAINING PROGRAM

## 2024-02-01 RX ORDER — BENZONATATE 100 MG/1
100 CAPSULE ORAL 3 TIMES DAILY PRN
Qty: 30 CAPSULE | Refills: 0 | Status: SHIPPED | OUTPATIENT
Start: 2024-02-01 | End: 2024-02-11

## 2024-02-01 RX ORDER — PHENTERMINE HYDROCHLORIDE 15 MG/1
15 CAPSULE ORAL EVERY MORNING
Qty: 30 CAPSULE | Refills: 0 | Status: SHIPPED | OUTPATIENT
Start: 2024-02-01 | End: 2024-03-02

## 2024-02-01 RX ORDER — PHENTERMINE HYDROCHLORIDE 15 MG/1
15 CAPSULE ORAL EVERY MORNING
Qty: 30 CAPSULE | Refills: 0 | Status: SHIPPED | OUTPATIENT
Start: 2024-03-02 | End: 2024-04-01

## 2024-02-01 RX ORDER — ALBUTEROL SULFATE 90 UG/1
2 AEROSOL, METERED RESPIRATORY (INHALATION) EVERY 4 HOURS PRN
Qty: 6.7 G | Refills: 0 | Status: SHIPPED | OUTPATIENT
Start: 2024-02-01

## 2024-02-01 NOTE — PROGRESS NOTES
Subjective:      Chief Complaint   Patient presents with    Weight Check     Taking phentermine 15mg - down 9lb    Cough     States she has been having a productive cough for about 10 days    Wellness Visit     [facility] supervisit with pap     HISTORY OF PRESENT ILLNESS  Cough      HPI obtained per patient report.  Cb Webster is a pleasant 63 year old female presenting for her medicare supervisit with pap.  She has had nasal congestion, cough, mild abdominal cramping, and loose stools for about 10 days. Nasal congestion has resolved, but her cough has not been improving and is productive. OTC cough medications have been insufficient. She had mild wheezing last night.  She fell about 2 weeks and broke her fall with her L hand. She has continued to have L hand and wrist pain since.  She has been tolerating phentermine well without any side effects.     Medicare Hearing Assessment:   Hearing Screening    Screening Method: Whisper Test  Whisper Test Result: Pass                     General Health:  In the past six months, have you lost more than 10 pounds without trying?: 2 - No  Has your appetite been poor?: No  Type of Diet: Balanced  How does the patient maintain a good energy level?: Appropriate Exercise  How would you describe your daily physical activity?: Moderate  How would you describe your current health state?: Good  How do you maintain positive mental well-being?: Visiting Family;Visiting Friends;Games;Puzzles;Social Interaction  On a scale of 0 to 10, with 0 being no pain and 10 being severe pain, what is your pain level?: 1 - (Mild)  In the past six months, have you experienced urine leakage?: 1-Yes  At any time do you feel concerned for the safety/well-being of yourself and/or your children, in your home or elsewhere?: No  Have you had any immunizations at another office such as Influenza, Hepatitis B, Tetanus, or Pneumococcal?: Yes    Fall Risk Assessment:   She has been screened for Falls and is High  Risk. Fall Prevention information provided to patient in After Visit Summary.    Do you feel unsteady when standing or walking?: Yes  Do you worry about falling?: Yes  Have you fallen in the past year?: Yes  How many times have you fallen?: 1  Were you injured?: Yes    Cognitive Assessment:   She had a completely normal cognitive assessment - see flowsheet entries       Functional Ability/Status:   Cb Webster has some abnormal functions as listed below:  She has problems with Memory based on screening of functional status.     Depression Screening (PHQ-2/PHQ-9): PHQ-2 SCORE: 0  , done 2024       Advanced Directives:     She does have a Living Will but we do NOT have it on file in Epic.    She does have a POA but we do NOT have it on file in Epic.        PAST PATIENT HISTORY  Past Medical History:   Diagnosis Date    Anxiety     Aortic regurgitation     mild    Arthritis     Calculus of kidney     Cardiac calcification (HCC) - CAC score of 5.24 seen on 22 CT Heart Scan protocol 2023    Colon polyps     Depression     Disorder of liver     Fatty liver    Diverticulosis     Gastroparesis     GERD (gastroesophageal reflux disease)     Hemorrhoids     History of eating disorder     Binge eating    Migraines     OCD (obsessive compulsive disorder)     MODE (obstructive sleep apnea) 2017    Parkinsonism     Peptic ulcer disease     Tendonitis of shoulder, right 2019    Tremor      Past Surgical History:   Procedure Laterality Date          x 2    CHOLECYSTECTOMY      COLONOSCOPY      D & C      ECT PROVIDED  [id number]    EGD      LAPAROSCOPIC CHOLECYSTECTOMY      NEEDLE BIOPSY RIGHT  2015    benign breast    OTHER SURGICAL HISTORY      C3-C4 anterior discectomy    OTHER SURGICAL HISTORY  2018    [facility]    OTHER SURGICAL HISTORY  2019    radiofrequency abalsion lumbar    REMOVAL GALLBLADDER  2020    SKIN SURGERY      SPINE SURGERY PROCEDURE UNLISTED      Anterior  cervical discectomy    TONSILLECTOMY  1966?       CURRENT MEDICATIONS  Outpatient Medications Marked as Taking for the 2/1/24 encounter (Office Visit) with Colton Schaefer MD   Medication Sig Dispense Refill    Phentermine HCl 15 MG Oral Cap Take 1 capsule (15 mg total) by mouth every morning. 30 capsule 0    benzonatate 100 MG Oral Cap Take 1 capsule (100 mg total) by mouth 3 (three) times daily as needed for cough. SWALLOW CAPSULE WHOLE. DO NOT BREAK, CUT, CRUSH, CHEW, OR DISSOLVE CAPSULE. 30 capsule 0    albuterol 108 (90 Base) MCG/ACT Inhalation Aero Soln Inhale 2 puffs into the lungs every 4 (four) hours as needed for Wheezing or Shortness of Breath. 6.7 g 0    [START ON 3/2/2024] Phentermine HCl 15 MG Oral Cap Take 1 capsule (15 mg total) by mouth every morning. 30 capsule 0    Phentermine HCl 15 MG Oral Cap Take 1 capsule (15 mg total) by mouth every morning. 30 capsule 0    Rimegepant Sulfate (NURTEC) 75 MG Oral Tablet Dispersible Take 75 mg by mouth as needed. 10 tablet 2    traZODone 100 MG Oral Tab Take 1 tablet (100 mg total) by mouth nightly.      rosuvastatin 5 MG Oral Tab Take 1 tablet (5 mg total) by mouth Every Monday, Wednesday, and Friday. 45 tablet 3    Propranolol HCl ER 80 MG Oral Capsule SR 24 Hr Take 1 capsule (80 mg total) by mouth every evening. (Patient taking differently: Take 1 capsule (80 mg total) by mouth at bedtime.) 90 capsule 1    memantine 10 MG Oral Tab Take 1 tablet (10 mg total) by mouth 2 (two) times daily. 60 tablet 5    butalbital-acetaminophen-caffeine -40 MG Oral Tab       FLUoxetine 20 MG Oral Cap TAKE 1 CAPSULE BY MOUTH EVERY MORNING LOWERING DOSE TO 20MG      dicyclomine 10 MG Oral Cap Take 1 capsule (10 mg total) by mouth 2 (two) times daily as needed. (Patient taking differently: Take 1 capsule (10 mg total) by mouth in the morning and 1 capsule (10 mg total) before bedtime.) 180 capsule 3    metoclopramide 5 MG Oral Tab Take 1 tablet (5 mg total) by mouth daily.  (Patient taking differently: Take 1 tablet (5 mg total) by mouth as needed.) 30 tablet 2    pantoprazole 40 MG Oral Tab EC Take 1 tablet (40 mg total) by mouth before breakfast. 90 tablet 3    famotidine 40 MG Oral Tab Take 1 tablet (40 mg total) by mouth daily as needed for Heartburn. 90 tablet 3    SPRAVATO, 84 MG DOSE, 28 MG/DEVICE Nasal Solution Therapy Pack 84 mg by Nasal route every 21 days.      triamcinolone 0.1 % External Cream Apply thin layer to affected area twice a day as needed 45 g 1    hydrocortisone 2.5 % External Cream Apply to AA of FACE BID x 2 weeks, hold 2 weeks, repeat PRN. 30 g 2    ketoconazole 2 % External Cream Apply to AA of FACE BID when not using Hydrocortisone Cream. 30 g 2    LORazepam 2 MG Oral Tab Take 1 tablet (2 mg total) by mouth as needed.      cyclobenzaprine 10 MG Oral Tab Take 1 tablet (10 mg total) by mouth 3 (three) times daily as needed for Muscle spasms. 90 tablet 1    busPIRone HCl 10 MG Oral Tab Take 5 tablets (50 mg total) by mouth daily. 1 1/2  tab in the morning and 1 1/2 in the afternoon         HEALTH MAINTENANCE  Immunization History   Administered Date(s) Administered    Covid-19 Vaccine Pfizer 30 mcg/0.3 ml 2021, 2021, 10/23/2021    Covid-19 Vaccine Pfizer Bivalent 30mcg/0.3mL 2022    Covid-19 Vaccine Pfizer Merrill-Sucrose 30 mcg/0.3 ml 2022    FLULAVAL 6 months & older 0.5 ml Prefilled syringe (93690) 10/23/2017, 2018, 10/01/2019, 10/07/2020, 2021, 10/28/2022    FLUZONE 6 months and older PFS 0.5 ml (09691) 10/23/2017, 2018, 10/13/2023    Pfizer Covid-19 Vaccine 30mcg/0.3ml 12yrs+ (2730-6192) 10/13/2023    TDAP 2018    Zoster Vaccine Recombinant Adjuvanted (Shingrix) 2021, 2021       ALLERGIES AND DRUG REACTIONS  Allergies   Allergen Reactions    Sulfa Antibiotics NAUSEA ONLY       Family History   Problem Relation Age of Onset    Hypertension Father     Heart Attack Father          at 48 years     Heart Disease Father     Alcohol and Other Disorders Associated Father     Depression Father     Other (ALS) Mother     Hypertension Mother         Diagnosed at 91 yo with ALS  at 91    Personality Disorder Daughter     Dementia Maternal Grandmother     Obesity Maternal Grandmother     Dementia Maternal Grandfather     Obesity Paternal Grandfather     Cancer Sister         Kidney, chronic lymp… leukemia    Ulcerative Colitis Brother     Cancer Brother         Kidney     Social History     Socioeconomic History    Marital status:    Occupational History    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[employer]   Tobacco Use    Smoking status: Never    Smokeless tobacco: Never   Vaping Use    Vaping Use: Never used   Substance and Sexual Activity    Alcohol use: Not Currently     Comment: 6 drinks or less/year    Drug use: Never   Other Topics Concern    Caffeine Concern No    Exercise Yes     Comment: walking    Seat Belt Yes    Special Diet No    Stress Concern Yes    Weight Concern Yes   Social History Narrative    Caring for grandson (Peter - aged 6 yo [2020])       Review of Systems   Respiratory:  Positive for cough.    Gastrointestinal:  Positive for abdominal pain and diarrhea.   Musculoskeletal:  Positive for arthralgias.   All other systems reviewed and are negative.         Objective:      /60   Pulse 61   Temp 97.5 °F (36.4 °C) (Temporal)   Resp 16   Ht 5' 1\" (1.549 m)   Wt 201 lb 6 oz (91.3 kg)   LMP  (LMP Unknown)   BMI 38.05 kg/m²   Body mass index is 38.05 kg/m².    Physical Exam  Vitals reviewed.   Constitutional:       General: She is not in acute distress.     Appearance: She is not ill-appearing, toxic-appearing or diaphoretic.   HENT:      Head: Normocephalic and atraumatic.   Cardiovascular:      Rate and Rhythm: Normal rate.   Pulmonary:      Effort: Pulmonary effort is normal.      Comments: Coarse BS  Abdominal:      General: Bowel  sounds are normal. There is no distension.      Palpations: Abdomen is soft. There is no mass.      Tenderness: There is abdominal tenderness (mild epigastric tenderness). There is no guarding or rebound.   Genitourinary:     General: Normal vulva.      Vagina: Normal.      Cervix: Normal.   Musculoskeletal:      Cervical back: Neck supple.   Neurological:      Mental Status: She is alert and oriented to person, place, and time.            Assessment and Plan:      1. Well adult exam (Primary)  2. Cervical cancer screening  -     ThinPrep PAP Smear B; Future; Expected date: 02/01/2024  -     Hpv High Risk , Thin Prep Collect; Future; Expected date: 02/01/2024  -     ThinPrep PAP Smear B  -     Hpv High Risk , Thin Prep Collect  3. Adenomatous polyp of colon, unspecified part of colon  4. Memory loss  5. Cognitive decline  6. Coronary artery calcification  7. Complex tear of medial meniscus of left knee as current injury, subsequent encounter  8. Class 2 obesity due to excess calories without serious comorbidity with body mass index (BMI) of 39.0 to 39.9 in adult  -     Phentermine HCl; Take 1 capsule (15 mg total) by mouth every morning.  Dispense: 30 capsule; Refill: 0  -     Phentermine HCl; Take 1 capsule (15 mg total) by mouth every morning.  Dispense: 30 capsule; Refill: 0  9. Body mass index 39.0-39.9, adult  -     Phentermine HCl; Take 1 capsule (15 mg total) by mouth every morning.  Dispense: 30 capsule; Refill: 0  -     Phentermine HCl; Take 1 capsule (15 mg total) by mouth every morning.  Dispense: 30 capsule; Refill: 0  10. Viral syndrome  11. Productive cough  -     XR CHEST PA + LAT CHEST (CPT=71046); Future; Expected date: 02/01/2024  -     Benzonatate; Take 1 capsule (100 mg total) by mouth 3 (three) times daily as needed for cough. SWALLOW CAPSULE WHOLE. DO NOT BREAK, CUT, CRUSH, CHEW, OR DISSOLVE CAPSULE.  Dispense: 30 capsule; Refill: 0  -     Albuterol Sulfate HFA; Inhale 2 puffs into the lungs  every 4 (four) hours as needed for Wheezing or Shortness of Breath.  Dispense: 6.7 g; Refill: 0  12. Fall, initial encounter  -     XR HAND (MIN 3 VIEWS), LEFT (CPT=73130); Future; Expected date: 02/01/2024  13. Pain of left hand  -     XR HAND (MIN 3 VIEWS), LEFT (CPT=73130); Future; Expected date: 02/01/2024    Return in about 2 months (around 4/1/2024).    Medicare Wellness Visit  - pap specimen sent for cotesting with pt's consent  - next colonoscopy due 7/2030  - UTD on immunizations     Memory decline  - recommend continuation of follow-up with her neurologist for further treatment of her cognitive decline  - her brain MRI showed stable trace chronic microvascular ischemic changes and coronary calcium score was 5.24 in 12/2022. We discussed vascular risk factor management for preventative treatment for her memory decline. The goal for her LDL cholesterol is <70. She is currently taking rosuvastatin 5mg. We will plan to recheck her lipid panel in 2 months and consider increasing the dose. Her ability to exercise is currently limited due to her L knee meniscal tear, for which she is considering a joint injection vs surgery.      BMI>39  - tolerating phentermine 15mg well without adverse effects  - down 9lbs since LOV last month  - a shared decision was made to continue phentermine for another 2 months and recheck labs at next visit (CBC, A1C, lipid panel)    Productive cough  - bronchitis vs PNA  - recommended CXR for further evaluation  - she may take tessalon perles PRN cough and use albuterol inhaler PRN wheezing     Viral syndrome  - with likely viral gastroenteritis. We discussed that she may take OTC TUMS or famotidine as needed for epigastric discomfort    Fall with resultant persistent hand pain  - recommended XR for further evaluation    Patient verbalized understanding of assessment and recommendations. All questions and concerns were addressed.    Electronically signed by Colton Schaefer MD

## 2024-02-02 ENCOUNTER — HOSPITAL ENCOUNTER (OUTPATIENT)
Dept: GENERAL RADIOLOGY | Age: 64
Discharge: HOME | End: 2024-02-02
Attending: STUDENT IN AN ORGANIZED HEALTH CARE EDUCATION/TRAINING PROGRAM
Payer: MEDICARE

## 2024-02-02 DIAGNOSIS — R05.8 PRODUCTIVE COUGH: ICD-10-CM

## 2024-02-02 DIAGNOSIS — M79.642 PAIN OF LEFT HAND: ICD-10-CM
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
## 2024-02-02 LAB — HPV I/H RISK 1 DNA SPEC QL NAA+PROBE: NEGATIVE

## 2024-02-02 PROCEDURE — 73130 X-RAY EXAM OF HAND: CPT | Performed by: STUDENT IN AN ORGANIZED HEALTH CARE EDUCATION/TRAINING PROGRAM

## 2024-02-02 PROCEDURE — 71046 X-RAY EXAM CHEST 2 VIEWS: CPT | Performed by: STUDENT IN AN ORGANIZED HEALTH CARE EDUCATION/TRAINING PROGRAM

## 2024-02-02 NOTE — PROGRESS NOTES
Good morning,     Your chest x-ray showed no signs of pneumonia, which is reassuring. This means that we do not need to add an antibiotic onto your regimen at this time. Please continue the treatments for bronchitis as we discussed during your visit.     I hope you feel better soon,    Dr. Schaefer

## 2024-02-02 NOTE — PROGRESS NOTES
Good morning Cb,     Your hand x-ray showed no signs of fracture, which is great news. Your symptoms are more likely due to a sprain. You can apply ice and/or Voltaren gel to the area and wear a wrist brace as needed. Your symptoms are expected to improve gradually over the next several weeks. Please let me know if you have any questions.     Dr. Schaefer

## 2024-02-05 ENCOUNTER — TELEPHONE (OUTPATIENT)
Dept: FAMILY MEDICINE CLINIC | Facility: CLINIC | Age: 64
End: 2024-02-05

## 2024-02-05 DIAGNOSIS — E66.09 CLASS 2 OBESITY DUE TO EXCESS CALORIES WITHOUT SERIOUS COMORBIDITY WITH BODY MASS INDEX (BMI) OF 39.0 TO 39.9 IN ADULT: ICD-10-CM

## 2024-02-05 NOTE — TELEPHONE ENCOUNTER
Received incoming fax from pharmacy on Phentermine 15MG, product backordered/unavailable. Will place fax in MA folder.

## 2024-02-06 LAB
.: NORMAL
.: NORMAL

## 2024-02-06 RX ORDER — PHENTERMINE HYDROCHLORIDE 15 MG/1
15 CAPSULE ORAL EVERY MORNING
Qty: 30 CAPSULE | Refills: 0 | Status: SHIPPED | OUTPATIENT
Start: 2024-02-06 | End: 2024-03-07

## 2024-02-08 ENCOUNTER — PATIENT MESSAGE (OUTPATIENT)
Dept: FAMILY MEDICINE CLINIC | Facility: CLINIC | Age: 64
End: 2024-02-08

## 2024-02-08 NOTE — TELEPHONE ENCOUNTER
From: Cb Webster  To: Colton Schaefer  Sent: 2/8/2024 7:54 AM CST  Subject: Cough Persists    Hi, I continue to have a persistant productive cough through the day and night after a bronchitis diagnosis 7 days ago. Should I expect more improvement at this point? I continue to take benzonatate and albuteral.  Thank you. — Cb Webster [birth year]

## 2024-02-09 ENCOUNTER — OFFICE VISIT (OUTPATIENT)
Dept: FAMILY MEDICINE CLINIC | Facility: CLINIC | Age: 64
End: 2024-02-09
Payer: MEDICARE

## 2024-02-09 VITALS
OXYGEN SATURATION: 97 % | HEIGHT: 61 IN | BODY MASS INDEX: 37.29 KG/M2 | TEMPERATURE: 97 F | WEIGHT: 197.5 LBS | RESPIRATION RATE: 18 BRPM | DIASTOLIC BLOOD PRESSURE: 64 MMHG | HEART RATE: 72 BPM | SYSTOLIC BLOOD PRESSURE: 118 MMHG

## 2024-02-09 DIAGNOSIS — B07.9 VERRUCA VULGARIS: ICD-10-CM

## 2024-02-09 DIAGNOSIS — J18.9 COMMUNITY ACQUIRED PNEUMONIA, UNSPECIFIED LATERALITY: Primary | ICD-10-CM

## 2024-02-09 PROCEDURE — 99213 OFFICE O/P EST LOW 20 MIN: CPT | Performed by: STUDENT IN AN ORGANIZED HEALTH CARE EDUCATION/TRAINING PROGRAM

## 2024-02-09 PROCEDURE — 17110 DESTRUCTION B9 LES UP TO 14: CPT | Performed by: STUDENT IN AN ORGANIZED HEALTH CARE EDUCATION/TRAINING PROGRAM

## 2024-02-09 RX ORDER — BENZONATATE 100 MG/1
100 CAPSULE ORAL 3 TIMES DAILY PRN
Qty: 30 CAPSULE | Refills: 0 | Status: SHIPPED | OUTPATIENT
Start: 2024-02-09 | End: 2024-02-19

## 2024-02-09 RX ORDER — ALBUTEROL SULFATE 90 UG/1
2 AEROSOL, METERED RESPIRATORY (INHALATION) EVERY 4 HOURS PRN
Qty: 6.7 G | Refills: 0 | Status: SHIPPED | OUTPATIENT
Start: 2024-02-09

## 2024-02-09 RX ORDER — AZITHROMYCIN 250 MG/1
TABLET, FILM COATED ORAL
Qty: 6 TABLET | Refills: 0 | Status: SHIPPED | OUTPATIENT
Start: 2024-02-09 | End: 2024-02-13

## 2024-02-09 NOTE — PROGRESS NOTES
Subjective:      Chief Complaint   Patient presents with    Bronchitis     F/up - still coughing     HISTORY OF PRESENT ILLNESS  Bronchitis  Associated symptoms include coughing.     HPI obtained per patient report.  Cb Webster is a pleasant 63 year old female presenting for follow-up.   She continues to have a productive cough without improvement since her LOV.  She also notes a lesion on her R thumb without improvement for 6-9 months.     PAST PATIENT HISTORY  Past Medical History:   Diagnosis Date    Anxiety     Aortic regurgitation     mild    Arthritis     Calculus of kidney     Cardiac calcification (HCC) - CAC score of 5.24 seen on 22 CT Heart Scan protocol 2023    Colon polyps     Depression     Disorder of liver     Fatty liver    Diverticulosis     Gastroparesis     GERD (gastroesophageal reflux disease)     Hemorrhoids     History of eating disorder     Binge eating    Migraines     OCD (obsessive compulsive disorder)     MODE (obstructive sleep apnea) 2017    Parkinsonism     Peptic ulcer disease     Tendonitis of shoulder, right 2019    Tremor      Past Surgical History:   Procedure Laterality Date          x 2    CHOLECYSTECTOMY      COLONOSCOPY      D & C      ECT PROVIDED  2747-1621    EGD      LAPAROSCOPIC CHOLECYSTECTOMY      NEEDLE BIOPSY RIGHT  2015    benign breast    OTHER SURGICAL HISTORY      C3-C4 anterior discectomy    OTHER SURGICAL HISTORY  2018    [facility]    OTHER SURGICAL HISTORY  2019    radiofrequency abalsion lumbar    REMOVAL GALLBLADDER  2020    SKIN SURGERY      SPINE SURGERY PROCEDURE UNLISTED      Anterior cervical discectomy    TONSILLECTOMY  1966?       CURRENT MEDICATIONS  Outpatient Medications Marked as Taking for the 24 encounter (Office Visit) with Colton Schaefer MD   Medication Sig Dispense Refill    azithromycin 250 MG Oral Tab Take 2 tablets (500 mg total) by mouth daily for 1 day, THEN 1 tablet (250 mg total)  daily for 4 days. 6 tablet 0    albuterol 108 (90 Base) MCG/ACT Inhalation Aero Soln Inhale 2 puffs into the lungs every 4 (four) hours as needed for Wheezing or Shortness of Breath. 6.7 g 0    benzonatate 100 MG Oral Cap Take 1 capsule (100 mg total) by mouth 3 (three) times daily as needed for cough. SWALLOW CAPSULE WHOLE. DO NOT BREAK, CUT, CRUSH, CHEW, OR DISSOLVE CAPSULE. 30 capsule 0    Phentermine HCl 15 MG Oral Cap Take 1 capsule (15 mg total) by mouth every morning. 30 capsule 0    Rimegepant Sulfate (NURTEC) 75 MG Oral Tablet Dispersible Take 75 mg by mouth as needed. 10 tablet 2    traZODone 100 MG Oral Tab Take 1 tablet (100 mg total) by mouth nightly.      rosuvastatin 5 MG Oral Tab Take 1 tablet (5 mg total) by mouth Every Monday, Wednesday, and Friday. 45 tablet 3    Propranolol HCl ER 80 MG Oral Capsule SR 24 Hr Take 1 capsule (80 mg total) by mouth every evening. (Patient taking differently: Take 1 capsule (80 mg total) by mouth at bedtime.) 90 capsule 1    memantine 10 MG Oral Tab Take 1 tablet (10 mg total) by mouth 2 (two) times daily. 60 tablet 5    butalbital-acetaminophen-caffeine -40 MG Oral Tab       FLUoxetine 20 MG Oral Cap TAKE 1 CAPSULE BY MOUTH EVERY MORNING LOWERING DOSE TO 20MG      dicyclomine 10 MG Oral Cap Take 1 capsule (10 mg total) by mouth 2 (two) times daily as needed. (Patient taking differently: Take 1 capsule (10 mg total) by mouth in the morning and 1 capsule (10 mg total) before bedtime.) 180 capsule 3    metoclopramide 5 MG Oral Tab Take 1 tablet (5 mg total) by mouth daily. (Patient taking differently: Take 1 tablet (5 mg total) by mouth as needed.) 30 tablet 2    pantoprazole 40 MG Oral Tab EC Take 1 tablet (40 mg total) by mouth before breakfast. 90 tablet 3    famotidine 40 MG Oral Tab Take 1 tablet (40 mg total) by mouth daily as needed for Heartburn. 90 tablet 3    SPRAVATO, 84 MG DOSE, 28 MG/DEVICE Nasal Solution Therapy Pack 84 mg by Nasal route every 21  days.      triamcinolone 0.1 % External Cream Apply thin layer to affected area twice a day as needed 45 g 1    hydrocortisone 2.5 % External Cream Apply to AA of FACE BID x 2 weeks, hold 2 weeks, repeat PRN. 30 g 2    ketoconazole 2 % External Cream Apply to AA of FACE BID when not using Hydrocortisone Cream. 30 g 2    LORazepam 2 MG Oral Tab Take 1 tablet (2 mg total) by mouth as needed.      cyclobenzaprine 10 MG Oral Tab Take 1 tablet (10 mg total) by mouth 3 (three) times daily as needed for Muscle spasms. 90 tablet 1    busPIRone HCl 10 MG Oral Tab Take 5 tablets (50 mg total) by mouth daily. 1 1/2  tab in the morning and 1 1/2 in the afternoon         HEALTH MAINTENANCE  Immunization History   Administered Date(s) Administered    Covid-19 Vaccine Pfizer 30 mcg/0.3 ml 2021, 2021, 10/23/2021    Covid-19 Vaccine Pfizer Bivalent 30mcg/0.3mL 2022    Covid-19 Vaccine Pfizer Merrill-Sucrose 30 mcg/0.3 ml 2022    FLULAVAL 6 months & older 0.5 ml Prefilled syringe ([id number]) 10/23/2017, 2018, 10/01/2019, 10/07/2020, 2021, 10/28/2022    FLUZONE 6 months and older PFS 0.5 ml ([id number]) 10/23/2017, 2018, 10/13/2023    Pfizer Covid-19 Vaccine 30mcg/0.3ml 12yrs+ (2209-2601) 10/13/2023    TDAP 2018    Zoster Vaccine Recombinant Adjuvanted (Shingrix) 2021, 2021       ALLERGIES AND DRUG REACTIONS  Allergies   Allergen Reactions    Sulfa Antibiotics NAUSEA ONLY       Family History   Problem Relation Age of Onset    Hypertension Father     Heart Attack Father          at 48 years    Heart Disease Father     Alcohol and Other Disorders Associated Father     Depression Father     Other (ALS) Mother     Hypertension Mother         Diagnosed at 91 yo with ALS  at 91    Personality Disorder Daughter     Dementia Maternal Grandmother     Obesity Maternal Grandmother     Dementia Maternal Grandfather     Obesity Paternal Grandfather     Cancer Sister         Kidney, chronic  lymp… leukemia    Ulcerative Colitis Brother     Cancer Brother         Kidney     Social History     Socioeconomic History    Marital status:    Occupational History    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[employer]   Tobacco Use    Smoking status: Never    Smokeless tobacco: Never   Vaping Use    Vaping Use: Never used   Substance and Sexual Activity    Alcohol use: Not Currently     Comment: 6 drinks or less/year    Drug use: Never   Other Topics Concern    Caffeine Concern No    Exercise Yes     Comment: walking    Seat Belt Yes    Special Diet No    Stress Concern Yes    Weight Concern Yes   Social History Narrative    Caring for grandson (Peter - aged 4 yo [1/2020])       Review of Systems   Respiratory:  Positive for cough.    All other systems reviewed and are negative.         Objective:      /64   Pulse 72   Temp 97.3 °F (36.3 °C) (Temporal)   Resp 18   Ht 5' 1\" (1.549 m)   Wt 197 lb 8 oz (89.6 kg)   LMP  (LMP Unknown)   SpO2 97%   BMI 37.32 kg/m²   Body mass index is 37.32 kg/m².    Physical Exam  Vitals reviewed.   Constitutional:       General: She is not in acute distress.     Appearance: She is not ill-appearing, toxic-appearing or diaphoretic.   HENT:      Head: Normocephalic and atraumatic.   Cardiovascular:      Rate and Rhythm: Normal rate.   Pulmonary:      Effort: Pulmonary effort is normal.      Breath sounds: No stridor. Rales (LLE mild rales) present. No wheezing or rhonchi.   Abdominal:      General: There is no distension.   Musculoskeletal:      Cervical back: Neck supple.   Skin:     General: Skin is warm and dry.      Coloration: Skin is not jaundiced or pale.      Findings: Lesion (flat wart approx 3mm in diameter on palmar surface of distal R thumb) present. No bruising, erythema or rash.   Neurological:      Mental Status: She is alert and oriented to person, place, and time.            Assessment and Plan:      1.  Community acquired pneumonia, unspecified laterality (Primary)  -     Azithromycin; Take 2 tablets (500 mg total) by mouth daily for 1 day, THEN 1 tablet (250 mg total) daily for 4 days.  Dispense: 6 tablet; Refill: 0  -     Albuterol Sulfate HFA; Inhale 2 puffs into the lungs every 4 (four) hours as needed for Wheezing or Shortness of Breath.  Dispense: 6.7 g; Refill: 0  -     Benzonatate; Take 1 capsule (100 mg total) by mouth 3 (three) times daily as needed for cough. SWALLOW CAPSULE WHOLE. DO NOT BREAK, CUT, CRUSH, CHEW, OR DISSOLVE CAPSULE.  Dispense: 30 capsule; Refill: 0  2. Verruca vulgaris    No follow-ups on file.    CAP  - recommended azithromycin as prescribed  - recommended mucinex OTC as needed for congestion  - she may continue albuterol inhaler and tessalon perles as needed    Verruca vulgaris  - treatment options were discussed including cryotherapy vs monitoring; pt opted for initial cryotherapy treatment in office today. R/B/A of this treatment were discussed  - 3 rounds of cryotherapy were performed with a 1mm margin around the lesion. Patient tolerated the procedure well without any issues  - we discussed that she may return in 2-3 weeks for another cryotherapy session     Patient verbalized understanding of assessment and recommendations. All questions and concerns were addressed.    Electronically signed by Colton Schaefer MD

## 2024-02-14 ENCOUNTER — OFFICE VISIT (OUTPATIENT)
Dept: INTERNAL MEDICINE CLINIC | Facility: CLINIC | Age: 64
End: 2024-02-14
Payer: MEDICARE

## 2024-02-14 VITALS
DIASTOLIC BLOOD PRESSURE: 60 MMHG | HEART RATE: 72 BPM | HEIGHT: 61 IN | SYSTOLIC BLOOD PRESSURE: 110 MMHG | BODY MASS INDEX: 37.19 KG/M2 | WEIGHT: 197 LBS | RESPIRATION RATE: 16 BRPM

## 2024-02-14 DIAGNOSIS — K31.84 GASTROPARESIS: ICD-10-CM

## 2024-02-14 DIAGNOSIS — K76.0 NAFLD (NONALCOHOLIC FATTY LIVER DISEASE): ICD-10-CM

## 2024-02-14 DIAGNOSIS — Z51.81 THERAPEUTIC DRUG MONITORING: ICD-10-CM

## 2024-02-14 DIAGNOSIS — E66.01 CLASS 3 SEVERE OBESITY DUE TO EXCESS CALORIES WITH SERIOUS COMORBIDITY AND BODY MASS INDEX (BMI) OF 40.0 TO 44.9 IN ADULT (HCC): Primary | ICD-10-CM

## 2024-02-14 PROCEDURE — 99214 OFFICE O/P EST MOD 30 MIN: CPT | Performed by: INTERNAL MEDICINE

## 2024-02-14 NOTE — PROGRESS NOTES
HISTORY OF PRESENT ILLNESS  Chief Complaint   Patient presents with    Weight Check     Down 11 lb       Cb Webster is a 63 year old female here for follow up in medical weight loss program.     Denies chest pain, shortness of breath, dizziness, blurred vision, headache, paresthesia, nausea/vomiting.     Last seen Cb in August   Was struggling with a variety of sx and was diagnosed with serotonin syndrome   Psychiatric medications have been reduced and other medications can imact it as well   Saw PCP in 1/4/24 and was resumed on phentermine     Did review interactions with serotonin syndrome concerns with phentermine       Wt Readings from Last 6 Encounters:   02/14/24 197 lb (89.4 kg)   02/09/24 197 lb 8 oz (89.6 kg)   02/01/24 201 lb 6 oz (91.3 kg)   01/04/24 210 lb (95.3 kg)   01/04/24 210 lb (95.3 kg)   12/21/23 206 lb (93.4 kg)         REVIEW OF SYSTEMS  GENERAL HEALTH: feels well otherwise, denied any fevers chills or night sweats   RESPIRATORY: denies shortness of breath   CARDIOVASCULAR: denies chest pain  GI: denies abdominal pain    EXAM  /60   Pulse 72   Resp 16   Ht 5' 1\" (1.549 m)   Wt 197 lb (89.4 kg)   LMP  (LMP Unknown)   BMI 37.22 kg/m²   GENERAL: well developed, well nourished,in no apparent distress, A/O x3  SKIN: no rashes,no suspicious lesions  HEENT: atraumatic, normocephalic, OP-clear, PERRL  NECK: supple,no adenopathy  LUNGS: clear to auscultation bilaterally   CARDIO: RRR without murmur  GI: good BS's,NT/ND, no masses or HSM  EXTREMITIES: no cyanosis, no clubbing, no edema    Lab Results   Component Value Date    WBC 7.1 10/18/2023    RBC 4.37 09/07/2023    HGB 12.4 10/18/2023    HCT 38.7 10/18/2023    MCV 90.0 10/18/2023    MCH 29.1 09/07/2023    MCHC 32.5 09/07/2023    RDW 13.1 09/07/2023     10/18/2023     Lab Results   Component Value Date    GLU 96 10/18/2023    BUN 18 10/18/2023    BUNCREA 23.7 (H) 08/11/2022    CREATSERUM 0.90 10/18/2023    ANIONGAP 6  09/07/2023    GFR 84 11/28/2017    GFRNAA 54 (L) 11/05/2021    GFRAA 62 11/05/2021    CA 96.0 10/18/2023    OSMOCALC 286 09/07/2023    ALKPHO 78 09/07/2023    AST 16 10/18/2023    ALT 7 10/18/2023    BILT 0.4 10/18/2023    TP 6.4 10/18/2023    ALB 3.7 09/07/2023    GLOBULIN 3.0 09/07/2023     09/07/2023    K 4.4 10/18/2023     10/18/2023    CO2 23 10/18/2023     Lab Results   Component Value Date     08/11/2022    A1C 4.7 10/18/2023     Lab Results   Component Value Date    CHOLEST 206 10/18/2023    TRIG 111 10/18/2023    HDL 57 10/18/2023    LDL 95 11/03/2020    VLDL 19 11/03/2020    TCHDLRATIO 3.60 10/18/2023    NONHDLC 149 10/18/2023     Lab Results   Component Value Date    T4F 0.9 08/11/2022    TSH 1.870 10/18/2023     Lab Results   Component Value Date    B12 301 08/11/2022     Lab Results   Component Value Date    VITD 34.7 08/11/2022       Current Outpatient Medications on File Prior to Visit   Medication Sig Dispense Refill    albuterol 108 (90 Base) MCG/ACT Inhalation Aero Soln Inhale 2 puffs into the lungs every 4 (four) hours as needed for Wheezing or Shortness of Breath. 6.7 g 0    benzonatate 100 MG Oral Cap Take 1 capsule (100 mg total) by mouth 3 (three) times daily as needed for cough. SWALLOW CAPSULE WHOLE. DO NOT BREAK, CUT, CRUSH, CHEW, OR DISSOLVE CAPSULE. 30 capsule 0    Phentermine HCl 15 MG Oral Cap Take 1 capsule (15 mg total) by mouth every morning. 30 capsule 0    Rimegepant Sulfate (NURTEC) 75 MG Oral Tablet Dispersible Take 75 mg by mouth as needed. 10 tablet 2    traZODone 100 MG Oral Tab Take 1 tablet (100 mg total) by mouth nightly.      rosuvastatin 5 MG Oral Tab Take 1 tablet (5 mg total) by mouth Every Monday, Wednesday, and Friday. 45 tablet 3    Propranolol HCl ER 80 MG Oral Capsule SR 24 Hr Take 1 capsule (80 mg total) by mouth every evening. (Patient taking differently: Take 1 capsule (80 mg total) by mouth at bedtime.) 90 capsule 1    memantine 10 MG Oral  Tab Take 1 tablet (10 mg total) by mouth 2 (two) times daily. 60 tablet 5    butalbital-acetaminophen-caffeine -40 MG Oral Tab       FLUoxetine 20 MG Oral Cap TAKE 1 CAPSULE BY MOUTH EVERY MORNING LOWERING DOSE TO 20MG      dicyclomine 10 MG Oral Cap Take 1 capsule (10 mg total) by mouth 2 (two) times daily as needed. (Patient taking differently: Take 1 capsule (10 mg total) by mouth in the morning and 1 capsule (10 mg total) before bedtime.) 180 capsule 3    metoclopramide 5 MG Oral Tab Take 1 tablet (5 mg total) by mouth daily. (Patient taking differently: Take 1 tablet (5 mg total) by mouth as needed.) 30 tablet 2    pantoprazole 40 MG Oral Tab EC Take 1 tablet (40 mg total) by mouth before breakfast. 90 tablet 3    famotidine 40 MG Oral Tab Take 1 tablet (40 mg total) by mouth daily as needed for Heartburn. 90 tablet 3    SPRAVATO, 84 MG DOSE, 28 MG/DEVICE Nasal Solution Therapy Pack 84 mg by Nasal route every 21 days.      triamcinolone 0.1 % External Cream Apply thin layer to affected area twice a day as needed 45 g 1    hydrocortisone 2.5 % External Cream Apply to AA of FACE BID x 2 weeks, hold 2 weeks, repeat PRN. 30 g 2    ketoconazole 2 % External Cream Apply to AA of FACE BID when not using Hydrocortisone Cream. 30 g 2    LORazepam 2 MG Oral Tab Take 1 tablet (2 mg total) by mouth as needed.      cyclobenzaprine 10 MG Oral Tab Take 1 tablet (10 mg total) by mouth 3 (three) times daily as needed for Muscle spasms. 90 tablet 1    busPIRone HCl 10 MG Oral Tab Take 5 tablets (50 mg total) by mouth daily. 1 1/2  tab in the morning and 1 1/2 in the afternoon       No current facility-administered medications on file prior to visit.       ASSESSMENT  Analyzed weight data:       Diagnoses and all orders for this visit:    1. Class 3 severe obesity due to excess calories with serious comorbidity and body mass index (BMI) of 40.0 to 44.9 in adult ([facility]) [E66.01, Z68.41 (ICD-10-CM)]    2. Therapeutic drug  monitoring    3. NAFLD (nonalcoholic fatty liver disease) [K76.0 (ICD-10-CM)]    4. Gastroparesis [K31.84 (ICD-10-CM)]            PLAN  Weight Max: 265 lb on 12/21/17  Initial consult: 159 lb with 37% body fat   Reconsult weight: 230 lb 8/11/22   Down 11 lb   Down 68 lb total   Total time spent on chart review, pre-charting, obtaining history, counseling, and educating, reviewing labs was 30 minutes.  Reviewed tracking apps and tracking nutrition and protein   We discussed possible trial of alternates in the future   -advised of side effects and adverse effects of this medication    Not intersted in ozempic to conserve usage and does have history of gastroparesis.   Did not tolerate stimulants   -advised of side effects and adverse effects of this medication  Continue ifestyle modification  Had patient sign VLADIMIR for behavioral health to coordinate care.   Continues to struggle with mood and emotion, working closely with therapist.   -advised of side effects and adverse effects of this medication  Medication use and side effects reviewed with patient.  Medication contraindications: n/a  Follow up with dietitian and psychologist as recommended.  Discussed the role of sleep and stress in weight management.  Labs orders as above.  Counseled on comprehensive weight loss plan including attention to nutrition, exercise and behavior/stress management for success. See patient instruction below for more details.  Weight Loss Consent to treat reviewed and signed.        Patient Instructions   Zepbound       No follow-ups on file.    Patient verbalizes understanding.    Jackelin Maloney MD

## 2024-02-21 ENCOUNTER — OFFICE VISIT (OUTPATIENT)
Dept: NEUROLOGY | Facility: CLINIC | Age: 64
End: 2024-02-21
Payer: MEDICARE

## 2024-02-21 VITALS
RESPIRATION RATE: 16 BRPM | SYSTOLIC BLOOD PRESSURE: 98 MMHG | BODY MASS INDEX: 37 KG/M2 | WEIGHT: 196 LBS | HEART RATE: 60 BPM | DIASTOLIC BLOOD PRESSURE: 62 MMHG

## 2024-02-21 DIAGNOSIS — F06.70 MILD NEUROCOGNITIVE DISORDER DUE TO MULTIPLE ETIOLOGIES: ICD-10-CM

## 2024-02-21 DIAGNOSIS — G43.709 CHRONIC MIGRAINE WITHOUT AURA WITHOUT STATUS MIGRAINOSUS, NOT INTRACTABLE: Primary | ICD-10-CM

## 2024-02-21 PROCEDURE — 64615 CHEMODENERV MUSC MIGRAINE: CPT | Performed by: OTHER

## 2024-02-21 PROCEDURE — 99213 OFFICE O/P EST LOW 20 MIN: CPT | Performed by: OTHER

## 2024-02-21 RX ORDER — MEMANTINE HYDROCHLORIDE 10 MG/1
10 TABLET ORAL DAILY
Qty: 30 TABLET | Refills: 5 | Status: SHIPPED | OUTPATIENT
Start: 2024-02-21

## 2024-02-21 NOTE — PROGRESS NOTES
HPI:    Patient ID: Cb Webster is a 63 year old female.    Migraine     Neurologic Problem  Associated symptoms include headaches.        Cb presents today for follow up for migraine and for botox therapy  States migraines stable on Botox.  States she is now on Spravato nasal therapy every 3 weeks for chronic MDD and that is helping her depression. She feels more clear in her thought process and would like to discuss Memantine dose.  Had MRI brain done in Dec and no acute abnormality seen.        Office visit: Dec 2023  Patient is a 63-year-old female with who presented with complaints of possible serotonin syndrome.  She states the past few months she has been experiencing agitation and restlessness, has more difficulty concentrating she is getting confused easily, also have nausea diarrhea sometime, poor co-ordination, palpitations.   She follows with psychiatry and on multiple medications and serotonin was suspected and she states there was some adjustment done with the dosage the dose of buspirone and trazodone was reduced also Spravato nasal spray was extended to every 3 weeks.  She also discontinue phentermine.  States there is some improvement but continues to have above symptoms. Last week had sore throat, positive for Strep and got treated with antibiotics  Serotonin level checked at that time was normal  PCP recommended MRI brain and it is pending      Last office visit  Patient is a 63-year-old female who presented for follow-up for migraine headaches and for Botox injection. She reports Botox therapy was working fine and headaches are under control. For past several months has noticed palpitations even though not anxious or under stress. States she going to get cardiac event    States has also noticed some memory decline, word finding difficulty, difficulty managing bank account. She couldn't find Memantine bottle so has not taking Memantine for last month. She is taking her antidepressants  regularly and not under any stress but frustrated with some health concerns      Neuro-psychology test performed by Shital Lawson in 2020 shows WAIS-IV- average IQ, average verbal comprehension and perceptual reasoning and borderline low average working memory and low average processing speed. She was diagnosed with ( G31.09) Major neurocognitive disorder without behavioral issues, generalized anxiety disorder and major depressive disorder recurrent, mild, with struggling self esteem and dissatisfied with life related to physical and mental health  HISTORY:  Past Medical History:   Diagnosis Date    Anxiety     Aortic regurgitation     mild    Arthritis     Calculus of kidney     Cardiac calcification (HCC) - CAC score of 5.24 seen on 22 CT Heart Scan protocol 2023    Colon polyps     Depression     Disorder of liver     Fatty liver    Diverticulosis     Gastroparesis     GERD (gastroesophageal reflux disease)     Hemorrhoids     History of eating disorder     Binge eating    Migraines     OCD (obsessive compulsive disorder)     MODE (obstructive sleep apnea) 2017    Parkinsonism     Peptic ulcer disease     Tendonitis of shoulder, right 2019    Tremor       Past Surgical History:   Procedure Laterality Date          x 2    CHOLECYSTECTOMY      COLONOSCOPY      D & C      ECT PROVIDED  7918-1146    EGD      LAPAROSCOPIC CHOLECYSTECTOMY      NEEDLE BIOPSY RIGHT  2015    benign breast    OTHER SURGICAL HISTORY      C3-C4 anterior discectomy    OTHER SURGICAL HISTORY  2018    [facility]    OTHER SURGICAL HISTORY  2019    radiofrequency abalsion lumbar    REMOVAL GALLBLADDER  2020    SKIN SURGERY      SPINE SURGERY PROCEDURE UNLISTED      Anterior cervical discectomy    TONSILLECTOMY  1966?      Family History   Problem Relation Age of Onset    Hypertension Father     Heart Attack Father          at 48 years    Heart Disease Father     Alcohol and Other Disorders  Associated Father     Depression Father     Other (ALS) Mother     Hypertension Mother         Diagnosed at 91 yo with ALS  at 91    Personality Disorder Daughter     Dementia Maternal Grandmother     Obesity Maternal Grandmother     Dementia Maternal Grandfather     Obesity Paternal Grandfather     Cancer Sister         Kidney, chronic lymp… leukemia    Ulcerative Colitis Brother     Cancer Brother         Kidney      Social History     Socioeconomic History    Marital status:    Occupational History    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[employer]   Tobacco Use    Smoking status: Never    Smokeless tobacco: Never   Vaping Use    Vaping Use: Never used   Substance and Sexual Activity    Alcohol use: Not Currently     Comment: 6 drinks or less/year    Drug use: Never   Other Topics Concern    Caffeine Concern No    Exercise Yes     Comment: walking and bike    Seat Belt Yes    Special Diet No    Stress Concern Yes    Weight Concern Yes   Social History Narrative    Caring for grandson (Peter - aged 6 yo [2020])        Review of Systems   Constitutional: Negative.    HENT: Negative.     Eyes: Negative.    Respiratory: Negative.     Cardiovascular: Negative.    Gastrointestinal: Negative.    Endocrine: Negative.    Genitourinary: Negative.    Musculoskeletal: Negative.    Allergic/Immunologic: Negative.    Neurological:  Positive for headaches.   Psychiatric/Behavioral: Negative.     All other systems reviewed and are negative.           Current Outpatient Medications   Medication Sig Dispense Refill    albuterol 108 (90 Base) MCG/ACT Inhalation Aero Soln Inhale 2 puffs into the lungs every 4 (four) hours as needed for Wheezing or Shortness of Breath. 6.7 g 0    Phentermine HCl 15 MG Oral Cap Take 1 capsule (15 mg total) by mouth every morning. 30 capsule 0    Rimegepant Sulfate (NURTEC) 75 MG Oral Tablet Dispersible Take 75 mg by mouth as needed. 10 tablet 2     traZODone 100 MG Oral Tab Take 1 tablet (100 mg total) by mouth nightly.      rosuvastatin 5 MG Oral Tab Take 1 tablet (5 mg total) by mouth Every Monday, Wednesday, and Friday. 45 tablet 3    Propranolol HCl ER 80 MG Oral Capsule SR 24 Hr Take 1 capsule (80 mg total) by mouth every evening. (Patient taking differently: Take 1 capsule (80 mg total) by mouth at bedtime.) 90 capsule 1    memantine 10 MG Oral Tab Take 1 tablet (10 mg total) by mouth 2 (two) times daily. 60 tablet 5    butalbital-acetaminophen-caffeine -40 MG Oral Tab       FLUoxetine 20 MG Oral Cap TAKE 1 CAPSULE BY MOUTH EVERY MORNING LOWERING DOSE TO 20MG      dicyclomine 10 MG Oral Cap Take 1 capsule (10 mg total) by mouth 2 (two) times daily as needed. (Patient taking differently: Take 1 capsule (10 mg total) by mouth in the morning and 1 capsule (10 mg total) before bedtime.) 180 capsule 3    metoclopramide 5 MG Oral Tab Take 1 tablet (5 mg total) by mouth daily. (Patient taking differently: Take 1 tablet (5 mg total) by mouth as needed.) 30 tablet 2    pantoprazole 40 MG Oral Tab EC Take 1 tablet (40 mg total) by mouth before breakfast. 90 tablet 3    famotidine 40 MG Oral Tab Take 1 tablet (40 mg total) by mouth daily as needed for Heartburn. 90 tablet 3    SPRAVATO, 84 MG DOSE, 28 MG/DEVICE Nasal Solution Therapy Pack 84 mg by Nasal route every 21 days.      triamcinolone 0.1 % External Cream Apply thin layer to affected area twice a day as needed 45 g 1    hydrocortisone 2.5 % External Cream Apply to AA of FACE BID x 2 weeks, hold 2 weeks, repeat PRN. 30 g 2    ketoconazole 2 % External Cream Apply to AA of FACE BID when not using Hydrocortisone Cream. 30 g 2    LORazepam 2 MG Oral Tab Take 1 tablet (2 mg total) by mouth as needed.      cyclobenzaprine 10 MG Oral Tab Take 1 tablet (10 mg total) by mouth 3 (three) times daily as needed for Muscle spasms. 90 tablet 1    busPIRone HCl 10 MG Oral Tab Take 5 tablets (50 mg total) by mouth  daily. 1 1/2  tab in the morning and 1 1/2 in the afternoon       Allergies:  Allergies   Allergen Reactions    Sulfa Antibiotics NAUSEA ONLY     PHYSICAL EXAM:   Physical Exam  Blood pressure 98/62, pulse 60, resp. rate 16, weight 196 lb (88.9 kg), not currently breastfeeding.     General: well developed, well nourished  HEENT: Normocephalic and atraumatic. Moist mucus membrane  Cardiovascular: Normal rate, regular rhythm and normal heart sounds.    Pulmonary/Chest: Effort normal and breath sounds normal.   Abdominal: Soft. Bowel sounds are normal.   Skin: Skin is warm and dry.   Psychiatric:  normal mood and affect.   Neurological   Awake, alert and oriented to time, place and person. Speech is dysfluent with some stuttering.   Able to follow commands.  Intact naming and repetition. Normal attention and impaired short term memory.   Cranial nerves 2-12: grossly intact  Sensory : Intact to  light touch and pinprick  Motor: Normal tone in all extremities. Mild postural hand tremors Strength is 5/5 in all muscle groups  Reflexes: symmetric and present 2+ throughout  Coordination: Intact         ASSESSMENT/PLAN:       ICD-10-CM    1. Chronic migraine without aura without status migrainosus, not intractable  G43.709       2. Mild neurocognitive disorder due to multiple etiologies  F06.70           Neurocognitive disorder  MRI brain and EEG performed negative  Concentration better since she started Spravato nasal therapy for major depression  Follow up with Psychiatry     2 On Botox and Propranolol for migraine prevention  Continue Nurtec as needed    4 Continue Memantine, may reduce dose to 10 mg daily  Discuss cognitive therapy and home cognitive exercises      Follow up in about 3 months  See orders and medications filed with this encounter. The patient indicates understanding of these issues and agrees with the plan.        James Betancourt MD  [facility] [place]      Meds This Visit:  Requested  Prescriptions      No prescriptions requested or ordered in this encounter       Imaging & Referrals:  None     ID#[id number]

## 2024-02-21 NOTE — PATIENT INSTRUCTIONS
Refill policies:    Allow 2-3 business days for refills; controlled substances may take longer.  Contact your pharmacy at least 5 days prior to running out of medication and have them send an electronic request or submit request through the “request refill” option in your [facility] account.  Refills are not addressed on weekends; covering physicians do not authorize routine medications on weekends.  No narcotics or controlled substances are refilled after noon on Fridays or by on call physicians.  By law, narcotics must be electronically prescribed.  A 30 day supply with no refills is the maximum allowed.  If your prescription is due for a refill, you may be due for a follow up appointment.  To best provide you care, patients receiving routine medications need to be seen at least once a year.  Patients receiving narcotic/controlled substance medications need to be seen at least once every 3 months.  In the event that your preferred pharmacy does not have the requested medication in stock (e.g. Backordered), it is your responsibility to find another pharmacy that has the requested medication available.  We will gladly send a new prescription to that pharmacy at your request.    Scheduling Tests:    If your physician has ordered radiology tests such as MRI or CT scans, please contact Central Scheduling at [phone] right away to schedule the test.  Once scheduled, the [facility] Centralized Referral Team will work with your insurance carrier to obtain pre-certification or prior authorization.  Depending on your insurance carrier, approval may take 3-10 days.  It is highly recommended patients assure they have received an authorization before having a test performed.  If test is done without insurance authorization, patient may be responsible for the entire amount billed.      Precertification and Prior Authorizations:  If your physician has recommended that you have a procedure or additional testing performed the [facility]  Centralized Referral Team will contact your insurance carrier to obtain pre-certification or prior authorization.    You are strongly encouraged to contact your insurance carrier to verify that your procedure/test has been approved and is a COVERED benefit.  Although the [facility] Centralized Referral Team does its due diligence, the insurance carrier gives the disclaimer that \"Although the procedure is authorized, this does not guarantee payment.\"    Ultimately the patient is responsible for payment.   Thank you for your understanding in this matter.  Paperwork Completion:  If you require FMLA or disability paperwork for your recovery, please make sure to either drop it off or have it faxed to our office at [phone]. Be sure the form has your name and date of birth on it.  The form will be faxed to our Forms Department and they will complete it for you.  There is a 25$ fee for all forms that need to be filled out.  Please be aware there is a 10-14 day turnaround time.  You will need to sign a release of information (VLADIMIR) form if your paperwork does not come with one.  You may call the Forms Department with any questions at [phone].  Their fax number is [phone].

## 2024-02-21 NOTE — PROGRESS NOTES
Patient reports 2 migraines in the last month     Paperwork noting that patient may bear financial responsibility for procedure(s) performed in clinic today signed prior to proceeding with procedure(s).    Furthermore, patient notified that they should contact their insurer to verify that your procedure/test has been approved and is a COVERED benefit.  Although the [facility] staff does its due diligence, the insurance carrier gives the disclaimer that \"Although the procedure is authorized, this does not guarantee payment.\"    Ultimately the patient is responsible for payment.    Botox is:  [x] Buy and Bill  [] Patient Supplied      Botox Reauthorization Questions:  Has the patient experienced a reduction in frequency of migraines since starting Botox? yes  If yes, by what percentage? 85%  Has the intensity of migraines decreased since starting Botox? yes  If yes, by what percentage? 85%

## 2024-03-01 ENCOUNTER — OFFICE VISIT (OUTPATIENT)
Dept: FAMILY MEDICINE CLINIC | Facility: CLINIC | Age: 64
End: 2024-03-01
Payer: MEDICARE

## 2024-03-01 VITALS
BODY MASS INDEX: 37.45 KG/M2 | HEART RATE: 62 BPM | DIASTOLIC BLOOD PRESSURE: 60 MMHG | HEIGHT: 61 IN | WEIGHT: 198.38 LBS | TEMPERATURE: 97 F | SYSTOLIC BLOOD PRESSURE: 110 MMHG | RESPIRATION RATE: 16 BRPM | OXYGEN SATURATION: 99 %

## 2024-03-01 DIAGNOSIS — B07.9 VERRUCA VULGARIS: Primary | ICD-10-CM

## 2024-03-01 DIAGNOSIS — B37.2 CANDIDAL INTERTRIGO: ICD-10-CM

## 2024-03-01 DIAGNOSIS — E66.09 CLASS 2 OBESITY DUE TO EXCESS CALORIES WITHOUT SERIOUS COMORBIDITY WITH BODY MASS INDEX (BMI) OF 39.0 TO 39.9 IN ADULT: ICD-10-CM

## 2024-03-01 PROCEDURE — 99213 OFFICE O/P EST LOW 20 MIN: CPT | Performed by: STUDENT IN AN ORGANIZED HEALTH CARE EDUCATION/TRAINING PROGRAM

## 2024-03-01 PROCEDURE — 17110 DESTRUCTION B9 LES UP TO 14: CPT | Performed by: STUDENT IN AN ORGANIZED HEALTH CARE EDUCATION/TRAINING PROGRAM

## 2024-03-01 RX ORDER — PHENTERMINE HYDROCHLORIDE 15 MG/1
15 CAPSULE ORAL EVERY MORNING
Qty: 30 CAPSULE | Refills: 0 | Status: SHIPPED | OUTPATIENT
Start: 2024-03-04 | End: 2024-04-03

## 2024-03-01 RX ORDER — PHENTERMINE HYDROCHLORIDE 15 MG/1
15 CAPSULE ORAL EVERY MORNING
Qty: 30 CAPSULE | Refills: 0 | Status: SHIPPED | OUTPATIENT
Start: 2024-04-03 | End: 2024-05-03

## 2024-03-01 RX ORDER — KETOCONAZOLE 20 MG/G
1 CREAM TOPICAL DAILY
Qty: 30 G | Refills: 1 | Status: SHIPPED | OUTPATIENT
Start: 2024-03-01 | End: 2024-03-15

## 2024-03-01 NOTE — PROGRESS NOTES
Subjective:      Chief Complaint   Patient presents with    Warts     Right thumb    Rash     Around her breasts area     HISTORY OF PRESENT ILLNESS  Warts  Associated symptoms include a rash.   Rash      HPI obtained per patient report.  Cb Webster is a pleasant 63 year old female presenting for follow-up for cryotherapy.   She tolerated her last cryotherapy treatment well without any issues and wishes to pursue another treatment today.  She also has had a new rash under B/L breasts for a few days.   She has been tolerating phentermine well without any side effects. She is overall down 12lbs since initiating this dose, but she notes that her weight has been stable the last few weeks due to reducing her exercising regimen due to L knee pain. She wishes to continue this medication.     PAST PATIENT HISTORY  Past Medical History:   Diagnosis Date    Anxiety     Aortic regurgitation     mild    Arthritis     Calculus of kidney     Cardiac calcification (HCC) - CAC score of 5.24 seen on 22 CT Heart Scan protocol 2023    Colon polyps     Depression     Disorder of liver     Fatty liver    Diverticulosis     Gastroparesis     GERD (gastroesophageal reflux disease)     Hemorrhoids     History of eating disorder     Binge eating    Migraines     OCD (obsessive compulsive disorder)     MODE (obstructive sleep apnea) 2017    Parkinsonism     Peptic ulcer disease     Tendonitis of shoulder, right 2019    Tremor      Past Surgical History:   Procedure Laterality Date          x 2    CHOLECYSTECTOMY      COLONOSCOPY      D & C      ECT PROVIDED  [id number]    EGD      LAPAROSCOPIC CHOLECYSTECTOMY      NEEDLE BIOPSY RIGHT  2015    benign breast    OTHER SURGICAL HISTORY      C3-C4 anterior discectomy    OTHER SURGICAL HISTORY  2018    [facility] GI    OTHER SURGICAL HISTORY  2019    radiofrequency abalsion lumbar    REMOVAL GALLBLADDER  2020    SKIN SURGERY      SPINE SURGERY PROCEDURE  UNLISTED  2008    Anterior cervical discectomy    TONSILLECTOMY  1966?       CURRENT MEDICATIONS  Outpatient Medications Marked as Taking for the 3/1/24 encounter (Office Visit) with Colton Schaefer MD   Medication Sig Dispense Refill    [START ON 3/4/2024] Phentermine HCl 15 MG Oral Cap Take 1 capsule (15 mg total) by mouth every morning. 30 capsule 0    ketoconazole 2 % External Cream Apply 1 Application topically daily for 14 days. 30 g 1    [START ON 4/3/2024] Phentermine HCl 15 MG Oral Cap Take 1 capsule (15 mg total) by mouth every morning. 30 capsule 0    memantine 10 MG Oral Tab Take 1 tablet (10 mg total) by mouth daily. 30 tablet 5    albuterol 108 (90 Base) MCG/ACT Inhalation Aero Soln Inhale 2 puffs into the lungs every 4 (four) hours as needed for Wheezing or Shortness of Breath. 6.7 g 0    Rimegepant Sulfate (NURTEC) 75 MG Oral Tablet Dispersible Take 75 mg by mouth as needed. 10 tablet 2    traZODone 100 MG Oral Tab Take 1 tablet (100 mg total) by mouth nightly.      rosuvastatin 5 MG Oral Tab Take 1 tablet (5 mg total) by mouth Every Monday, Wednesday, and Friday. 45 tablet 3    Propranolol HCl ER 80 MG Oral Capsule SR 24 Hr Take 1 capsule (80 mg total) by mouth every evening. (Patient taking differently: Take 1 capsule (80 mg total) by mouth at bedtime.) 90 capsule 1    butalbital-acetaminophen-caffeine -40 MG Oral Tab       FLUoxetine 20 MG Oral Cap TAKE 1 CAPSULE BY MOUTH EVERY MORNING LOWERING DOSE TO 20MG      dicyclomine 10 MG Oral Cap Take 1 capsule (10 mg total) by mouth 2 (two) times daily as needed. (Patient taking differently: Take 1 capsule (10 mg total) by mouth in the morning and 1 capsule (10 mg total) before bedtime.) 180 capsule 3    metoclopramide 5 MG Oral Tab Take 1 tablet (5 mg total) by mouth daily. (Patient taking differently: Take 1 tablet (5 mg total) by mouth as needed.) 30 tablet 2    pantoprazole 40 MG Oral Tab EC Take 1 tablet (40 mg total) by mouth before breakfast. 90  tablet 3    famotidine 40 MG Oral Tab Take 1 tablet (40 mg total) by mouth daily as needed for Heartburn. 90 tablet 3    SPRAVATO, 84 MG DOSE, 28 MG/DEVICE Nasal Solution Therapy Pack 84 mg by Nasal route every 21 days.      triamcinolone 0.1 % External Cream Apply thin layer to affected area twice a day as needed 45 g 1    hydrocortisone 2.5 % External Cream Apply to AA of FACE BID x 2 weeks, hold 2 weeks, repeat PRN. 30 g 2    ketoconazole 2 % External Cream Apply to AA of FACE BID when not using Hydrocortisone Cream. 30 g 2    LORazepam 2 MG Oral Tab Take 1 tablet (2 mg total) by mouth as needed.      cyclobenzaprine 10 MG Oral Tab Take 1 tablet (10 mg total) by mouth 3 (three) times daily as needed for Muscle spasms. 90 tablet 1    busPIRone HCl 10 MG Oral Tab Take 5 tablets (50 mg total) by mouth daily. 1 1/2  tab in the morning and 1 1/2 in the afternoon         HEALTH MAINTENANCE  Immunization History   Administered Date(s) Administered    Covid-19 Vaccine Pfizer 30 mcg/0.3 ml 2021, 2021, 10/23/2021    Covid-19 Vaccine Pfizer Bivalent 30mcg/0.3mL 2022    Covid-19 Vaccine Pfizer Merrill-Sucrose 30 mcg/0.3 ml 2022    FLULAVAL 6 months & older 0.5 ml Prefilled syringe (06743) 10/23/2017, 2018, 10/01/2019, 10/07/2020, 2021, 10/28/2022    FLUZONE 6 months and older PFS 0.5 ml (76546) 10/23/2017, 2018, 10/13/2023    Pfizer Covid-19 Vaccine 30mcg/0.3ml 12yrs+ (2585-3283) 10/13/2023    TDAP 2018    Zoster Vaccine Recombinant Adjuvanted (Shingrix) 2021, 2021       ALLERGIES AND DRUG REACTIONS  Allergies   Allergen Reactions    Sulfa Antibiotics NAUSEA ONLY       Family History   Problem Relation Age of Onset    Hypertension Father     Heart Attack Father          at 48 years    Heart Disease Father     Alcohol and Other Disorders Associated Father     Depression Father     Other (ALS) Mother     Hypertension Mother         Diagnosed at 89 yo with ALS  at  91    Personality Disorder Daughter     Dementia Maternal Grandmother     Obesity Maternal Grandmother     Dementia Maternal Grandfather     Obesity Paternal Grandfather     Cancer Sister         Kidney, chronic lymp… leukemia    Ulcerative Colitis Brother     Cancer Brother         Kidney     Social History     Socioeconomic History    Marital status:    Occupational History    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[employer]   Tobacco Use    Smoking status: Never    Smokeless tobacco: Never   Vaping Use    Vaping Use: Never used   Substance and Sexual Activity    Alcohol use: Not Currently     Comment: 6 drinks or less/year    Drug use: Never   Other Topics Concern    Caffeine Concern No    Exercise Yes     Comment: walking and bike    Seat Belt Yes    Special Diet No    Stress Concern Yes    Weight Concern Yes   Social History Narrative    Caring for grandson (Peter - aged 4 yo [1/2020])       Review of Systems   Skin:  Positive for rash.   All other systems reviewed and are negative.         Objective:      /60   Pulse 62   Temp 97.3 °F (36.3 °C) (Temporal)   Resp 16   Ht 5' 1\" (1.549 m)   Wt 198 lb 6 oz (90 kg)   LMP  (LMP Unknown)   SpO2 99%   BMI 37.48 kg/m²   Body mass index is 37.48 kg/m².    Physical Exam  Vitals reviewed.   Constitutional:       General: She is not in acute distress.     Appearance: She is not ill-appearing, toxic-appearing or diaphoretic.   Cardiovascular:      Rate and Rhythm: Normal rate.   Pulmonary:      Effort: Pulmonary effort is normal.   Abdominal:      General: There is no distension.   Musculoskeletal:      Cervical back: Neck supple.   Skin:     General: Skin is warm and dry.      Coloration: Skin is not jaundiced or pale.      Findings: Lesion (flat wart approx 3mm in diameter on palmar surface of distal R thumb) and rash (erythematous patch with irregular borders and central clearning) present. No bruising or  erythema.   Neurological:      Mental Status: She is alert and oriented to person, place, and time.            Assessment and Plan:      1. Verruca vulgaris (Primary)  2. Candidal intertrigo  -     Ketoconazole; Apply 1 Application topically daily for 14 days.  Dispense: 30 g; Refill: 1  3. Body mass index 39.0-39.9, adult  -     Phentermine HCl; Take 1 capsule (15 mg total) by mouth every morning.  Dispense: 30 capsule; Refill: 0  -     Phentermine HCl; Take 1 capsule (15 mg total) by mouth every morning.  Dispense: 30 capsule; Refill: 0  4. Class 2 obesity due to excess calories without serious comorbidity with body mass index (BMI) of 39.0 to 39.9 in adult  -     Phentermine HCl; Take 1 capsule (15 mg total) by mouth every morning.  Dispense: 30 capsule; Refill: 0  -     Phentermine HCl; Take 1 capsule (15 mg total) by mouth every morning.  Dispense: 30 capsule; Refill: 0    No follow-ups on file.    Verruca vulgaris  - R/B/A of cryotherapy treatment were discussed  - 2nd cryotherapy treatment was administered with pt consent  - 3 rounds of cryotherapy were performed with a 1mm margin around the lesion. Patient tolerated the procedure well without any issues  - we discussed that she may return in 2-3 weeks for another cryotherapy session     Candidal intertrigo  - recommended keeping the affected area as dry as possible  - recommended topical ketoconazole as prescribed    BMI>39  - she has lost about 12 lbs since initiating phentermine 15mg and is tolerating this dose well   - a shared decision was made to continue this dose     Patient verbalized understanding of assessment and recommendations. All questions and concerns were addressed.    Electronically signed by Colton Schaefer MD

## 2024-03-14 NOTE — OPERATIVE REPORT
[facility]  Operative Report  2019     Abdiel Machuca Patient Status:  Hospital Outpatient Surgery    1960 MRN [id number]   [facility] ENDOSCOPY Attending Jenni Ward MD   Hosp Day # 0 PCP Claudette Pedroza MD     Indication: Pt. Given PO fluids as requested.    right L2-L3 level . The needle was then walked off the bony tissue and advanced 2 to 3 mm to lie along the path of the L2 medial branch nerve. AP and lateral radiographs were obtained to document proper needle position.   Sensory and motor stimulation wer DISCHARGE

## 2024-03-22 ENCOUNTER — OFFICE VISIT (OUTPATIENT)
Dept: FAMILY MEDICINE CLINIC | Facility: CLINIC | Age: 64
End: 2024-03-22
Payer: MEDICARE

## 2024-03-22 VITALS
OXYGEN SATURATION: 99 % | BODY MASS INDEX: 37.41 KG/M2 | HEART RATE: 60 BPM | HEIGHT: 61 IN | WEIGHT: 198.13 LBS | SYSTOLIC BLOOD PRESSURE: 110 MMHG | TEMPERATURE: 98 F | RESPIRATION RATE: 16 BRPM | DIASTOLIC BLOOD PRESSURE: 60 MMHG

## 2024-03-22 DIAGNOSIS — B07.9 VERRUCA VULGARIS: Primary | ICD-10-CM

## 2024-03-22 DIAGNOSIS — E66.09 CLASS 2 OBESITY DUE TO EXCESS CALORIES WITHOUT SERIOUS COMORBIDITY WITH BODY MASS INDEX (BMI) OF 39.0 TO 39.9 IN ADULT: ICD-10-CM

## 2024-03-22 PROCEDURE — 17110 DESTRUCTION B9 LES UP TO 14: CPT | Performed by: STUDENT IN AN ORGANIZED HEALTH CARE EDUCATION/TRAINING PROGRAM

## 2024-03-22 PROCEDURE — 99213 OFFICE O/P EST LOW 20 MIN: CPT | Performed by: STUDENT IN AN ORGANIZED HEALTH CARE EDUCATION/TRAINING PROGRAM

## 2024-03-22 RX ORDER — PHENTERMINE HYDROCHLORIDE 15 MG/1
15 CAPSULE ORAL EVERY MORNING
Qty: 30 CAPSULE | Refills: 0 | Status: SHIPPED | OUTPATIENT
Start: 2024-04-03 | End: 2024-05-03

## 2024-03-22 NOTE — PROGRESS NOTES
Subjective:      Chief Complaint   Patient presents with    Warts     Removal on right thumb - procedure consent signed     HISTORY OF PRESENT ILLNESS  Warts      HPI obtained per patient report.  Cb Webster is a pleasant 63 year old female presenting for follow-up for cryotherapy.   She tolerated her last cryotherapy treatment well without any issues and wishes to pursue another treatment today. The wart has decreased in size since her last cryotherapy treatment.  She has been tolerating phentermine well without any side effects. She notes that her weight has continued to be stable the last few weeks due to reducing her exercising regimen due to L knee pain.     PAST PATIENT HISTORY  Past Medical History:   Diagnosis Date    Anxiety     Aortic regurgitation     mild    Arthritis     Calculus of kidney     Cardiac calcification (HCC) - CAC score of 5.24 seen on 22 CT Heart Scan protocol 2023    Colon polyps     Depression     Disorder of liver     Fatty liver    Diverticulosis     Gastroparesis     GERD (gastroesophageal reflux disease)     Hemorrhoids     History of eating disorder     Binge eating    Migraines     OCD (obsessive compulsive disorder)     MODE (obstructive sleep apnea) 2017    Parkinsonism     Peptic ulcer disease     Tendonitis of shoulder, right 2019    Tremor      Past Surgical History:   Procedure Laterality Date          x 2    CHOLECYSTECTOMY      COLONOSCOPY      D & C      ECT PROVIDED  8942-7316    EGD      LAPAROSCOPIC CHOLECYSTECTOMY      NEEDLE BIOPSY RIGHT  2015    benign breast    OTHER SURGICAL HISTORY      C3-C4 anterior discectomy    OTHER SURGICAL HISTORY  [facility]    OTHER SURGICAL HISTORY  2019    radiofrequency abalsion lumbar    REMOVAL GALLBLADDER  2020    SKIN SURGERY      SPINE SURGERY PROCEDURE UNLISTED      Anterior cervical discectomy    TONSILLECTOMY  1966?       CURRENT MEDICATIONS  Outpatient Medications  Marked as Taking for the 3/22/24 encounter (Office Visit) with Colton Schaefer MD   Medication Sig Dispense Refill    [START ON 4/3/2024] Phentermine HCl 15 MG Oral Cap Take 1 capsule (15 mg total) by mouth every morning. 30 capsule 0    [START ON 4/3/2024] Phentermine HCl 15 MG Oral Cap Take 1 capsule (15 mg total) by mouth every morning. 30 capsule 0    memantine 10 MG Oral Tab Take 1 tablet (10 mg total) by mouth daily. 30 tablet 5    albuterol 108 (90 Base) MCG/ACT Inhalation Aero Soln Inhale 2 puffs into the lungs every 4 (four) hours as needed for Wheezing or Shortness of Breath. 6.7 g 0    Rimegepant Sulfate (NURTEC) 75 MG Oral Tablet Dispersible Take 75 mg by mouth as needed. 10 tablet 2    traZODone 100 MG Oral Tab Take 1 tablet (100 mg total) by mouth nightly.      rosuvastatin 5 MG Oral Tab Take 1 tablet (5 mg total) by mouth Every Monday, Wednesday, and Friday. 45 tablet 3    Propranolol HCl ER 80 MG Oral Capsule SR 24 Hr Take 1 capsule (80 mg total) by mouth every evening. (Patient taking differently: Take 1 capsule (80 mg total) by mouth at bedtime.) 90 capsule 1    butalbital-acetaminophen-caffeine -40 MG Oral Tab       FLUoxetine 20 MG Oral Cap TAKE 1 CAPSULE BY MOUTH EVERY MORNING LOWERING DOSE TO 20MG      dicyclomine 10 MG Oral Cap Take 1 capsule (10 mg total) by mouth 2 (two) times daily as needed. (Patient taking differently: Take 1 capsule (10 mg total) by mouth in the morning and 1 capsule (10 mg total) before bedtime.) 180 capsule 3    metoclopramide 5 MG Oral Tab Take 1 tablet (5 mg total) by mouth daily. (Patient taking differently: Take 1 tablet (5 mg total) by mouth as needed.) 30 tablet 2    pantoprazole 40 MG Oral Tab EC Take 1 tablet (40 mg total) by mouth before breakfast. 90 tablet 3    famotidine 40 MG Oral Tab Take 1 tablet (40 mg total) by mouth daily as needed for Heartburn. 90 tablet 3    SPRAVATO, 84 MG DOSE, 28 MG/DEVICE Nasal Solution Therapy Pack 84 mg by Nasal route every  21 days.      triamcinolone 0.1 % External Cream Apply thin layer to affected area twice a day as needed 45 g 1    hydrocortisone 2.5 % External Cream Apply to AA of FACE BID x 2 weeks, hold 2 weeks, repeat PRN. 30 g 2    ketoconazole 2 % External Cream Apply to AA of FACE BID when not using Hydrocortisone Cream. 30 g 2    LORazepam 2 MG Oral Tab Take 1 tablet (2 mg total) by mouth as needed.      cyclobenzaprine 10 MG Oral Tab Take 1 tablet (10 mg total) by mouth 3 (three) times daily as needed for Muscle spasms. 90 tablet 1    busPIRone HCl 10 MG Oral Tab Take 5 tablets (50 mg total) by mouth daily. 1 1/2  tab in the morning and 1 1/2 in the afternoon         HEALTH MAINTENANCE  Immunization History   Administered Date(s) Administered    Covid-19 Vaccine Pfizer 30 mcg/0.3 ml 2021, 2021, 10/23/2021    Covid-19 Vaccine Pfizer Bivalent 30mcg/0.3mL 2022    Covid-19 Vaccine Pfizer Merrill-Sucrose 30 mcg/0.3 ml 2022    FLULAVAL 6 months & older 0.5 ml Prefilled syringe (65631) 10/23/2017, 2018, 10/01/2019, 10/07/2020, 2021, 10/28/2022    FLUZONE 6 months and older PFS 0.5 ml (78310) 10/23/2017, 2018, 10/13/2023    Pfizer Covid-19 Vaccine 30mcg/0.3ml 12yrs+ ([id number]) 10/13/2023    TDAP 2018    Zoster Vaccine Recombinant Adjuvanted (Shingrix) 2021, 2021       ALLERGIES AND DRUG REACTIONS  Allergies   Allergen Reactions    Sulfa Antibiotics NAUSEA ONLY       Family History   Problem Relation Age of Onset    Hypertension Father     Heart Attack Father          at 48 years    Heart Disease Father     Alcohol and Other Disorders Associated Father     Depression Father     Other (ALS) Mother     Hypertension Mother         Diagnosed at 89 yo with ALS  at 91    Personality Disorder Daughter     Dementia Maternal Grandmother     Obesity Maternal Grandmother     Dementia Maternal Grandfather     Obesity Paternal Grandfather     Cancer Sister         Kidney,  chronic lymp… leukemia    Ulcerative Colitis Brother     Cancer Brother         Kidney     Social History     Socioeconomic History    Marital status:    Occupational History    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[employer]   Tobacco Use    Smoking status: Never    Smokeless tobacco: Never   Vaping Use    Vaping Use: Never used   Substance and Sexual Activity    Alcohol use: Not Currently     Comment: 6 drinks or less/year    Drug use: Never   Other Topics Concern    Caffeine Concern No    Exercise Yes     Comment: walking and bike    Seat Belt Yes    Special Diet No    Stress Concern Yes    Weight Concern Yes   Social History Narrative    Caring for grandson (Peter - aged 4 yo [1/2020])       Review of Systems   All other systems reviewed and are negative.         Objective:      /60   Pulse 60   Temp 97.8 °F (36.6 °C) (Temporal)   Resp 16   Ht 5' 1\" (1.549 m)   Wt 198 lb 2 oz (89.9 kg)   LMP  (LMP Unknown)   SpO2 99%   BMI 37.44 kg/m²   Body mass index is 37.44 kg/m².    Physical Exam  Vitals reviewed.   Constitutional:       General: She is not in acute distress.     Appearance: She is not ill-appearing, toxic-appearing or diaphoretic.   Cardiovascular:      Rate and Rhythm: Normal rate.   Pulmonary:      Effort: Pulmonary effort is normal.   Abdominal:      General: There is no distension.   Musculoskeletal:      Cervical back: Neck supple.   Skin:     General: Skin is warm and dry.      Coloration: Skin is not jaundiced or pale.      Findings: Lesion (flat wart approx 2mm in diameter on palmar surface of distal R thumb) present. No bruising, erythema or rash.   Neurological:      Mental Status: She is alert and oriented to person, place, and time.            Assessment and Plan:      1. Verruca vulgaris (Primary)  2. Body mass index 39.0-39.9, adult  -     Phentermine HCl; Take 1 capsule (15 mg total) by mouth every morning.  Dispense: 30  capsule; Refill: 0  3. Class 2 obesity due to excess calories without serious comorbidity with body mass index (BMI) of 39.0 to 39.9 in adult  -     Phentermine HCl; Take 1 capsule (15 mg total) by mouth every morning.  Dispense: 30 capsule; Refill: 0    No follow-ups on file.    Verruca vulgaris  - improving  - R/B/A of cryotherapy treatment were reviewed  - 3rd cryotherapy treatment was administered with pt consent  - 3 rounds of cryotherapy were performed with a 1mm margin around the lesion. Patient tolerated the procedure well without any issues  - we discussed that she may return in 2-3 weeks for another cryotherapy session if needed     BMI>39  - she has lost about 12 lbs since initiating phentermine 15mg and is tolerating this dose well   - her weight has been stable recently due to decreased physical activity levels associated with her knee pain  - we discussed options for further treatment including potential addition of topiramate to her phentermine   - a shared decision was made to continue her current regimen for another 1-2 months     Patient verbalized understanding of assessment and recommendations. All questions and concerns were addressed.    Electronically signed by Colton Schaefer MD

## 2024-04-24 DIAGNOSIS — G43.709 CHRONIC MIGRAINE WITHOUT AURA WITHOUT STATUS MIGRAINOSUS, NOT INTRACTABLE: ICD-10-CM

## 2024-04-24 RX ORDER — PROPRANOLOL HYDROCHLORIDE 80 MG/1
1 CAPSULE, EXTENDED RELEASE ORAL EVERY EVENING
Qty: 90 CAPSULE | Refills: 1 | Status: SHIPPED | OUTPATIENT
Start: 2024-04-24

## 2024-04-24 NOTE — TELEPHONE ENCOUNTER
Medication: Propranolol 80 mg     Date of last refill: 10/26/2023 (#90/1)  [place] 01/21/2024     Last office visit: 02/21/204-botox  Due back to clinic per last office note:  3 months  Date next office visit scheduled:    Future Appointments   Date Time Provider Department Center   5/22/2024  9:00 AM James Betancourt MD EN[facility]   6/25/2024 10:00 AM Jackelin Maloney MD [facility]   7/1/2024  9:00 AM Carol Blank MD [facility]   8/12/2024  8:40 AM Last, [facility]           Last OV note recommendation:    Neurocognitive disorder  MRI brain and EEG performed negative  Concentration better since she started Spravato nasal therapy for major depression  Follow up with Psychiatry      2 On Botox and Propranolol for migraine prevention  Continue Nurtec as needed     4 Continue Memantine, may reduce dose to 10 mg daily  Discuss cognitive therapy and home cognitive exercises

## 2024-05-14 ENCOUNTER — OFFICE VISIT (OUTPATIENT)
Dept: FAMILY MEDICINE CLINIC | Facility: CLINIC | Age: 64
End: 2024-05-14

## 2024-05-14 VITALS
HEART RATE: 76 BPM | OXYGEN SATURATION: 97 % | WEIGHT: 195.13 LBS | SYSTOLIC BLOOD PRESSURE: 124 MMHG | RESPIRATION RATE: 16 BRPM | BODY MASS INDEX: 36.84 KG/M2 | TEMPERATURE: 97 F | HEIGHT: 61 IN | DIASTOLIC BLOOD PRESSURE: 70 MMHG

## 2024-05-14 DIAGNOSIS — B07.9 VERRUCA VULGARIS: Primary | ICD-10-CM

## 2024-05-14 DIAGNOSIS — J18.9 COMMUNITY ACQUIRED PNEUMONIA, UNSPECIFIED LATERALITY: ICD-10-CM

## 2024-05-14 DIAGNOSIS — E66.09 CLASS 2 OBESITY DUE TO EXCESS CALORIES WITHOUT SERIOUS COMORBIDITY WITH BODY MASS INDEX (BMI) OF 39.0 TO 39.9 IN ADULT: ICD-10-CM

## 2024-05-14 PROCEDURE — 99213 OFFICE O/P EST LOW 20 MIN: CPT | Performed by: STUDENT IN AN ORGANIZED HEALTH CARE EDUCATION/TRAINING PROGRAM

## 2024-05-14 PROCEDURE — 17110 DESTRUCTION B9 LES UP TO 14: CPT | Performed by: STUDENT IN AN ORGANIZED HEALTH CARE EDUCATION/TRAINING PROGRAM

## 2024-05-14 RX ORDER — TOPIRAMATE 25 MG/1
25 TABLET ORAL DAILY
Qty: 90 TABLET | Refills: 0 | Status: SHIPPED | OUTPATIENT
Start: 2024-05-14 | End: 2024-08-12

## 2024-05-14 RX ORDER — AZITHROMYCIN 250 MG/1
TABLET, FILM COATED ORAL
Qty: 6 TABLET | Refills: 0 | Status: SHIPPED | OUTPATIENT
Start: 2024-05-14 | End: 2024-05-18

## 2024-05-14 RX ORDER — FLUOXETINE 10 MG/1
10 CAPSULE ORAL DAILY
COMMUNITY
Start: 2024-05-07

## 2024-05-14 RX ORDER — IMIQUIMOD 12.5 MG/.25G
1 CREAM TOPICAL NIGHTLY
Qty: 24 EACH | Refills: 2 | Status: SHIPPED | OUTPATIENT
Start: 2024-05-14 | End: 2024-08-12

## 2024-05-14 RX ORDER — BENZONATATE 100 MG/1
100 CAPSULE ORAL 3 TIMES DAILY PRN
Qty: 30 CAPSULE | Refills: 0 | Status: SHIPPED | OUTPATIENT
Start: 2024-05-14 | End: 2024-05-24

## 2024-05-14 RX ORDER — PHENTERMINE HYDROCHLORIDE 15 MG/1
15 CAPSULE ORAL EVERY MORNING
Qty: 30 CAPSULE | Refills: 0 | Status: SHIPPED | OUTPATIENT
Start: 2024-05-14 | End: 2024-06-13

## 2024-05-14 RX ORDER — PHENTERMINE HYDROCHLORIDE 15 MG/1
15 CAPSULE ORAL EVERY MORNING
Qty: 30 CAPSULE | Refills: 0 | Status: SHIPPED
Start: 2024-05-14 | End: 2024-05-14

## 2024-05-14 RX ORDER — PHENTERMINE HYDROCHLORIDE 15 MG/1
15 CAPSULE ORAL EVERY MORNING
Qty: 30 CAPSULE | Refills: 0 | Status: SHIPPED | OUTPATIENT
Start: 2024-06-13 | End: 2024-07-13

## 2024-05-14 RX ORDER — PHENTERMINE HYDROCHLORIDE 15 MG/1
15 CAPSULE ORAL EVERY MORNING
Qty: 30 CAPSULE | Refills: 0 | Status: SHIPPED | OUTPATIENT
Start: 2024-07-13 | End: 2024-08-12

## 2024-05-14 NOTE — PROGRESS NOTES
Subjective:      Chief Complaint   Patient presents with    Weight Check     Down 3lb    Warts     Wart removal on right thumb - procedure consent signed    Cough     States she has been coughing since last Wednesday - states its a productive cough and green phlegm     HISTORY OF PRESENT ILLNESS  HPI  HPI obtained per patient report.  Cb Webster is a pleasant 63 year old female presenting for follow-up.     She is here for a cryotherapy treatment for her R thumb wart. She tolerated her last cryotherapy treatment well without any issues and wishes to pursue another treatment today.    She has been tolerating phentermine well without any side effects.     She notes a new cough productive of thick yellow sputum which started 1 week ago. This symptom has not been improving since onset.    PAST PATIENT HISTORY  Past Medical History:    Anxiety    Aortic regurgitation    mild    Arthritis    Calculus of kidney    Cardiac calcification (HCC) - CAC score of 5.24 seen on 22 CT Heart Scan protocol    Colon polyps    Depression    Disorder of liver    Fatty liver    Diverticulosis    Gastroparesis    GERD (gastroesophageal reflux disease)    Hemorrhoids    History of eating disorder    Binge eating    Migraines    OCD (obsessive compulsive disorder)    MODE (obstructive sleep apnea)    Parkinsonism (HCC)    Peptic ulcer disease    Tendonitis of shoulder, right    Tremor     Past Surgical History:   Procedure Laterality Date          x 2    Cholecystectomy      Colonoscopy      D & c      Ect provided  [id number]    Egd      Laparoscopic cholecystectomy      Needle biopsy right  2015    benign breast    Other surgical history      C3-C4 anterior discectomy    [facility]    Other surgical history  2019    radiofrequency abalsion lumbar    Removal gallbladder  2020    Skin surgery      Spine surgery procedure unlisted      Anterior cervical discectomy     Tonsillectomy  1966?       CURRENT MEDICATIONS  Outpatient Medications Marked as Taking for the 5/14/24 encounter (Office Visit) with Colton Schaefer MD   Medication Sig Dispense Refill    FLUoxetine 10 MG Oral Cap Take 1 capsule (10 mg total) by mouth daily. Along with the 20mg to equal to 30mg      Imiquimod 5 % External Cream Apply 1 Application topically nightly. 24 each 2    Phentermine HCl 15 MG Oral Cap Take 1 capsule (15 mg total) by mouth every morning. 30 capsule 0    topiramate 25 MG Oral Tab Take 1 tablet (25 mg total) by mouth daily. 90 tablet 0    azithromycin 250 MG Oral Tab Take 2 tablets (500 mg total) by mouth daily for 1 day, THEN 1 tablet (250 mg total) daily for 4 days. 6 tablet 0    benzonatate 100 MG Oral Cap Take 1 capsule (100 mg total) by mouth 3 (three) times daily as needed for cough. SWALLOW CAPSULE WHOLE. DO NOT BREAK, CUT, CRUSH, CHEW, OR DISSOLVE CAPSULE. 30 capsule 0    [START ON 6/13/2024] Phentermine HCl 15 MG Oral Cap Take 1 capsule (15 mg total) by mouth every morning. 30 capsule 0    [START ON 7/13/2024] Phentermine HCl 15 MG Oral Cap Take 1 capsule (15 mg total) by mouth every morning. 30 capsule 0    PROPRANOLOL HCL ER 80 MG Oral Capsule SR 24 Hr TAKE 1 CAPSULE (80 MG TOTAL) BY MOUTH EVERY EVENING. 90 capsule 1    memantine 10 MG Oral Tab Take 1 tablet (10 mg total) by mouth daily. 30 tablet 5    albuterol 108 (90 Base) MCG/ACT Inhalation Aero Soln Inhale 2 puffs into the lungs every 4 (four) hours as needed for Wheezing or Shortness of Breath. 6.7 g 0    Rimegepant Sulfate (NURTEC) 75 MG Oral Tablet Dispersible Take 75 mg by mouth as needed. 10 tablet 2    traZODone 100 MG Oral Tab Take 1 tablet (100 mg total) by mouth nightly.      rosuvastatin 5 MG Oral Tab Take 1 tablet (5 mg total) by mouth Every Monday, Wednesday, and Friday. 45 tablet 3    butalbital-acetaminophen-caffeine -40 MG Oral Tab       FLUoxetine 20 MG Oral Cap TAKE 1 CAPSULE BY MOUTH EVERY MORNING LOWERING  DOSE TO 20MG      dicyclomine 10 MG Oral Cap Take 1 capsule (10 mg total) by mouth 2 (two) times daily as needed. (Patient taking differently: Take 1 capsule (10 mg total) by mouth in the morning and 1 capsule (10 mg total) before bedtime.) 180 capsule 3    metoclopramide 5 MG Oral Tab Take 1 tablet (5 mg total) by mouth daily. (Patient taking differently: Take 1 tablet (5 mg total) by mouth as needed.) 30 tablet 2    pantoprazole 40 MG Oral Tab EC Take 1 tablet (40 mg total) by mouth before breakfast. 90 tablet 3    famotidine 40 MG Oral Tab Take 1 tablet (40 mg total) by mouth daily as needed for Heartburn. 90 tablet 3    SPRAVATO, 84 MG DOSE, 28 MG/DEVICE Nasal Solution Therapy Pack 84 mg by Nasal route every 21 days.      triamcinolone 0.1 % External Cream Apply thin layer to affected area twice a day as needed 45 g 1    hydrocortisone 2.5 % External Cream Apply to AA of FACE BID x 2 weeks, hold 2 weeks, repeat PRN. 30 g 2    ketoconazole 2 % External Cream Apply to AA of FACE BID when not using Hydrocortisone Cream. 30 g 2    LORazepam 2 MG Oral Tab Take 1 tablet (2 mg total) by mouth as needed.      cyclobenzaprine 10 MG Oral Tab Take 1 tablet (10 mg total) by mouth 3 (three) times daily as needed for Muscle spasms. 90 tablet 1    busPIRone HCl 10 MG Oral Tab 1 1/2  tab in the morning and 1 1/2 in the afternoon         HEALTH MAINTENANCE  Immunization History   Administered Date(s) Administered    Covid-19 Vaccine Pfizer 30 mcg/0.3 ml 03/24/2021, 04/14/2021, 10/23/2021    Covid-19 Vaccine Pfizer Bivalent 30mcg/0.3mL 09/24/2022    Covid-19 Vaccine Pfizer Merrill-Sucrose 30 mcg/0.3 ml 05/11/2022    FLULAVAL 6 months & older 0.5 ml Prefilled syringe (48574) 10/23/2017, 09/20/2018, 10/01/2019, 10/07/2020, 09/30/2021, 10/28/2022    FLUZONE 6 months and older PFS 0.5 ml (61782) 10/23/2017, 09/20/2018, 10/13/2023    Pfizer Covid-19 Vaccine 30mcg/0.3ml 12yrs+ ([id number]) 10/13/2023    TDAP 12/05/2018    Zoster Vaccine  Recombinant Adjuvanted (Shingrix) 2021, 2021       ALLERGIES AND DRUG REACTIONS  Allergies   Allergen Reactions    Sulfa Antibiotics NAUSEA ONLY       Family History   Problem Relation Age of Onset    Hypertension Father     Heart Attack Father          at 48 years    Heart Disease Father     Alcohol and Other Disorders Associated Father     Depression Father     Other (ALS) Mother     Hypertension Mother         Diagnosed at 91 yo with ALS  at 91    Personality Disorder Daughter     Dementia Maternal Grandmother     Obesity Maternal Grandmother     Dementia Maternal Grandfather     Obesity Paternal Grandfather     Cancer Sister         Kidney, chronic lymp… leukemia    Ulcerative Colitis Brother     Cancer Brother         Kidney     Social History     Socioeconomic History    Marital status:    Occupational History    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[employer]   Tobacco Use    Smoking status: Never    Smokeless tobacco: Never   Vaping Use    Vaping status: Never Used   Substance and Sexual Activity    Alcohol use: Not Currently     Comment: 6 drinks or less/year    Drug use: Never   Other Topics Concern    Caffeine Concern No    Exercise Yes     Comment: walking and bike    Seat Belt Yes    Special Diet No    Stress Concern Yes    Weight Concern Yes   Social History Narrative    Caring for grandson (Peter - aged 6 yo [2020])       Review of Systems   Respiratory:  Positive for cough.    All other systems reviewed and are negative.         Objective:      /70   Pulse 76   Temp 97.2 °F (36.2 °C) (Temporal)   Resp 16   Ht 5' 1\" (1.549 m)   Wt 195 lb 2 oz (88.5 kg)   LMP  (LMP Unknown)   SpO2 97%   BMI 36.87 kg/m²   Body mass index is 36.87 kg/m².    Physical Exam  Vitals reviewed.   Constitutional:       General: She is not in acute distress.     Appearance: She is not ill-appearing, toxic-appearing or diaphoretic.    Cardiovascular:      Rate and Rhythm: Normal rate.   Pulmonary:      Effort: Pulmonary effort is normal.      Breath sounds: No stridor. Rales (coarse BS in B/L lung bases) present. No wheezing or rhonchi.   Abdominal:      General: There is no distension.   Musculoskeletal:      Cervical back: Neck supple.   Skin:     General: Skin is warm and dry.      Coloration: Skin is not jaundiced or pale.      Findings: Lesion (flat wart approx 2mm in diameter on palmar surface of distal R thumb) present. No bruising, erythema or rash.   Neurological:      Mental Status: She is alert and oriented to person, place, and time.            Assessment and Plan:      1. Verruca vulgaris (Primary)  -     Imiquimod; Apply 1 Application topically nightly.  Dispense: 24 each; Refill: 2  2. Body mass index 39.0-39.9, adult  -     Phentermine HCl; Take 1 capsule (15 mg total) by mouth every morning.  Dispense: 30 capsule; Refill: 0  -     Topiramate; Take 1 tablet (25 mg total) by mouth daily.  Dispense: 90 tablet; Refill: 0  -     Discontinue: Phentermine HCl; Take 1 capsule (15 mg total) by mouth every morning.  Dispense: 30 capsule; Refill: 0  -     Discontinue: Phentermine HCl; Take 1 capsule (15 mg total) by mouth every morning.  Dispense: 30 capsule; Refill: 0  -     Phentermine HCl; Take 1 capsule (15 mg total) by mouth every morning.  Dispense: 30 capsule; Refill: 0  -     Phentermine HCl; Take 1 capsule (15 mg total) by mouth every morning.  Dispense: 30 capsule; Refill: 0  3. Class 2 obesity due to excess calories without serious comorbidity with body mass index (BMI) of 39.0 to 39.9 in adult  -     Phentermine HCl; Take 1 capsule (15 mg total) by mouth every morning.  Dispense: 30 capsule; Refill: 0  -     Topiramate; Take 1 tablet (25 mg total) by mouth daily.  Dispense: 90 tablet; Refill: 0  -     Discontinue: Phentermine HCl; Take 1 capsule (15 mg total) by mouth every morning.  Dispense: 30 capsule; Refill: 0  -      Discontinue: Phentermine HCl; Take 1 capsule (15 mg total) by mouth every morning.  Dispense: 30 capsule; Refill: 0  -     Phentermine HCl; Take 1 capsule (15 mg total) by mouth every morning.  Dispense: 30 capsule; Refill: 0  -     Phentermine HCl; Take 1 capsule (15 mg total) by mouth every morning.  Dispense: 30 capsule; Refill: 0  4. Community acquired pneumonia, unspecified laterality  -     Azithromycin; Take 2 tablets (500 mg total) by mouth daily for 1 day, THEN 1 tablet (250 mg total) daily for 4 days.  Dispense: 6 tablet; Refill: 0  -     Benzonatate; Take 1 capsule (100 mg total) by mouth 3 (three) times daily as needed for cough. SWALLOW CAPSULE WHOLE. DO NOT BREAK, CUT, CRUSH, CHEW, OR DISSOLVE CAPSULE.  Dispense: 30 capsule; Refill: 0    No follow-ups on file.    Verruca vulgaris  - R/B/A of cryotherapy treatment were reviewed  - 4th cryotherapy treatment was administered with pt consent  - 3 rounds of cryotherapy were performed with a 1mm margin around the lesion. Patient tolerated the procedure well without any issues  - recommended addition of topical imiquimod as prescribed to her regimen for facilitation of wart treatment  - we discussed that she may return in 2-3 weeks for another cryotherapy session if needed     BMI>39  - she has lost about 15 lbs since initiating phentermine 15mg and is tolerating this dose well   - she is down another 3 lbs since her LOV 3 months ago  - we discussed the R/B/A of topiramate and made a shared decision to add this medication onto her regimen  - we will plan to follow-up in 3 months or earlier if needed    CAP  - azithromycin and tessalon perles as prescribed    Patient verbalized understanding of assessment and recommendations. All questions and concerns were addressed.    Electronically signed by Colton Schaefer MD

## 2024-05-22 ENCOUNTER — OFFICE VISIT (OUTPATIENT)
Dept: NEUROLOGY | Facility: CLINIC | Age: 64
End: 2024-05-22

## 2024-05-22 VITALS
WEIGHT: 194 LBS | DIASTOLIC BLOOD PRESSURE: 60 MMHG | RESPIRATION RATE: 16 BRPM | BODY MASS INDEX: 37 KG/M2 | SYSTOLIC BLOOD PRESSURE: 122 MMHG | HEART RATE: 62 BPM

## 2024-05-22 DIAGNOSIS — G43.709 CHRONIC MIGRAINE WITHOUT AURA WITHOUT STATUS MIGRAINOSUS, NOT INTRACTABLE: Primary | ICD-10-CM

## 2024-05-22 RX ORDER — CEFUROXIME AXETIL 250 MG/1
6 TABLET ORAL AS NEEDED
Qty: 6 ML | Refills: 2 | Status: SHIPPED | OUTPATIENT
Start: 2024-05-22

## 2024-05-22 NOTE — PROGRESS NOTES
Paperwork noting that patient may bear financial responsibility for procedure(s) performed in clinic today signed prior to proceeding with procedure(s).    Furthermore, patient notified that they should contact their insurer to verify that your procedure/test has been approved and is a COVERED benefit.  Although the [facility] staff does its due diligence, the insurance carrier gives the disclaimer that \"Although the procedure is authorized, this does not guarantee payment.\"    Ultimately the patient is responsible for payment.    Botox is:  [x] Buy and Bill  [] Patient Supplied      Botox Reauthorization Questions:  Has the patient experienced a reduction in frequency of migraines since starting Botox? yes  If yes, by what percentage? 80%  Has the intensity of migraines decreased since starting Botox? yes  If yes, by what percentage? 80%    [x] I have discussed with patient that if their insurance changes they must contact the office right away with that information so that a new prior authorization can be completed.  Patient verbalized understanding that Botox cannot be performed without a current prior authorization in place with correct insurance.

## 2024-05-22 NOTE — PROGRESS NOTES
HPI:    Patient ID: Cb Webster is a 64 year old female.    Migraine     Neurologic Problem  Associated symptoms include headaches.        Cb Webster is a 64 year old female who presents today for follow up for migraine and for botox therapy  States migraines stable on Botox and getting less headache.  She also feels that due to her antidepressant medication adjustment her headaches has improved  She would like to switch Nurtec to another agent as the co-pay is very high  Reports she tried Imitrex injections in the past and that has worked well so she would like to go back to Imitrex injections      Office visit: Dec 2023  Patient is a 63-year-old female with who presented with complaints of possible serotonin syndrome.  She states the past few months she has been experiencing agitation and restlessness, has more difficulty concentrating she is getting confused easily, also have nausea diarrhea sometime, poor co-ordination, palpitations.   She follows with psychiatry and on multiple medications and serotonin was suspected and she states there was some adjustment done with the dosage the dose of buspirone and trazodone was reduced also Spravato nasal spray was extended to every 3 weeks.  She also discontinue phentermine.  States there is some improvement but continues to have above symptoms. Last week had sore throat, positive for Strep and got treated with antibiotics  Serotonin level checked at that time was normal  PCP recommended MRI brain and it is pending      Last office visit  Patient is a 63-year-old female who presented for follow-up for migraine headaches and for Botox injection. She reports Botox therapy was working fine and headaches are under control. For past several months has noticed palpitations even though not anxious or under stress. States she going to get cardiac event    States has also noticed some memory decline, word finding difficulty, difficulty managing bank account. She  couldn't find Memantine bottle so has not taking Memantine for last month. She is taking her antidepressants regularly and not under any stress but frustrated with some health concerns      Neuro-psychology test performed by Shital Lawson in 2020 shows WAIS-IV- average IQ, average verbal comprehension and perceptual reasoning and borderline low average working memory and low average processing speed. She was diagnosed with ( G31.09) Major neurocognitive disorder without behavioral issues, generalized anxiety disorder and major depressive disorder recurrent, mild, with struggling self esteem and dissatisfied with life related to physical and mental health  HISTORY:  Past Medical History:    Anxiety    Aortic regurgitation    mild    Arthritis    Calculus of kidney    Cardiac calcification (HCC) - CAC score of 5.24 seen on 22 CT Heart Scan protocol    Colon polyps    Depression    Disorder of liver    Fatty liver    Diverticulosis    Gastroparesis    GERD (gastroesophageal reflux disease)    Hemorrhoids    History of eating disorder    Binge eating    Migraines    OCD (obsessive compulsive disorder)    MODE (obstructive sleep apnea)    Parkinsonism (HCC)    Peptic ulcer disease    Tendonitis of shoulder, right    Tremor      Past Surgical History:   Procedure Laterality Date          x 2    Cholecystectomy      Colonoscopy      D & c      Ect provided  [id number]    Egd      Laparoscopic cholecystectomy      Needle biopsy right  2015    benign breast    Other surgical history      C3-C4 anterior discectomy    [facility]    Other surgical history  2019    radiofrequency abalsion lumbar    Removal gallbladder  2020    Skin surgery      Spine surgery procedure unlisted      Anterior cervical discectomy    Tonsillectomy  1966?      Family History   Problem Relation Age of Onset    Hypertension Father     Heart Attack Father          at 48 years     Heart Disease Father     Alcohol and Other Disorders Associated Father     Depression Father     Other (ALS) Mother     Hypertension Mother         Diagnosed at 91 yo with ALS  at 91    Personality Disorder Daughter     Dementia Maternal Grandmother     Obesity Maternal Grandmother     Dementia Maternal Grandfather     Obesity Paternal Grandfather     Cancer Sister         Kidney, chronic lymp… leukemia    Ulcerative Colitis Brother     Cancer Brother         Kidney      Social History     Socioeconomic History    Marital status:    Occupational History    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[employer]   Tobacco Use    Smoking status: Never    Smokeless tobacco: Never   Vaping Use    Vaping status: Never Used   Substance and Sexual Activity    Alcohol use: Not Currently     Comment: 6 drinks or less/year    Drug use: Never   Other Topics Concern    Caffeine Concern No    Exercise Yes     Comment: walking and bike    Seat Belt Yes    Special Diet No    Stress Concern Yes    Weight Concern Yes   Social History Narrative    Caring for grandson (Peter - aged 4 yo [2020])        Review of Systems   Constitutional: Negative.    HENT: Negative.     Eyes: Negative.    Respiratory: Negative.     Cardiovascular: Negative.    Gastrointestinal: Negative.    Endocrine: Negative.    Genitourinary: Negative.    Musculoskeletal: Negative.    Allergic/Immunologic: Negative.    Neurological:  Positive for headaches.   Psychiatric/Behavioral: Negative.     All other systems reviewed and are negative.           Current Outpatient Medications   Medication Sig Dispense Refill    SUMAtriptan Succinate 6 MG/0.5ML Subcutaneous Solution Auto-injector Inject 0.5 mL (6 mg total) into the skin as needed (for moderate to severe migraine). 6 mL 2    FLUoxetine 10 MG Oral Cap Take 1 capsule (10 mg total) by mouth daily. Along with the 20mg to equal to 30mg      Imiquimod 5 % External Cream  Apply 1 Application topically nightly. 24 each 2    Phentermine HCl 15 MG Oral Cap Take 1 capsule (15 mg total) by mouth every morning. 30 capsule 0    topiramate 25 MG Oral Tab Take 1 tablet (25 mg total) by mouth daily. 90 tablet 0    benzonatate 100 MG Oral Cap Take 1 capsule (100 mg total) by mouth 3 (three) times daily as needed for cough. SWALLOW CAPSULE WHOLE. DO NOT BREAK, CUT, CRUSH, CHEW, OR DISSOLVE CAPSULE. 30 capsule 0    [START ON 7/13/2024] Phentermine HCl 15 MG Oral Cap Take 1 capsule (15 mg total) by mouth every morning. 30 capsule 0    PROPRANOLOL HCL ER 80 MG Oral Capsule SR 24 Hr TAKE 1 CAPSULE (80 MG TOTAL) BY MOUTH EVERY EVENING. 90 capsule 1    memantine 10 MG Oral Tab Take 1 tablet (10 mg total) by mouth daily. 30 tablet 5    albuterol 108 (90 Base) MCG/ACT Inhalation Aero Soln Inhale 2 puffs into the lungs every 4 (four) hours as needed for Wheezing or Shortness of Breath. 6.7 g 0    traZODone 100 MG Oral Tab Take 1 tablet (100 mg total) by mouth nightly.      rosuvastatin 5 MG Oral Tab Take 1 tablet (5 mg total) by mouth Every Monday, Wednesday, and Friday. 45 tablet 3    butalbital-acetaminophen-caffeine -40 MG Oral Tab       FLUoxetine 20 MG Oral Cap TAKE 1 CAPSULE BY MOUTH EVERY MORNING LOWERING DOSE TO 20MG      dicyclomine 10 MG Oral Cap Take 1 capsule (10 mg total) by mouth 2 (two) times daily as needed. (Patient taking differently: Take 1 capsule (10 mg total) by mouth in the morning and 1 capsule (10 mg total) before bedtime.) 180 capsule 3    metoclopramide 5 MG Oral Tab Take 1 tablet (5 mg total) by mouth daily. (Patient taking differently: Take 1 tablet (5 mg total) by mouth as needed.) 30 tablet 2    pantoprazole 40 MG Oral Tab EC Take 1 tablet (40 mg total) by mouth before breakfast. 90 tablet 3    famotidine 40 MG Oral Tab Take 1 tablet (40 mg total) by mouth daily as needed for Heartburn. 90 tablet 3    SPRAVATO, 84 MG DOSE, 28 MG/DEVICE Nasal Solution Therapy Pack 84  mg by Nasal route every 21 days.      triamcinolone 0.1 % External Cream Apply thin layer to affected area twice a day as needed 45 g 1    hydrocortisone 2.5 % External Cream Apply to AA of FACE BID x 2 weeks, hold 2 weeks, repeat PRN. 30 g 2    ketoconazole 2 % External Cream Apply to AA of FACE BID when not using Hydrocortisone Cream. 30 g 2    LORazepam 2 MG Oral Tab Take 1 tablet (2 mg total) by mouth as needed.      cyclobenzaprine 10 MG Oral Tab Take 1 tablet (10 mg total) by mouth 3 (three) times daily as needed for Muscle spasms. 90 tablet 1    busPIRone HCl 10 MG Oral Tab 1 1/2  tab in the morning and 1 1/2 in the afternoon       Allergies:  Allergies   Allergen Reactions    Sulfa Antibiotics NAUSEA ONLY     PHYSICAL EXAM:   Physical Exam  Blood pressure 122/60, pulse 62, resp. rate 16, weight 194 lb (88 kg), not currently breastfeeding.     General: well developed, well nourished  HEENT: Normocephalic and atraumatic. Moist mucus membrane  Cardiovascular: Normal rate, regular rhythm and normal heart sounds.    Pulmonary/Chest: Effort normal and breath sounds normal.   Abdominal: Soft. Bowel sounds are normal.   Skin: Skin is warm and dry.   Psychiatric:  normal mood and affect.   Neurological   Awake, alert and oriented to time, place and person. Speech is dysfluent with some stuttering.   Able to follow commands.  Intact naming and repetition. Normal attention and impaired short term memory.   Cranial nerves 2-12: grossly intact  Sensory : Intact to  light touch and pinprick  Motor: Normal tone in all extremities. Mild postural hand tremors Strength is 5/5 in all muscle groups  Reflexes: symmetric and present 2+ throughout  Coordination: Intact         ASSESSMENT/PLAN:       ICD-10-CM    1. Chronic migraine without aura without status migrainosus, not intractable  G43.709       2. Mild neurocognitive disorder due to multiple etiologies  F06.70           Neurocognitive disorder  MRI brain and EEG performed  negative  On memantine 10 mg daily for neurocognitive prophylaxis  Follow up with Psychiatry       2 On Botox and Propranolol for migraine prevention  Switch Nurtec's to Imitrex injection        Follow up in about 3 months  See orders and medications filed with this encounter. The patient indicates understanding of these issues and agrees with the plan.        James Betancourt MD  [facility] [place]      Meds This Visit:  Requested Prescriptions     Signed Prescriptions Disp Refills    SUMAtriptan Succinate 6 MG/0.5ML Subcutaneous Solution Auto-injector 6 mL 2     Sig: Inject 0.5 mL (6 mg total) into the skin as needed (for moderate to severe migraine).       Imaging & Referrals:  None     ID#[id number]

## 2024-06-06 ENCOUNTER — OFFICE VISIT (OUTPATIENT)
Dept: FAMILY MEDICINE CLINIC | Facility: CLINIC | Age: 64
End: 2024-06-06
Payer: MEDICARE

## 2024-06-06 VITALS
SYSTOLIC BLOOD PRESSURE: 110 MMHG | OXYGEN SATURATION: 97 % | HEART RATE: 71 BPM | DIASTOLIC BLOOD PRESSURE: 60 MMHG | HEIGHT: 61 IN | WEIGHT: 192 LBS | RESPIRATION RATE: 16 BRPM | TEMPERATURE: 97 F | BODY MASS INDEX: 36.25 KG/M2

## 2024-06-06 DIAGNOSIS — R05.2 SUBACUTE COUGH: ICD-10-CM

## 2024-06-06 DIAGNOSIS — E66.09 CLASS 2 OBESITY DUE TO EXCESS CALORIES WITHOUT SERIOUS COMORBIDITY WITH BODY MASS INDEX (BMI) OF 39.0 TO 39.9 IN ADULT: ICD-10-CM

## 2024-06-06 DIAGNOSIS — B07.9 VERRUCA VULGARIS: Primary | ICD-10-CM

## 2024-06-06 PROCEDURE — 99214 OFFICE O/P EST MOD 30 MIN: CPT | Performed by: STUDENT IN AN ORGANIZED HEALTH CARE EDUCATION/TRAINING PROGRAM

## 2024-06-06 PROCEDURE — 17110 DESTRUCTION B9 LES UP TO 14: CPT | Performed by: STUDENT IN AN ORGANIZED HEALTH CARE EDUCATION/TRAINING PROGRAM

## 2024-06-06 RX ORDER — TOPIRAMATE 25 MG/1
25 TABLET ORAL DAILY
Qty: 90 TABLET | Refills: 0 | Status: SHIPPED | OUTPATIENT
Start: 2024-06-06 | End: 2024-09-04

## 2024-06-06 RX ORDER — BENZONATATE 100 MG/1
100 CAPSULE ORAL 3 TIMES DAILY PRN
Qty: 30 CAPSULE | Refills: 0 | Status: SHIPPED | OUTPATIENT
Start: 2024-06-06 | End: 2024-06-16

## 2024-06-06 NOTE — PROGRESS NOTES
Subjective:      Chief Complaint   Patient presents with    Procedure     Wart removal on right thumb -> consent signed     HISTORY OF PRESENT ILLNESS  HPI  HPI obtained per patient report.  Cb Webster is a pleasant 64 year old female presenting for follow-up.   She is here for a cryotherapy treatment for her R thumb wart. She tolerated her last cryotherapy treatment well without any issues and wishes to pursue another treatment today. She has been applying topical imiquimod to the lesion at home as well.   She has been noticing some fatigue and slowing of her thought process since starting topiramate. She is down another 3 lbs since her LOV about 2 weeks ago.  She has a dry cough which has been lingering since her LOV. Her other symptoms of PNA improved with her course of antibiotics.      PAST PATIENT HISTORY  Past Medical History:    Anxiety    Aortic regurgitation    mild    Arthritis    Calculus of kidney    Cardiac calcification (HCC) - CAC score of 5.24 seen on 22 CT Heart Scan protocol    Colon polyps    Depression    Disorder of liver    Fatty liver    Diverticulosis    Gastroparesis    GERD (gastroesophageal reflux disease)    Hemorrhoids    History of eating disorder    Binge eating    Migraines    OCD (obsessive compulsive disorder)    MODE (obstructive sleep apnea)    Parkinsonism (HCC)    Peptic ulcer disease    Tendonitis of shoulder, right    Tremor     Past Surgical History:   Procedure Laterality Date          x 2    Cholecystectomy      Colonoscopy      D & c      Ect provided  [id number]    Egd      Laparoscopic cholecystectomy      Needle biopsy right  2015    benign breast    Other surgical history      C3-C4 anterior discectomy    Other surgical history  2018    [facility]    Other surgical history  2019    radiofrequency abalsion lumbar    Removal gallbladder  2020    Skin surgery      Spine surgery procedure unlisted      Anterior cervical discectomy     Tonsillectomy  1966?       CURRENT MEDICATIONS  Outpatient Medications Marked as Taking for the 6/6/24 encounter (Office Visit) with Colton Schaefer MD   Medication Sig Dispense Refill    benzonatate 100 MG Oral Cap Take 1 capsule (100 mg total) by mouth 3 (three) times daily as needed for cough. SWALLOW CAPSULE WHOLE. DO NOT BREAK, CUT, CRUSH, CHEW, OR DISSOLVE CAPSULE. 30 capsule 0    topiramate 25 MG Oral Tab Take 1 tablet (25 mg total) by mouth daily. 90 tablet 0    SUMAtriptan Succinate 6 MG/0.5ML Subcutaneous Solution Auto-injector Inject 0.5 mL (6 mg total) into the skin as needed (for moderate to severe migraine). 6 mL 2    FLUoxetine 10 MG Oral Cap Take 1 capsule (10 mg total) by mouth daily. Along with the 20mg to equal to 30mg      Imiquimod 5 % External Cream Apply 1 Application topically nightly. 24 each 2    Phentermine HCl 15 MG Oral Cap Take 1 capsule (15 mg total) by mouth every morning. 30 capsule 0    topiramate 25 MG Oral Tab Take 1 tablet (25 mg total) by mouth daily. 90 tablet 0    PROPRANOLOL HCL ER 80 MG Oral Capsule SR 24 Hr TAKE 1 CAPSULE (80 MG TOTAL) BY MOUTH EVERY EVENING. 90 capsule 1    memantine 10 MG Oral Tab Take 1 tablet (10 mg total) by mouth daily. 30 tablet 5    albuterol 108 (90 Base) MCG/ACT Inhalation Aero Soln Inhale 2 puffs into the lungs every 4 (four) hours as needed for Wheezing or Shortness of Breath. 6.7 g 0    traZODone 100 MG Oral Tab Take 1 tablet (100 mg total) by mouth nightly.      rosuvastatin 5 MG Oral Tab Take 1 tablet (5 mg total) by mouth Every Monday, Wednesday, and Friday. 45 tablet 3    butalbital-acetaminophen-caffeine -40 MG Oral Tab       FLUoxetine 20 MG Oral Cap TAKE 1 CAPSULE BY MOUTH EVERY MORNING LOWERING DOSE TO 20MG      dicyclomine 10 MG Oral Cap Take 1 capsule (10 mg total) by mouth 2 (two) times daily as needed. (Patient taking differently: Take 1 capsule (10 mg total) by mouth in the morning and 1 capsule (10 mg total) before bedtime.) 180  capsule 3    metoclopramide 5 MG Oral Tab Take 1 tablet (5 mg total) by mouth daily. (Patient taking differently: Take 1 tablet (5 mg total) by mouth as needed.) 30 tablet 2    pantoprazole 40 MG Oral Tab EC Take 1 tablet (40 mg total) by mouth before breakfast. 90 tablet 3    famotidine 40 MG Oral Tab Take 1 tablet (40 mg total) by mouth daily as needed for Heartburn. 90 tablet 3    SPRAVATO, 84 MG DOSE, 28 MG/DEVICE Nasal Solution Therapy Pack 84 mg by Nasal route every 21 days.      triamcinolone 0.1 % External Cream Apply thin layer to affected area twice a day as needed 45 g 1    hydrocortisone 2.5 % External Cream Apply to AA of FACE BID x 2 weeks, hold 2 weeks, repeat PRN. 30 g 2    ketoconazole 2 % External Cream Apply to AA of FACE BID when not using Hydrocortisone Cream. 30 g 2    LORazepam 2 MG Oral Tab Take 1 tablet (2 mg total) by mouth as needed.      cyclobenzaprine 10 MG Oral Tab Take 1 tablet (10 mg total) by mouth 3 (three) times daily as needed for Muscle spasms. 90 tablet 1    busPIRone HCl 10 MG Oral Tab 1 1/2  tab in the morning and 1 1/2 in the afternoon         HEALTH MAINTENANCE  Immunization History   Administered Date(s) Administered    Covid-19 Vaccine Pfizer 30 mcg/0.3 ml 03/24/2021, 04/14/2021, 10/23/2021    Covid-19 Vaccine Pfizer Bivalent 30mcg/0.3mL 09/24/2022    Covid-19 Vaccine Pfizer Merrill-Sucrose 30 mcg/0.3 ml 05/11/2022    FLULAVAL 6 months & older 0.5 ml Prefilled syringe ([id number]) 10/23/2017, 09/20/2018, 10/01/2019, 10/07/2020, 09/30/2021, 10/28/2022    FLUZONE 6 months and older PFS 0.5 ml (69414) 10/23/2017, 09/20/2018, 10/13/2023    Pfizer Covid-19 Vaccine 30mcg/0.3ml 12yrs+ ([id number]) 10/13/2023    TDAP 12/05/2018    Zoster Vaccine Recombinant Adjuvanted (Shingrix) 02/16/2021, 05/18/2021       ALLERGIES AND DRUG REACTIONS  Allergies   Allergen Reactions    Sulfa Antibiotics NAUSEA ONLY       Family History   Problem Relation Age of Onset    Hypertension Father     Heart  Attack Father          at 48 years    Heart Disease Father     Alcohol and Other Disorders Associated Father     Depression Father     Other (ALS) Mother     Hypertension Mother         Diagnosed at 91 yo with ALS  at 91    Personality Disorder Daughter     Dementia Maternal Grandmother     Obesity Maternal Grandmother     Dementia Maternal Grandfather     Obesity Paternal Grandfather     Cancer Sister         Kidney, chronic lymp… leukemia    Ulcerative Colitis Brother     Cancer Brother         Kidney     Social History     Socioeconomic History    Marital status:    Occupational History    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[employer]   Tobacco Use    Smoking status: Never    Smokeless tobacco: Never   Vaping Use    Vaping status: Never Used   Substance and Sexual Activity    Alcohol use: Not Currently     Comment: 6 drinks or less/year    Drug use: Never   Other Topics Concern    Caffeine Concern No    Exercise Yes     Comment: walking and bike    Seat Belt Yes    Special Diet No    Stress Concern Yes    Weight Concern Yes   Social History Narrative    Caring for grandson (Peter - aged 6 yo [2020])       Review of Systems   Respiratory:  Positive for cough.    All other systems reviewed and are negative.         Objective:      /60   Pulse 71   Temp 97.3 °F (36.3 °C) (Temporal)   Resp 16   Ht 5' 1\" (1.549 m)   Wt 192 lb (87.1 kg)   LMP  (LMP Unknown)   SpO2 97%   BMI 36.28 kg/m²   Body mass index is 36.28 kg/m².    Physical Exam  Vitals reviewed.   Constitutional:       General: She is not in acute distress.     Appearance: She is not ill-appearing, toxic-appearing or diaphoretic.   Cardiovascular:      Rate and Rhythm: Normal rate.   Pulmonary:      Effort: Pulmonary effort is normal.      Breath sounds: Normal breath sounds.   Abdominal:      General: There is no distension.   Musculoskeletal:      Cervical back: Neck supple.   Skin:      General: Skin is warm and dry.      Coloration: Skin is not jaundiced or pale.      Findings: Lesion (flat wart approx 1mm in diameter on palmar surface of distal R thumb) present. No bruising, erythema or rash.   Neurological:      Mental Status: She is alert and oriented to person, place, and time.            Assessment and Plan:      1. Verruca vulgaris (Primary)  2. Body mass index 39.0-39.9, adult  -     Topiramate; Take 1 tablet (25 mg total) by mouth daily.  Dispense: 90 tablet; Refill: 0  3. Class 2 obesity due to excess calories without serious comorbidity with body mass index (BMI) of 39.0 to 39.9 in adult  -     Topiramate; Take 1 tablet (25 mg total) by mouth daily.  Dispense: 90 tablet; Refill: 0  4. Subacute cough  -     Benzonatate; Take 1 capsule (100 mg total) by mouth 3 (three) times daily as needed for cough. SWALLOW CAPSULE WHOLE. DO NOT BREAK, CUT, CRUSH, CHEW, OR DISSOLVE CAPSULE.  Dispense: 30 capsule; Refill: 0    Return in about 3 months (around 9/6/2024) for weight check-up.    Verruca vulgaris  - improving  - 5th cryotherapy treatment was administered with pt consent  - 3 rounds of cryotherapy were performed with a 1mm margin around the lesion. Patient tolerated the procedure well without any issues  - recommended continuation of topical imiquimod until the wart resolves   - if wart has not resolved completely in 2-3 weeks, she may return for another cryotherapy session if needed     BMI>39  - she has lost about 12 lbs since initiating phentermine 15mg and is tolerating this dose well   - her weight has been stable recently due to decreased physical activity levels associated with her knee pain  - she is down another 3 lbs since starting topiramate about 2 weeks ago. She is experiencing some mild side effects of this medication, but she feels that they are manageable and would like to continue this medication and monitor her symptoms for now  - a shared decision was made to continue her  current regimen for another 3 months     Subacute cough  - post-infectious cough since her CAP treatment   - recommended supportive and symptomatic care until this symptom resolves, which is expected within the next few weeks    Patient verbalized understanding of assessment and recommendations. All questions and concerns were addressed.    Electronically signed by Colton Schaefer MD

## 2024-06-20 DIAGNOSIS — R41.0 CONFUSION: Primary | ICD-10-CM

## 2024-06-20 RX ORDER — MEMANTINE HYDROCHLORIDE 10 MG/1
10 TABLET ORAL DAILY
Qty: 90 TABLET | Refills: 0 | Status: SHIPPED | OUTPATIENT
Start: 2024-06-20

## 2024-06-20 NOTE — TELEPHONE ENCOUNTER
Medication: MEMANTINE 10 MG Oral Tab      Date of last refill: 02/21/2024 (#30/5)  [place]     Last office visit: 05/22/2024  Due back to clinic per last office note:  3 months  Date next office visit scheduled:    Future Appointments   Date Time Provider Department Center   6/25/2024 10:00 AM Jackelin Maloney MD [facility]   7/1/2024  9:00 AM Carol Blank MD [facility] [place]   8/12/2024  8:30 AM Last, [facility]   8/23/2024  9:50 AM James Betancourt MD [facility] [place]           Last OV note recommendation:    ASSESSMENT/PLAN:          ICD-10-CM     1. Chronic migraine without aura without status migrainosus, not intractable  G43.709         2. Mild neurocognitive disorder due to multiple etiologies  F06.70               Neurocognitive disorder  MRI brain and EEG performed negative  On memantine 10 mg daily for neurocognitive prophylaxis  Follow up with Psychiatry         2 On Botox and Propranolol for migraine prevention  Switch Nurtec's to Imitrex injection           Follow up in about 3 months  See orders and medications filed with this encounter. The patient indicates understanding of these issues and agrees with the plan.

## 2024-06-25 ENCOUNTER — OFFICE VISIT (OUTPATIENT)
Dept: INTERNAL MEDICINE CLINIC | Facility: CLINIC | Age: 64
End: 2024-06-25

## 2024-06-25 VITALS
SYSTOLIC BLOOD PRESSURE: 102 MMHG | WEIGHT: 186 LBS | HEART RATE: 79 BPM | BODY MASS INDEX: 35.12 KG/M2 | HEIGHT: 61 IN | DIASTOLIC BLOOD PRESSURE: 68 MMHG | RESPIRATION RATE: 16 BRPM

## 2024-06-25 DIAGNOSIS — Z51.81 THERAPEUTIC DRUG MONITORING: Primary | ICD-10-CM

## 2024-06-25 DIAGNOSIS — E66.01 CLASS 3 SEVERE OBESITY DUE TO EXCESS CALORIES WITH SERIOUS COMORBIDITY AND BODY MASS INDEX (BMI) OF 40.0 TO 44.9 IN ADULT (HCC): ICD-10-CM

## 2024-06-25 PROBLEM — K21.9 GASTROESOPHAGEAL REFLUX DISEASE: Status: ACTIVE | Noted: 2023-09-21

## 2024-06-25 PROBLEM — R93.1 ELEVATED CORONARY ARTERY CALCIUM SCORE: Status: ACTIVE | Noted: 2023-09-21

## 2024-06-25 PROBLEM — R73.09 ELEVATED GLUCOSE LEVEL: Status: ACTIVE | Noted: 2023-09-21

## 2024-06-25 PROBLEM — R93.1 ABNORMAL CT SCAN OF HEART: Status: ACTIVE | Noted: 2023-09-21

## 2024-06-25 PROBLEM — R94.31 ABNORMAL ECG: Status: ACTIVE | Noted: 2023-09-21

## 2024-06-25 PROCEDURE — 99213 OFFICE O/P EST LOW 20 MIN: CPT | Performed by: INTERNAL MEDICINE

## 2024-06-25 NOTE — PROGRESS NOTES
HISTORY OF PRESENT ILLNESS  Chief Complaint   Patient presents with    Weight Check     Down 11        Cb Webster is a 64 year old female here for follow up in medical weight loss program.     Denies chest pain, shortness of breath, dizziness, blurred vision, headache, paresthesia, nausea/vomiting.   Continues to struglge with depression   Working with therapist, psychiatry  Going [place] next week   Continues to make good choices with her eating   Still taking her phentermine   6 weeks ago added topamax back in   Does feel that the add'l medication is working with craving and additional appetite piece.   Strulgging reflux with stress and anxiety   Not eating late at night   Trying to make good meal time choices when she can               Wt Readings from Last 6 Encounters:   06/25/24 186 lb (84.4 kg)   06/06/24 192 lb (87.1 kg)   05/22/24 194 lb (88 kg)   05/14/24 195 lb 2 oz (88.5 kg)   03/22/24 198 lb 2 oz (89.9 kg)   03/01/24 198 lb 6 oz (90 kg)         REVIEW OF SYSTEMS  GENERAL HEALTH: feels well otherwise, denied any fevers chills or night sweats   RESPIRATORY: denies shortness of breath   CARDIOVASCULAR: denies chest pain  GI: denies abdominal pain    EXAM  /68   Pulse 79   Resp 16   Ht 5' 1\" (1.549 m)   Wt 186 lb (84.4 kg)   LMP  (LMP Unknown)   BMI 35.14 kg/m²   GENERAL: well developed, well nourished,in no apparent distress, A/O x3  SKIN: no rashes,no suspicious lesions  HEENT: atraumatic, normocephalic, OP-clear, PERRL  NECK: supple,no adenopathy  LUNGS: clear to auscultation bilaterally   CARDIO: RRR without murmur  GI: good BS's,NT/ND, no masses or HSM  EXTREMITIES: no cyanosis, no clubbing, no edema    Lab Results   Component Value Date    WBC 7.1 10/18/2023    RBC 4.37 09/07/2023    HGB 12.4 10/18/2023    HCT 38.7 10/18/2023    MCV 90.0 10/18/2023    MCH 29.1 09/07/2023    MCHC 32.5 09/07/2023    RDW 13.1 09/07/2023     10/18/2023     Lab Results   Component Value Date     GLU 96 10/18/2023    BUN 18 10/18/2023    BUNCREA 23.7 (H) 08/11/2022    CREATSERUM 0.90 10/18/2023    ANIONGAP 6 09/07/2023    GFR 84 11/28/2017    GFRNAA 54 (L) 11/05/2021    GFRAA 62 11/05/2021    CA 96.0 10/18/2023    OSMOCALC 286 09/07/2023    ALKPHO 78 09/07/2023    AST 16 10/18/2023    ALT 7 10/18/2023    BILT 0.4 10/18/2023    TP 6.4 10/18/2023    ALB 3.7 09/07/2023    GLOBULIN 3.0 09/07/2023     09/07/2023    K 4.4 10/18/2023     10/18/2023    CO2 23 10/18/2023     Lab Results   Component Value Date     08/11/2022    A1C 4.7 10/18/2023     Lab Results   Component Value Date    CHOLEST 206 10/18/2023    TRIG 111 10/18/2023    HDL 57 10/18/2023    LDL 95 11/03/2020    VLDL 19 11/03/2020    TCHDLRATIO 3.60 10/18/2023    NONHDLC 149 10/18/2023     Lab Results   Component Value Date    T4F 0.9 08/11/2022    TSH 1.870 10/18/2023     Lab Results   Component Value Date    B12 301 08/11/2022     Lab Results   Component Value Date    VITD 34.7 08/11/2022       Current Outpatient Medications on File Prior to Visit   Medication Sig Dispense Refill    memantine 10 MG Oral Tab Take 1 tablet (10 mg total) by mouth daily. 90 tablet 0    topiramate 25 MG Oral Tab Take 1 tablet (25 mg total) by mouth daily. 90 tablet 0    SUMAtriptan Succinate 6 MG/0.5ML Subcutaneous Solution Auto-injector Inject 0.5 mL (6 mg total) into the skin as needed (for moderate to severe migraine). 6 mL 2    FLUoxetine 10 MG Oral Cap Take 1 capsule (10 mg total) by mouth daily. Along with the 20mg to equal to 30mg      Imiquimod 5 % External Cream Apply 1 Application topically nightly. 24 each 2    PROPRANOLOL HCL ER 80 MG Oral Capsule SR 24 Hr TAKE 1 CAPSULE (80 MG TOTAL) BY MOUTH EVERY EVENING. 90 capsule 1    albuterol 108 (90 Base) MCG/ACT Inhalation Aero Soln Inhale 2 puffs into the lungs every 4 (four) hours as needed for Wheezing or Shortness of Breath. 6.7 g 0    traZODone 100 MG Oral Tab Take 1 tablet (100 mg total) by  mouth nightly.      rosuvastatin 5 MG Oral Tab Take 1 tablet (5 mg total) by mouth Every Monday, Wednesday, and Friday. 45 tablet 3    butalbital-acetaminophen-caffeine -40 MG Oral Tab       FLUoxetine 20 MG Oral Cap TAKE 1 CAPSULE BY MOUTH EVERY MORNING LOWERING DOSE TO 20MG      dicyclomine 10 MG Oral Cap Take 1 capsule (10 mg total) by mouth 2 (two) times daily as needed. (Patient taking differently: Take 1 capsule (10 mg total) by mouth in the morning and 1 capsule (10 mg total) before bedtime.) 180 capsule 3    metoclopramide 5 MG Oral Tab Take 1 tablet (5 mg total) by mouth daily. (Patient taking differently: Take 1 tablet (5 mg total) by mouth as needed.) 30 tablet 2    pantoprazole 40 MG Oral Tab EC Take 1 tablet (40 mg total) by mouth before breakfast. 90 tablet 3    famotidine 40 MG Oral Tab Take 1 tablet (40 mg total) by mouth daily as needed for Heartburn. 90 tablet 3    SPRAVATO, 84 MG DOSE, 28 MG/DEVICE Nasal Solution Therapy Pack 84 mg by Nasal route every 21 days.      triamcinolone 0.1 % External Cream Apply thin layer to affected area twice a day as needed 45 g 1    hydrocortisone 2.5 % External Cream Apply to AA of FACE BID x 2 weeks, hold 2 weeks, repeat PRN. 30 g 2    ketoconazole 2 % External Cream Apply to AA of FACE BID when not using Hydrocortisone Cream. 30 g 2    LORazepam 2 MG Oral Tab Take 1 tablet (2 mg total) by mouth as needed.      cyclobenzaprine 10 MG Oral Tab Take 1 tablet (10 mg total) by mouth 3 (three) times daily as needed for Muscle spasms. 90 tablet 1    busPIRone HCl 10 MG Oral Tab 1 1/2  tab in the morning and 1 1/2 in the afternoon       No current facility-administered medications on file prior to visit.       ASSESSMENT  Analyzed weight data:       Diagnoses and all orders for this visit:    1. Therapeutic drug monitoring    2. Class 3 severe obesity due to excess calories with serious comorbidity and body mass index (BMI) of 40.0 to 44.9 in adult (Former[facility]) [E66.01,  Z68.41 (ICD-10-CM)]            PLAN  Weight Max: 265 lb on 12/21/17  Weight wing: 159 lb with 37% body fat   Reconsult weight: 230 lb 8/11/22   Down 11 lb   Down 79 lb total   Continue phentermine and topamax   -advised of side effects and adverse effects of this medication  At this time holding on bariatric surgery options   Reviewed increase and safe fitness options based on knee issues and pain   Increase safe low impac t strength as tolerated      Reviewed tracking apps and tracking nutrition and protein   We discussed possible trial of alternates in the future   -advised of side effects and adverse effects of this medication    Not intersted in ozempic to conserve usage and does have history of gastroparesis.     Continue ifestyle modification  Continues to struggle with mood and emotion, working closely with therapist.   -advised of side effects and adverse effects of this medication  Medication use and side effects reviewed with patient.  Medication contraindications: n/a  Follow up with dietitian and psychologist as recommended.  Discussed the role of sleep and stress in weight management.  Labs orders as above.  Counseled on comprehensive weight loss plan including attention to nutrition, exercise and behavior/stress management for success. See patient instruction below for more details.  Weight Loss Consent to treat reviewed and signed.        There are no Patient Instructions on file for this visit.    No follow-ups on file.    Patient verbalizes understanding.    Jackelin Maloney MD

## 2024-07-01 ENCOUNTER — OFFICE VISIT (OUTPATIENT)
Dept: ORTHOPEDICS CLINIC | Facility: CLINIC | Age: 64
End: 2024-07-01
Payer: MEDICARE

## 2024-07-01 VITALS — BODY MASS INDEX: 35.12 KG/M2 | HEIGHT: 61 IN | WEIGHT: 186 LBS

## 2024-07-01 DIAGNOSIS — M25.562 CHRONIC PAIN OF LEFT KNEE: ICD-10-CM

## 2024-07-01 DIAGNOSIS — M23.204 OTHER OLD TEAR OF MEDIAL MENISCUS OF LEFT KNEE: ICD-10-CM

## 2024-07-01 DIAGNOSIS — G89.29 CHRONIC PAIN OF LEFT KNEE: ICD-10-CM

## 2024-07-01 DIAGNOSIS — M17.12 PRIMARY OSTEOARTHRITIS OF LEFT KNEE: Primary | ICD-10-CM

## 2024-07-01 RX ORDER — TRIAMCINOLONE ACETONIDE 40 MG/ML
40 INJECTION, SUSPENSION INTRA-ARTICULAR; INTRAMUSCULAR ONCE
Status: COMPLETED | OUTPATIENT
Start: 2024-07-01 | End: 2024-07-01

## 2024-07-01 RX ADMIN — TRIAMCINOLONE ACETONIDE 40 MG: 40 INJECTION, SUSPENSION INTRA-ARTICULAR; INTRAMUSCULAR at 10:20:00

## 2024-07-01 NOTE — PROGRESS NOTES
[facility] follow-up    Chief Complaint   Patient presents with    Follow - Up     LT KNEE PAIN       HPI: The patient is a 64 year old female returning with complaints of chronic pain about her left knee.  She underwent arthroscopic lateral release by Dr. Salvador Johnston about a year ago.  She subsequently experienced some persistent pain about the knee and had a corticosteroid injection provided by Mari about a year ago at the end of May.  Today she is complaining of intermittent pain anterior laterally and is concerned about an upcoming family trip to [place].  There will be up to 20 family members traveling together and she is concerned about being able to tolerate the activities of travel.  She wonders if a repeat cortisone injection could be provided given some symptomatic relief in the past.  She denies swelling, warmth, catching, locking or ligia instability.  MRI from December of last year identified irregularities at the medial meniscus root attachment and chondromalacia medial compartment which she has chosen to treat nonoperatively.      Past Medical History:    Anxiety    Aortic regurgitation    mild    Arthritis    Calculus of kidney    Cardiac calcification (HCC) - CAC score of 5.24 seen on 22 CT Heart Scan protocol    Colon polyps    Depression    Disorder of liver    Fatty liver    Diverticulosis    Gastroparesis    GERD (gastroesophageal reflux disease)    Hemorrhoids    History of eating disorder    Binge eating    Migraines    OCD (obsessive compulsive disorder)    MODE (obstructive sleep apnea)    Parkinsonism (HCC)    Peptic ulcer disease    Tendonitis of shoulder, right    Tremor     Past Surgical History:   Procedure Laterality Date          x 2    Cholecystectomy      Colonoscopy      D & c      Ect provided  [id number]    Egd      Laparoscopic cholecystectomy      Needle biopsy right  2015    benign breast    Other surgical history      C3-C4  anterior discectomy    Other surgical history  2018    [facility]    Other surgical history  09/09/2019    radiofrequency abalsion lumbar    Removal gallbladder  02/2020    Skin surgery      Spine surgery procedure unlisted  2008    Anterior cervical discectomy    Tonsillectomy  1966?     Current Outpatient Medications   Medication Sig Dispense Refill    memantine 10 MG Oral Tab Take 1 tablet (10 mg total) by mouth daily. 90 tablet 0    topiramate 25 MG Oral Tab Take 1 tablet (25 mg total) by mouth daily. 90 tablet 0    SUMAtriptan Succinate 6 MG/0.5ML Subcutaneous Solution Auto-injector Inject 0.5 mL (6 mg total) into the skin as needed (for moderate to severe migraine). 6 mL 2    FLUoxetine 10 MG Oral Cap Take 1 capsule (10 mg total) by mouth daily. Along with the 20mg to equal to 30mg      Imiquimod 5 % External Cream Apply 1 Application topically nightly. 24 each 2    PROPRANOLOL HCL ER 80 MG Oral Capsule SR 24 Hr TAKE 1 CAPSULE (80 MG TOTAL) BY MOUTH EVERY EVENING. 90 capsule 1    albuterol 108 (90 Base) MCG/ACT Inhalation Aero Soln Inhale 2 puffs into the lungs every 4 (four) hours as needed for Wheezing or Shortness of Breath. 6.7 g 0    traZODone 100 MG Oral Tab Take 1 tablet (100 mg total) by mouth nightly.      rosuvastatin 5 MG Oral Tab Take 1 tablet (5 mg total) by mouth Every Monday, Wednesday, and Friday. 45 tablet 3    butalbital-acetaminophen-caffeine -40 MG Oral Tab       FLUoxetine 20 MG Oral Cap TAKE 1 CAPSULE BY MOUTH EVERY MORNING LOWERING DOSE TO 20MG      dicyclomine 10 MG Oral Cap Take 1 capsule (10 mg total) by mouth 2 (two) times daily as needed. (Patient taking differently: Take 1 capsule (10 mg total) by mouth in the morning and 1 capsule (10 mg total) before bedtime.) 180 capsule 3    metoclopramide 5 MG Oral Tab Take 1 tablet (5 mg total) by mouth daily. (Patient taking differently: Take 1 tablet (5 mg total) by mouth as needed.) 30 tablet 2    pantoprazole 40 MG Oral Tab EC Take  1 tablet (40 mg total) by mouth before breakfast. 90 tablet 3    famotidine 40 MG Oral Tab Take 1 tablet (40 mg total) by mouth daily as needed for Heartburn. 90 tablet 3    SPRAVATO, 84 MG DOSE, 28 MG/DEVICE Nasal Solution Therapy Pack 84 mg by Nasal route every 21 days.      triamcinolone 0.1 % External Cream Apply thin layer to affected area twice a day as needed 45 g 1    hydrocortisone 2.5 % External Cream Apply to AA of FACE BID x 2 weeks, hold 2 weeks, repeat PRN. 30 g 2    ketoconazole 2 % External Cream Apply to AA of FACE BID when not using Hydrocortisone Cream. 30 g 2    LORazepam 2 MG Oral Tab Take 1 tablet (2 mg total) by mouth as needed.      cyclobenzaprine 10 MG Oral Tab Take 1 tablet (10 mg total) by mouth 3 (three) times daily as needed for Muscle spasms. 90 tablet 1    busPIRone HCl 10 MG Oral Tab 1 1/2  tab in the morning and 1 1/2 in the afternoon       Allergies   Allergen Reactions    Sulfa Antibiotics NAUSEA ONLY     Family History   Problem Relation Age of Onset    Hypertension Father     Heart Attack Father          at 48 years    Heart Disease Father     Alcohol and Other Disorders Associated Father     Depression Father     Other (ALS) Mother     Hypertension Mother         Diagnosed at 91 yo with ALS  at 91    Personality Disorder Daughter     Dementia Maternal Grandmother     Obesity Maternal Grandmother     Dementia Maternal Grandfather     Obesity Paternal Grandfather     Cancer Sister         Kidney, chronic lymp… leukemia    Ulcerative Colitis Brother     Cancer Brother         Kidney     Social History     Occupational History    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[employer]   Tobacco Use    Smoking status: Never    Smokeless tobacco: Never   Vaping Use    Vaping status: Never Used   Substance and Sexual Activity    Alcohol use: Not Currently     Comment: 6 drinks or less/year    Drug use: Never    Sexual activity: Not on file         ROS:  Complete ROS reviewed by me and non-contributory to the chief complaint except as mentioned above.    Physical Exam:    Ht 5' 1\" (1.549 m)   Wt 186 lb (84.4 kg)   LMP  (LMP Unknown)   BMI 35.14 kg/m²   Constitutional: Well developed, well nourished 64 year old female  Psychological: NAD, alert and appropriate  Respiratory: Breathing comfortably on room air with RR of 10-14  Cardiac: Palpable distal pulses with pink warm extremities distally  Left knee: Inspection reveals no significant discoloration nor deformity.  Palpation reveals no effusion.  Range of motion is adequate with no flexion contracture and adequate flexion to at least 115 degrees.  Lateral patellofemoral joint line is tender to palpation.  Collaterals are stable on varus valgus stress.  Lachman and posterior drawer are negative.  Popliteal space is nontender.  No significant distal edema is noted.  Neurovascular status is intact on sensory, motor and perfusion assessment distally.    Imaging:      MRI left knee 12/8/2023:      Impression   CONCLUSION:    1. There is again evidence of tricompartmental chondromalacia with suggestion of interval progression of disease within the medial compartment, where there is suggestion of mild osteoarthritis with a small moderate to high grade articular cartilage  defect within the mid weight-bearing surface of the medial femoral condyle, with secondary subchondral sclerosis and edema.  2. Stable appearing moderate grade radial tear along the junction of the posterior horn and posterior root ligament of the medial meniscus.  Secondary peripheral extrusion of medial meniscal tissue with bowing of the MCL fibers and grade 1 chronic MCL  sprain.  3. Mild distal quadriceps tendinosis and enthesopathy.  4. Mild joint effusion.        Assessment/Diagnoses:    Diagnoses and all orders for this visit:    Primary osteoarthritis of left knee  -     DRAIN/INJECT LARGE JOINT/BURSA  -     triamcinolone acetonide  (Kenalog-40) 40 MG/ML injection 40 mg    Chronic pain of left knee  -     DRAIN/INJECT LARGE JOINT/BURSA  -     triamcinolone acetonide (Kenalog-40) 40 MG/ML injection 40 mg    Other old tear of medial meniscus of left knee           Plan:  I reviewed imaging and exam findings with the patient.  She is struggling with recurrent pain likely due to progressive degenerative change at the patellofemoral and medial compartments of the left knee.  She understands her treatment options including the implications of her MRI from December.  Rather than to pursue repeat arthroscopy, she is interested in nonoperative care.  Treatments include activity modification, weight loss, anti-inflammatory use and possible injections.  In light of her upcoming bucket list trip to [place] with her family, she would like to consider a cortisone injection for temporary symptomatic relief.  We discussed the option for knee corticosteroid injection along with the potential risks, benefits and alternatives.  All questions were answered and the patient verbalized understanding and appreciation.  If symptoms are not improving over the next 2 to 4 weeks, I asked the patient to contact our office for possible next steps.    Knee Cortisone Injection Procedure:  After discussing risks, benefits and alternatives to cortisone injection including but not limited to needle infection, steroid flare or failed improvement, the patient gave written and verbal consent to proceed.  Using meticulous sterile technique, I injected 40 mg of Kenalog mixed with 4 cc of 1% Xylocaine at the lateral patellofemoral joint of the left knee to minimal resistance.  The patient tolerated this well and a Band-Aid was applied.  Instructions were given to contact us if the patient experiences any adverse effects.      Carol Blank MD, FAAOS  Orthopaedic Surgery   Sports Medicine/Knee and Shoulder  [facility]  t: [phone]  f:  [phone]               This document was partially prepared using Dragon Medical voice recognition software.  Although every attempt is made to correct errors during dictation, discrepancies may still exist.

## 2024-07-03 ENCOUNTER — MED REC SCAN ONLY (OUTPATIENT)
Dept: ORTHOPEDICS CLINIC | Facility: CLINIC | Age: 64
End: 2024-07-03

## 2024-08-05 ENCOUNTER — TELEPHONE (OUTPATIENT)
Facility: CLINIC | Age: 64
End: 2024-08-05

## 2024-08-05 NOTE — TELEPHONE ENCOUNTER
Patient has an appointment scheduled with Dr. Blank on 8/21/24 for severe right knee pain. Please advise if imaging is needed prior.

## 2024-08-06 ENCOUNTER — APPOINTMENT (OUTPATIENT)
Dept: GENERAL RADIOLOGY | Facility: HOSPITAL | Age: 64
End: 2024-08-06
Attending: EMERGENCY MEDICINE
Payer: MEDICARE

## 2024-08-06 ENCOUNTER — HOSPITAL ENCOUNTER (EMERGENCY)
Facility: HOSPITAL | Age: 64
Discharge: HOME | End: 2024-08-06
Attending: EMERGENCY MEDICINE
Payer: MEDICARE

## 2024-08-06 VITALS
HEART RATE: 58 BPM | BODY MASS INDEX: 33.13 KG/M2 | DIASTOLIC BLOOD PRESSURE: 90 MMHG | RESPIRATION RATE: 18 BRPM | SYSTOLIC BLOOD PRESSURE: 112 MMHG | WEIGHT: 180 LBS | TEMPERATURE: 98 F | HEIGHT: 62 IN | OXYGEN SATURATION: 100 %

## 2024-08-06 DIAGNOSIS — M25.561 RIGHT KNEE PAIN, UNSPECIFIED CHRONICITY: Primary | ICD-10-CM

## 2024-08-06 PROCEDURE — 73562 X-RAY EXAM OF KNEE 3: CPT | Performed by: EMERGENCY MEDICINE

## 2024-08-06 PROCEDURE — 99283 EMERGENCY DEPT VISIT LOW MDM: CPT

## 2024-08-06 PROCEDURE — 99284 EMERGENCY DEPT VISIT MOD MDM: CPT

## 2024-08-06 RX ORDER — MELOXICAM 7.5 MG/1
7.5 TABLET ORAL DAILY
Qty: 20 TABLET | Refills: 0 | Status: SHIPPED | OUTPATIENT
Start: 2024-08-06

## 2024-08-06 NOTE — TELEPHONE ENCOUNTER
Darling/ [facility] ED case manager called - Pt is currently in ED for severe knee pain. Pt has an appt on 8/21. Darling asks if a provider could see her sooner. Please call patient directly.    Per Darling, patient is getting an xray - Results will be available in Epic chart for review.

## 2024-08-06 NOTE — CM/SW NOTE
EDCM assistance requested for sooner Ortho appt with Dr. Blank.  Spoke with Dr. Blnak's office, [phone].  Patient had called office yesterday for sooner appointment and a message was sent to the doctors.  Office is waiting to hear back.  They will contact patient directly.  MD updated.

## 2024-08-06 NOTE — ED PROVIDER NOTES
Patient Seen in: [facility] Emergency Department      History     Chief Complaint   Patient presents with    Leg or Foot Injury     Stated Complaint: Knee pain    Subjective:   HPI    Patient is a 64-year-old female comes in to ER for evaluation for bilateral knee pain.  She has had chronic knee pain left knee and also had arthroscopic surgery meniscal tear on the left knee more than a year ago.  She had cortisone injection which has not helped much.  She has been compensating by using the right knee more but over the past month has had increasing pain in the right knee.  Now having increasing pain with bearing weight and is using a walker.  She is unable to get a prompt follow-up appointment with her orthopedic surgeon and so came to the ED for evaluation.  She also has some pain in the right lower back but no radiation.  No change in bowel bladder habits saddle anesthesia or lower extremity weakness or numbness.  No fever no chills.  She is able to bend the knee without any significant difficulty.  No redness.  No other associated symptoms or complaints.  Objective:   Past Medical History:    Anxiety    Aortic regurgitation    mild    Arthritis    Calculus of kidney    Cardiac calcification (HCC) - CAC score of 5.24 seen on 22 CT Heart Scan protocol    Colon polyps    Depression    Disorder of liver    Fatty liver    Diverticulosis    Gastroparesis    GERD (gastroesophageal reflux disease)    Hemorrhoids    History of eating disorder    Binge eating    Migraines    OCD (obsessive compulsive disorder)    MODE (obstructive sleep apnea)    Parkinsonism (HCC)    Peptic ulcer disease    Tendonitis of shoulder, right    Tremor              Past Surgical History:   Procedure Laterality Date          x 2    Cholecystectomy      Colonoscopy      D & c      Ect provided  [id number]    Egd      Laparoscopic cholecystectomy      Needle biopsy right  2015    benign breast    Other surgical history       C3-C4 anterior discectomy    Other surgical history  2018    [facility]    Other surgical history  09/09/2019    radiofrequency abalsion lumbar    Removal gallbladder  02/2020    Skin surgery      Spine surgery procedure unlisted  2008    Anterior cervical discectomy    Tonsillectomy  1966?                Social History     Socioeconomic History    Marital status:    Occupational History    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[employer]   Tobacco Use    Smoking status: Never    Smokeless tobacco: Never   Vaping Use    Vaping status: Never Used   Substance and Sexual Activity    Alcohol use: Not Currently     Comment: 6 drinks or less/year    Drug use: Never   Other Topics Concern    Caffeine Concern No    Exercise Yes     Comment: walking and bike    Seat Belt Yes    Special Diet No    Stress Concern Yes    Weight Concern Yes   Social History Narrative    Caring for grandson (Peter - aged 6 yo [1/2020])              Review of Systems    Positive for stated Chief Complaint: Leg or Foot Injury    Other systems are as noted in HPI.  Constitutional and vital signs reviewed.      All other systems reviewed and negative except as noted above.    Physical Exam     ED Triage Vitals [08/06/24 0712]   BP 91/56   Pulse 66   Resp 18   Temp 98 °F (36.7 °C)   Temp src Oral   SpO2 97 %   O2 Device None (Room air)       Current Vitals:   Vital Signs  BP: 112/90  Pulse: 63  Resp: 18  Temp: 98 °F (36.7 °C)  Temp src: Oral  MAP (mmHg): 100    Oxygen Therapy  SpO2: 100 %  O2 Device: None (Room air)            Physical Exam  Vitals and nursing note reviewed.   Constitutional:       General: She is not in acute distress.     Appearance: She is well-developed. She is not toxic-appearing.   HENT:      Head: Normocephalic and atraumatic.   Eyes:      General: No scleral icterus.     Conjunctiva/sclera: Conjunctivae normal.   Cardiovascular:      Rate and Rhythm: Normal rate.   Pulmonary:       Effort: Pulmonary effort is normal. No respiratory distress.   Abdominal:      General: There is no distension.   Musculoskeletal:         General: No tenderness. Normal range of motion.      Cervical back: Normal range of motion and neck supple.      Comments: Right knee: No ligamentous laxity no hemarthrosis.  Quadricep mechanism patellar tendon intact.  Patient neurovasc intact proximally distally.  No overlying erythema or induration or tenderness   Skin:     General: Skin is warm and dry.      Findings: No rash.   Neurological:      Mental Status: She is alert and oriented to person, place, and time.      Sensory: No sensory deficit.      Motor: No weakness or abnormal muscle tone.      Coordination: Coordination normal.      Comments: Normal strength sensation bilateral lower extremities.   Psychiatric:         Behavior: Behavior normal.              ED Course   Labs Reviewed - No data to display                    MDM           -Pulse oximetry was interpreted by me and was normal.  Pulse oximeter was ordered to monitor patient for hypoxia.        -History source other than patient -         -Comorbidities did add complexity to the management are mentioned in the HPI above        -I personally reviewed the prior external notes and the medical record to obtain additional history.  Office visit from July 1 or with orthopedics.  Patient had complained of pain intermittentlyIn the left knee Likely related to progressive degenerative change at the patellofemoral and medial compartments of the left knee.and received cortisone injection.        -DDX: Includes but not limited to -degenerative osteoarthritis, fracture        -I personally reviewed the radiographs findings and they show no acute fractures.  Please refer to radiology report for official interpretation            Patient x-rays are reassuring.  There is small amount of fluid.  But she does not have any signs symptoms suggestive of a septic  joint.  I suspect pain may be arthritic versus due to a soft tissue abnormality such as meniscus or ligamentous problem.  I did discuss with case management who contacted [facility] orthopedics.  Patient will be contacted directly for a sooner appointment.  She  is open to trying meloxicam for pain control.  She will be discharged with close outpatient follow-up.  Patient well-appearing nontoxic discharged home stable condition.                           Medical Decision Making      Disposition and Plan     Clinical Impression:  1. Right knee pain, unspecified chronicity         Disposition:  Discharge  8/6/2024  8:29 am    Follow-up:  Carol Blank MD  [address] [place] [id number]  [phone]    Schedule an appointment as soon as possible for a visit            Medications Prescribed:  Current Discharge Medication List        START taking these medications    Details   Meloxicam (MOBIC) 7.5 MG Oral Tab Take 1 tablet (7.5 mg total) by mouth daily.  Qty: 20 tablet, Refills: 0

## 2024-08-06 NOTE — ED INITIAL ASSESSMENT (HPI)
Pt c/o bilateral knee pain and inability to bear weight on the R Leg. L knee pain x 1.5 yrs. Pt had arthroscopy on L knee and cortisone shot a month ago, may have torn meniscus.  R knee pain worse in last 3-4 wks. Pt also c/o lower R back pain x 4 wks.

## 2024-08-12 PROBLEM — J18.9 PNEUMONIA, UNSPECIFIED ORGANISM: Status: ACTIVE | Noted: 2024-08-12

## 2024-08-12 PROBLEM — S06.9X9S UNSPECIFIED INTRACRANIAL INJURY WITH LOSS OF CONSCIOUSNESS OF UNSPECIFIED DURATION, SEQUELA: Status: ACTIVE | Noted: 2024-08-12

## 2024-08-12 PROBLEM — S06.9X9S UNSPECIFIED INTRACRANIAL INJURY WITH LOSS OF CONSCIOUSNESS OF UNSPECIFIED DURATION, SEQUELA (HCC): Status: ACTIVE | Noted: 2024-08-12
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
## 2024-08-12 PROBLEM — J02.9 ACUTE PHARYNGITIS, UNSPECIFIED: Status: ACTIVE | Noted: 2024-08-12

## 2024-08-12 PROBLEM — M25.562 PAIN IN LEFT KNEE: Status: ACTIVE | Noted: 2024-08-12

## 2024-08-12 PROBLEM — B07.9 VIRAL WART, UNSPECIFIED: Status: ACTIVE | Noted: 2024-08-12

## 2024-08-12 PROBLEM — R41.0 DISORIENTATION, UNSPECIFIED: Status: ACTIVE | Noted: 2024-08-12

## 2024-08-12 PROBLEM — R29.90 UNSPECIFIED SYMPTOMS AND SIGNS INVOLVING THE NERVOUS SYSTEM: Status: ACTIVE | Noted: 2024-08-12

## 2024-08-12 PROBLEM — M79.642 PAIN IN LEFT HAND: Status: ACTIVE | Noted: 2024-08-12

## 2024-08-12 PROBLEM — B37.2 CANDIDIASIS OF SKIN AND NAIL: Status: ACTIVE | Noted: 2024-08-12

## 2024-08-12 PROBLEM — R41.89 OTHER SYMPTOMS AND SIGNS INVOLVING COGNITIVE FUNCTIONS AND AWARENESS: Status: ACTIVE | Noted: 2024-08-12

## 2024-08-12 PROBLEM — I25.10 ATHEROSCLEROTIC HEART DISEASE OF NATIVE CORONARY ARTERY WITHOUT ANGINA PECTORIS: Status: ACTIVE | Noted: 2024-08-12

## 2024-08-12 PROBLEM — R00.0 TACHYCARDIA, UNSPECIFIED: Status: ACTIVE | Noted: 2024-08-12

## 2024-08-12 PROBLEM — M17.12 UNILATERAL PRIMARY OSTEOARTHRITIS, LEFT KNEE: Status: ACTIVE | Noted: 2024-08-12

## 2024-08-12 PROBLEM — M23.204: Status: ACTIVE | Noted: 2024-08-12

## 2024-08-14 ENCOUNTER — OFFICE VISIT (OUTPATIENT)
Dept: ORTHOPEDICS CLINIC | Facility: CLINIC | Age: 64
End: 2024-08-14
Payer: MEDICARE

## 2024-08-14 VITALS — HEIGHT: 62 IN | BODY MASS INDEX: 33.31 KG/M2 | WEIGHT: 181 LBS

## 2024-08-14 DIAGNOSIS — M17.11 PRIMARY OSTEOARTHRITIS OF RIGHT KNEE: Primary | ICD-10-CM

## 2024-08-14 PROCEDURE — 99214 OFFICE O/P EST MOD 30 MIN: CPT | Performed by: PHYSICIAN ASSISTANT

## 2024-08-14 PROCEDURE — 20610 DRAIN/INJ JOINT/BURSA W/O US: CPT | Performed by: PHYSICIAN ASSISTANT

## 2024-08-14 RX ORDER — TRIAMCINOLONE ACETONIDE 40 MG/ML
40 INJECTION, SUSPENSION INTRA-ARTICULAR; INTRAMUSCULAR ONCE
Status: COMPLETED | OUTPATIENT
Start: 2024-08-14 | End: 2024-08-14

## 2024-08-14 RX ADMIN — TRIAMCINOLONE ACETONIDE 40 MG: 40 INJECTION, SUSPENSION INTRA-ARTICULAR; INTRAMUSCULAR at 12:20:00

## 2024-08-14 NOTE — PROGRESS NOTES
[facility] Progress Note      Chief Complaint:  Right knee pain      Subjective: Cb Webster is a 64 year old female who is here with her daughter today for initial evaluation of her right knee pain.  She has been treated in the past for her left knee degenerative arthritis and medial meniscus tear.  Unfortunately, 1 month ago she noticed increased pain in the right knee.  She may relate onset of symptoms to weeding and subsequently going for a 3 mile walk.  She had severe medial sided knee pain that required use of a walker for ambulation.  Due to the severity of pain, she was recently seen and evaluated in the emergency department at which time x-rays were taken.  She was advised to follow-up with orthopedics.  She is unable to take oral anti-inflammatories due to her history of ulcers.  She did try meloxicam with no benefit.  She does take occasional Tylenol.  She also has associated pain in the shin and medial ankle.  She is here for further evaluation.      Objective: She ambulates with the assistance of a walker.  Exam of the right knee and lower extremity reveals that the overlying skin is intact.  There is a trace palpable effusion.  She has pain with full extension and pain with full flexion.  She is significantly tender about the medial joint line, milder lateral joint line tenderness.  Sensation is present to light touch.  Stable ligamentous exam.  She does have mild soft tissue edema in the lower leg that is tender to palpation.      XR KNEE (3 VIEWS), RIGHT (CPT=73562)    Result Date: 8/6/2024  CONCLUSION:  No acute osseous findings.  Small joint fluid.   LOCATION:  [place]   Dictated by (CST): James Lewis MD on 8/06/2024 at 8:15 AM     Finalized by (CST): James Lewis MD on 8/06/2024 at 8:16 AM            Assessment: Right knee early degenerative joint disease      Plan: I discussed x-ray and exam findings with the patient.  She does have some mild degenerative changes in the medial  compartment that may have been exacerbated by her activities.  She also may have a medial meniscus tear.  I recommend continued conservative treatment including cortisone injection for pain relief.  She would like to proceed.  If the injection provides relief but it is temporary, further imaging with an MRI scan may be considered.  We will also consider an MRI scan if the injection is not beneficial at all.  She will contact me for the MRI order and authorization through her insurance.  She otherwise will follow-up with me on an as-needed basis with recurrent or worsening symptoms, questions or concerns.  I also would like to attempt aspiration prior to the injection and she agrees.  I also recommend trial of compression stockings to improve her soft tissue edema in the lower leg.    Procedure: Risk, benefits, and alternatives to the aspiration and cortisone injection including but not limited to risk of needle infection, flareup, and failure to improve was discussed.  She would like to proceed under meticulous sterile technique, 4 cc Xylocaine was used to anesthetize the lateral patellofemoral joint of the right knee.  Then through an 18-gauge needle, 5cc of clear serosanguineous fluid was aspirated.  Through the same 18-gauge needle, 4 cc of Xylocaine and 40 mg of Kenalog was injected with free flow fluid.  A Band-Aid was applied.  An Ace wrap wrap was also applied.  Advised to follow-up with any adverse reactions.          Mari Damon PA-C  Orthopedic Surgery   [address] [place] [id number]   t: [phone]  f: [phone]

## 2024-08-17 DIAGNOSIS — E66.812 CLASS 2 OBESITY DUE TO EXCESS CALORIES WITHOUT SERIOUS COMORBIDITY WITH BODY MASS INDEX (BMI) OF 39.0 TO 39.9 IN ADULT: ICD-10-CM

## 2024-08-17 DIAGNOSIS — E66.09 CLASS 2 OBESITY DUE TO EXCESS CALORIES WITHOUT SERIOUS COMORBIDITY WITH BODY MASS INDEX (BMI) OF 39.0 TO 39.9 IN ADULT: ICD-10-CM

## 2024-08-19 RX ORDER — PHENTERMINE HYDROCHLORIDE 15 MG/1
15 CAPSULE ORAL EVERY MORNING
Qty: 30 CAPSULE | Refills: 0 | Status: SHIPPED | OUTPATIENT
Start: 2024-08-19 | End: 2024-10-26

## 2024-08-19 NOTE — TELEPHONE ENCOUNTER
Requesting Phentermine 15mg  Last OV: 6/6/24  RTC: 3 months  Last Rx'd 7/13/24 #30 with 0 refills    Future Appointments   Date Time Provider Department Center   8/23/2024  9:50 AM James Betancourt MD [facility]   9/20/2024  8:30 AM Monalisa Luis APRN [facility]   10/31/2024 10:00 AM Jackelin Maloney MD [facility]         Controlled med:  Rx pended and routed for approval/denial

## 2024-08-30 ENCOUNTER — OFFICE VISIT (OUTPATIENT)
Dept: NEUROLOGY | Facility: CLINIC | Age: 64
End: 2024-08-30
Payer: MEDICARE

## 2024-08-30 VITALS
HEART RATE: 86 BPM | RESPIRATION RATE: 16 BRPM | WEIGHT: 178.19 LBS | BODY MASS INDEX: 33 KG/M2 | SYSTOLIC BLOOD PRESSURE: 126 MMHG | DIASTOLIC BLOOD PRESSURE: 68 MMHG

## 2024-08-30 DIAGNOSIS — G43.709 CHRONIC MIGRAINE WITHOUT AURA WITHOUT STATUS MIGRAINOSUS, NOT INTRACTABLE: Primary | ICD-10-CM

## 2024-08-30 DIAGNOSIS — F06.70 MILD NEUROCOGNITIVE DISORDER DUE TO MULTIPLE ETIOLOGIES: ICD-10-CM

## 2024-08-30 PROCEDURE — 64615 CHEMODENERV MUSC MIGRAINE: CPT | Performed by: OTHER

## 2024-08-30 PROCEDURE — 99213 OFFICE O/P EST LOW 20 MIN: CPT | Performed by: OTHER

## 2024-08-30 RX ORDER — FLUOXETINE 40 MG/1
40 CAPSULE ORAL DAILY
COMMUNITY

## 2024-08-30 NOTE — PATIENT INSTRUCTIONS
Refill policies:    Allow 2-3 business days for refills; controlled substances may take longer.  Contact your pharmacy at least 5 days prior to running out of medication and have them send an electronic request or submit request through the “request refill” option in your [employer] account.  Refills are not addressed on weekends; covering physicians do not authorize routine medications on weekends.  No narcotics or controlled substances are refilled after noon on Fridays or by on call physicians.  By law, narcotics must be electronically prescribed.  A 30 day supply with no refills is the maximum allowed.  If your prescription is due for a refill, you may be due for a follow up appointment.  To best provide you care, patients receiving routine medications need to be seen at least once a year.  Patients receiving narcotic/controlled substance medications need to be seen at least once every 3 months.  In the event that your preferred pharmacy does not have the requested medication in stock (e.g. Backordered), it is your responsibility to find another pharmacy that has the requested medication available.  We will gladly send a new prescription to that pharmacy at your request.    Scheduling Tests:    If your physician has ordered radiology tests such as MRI or CT scans, please contact Central Scheduling at [phone] right away to schedule the test.  Once scheduled, the [facility] Centralized Referral Team will work with your insurance carrier to obtain pre-certification or prior authorization.  Depending on your insurance carrier, approval may take 3-10 days.  It is highly recommended patients assure they have received an authorization before having a test performed.  If test is done without insurance authorization, patient may be responsible for the entire amount billed.      Precertification and Prior Authorizations:  If your physician has recommended that you have a procedure or additional testing performed the [facility]  Centralized Referral Team will contact your insurance carrier to obtain pre-certification or prior authorization.    You are strongly encouraged to contact your insurance carrier to verify that your procedure/test has been approved and is a COVERED benefit.  Although the [facility] Centralized Referral Team does its due diligence, the insurance carrier gives the disclaimer that \"Although the procedure is authorized, this does not guarantee payment.\"    Ultimately the patient is responsible for payment.   Thank you for your understanding in this matter.  Paperwork Completion:  If you require FMLA or disability paperwork for your recovery, please make sure to either drop it off or have it faxed to our office at [phone]. Be sure the form has your name and date of birth on it.  The form will be faxed to our Forms Department and they will complete it for you.  There is a 25$ fee for all forms that need to be filled out.  Please be aware there is a 10-14 day turnaround time.  You will need to sign a release of information (VLADIMIR) form if your paperwork does not come with one.  You may call the Forms Department with any questions at [phone].  Their fax number is [phone].

## 2024-08-30 NOTE — PROGRESS NOTES
HPI:    Patient ID: Cb Webster is a 64 year old female.    Neurologic Problem  Associated symptoms include headaches.   Migraine          Cb Webster is a 64 year old female who presents today for follow up for migraine and for botox therapy  States migraines stable on Botox. States she is currently worried about her  who is also my patient and has Parkinsonism. States he is declining cognitively and has not completed the neuropsychology evaluation  This is stressing her out otherwise her depression and anxiety were under control.      Office visit: Dec 2023  Patient is a 63-year-old female with who presented with complaints of possible serotonin syndrome.  She states the past few months she has been experiencing agitation and restlessness, has more difficulty concentrating she is getting confused easily, also have nausea diarrhea sometime, poor co-ordination, palpitations.   She follows with psychiatry and on multiple medications and serotonin was suspected and she states there was some adjustment done with the dosage the dose of buspirone and trazodone was reduced also Spravato nasal spray was extended to every 3 weeks.  She also discontinue phentermine.  States there is some improvement but continues to have above symptoms. Last week had sore throat, positive for Strep and got treated with antibiotics  Serotonin level checked at that time was normal  PCP recommended MRI brain and it is pending      Last office visit  Patient is a 63-year-old female who presented for follow-up for migraine headaches and for Botox injection. She reports Botox therapy was working fine and headaches are under control. For past several months has noticed palpitations even though not anxious or under stress. States she going to get cardiac event    States has also noticed some memory decline, word finding difficulty, difficulty managing bank account. She couldn't find Memantine bottle so has not taking Memantine for  last month. She is taking her antidepressants regularly and not under any stress but frustrated with some health concerns      Neuro-psychology test performed by Shital Lawson in April 2020 shows WAIS-IV- average IQ, average verbal comprehension and perceptual reasoning and borderline low average working memory and low average processing speed. She was diagnosed with ( G31.09) Major neurocognitive disorder without behavioral issues, generalized anxiety disorder and major depressive disorder recurrent, mild, with struggling self esteem and dissatisfied with life related to physical and mental health  HISTORY:  Past Medical History:    Anxiety    Aortic regurgitation    mild    Arthritis    Back pain    Calculus of kidney    Cardiac calcification (HCC) - CAC score of 5.24 seen on 12/2/22 CT Heart Scan protocol    Colon polyps    Constipation    Depression    Diarrhea, unspecified    Disorder of liver    Fatty liver    Diverticulosis    Dizziness    Fatigue    Flatulence/gas pain/belching    Food intolerance    Frequent urination    Gastroparesis    GERD (gastroesophageal reflux disease)    Headache disorder    Migraines    Heart palpitations    Heartburn    Hemorrhoids    History of depression    Major depressive disorder & anxiety    History of eating disorder    Binge eating    Hoarseness, chronic    Indigestion    Leg swelling    Loss of appetite    Migraines    Mouth sores    Triggered by stress or poor diet    Nausea    OCD (obsessive compulsive disorder)    MODE (obstructive sleep apnea)    Pain in joints    Painful swallowing    Parkinsonism (HCC)    Peptic ulcer disease    Problems with swallowing    Sleep disturbance    High serotonin    Stool incontinence    Soft barely formed stool, not detecting urgency    Stress    Syncope    Tendonitis of shoulder, right    Tremor    Vomiting blood    Wears glasses    Weight gain    10 lbs in 2 months - stop phentermine and decreased excercise after arthroscope of left  knee    Weight loss    28 lb last year      Past Surgical History:   Procedure Laterality Date          x 2    Cholecystectomy      Colonoscopy      D & c      Ect provided  [id number]    Egd      Laparoscopic cholecystectomy      Needle biopsy right  2015    benign breast    Other surgical history      C3-C4 anterior discectomy    Other surgical history  2018    [facility]    Other surgical history  2019    radiofrequency abalsion lumbar    Removal gallbladder  2020    Skin surgery      Spine surgery procedure unlisted      Anterior cervical discectomy    Tonsillectomy  1966?      Family History   Problem Relation Age of Onset    Hypertension Father     Heart Attack Father          at 48 years    Heart Disease Father     Alcohol and Other Disorders Associated Father     Depression Father     Other (ALS) Mother     Hypertension Mother         Diagnosed at 91 yo with ALS  at 91    Personality Disorder Daughter     Dementia Maternal Grandmother     Obesity Maternal Grandmother     Dementia Maternal Grandfather     Obesity Paternal Grandfather     Cancer Sister         Kidney, chronic lymp… leukemia    Heart Attack Sister         Kidney cancer, chronic lymphocytic leukemia    Ulcerative Colitis Brother     Cancer Brother         Kidney      Social History     Socioeconomic History    Marital status:    Occupational History    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[employer], [employer]   Tobacco Use    Smoking status: Never    Smokeless tobacco: Never   Vaping Use    Vaping status: Never Used   Substance and Sexual Activity    Alcohol use: Not Currently     Comment: 6 drinks or less/year    Drug use: Never   Other Topics Concern    Caffeine Concern No    Exercise Yes     Comment: walking and bike    Seat Belt Yes    Special Diet No    Stress Concern Yes    Weight Concern Yes   Social History Narrative    Caring for grandson (Peter - aged 4 yo  [1/2020])        Review of Systems   Constitutional: Negative.    HENT: Negative.     Eyes: Negative.    Respiratory: Negative.     Cardiovascular: Negative.    Gastrointestinal: Negative.    Endocrine: Negative.    Genitourinary: Negative.    Musculoskeletal: Negative.    Allergic/Immunologic: Negative.    Neurological:  Positive for headaches.   Psychiatric/Behavioral: Negative.     All other systems reviewed and are negative.           Current Outpatient Medications   Medication Sig Dispense Refill    FLUoxetine HCl 40 MG Oral Cap Take 1 capsule (40 mg total) by mouth daily.      Phentermine HCl 15 MG Oral Cap Take 1 capsule (15 mg total) by mouth every morning. 30 capsule 0    pantoprazole 40 MG Oral Tab EC Take 1 tablet (40 mg total) by mouth before breakfast. 90 tablet 3    metoclopramide 5 MG Oral Tab Take 1 tablet (5 mg total) by mouth daily. 30 tablet 2    famotidine 40 MG Oral Tab Take 1 tablet (40 mg total) by mouth daily as needed for Heartburn. 90 tablet 3    dicyclomine 10 MG Oral Cap Take 1 capsule (10 mg total) by mouth in the morning and 1 capsule (10 mg total) before bedtime. 180 capsule 3    Meloxicam (MOBIC) 7.5 MG Oral Tab Take 1 tablet (7.5 mg total) by mouth daily. 20 tablet 0    memantine 10 MG Oral Tab Take 1 tablet (10 mg total) by mouth daily. 90 tablet 0    topiramate 25 MG Oral Tab Take 1 tablet (25 mg total) by mouth daily. 90 tablet 0    SUMAtriptan Succinate 6 MG/0.5ML Subcutaneous Solution Auto-injector Inject 0.5 mL (6 mg total) into the skin as needed (for moderate to severe migraine). 6 mL 2    PROPRANOLOL HCL ER 80 MG Oral Capsule SR 24 Hr TAKE 1 CAPSULE (80 MG TOTAL) BY MOUTH EVERY EVENING. 90 capsule 1    albuterol 108 (90 Base) MCG/ACT Inhalation Aero Soln Inhale 2 puffs into the lungs every 4 (four) hours as needed for Wheezing or Shortness of Breath. 6.7 g 0    traZODone 100 MG Oral Tab Take 1 tablet (100 mg total) by mouth nightly.      rosuvastatin 5 MG Oral Tab Take 1  tablet (5 mg total) by mouth Every Monday, Wednesday, and Friday. 45 tablet 3    butalbital-acetaminophen-caffeine -40 MG Oral Tab       SPRAVATO, 84 MG DOSE, 28 MG/DEVICE Nasal Solution Therapy Pack 84 mg by Nasal route every 21 days.      triamcinolone 0.1 % External Cream Apply thin layer to affected area twice a day as needed 45 g 1    hydrocortisone 2.5 % External Cream Apply to AA of FACE BID x 2 weeks, hold 2 weeks, repeat PRN. 30 g 2    ketoconazole 2 % External Cream Apply to AA of FACE BID when not using Hydrocortisone Cream. 30 g 2    LORazepam 2 MG Oral Tab Take 1 tablet (2 mg total) by mouth as needed.      cyclobenzaprine 10 MG Oral Tab Take 1 tablet (10 mg total) by mouth 3 (three) times daily as needed for Muscle spasms. 90 tablet 1    busPIRone HCl 10 MG Oral Tab 1 1/2  tab in the morning and 1 1/2 in the afternoon       Allergies:  Allergies   Allergen Reactions    Sulfa Antibiotics NAUSEA ONLY     PHYSICAL EXAM:   Physical Exam  Blood pressure 126/68, pulse 86, resp. rate 16, weight 178 lb 3.2 oz (80.8 kg), not currently breastfeeding.     General: well developed, well nourished  HEENT: Normocephalic and atraumatic. Moist mucus membrane  Cardiovascular: Normal rate, regular rhythm and normal heart sounds.    Pulmonary/Chest: Effort normal and breath sounds normal.   Abdominal: Soft. Bowel sounds are normal.   Skin: Skin is warm and dry.   Psychiatric:  normal mood and affect.   Neurological   Awake, alert and oriented to time, place and person. Speech is dysfluent with some stuttering.   Able to follow commands.  Intact naming and repetition. Normal attention and impaired short term memory.   Cranial nerves 2-12: grossly intact  Sensory : Intact to  light touch and pinprick  Motor: Normal tone in all extremities. Mild postural hand tremors Strength is 5/5 in all muscle groups  Reflexes: symmetric and present 2+ throughout  Coordination: Intact         ASSESSMENT/PLAN:       ICD-10-CM    1.  Chronic migraine without aura without status migrainosus, not intractable  G43.709       2. Mild neurocognitive disorder due to multiple etiologies  F06.70           Neurocognitive disorder  MRI brain and EEG performed negative  On memantine 10 mg daily for neurocognitive prophylaxis- patient wants to lower the dose of Memantine  Follow up with Psychiatry       2 On Botox and Propranolol for migraine prevention  Switch Nurtec's to Imitrex injection      Follow up in about 3 months  See orders and medications filed with this encounter. The patient indicates understanding of these issues and agrees with the plan.        James Betancourt MD  [facility] [place]      Meds This Visit:  Requested Prescriptions      No prescriptions requested or ordered in this encounter       Imaging & Referrals:  None     ID#[id number]

## 2024-09-17 ENCOUNTER — PATIENT MESSAGE (OUTPATIENT)
Dept: ORTHOPEDICS CLINIC | Facility: CLINIC | Age: 64
End: 2024-09-17

## 2024-09-17 DIAGNOSIS — M17.12 PRIMARY OSTEOARTHRITIS OF LEFT KNEE: ICD-10-CM

## 2024-09-17 DIAGNOSIS — M23.204 OTHER OLD TEAR OF MEDIAL MENISCUS OF LEFT KNEE: ICD-10-CM

## 2024-09-17 DIAGNOSIS — M17.11 PRIMARY OSTEOARTHRITIS OF RIGHT KNEE: Primary | ICD-10-CM

## 2024-09-17 NOTE — TELEPHONE ENCOUNTER
From: Cb Webster  To: Mari Damon  Sent: 9/17/2024 12:13 PM CDT  Subject: Knee Pain Log & Next Steps    5 weeks since my last appt. which included draining fluid on right knee and cortisone injection. I’m mostly sedentary to allow me approx. 2 outdoor walks per week. I’ve attached a log which shows the amount of time walked & pain level in each knee. I’ve iced each knee after walking and worn compression socks for 2 weeks as you recommended. My quality of life is compromised by the:  - limited frequency, duration and intensity of walking for pleasure/health I can sustain while otherwise remaining intentionally very sedentary   - concerns of causing further injury or pain     Imaging of left knee shows a torn meniscus. X-ray of right knee indicates arthritis.    Next? PT, MRI of right knee?

## 2024-09-17 NOTE — TELEPHONE ENCOUNTER
LOV 8/14/24  MRI of knee can be considered if injection fails - per last OV notes.  Pended.    Condition update from patient below.     \"5 weeks since my last appt.  which included draining fluid on right knee and cortisone injection.  I’m mostly sedentary to allow me approx. 2 outdoor walks per week. I’ve attached a log which shows the amount of time walked & pain level in each knee. I’ve iced each knee after walking and worn compression socks for 2 weeks as you recommended. My quality of life is compromised by the:  - limited frequency, duration and intensity of walking for pleasure/health I can sustain while otherwise remaining intentionally very sedentary  - concerns of causing further injury or pain    Imaging of left knee shows a torn meniscus. X-ray of right knee indicates arthritis.    Next? PT, MRI of right knee?\"

## 2024-09-20 PROBLEM — M17.11 UNILATERAL PRIMARY OSTEOARTHRITIS, RIGHT KNEE: Status: ACTIVE | Noted: 2024-09-20

## 2024-09-20 PROBLEM — K58.0 IRRITABLE BOWEL SYNDROME WITH DIARRHEA: Status: ACTIVE | Noted: 2024-09-20

## 2024-09-23 ENCOUNTER — OFFICE VISIT (OUTPATIENT)
Dept: FAMILY MEDICINE CLINIC | Facility: CLINIC | Age: 64
End: 2024-09-23
Payer: MEDICARE

## 2024-09-23 VITALS
WEIGHT: 177 LBS | BODY MASS INDEX: 32.57 KG/M2 | HEART RATE: 80 BPM | RESPIRATION RATE: 18 BRPM | DIASTOLIC BLOOD PRESSURE: 72 MMHG | SYSTOLIC BLOOD PRESSURE: 126 MMHG | OXYGEN SATURATION: 97 % | HEIGHT: 62 IN | TEMPERATURE: 97 F

## 2024-09-23 DIAGNOSIS — R05.1 ACUTE COUGH: Primary | ICD-10-CM

## 2024-09-23 DIAGNOSIS — J40 BRONCHITIS WITH ACUTE WHEEZING: ICD-10-CM

## 2024-09-23 PROCEDURE — 99213 OFFICE O/P EST LOW 20 MIN: CPT | Performed by: FAMILY MEDICINE

## 2024-09-23 RX ORDER — AZITHROMYCIN 250 MG/1
TABLET, FILM COATED ORAL
Qty: 6 TABLET | Refills: 0 | Status: SHIPPED | OUTPATIENT
Start: 2024-09-23 | End: 2024-09-27

## 2024-09-23 RX ORDER — ALBUTEROL SULFATE 90 UG/1
2 INHALANT RESPIRATORY (INHALATION) EVERY 4 HOURS PRN
Qty: 1 EACH | Refills: 0 | Status: SHIPPED | OUTPATIENT
Start: 2024-09-23

## 2024-09-23 RX ORDER — PREDNISONE 20 MG/1
20 TABLET ORAL 2 TIMES DAILY
Qty: 10 TABLET | Refills: 0 | Status: SHIPPED | OUTPATIENT
Start: 2024-09-23 | End: 2024-09-28

## 2024-09-23 NOTE — PROGRESS NOTES
CHIEF COMPLAINT:     Chief Complaint   Patient presents with    Cough     Patient states she has had a productive cough for the past 2 weeks, patient states she has tried nyquil but it doesn't seem to be helping.         HPI:   Cb Webster is a 64 year old female who presents for evaluation of a cough.  Onset was 14 days ago and getting worse. The cough has been getting worse. Treatments tried OTC cough medication, tylenol.  Reports minor fever, chills, sputum production, wheezing. Denies lung disease, asthma, copd, weight loss,  smoking, heart disease. Sick contacts: coworkers and kids. Recent travel: Denies Exposure to TB: Denies. Associated symptoms include: congestion, wheezing, OTC cold meds have not been helping, prior history of bronchitis, prior history of sinusitis    Current Outpatient Medications   Medication Sig Dispense Refill    albuterol 108 (90 Base) MCG/ACT Inhalation Aero Soln Inhale 2 puffs into the lungs every 4 (four) hours as needed for Wheezing or Shortness of Breath (cough). 1 each 0    predniSONE 20 MG Oral Tab Take 1 tablet (20 mg total) by mouth 2 (two) times daily for 5 days. 10 tablet 0    azithromycin (ZITHROMAX Z-FLORENCE) 250 MG Oral Tab Take 2 tablets (500 mg total) by mouth daily for 1 day, THEN 1 tablet (250 mg total) daily for 4 days. 6 tablet 0    topiramate 25 MG Oral Tab Take 1 tablet (25 mg total) by mouth daily.      FLUoxetine HCl 40 MG Oral Cap Take 1 capsule (40 mg total) by mouth daily.      Phentermine HCl 15 MG Oral Cap Take 1 capsule (15 mg total) by mouth every morning. 30 capsule 0    pantoprazole 40 MG Oral Tab EC Take 1 tablet (40 mg total) by mouth before breakfast. 90 tablet 3    metoclopramide 5 MG Oral Tab Take 1 tablet (5 mg total) by mouth daily. 30 tablet 2    famotidine 40 MG Oral Tab Take 1 tablet (40 mg total) by mouth daily as needed for Heartburn. 90 tablet 3    dicyclomine 10 MG Oral Cap Take 1 capsule (10 mg total) by mouth in the morning and 1  capsule (10 mg total) before bedtime. 180 capsule 3    Meloxicam (MOBIC) 7.5 MG Oral Tab Take 1 tablet (7.5 mg total) by mouth daily. 20 tablet 0    memantine 10 MG Oral Tab Take 1 tablet (10 mg total) by mouth daily. 90 tablet 0    SUMAtriptan Succinate 6 MG/0.5ML Subcutaneous Solution Auto-injector Inject 0.5 mL (6 mg total) into the skin as needed (for moderate to severe migraine). 6 mL 2    PROPRANOLOL HCL ER 80 MG Oral Capsule SR 24 Hr TAKE 1 CAPSULE (80 MG TOTAL) BY MOUTH EVERY EVENING. 90 capsule 1    albuterol 108 (90 Base) MCG/ACT Inhalation Aero Soln Inhale 2 puffs into the lungs every 4 (four) hours as needed for Wheezing or Shortness of Breath. 6.7 g 0    traZODone 100 MG Oral Tab Take 1 tablet (100 mg total) by mouth nightly.      rosuvastatin 5 MG Oral Tab Take 1 tablet (5 mg total) by mouth Every Monday, Wednesday, and Friday. 45 tablet 3    butalbital-acetaminophen-caffeine -40 MG Oral Tab       SPRAVATO, 84 MG DOSE, 28 MG/DEVICE Nasal Solution Therapy Pack 84 mg by Nasal route every 21 days.      triamcinolone 0.1 % External Cream Apply thin layer to affected area twice a day as needed 45 g 1    hydrocortisone 2.5 % External Cream Apply to AA of FACE BID x 2 weeks, hold 2 weeks, repeat PRN. 30 g 2    ketoconazole 2 % External Cream Apply to AA of FACE BID when not using Hydrocortisone Cream. 30 g 2    LORazepam 2 MG Oral Tab Take 1 tablet (2 mg total) by mouth as needed.      cyclobenzaprine 10 MG Oral Tab Take 1 tablet (10 mg total) by mouth 3 (three) times daily as needed for Muscle spasms. 90 tablet 1    busPIRone HCl 10 MG Oral Tab 1 1/2  tab in the morning and 1 1/2 in the afternoon        Past Medical History:    Anxiety    Aortic regurgitation    mild    Arthritis    Back pain    Calculus of kidney    Cardiac calcification (HCC) - CAC score of 5.24 seen on 12/2/22 CT Heart Scan protocol    Colon polyps    Constipation    Depression    Diarrhea, unspecified    Disorder of liver    Fatty  liver    Diverticulosis    Dizziness    Fatigue    Flatulence/gas pain/belching    Food intolerance    Frequent urination    Gastroparesis    GERD (gastroesophageal reflux disease)    Headache disorder    Migraines    Heart palpitations    Heartburn    Hemorrhoids    History of depression    Major depressive disorder & anxiety    History of eating disorder    Binge eating    Hoarseness, chronic    Indigestion    Leg swelling    Loss of appetite    Migraines    Mouth sores    Triggered by stress or poor diet    Nausea    OCD (obsessive compulsive disorder)    MODE (obstructive sleep apnea)    Pain in joints    Painful swallowing    Parkinsonism (HCC)    Peptic ulcer disease    Problems with swallowing    Sleep disturbance    High serotonin    Stool incontinence    Soft barely formed stool, not detecting urgency    Stress    Syncope    Tendonitis of shoulder, right    Tremor    Vomiting blood    Wears glasses    Weight gain    10 lbs in 2 months - stop phentermine and decreased excercise after arthroscope of left knee    Weight loss    28 lb last year      Social History:  Social History     Socioeconomic History    Marital status:    Occupational History    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[employer], [employer]   Tobacco Use    Smoking status: Never    Smokeless tobacco: Never   Vaping Use    Vaping status: Never Used   Substance and Sexual Activity    Alcohol use: Not Currently     Comment: 6 drinks or less/year    Drug use: Never   Other Topics Concern    Caffeine Concern No    Exercise Yes     Comment: walking and bike    Seat Belt Yes    Special Diet No    Stress Concern Yes    Weight Concern Yes   Social History Narrative    Caring for grandson (Peter - aged 4 yo [1/2020])        REVIEW OF SYSTEMS:   GENERAL: see HPI  SKIN: No rashes, or other skin lesions.   EYES: Denies blurred vision or double vision.  HENT:  See HPI  CARDIOVASCULAR: Denies palpitations  LUNGS: Per  HPI.   GI: Denies N/V/C/D or abdominal pain.      EXAM:   /72 (BP Location: Left arm, Patient Position: Sitting, Cuff Size: adult)   Pulse 80   Temp 97.2 °F (36.2 °C) (Temporal)   Resp 18   Ht 5' 2\" (1.575 m)   Wt 177 lb (80.3 kg)   LMP  (LMP Unknown)   SpO2 97%   BMI 32.37 kg/m²   GENERAL: well developed, well nourished,in no apparent distress  SKIN: no rashes, no suspicious lesions  EYES: Conjunctiva clear.  No scleral icterus.  HENT: Atraumatic, normocephalic.  TM's clear bilaterally.  Nostrils patent, nasal mucosa pink and non-inflamed.  No erythema of the throat.  NECK: supple, non-tender.  LUNGS: Normal respiratory rate. Normal effort.  + cough. + wheezing. No rales or crackles. No decreased BS.   CARDIO: RRR without murmur  LYMPH: No cervical or supraclavicular lymphadenopathy.   EXTREMITIES:  No clubbing, cyanosis, or edema.    ASSESSMENT AND PLAN:   Cb Webster is a 64 year old female who presents with:     ASSESSMENT:  Encounter Diagnoses   Name Primary?    Acute cough Yes    Bronchitis with acute wheezing        PLAN:  Meds as below.  Comfort Care as listed in Patient Instructions.      Meds & Refills for this Visit:  Requested Prescriptions     Signed Prescriptions Disp Refills    albuterol 108 (90 Base) MCG/ACT Inhalation Aero Soln 1 each 0     Sig: Inhale 2 puffs into the lungs every 4 (four) hours as needed for Wheezing or Shortness of Breath (cough).    predniSONE 20 MG Oral Tab 10 tablet 0     Sig: Take 1 tablet (20 mg total) by mouth 2 (two) times daily for 5 days.    azithromycin (ZITHROMAX Z-FLORENCE) 250 MG Oral Tab 6 tablet 0     Sig: Take 2 tablets (500 mg total) by mouth daily for 1 day, THEN 1 tablet (250 mg total) daily for 4 days.     Side effects, risks, benefits, of medication explained and discussed.      Recommended increased fluids/humidity  12-hour decongestant nasal spray twice daily for a maximum of 4 days  Steam inhalation for sinus pressure   OTC cold and flu  medication as needed.   Robitussin or Robitussin DM as needed for cough  1 tsp honey for the cough prn    Tylenol as needed/Ibuprofen as needed, do not exceed 4000 mg of acetaminophen or 2400 mg of ibuprofen    Recheck if not improved in one week or sooner if worsening.    The patient indicates understanding of these issues and agrees to the plan.  The patient is asked to see PCP if sx's persist for more than 2 weeks, sooner if worsen.    There are no Patient Instructions on file for this visit.

## 2024-10-11 ENCOUNTER — TELEPHONE (OUTPATIENT)
Dept: PHYSICAL THERAPY | Facility: HOSPITAL | Age: 64
End: 2024-10-11

## 2024-10-14 ENCOUNTER — OFFICE VISIT (OUTPATIENT)
Dept: PHYSICAL THERAPY | Age: 64
End: 2024-10-14
Attending: PHYSICIAN ASSISTANT
Payer: MEDICARE

## 2024-10-14 DIAGNOSIS — M23.204 OTHER OLD TEAR OF MEDIAL MENISCUS OF LEFT KNEE: ICD-10-CM

## 2024-10-14 DIAGNOSIS — M17.11 PRIMARY OSTEOARTHRITIS OF RIGHT KNEE: Primary | ICD-10-CM

## 2024-10-14 DIAGNOSIS — M17.12 PRIMARY OSTEOARTHRITIS OF LEFT KNEE: ICD-10-CM

## 2024-10-14 PROCEDURE — 97161 PT EVAL LOW COMPLEX 20 MIN: CPT

## 2024-10-14 PROCEDURE — 97110 THERAPEUTIC EXERCISES: CPT

## 2024-10-14 NOTE — PATIENT INSTRUCTIONS
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 She subsequently completed her Doctor of Physical Therapy from [employer] in 2013. Roopa’s experience is primarily with orthopedics, but also has experience in pediatrics as well.     Roopa has been a certified provider of the [employer] instrument-assisted soft tissue mobilization since 2015 & has further earned the title of [employer] Specialist in 2020. Roopa is continuing her passion for learning by pursuing training through the [facility] to effectively treat patients with pelvic floor related disorders. 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    Roopa thrives on treating patients with empathy & compassion to provide the individualized care that all patients need & deserve. Roopa aims to empower patients with the tools to feel in charge of their recovery, knowing they can advocate for themselves & achieve maximum success when they understand the treatments that work best for them.    When Roopa is not at work, she enjoys exercising with [employer] classes, scenic walks outside, baking, & traveling with her family.     Roopa is accepting new patients at the [facility] [place]. To schedule or change an appointment, call [phone]. To speak with Roopa, call [phone] or message on [employer].                    Access Code: [email or web address]  URL: [email or web address]  Date: 10/14/2024  Prepared by: Roopa Mena    Exercises  - Hooklying Clamshell with Resistance  - 1 x daily - 4 x weekly - 2 sets - 10 reps - 5 sec hold  - Supine Hip Adduction  Isometric with Ball  - 1 x daily - 4 x weekly - 2 sets - 10 reps - 5 sec hold

## 2024-10-14 NOTE — PROGRESS NOTES
LOWER EXTREMITY EVALUATION:     Diagnosis:   Primary osteoarthritis of right knee (M17.11)  Other old tear of medial meniscus of left knee (M23.204)  Primary osteoarthritis of left knee (M17.12)      Referring Provider: Mari Damon  Date of Evaluation:    10/14/2024    Precautions:  Pt reports some cognitive impairment/ cognitive functioning loss with slow processing speeds, some memory loss   Next MD visit:   none scheduled  Date of Surgery: n/a   Comments:  Eval and treat bilateral knees  ROM, gait training  HEP and modalities  2 times a week for 4 weeks    PATIENT SUMMARY   Cb Webster is a 64 year old female who presents to therapy today with complaints of B, R>L knee pain. Pt reports the R knee pain starts medial & inferior, traveling down medial shin to medial ankle (reports this is not sciatica). Pt reports the L knee pain starts superior aspect & lateral aspect. Both knees hurt on the posterior aspect. Some stiffness R knee only.    Of note, pt reports some cognitive impairment/ cognitive functioning loss with slow processing speeds, some memory loss (due to ECT for depression about 10 yrs ago). Pt reports patience & handouts will be helpful for her success in PT.    Pt describes pain level current L 2/10 R 3/10, at best L 2/10 R 3/10, at worst L 5/10 R 6/10. +Tylenol, ice  Current functional limitations include walking, stair negotiation, exercise (recumbent bike), carrying in groceries, bending down to pick items from floor, reaching forward for items, lateral movements including twisting to put items in , prolonged standing, prolonged long sitting positions, walking on uneven surfaces, car transfers    Cb describes prior level of function: cortisone injection L knee with hx arthroscopic surgery a year prior; reports the R knee was \"great\" but increased pain associated with summer vacation with a lot of stepping on a bus for a couple of days & pt feels the R knee may have  been compensating for the L knee. Denies DONOVAN/ falls: reports no pain while she was on vacation, but feels one of the walks when she got home \"did her in.\" Pt goals include \"I want to be active & able to manage my pain, preferably with something not prescription. If not pain-free able to ignore enough.\"  Past medical history was reviewed with Cb. Significant findings include  has a past medical history of Anxiety, Aortic regurgitation, Arthritis, Back pain, Calculus of kidney, Cardiac calcification (HCC) - CAC score of 5.24 seen on 12/2/22 CT Heart Scan protocol (11/16/2023), Colon polyps, Constipation, Depression, Diarrhea, unspecified, Disorder of liver, Diverticulosis, Dizziness (Last week), Fatigue, Flatulence/gas pain/belching, Food intolerance (2019), Frequent urination, Gastroparesis, GERD (gastroesophageal reflux disease), Headache disorder (2005?), Heart palpitations, Heartburn, Hemorrhoids, History of depression (1993), History of eating disorder (2020), Hoarseness, chronic, Indigestion, Leg swelling, Loss of appetite, Migraines, Mouth sores (2010), Nausea, OCD (obsessive compulsive disorder), MODE (obstructive sleep apnea) (2017), Pain in joints, Painful swallowing (2020), Parkinsonism (HCC), Peptic ulcer disease, Problems with swallowing (2020), Sleep disturbance (2023), Stool incontinence (10/23), Stress, Syncope (2 weeks), Tendonitis of shoulder, right (03/2019), Tremor, Vomiting blood, Wears glasses, Weight gain, and Weight loss. Reports  hx previous PT for LBP, RTC, hand    ASSESSMENT  Cb presents to physical therapy evaluation with primary c/o B, R>L knee pain/ discomfort. The results of the objective tests and measures show impaired posture, gait, balance, soft tissue mobility, joint mobility, strength, ROM. Functional deficits include but are not limited to walking, stair negotiation, exercise (recumbent bike), carrying in groceries, bending down to pick items from floor, reaching forward for  items, lateral movements including twisting to put items in , prolonged standing, prolonged long sitting positions, walking on uneven surfaces, car transfers. Signs and symptoms are consistent with diagnosis of Primary osteoarthritis of right knee (M17.11) Other old tear of medial meniscus of left knee (M23.204) Primary osteoarthritis of left knee (M17.12). Pt and PT discussed evaluation findings, pathology, POC and HEP. Pt voiced understanding and performs HEP correctly without reported pain. Skilled Physical Therapy is medically necessary to address the above impairments and reach functional goals.     OBJECTIVE:   Observation: wearing [employer] athletic shoes; without shoes: B pes planus, mild B genu varum, hesitance with turns  Palpation: TTP R>L medial tib-fem joint line; STRs & TTP along B ITB, glute med; no abnormal warmth to B knee; B calf supple & pain-free    ROM: (* denotes performed with pain)  Hip PROM Knee AROM   ER: R WNL; L min restricted  IR: R WNL; L min restricted Flexion: R 128; L 135  Extension: R 0; L 0     Accessory motion: mild to mod restricted B pat-fem all planes & tib-fem post/ IR    Flexibility: TBA    Strength/MMT: (* denotes performed with pain)  Hip Knee   Flexion: R 4/5; L 4+/5  Extension: R 4-/5; L 4-/5  Abduction: R 3+/5; L 3+/5  ER: R 4/5; L 4+/5 Flexion: R 4/5; L 4+/5  Extension: R 4+/5; L 4+/5        Gait: pt ambulates on level ground with decreased step length B, decreased arm swing, difficulty turning, trendelenburg/waddle, and hesitancy, decreased tho, mild crossover gait pattern intermittently noted; mild flexed knee gait  Balance: SLS: R 2 sec, L 4 sec    Today’s Treatment and Response:   Pt education was provided on exam findings, treatment diagnosis, treatment plan, expectations, and prognosis. Pt was also provided recommendations for possible soreness after evaluation, modalities as needed [ice/heat], importance of remaining active, and shoe wear. If pt opts  to use heat/ cold pt should check skin intermittently throughout & only with intact sensation, 10-15'    Patient was instructed in and issued a HEP for:   Access Code: [email or web address]  URL: [email or web address]  Date: 10/14/2024  Prepared by: Roopa Mena    Exercises  - Hooklying Clamshell with Resistance  - 1 x daily - 4 x weekly - 2 sets - 10 reps - 5 sec hold (green TheraBand)  - Supine Hip Adduction Isometric with Ball  - 1 x daily - 4 x weekly - 2 sets - 10 reps - 5 sec hold (or folded pillow)    Charges: PT Eval Low Complexity, Therex X 1      Total Timed Treatment: 15 min     Total Treatment Time: 45 min   Based on the clinical presentation, examination and history, this evaluation/ condition is stable and low complexity.     PLAN OF CARE:    Goals: (to be met in 12 visits)  Pt will demonstrate increased B hip ABD strength to 4/5 for improved stability during walking, increased mechanics with bending & lateral movements around the home  Pt will demonstrate increased B hip EXT strength to 4/5 for improved ability to effectively negotiate stairs  Pt will improve B SLS to 10-15 sec to improve safety with gait on uneven surfaces such as grass and gravel  Pt will report pain at worst to 3-4/10 B knee for improved tolerance to prolonged standing & sitting as well as car transfers  Pt will be independent and compliant with comprehensive HEP to maintain progress achieved in PT      Frequency / Duration: Patient will be seen for 1-2 x/week or a total of 12 visits over a 90 day period. Treatment will include: Gait training, Manual Therapy, Neuromuscular Re-education, Self-Care Home Management, Therapeutic Activities, Therapeutic Exercise, Home Exercise Program instruction, and Modalities to include: Electrical stimulation (unattended) and Ultrasound    Education or treatment limitation: Cognition (potential)  Rehab Potential:excellent    LEFS Score  LEFS Score: (Patient-Rptd) 33.75 % (10/13/2024  7:56  PM)    Patient/Family/Caregiver was advised of these findings, precautions, and treatment options and has agreed to actively participate in planning and for this course of care.    Thank you for your referral. Please co-sign or sign and return this letter via fax as soon as possible to [phone]. If you have any questions, please contact me at Dept: [phone]    Sincerely,  Electronically signed by therapist: Roopa Mena PT    Physician's certification required: Yes  I certify the need for these services furnished under this plan of treatment and while under my care.    X___________________________________________________ Date____________________    Certification From: 10/14/2024  To:1/12/2025

## 2024-10-18 ENCOUNTER — OFFICE VISIT (OUTPATIENT)
Dept: PHYSICAL THERAPY | Age: 64
End: 2024-10-18
Attending: PHYSICIAN ASSISTANT
Payer: MEDICARE

## 2024-10-18 PROCEDURE — 97110 THERAPEUTIC EXERCISES: CPT

## 2024-10-18 NOTE — PROGRESS NOTES
Diagnosis:   Primary osteoarthritis of right knee (M17.11)  Other old tear of medial meniscus of left knee (M23.204)  Primary osteoarthritis of left knee (M17.12)      Referring Provider: Mari Damon  Date of Evaluation:    10/14/2024    Precautions:   Pt reports some cognitive impairment/ cognitive functioning loss with slow processing speeds, some memory loss Next MD visit:   none scheduled  Date of Surgery: n/a   Insurance Primary/Secondary: [employer]ET[employer] [facility] / N/A     # Auth Visits: n/a            Subjective: Pt reports feeling better than she expected; yesterday walked for about a 30 min \"slow stroll\" with pain 4/10, iced, didn't push it. Did have pain Mon evening, took a pain pill    Pain: 2/10      Objective: See Flowsheet for details      Assessment: Emphasis on NWB strengthening & mobility work today with goal for pain levels to be controlled. Pt reported just muscle soreness end of visit. Cues with clams to avoid compensatory pelvic rocking.      Goals: (to be met in 12 visits)  Pt will demonstrate increased B hip ABD strength to 4/5 for improved stability during walking, increased mechanics with bending & lateral movements around the home  Pt will demonstrate increased B hip EXT strength to 4/5 for improved ability to effectively negotiate stairs  Pt will improve B SLS to 10-15 sec to improve safety with gait on uneven surfaces such as grass and gravel  Pt will report pain at worst to 3-4/10 B knee for improved tolerance to prolonged standing & sitting as well as car transfers  Pt will be independent and compliant with comprehensive HEP to maintain progress achieved in PT      Plan: Progression to WB activity as indicated. Ex's to consider: SLR, TKE, step up/ down; balance activity as tolerated. May update HEP next visit pending feedback  Date: 10/18/2024  TX#: 2/12 Date:                 TX#: 3/ Date:                 TX#: 4/ Date:                 TX#: 5/ Date:   TX#: 6/   Therex: 43'  NuStep L1 X 5'  HEP  review:  -hooklying hip Abd whit 5\" 2 X 10 Green TB  -hooklying hip Add whit 5\" 2 X 10 yellow ball  SB HS curl 3\", with B Q/S in ext 3\" X 15  SB bridge (ball under thighs, small range) X 10  SAQ over round bolster 3\" X 10 ea  Clams 2 X 10 ea  Education: expectations for how pt can feel from PT sessions: goal soreness vs pain; will initiate NWB in PT with progress to WB as tolerated.       HEP:   Access Code: [email or web address]  URL: [email or web address]  Date: 10/14/2024  Prepared by: Roopa Mena    Exercises  - Hooklying Clamshell with Resistance  - 1 x daily - 4 x weekly - 2 sets - 10 reps - 5 sec hold (green TheraBand)  - Supine Hip Adduction Isometric with Ball  - 1 x daily - 4 x weekly - 2 sets - 10 reps - 5 sec hold (or folded pillow)    Charges: Therex X 3       Total Timed Treatment: 43 min  Total Treatment Time: 43 min

## 2024-10-21 ENCOUNTER — OFFICE VISIT (OUTPATIENT)
Dept: PHYSICAL THERAPY | Age: 64
End: 2024-10-21
Attending: PHYSICIAN ASSISTANT
Payer: MEDICARE

## 2024-10-21 PROCEDURE — 97110 THERAPEUTIC EXERCISES: CPT

## 2024-10-21 NOTE — PATIENT INSTRUCTIONS
Access Code: [email or web address]  URL: [email or web address]  Date: 10/21/2024  Prepared by: Roopa Mena    Exercises  - Supine Short Arc Quad  - 1 x daily - 4 x weekly - 1 sets - 10 reps - 3 sec hold  - Standing Terminal Knee Extension at Wall with Ball  - 1 x daily - 4 x weekly - 1 sets - 10 reps - 5 sec hold  - Clamshell  - 1 x daily - 4 x weekly - 2 sets - 10 reps - 2 sec hold

## 2024-10-21 NOTE — PROGRESS NOTES
Diagnosis:   Primary osteoarthritis of right knee (M17.11)  Other old tear of medial meniscus of left knee (M23.204)  Primary osteoarthritis of left knee (M17.12)      Referring Provider: Mari Damon  Date of Evaluation:    10/14/2024    Precautions:   Pt reports some cognitive impairment/ cognitive functioning loss with slow processing speeds, some memory loss Next MD visit:   none scheduled  Date of Surgery: n/a   Insurance Primary/Secondary: [employer] MCR / N/A     # Auth Visits: n/a            Subjective: Pt reports was not sore after last visit & was not sore today either.    Pain: 0/10      Objective: See Flowsheet for details      Assessment: Pt reported more challenge on R vs L SLR. However more control with R vs L TKE. Cues for achieving TKE with fwd step ups as to not step up with maintaining the flexed knee. Pt reported the LE/ quad feeling sufficiently challenged with regular step ups with modifications with both reverse & traditional TKE in attempts to more appropriately challenge. Best response with traditional TKE. Based on feedback with shuttle DL press, pt may be able to increase weight next time. No pain with TX. Advised in potential of muscle soreness from new progressions including in WB.      Goals: (to be met in 12 visits)  Pt will demonstrate increased B hip ABD strength to 4/5 for improved stability during walking, increased mechanics with bending & lateral movements around the home  Pt will demonstrate increased B hip EXT strength to 4/5 for improved ability to effectively negotiate stairs  Pt will improve B SLS to 10-15 sec to improve safety with gait on uneven surfaces such as grass and gravel  Pt will report pain at worst to 3-4/10 B knee for improved tolerance to prolonged standing & sitting as well as car transfers  Pt will be independent and compliant with comprehensive HEP to maintain progress achieved in PT      Plan: Cont with WB progressions as indicated. Ex's to consider: lat  step up, step down; balance activity as tolerated.  Date: 10/18/2024  TX#: 2/12 Date: 10/21/2024  TX#: 3/12 Date:                 TX#: 4/ Date:                 TX#: 5/ Date:   TX#: 6/   Therex: 43'  NuStep L1 X 5'  HEP review:  -hooklying hip Abd whit 5\" 2 X 10 Green TB  -hooklying hip Add whit 5\" 2 X 10 yellow ball  SB HS curl 3\", with B Q/S in ext 3\" X 15  SB bridge (ball under thighs, small range) X 10  SAQ over round bolster 3\" X 10 ea  Clams 2 X 10 ea  Education: expectations for how pt can feel from PT sessions: goal soreness vs pain; will initiate NWB in PT with progress to WB as tolerated. Therex: 44'  NuStep L1 X 5'  SB HS curl 3\", with B Q/S in ext 3\" 2 X 15  SB bridge (ball under thighs, small range) 2 X 10  SLR X 10 ea  TKE with yellow ball at wall 5\" X 10 ea - HEP  Fwd 6 inch step up:  -with LLE leading: X 5  -with LLE leading: reverse TKE Green TB X 5  -with LLE leading: TKE Green TB X 5  -with RLE leading: TKE Green TB X 15  Shuttle:  -DL press 43# 3 X 10  -SL press 31# X 15 ea  HEP parameters: recommend 3 exercises, 10-15'/ day      HEP:   Access Code: [email or web address]  URL: [email or web address]  Date: 10/14/2024  Prepared by: Roopa eMna    Exercises  - Hooklying Clamshell with Resistance  - 1 x daily - 4 x weekly - 2 sets - 10 reps - 5 sec hold (green TheraBand)  - Supine Hip Adduction Isometric with Ball  - 1 x daily - 4 x weekly - 2 sets - 10 reps - 5 sec hold (or folded pillow)      Access Code: [email or web address]  URL: [email or web address]  Date: 10/21/2024  Prepared by: Roopa Mena    Exercises  - Supine Short Arc Quad  - 1 x daily - 4 x weekly - 1 sets - 10 reps - 3 sec hold  - Standing Terminal Knee Extension at Wall with Ball  - 1 x daily - 4 x weekly - 1 sets - 10 reps - 5 sec hold  - Clamshell  - 1 x daily - 4 x weekly - 2 sets - 10 reps - 2 sec hold    Charges: Therex X 3       Total Timed Treatment: 44 min  Total Treatment Time: 44 min

## 2024-10-23 ENCOUNTER — OFFICE VISIT (OUTPATIENT)
Dept: PHYSICAL THERAPY | Age: 64
End: 2024-10-23
Attending: PHYSICIAN ASSISTANT
Payer: MEDICARE

## 2024-10-23 PROCEDURE — 97110 THERAPEUTIC EXERCISES: CPT

## 2024-10-23 NOTE — PROGRESS NOTES
Diagnosis:   Primary osteoarthritis of right knee (M17.11)  Other old tear of medial meniscus of left knee (M23.204)  Primary osteoarthritis of left knee (M17.12)      Referring Provider: Mari Damon  Date of Evaluation:    10/14/2024    Precautions:   Pt reports some cognitive impairment/ cognitive functioning loss with slow processing speeds, some memory loss Next MD visit:   none scheduled  Date of Surgery: n/a   Insurance Primary/Secondary: [employer] MCR / N/A     # Auth Visits: n/a            Subjective: \" I have less soreness and less soreness in my knee since last visit and I feel like I have more ROM with stairs negotiation '.    Pain: 0/10      Objective: See Flowsheet for details      Assessment: initiated step ups to promote functional quad and glute strength and initiated balance activities to further progress lower extremities stability with gait with patient good tolerance .      Goals: (to be met in 12 visits)  Pt will demonstrate increased B hip ABD strength to 4/5 for improved stability during walking, increased mechanics with bending & lateral movements around the home  Pt will demonstrate increased B hip EXT strength to 4/5 for improved ability to effectively negotiate stairs  Pt will improve B SLS to 10-15 sec to improve safety with gait on uneven surfaces such as grass and gravel  Pt will report pain at worst to 3-4/10 B knee for improved tolerance to prolonged standing & sitting as well as car transfers  Pt will be independent and compliant with comprehensive HEP to maintain progress achieved in PT      Plan: Cont with WB progressions as indicated. Ex's to consider: lat step up, step down; balance activity as tolerated.  Date: 10/18/2024  TX#: 2/12 Date: 10/21/2024  TX#: 3/12 Date: 10/23/24              TX#: 4/12 Date:                 TX#: 5/ Date:   TX#: 6/   Therex: 43'  NuStep L1 X 5'  HEP review:  -hooklying hip Abd whit 5\" 2 X 10 Green TB  -hooklying hip Add whit 5\" 2 X 10 yellow  ball  SB HS curl 3\", with B Q/S in ext 3\" X 15  SB bridge (ball under thighs, small range) X 10  SAQ over round bolster 3\" X 10 ea  Clams 2 X 10 ea  Education: expectations for how pt can feel from PT sessions: goal soreness vs pain; will initiate NWB in PT with progress to WB as tolerated. Therex: 44'  NuStep L1 X 5'  SB HS curl 3\", with B Q/S in ext 3\" 2 X 15  SB bridge (ball under thighs, small range) 2 X 10  SLR X 10 ea  TKE with yellow ball at wall 5\" X 10 ea - HEP  Fwd 6 inch step up:  -with LLE leading: X 5  -with LLE leading: reverse TKE Green TB X 5  -with LLE leading: TKE Green TB X 5  -with RLE leading: TKE Green TB X 15  Shuttle:  -DL press 43# 3 X 10  -SL press 31# X 15 ea  HEP parameters: recommend 3 exercises, 10-15'/ day There ex : 40 min   NU step x 6 min , level 5  Supine :  SB bridging 2 x 10   SLR with QS   2 x 10  TKE on bolster with 2 lbs   2 x 10   Standing :  B gastroc stretch on slant board 5 x 30 sec hold   B heel raises x 20  B toes raises x 20  Shuttle :  B leg press with 50 lbs 2 x 10  R/L leg press with 31 lbs 2 x 10   Step up / lateral step up on 6 inch step   R/L TKE against yellow ball   2 x 10   Balance re-training :  Romberg stance with ball passing OH   5 attempts   Rocker board :  B LE balancing   A/P X 20  M/L X 20      HEP:   Access Code: [email or web address]  URL: [email or web address]  Date: 10/14/2024  Prepared by: Roopa Mena    Exercises  - Hooklying Clamshell with Resistance  - 1 x daily - 4 x weekly - 2 sets - 10 reps - 5 sec hold (green TheraBand)  - Supine Hip Adduction Isometric with Ball  - 1 x daily - 4 x weekly - 2 sets - 10 reps - 5 sec hold (or folded pillow)      Access Code: [email or web address]  URL: [email or web address]  Date: 10/21/2024  Prepared by: Roopa Mena    Exercises  - Supine Short Arc Quad  - 1 x daily - 4 x weekly - 1 sets - 10 reps - 3 sec hold  - Standing Terminal Knee Extension at Wall with Ball  - 1 x daily - 4 x weekly - 1 sets -  10 reps - 5 sec hold  - Clamshell  - 1 x daily - 4 x weekly - 2 sets - 10 reps - 2 sec hold    Charges: Therex X 3       Total Timed Treatment: 40 min  Total Treatment Time: 45 min

## 2024-10-25 NOTE — OPERATIVE REPORT
Klaus Branch is a 73 yo male patient who presents today for elective DCCV in setting of persistent Afib. He is anticoagluated with Eliquis and confirms no missed doses     There have been no  interim changes in his medical history since the office visit.     Treatment still indicated    LAST DOSE of Eliquis was taken this morning    There have been no medication changes.     Has been NPO since midnight    ALLERGIES Lisinopril     10pt ROS completed, negative except for fatigue  PE reveals irreg rate controlled rhythm, clear lungs, no edema                       History of Present Illness:  Klaus returns in f/u of CAD status post 2 CANDI placement on 12/18/2019 after an abnormal stress test showing apical akinesis, mixed hyperlipidemia, CPK elevation, hypertension and valvular heart disease.  Clinically he is stable from a cardiac point of view with no chest pain or pressure.  He does remain in rate controlled atrial fibrillation.  We had found this at his last office visit.  The exact duration of that is uncertain.  He was in a sinus rhythm when he was here in March.  His only symptom is some mild fatigue.  He has not had any chest pain pressure or symptoms of congestive failure.  He remains in atrial fibrillation at 74 per minute.  He has been checking his rhythm at home with a cardia mobile device coming up with possible atrial fibrillation each time.    We had started him on Eliquis 5 mg p.o. b.i.d. back on 09/11/2024.  He has not noted any bleeding problems.  He is tolerating other medications well.  His last lab work is noted below.  History of Present Illness (cont.):  Lab work from 09/04/2024 showed total cholesterol 123, triglycerides 67, HDL 47 and LDL 62 which is down from 71.  CPK mildly elevated at 224 with 196 being top-normal.  Transaminases were normal.  The remainder of his CMP is entirely normal with a creatinine 0.91 potassium 4.1.  He remains on rosuvastatin 20 mg p.o. daily and Zetia 10 mg p.o.  Michelle Murphy Patient Status:  Observation    1960 MRN [id number]   [facility] ENDOSCOPY Attending Elijah Knight MD   Hosp Day # 0 PCP Carlo Cronin MD       PREOPERATIVE DIAGNOSIS/INDICATION: Hematemesis  POSTOPERTATIV Ok to d/c from GI standpoint.      Amalia Carter  Gastroenterology/Advanced Endoscopy  [facility] daily.  He has not feeling any specific myalgias.    Past Medical and Surgical Histories   Past Medical History (reviewed - no changes required):   Hypertension  Hyperlipidemia  GERD  Hurley's esophagus  CAD  -lad stenting (2019)  Moderna x2 +booster  COVID-early 2023 and mid 2024  Past Surgical History (reviewed - no changes required):   -vasectomy  -lumbar laminectomy  2008 shoulder ACL  -urolift    Social History     Smoked Tobacco Usage    Smoking Status:  Former    Detailed Status:  Former smoker    Smokeless Tobacco Usage     Smokeless Status:   Never    Alcohol Usage     Alcohol Status:  Current    He drinks wine socially.    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  Family History (reviewed - no changes required):   Father- at 58 with congestive heart failure, MI at 55  Mother  of CVA at 85  Female sibling 69-alive and well  His father had 6 siblings all of whom  with hypertension and/or coronary disease    Cardiac Catheterization History   Past Cath History:   2018-echo-LVEF 65%, normal wall motion, grade 1 diastolic dysfunction, thickened mitral leaflets, mild MR, mild TR    2019-exercise nuclear perfusion images are abnormal consistent with inferior scar and mild ischemia in the apical segment. Above average exercise tolerance. Patient developed chest discomfort and borderline inferolateral  ischemic EKG changes at peak exercise. The left ventricular ejection fraction is low normal (53%).  No TID. Compared to previous nuclear stress test report of 2015 inferior fixed defect was present, reversible apical defect is new.    2019-cardiac catheterization-left main  normal, lad long lesion approximately 60% though at 95% in distal portion of lesion, D1 proximal 90% lesion, circumflex proximal 30% disease, RCA 80% PDA, LVEF 60%, LVEDP 11 mm of mercury.  Lad stented with 2 Synergy drug-eluting stents       Review of Systems   General: Denies sweats, chills, fevers, weight gain and fatigue.   Ears/Nose/Throat: Denies nosebleeds.   Cardiovascular: Denies chest pain at rest, chest pain with exercise, palpitations, peripheral edema and dyspnea on exertion.   Respiratory: Denies hemoptysis, productive cough, dry cough, dyspnea at rest and shortness of breath.   Gastrointestinal: Denies hematemesis, melena and blood in stool.   Genitourinary: Denies hematuria.   Genitourinary: Denies hematuria.   Musculoskeletal: Denies myalgias.   Neurologic: Denies pre-syncope, dizziness and syncope.   Heme/Lymphatic: Denies abnormal bruising and bleeding.     Physical Exam  I have examined the patient and concur with documented physical findings.  Vital Signs:     Height: 71 inches  (03/12/2024).    Weight: 208.6 pounds    Pulse rhythm: regular    BP cuff size:  Standard    BP: 120/74 mmHg HR: 74 bpm.    Body Mass Index: 29.20    Body Surface Area: 2.15 m2.  General: Well developed and well nourished.   Eyes: MARK, conjunctiva and sclera clear and no xanthomas.   Neck: Supple, no adenopathy and no thyromegaly.   Lungs: Clear to auscultation.   Chest: No obvious deformity.   Heart: S1 and S2 physiologic and PMI not displaced. Positive for irreg/irreg controlled response. Examination of neck veins reveals no neck vein distention.  Carotid artery examination reveals no carotid bruits.    Extremities: There is no peripheral edema cyanosis or clubbing.   Pulses: Pedal pulses are normal bilaterally and radial pulses are normal bilaterally.   Abdomen: Normoactive bowel sounds and soft and non-tender.   Musculoskeletal: Normal gait and normal strength and tone.   Skin: Warm and dry and no rashes.    Neurologic: Alert, oriented x 3 and appropriate affect.       Resting Electrocardiogram  ECG ordered and personally reviewed:   ECG Date:  10/14/2024   Atrial fibrillation   Heart Rate: 74 bpm.  QRS: 104 msec. QT: 380 msec.   ECG Comments: When compared , ECG of 11-Sep-2024 08:46,, No significant change was found   Summary: Abnormal tracing.      Medications:   aspirin 81 mg tablet,delayed release (DR/EC) (aspirin) Take 1 tablet once a day   alfuzosin 10 mg tablet extended release 24 hr (alfuzosin) Take 1 tablet once a day   * ACID REDUCER 20 MG TBEC (OMEPRAZOLE MAGNESIUM) 1 tablet by mouth once a day   ezetimibe 10 mg tablet (ezetimibe) TAKE 1 TABLET BY MOUTH ONCE  DAILY  losartan 25 mg tablet (losartan) TAKE 1 TABLET BY MOUTH ONCE  DAILY  metoprolol succinate ER 50 mg tablet,extended release 24 hr (metoprolol succinate) Take 1 tablet by mouth once a day   rosuvastatin 20 mg tablet (rosuvastatin) Take 1 tablet by mouth every evening  Eliquis 5 mg tablet (apixaban) Take 1 tablet by mouth twice a day TAKE 1 TABLET BY MOUTH TWICE A DAY  Medications reviewed today.    Medications list verified with patient by Juan Carlos Boswell M.D..    Allergies, Intolerances and/or Therapeutic Variances:    LISINOPRIL (LISINOPRIL TABS)  [DRUG]  (Onset: 06/03/2019) Critical: dry cough  Allergies and adverse reactions reviewed today.      Problems:   ATRIAL FIBRILLATION, PAROXYSMAL (ICD-427.31) (BUU16-I99.0)  ANTICOAGULATION (ICD-V58.61) (FVO29-E06.01)  CAD (ICD-411.1) (RZI38-I44.110)      LAD PTCA / SYNERGY CANDI X 2 (12/18/19) (ICD-V45.82) (HEZ97-G54.61)  HYPERTENSIVE HEART DISEASE, W/O HF (ICD-402.00) (BRV91-N17.9)  HYPERLIPIDEMIA, MIXED (ICD-272.2) (LJE04-N20.2)      MYALGIAS (ICD-729.1) (WZA25-B54.1)  CPK ABNORMAL (ICD-790.5) (RXH62-L96.8)  AORTIC STENOSIS (ICD-424.1) (VUK07-B77.0)  MITRAL REGURGITATION (ICD-424.0) (LQI04-V45.0)  GERD (ICD-530.81) (ATJ89-N25.9)  Problems reviewed today.      Impression:  1)  Atrial Fibrillation:  Paroxysmal :  He remains in rate controlled atrial fibrillation.  Actually this was not even noticeable to him until we found that at the last visit.  He does note some mild fatigue.  His cardia mobile device at home has suggested he was in atrial fibrillation regularly.  He is rate controlled.  We restarted Eliquis at the last visit.  I am going to give him another 2 more weeks on Eliquis and then bring him in for elective outpatient cardioversion.  Hopefully that will be effective converting him back to sinus rhythm on his current medications.  If he fails to convert or does not maintain sinus rhythm we would have to consider an antiarrhythmic such as amiodarone.  Another option would be to consider ablation and I would have him see EP.  We will set him up for later this month for cardioversion.    2)  Anticoagulation :  Remains anticoagulated with Eliquis 5 mg p.o. b.i.d..  He has been consistent taking this.  I cautioned him not to miss any doses    3)  CAD :  No active anginal symptoms.  He is coming up on 5 years out from his LAD stent.    4)  LAD PTCA / Synergy Tony X 2 (12/18/19) :  As above.  Symptom-free.     5)  Hypertensive Heart Disease: W/o Hf :  Blood pressure is well controlled.  Same medications    6)  Hyperlipidemia: Mixed :  See last lab work above.  LDL was 62 with an HDL 47.  Same medications for now as CK was slightly elevated at 224.    7)  Cpk Abnormal :  As above.  We are going to recheck his CPK with next labs    8)  Aortic Stenosis :  He had an echo 12/06/2023 then with 65% ejection fraction, segmentally normal wall motion, LA 4.2 cm, mildly dilated RA.  There was mild aortic stenosis with peak/mean gradients 18/9 mmHg.  Mitral regurgitation was also mild.    9)  Mitral Regurgitation :  Mild MR as above        [facility] recommends that Mr. Klaus Branch follow-up with their primary care physician for evaluation and management of other medical conditions. If  the patient does not have a primary care provider, our office is willing to help the patient find a primary provider.        Return visit requested: 4 week(s) as a preferred in office encounter with the Physician.    Medical Decision Making: Review Test Results,Order Unique Test    This visit also reflects time, intensity and resources involved in furnishing a visit which facilitates a longitudinal relationship with this patient. This allows me to address their needs with consistency and continuity over time.                  Electronically Signed by Juan Carlos Boswell M.D. on 10/14/2024 at 10:08 AM

## 2024-10-26 ENCOUNTER — PATIENT MESSAGE (OUTPATIENT)
Dept: FAMILY MEDICINE CLINIC | Facility: CLINIC | Age: 64
End: 2024-10-26

## 2024-10-26 DIAGNOSIS — E66.09 CLASS 2 OBESITY DUE TO EXCESS CALORIES WITHOUT SERIOUS COMORBIDITY WITH BODY MASS INDEX (BMI) OF 39.0 TO 39.9 IN ADULT: ICD-10-CM

## 2024-10-26 DIAGNOSIS — E66.812 CLASS 2 OBESITY DUE TO EXCESS CALORIES WITHOUT SERIOUS COMORBIDITY WITH BODY MASS INDEX (BMI) OF 39.0 TO 39.9 IN ADULT: ICD-10-CM

## 2024-10-28 RX ORDER — PHENTERMINE HYDROCHLORIDE 15 MG/1
15 CAPSULE ORAL EVERY MORNING
Qty: 30 CAPSULE | Refills: 0 | Status: SHIPPED | OUTPATIENT
Start: 2024-10-28

## 2024-10-28 NOTE — TELEPHONE ENCOUNTER
Requesting Phentermine 15mg  Last OV: 6/6/24  RTC: 3 months  Last Rx'd 8/19/24 30 with 0 refills    Future Appointments   Date Time Provider Department Center   10/29/2024  2:45 PM Roopa Mena, PT  PT [place]   10/31/2024 10:00 AM Jackelin Maloney MD [facility] [address]   12/2/2024  1:10 PM James Betancourt MD [facility] [place]       Per [place] , last dispensed 9/30/24 #30    Controlled med:  Rx pended and routed for approval/denial

## 2024-10-29 ENCOUNTER — OFFICE VISIT (OUTPATIENT)
Dept: PHYSICAL THERAPY | Age: 64
End: 2024-10-29
Attending: PHYSICIAN ASSISTANT
Payer: MEDICARE

## 2024-10-29 PROCEDURE — 97110 THERAPEUTIC EXERCISES: CPT | Performed by: PHYSICAL THERAPIST

## 2024-10-29 NOTE — PATIENT INSTRUCTIONS
Access Code: [email or web address]  URL: [email or web address]  Date: 10/29/2024  Prepared by: Roopa Mena    Exercises  - Supine Piriformis Stretch with Foot on Ground  - 1 x daily - 7 x weekly - 3 sets - 30 sec hold  - Seated Figure 4 Piriformis Stretch  - 1 x daily - 7 x weekly - 3 sets - 30 sec hold

## 2024-10-29 NOTE — PROGRESS NOTES
Diagnosis:   Primary osteoarthritis of right knee (M17.11)  Other old tear of medial meniscus of left knee (M23.204)  Primary osteoarthritis of left knee (M17.12)      Referring Provider: Mari Damon  Date of Evaluation:    10/14/2024    Precautions:   Pt reports some cognitive impairment/ cognitive functioning loss with slow processing speeds, some memory loss Next MD visit:   none scheduled  Date of Surgery: n/a   Insurance Primary/Secondary: [employer] [facility] / N/A     # Auth Visits: n/a            Subjective: Describes R buttock pain - Had some muscle soreness after last PT session, nothing severe. Sat completed HEP, was last doing the clams & at end had some 5/10 R medial knee pain, iced, after about 24 hours seemed to recover but the glute pain getting progressively more uncomfortable since Thurs. Has not done anything the past two days. Used a few heat patches, heating pad. Maybe relief? Not sure. Reports similar pain on the L before. Not sure if muscle or nerve. Feels tight.    Pain: 3/10      Objective: See Flowsheet for details      Assessment: R buttock pain may be due to increased strengthening to the glutes creating some muscle tightness/ tension; added in self-piriformis/ glute stretches with pt reporting feeling loosened, discomfort not pain afterwards. Cont with increased LE stretching/ mobility work to further supplement. Pt reported overall feeling better throughout visit than at start with increased mobility. Normal/ typical muscular fatigue at end of visit.      Goals: (to be met in 12 visits)  Pt will demonstrate increased B hip ABD strength to 4/5 for improved stability during walking, increased mechanics with bending & lateral movements around the home  Pt will demonstrate increased B hip EXT strength to 4/5 for improved ability to effectively negotiate stairs  Pt will improve B SLS to 10-15 sec to improve safety with gait on uneven surfaces such as grass and gravel  Pt will report pain at  worst to 3-4/10 B knee for improved tolerance to prolonged standing & sitting as well as car transfers  Pt will be independent and compliant with comprehensive HEP to maintain progress achieved in PT      Plan: Cont with WB progressions as indicated. Ex's to consider: lat step up, step down; balance activity as tolerated. May update HEP for add'l stretches pending pt feedback  (Pt advised that if gap in care 2/2 wait list can reach out to PT with any non-urgent ?s/ concerns during this time)  Date: 10/18/2024  TX#: 2/12 Date: 10/21/2024  TX#: 3/12 Date: 10/23/24              TX#: 4/12 Date: 10/29/2024  TX#: 5/12 Date:   TX#: 6/   Therex: 43'  NuStep L1 X 5'  HEP review:  -hooklying hip Abd whit 5\" 2 X 10 Green TB  -hooklying hip Add whit 5\" 2 X 10 yellow ball  SB HS curl 3\", with B Q/S in ext 3\" X 15  SB bridge (ball under thighs, small range) X 10  SAQ over round bolster 3\" X 10 ea  Clams 2 X 10 ea  Education: expectations for how pt can feel from PT sessions: goal soreness vs pain; will initiate NWB in PT with progress to WB as tolerated. Therex: 44'  NuStep L1 X 5'  SB HS curl 3\", with B Q/S in ext 3\" 2 X 15  SB bridge (ball under thighs, small range) 2 X 10  SLR X 10 ea  TKE with yellow ball at wall 5\" X 10 ea - HEP  Fwd 6 inch step up:  -with LLE leading: X 5  -with LLE leading: reverse TKE Green TB X 5  -with LLE leading: TKE Green TB X 5  -with RLE leading: TKE Green TB X 15  Shuttle:  -DL press 43# 3 X 10  -SL press 31# X 15 ea  HEP parameters: recommend 3 exercises, 10-15'/ day There ex : 40 min   NU step x 6 min , level 5  Supine :  SB bridging 2 x 10   SLR with QS   2 x 10  TKE on bolster with 2 lbs   2 x 10   Standing :  B gastroc stretch on slant board 5 x 30 sec hold   B heel raises x 20  B toes raises x 20  Shuttle :  B leg press with 50 lbs 2 x 10  R/L leg press with 31 lbs 2 x 10   Step up / lateral step up on 6 inch step   R/L TKE against yellow ball   2 x 10   Balance re-training :  Romberg stance  with ball passing OH   5 attempts   Rocker board :  B LE balancing   A/P X 20  M/L X 20  Therex: 42'  NuStep L4 X 5'  Discussion of POC: pt would like to cont with PT past today (last visit currently scheduled)  Supine crossover piriformis stretch 3 X 30\" R - HEP  Seated figure-4 piriformis stretch 3 X 30\" R - HEP  HEP modification: complete clams 3-4x/ week \"too much of a good thing\"  Standing B gastroc & soleus stretch on slant board 5 X 30\" ea  Supine LTR 5\" hold X 3' total (wide feet)  Seated R/L HS stretch 3 X 30\" ea  Standing flat back stretch tableside 3 X 30\"  Standing R/L hip Add stretch tableside 3 X 30\" ea    HEP:   Access Code: [email or web address]  URL: [email or web address]  Date: 10/14/2024  Prepared by: Roopa Mena    Exercises  - Hooklying Clamshell with Resistance  - 1 x daily - 4 x weekly - 2 sets - 10 reps - 5 sec hold (green TheraBand)  - Supine Hip Adduction Isometric with Ball  - 1 x daily - 4 x weekly - 2 sets - 10 reps - 5 sec hold (or folded pillow)      Access Code: [email or web address]  URL: [email or web address]  Date: 10/21/2024  Prepared by: Roopa Mena    Exercises  - Supine Short Arc Quad  - 1 x daily - 4 x weekly - 1 sets - 10 reps - 3 sec hold  - Standing Terminal Knee Extension at Wall with Ball  - 1 x daily - 4 x weekly - 1 sets - 10 reps - 5 sec hold  - Clamshell  - 1 x daily - 4 x weekly - 2 sets - 10 reps - 2 sec hold    Access Code: [email or web address]  URL: [email or web address]  Date: 10/29/2024  Prepared by: Roopa Mena    Exercises  - Supine Piriformis Stretch with Foot on Ground  - 1 x daily - 7 x weekly - 3 sets - 30 sec hold  - Seated Figure 4 Piriformis Stretch  - 1 x daily - 7 x weekly - 3 sets - 30 sec hold    Charges: Therex X 3       Total Timed Treatment: 42 min  Total Treatment Time: 42 min

## 2024-11-04 ENCOUNTER — TELEPHONE (OUTPATIENT)
Dept: NEUROLOGY | Facility: CLINIC | Age: 64
End: 2024-11-04

## 2024-11-04 NOTE — TELEPHONE ENCOUNTER
Spoke to patient and advised regarding botox.     1 vial =  200 units    Patient verbalized understanding.

## 2024-11-04 NOTE — TELEPHONE ENCOUNTER
Patient contacted the office looking to speak with a nurse regarding the current Botox prescription. She wanted to \"know the quantity of the vials.\"  Please contact and advise.

## 2024-11-05 ENCOUNTER — OFFICE VISIT (OUTPATIENT)
Dept: FAMILY MEDICINE CLINIC | Facility: CLINIC | Age: 64
End: 2024-11-05
Payer: MEDICARE

## 2024-11-05 VITALS
BODY MASS INDEX: 32.85 KG/M2 | HEIGHT: 61.25 IN | HEART RATE: 82 BPM | OXYGEN SATURATION: 98 % | RESPIRATION RATE: 18 BRPM | DIASTOLIC BLOOD PRESSURE: 60 MMHG | SYSTOLIC BLOOD PRESSURE: 110 MMHG | TEMPERATURE: 98 F | WEIGHT: 176.25 LBS

## 2024-11-05 DIAGNOSIS — M54.16 LUMBAR RADICULOPATHY: ICD-10-CM

## 2024-11-05 DIAGNOSIS — E66.812 CLASS 2 OBESITY DUE TO EXCESS CALORIES WITHOUT SERIOUS COMORBIDITY WITH BODY MASS INDEX (BMI) OF 39.0 TO 39.9 IN ADULT: ICD-10-CM

## 2024-11-05 DIAGNOSIS — E66.09 CLASS 2 OBESITY DUE TO EXCESS CALORIES WITHOUT SERIOUS COMORBIDITY WITH BODY MASS INDEX (BMI) OF 39.0 TO 39.9 IN ADULT: ICD-10-CM

## 2024-11-05 DIAGNOSIS — M47.26 OSTEOARTHRITIS OF SPINE WITH RADICULOPATHY, LUMBAR REGION: ICD-10-CM

## 2024-11-05 DIAGNOSIS — G57.01 PIRIFORMIS SYNDROME OF RIGHT SIDE: ICD-10-CM

## 2024-11-05 PROCEDURE — 99214 OFFICE O/P EST MOD 30 MIN: CPT | Performed by: STUDENT IN AN ORGANIZED HEALTH CARE EDUCATION/TRAINING PROGRAM

## 2024-11-05 RX ORDER — PHENTERMINE HYDROCHLORIDE 15 MG/1
15 CAPSULE ORAL EVERY MORNING
Qty: 30 CAPSULE | Refills: 0 | Status: SHIPPED | OUTPATIENT
Start: 2024-11-26 | End: 2024-12-26

## 2024-11-05 RX ORDER — PHENTERMINE HYDROCHLORIDE 15 MG/1
15 CAPSULE ORAL EVERY MORNING
Qty: 30 CAPSULE | Refills: 0 | Status: SHIPPED | OUTPATIENT
Start: 2024-12-25 | End: 2025-01-24

## 2024-11-05 RX ORDER — CYCLOBENZAPRINE HCL 10 MG
10 TABLET ORAL 3 TIMES DAILY PRN
Qty: 90 TABLET | Refills: 0 | Status: SHIPPED | OUTPATIENT
Start: 2024-11-05

## 2024-11-05 RX ORDER — PHENTERMINE HYDROCHLORIDE 15 MG/1
15 CAPSULE ORAL EVERY MORNING
Qty: 30 CAPSULE | Refills: 0 | Status: SHIPPED | OUTPATIENT
Start: 2025-01-23 | End: 2025-02-22

## 2024-11-05 RX ORDER — FLUOXETINE 10 MG/1
CAPSULE ORAL
COMMUNITY
Start: 2024-10-12

## 2024-11-05 NOTE — PROGRESS NOTES
Subjective:      Chief Complaint   Patient presents with    Medication Follow-Up     Would like to discuss stopping Topamax    Pain     Buttock pain - has been doing PT to help with knees     HISTORY OF PRESENT ILLNESS  HPI  HPI obtained per patient report.  Cb Webster is a pleasant 64 year old female presenting for follow-up.   She wishes to taper off of topamax due to wishing to take less medications. She self-reduced the dose to 25 mg every other day.   She also reports R buttock pain radiating down her R leg that began after starting PT for her knee pain. It is constant, described as aching, and rated 7/10 in severity. It is improved by heat application. She denies associated numbness/tingling/weakness. Her knee pain has been improving with PT, however.     PAST PATIENT HISTORY  Past Medical History:    Anxiety    Aortic regurgitation    mild    Arthritis    Back pain    Calculus of kidney    Cardiac calcification (HCC) - CAC score of 5.24 seen on 12/2/22 CT Heart Scan protocol    Colon polyps    Constipation    Depression    Diarrhea, unspecified    Disorder of liver    Fatty liver    Diverticulosis    Dizziness    Fatigue    Flatulence/gas pain/belching    Food intolerance    Frequent urination    Gastroparesis    GERD (gastroesophageal reflux disease)    Headache disorder    Migraines    Heart palpitations    Heartburn    Hemorrhoids    History of depression    Major depressive disorder & anxiety    History of eating disorder    Binge eating    Hoarseness, chronic    Indigestion    Leg swelling    Loss of appetite    Migraines    Mouth sores    Triggered by stress or poor diet    Nausea    OCD (obsessive compulsive disorder)    MODE (obstructive sleep apnea)    Pain in joints    Painful swallowing    Parkinsonism (HCC)    Peptic ulcer disease    Problems with swallowing    Sleep disturbance    High serotonin    Stool incontinence    Soft barely formed stool, not detecting urgency    Stress    Syncope     Tendonitis of shoulder, right    Tremor    Vomiting blood    Wears glasses    Weight gain    10 lbs in 2 months - stop phentermine and decreased excercise after arthroscope of left knee    Weight loss    28 lb last year     Past Surgical History:   Procedure Laterality Date          x 2    Cholecystectomy      Colonoscopy      D & c      Ect provided  [id number]    Egd      Laparoscopic cholecystectomy      Needle biopsy right  2015    benign breast    Other surgical history      C3-C4 anterior discectomy    Other surgical history      [facility]    Other surgical history  2019    radiofrequency abalsion lumbar    Removal gallbladder  2020    Skin surgery      Spine surgery procedure unlisted      Anterior cervical discectomy    Tonsillectomy  1966?       CURRENT MEDICATIONS  Medications Taking[1]    HEALTH MAINTENANCE  Immunization History   Administered Date(s) Administered    Covid-19 Vaccine Pfizer 30 mcg/0.3 ml 2021, 2021, 10/23/2021    Covid-19 Vaccine Pfizer Bivalent 30mcg/0.3mL 2022    Covid-19 Vaccine Pfizer Merrill-Sucrose 30 mcg/0.3 ml 2022    FLULAVAL 6 months & older 0.5 ml Prefilled syringe ([id number]) 10/23/2017, 2018, 10/01/2019, 10/07/2020, 2021, 10/28/2022    FLUZONE 6 months and older PFS 0.5 ml ([id number]) 10/23/2017, 2018, 10/13/2023    Influenza 2024    Pfizer Covid-19 Vaccine 30mcg/0.3ml 12yrs+ 10/13/2023, 2024    RSV, recombinant, RSVpreF, adjuvanted (Arexvy) 2023    TDAP 2018    Zoster Vaccine Recombinant Adjuvanted (Shingrix) 2021, 2021       ALLERGIES AND DRUG REACTIONS  Allergies[2]    Family History   Problem Relation Age of Onset    Hypertension Father     Heart Attack Father          at 48 years    Heart Disease Father     Alcohol and Other Disorders Associated Father     Depression Father     Other (ALS) Mother     Hypertension Mother         Diagnosed at 91 yo with ALS  at 91     Personality Disorder Daughter     Dementia Maternal Grandmother     Obesity Maternal Grandmother     Dementia Maternal Grandfather     Obesity Paternal Grandfather     Cancer Sister         Kidney, chronic lymp… leukemia    Heart Attack Sister         Kidney cancer, chronic lymphocytic leukemia    Ulcerative Colitis Brother     Cancer Brother         Kidney     Social History     Socioeconomic History    Marital status:    Occupational History    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[employer]   Tobacco Use    Smoking status: Never    Smokeless tobacco: Never   Vaping Use    Vaping status: Never Used   Substance and Sexual Activity    Alcohol use: Not Currently     Comment: 6 drinks or less/year    Drug use: Never   Other Topics Concern    Caffeine Concern No    Exercise Yes    Seat Belt Yes    Special Diet No    Stress Concern Yes    Weight Concern Yes   Social History Narrative    Caring for grandson (Peter - aged 4 yo [1/2020])       Review of Systems   All other systems reviewed and are negative.         Objective:      /60   Pulse 82   Temp 97.5 °F (36.4 °C) (Temporal)   Resp 18   Ht 5' 1.25\" (1.556 m)   Wt 176 lb 4 oz (79.9 kg)   LMP  (LMP Unknown)   SpO2 98%   BMI 33.03 kg/m²   Body mass index is 33.03 kg/m².    Physical Exam  Vitals reviewed.   Constitutional:       General: She is not in acute distress.     Appearance: She is not ill-appearing, toxic-appearing or diaphoretic.   HENT:      Head: Normocephalic and atraumatic.   Cardiovascular:      Rate and Rhythm: Normal rate.   Pulmonary:      Effort: Pulmonary effort is normal.   Musculoskeletal:         General: No swelling, tenderness or deformity. Normal range of motion.      Right lower leg: No edema.      Left lower leg: No edema.      Comments: Straight leg raise test and log roll test positive on the right   Neurological:      General: No focal deficit present.      Mental Status: She  is alert and oriented to person, place, and time.            Assessment and Plan:      1. Body mass index 39.0-39.9, adult (Primary)  -     Phentermine HCl; Take 1 capsule (15 mg total) by mouth every morning.  Dispense: 30 capsule; Refill: 0  -     Phentermine HCl; Take 1 capsule (15 mg total) by mouth every morning.  Dispense: 30 capsule; Refill: 0  -     Phentermine HCl; Take 1 capsule (15 mg total) by mouth every morning.  Dispense: 30 capsule; Refill: 0  2. Class 2 obesity due to excess calories without serious comorbidity with body mass index (BMI) of 39.0 to 39.9 in adult  -     Phentermine HCl; Take 1 capsule (15 mg total) by mouth every morning.  Dispense: 30 capsule; Refill: 0  -     Phentermine HCl; Take 1 capsule (15 mg total) by mouth every morning.  Dispense: 30 capsule; Refill: 0  -     Phentermine HCl; Take 1 capsule (15 mg total) by mouth every morning.  Dispense: 30 capsule; Refill: 0  3. Lumbar radiculopathy  -     Cyclobenzaprine HCl; Take 1 tablet (10 mg total) by mouth 3 (three) times daily as needed for Muscle spasms.  Dispense: 90 tablet; Refill: 0  -     OP REFERRAL TO [facility]  4. Piriformis syndrome of right side  -     Cyclobenzaprine HCl; Take 1 tablet (10 mg total) by mouth 3 (three) times daily as needed for Muscle spasms.  Dispense: 90 tablet; Refill: 0  -     OP REFERRAL TO [facility]  5. Osteoarthritis of spine with radiculopathy, lumbar region  -     Cyclobenzaprine HCl; Take 1 tablet (10 mg total) by mouth 3 (three) times daily as needed for Muscle spasms.  Dispense: 90 tablet; Refill: 0  -     OP REFERRAL TO [facility]    Return in about 4 months (around 3/5/2025) for follow-up.    BMI>39  - she is down another 16 lbs since June  - she is happy with her progress during her weight loss journey and wishes to stop topamax. She has already self-reduced the dose to 25 mg every other day  - recommended taking topamax half  of 1 tablet every other day for 1 week, then stopping this medication   - she is tolerating phentermine well and wishes to continue this medication, so her prescriptions were renewed for another 3 months for phentermine   - she notes that her personal goal weight is about 138 lbs    Lumbar radiculopathy, piriformis syndrome  - lumbar spine XR 10/2021 showed lumbar DJD  - recommended activity modification, local heat application, PT, and continuation of flexeril as needed  - she may also apply topical voltaren gel and/or topical lidocaine OTC as needed for her pain    Patient verbalized understanding of assessment and recommendations. All questions and concerns were addressed.    Electronically signed by Colton Schaefer MD         [1]   Outpatient Medications Marked as Taking for the 11/5/24 encounter (Office Visit) with Colton Schaefer MD   Medication Sig Dispense Refill    FLUoxetine 10 MG Oral Cap TAKE 1 CAPSULE BY MOUTH EVERY MORNING TAKE ALONG WITH THE 40MG CAPS FOR TOTAL DOSE OF 50MG DAILY      [START ON 11/26/2024] Phentermine HCl 15 MG Oral Cap Take 1 capsule (15 mg total) by mouth every morning. 30 capsule 0    [START ON 12/25/2024] Phentermine HCl 15 MG Oral Cap Take 1 capsule (15 mg total) by mouth every morning. 30 capsule 0    [START ON 1/23/2025] Phentermine HCl 15 MG Oral Cap Take 1 capsule (15 mg total) by mouth every morning. 30 capsule 0    cyclobenzaprine 10 MG Oral Tab Take 1 tablet (10 mg total) by mouth 3 (three) times daily as needed for Muscle spasms. 90 tablet 0    topiramate 25 MG Oral Tab Take 1 tablet (25 mg total) by mouth daily.      FLUoxetine HCl 40 MG Oral Cap Take 1 capsule (40 mg total) by mouth daily.      pantoprazole 40 MG Oral Tab EC Take 1 tablet (40 mg total) by mouth before breakfast. 90 tablet 3    metoclopramide 5 MG Oral Tab Take 1 tablet (5 mg total) by mouth daily. 30 tablet 2    famotidine 40 MG Oral Tab Take 1 tablet (40 mg total) by mouth daily as needed for Heartburn. 90  tablet 3    dicyclomine 10 MG Oral Cap Take 1 capsule (10 mg total) by mouth in the morning and 1 capsule (10 mg total) before bedtime. 180 capsule 3    memantine 10 MG Oral Tab Take 1 tablet (10 mg total) by mouth daily. 90 tablet 0    SUMAtriptan Succinate 6 MG/0.5ML Subcutaneous Solution Auto-injector Inject 0.5 mL (6 mg total) into the skin as needed (for moderate to severe migraine). 6 mL 2    PROPRANOLOL HCL ER 80 MG Oral Capsule SR 24 Hr TAKE 1 CAPSULE (80 MG TOTAL) BY MOUTH EVERY EVENING. 90 capsule 1    traZODone 100 MG Oral Tab Take 1 tablet (100 mg total) by mouth nightly.      rosuvastatin 5 MG Oral Tab Take 1 tablet (5 mg total) by mouth Every Monday, Wednesday, and Friday. 45 tablet 3    butalbital-acetaminophen-caffeine -40 MG Oral Tab       SPRAVATO, 84 MG DOSE, 28 MG/DEVICE Nasal Solution Therapy Pack 84 mg by Nasal route every 21 days.      triamcinolone 0.1 % External Cream Apply thin layer to affected area twice a day as needed 45 g 1    hydrocortisone 2.5 % External Cream Apply to AA of FACE BID x 2 weeks, hold 2 weeks, repeat PRN. 30 g 2    ketoconazole 2 % External Cream Apply to AA of FACE BID when not using Hydrocortisone Cream. 30 g 2    LORazepam 2 MG Oral Tab Take 1 tablet (2 mg total) by mouth as needed.      busPIRone HCl 10 MG Oral Tab 1 1/2  tab in the morning and 1 1/2 in the afternoon     [2]   Allergies  Allergen Reactions    Sulfa Antibiotics NAUSEA ONLY

## 2024-11-08 ENCOUNTER — TELEPHONE (OUTPATIENT)
Dept: PHYSICAL THERAPY | Facility: HOSPITAL | Age: 64
End: 2024-11-08

## 2024-11-08 ENCOUNTER — PATIENT MESSAGE (OUTPATIENT)
Dept: FAMILY MEDICINE CLINIC | Facility: CLINIC | Age: 64
End: 2024-11-08

## 2024-11-08 DIAGNOSIS — Z12.31 ENCOUNTER FOR SCREENING MAMMOGRAM FOR MALIGNANT NEOPLASM OF BREAST: Primary | ICD-10-CM

## 2024-11-14 ENCOUNTER — HOSPITAL ENCOUNTER (OUTPATIENT)
Age: 64
Discharge: HOME | End: 2024-11-14
Payer: MEDICARE

## 2024-11-14 VITALS
HEIGHT: 61.75 IN | WEIGHT: 175 LBS | HEART RATE: 66 BPM | TEMPERATURE: 97 F | SYSTOLIC BLOOD PRESSURE: 113 MMHG | DIASTOLIC BLOOD PRESSURE: 46 MMHG | BODY MASS INDEX: 32.2 KG/M2 | OXYGEN SATURATION: 97 % | RESPIRATION RATE: 20 BRPM

## 2024-11-14 DIAGNOSIS — M54.16 LUMBAR RADICULOPATHY: Primary | ICD-10-CM

## 2024-11-14 PROCEDURE — 99214 OFFICE O/P EST MOD 30 MIN: CPT

## 2024-11-14 PROCEDURE — 99213 OFFICE O/P EST LOW 20 MIN: CPT

## 2024-11-14 RX ORDER — PREDNISONE 20 MG/1
40 TABLET ORAL DAILY
Qty: 8 TABLET | Refills: 0 | Status: SHIPPED | OUTPATIENT
Start: 2024-11-14 | End: 2024-11-18

## 2024-11-14 RX ORDER — PREDNISONE 20 MG/1
40 TABLET ORAL ONCE
Status: COMPLETED | OUTPATIENT
Start: 2024-11-14 | End: 2024-11-14

## 2024-11-14 RX ORDER — HYDROCODONE BITARTRATE AND ACETAMINOPHEN 5; 325 MG/1; MG/1
1-2 TABLET ORAL EVERY 6 HOURS PRN
Qty: 8 TABLET | Refills: 0 | Status: SHIPPED | OUTPATIENT
Start: 2024-11-14 | End: 2024-11-19

## 2024-11-14 NOTE — ED PROVIDER NOTES
Patient Seen in: [facility]      History     Chief Complaint   Patient presents with    Back Pain     Stated Complaint: Back Pain - L4/L5/S1 pinched nerves & piriformis syndrome. Pain from lower back*    Subjective:   HPI    Patient is a pleasant 64-year-old female with arthritis and history of back pain here for evaluation of right buttock pain.  Symptoms started about 2 to 3 weeks ago.  Patient states symptoms have been aggravated by physical therapy which she is currently in for knee pain.  Has been taking Tylenol, Norco and cyclobenzaprine with minimal relief.  Pain radiates down lateral aspect of right leg.  Pain is rated 7 out of 10 in severity.  Denies weakness or loss of bowel or bladder function.    No injury or trauma precipitating onset of symptoms    Objective:     Past Medical History:    Anxiety    Aortic regurgitation    mild    Arthritis    Back pain    Calculus of kidney    Cardiac calcification (HCC) - CAC score of 5.24 seen on 12/2/22 CT Heart Scan protocol    Colon polyps    Constipation    Depression    Diarrhea, unspecified    Disorder of liver    Fatty liver    Diverticulosis    Dizziness    Fatigue    Flatulence/gas pain/belching    Food intolerance    Frequent urination    Gastroparesis    GERD (gastroesophageal reflux disease)    Headache disorder    Migraines    Heart palpitations    Heartburn    Hemorrhoids    History of depression    Major depressive disorder & anxiety    History of eating disorder    Binge eating    Hoarseness, chronic    Hyperlipidemia    Indigestion    Leg swelling    Loss of appetite    Migraines    Mouth sores    Triggered by stress or poor diet    Nausea    OCD (obsessive compulsive disorder)    MODE (obstructive sleep apnea)    Pain in joints    Painful swallowing    Parkinsonism (HCC)    Peptic ulcer disease    Problems with swallowing    Sleep disturbance    High serotonin    Stool incontinence    Soft barely formed stool, not detecting urgency     Stress    Syncope    Tendonitis of shoulder, right    Tremor    Vomiting blood    Wears glasses    Weight gain    10 lbs in 2 months - stop phentermine and decreased excercise after arthroscope of left knee    Weight loss    28 lb last year              Past Surgical History:   Procedure Laterality Date          x 2    Cholecystectomy      Colonoscopy      D & c      Ect provided  [id number]    Egd      Laparoscopic cholecystectomy      Needle biopsy right  2015    benign breast    Other surgical history      C3-C4 anterior discectomy    Other [facility] history  [facility]    Other surgical history  2019    radiofrequency abalsion lumbar    Removal gallbladder  2020    Skin surgery      Spine surgery procedure unlisted      Anterior cervical discectomy    Tonsillectomy  1966?                Social History     Socioeconomic History    Marital status:    Occupational History    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[employer]   Tobacco Use    Smoking status: Never    Smokeless tobacco: Never   Vaping Use    Vaping status: Never Used   Substance and Sexual Activity    Alcohol use: Not Currently     Comment: 6 drinks or less/year    Drug use: Never   Other Topics Concern    Caffeine Concern No    Exercise Yes    Seat Belt Yes    Special Diet No    Stress Concern Yes    Weight Concern Yes   Social History Narrative    Caring for grandson (Peter - aged 6 yo [2020])              Review of Systems    Positive for stated complaint: Back Pain - L4/L5/S1 pinched nerves & piriformis syndrome. Pain from lower back*  Other systems are as noted in HPI.  Constitutional and vital signs reviewed.      All other systems reviewed and negative except as noted above.    Physical Exam     ED Triage Vitals [24 1230]   /46   Pulse 66   Resp 20   Temp 97.4 °F (36.3 °C)   Temp src Temporal   SpO2 97 %   O2 Device        Current Vitals:   Vital  Signs  BP: 113/46  Pulse: 66  Resp: 20  Temp: 97.4 °F (36.3 °C)  Temp src: Temporal    Oxygen Therapy  SpO2: 97 %        Physical Exam  Vitals and nursing note reviewed.   Constitutional:       General: She is not in acute distress.     Appearance: Normal appearance. She is obese. She is not ill-appearing, toxic-appearing or diaphoretic.   Eyes:      Conjunctiva/sclera: Conjunctivae normal.      Pupils: Pupils are equal, round, and reactive to light.   Cardiovascular:      Rate and Rhythm: Normal rate and regular rhythm.      Heart sounds: Normal heart sounds.   Pulmonary:      Effort: Pulmonary effort is normal.   Neurological:      Mental Status: She is alert.           ED Course   Labs Reviewed - No data to display               MDM            Medical Decision Making  Differentials include but are not limited to sciatic pain, lumbar radiculopathy, degenerative disc disease, and cauda equina.  Will plan for systemic steroid, first dose of prednisone administered here.  Patient encouraged to continue Tylenol, cyclobenzaprine and gentle stretching as needed.  Close outpatient follow-up with spine to ensure improvement with plan of care.  Patient agrees with plan of care.  All questions answered to patient's satisfaction.        Disposition and Plan     Clinical Impression:  1. Lumbar radiculopathy         Disposition:  Discharge  11/14/2024  1:04 pm    Follow-up:  Ismael Perez MD  [id number] [address] [address] [phone]    Schedule an appointment as soon as possible for a visit             Medications Prescribed:  Discharge Medication List as of 11/14/2024  1:07 PM              Supplementary Documentation:

## 2024-11-14 NOTE — ED INITIAL ASSESSMENT (HPI)
Back pain - back x 2 weeks aggravated by physical therapy.   Takes tylenol for pain . Took 1 Norco at 6 am and at 830.  But no improvement

## 2024-11-15 ENCOUNTER — HOSPITAL ENCOUNTER (OUTPATIENT)
Dept: GENERAL RADIOLOGY | Age: 64
Discharge: HOME | End: 2024-11-15
Attending: STUDENT IN AN ORGANIZED HEALTH CARE EDUCATION/TRAINING PROGRAM
Payer: MEDICARE

## 2024-11-15 DIAGNOSIS — M54.50 LOW BACK PAIN, UNSPECIFIED BACK PAIN LATERALITY, UNSPECIFIED CHRONICITY, UNSPECIFIED WHETHER SCIATICA PRESENT: ICD-10-CM

## 2024-11-15 DIAGNOSIS — M54.50 LOW BACK PAIN, UNSPECIFIED BACK PAIN LATERALITY, UNSPECIFIED CHRONICITY, UNSPECIFIED WHETHER SCIATICA PRESENT: Primary | ICD-10-CM

## 2024-11-15 PROCEDURE — 72100 X-RAY EXAM L-S SPINE 2/3 VWS: CPT | Performed by: STUDENT IN AN ORGANIZED HEALTH CARE EDUCATION/TRAINING PROGRAM

## 2024-11-16 DIAGNOSIS — R41.0 CONFUSION: ICD-10-CM

## 2024-11-18 ENCOUNTER — OFFICE VISIT (OUTPATIENT)
Dept: ORTHOPEDICS CLINIC | Facility: CLINIC | Age: 64
End: 2024-11-18
Payer: MEDICARE

## 2024-11-18 DIAGNOSIS — M54.16 LUMBAR RADICULOPATHY: Primary | ICD-10-CM

## 2024-11-18 RX ORDER — MEMANTINE HYDROCHLORIDE 10 MG/1
10 TABLET ORAL DAILY
Qty: 90 TABLET | Refills: 0 | Status: SHIPPED | OUTPATIENT
Start: 2024-11-18

## 2024-11-18 NOTE — TELEPHONE ENCOUNTER
Medication:  MEMANTINE 10 MG Oral Tab      Date of last refill: 06/20/2024 (#90/0)  [place]     Last office visit: 08/30/2024  Due back to clinic per last office note:  Around 11/22/2024  Date next office visit scheduled:    Future Appointments   Date Time Provider Department Center   11/18/2024  2:20 PM Ismael Perez MD [facility]   11/20/2024  1:15 PM Daija Ayers, PTA [facility] [place]   11/25/2024  3:45 PM Daija Ayers, PTA [facility] [place]   11/27/2024 12:30 PM Daija Ayers, PTA [facility] [place]   12/2/2024  1:10 PM James Betancourt MD EN[facility] [place]   12/3/2024  2:00 PM Trina, Roopa, PT [facility] [place]   12/6/2024 12:30 PM Trina, Roopa, PT [facility] [place]   12/10/2024  9:40 AM BBK NOEMY RM1 [facility] [place]   12/11/2024 11:45 AM Trina, Roopa, PT [facility] [place]   12/17/2024  9:15 AM Trina, Roopa, PT [facility] [place]   12/24/2024  9:15 AM Trina, Roopa, PT [facility] [place]   12/27/2024  1:15 PM Trina, Roopa, PT [facility] [place]   12/30/2024  8:45 AM Trina, Roopa, PT [facility] [place]   1/16/2025  9:30 AM Colton Schaefer MD [facility] [address] [facility] [address]   3/19/2025 12:20 PM Jackelin Maloney MD [facility]           Last OV note recommendation:    ASSESSMENT/PLAN:          ICD-10-CM     1. Chronic migraine without aura without status migrainosus, not intractable  G43.709         2. Mild neurocognitive disorder due to multiple etiologies  F06.70               Neurocognitive disorder  MRI brain and EEG performed negative  On memantine 10 mg daily for neurocognitive prophylaxis- patient wants to lower the dose of Memantine  Follow up with Psychiatry         2 On Botox and Propranolol for migraine prevention  Switch Nurtec's to Imitrex injection        Follow up in about 3 months  See orders and medications filed with this encounter. The patient indicates understanding of these issues and agrees with the plan.

## 2024-11-18 NOTE — H&P
[facility] - ORTHOPEDICS  [address] [place] [address], [address] [address]  [address] [place] [id number]  [phone]     NEW PATIENT VISIT - HISTORY AND PHYSICAL EXAMINATION     Name: Cb Webster   MRN: [id number]  Date: 11/18/24       CC: Back and leg pain    REFERRED BY: Colton Schaefer MD    HPI:   Cb Webster is a very pleasant 64 year old female who presents today for evaluation of back and leg pain. The distribution of symptoms are: 20% backpain and 80% leg pain. The symptoms began many year(s) ago without any significant injury. Since the onset, the symptoms have rapidly deteriorated . Patient feels pain is aggravated by walking, standing and improved by rest. The patient reports  numbness and  weakness.  The symptom characteristics are as follows: Patient is a 64-year-old female with a history of lumbar stenosis presenting with worsening of symptoms.  Patient started physical therapy this summer for knee pain and progressively started having worsening back pain that radiated down right posterior buttock and thigh, associated with numbness and tingling.  Patient has history of lumbar stenosis with epidural steroid injections in the past.  Patient recently had ketamine for depression with improvement in symptoms.    Prior spine surgery: none.    Bowel and bladder symptoms: absent.    The patient has not had issues with balance and/or hand dexterity problems such as changes in penmanship or the use of buttons or zippers.    Treatment up to this time has included:    Evaluation: PCP  NSAIDS: have been partially helpful  Narcotic use: None  Physical therapy: Ongoing  Spinal injections: Previously  Others:       PMH:   Past Medical History:    Anxiety    Aortic regurgitation    mild    Arthritis    Back pain    Calculus of kidney    Cardiac calcification (HCC) - CAC score of 5.24 seen on 12/2/22 CT Heart Scan protocol    Colon polyps    Constipation    Depression     Diarrhea, unspecified    Disorder of liver    Fatty liver    Diverticulosis    Dizziness    Fatigue    Flatulence/gas pain/belching    Food intolerance    Frequent urination    Gastroparesis    GERD (gastroesophageal reflux disease)    Headache disorder    Migraines    Heart palpitations    Heartburn    Hemorrhoids    History of depression    Major depressive disorder & anxiety    History of eating disorder    Binge eating    Hoarseness, chronic    Hyperlipidemia    Indigestion    Leg swelling    Loss of appetite    Migraines    Mouth sores    Triggered by stress or poor diet    Nausea    OCD (obsessive compulsive disorder)    MODE (obstructive sleep apnea)    Pain in joints    Painful swallowing    Parkinsonism (HCC)    Peptic ulcer disease    Problems with swallowing    Sleep disturbance    High serotonin    Stool incontinence    Soft barely formed stool, not detecting urgency    Stress    Syncope    Tendonitis of shoulder, right    Tremor    Vomiting blood    Wears glasses    Weight gain    10 lbs in 2 months - stop phentermine and decreased excercise after arthroscope of left knee    Weight loss    28 lb last year       PAST SURGICAL HX:  Past Surgical History:   Procedure Laterality Date          x 2    Cholecystectomy      Colonoscopy      D & c      Ect provided  [id number]    Egd      Laparoscopic cholecystectomy      Needle biopsy right  2015    benign breast    Other surgical history      C3-C4 anterior discectomy    Other surgical history  2018    [facility]    Other surgical history  2019    radiofrequency abalsion lumbar    Removal gallbladder  2020    Skin surgery      Spine surgery procedure unlisted      Anterior cervical discectomy    Tonsillectomy  1966?       FAMILY HX:  Family History   Problem Relation Age of Onset    Hypertension Father     Heart Attack Father          at 48 years    Heart Disease Father     Alcohol and Other Disorders Associated Father      Depression Father     Other (ALS) Mother     Hypertension Mother         Diagnosed at 89 yo with ALS  at 91    Personality Disorder Daughter     Dementia Maternal Grandmother     Obesity Maternal Grandmother     Dementia Maternal Grandfather     Obesity Paternal Grandfather     Cancer Sister         Kidney, chronic lymp… leukemia    Heart Attack Sister         Kidney cancer, chronic lymphocytic leukemia    Ulcerative Colitis Brother     Cancer Brother         Kidney       ALLERGIES:  Sulfa antibiotics    MEDICATIONS:   Current Outpatient Medications   Medication Sig Dispense Refill    MEMANTINE 10 MG Oral Tab TAKE 1 TABLET BY MOUTH EVERY DAY 90 tablet 0    predniSONE 20 MG Oral Tab Take 2 tablets (40 mg total) by mouth daily for 4 days. 8 tablet 0    HYDROcodone-acetaminophen 5-325 MG Oral Tab Take 1-2 tablets by mouth every 6 (six) hours as needed for Pain. 8 tablet 0    FLUoxetine 10 MG Oral Cap TAKE 1 CAPSULE BY MOUTH EVERY MORNING TAKE ALONG WITH THE 40MG CAPS FOR TOTAL DOSE OF 50MG DAILY      [START ON 2024] Phentermine HCl 15 MG Oral Cap Take 1 capsule (15 mg total) by mouth every morning. 30 capsule 0    [START ON 2024] Phentermine HCl 15 MG Oral Cap Take 1 capsule (15 mg total) by mouth every morning. 30 capsule 0    [START ON 2025] Phentermine HCl 15 MG Oral Cap Take 1 capsule (15 mg total) by mouth every morning. 30 capsule 0    cyclobenzaprine 10 MG Oral Tab Take 1 tablet (10 mg total) by mouth 3 (three) times daily as needed for Muscle spasms. 90 tablet 0    topiramate 25 MG Oral Tab Take 1 tablet (25 mg total) by mouth daily. (Patient not taking: Reported on 2024)      FLUoxetine HCl 40 MG Oral Cap Take 1 capsule (40 mg total) by mouth daily.      pantoprazole 40 MG Oral Tab EC Take 1 tablet (40 mg total) by mouth before breakfast. 90 tablet 3    metoclopramide 5 MG Oral Tab Take 1 tablet (5 mg total) by mouth daily. 30 tablet 2    famotidine 40 MG Oral Tab Take 1 tablet (40  mg total) by mouth daily as needed for Heartburn. 90 tablet 3    dicyclomine 10 MG Oral Cap Take 1 capsule (10 mg total) by mouth in the morning and 1 capsule (10 mg total) before bedtime. 180 capsule 3    SUMAtriptan Succinate 6 MG/0.5ML Subcutaneous Solution Auto-injector Inject 0.5 mL (6 mg total) into the skin as needed (for moderate to severe migraine). 6 mL 2    PROPRANOLOL HCL ER 80 MG Oral Capsule SR 24 Hr TAKE 1 CAPSULE (80 MG TOTAL) BY MOUTH EVERY EVENING. 90 capsule 1    albuterol 108 (90 Base) MCG/ACT Inhalation Aero Soln Inhale 2 puffs into the lungs every 4 (four) hours as needed for Wheezing or Shortness of Breath. 6.7 g 0    traZODone 100 MG Oral Tab Take 1 tablet (100 mg total) by mouth nightly.      rosuvastatin 5 MG Oral Tab Take 1 tablet (5 mg total) by mouth Every Monday, Wednesday, and Friday. 45 tablet 3    butalbital-acetaminophen-caffeine -40 MG Oral Tab       SPRAVATO, 84 MG DOSE, 28 MG/DEVICE Nasal Solution Therapy Pack 84 mg by Nasal route every 21 days.      triamcinolone 0.1 % External Cream Apply thin layer to affected area twice a day as needed 45 g 1    hydrocortisone 2.5 % External Cream Apply to AA of FACE BID x 2 weeks, hold 2 weeks, repeat PRN. 30 g 2    ketoconazole 2 % External Cream Apply to AA of FACE BID when not using Hydrocortisone Cream. 30 g 2    LORazepam 2 MG Oral Tab Take 1 tablet (2 mg total) by mouth as needed.      busPIRone HCl 10 MG Oral Tab 1 1/2  tab in the morning and 1 1/2 in the afternoon         ROS: A comprehensive 14 point review of systems was performed and was negative aside from the aforementioned per history of present illness.    SOCIAL HX:  Social History     Tobacco Use    Smoking status: Never    Smokeless tobacco: Never   Substance Use Topics    Alcohol use: Not Currently     Comment: 6 drinks or less/year         PE:   There were no vitals filed for this visit.  Estimated body mass index is 32.27 kg/m² as calculated from the following:     Height as of 11/14/24: 5' 1.75\" (1.568 m).    Weight as of 11/14/24: 175 lb (79.4 kg).    Physical Exam  Constitutional:       Appearance: Normal appearance.   HENT:      Head: Normocephalic and atraumatic.   Eyes:      Extraocular Movements: Extraocular movements intact.   Cardiovascular:      Pulses: Normal pulses. Skin warm and well perfused.  Pulmonary:      Effort: Pulmonary effort is normal. No respiratory distress.   Skin:     General: Skin is warm.   Psychiatric:         Mood and Affect: Mood normal.     Spine Exam:    Normal gait without difficulty  Able to heel, toe, tandem gait without difficulty  Level shoulders and hips in even stance    Restricted L-spine ROM    No tenderness to palpation of L-spine    Straight leg raise test: negative    Sustained clonus: negative    LE Strength: 5/5 IP QUAD TA EHL GSC  LE Sensation: normal in L2-S1 distribution  LE reflexes: normal    Radiographic Examination/Diagnostics:  XR and MRI personally viewed, independently interpreted and radiology report was reviewed.  X-ray of the lumbar spine demonstrates degenerative changes in the lower lumbar segments, particularly at L4-5 with asymmetric disc collapse  MRI of the lumbar spine from 2019 demonstrated foraminal disc herniation at L4-5 and L5-S1    IMPRESSION: Cb Webster is a 64 year old female with lumbar stenosis and radiculopathy    PLAN:     We reviewed the patients history, symptoms, exam findings, and imaging today.  We had a detailed discussion outlining the etiology, anatomy, pathophysiology, and natural history of lumbar stenosis. The typical management of this condition may include lifestyle modification, NSAIDs, physical therapy, oral steroids, epidural injections, neuromodulatory medications, and sometimes pain medications.  Based on our discussion today we would like to have the patient initiate our recommendations for continued conservative therapy in the treatment of their condition noted in the  assessment section.     - After my face-to-face evaluation, given the clinical presentation, physical examination, as well as XR findings, there is high suspicion for nerve compression. To further evaluate patient's spinal pain and for pre-operative surgical planning, lumbar spine MRI was ordered.   - Continue PT  - Consider short term opioid prescription if symptoms persist       FOLLOW-UP:  We will see her back in follow-up in after updated images, or sooner if any problems arise. Patient understands and agrees with plan.      Ismael Perez MD  Orthopedic Spine Surgeon  [facility]   [address] [place] [address].[email or web address]  [email or web address]  t: [phone]   f: [phone]        This note was dictated using Dragon software.  While it was briefly proofread prior to completion, some grammatical, spelling, and word choice errors due to dictation may still occur.

## 2024-11-19 ENCOUNTER — TELEPHONE (OUTPATIENT)
Dept: PHYSICAL THERAPY | Age: 64
End: 2024-11-19

## 2024-11-20 ENCOUNTER — APPOINTMENT (OUTPATIENT)
Dept: PHYSICAL THERAPY | Age: 64
End: 2024-11-20
Attending: PHYSICIAN ASSISTANT
Payer: MEDICARE

## 2024-11-20 ENCOUNTER — TELEPHONE (OUTPATIENT)
Dept: PHYSICAL THERAPY | Age: 64
End: 2024-11-20

## 2024-11-20 ENCOUNTER — OFFICE VISIT (OUTPATIENT)
Dept: PHYSICAL THERAPY | Age: 64
End: 2024-11-20
Attending: PHYSICIAN ASSISTANT
Payer: MEDICARE

## 2024-11-20 ENCOUNTER — TELEMEDICINE (OUTPATIENT)
Dept: FAMILY MEDICINE CLINIC | Facility: CLINIC | Age: 64
End: 2024-11-20
Payer: MEDICARE

## 2024-11-20 DIAGNOSIS — M54.16 LUMBAR RADICULOPATHY: Primary | ICD-10-CM

## 2024-11-20 PROCEDURE — 99213 OFFICE O/P EST LOW 20 MIN: CPT | Performed by: FAMILY MEDICINE

## 2024-11-20 PROCEDURE — G2211 COMPLEX E/M VISIT ADD ON: HCPCS | Performed by: FAMILY MEDICINE

## 2024-11-20 PROCEDURE — 97110 THERAPEUTIC EXERCISES: CPT

## 2024-11-20 RX ORDER — GABAPENTIN 100 MG/1
100 CAPSULE ORAL 2 TIMES DAILY
Qty: 60 CAPSULE | Refills: 1 | Status: SHIPPED | OUTPATIENT
Start: 2024-11-20

## 2024-11-20 NOTE — PROGRESS NOTES
Diagnosis:   Primary osteoarthritis of right knee (M17.11)  Other old tear of medial meniscus of left knee (M23.204)  Primary osteoarthritis of left knee (M17.12)      Referring Provider: Mari Damon  Date of Evaluation:    10/14/2024    Precautions:   Pt reports some cognitive impairment/ cognitive functioning loss with slow processing speeds, some memory loss Next MD visit:   none scheduled  Date of Surgery: n/a   Insurance Primary/Secondary: [employer] MCR / N/A     # Auth Visits: n/a           Discharge Summary  Pt has attended 6 visits in Physical Therapy.      Subjective: \" My both knees were feeling much better since the start of PT and I was able to walk , negotiate stairs bending down to  light objects and reach forward for items till couple days ago when I developed severe lower back pain . Today I have hard time walking and bending is very painful and I am not able to put my shoes on \".  Pain: 8-9/10 ( posterior R LE )    Objective:     Measurement Date:10/14/2024 Date: 11/20/2024   Hip PROM :  R :                      L:   R :                     L:   ER WNL            Min loss WNL               WNL   IR  WNL            Min loss WNL               WNL   Knee AROM :  R:                     L:  R:                    L:   Flexion  128 deg          135 deg     135 deg          140 deg       Extension  0 deg               0 deg 0 deg               0 deg   Strength / MMT       Hip : R:                        L:  R:                    L:   Flexion  4/5                      4+/5   5/5               5/5    Extension  4-/5                   4+/5  4+/5             5/5    Abduction  3+/5                   3+/5  4+/5              3+/5 ( painful )   ER 4/5                    4+/5  4+/5             5/5   Knee : R :                    L:  R:                L:   Flexion  4/5                   4+/5  5/5               5/5    Extension  4+/5                 4+/5  5/5            Assessment: Miss Vivar demonstrates  improvement in both knee AROM of flexion , hips flexibility and strength of both knees and hips since the start of PT . Patient activity level in WB is decreased and  affected  at this time possibly  due to  onset of the lower back pain which is causing radiating pain from her right buttock down to her hamstring and calf during ambulation , bending forward , standing and with stairs negotiation . Patient demonstrates compliance in her final HEP . Patient stated  that she would like to be discharged from PT for her knees and she would like to focus on treatment for her lower back at this time . Patient will be evaluated shortly for her low back under a new POC with RX received from provider.       Goals: (to be met in 12 visits)  Pt will demonstrate increased B hip ABD strength to 4/5 for improved stability during walking, increased mechanics with bending & lateral movements around the home  Pt will demonstrate increased B hip EXT strength to 4/5 for improved ability to effectively negotiate stairs - ACHIEVED   Pt will improve B SLS to 10-15 sec to improve safety with gait on uneven surfaces such as grass and gravel - UNABLE TO ASSESS AT THIS TIME   Pt will report pain at worst to 3-4/10 B knee for improved tolerance to prolonged standing & sitting as well as car transfers - IMPROVED   Pt will be independent and compliant with comprehensive HEP to maintain progress achieved in PT  - ACHIEVED    LEFS Score  LEFS Score: (Patient-Rptd) 33.75 % (10/13/2024  7:56 PM)    Post LEFS Score  Post LEFS Score: 18.75 % (11/20/2024  3:23 PM)    -15 % improvement    Plan: Discontinue PT at this time . Discharge from knee POC. Anticipate evaluation for lumbar spine shortly.    Date: 10/18/2024  TX#: 2/12 Date: 10/21/2024  TX#: 3/12 Date: 10/23/24              TX#: 4/12 Date: 10/29/2024  TX#: 5/12 Date: 11/20/24  TX#: 6/12   Therex: 43'  NuStep L1 X 5'  HEP review:  -hooklying hip Abd whit 5\" 2 X 10 Green TB  -hooklying hip Add whit  5\" 2 X 10 yellow ball  SB HS curl 3\", with B Q/S in ext 3\" X 15  SB bridge (ball under thighs, small range) X 10  SAQ over round bolster 3\" X 10 ea  Clams 2 X 10 ea  Education: expectations for how pt can feel from PT sessions: goal soreness vs pain; will initiate NWB in PT with progress to WB as tolerated. Therex: 44'  NuStep L1 X 5'  SB HS curl 3\", with B Q/S in ext 3\" 2 X 15  SB bridge (ball under thighs, small range) 2 X 10  SLR X 10 ea  TKE with yellow ball at wall 5\" X 10 ea - HEP  Fwd 6 inch step up:  -with LLE leading: X 5  -with LLE leading: reverse TKE Green TB X 5  -with LLE leading: TKE Green TB X 5  -with RLE leading: TKE Green TB X 15  Shuttle:  -DL press 43# 3 X 10  -SL press 31# X 15 ea  HEP parameters: recommend 3 exercises, 10-15'/ day There ex : 40 min   NU step x 6 min , level 5  Supine :  SB bridging 2 x 10   SLR with QS   2 x 10  TKE on bolster with 2 lbs   2 x 10   Standing :  B gastroc stretch on slant board 5 x 30 sec hold   B heel raises x 20  B toes raises x 20  Shuttle :  B leg press with 50 lbs 2 x 10  R/L leg press with 31 lbs 2 x 10   Step up / lateral step up on 6 inch step   R/L TKE against yellow ball   2 x 10   Balance re-training :  Romberg stance with ball passing OH   5 attempts   Rocker board :  B LE balancing   A/P X 20  M/L X 20  Therex: 42'  NuStep L4 X 5'  Discussion of POC: pt would like to cont with PT past today (last visit currently scheduled)  Supine crossover piriformis stretch 3 X 30\" R - HEP  Seated figure-4 piriformis stretch 3 X 30\" R - HEP  HEP modification: complete clams 3-4x/ week \"too much of a good thing\"  Standing B gastroc & soleus stretch on slant board 5 X 30\" ea  Supine LTR 5\" hold X 3' total (wide feet)  Seated R/L HS stretch 3 X 30\" ea  Standing flat back stretch tableside 3 X 30\"  Standing R/L hip Add stretch tableside 3 X 30\" ea Therex : 40 min :  NU step x 6 min , level 3  HEP EDUCATION AND PERFORMANCE :  Supine :  QS 2 x 10   R/L SLR 2 x 10   Side  lying :  R/L clam shell 2 x 10   R/L hip abd 2 x 10   Standing :  Alt hip flex , abd , ext 2 x 10   Side stepping   2 x 10 steps    HEP:   Access Code: [email or web address]  URL: [email or web address]  Date: 10/14/2024  Prepared by: Roopa Mena    Exercises  - Hooklying Clamshell with Resistance  - 1 x daily - 4 x weekly - 2 sets - 10 reps - 5 sec hold (green TheraBand)  - Supine Hip Adduction Isometric with Ball  - 1 x daily - 4 x weekly - 2 sets - 10 reps - 5 sec hold (or folded pillow)      Access Code: [email or web address]  URL: [email or web address]  Date: 10/21/2024  Prepared by: Roopa Mnea    Exercises  - Supine Short Arc Quad  - 1 x daily - 4 x weekly - 1 sets - 10 reps - 3 sec hold  - Standing Terminal Knee Extension at Wall with Ball  - 1 x daily - 4 x weekly - 1 sets - 10 reps - 5 sec hold  - Clamshell  - 1 x daily - 4 x weekly - 2 sets - 10 reps - 2 sec hold    Access Code: [email or web address]  URL: [email or web address]  Date: 10/29/2024  Prepared by: Roopa Mena    Exercises  - Supine Piriformis Stretch with Foot on Ground  - 1 x daily - 7 x weekly - 3 sets - 30 sec hold  - Seated Figure 4 Piriformis Stretch  - 1 x daily - 7 x weekly - 3 sets - 30 sec hold    Charges: Therex X 3       Total Timed Treatment: 40 min  Total Treatment Time: 45 min

## 2024-11-20 NOTE — PROGRESS NOTES
Subjective:   Patient ID: Cb Webster is a 64 year old female.    HPI  Ms. Webster is a very pleasant 64-year-old female with history of depression, migraines, anxiety, chronic low back pain presenting today for video visit for management of chronic low back pain.  She had seen spine specialty previously and was advised to do physical therapy and pain management.  She did schedule these already.  She is also scheduled to do MRI of her lumbar spine.  She has had this pain for about 7 years mostly on the right side but noticed recently that pain would radiate down to her right leg associated with numbness and tingling.  She does have this in both legs but the right is more prominent than on the left.  No recent injury or trauma.  She went to the urgent care and was given hydrocodone which has been helpful temporarily.  At this point she is looking for options on what other pain medication she could use.      I had reviewed past medical and family histories together with allergy and medication lists documented.        History/Other:   Review of Systems   Constitutional:  Negative for fever.   Respiratory:  Negative for cough and shortness of breath.    Cardiovascular:  Negative for chest pain.   Gastrointestinal:  Negative for abdominal pain.   Genitourinary:  Negative for difficulty urinating.   Musculoskeletal:  Positive for back pain.   Neurological:  Positive for numbness. Negative for dizziness and headaches.     Current Outpatient Medications   Medication Sig Dispense Refill    gabapentin 100 MG Oral Cap Take 1 capsule (100 mg total) by mouth 2 (two) times daily. 60 capsule 1    MEMANTINE 10 MG Oral Tab TAKE 1 TABLET BY MOUTH EVERY DAY 90 tablet 0    FLUoxetine 10 MG Oral Cap TAKE 1 CAPSULE BY MOUTH EVERY MORNING TAKE ALONG WITH THE 40MG CAPS FOR TOTAL DOSE OF 50MG DAILY      [START ON 11/26/2024] Phentermine HCl 15 MG Oral Cap Take 1 capsule (15 mg total) by mouth every morning. 30 capsule 0    [START ON  12/25/2024] Phentermine HCl 15 MG Oral Cap Take 1 capsule (15 mg total) by mouth every morning. 30 capsule 0    [START ON 1/23/2025] Phentermine HCl 15 MG Oral Cap Take 1 capsule (15 mg total) by mouth every morning. 30 capsule 0    cyclobenzaprine 10 MG Oral Tab Take 1 tablet (10 mg total) by mouth 3 (three) times daily as needed for Muscle spasms. 90 tablet 0    topiramate 25 MG Oral Tab Take 1 tablet (25 mg total) by mouth daily. (Patient not taking: Reported on 11/14/2024)      FLUoxetine HCl 40 MG Oral Cap Take 1 capsule (40 mg total) by mouth daily.      pantoprazole 40 MG Oral Tab EC Take 1 tablet (40 mg total) by mouth before breakfast. 90 tablet 3    metoclopramide 5 MG Oral Tab Take 1 tablet (5 mg total) by mouth daily. 30 tablet 2    famotidine 40 MG Oral Tab Take 1 tablet (40 mg total) by mouth daily as needed for Heartburn. 90 tablet 3    dicyclomine 10 MG Oral Cap Take 1 capsule (10 mg total) by mouth in the morning and 1 capsule (10 mg total) before bedtime. 180 capsule 3    SUMAtriptan Succinate 6 MG/0.5ML Subcutaneous Solution Auto-injector Inject 0.5 mL (6 mg total) into the skin as needed (for moderate to severe migraine). 6 mL 2    PROPRANOLOL HCL ER 80 MG Oral Capsule SR 24 Hr TAKE 1 CAPSULE (80 MG TOTAL) BY MOUTH EVERY EVENING. 90 capsule 1    albuterol 108 (90 Base) MCG/ACT Inhalation Aero Soln Inhale 2 puffs into the lungs every 4 (four) hours as needed for Wheezing or Shortness of Breath. 6.7 g 0    traZODone 100 MG Oral Tab Take 1 tablet (100 mg total) by mouth nightly.      rosuvastatin 5 MG Oral Tab Take 1 tablet (5 mg total) by mouth Every Monday, Wednesday, and Friday. 45 tablet 3    butalbital-acetaminophen-caffeine -40 MG Oral Tab       SPRAVATO, 84 MG DOSE, 28 MG/DEVICE Nasal Solution Therapy Pack 84 mg by Nasal route every 21 days.      triamcinolone 0.1 % External Cream Apply thin layer to affected area twice a day as needed 45 g 1    hydrocortisone 2.5 % External Cream Apply  to AA of FACE BID x 2 weeks, hold 2 weeks, repeat PRN. 30 g 2    ketoconazole 2 % External Cream Apply to AA of FACE BID when not using Hydrocortisone Cream. 30 g 2    LORazepam 2 MG Oral Tab Take 1 tablet (2 mg total) by mouth as needed.      busPIRone HCl 10 MG Oral Tab 1 1/2  tab in the morning and 1 1/2 in the afternoon       Allergies:Allergies[1]    Objective:   Physical Exam  Constitutional:       General: She is not in acute distress.  Neurological:      Mental Status: She is alert.   Psychiatric:         Mood and Affect: Mood normal.         Behavior: Behavior normal.         Assessment & Plan:   1. Lumbar radiculopathy    -Reviewed notes with spine specialty  - Agree with recommendations in terms of doing physical therapy, pain management.  Will await MRI of the lumbar spine imaging  - Trial of gabapentin 100 mg twice a day  - I did explain to her how this medication works  - Discussed possible side effects which include but not limited to allergic reaction, fatigue, somnolence  -Try to avoid driving if possible  - Follow-up after 4 weeks or as needed but will also await recommendations from pain management.      This note was prepared using Dragon Medical voice recognition dictation software. As a result errors may occur. When identified these errors have been corrected. While every attempt is made to correct errors during dictation discrepancies may still exist            No orders of the defined types were placed in this encounter.      Meds This Visit:  Requested Prescriptions     Signed Prescriptions Disp Refills    gabapentin 100 MG Oral Cap 60 capsule 1     Sig: Take 1 capsule (100 mg total) by mouth 2 (two) times daily.       Imaging & Referrals:  None         [1]   Allergies  Allergen Reactions    Sulfa Antibiotics NAUSEA ONLY

## 2024-11-25 ENCOUNTER — APPOINTMENT (OUTPATIENT)
Dept: PHYSICAL THERAPY | Age: 64
End: 2024-11-25
Attending: PHYSICIAN ASSISTANT
Payer: MEDICARE

## 2024-11-25 ENCOUNTER — TELEPHONE (OUTPATIENT)
Dept: PHYSICAL THERAPY | Facility: HOSPITAL | Age: 64
End: 2024-11-25

## 2024-11-27 ENCOUNTER — APPOINTMENT (OUTPATIENT)
Dept: PHYSICAL THERAPY | Age: 64
End: 2024-11-27
Attending: PHYSICIAN ASSISTANT
Payer: MEDICARE

## 2024-11-27 ENCOUNTER — OFFICE VISIT (OUTPATIENT)
Dept: PHYSICAL THERAPY | Age: 64
End: 2024-11-27
Attending: STUDENT IN AN ORGANIZED HEALTH CARE EDUCATION/TRAINING PROGRAM
Payer: MEDICARE

## 2024-11-27 ENCOUNTER — HOSPITAL ENCOUNTER (EMERGENCY)
Age: 64
Discharge: HOME | End: 2024-11-27
Payer: MEDICARE

## 2024-11-27 ENCOUNTER — TELEPHONE (OUTPATIENT)
Dept: PHYSICAL THERAPY | Facility: HOSPITAL | Age: 64
End: 2024-11-27

## 2024-11-27 VITALS
HEART RATE: 76 BPM | SYSTOLIC BLOOD PRESSURE: 102 MMHG | HEIGHT: 61.75 IN | WEIGHT: 175 LBS | RESPIRATION RATE: 16 BRPM | DIASTOLIC BLOOD PRESSURE: 52 MMHG | OXYGEN SATURATION: 98 % | BODY MASS INDEX: 32.2 KG/M2 | TEMPERATURE: 99 F

## 2024-11-27 DIAGNOSIS — M47.26 OSTEOARTHRITIS OF SPINE WITH RADICULOPATHY, LUMBAR REGION: ICD-10-CM

## 2024-11-27 DIAGNOSIS — G57.01 PIRIFORMIS SYNDROME OF RIGHT SIDE: ICD-10-CM

## 2024-11-27 DIAGNOSIS — N30.00 ACUTE CYSTITIS WITHOUT HEMATURIA: ICD-10-CM

## 2024-11-27 DIAGNOSIS — M54.41 ACUTE BILATERAL LOW BACK PAIN WITH BILATERAL SCIATICA: Primary | ICD-10-CM

## 2024-11-27 DIAGNOSIS — M54.16 LUMBAR RADICULOPATHY: Primary | ICD-10-CM

## 2024-11-27 DIAGNOSIS — M54.42 ACUTE BILATERAL LOW BACK PAIN WITH BILATERAL SCIATICA: Primary | ICD-10-CM

## 2024-11-27 LAB
BILIRUB UR QL CFM: NEGATIVE
COLOR UR AUTO: YELLOW
GLUCOSE UR STRIP.AUTO-MCNC: NEGATIVE MG/DL
NITRITE UR QL STRIP.AUTO: NEGATIVE
PH UR STRIP.AUTO: 5.5 [PH] (ref 5–8)
RBC UR QL AUTO: NEGATIVE
SP GR UR STRIP.AUTO: >=1.03 (ref 1–1.03)
UROBILINOGEN UR STRIP.AUTO-MCNC: 0.2 MG/DL
WBC #/AREA URNS AUTO: >50 /HPF

## 2024-11-27 PROCEDURE — 99284 EMERGENCY DEPT VISIT MOD MDM: CPT

## 2024-11-27 PROCEDURE — 97162 PT EVAL MOD COMPLEX 30 MIN: CPT | Performed by: PHYSICAL THERAPIST

## 2024-11-27 PROCEDURE — 96372 THER/PROPH/DIAG INJ SC/IM: CPT

## 2024-11-27 PROCEDURE — 87086 URINE CULTURE/COLONY COUNT: CPT | Performed by: NURSE PRACTITIONER

## 2024-11-27 PROCEDURE — 81015 MICROSCOPIC EXAM OF URINE: CPT | Performed by: NURSE PRACTITIONER

## 2024-11-27 PROCEDURE — 81001 URINALYSIS AUTO W/SCOPE: CPT | Performed by: NURSE PRACTITIONER

## 2024-11-27 RX ORDER — DEXAMETHASONE SODIUM PHOSPHATE 10 MG/ML
10 INJECTION, SOLUTION INTRAMUSCULAR; INTRAVENOUS ONCE
Status: COMPLETED | OUTPATIENT
Start: 2024-11-27 | End: 2024-11-27

## 2024-11-27 RX ORDER — DEXAMETHASONE SODIUM PHOSPHATE 10 MG/ML
10 INJECTION, SOLUTION INTRAMUSCULAR; INTRAVENOUS ONCE
Status: DISCONTINUED | OUTPATIENT
Start: 2024-11-27 | End: 2024-11-27

## 2024-11-27 RX ORDER — KETOROLAC TROMETHAMINE 15 MG/ML
15 INJECTION, SOLUTION INTRAMUSCULAR; INTRAVENOUS ONCE
Status: COMPLETED | OUTPATIENT
Start: 2024-11-27 | End: 2024-11-27

## 2024-11-27 RX ORDER — KETOROLAC TROMETHAMINE 15 MG/ML
15 INJECTION, SOLUTION INTRAMUSCULAR; INTRAVENOUS ONCE
Status: DISCONTINUED | OUTPATIENT
Start: 2024-11-27 | End: 2024-11-27

## 2024-11-27 RX ORDER — NITROFURANTOIN 25; 75 MG/1; MG/1
100 CAPSULE ORAL 2 TIMES DAILY
Qty: 10 CAPSULE | Refills: 0 | Status: SHIPPED | OUTPATIENT
Start: 2024-11-27 | End: 2024-12-02 | Stop reason: ALTCHOICE

## 2024-11-27 NOTE — ED PROVIDER NOTES
Patient Seen in: [facility] [place]      History     Chief Complaint   Patient presents with    Back Pain     Stated Complaint: low back pain/rad to R side, brought to ED from PT, concerned increasing sympto*    Subjective:   64-year-old female presents for low back pain.  Patient was seen by the physical therapy department today and initial questioning she had commented that she was having some increased bowel frequency, no incontinence.  She reports that she has a history of IBS and has not been following her diet over the last 3 weeks.  She states this is typical for her IBS flares.  Patient also reports that she feels she is not emptying her bladder fully when she is urinating.  Denies any difficulty going to the bathroom, denies any urinary incontinence, denies any hesitancy    Objective:     Past Medical History:    Anxiety    Aortic regurgitation    mild    Arthritis    Back pain    Calculus of kidney    Cardiac calcification (HCC) - CAC score of 5.24 seen on 12/2/22 CT Heart Scan protocol    Colon polyps    Constipation    Depression    Diarrhea, unspecified    Disorder of liver    Fatty liver    Diverticulosis    Dizziness    Fatigue    Flatulence/gas pain/belching    Food intolerance    Frequent urination    Gastroparesis    GERD (gastroesophageal reflux disease)    Headache disorder    Migraines    Heart palpitations    Heartburn    Hemorrhoids    History of depression    Major depressive disorder & anxiety    History of eating disorder    Binge eating    Hoarseness, chronic    Hyperlipidemia    Indigestion    Leg swelling    Loss of appetite    Migraines    Mouth sores    Triggered by stress or poor diet    Nausea    OCD (obsessive compulsive disorder)    MODE (obstructive sleep apnea)    Pain in joints    Painful swallowing    Parkinsonism (HCC)    Peptic ulcer disease    Problems with swallowing    Sleep disturbance    High serotonin    Stool incontinence    Soft barely formed  stool, not detecting urgency    Stress    Syncope    Tendonitis of shoulder, right    Tremor    Vomiting blood    Wears glasses    Weight gain    10 lbs in 2 months - stop phentermine and decreased excercise after arthroscope of left knee    Weight loss    28 lb last year              Past Surgical History:   Procedure Laterality Date          x 2    Cholecystectomy      Colonoscopy      D & c      Ect provided  [id number]    Egd      Laparoscopic cholecystectomy      Needle biopsy right  2015    benign breast    Other surgical history      C3-C4 anterior discectomy    Other surgical history  2018    [facility]    Other surgical history  2019    radiofrequency abalsion lumbar    Removal gallbladder  2020    Skin surgery      Spine surgery procedure unlisted      Anterior cervical discectomy    Tonsillectomy  1966?                Social History     Socioeconomic History    Marital status:    Occupational History    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[employer]   Tobacco Use    Smoking status: Never    Smokeless tobacco: Never   Vaping Use    Vaping status: Never Used   Substance and Sexual Activity    Alcohol use: Not Currently     Comment: 6 drinks or less/year    Drug use: Never   Other Topics Concern    Caffeine Concern No    Exercise Yes    Seat Belt Yes    Special Diet No    Stress Concern Yes    Weight Concern Yes   Social History Narrative    Caring for grandson (Peter - aged 6 yo [2020])                  Physical Exam     ED Triage Vitals [24 1317]   /52   Pulse 76   Resp 16   Temp 98.6 °F (37 °C)   Temp src    SpO2 98 %   O2 Device None (Room air)       Current Vitals:   Vital Signs  BP: 102/52  Pulse: 76  Resp: 16  Temp: 98.6 °F (37 °C)    Oxygen Therapy  SpO2: 98 %  O2 Device: None (Room air)        Physical Exam  Vitals and nursing note reviewed.   Constitutional:       General: She is not in acute distress.  HENT:       Head: Normocephalic.   Cardiovascular:      Rate and Rhythm: Normal rate.   Pulmonary:      Effort: Pulmonary effort is normal.   Musculoskeletal:         General: Normal range of motion.   Skin:     General: Skin is warm and dry.   Neurological:      General: No focal deficit present.      Mental Status: She is alert and oriented to person, place, and time.      Cranial Nerves: No cranial nerve deficit.      Sensory: No sensory deficit.      Motor: No weakness.      Gait: Gait normal.             ED Course     Labs Reviewed   URINALYSIS WITH CULTURE REFLEX - Abnormal; Notable for the following components:       Result Value    Clarity Urine Cloudy (*)     Ketones Urine Trace (*)     Protein Urine 30 mg/dL (*)     Leukocyte Esterase Urine Small (*)     All other components within normal limits   UA MICROSCOPIC ONLY, URINE - Abnormal; Notable for the following components:    WBC Urine >50 (*)     RBC Urine 3-5 (*)     Bacteria Urine 1+ (*)     Squamous Epi. Cells Moderate (*)     All other components within normal limits   ICTOTEST - Normal   URINE CULTURE, ROUTINE                 MDM    Medical Decision Making  Pertinent Labs & Imaging studies reviewed. (See chart for details).  Patient coming in with low back pain, sensation of urinary retention, increased bowel frequency.   Differential diagnosis includes IBS, UTI, cauda  equina  Will discharge on antibiotics.   Patient is comfortable with this plan.     Overall Pt looks good. Non-toxic, well-hydrated and in no respiratory distress. Vital signs are reassuring. Exam is reassuring. I do not believe pt requires and additional diagnostic studies or intervention. I believe pt can be discharged home to continue evaluation as an outpatient. Follow-up provider given. Discharge instructions given and reviewed. Return for any problems. All understand and agrees with the plan.        Problems Addressed:  Acute bilateral low back pain with bilateral sciatica: acute  illness or injury  Acute cystitis without hematuria: acute illness or injury    Amount and/or Complexity of Data Reviewed  Labs: ordered. Decision-making details documented in ED Course.    Risk  OTC drugs.  Prescription drug management.        Disposition and Plan     Clinical Impression:  1. Acute bilateral low back pain with bilateral sciatica    2. Acute cystitis without hematuria         Disposition:  Discharge  11/27/2024  3:22 pm    Follow-up:  Colton Schaefer MD  [address] [address]  [phone]    Follow up            Medications Prescribed:  Discharge Medication List as of 11/27/2024  3:25 PM        START taking these medications    Details   nitrofurantoin monohydrate macro (MACROBID) 100 MG Oral Cap Take 1 capsule (100 mg total) by mouth 2 (two) times daily for 5 days., Normal, Disp-10 capsule, R-0                 Supplementary Documentation:

## 2024-11-27 NOTE — ED QUICK NOTES
Pt states after pt upstairs she started to have some heightened anxiety.  Denies having an anxiety attack.  Mucus memb dry.  Water given

## 2024-11-27 NOTE — PROGRESS NOTES
SPINE EVALUATION:     Diagnosis:   Lumbar radiculopathy (M54.16)  Piriformis syndrome of right side (G57.01)  Osteoarthritis of spine with radiculopathy, lumbar region (M47.26)      Referring Provider: Colton Schaefer  Date of Evaluation:    11/27/2024    Precautions: Pt reports some cognitive impairment/ cognitive functioning loss with slow processing speeds, some memory loss Next MD visit:  12/4: pain mgmt  12/17: lumbar MRI  12/19: ortho spine  Date of Surgery: n/a     PATIENT SUMMARY   Cb Webster is a 64 year old female who presents to therapy today with complaints of RLE pain (R buttock, R calf shin/ lat aspect ankle & into foot & toes; R thigh ache not described as shooting/ better tolerance to this) with associated N/T, LBP. Pt reports overall LE symptoms have been typically more pronounced than back pain, however more recently feeling back pain more concerning in the past two days. Pt denies saddle anesthesia, though reports worsening bowel/ bladder changes: looser stools, increased FI, possible reduced urinary frequency with need to force stream, more urinary urgency; these symptoms are noted more now since her current back/ LE symptoms started. Pt reports in the spring was having FI & with help from GI with diet/ fiber symptoms reduced. However now increased. Pt feels she may be voiding less (~ 48 oz water) & has to force urination more (newer symptoms). Pt reports has been referred to pelvic floor doctor 5 yrs ago - unable to initiate voiding at the time. Pt reports sometimes standing up & placing light weight on the leg will aid with standing tolerance but sometimes will need to sit back down. Pt reports overall standing is more comfortable than sitting.    Pt reports hx of LBP & sciatica (onset around 2015) which has been almost always on the L side, usually isolated to the buttock. Pt does not recall symptoms on the R previously but reports hx R lat thigh tenderness. Pt reports imaging previously  has revealed DDD. Pt reports DDD has now progressed. Pt would like to proceed with conservative management including PT. Pt will see Dr. Fu in pain management. Pt will have more imaging in December. Will see Dr. Perez again as well. Pt reports with recent flare went to IC & was given prednisone for 5 days; asked for pain meds, given 6 tablets of hydrocodone; after that pt reports terrible pain described as outrageous/ the worst pain; pt reports she ended up calling home health to get Toradol injection. Reports specialist said surgery is not out of the question, would like to see the updated imaging; asked for pain meds again for a \"rescue\" & pt reports per MD policy is to not dispense until after surgery, but PCP may be able to - unable to get in with PCP so had virtual appt with colleague - placed on gabapentin. Been on for about a week. Not sure if any changes yet.    Pt describes pain level current 5/10, at best 3/10, at worst 9/10.   Current functional limitations include donning socks & shoes (using shoe horn), wiping after toileting (using device to assist), shower frequency/ standing in the shower (concern of standing with slippery shower), sitting > 1 hour, walking > 1 hour, limited driving distances (not far).    Cb describes prior level of function: was previously walking, kneeling to pull weeds, light housekeeping but not vacuuming or doing heavy lifting. Pt reports the newer/ current symptoms started after onset of knee PT sometime in Oct. Pt reports the L knee is much better & the R knee is better. Pt goals include \"manage the pain or reduce it, do something positive for it - improve the pain in my lower back, R leg & feet/ toes; being able to drive & feeling confident about driving.\"  Past medical history was reviewed with Cb. Significant findings include  has a past medical history of Anxiety, Aortic regurgitation, Arthritis, Back pain, Calculus of kidney, Cardiac calcification (HCC) - CAC score  of 5.24 seen on 22 CT Heart Scan protocol (2023), Colon polyps, Constipation, Depression, Diarrhea, unspecified, Disorder of liver, Diverticulosis, Dizziness (Last week), Fatigue, Flatulence/gas pain/belching, Food intolerance (), Frequent urination, Gastroparesis, GERD (gastroesophageal reflux disease), Headache disorder (?), Heart palpitations, Heartburn, Hemorrhoids, History of depression (), History of eating disorder (), Hoarseness, chronic, Hyperlipidemia, Indigestion, Leg swelling, Loss of appetite, Migraines, Mouth sores (), Nausea, OCD (obsessive compulsive disorder), MODE (obstructive sleep apnea) (), Pain in joints, Painful swallowing (), Parkinsonism (HCC), Peptic ulcer disease, Problems with swallowing (), Sleep disturbance (), Stool incontinence (10/23), Stress, Syncope (2 weeks), Tendonitis of shoulder, right (2019), Tremor, Vomiting blood, Wears glasses, Weight gain, and Weight loss. Reports  hx previous PT for LBP, RTC, hand. Of note, pt reports some cognitive impairment/ cognitive functioning loss with slow processing speeds, some memory loss (due to ECT for depression about 10 yrs ago). Pt reports patience & handouts will be helpful for her success in PT. Pt  has a past surgical history that includes ; needle biopsy right (2015); d & c; other surgical history; other surgical history (); colonoscopy; egd; other surgical history (2019); ect provided ([id number]); Laparoscopic cholecystectomy; removal gallbladder (2020); cholecystectomy; spine surgery procedure unlisted (); tonsillectomy (?); and skin surgery.    ASSESSMENT  Cb presents to physical therapy evaluation with primary c/o RLE pain (R buttock, R calf shin/ lat aspect ankle & into foot & toes; R thigh ache not described as shooting/ better tolerance to this) with associated N/T, LBP. The results of the objective tests and measures show impairments in posture,  gait, strength (weakness in RLE myotomes), LE dermatomes, lumbar AROM, positive R slump with < 10 degrees passive elevation with DF. Functional deficits include but are not limited to donning socks & shoes, wiping after toileting, shower frequency/ standing in the shower, sitting > 1 hour, walking > 1 hour, limited driving distances. Signs and symptoms are consistent with diagnosis of Lumbar radiculopathy (M54.16) Piriformis syndrome of right side (G57.01) Osteoarthritis of spine with radiculopathy, lumbar region (M47.26). Pt and PT discussed evaluation findings, pathology, POC. Pt voiced understanding. Skilled Physical Therapy is medically necessary to address the above impairments and reach functional goals.     OBJECTIVE:   Limited obj component of IE in light of need for comprehensive subj exam. TBA subsequent visit.   Observation/Posture: Pt alternated between sitting & standing for subj portion of IE. Stance: WBOS, reduced lumbar lordotic curve  Neuro Screen: LE dermatomes hyper R L4-S1 otherwise WNL B L2, L3; LE reflexes WNL B patellar & hyporeflexic B achilles  Lumbar AROM: (* denotes performed with pain)  Flexion: mod restricted* peripheralization RLE  Rotation: R mod restricted*; L mod restricted*  Accessory motion: NT  Palpation: TTP R>L PSIS with min increase in lumbar paraspinal tone in stance  Strength: (* denotes performed with pain)  LE myotomes   Hip flexion (L2): R 4/5; L 5/5  Knee extension (L3): R 4/5; L 5/5   DF (L4): R 4-/5; L 5/5  Great Toe Ext (L5): R 4-/5, L 5/5  PF (S1): R 4-/5; L 5/5  Add'l TBA   Flexibility: NT  Special tests:   SLR: R + with passive DF & raise < 10 deg off table, L NT  Gait: pt ambulates on level ground with R sided antalgia, trendelenburg/waddle, path deviation with visual scanning, and crossover gait pattern  with varied hesitancy. Pt reported feeling RLE felt the knee could give out/ buckle during amb (did not, denies falls)    Today’s Treatment and Response:   Pt  education was provided on exam findings, treatment diagnosis, treatment plan, expectations, and prognosis. Pt was also provided recommendations for importance of remaining active and possible use of AD (i.e. cane) pending gait disturbance prn . Due to pt's presentation, PT reached out to both PCP & ortho spine MD who is managing pt's care to inform of pt's worsening bowel/ bladder symptoms associated with onset of RLE/ back symptoms. In light of these symptoms combined with myotomal weakness PT recommended pt to report to [facility] downstairs to be urgently evaluated. Pt was accompanied down to ER & was left safely with ER triage staff.    Patient was instructed in and issued a HEP for: Deferred.    Charges: PT Eval Moderate Complexity      Total Timed Treatment: 3 min     Total Treatment Time: 60 min    Based on clinical rationale and outcome measures, this evaluation involved Moderate Complexity decision making due to 3+ personal factors/comorbidities, 4+ body structures involved/activity limitations, and evolving symptoms including changing pain levels.  PLAN OF CARE:    Goals: (to be met in 10 visits)   Pt will demonstrate good understanding of proper posture and body mechanics to decrease pain and improve spinal safety   Pt will improve lumbar spine AROM flexion to WNL to allow increase ease with bending forward to don shoes & socks  Pt will improve lumbar spine AROM R/L rot to WNL to allow ease with wiping after toileting  Pt will improve R knee ext (L3) strength to 5/5 to increase pt's comfort with standing in the shower  Pt will improve R ankle DF (L4), great toe ext (L5), & PF (S1) strength to a least 4+/5 for improving ease for driving longer distances  Pt will reduce VAL score from 54% (severe disability) to 21-40% (moderate disability) for improved overall function with ADLs including sitting & walking  Pt will be independent and compliant with comprehensive HEP to maintain progress achieved in PT       Frequency / Duration: Patient will be seen for 1-2 x/week or a total of 10 visits over a 90 day period. Treatment will include: Gait training, Manual Therapy, Neuromuscular Re-education, Self-Care Home Management, Therapeutic Activities, Therapeutic Exercise, Home Exercise Program instruction, and Modalities to include: Electrical stimulation (unattended)    Education or treatment limitation: Cognition (potential)  Rehab Potential:good    Oswestry Disability Index Score  Score: (Patient-Rptd) 54 % (11/27/2024 11:58 AM)    Patient/Family/Caregiver was advised of these findings, precautions, and treatment options and has agreed to actively participate in planning and for this course of care.    Thank you for your referral. Please co-sign or sign and return this letter via fax as soon as possible to [phone]. If you have any questions, please contact me at Dept: [phone]    Sincerely,  Electronically signed by therapist: Roopa Mena PT    Physician's certification required: Yes  I certify the need for these services furnished under this plan of treatment and while under my care.    X___________________________________________________ Date____________________    Certification From: 11/27/2024  To:2/25/2025

## 2024-12-02 ENCOUNTER — TELEPHONE (OUTPATIENT)
Dept: NEUROLOGY | Facility: CLINIC | Age: 64
End: 2024-12-02

## 2024-12-02 ENCOUNTER — TELEPHONE (OUTPATIENT)
Dept: FAMILY MEDICINE CLINIC | Facility: CLINIC | Age: 64
End: 2024-12-02

## 2024-12-02 ENCOUNTER — OFFICE VISIT (OUTPATIENT)
Dept: NEUROLOGY | Facility: CLINIC | Age: 64
End: 2024-12-02
Payer: MEDICARE

## 2024-12-02 VITALS
WEIGHT: 181 LBS | RESPIRATION RATE: 16 BRPM | HEART RATE: 80 BPM | BODY MASS INDEX: 33 KG/M2 | DIASTOLIC BLOOD PRESSURE: 78 MMHG | SYSTOLIC BLOOD PRESSURE: 124 MMHG

## 2024-12-02 DIAGNOSIS — G43.709 CHRONIC MIGRAINE WITHOUT AURA WITHOUT STATUS MIGRAINOSUS, NOT INTRACTABLE: Primary | ICD-10-CM

## 2024-12-02 RX ORDER — BUSPIRONE HYDROCHLORIDE 15 MG/1
15 TABLET ORAL 2 TIMES DAILY
COMMUNITY
Start: 2024-11-04

## 2024-12-02 RX ORDER — MELOXICAM 7.5 MG/1
7.5 TABLET ORAL DAILY
COMMUNITY

## 2024-12-02 RX ORDER — METHYLPHENIDATE HYDROCHLORIDE 10 MG/1
10 TABLET ORAL 2 TIMES DAILY
COMMUNITY
Start: 2024-11-18

## 2024-12-02 NOTE — TELEPHONE ENCOUNTER
Last visit 11/20/24 - Dr. Ramos  Telemedicine  1. Lumbar radiculopathy    -Reviewed notes with spine specialty  - Agree with recommendations in terms of doing physical therapy, pain management.  Will await MRI of the lumbar spine imaging  - Trial of gabapentin 100 mg twice a day  - I did explain to her how this medication works  - Discussed possible side effects which include but not limited to allergic reaction, fatigue, somnolence  -Try to avoid driving if possible  - Follow-up after 4 weeks or as needed but will also await recommendations from pain management.    Patient requesting new medication.

## 2024-12-02 NOTE — PATIENT INSTRUCTIONS
Refill policies:    Allow 2-3 business days for refills; controlled substances may take longer.  Contact your pharmacy at least 5 days prior to running out of medication and have them send an electronic request or submit request through the “request refill” option in your [facility] account.  Refills are not addressed on weekends; covering physicians do not authorize routine medications on weekends.  No narcotics or controlled substances are refilled after noon on Fridays or by on call physicians.  By law, narcotics must be electronically prescribed.  A 30 day supply with no refills is the maximum allowed.  If your prescription is due for a refill, you may be due for a follow up appointment.  To best provide you care, patients receiving routine medications need to be seen at least once a year.  Patients receiving narcotic/controlled substance medications need to be seen at least once every 3 months.  In the event that your preferred pharmacy does not have the requested medication in stock (e.g. Backordered), it is your responsibility to find another pharmacy that has the requested medication available.  We will gladly send a new prescription to that pharmacy at your request.    Scheduling Tests:    If your physician has ordered radiology tests such as MRI or CT scans, please contact Central Scheduling at [phone] right away to schedule the test.  Once scheduled, the [facility] Centralized Referral Team will work with your insurance carrier to obtain pre-certification or prior authorization.  Depending on your insurance carrier, approval may take 3-10 days.  It is highly recommended patients assure they have received an authorization before having a test performed.  If test is done without insurance authorization, patient may be responsible for the entire amount billed.      Precertification and Prior Authorizations:  If your physician has recommended that you have a procedure or additional testing performed the [facility]  Centralized Referral Team will contact your insurance carrier to obtain pre-certification or prior authorization.    You are strongly encouraged to contact your insurance carrier to verify that your procedure/test has been approved and is a COVERED benefit.  Although the [facility] Centralized Referral Team does its due diligence, the insurance carrier gives the disclaimer that \"Although the procedure is authorized, this does not guarantee payment.\"    Ultimately the patient is responsible for payment.   Thank you for your understanding in this matter.  Paperwork Completion:  If you require FMLA or disability paperwork for your recovery, please make sure to either drop it off or have it faxed to our office at [phone]. Be sure the form has your name and date of birth on it.  The form will be faxed to our Forms Department and they will complete it for you.  There is a 25$ fee for all forms that need to be filled out.  Please be aware there is a 10-14 day turnaround time.  You will need to sign a release of information (VLADIMIR) form if your paperwork does not come with one.  You may call the Forms Department with any questions at [phone].  Their fax number is [phone].

## 2024-12-02 NOTE — TELEPHONE ENCOUNTER
Initiating Botox Procedure Prior-Authorization for 2025. Patient has been scheduled for next visit on 2/24/2025.

## 2024-12-02 NOTE — PROGRESS NOTES

## 2024-12-02 NOTE — TELEPHONE ENCOUNTER
Patient calling in stating that her back pain has gotten worst. Patient states that the rx that was recently prescribed has not done anything and wants to know if she can prescribe something different. Patient states that she has an appointment with the pain clinic on Wednesday.     Please advise.       [facility]/pharmacy #[id number] - [place], [place] - [address] AT [facility], [phone], [phone] [address] [address]  Phone: [phone] Fax: [phone]

## 2024-12-03 ENCOUNTER — OFFICE VISIT (OUTPATIENT)
Dept: PHYSICAL THERAPY | Age: 64
End: 2024-12-03
Attending: PHYSICIAN ASSISTANT
Payer: MEDICARE

## 2024-12-03 ENCOUNTER — APPOINTMENT (OUTPATIENT)
Dept: PHYSICAL THERAPY | Age: 64
End: 2024-12-03
Attending: PHYSICIAN ASSISTANT
Payer: MEDICARE

## 2024-12-03 PROCEDURE — 97110 THERAPEUTIC EXERCISES: CPT | Performed by: PHYSICAL THERAPIST

## 2024-12-03 NOTE — PROGRESS NOTES
Diagnosis:   Lumbar radiculopathy (M54.16)  Piriformis syndrome of right side (G57.01)  Osteoarthritis of spine with radiculopathy, lumbar region (M47.26)       Referring Provider: Colton Schaefer  Date of Evaluation:    11/27/2024    Precautions:   Pt reports some cognitive impairment/ cognitive functioning loss with slow processing speeds, some memory loss Next MD visit:   12/4: pain mgmt  12/17: lumbar MRI  12/19: ortho spine  Date of Surgery: n/a   Insurance Primary/Secondary: [employer] MCR / N/A     # Auth Visits: no visit limit, no auth            Subjective: Pt reports B LBP & pain referring down RLE. Symptoms about the same. Feels walking is \"just not right.\" Not sure if it is from the pain meds but does not feel she has control of the legs with walking. PFED gave medication for UTI which is completed, no changes. Curious re: rolling over in bed with less pain    Pain: 5/10      Objective: See Flowsheet for details      Assessment: Peripheralization with both lumbar ext & flex however (beginning) centralization with standing R lumbar side glides. Aberrant motions reported with RLE with mat table supine activities, albeit no increase in pain with this. Pain levels down to 4/10 end of visit with pt encouraged from some symptom relief noted. Pt instructed in recommended bed mobility techniques with use of pillow support between BLE to promote neutral pelvis as well as log rolling to avoid spinal twisting. Able to demo efficiency with form for home use. Confirmed pt understanding of \"red/ yellow/ green light\" for when to cont with HEP or when to defer: based on symptoms re: centralization & peripheralization.      Goals: (to be met in 10 visits)   Pt will demonstrate good understanding of proper posture and body mechanics to decrease pain and improve spinal safety   Pt will improve lumbar spine AROM flexion to WNL to allow increase ease with bending forward to don shoes & socks  Pt will improve lumbar spine AROM R/L rot  Pt missed INR appt. Left message on his cell phone voicemail asking him to reschedule the INR lab appt at the [facility] (either call the INR clinic or call the [place] clinic directly to schedule the lab appt). Joana   to WNL to allow ease with wiping after toileting  Pt will improve R knee ext (L3) strength to 5/5 to increase pt's comfort with standing in the shower  Pt will improve R ankle DF (L4), great toe ext (L5), & PF (S1) strength to a least 4+/5 for improving ease for driving longer distances  Pt will reduce VAL score from 54% (severe disability) to 21-40% (moderate disability) for improved overall function with ADLs including sitting & walking  Pt will be independent and compliant with comprehensive HEP to maintain progress achieved in PT     Plan: Assess for update following pain management appt tomorrow.  Date: 12/3/2024  TX#: 2/10 Date:                 TX#: 3/ Date:                 TX#: 4/ Date:                 TX#: 5/ Date:   TX#: 6/   Therex: 43'  Standing lumbar ext X 10  Standing lumbar flex X 10  Standing at doorway R lumbar side glides 3 X 10 - HEP  -education: avoid \"testing\" it with repeated lumbar flex/ ext if side glides are centralizing  -education: goals for centralization, avoiding peripheralization  Crossover piriformis stretch R 10 X 10\" manual with instruction/ practice for HEP  LTR small range X 1' - HEP  Supine R ankle pump in 0 deg 2 X 10, in 20 deg X 10 - HEP (for 0 deg)  SAQ round bolster X 10 B  Bed mobility: log rolling  -folded pillow for neutral pelvis - HEP       HEP:   Access Code: [email or web address]  URL: [email or web address]  Date: 12/03/2024  Prepared by: Roopa Mena    Exercises  - Standing Side Glide Self Mobilization at Wall  - 2-3 x daily - 7 x weekly - 3 sets - 10 reps - 3 sec hold  - Supine Piriformis Stretch with Foot on Ground  - 1-2 x daily - 7 x weekly - 1 sets - 10 reps - 10 sec hold  - Supine Lower Trunk Rotation with PLB  - 1-1 x daily - 7 x weekly - 1-2 sets - 5 sec hold - 1 min duration  - Supine Active Ankle Pumps  - 1-2 x daily - 7 x weekly - 2-3 sets - 10 reps    Patient Education  - Sleep Positions  - Log Roll    Charges: Therex X 3      Total Timed Treatment:  43 min  Total Treatment Time: 43 min

## 2024-12-03 NOTE — PATIENT INSTRUCTIONS
Access Code: [email or web address]  URL: [email or web address]  Date: 12/03/2024  Prepared by: Roopa Mena    Exercises  - Standing Side Glide Self Mobilization at Wall  - 2-3 x daily - 7 x weekly - 3 sets - 10 reps - 3 sec hold  - Supine Piriformis Stretch with Foot on Ground  - 1-2 x daily - 7 x weekly - 1 sets - 10 reps - 10 sec hold  - Supine Lower Trunk Rotation with PLB  - 1-1 x daily - 7 x weekly - 1-2 sets - 5 sec hold - 1 min duration  - Supine Active Ankle Pumps  - 1-2 x daily - 7 x weekly - 2-3 sets - 10 reps    Patient Education  - Sleep Positions  - Log Roll

## 2024-12-04 ENCOUNTER — OFFICE VISIT (OUTPATIENT)
Dept: PAIN CLINIC | Facility: CLINIC | Age: 64
End: 2024-12-04
Payer: MEDICARE

## 2024-12-04 VITALS
BODY MASS INDEX: 34 KG/M2 | HEART RATE: 83 BPM | DIASTOLIC BLOOD PRESSURE: 80 MMHG | SYSTOLIC BLOOD PRESSURE: 120 MMHG | WEIGHT: 183 LBS | OXYGEN SATURATION: 98 %

## 2024-12-04 DIAGNOSIS — M54.16 LUMBAR RADICULOPATHY: Primary | ICD-10-CM

## 2024-12-04 PROCEDURE — 99214 OFFICE O/P EST MOD 30 MIN: CPT | Performed by: PHYSICIAN ASSISTANT

## 2024-12-04 NOTE — PROGRESS NOTES
Patient: Cb Webster  Medical Record Number: [id number]  Site: [facility]  Referring Physician:  Ismael Perez  PCP: Dr. Schaefer    Dear Dr. Perez:    Thank you very much for requesting this consultation. I had the opportunity to evaluate and initiate care for your patient today, as per your request.    HISTORY OF CHIEF COMPLAINT:      Cb Webster is a 64 year old female, who complains of bilateral sacral pain with radiating R gluteal pain to R thigh in non-specific distribution to anterior lower leg and dorsum of foot to great toe.    Patient is here today at the request of spine surgery with pain in above-described distribution that has been ongoing for years.  She had been seen by this clinic in the distant past, having undergone SI joint injection and facet injection in 2018 followed by radiofrequency ablation in 2019.  For her complaints of low back pain, states that she assumed it was helpful, as she did not return for additional procedures, though can not recall specifically, citing some cognitive decline secondary to ECT treatments.      Over the past \"few months\" pain flared, specifically with increased R radicular pain.  She has been sent to PT, to minimal relief as of yet.  She was sent for XR and MRI, and is scheduled for MRI next week.  Sent for consideration of injections.  She is on flexeril and neurontin, to mild relief.      VAS Pain Score:  3-9/10    Aggravating Factors: Relieving Factors:   Sitting  Increased activity  Changing positions      Past Treatment Attempted/Patient’s Response:  As above     Past Medical History:    Anxiety    Aortic regurgitation    mild    Arthritis    Back pain    Calculus of kidney    Cardiac calcification (HCC) - CAC score of 5.24 seen on 12/2/22 CT Heart Scan protocol    Colon polyps    Constipation    Depression    Diarrhea, unspecified    Disorder of liver    Fatty liver    Diverticulosis    Dizziness    Fatigue    Flatulence/gas  pain/belching    Food intolerance    Frequent urination    Gastroparesis    GERD (gastroesophageal reflux disease)    Headache disorder    Migraines    Heart palpitations    Heartburn    Hemorrhoids    History of depression    Major depressive disorder & anxiety    History of eating disorder    Binge eating    Hoarseness, chronic    Hyperlipidemia    Indigestion    Leg swelling    Loss of appetite    Migraines    Mouth sores    Triggered by stress or poor diet    Nausea    OCD (obsessive compulsive disorder)    MODE (obstructive sleep apnea)    Pain in joints    Painful swallowing    Parkinsonism (HCC)    Peptic ulcer disease    Problems with swallowing    Sleep disturbance    High serotonin    Stool incontinence    Soft barely formed stool, not detecting urgency    Stress    Syncope    Tendonitis of shoulder, right    Tremor    Vomiting blood    Wears glasses    Weight gain    10 lbs in 2 months - stop phentermine and decreased excercise after arthroscope of left knee    Weight loss    28 lb last year      Past Surgical History:   Procedure Laterality Date          x 2    Cholecystectomy      Colonoscopy      D & c      Ect provided  [id number]    Egd      Laparoscopic cholecystectomy      Needle biopsy right  2015    benign breast    Other surgical history      C3-C4 anterior discectomy    Other surgical history  2018    [facility]    Other surgical history  2019    radiofrequency abalsion lumbar    Removal gallbladder  2020    Skin surgery      Spine surgery procedure unlisted      Anterior cervical discectomy    Tonsillectomy  1966?      Family History   Problem Relation Age of Onset    Hypertension Father     Heart Attack Father          at 48 years    Heart Disease Father     Alcohol and Other Disorders Associated Father     Depression Father     Other (ALS) Mother     Hypertension Mother         Diagnosed at 91 yo with ALS  at 91    Personality Disorder Daughter     Dementia  Maternal Grandmother     Obesity Maternal Grandmother     Dementia Maternal Grandfather     Obesity Paternal Grandfather     Cancer Sister         Kidney, chronic lymp… leukemia    Heart Attack Sister         Kidney cancer, chronic lymphocytic leukemia    Ulcerative Colitis Brother     Cancer Brother         Kidney      Social History     Socioeconomic History    Marital status:    Occupational History    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[employer]   Tobacco Use    Smoking status: Never    Smokeless tobacco: Never   Vaping Use    Vaping status: Never Used   Substance and Sexual Activity    Alcohol use: Not Currently     Comment: 6 drinks or less/year    Drug use: Never   Other Topics Concern    Caffeine Concern No    Exercise Yes    Seat Belt Yes    Special Diet No    Stress Concern Yes    Weight Concern Yes      Current Medications:  Current Outpatient Medications   Medication Sig Dispense Refill    methylphenidate 10 MG Oral Tab Take 1 tablet (10 mg total) by mouth 2 (two) times daily.      busPIRone 15 MG Oral Tab Take 1 tablet (15 mg total) by mouth 2 (two) times daily.      Meloxicam 7.5 MG Oral Tab Take 1 tablet (7.5 mg total) by mouth daily.      gabapentin 100 MG Oral Cap Take 1 capsule (100 mg total) by mouth 2 (two) times daily. 60 capsule 1    MEMANTINE 10 MG Oral Tab TAKE 1 TABLET BY MOUTH EVERY DAY 90 tablet 0    FLUoxetine 10 MG Oral Cap TAKE 1 CAPSULE BY MOUTH EVERY MORNING TAKE ALONG WITH THE 40MG CAPS FOR TOTAL DOSE OF 50MG DAILY      [START ON 1/23/2025] Phentermine HCl 15 MG Oral Cap Take 1 capsule (15 mg total) by mouth every morning. 30 capsule 0    cyclobenzaprine 10 MG Oral Tab Take 1 tablet (10 mg total) by mouth 3 (three) times daily as needed for Muscle spasms. 90 tablet 0    FLUoxetine HCl 40 MG Oral Cap Take 1 capsule (40 mg total) by mouth daily.      pantoprazole 40 MG Oral Tab EC Take 1 tablet (40 mg total) by mouth before  breakfast. 90 tablet 3    metoclopramide 5 MG Oral Tab Take 1 tablet (5 mg total) by mouth daily. 30 tablet 2    famotidine 40 MG Oral Tab Take 1 tablet (40 mg total) by mouth daily as needed for Heartburn. 90 tablet 3    dicyclomine 10 MG Oral Cap Take 1 capsule (10 mg total) by mouth in the morning and 1 capsule (10 mg total) before bedtime. 180 capsule 3    SUMAtriptan Succinate 6 MG/0.5ML Subcutaneous Solution Auto-injector Inject 0.5 mL (6 mg total) into the skin as needed (for moderate to severe migraine). 6 mL 2    PROPRANOLOL HCL ER 80 MG Oral Capsule SR 24 Hr TAKE 1 CAPSULE (80 MG TOTAL) BY MOUTH EVERY EVENING. 90 capsule 1    albuterol 108 (90 Base) MCG/ACT Inhalation Aero Soln Inhale 2 puffs into the lungs every 4 (four) hours as needed for Wheezing or Shortness of Breath. 6.7 g 0    traZODone 100 MG Oral Tab Take 1 tablet (100 mg total) by mouth nightly.      rosuvastatin 5 MG Oral Tab Take 1 tablet (5 mg total) by mouth Every Monday, Wednesday, and Friday. 45 tablet 3    SPRAVATO, 84 MG DOSE, 28 MG/DEVICE Nasal Solution Therapy Pack 84 mg by Nasal route every 21 days.      triamcinolone 0.1 % External Cream Apply thin layer to affected area twice a day as needed 45 g 1    hydrocortisone 2.5 % External Cream Apply to AA of FACE BID x 2 weeks, hold 2 weeks, repeat PRN. 30 g 2    ketoconazole 2 % External Cream Apply to AA of FACE BID when not using Hydrocortisone Cream. 30 g 2    LORazepam 2 MG Oral Tab Take 1 tablet (2 mg total) by mouth as needed.          Functional Assessment: Patient reports that they are able to complete all of their ADL's such as eating, bathing, using the toilet, dressing and getting up from a bed or a chair independently.    Work History:  The patient currently is retired.    REVIEW OF SYSTEMS:   10 point review of systems is otherwise negative,unless otherwise in HPI.      Radiology/Lab Test Reviewed:  MRI L spine 4/20/19:    CONCLUSION:       There is a new right paracentral  disc herniation at L4-L5 that is markedly effacing right lateral recess and impinging on the right L5 nerve root.      Right paracentral disc bulge at L5-S1 has a foraminal component that may be minimally abutting the foraminal component of the right L5 nerve root.  This is also minimally effacing the right lateral recess and abutting the right S1 nerve root.     CBC:    Lab Results   Component Value Date    WBC 7.1 10/18/2023    WBC 8.3 09/07/2023    WBC 6.7 10/26/2022     Lab Results   Component Value Date    HEMOGLOBIN 13.2 09/19/2020    HEMOGLOBIN 12.4 01/12/2020     Lab Results   Component Value Date     10/18/2023    .0 09/07/2023    .0 08/11/2022         PHYSICAL EXAMIMATION   PHYSICAL EXAMINATION: Cb Wesbter is a 64 year old female who is observed sitting comfortably on a chair in the exam room alert and oriented times three. She looks consistent with her stated age.    Ht Readings from Last 1 Encounters:   11/27/24 61.75\"     Wt Readings from Last 1 Encounters:   12/04/24 183 lb (83 kg)     The patient is well developed, well nourished, normal body habitus, well muscled. She moves independently from sitting to standing with ease.       Inspection:  No acute distress    Patient displays Non-antalgic, and is able to Normal heel walk, Normal toe walk.    Coordination:  Well-coordinated, fluent gait, able to engage in rapid alternating movements of upper and lower extremities.    ROM Lumbar Spine:  See chart below:  Motion Right (+ or -) Left (+ or -)   Lumbar Flexion + -   Lumbar Extension - -   Lumbar Bending + -   Lumbar Extension/ twisting motion - -     Lumbar/Sacral Integument:  Skin over lumbar sacral spine is intact without rashes, excoriations, lesions or erythema noted    Palpation:  See chart below:  Palpation of lumbar area Right (+ or -) Left (+ or -)   Lumbar facets - -   Lumbar paraspinals - -   Piriformis - -   SIJ - -   Trochanteric Bursa - -     Deep Tendon  Reflexes:  Grossly intact to bilateral lower extremities 2/4 throughout    Strength:  Strength deficits noted:  3/5 right EHL, 4/5 right dorsiflexion     Sensation:  No sensory deficits noted on bilateral lower extremities to light touch    Tests:  Test Right (+ or -) Left (+ or -)   SLR + -   Antolin’s     Babinski     Clonus       HEAD/NECK: Head is normocephalic, neck supple  EYES: EOMI, GORDO  SKIN EXAM: Skin is intact, head, neck, trunk and arms/legs. No rashes, mottling or ulcerations.  LYMPH EXAM: There is no lymph edema in either lower extremity.  VASCULAR EXAM: Pedal pulses are normal bilaterally, with good distal perfusion. No clubbing or cyanosis.  ABDOMINAL EXAM: Abdomen is soft without masses palpated.  HEART: normal, regular, S1 and S2  LUNGS:CTA  MUSCULOSKELETAL: Smooth, pain-free ROM to bilat hips, ankles, and knees.     Do you have any known blood/bleeding disorders?  No  Does patient currently take blood thinners?   None  Does patient currently take any antibiotics?   No      Patient is currently on pain medications:  No  Reason pain medications are prescribed: N/A  Pain medications are prescribed by: N/A  [place] Prescription Monitoring review: N/A  DIRE: N/A  Treatment decision: N/A    MEDICAL DECISION MAKING:     Impression: Right lumbar radiculopathy    Low back and radicular right lower extremity pain dominantly in an L5 distribution with pain to the dorsum of the foot and great toe with x-ray evidence of progressive degenerative changes at L4-5 when compared to previous imaging.  She had undergone radiofrequency ablation of the lumbar facets in 2019 which was quite effective for her axial complaints, the pain has long since returned and now associated with even more significant radicular right lower extremity pain.  She has been evaluated by spine surgery, and is actively engaged in physical therapy without resolution of symptoms.  Taking neuropathic agents and Flexeril to temporary relief.   She is awaiting MRI in the coming weeks, and sent for consideration of injections.    On exam does have pain with range of motion lumbar spine, specifically flexion and right lateral bending.  No focal sensory deficits.  Weakness of the right EHL (3/5) and to a lesser extent dorsiflexion entheses 4/5).  Positive right straight leg raise.    Will asked that she complete MRI, as scheduled.  After imaging, contact our clinic I will be happy to take a look at the images.  If this does show L5 impingement, as we expected, we will simply proceed with a right L5 TF-DIANA.  If there are less specific changes, could give consideration to intralaminar approach.    Plan: Update MRI, as scheduled.  Contact clinic after completion and we will be happy to look at images.  Injections to follow based on imaging.      The patient indicates understanding of these issues and agrees to the plan.      Thank you very much.     Respectfully yours,    ONIEL Genao

## 2024-12-04 NOTE — PROGRESS NOTES
Subjective:   Patient ID: Cb Webster is a 64 year old female.    Back Pain        History/Other:   Review of Systems   Musculoskeletal:  Positive for back pain.     Current Outpatient Medications   Medication Sig Dispense Refill    methylphenidate 10 MG Oral Tab Take 1 tablet (10 mg total) by mouth 2 (two) times daily.      busPIRone 15 MG Oral Tab Take 1 tablet (15 mg total) by mouth 2 (two) times daily.      Meloxicam 7.5 MG Oral Tab Take 1 tablet (7.5 mg total) by mouth daily.      gabapentin 100 MG Oral Cap Take 1 capsule (100 mg total) by mouth 2 (two) times daily. 60 capsule 1    MEMANTINE 10 MG Oral Tab TAKE 1 TABLET BY MOUTH EVERY DAY 90 tablet 0    FLUoxetine 10 MG Oral Cap TAKE 1 CAPSULE BY MOUTH EVERY MORNING TAKE ALONG WITH THE 40MG CAPS FOR TOTAL DOSE OF 50MG DAILY      Phentermine HCl 15 MG Oral Cap Take 1 capsule (15 mg total) by mouth every morning. 30 capsule 0    [START ON 12/25/2024] Phentermine HCl 15 MG Oral Cap Take 1 capsule (15 mg total) by mouth every morning. (Patient not taking: Reported on 12/2/2024) 30 capsule 0    [START ON 1/23/2025] Phentermine HCl 15 MG Oral Cap Take 1 capsule (15 mg total) by mouth every morning. (Patient not taking: Reported on 12/2/2024) 30 capsule 0    cyclobenzaprine 10 MG Oral Tab Take 1 tablet (10 mg total) by mouth 3 (three) times daily as needed for Muscle spasms. 90 tablet 0    FLUoxetine HCl 40 MG Oral Cap Take 1 capsule (40 mg total) by mouth daily.      pantoprazole 40 MG Oral Tab EC Take 1 tablet (40 mg total) by mouth before breakfast. 90 tablet 3    metoclopramide 5 MG Oral Tab Take 1 tablet (5 mg total) by mouth daily. 30 tablet 2    famotidine 40 MG Oral Tab Take 1 tablet (40 mg total) by mouth daily as needed for Heartburn. 90 tablet 3    dicyclomine 10 MG Oral Cap Take 1 capsule (10 mg total) by mouth in the morning and 1 capsule (10 mg total) before bedtime. 180 capsule 3    SUMAtriptan Succinate 6 MG/0.5ML Subcutaneous Solution  Auto-injector Inject 0.5 mL (6 mg total) into the skin as needed (for moderate to severe migraine). 6 mL 2    PROPRANOLOL HCL ER 80 MG Oral Capsule SR 24 Hr TAKE 1 CAPSULE (80 MG TOTAL) BY MOUTH EVERY EVENING. 90 capsule 1    albuterol 108 (90 Base) MCG/ACT Inhalation Aero Soln Inhale 2 puffs into the lungs every 4 (four) hours as needed for Wheezing or Shortness of Breath. 6.7 g 0    traZODone 100 MG Oral Tab Take 1 tablet (100 mg total) by mouth nightly.      rosuvastatin 5 MG Oral Tab Take 1 tablet (5 mg total) by mouth Every Monday, Wednesday, and Friday. 45 tablet 3    butalbital-acetaminophen-caffeine -40 MG Oral Tab       SPRAVATO, 84 MG DOSE, 28 MG/DEVICE Nasal Solution Therapy Pack 84 mg by Nasal route every 21 days.      triamcinolone 0.1 % External Cream Apply thin layer to affected area twice a day as needed 45 g 1    hydrocortisone 2.5 % External Cream Apply to AA of FACE BID x 2 weeks, hold 2 weeks, repeat PRN. 30 g 2    ketoconazole 2 % External Cream Apply to AA of FACE BID when not using Hydrocortisone Cream. 30 g 2    LORazepam 2 MG Oral Tab Take 1 tablet (2 mg total) by mouth as needed.       Allergies:Allergies[1]    Objective:   Physical Exam    Assessment & Plan:   No diagnosis found.    No orders of the defined types were placed in this encounter.      Meds This Visit:  Requested Prescriptions      No prescriptions requested or ordered in this encounter       Imaging & Referrals:  None  Location of Pain: low back     Date Pain Began: 11/4/2024          Work Related:   No        Receiving Work Comp/Disability:   No    Numeric Rating Scale:  Pain at Present:  6                                                                                                            (No Pain) 0  to  10 (Worst Pain)                 Minimum Pain:   3  Maximum Pain  9    Distribution of Pain:    bilateral    Quality of Pain:   numbness, sharp/stabbing, and tingling    Origin of Pain:     Degenerative    Aggravating Factors:    Sitting    Past Treatments for Current Pain Condition:   Physical Therapy and NSAIDS    Prior diagnostic testing for your pain:  xray         [1]   Allergies  Allergen Reactions    Sulfa Antibiotics NAUSEA ONLY

## 2024-12-06 ENCOUNTER — OFFICE VISIT (OUTPATIENT)
Dept: PHYSICAL THERAPY | Age: 64
End: 2024-12-06
Attending: PHYSICIAN ASSISTANT
Payer: MEDICARE

## 2024-12-06 PROCEDURE — 97110 THERAPEUTIC EXERCISES: CPT | Performed by: PHYSICAL THERAPIST

## 2024-12-06 NOTE — PROGRESS NOTES
Diagnosis:   Lumbar radiculopathy (M54.16)  Piriformis syndrome of right side (G57.01)  Osteoarthritis of spine with radiculopathy, lumbar region (M47.26)       Referring Provider: Colton Schaefer  Date of Evaluation:    11/27/2024    Precautions:   Pt reports some cognitive impairment/ cognitive functioning loss with slow processing speeds, some memory loss Next MD visit:   12/4: pain mgmt  12/17: lumbar MRI  12/19: ortho spine  Date of Surgery: n/a   Insurance Primary/Secondary: AETNA [facility] / N/A     # Auth Visits: no visit limit, no auth            Subjective: Pt reports in the evening 7/10 in the foot traveling up on the R side only. Yesterday was pretty pain-free. Did not complete any of the HEP activities due to pain. Yesterday was reaching fwd & upward which increased pain.    Pain: 5/10      Objective: See Flowsheet for details      Assessment: Beginning centralization with supine 90/90 lumbar decompressive position, this was added to HEP with instruction to avoid sleeping in this position. Increased neural tension with modified figure-4 piriformis stretch in supine though resolved after walking around in clinic after stretching concluded. Pt reported the L calf felt tighter with slantboard gastroc stretch so had better tolerance to completing with RLE only to stretch further into the board. Decreased excursion overall noted with R toe raises in standing & sitting vs on L.      Goals: (to be met in 10 visits)   Pt will demonstrate good understanding of proper posture and body mechanics to decrease pain and improve spinal safety   Pt will improve lumbar spine AROM flexion to WNL to allow increase ease with bending forward to don shoes & socks  Pt will improve lumbar spine AROM R/L rot to WNL to allow ease with wiping after toileting  Pt will improve R knee ext (L3) strength to 5/5 to increase pt's comfort with standing in the shower  Pt will improve R ankle DF (L4), great toe ext (L5), & PF (S1) strength to a least  4+/5 for improving ease for driving longer distances  Pt will reduce VAL score from 54% (severe disability) to 21-40% (moderate disability) for improved overall function with ADLs including sitting & walking  Pt will be independent and compliant with comprehensive HEP to maintain progress achieved in PT     Plan: Cont to promote restoration of RLE strength, reduced pain & improved functional mobility.  Date: 12/3/2024  TX#: 2/10 Date: 12/6/2024  TX#: 3/10 Date:                 TX#: 4/ Date:                 TX#: 5/ Date:   TX#: 6/   Therex: 43'  Standing lumbar ext X 10  Standing lumbar flex X 10  Standing at doorway R lumbar side glides 3 X 10 - HEP  -education: avoid \"testing\" it with repeated lumbar flex/ ext if side glides are centralizing  -education: goals for centralization, avoiding peripheralization  Crossover piriformis stretch R 10 X 10\" manual with instruction/ practice for HEP  LTR small range X 1' - HEP  Supine R ankle pump in 0 deg 2 X 10, in 20 deg X 10 - HEP (for 0 deg)  SAQ round bolster X 10 B  Bed mobility: log rolling  -folded pillow for neutral pelvis - HEP Therex: 42'  NuStep L3 X 6' concurrent with pt update  Supine lumbar decompressive position 90/90 with traction stool X 4' - HEP  -avoid sleeping in this position/ max of 10' total stretch  Hooklying self-performed lumbar traction with UE presses X 5  Hooklying hip Add efren with yellow ball 10\" X 10  Hooklying hip Abd Efren with black pilates ring 10\" X 10   Supine R modified figure-4 piriformis stretch without added OP 3 X 30\" - HEP  Slantboard gastroc stretch heels on ground B 10\" X 4; R 10\" X 6  Standing B alt heel-toe raises X 10 with HHA - HEP  Seated B alt heel-toe raise X 10 - HEP    Next visit TBD: L sidelying R positional distraction      HEP:   Access Code: [email or web address]  URL: [email or web address]  Date: 12/03/2024  Prepared by: Roopa Mena    Exercises  - Standing Side Glide Self Mobilization at Wall  - 2-3 x daily - 7  x weekly - 3 sets - 10 reps - 3 sec hold  - Supine Piriformis Stretch with Foot on Ground  - 1-2 x daily - 7 x weekly - 1 sets - 10 reps - 10 sec hold  - Supine Lower Trunk Rotation with PLB  - 1-1 x daily - 7 x weekly - 1-2 sets - 5 sec hold - 1 min duration  - Supine Active Ankle Pumps  - 1-2 x daily - 7 x weekly - 2-3 sets - 10 reps    Patient Education  - Sleep Positions  - Log Roll    Access Code: [email or web address]  URL: [email or web address]  Date: 12/06/2024  Prepared by: Roopa Mena    Program Notes  You do not need to complete both the seated & standing heel toe raises but feel free to mix & match - if you are feeling more tired & need to sit, complete them in sitting. Otherwise try in standing as long as you have something to hold on to.    Exercises  - Supine with Legs Supported at 90/90  - 1-2 x daily - 7 x weekly - 5-10 min hold  - Heel Toe Raises with Counter Support  - 1 x daily - 5 x weekly - 1-2 sets - 10 reps  - Seated Heel Toe Raises  - 1 x daily - 5 x weekly - 1-2 sets - 10 reps  - Supine Figure 4 Piriformis Stretch  - 1 x daily - 7 x weekly - 3 sets - 30 sec hold    Charges: Therex X 3      Total Timed Treatment: 42 min  Total Treatment Time: 42 min

## 2024-12-09 ENCOUNTER — OFFICE VISIT (OUTPATIENT)
Dept: PHYSICAL THERAPY | Age: 64
End: 2024-12-09
Attending: STUDENT IN AN ORGANIZED HEALTH CARE EDUCATION/TRAINING PROGRAM
Payer: MEDICARE

## 2024-12-09 PROCEDURE — 97110 THERAPEUTIC EXERCISES: CPT | Performed by: PHYSICAL THERAPIST

## 2024-12-09 NOTE — PROGRESS NOTES
Diagnosis:   Lumbar radiculopathy (M54.16)  Piriformis syndrome of right side (G57.01)  Osteoarthritis of spine with radiculopathy, lumbar region (M47.26)       Referring Provider: Colton Schaefer  Date of Evaluation:    11/27/2024    Precautions:   Pt reports some cognitive impairment/ cognitive functioning loss with slow processing speeds, some memory loss Next MD visit:   12/4: pain mgmt  12/17: lumbar MRI  12/19: ortho spine  Date of Surgery: n/a   Insurance Primary/Secondary: AETNA [facility] / N/A     # Auth Visits: no visit limit, no auth            Subjective: Pt reports having two good days this weekend, feeling encouraged. Feeling the most concerning/ painful is with rolling to the L getting out of bed in the morning, will still have pain upon standing up but pain tends to dissipate within ~ 20 min. Pt also reports pain with getting up after sitting on toilet as well as reaching around for wiping. Pt reports the knees have been feeling pretty good, not having to think about them a lot. Feels her R leg is wanting to swing around with walking & the L leg is crossing over the R leg & does not feel steady, feels off balance. Concerned she cannot walk a straight line & feels unsteady.    Pain: 3/10      Objective: See Flowsheet for details      Assessment: Decreased use of log rolling mechanics for supine to sit with pt being instructed in proper form. Pt had improved form upon practice. Pt cued for leaning more forward with spine neutral with sit to stand for improved mechanics. Pt reported ability to complete from chair in clinic without pain. Near end of visit, pt discussed concerns with walking (see subj). Will plan to prioritize gait training next visit to see best option with A.D. at home/ in community including any training required for normalizing gait & safety at home & in community. In the meantime before next visit: pt has walker at home, & was advised to use out of precaution even if \"overkill.\" Will determine  next visit if using walker is still appropriate, or if able to use less restrictive A.D. such as quad cane.      Goals: (to be met in 10 visits)   Pt will demonstrate good understanding of proper posture and body mechanics to decrease pain and improve spinal safety   Pt will improve lumbar spine AROM flexion to WNL to allow increase ease with bending forward to don shoes & socks  Pt will improve lumbar spine AROM R/L rot to WNL to allow ease with wiping after toileting  Pt will improve R knee ext (L3) strength to 5/5 to increase pt's comfort with standing in the shower  Pt will improve R ankle DF (L4), great toe ext (L5), & PF (S1) strength to a least 4+/5 for improving ease for driving longer distances  Pt will reduce VAL score from 54% (severe disability) to 21-40% (moderate disability) for improved overall function with ADLs including sitting & walking  Pt will be independent and compliant with comprehensive HEP to maintain progress achieved in PT     Plan: Next visit- look into different A.D. options for gait such as quad cane & address gait training as indicated. Pt on Wait List for remainder of the week: PT had 1 opening tomorrow but pt unable to take this spot & so pt will be contacted if any openings arise. Next visit confirmed.  Date: 12/3/2024  TX#: 2/10 Date: 12/6/2024  TX#: 3/10 Date: 12/9/2024  TX#: 4/10 Date:                 TX#: 5/ Date:   TX#: 6/   Therex: 43'  Standing lumbar ext X 10  Standing lumbar flex X 10  Standing at doorway R lumbar side glides 3 X 10 - HEP  -education: avoid \"testing\" it with repeated lumbar flex/ ext if side glides are centralizing  -education: goals for centralization, avoiding peripheralization  Crossover piriformis stretch R 10 X 10\" manual with instruction/ practice for HEP  LTR small range X 1' - HEP  Supine R ankle pump in 0 deg 2 X 10, in 20 deg X 10 - HEP (for 0 deg)  SAQ round bolster X 10 B  Bed mobility: log rolling  -folded pillow for neutral pelvis - HEP  Therex: 42'  NuStep L3 X 6' concurrent with pt update  Supine lumbar decompressive position 90/90 with traction stool X 4' - HEP  -avoid sleeping in this position/ max of 10' total stretch  Hooklying self-performed lumbar traction with UE presses X 5  Hooklying hip Add efren with yellow ball 10\" X 10  Hooklying hip Abd Efren with black pilates ring 10\" X 10   Supine R modified figure-4 piriformis stretch without added OP 3 X 30\" - HEP  Slantboard gastroc stretch heels on ground B 10\" X 4; R 10\" X 6  Standing B alt heel-toe raises X 10 with HHA - HEP  Seated B alt heel-toe raise X 10 - HEP    Next visit TBD: L sidelying R positional distraction Therex: 45'  NuStep L4 X 6'  HEP review  Log rolling/ bed mobility to reduce pain with getting out of bed (supine<>sit) with VC's, manual cues, video demo for maximizing learning ([employer]) - HEP  Sit to stand X 5 - HEP  -pt may be appropriate to use device for toilet for arm rests/ handrails for safety- check [employer] for options, many options available - pt should self-research/ read reviews  Education: kinetic chain connection back, hips, knees  Safety: discussed A.D. for gait: pt has a walker at home; pt should cont to use walker until sees PT again for additional gait training/ A.D. trial    Next visit TBD: gait training/ A.D. assessment,  L sidelying R positional distraction pending symptoms     HEP:   Access Code: [email or web address]  URL: [email or web address]  Date: 12/03/2024  Prepared by: Roopa Mena    Exercises  - Standing Side Glide Self Mobilization at Wall  - 2-3 x daily - 7 x weekly - 3 sets - 10 reps - 3 sec hold  - Supine Piriformis Stretch with Foot on Ground  - 1-2 x daily - 7 x weekly - 1 sets - 10 reps - 10 sec hold  - Supine Lower Trunk Rotation with PLB  - 1-1 x daily - 7 x weekly - 1-2 sets - 5 sec hold - 1 min duration  - Supine Active Ankle Pumps  - 1-2 x daily - 7 x weekly - 2-3 sets - 10 reps    Patient Education  - Sleep Positions  - Log  Roll    Access Code: [email or web address]  URL: [email or web address]  Date: 12/06/2024  Prepared by: Roopa Mena    Program Notes  You do not need to complete both the seated & standing heel toe raises but feel free to mix & match - if you are feeling more tired & need to sit, complete them in sitting. Otherwise try in standing as long as you have something to hold on to.    Exercises  - Supine with Legs Supported at 90/90  - 1-2 x daily - 7 x weekly - 5-10 min hold  - Heel Toe Raises with Counter Support  - 1 x daily - 5 x weekly - 1-2 sets - 10 reps  - Seated Heel Toe Raises  - 1 x daily - 5 x weekly - 1-2 sets - 10 reps  - Supine Figure 4 Piriformis Stretch  - 1 x daily - 7 x weekly - 3 sets - 30 sec hold    Access Code: [email or web address]  URL: [email or web address]  Date: 12/09/2024  Prepared by: Roopa Mena    Exercises  - Sitting to Supine Roll  - 1 x daily - 7 x weekly - 1 sets - 3-5 reps  - Sit to Stand  - 1 x daily - 5-7 x weekly - 1 sets - 3-5 reps    Patient Education  - Log Roll  - Sitting Down & Standing Up    Charges: Therex X 3      Total Timed Treatment: 45 min  Total Treatment Time: 45 min

## 2024-12-09 NOTE — PATIENT INSTRUCTIONS
Access Code: [email or web address]  URL: [email or web address]  Date: 12/09/2024  Prepared by: Roopa Mena    Exercises  - Sitting to Supine Roll  - 1 x daily - 7 x weekly - 1 sets - 3-5 reps  - Sit to Stand  - 1 x daily - 5-7 x weekly - 1 sets - 3-5 reps    Patient Education  - Log Roll  - Sitting Down & Standing Up            Use walker at home/ when you leave the house for safety until your next PT visit - we will see if a different option is better

## 2024-12-10 ENCOUNTER — HOSPITAL ENCOUNTER (OUTPATIENT)
Dept: MAMMOGRAPHY | Age: 64
Discharge: HOME | End: 2024-12-10
Attending: STUDENT IN AN ORGANIZED HEALTH CARE EDUCATION/TRAINING PROGRAM
Payer: MEDICARE

## 2024-12-10 DIAGNOSIS — Z12.31 ENCOUNTER FOR SCREENING MAMMOGRAM FOR MALIGNANT NEOPLASM OF BREAST: ICD-10-CM

## 2024-12-10 PROCEDURE — 77063 BREAST TOMOSYNTHESIS BI: CPT | Performed by: STUDENT IN AN ORGANIZED HEALTH CARE EDUCATION/TRAINING PROGRAM

## 2024-12-10 PROCEDURE — 77067 SCR MAMMO BI INCL CAD: CPT | Performed by: STUDENT IN AN ORGANIZED HEALTH CARE EDUCATION/TRAINING PROGRAM

## 2024-12-11 ENCOUNTER — APPOINTMENT (OUTPATIENT)
Dept: PHYSICAL THERAPY | Age: 64
End: 2024-12-11
Attending: STUDENT IN AN ORGANIZED HEALTH CARE EDUCATION/TRAINING PROGRAM
Payer: MEDICARE

## 2024-12-11 DIAGNOSIS — G57.01 PIRIFORMIS SYNDROME OF RIGHT SIDE: ICD-10-CM

## 2024-12-11 DIAGNOSIS — M47.26 OSTEOARTHRITIS OF SPINE WITH RADICULOPATHY, LUMBAR REGION: ICD-10-CM

## 2024-12-11 DIAGNOSIS — M54.16 LUMBAR RADICULOPATHY: ICD-10-CM

## 2024-12-12 RX ORDER — CYCLOBENZAPRINE HCL 10 MG
10 TABLET ORAL 3 TIMES DAILY PRN
Qty: 90 TABLET | Refills: 0 | Status: SHIPPED | OUTPATIENT
Start: 2024-12-12

## 2024-12-12 NOTE — TELEPHONE ENCOUNTER
Requesting Cyclobenzaprine 10mg  Last OV: 11/20/24  RTC: 4 weeks  Last Rx'd 11/5/24 #90 with 0 refills    Future Appointments   Date Time Provider Department Center   12/17/2024  7:00 AM E[facility] MR RM4 (3T WIDE) [facility]   12/17/2024  9:15 AM Roopa Mena, PT PF PT [place]   12/19/2024 11:40 AM Ismael Perez MD [facility] ORTHO [facility]   12/24/2024  9:15 AM Roopa Mena, PT PF PT [place]   12/27/2024  1:15 PM Roopa Mena, PT PF PT [place]   12/30/2024  8:45 AM Roopa Mena, PT PF PT [place]   1/16/2025  9:30 AM Colton Schaefer MD [facility] [address] [facility] [address]   2/24/2025  1:10 PM James Betancourt MD ENINAPER [facility] [place]   3/19/2025 12:20 PM Jackelin Maloney MD [facility]       Non-protocol med:  Rx pended and routed for approval/denial

## 2024-12-12 NOTE — PROGRESS NOTES
Good morning Cb,     Your mammogram result was benign. Your next routine screening mammogram is recommended in 1 year.     Take care and Happy Holidays,  Dr. Schaefer

## 2024-12-13 ENCOUNTER — OFFICE VISIT (OUTPATIENT)
Dept: PHYSICAL THERAPY | Age: 64
End: 2024-12-13
Attending: STUDENT IN AN ORGANIZED HEALTH CARE EDUCATION/TRAINING PROGRAM
Payer: MEDICARE

## 2024-12-13 PROCEDURE — 97110 THERAPEUTIC EXERCISES: CPT | Performed by: PHYSICAL THERAPIST

## 2024-12-13 PROCEDURE — 97112 NEUROMUSCULAR REEDUCATION: CPT | Performed by: PHYSICAL THERAPIST

## 2024-12-13 PROCEDURE — 97116 GAIT TRAINING THERAPY: CPT | Performed by: PHYSICAL THERAPIST

## 2024-12-13 NOTE — PROGRESS NOTES
Diagnosis:   Lumbar radiculopathy (M54.16)  Piriformis syndrome of right side (G57.01)  Osteoarthritis of spine with radiculopathy, lumbar region (M47.26)       Referring Provider: Colton Schaefer  Date of Evaluation:    11/27/2024    Precautions:   Pt reports some cognitive impairment/ cognitive functioning loss with slow processing speeds, some memory loss Next MD visit:   12/4: pain mgmt  12/17: lumbar MRI  12/19: ortho spine  Date of Surgery: n/a   Insurance Primary/Secondary: AETNA [facility] / N/A     # Auth Visits: no visit limit, no auth            Subjective: Pt reports waking up today with pain to the R lower leg & foot even before getting out of bed.    Pain: 6/10      Objective: See Flowsheet for details  12/13/2024: arrives with RW with mild increase in fwd/ ant trunk lean: more upright trunk posture after PT increased height of RW      Assessment: Pt reported feeling improved ease of amb upon RW height adjustment. Upon previous discussion with pt re: A.D. options to improve safety with home & community negotiation, initiated gait training with trial of both SC & SBQC. Both pt & PT in agreement pt looked the most stable & comfortable with SBQC; first instructed pt with cane in the LUE as for offloading the primary R affected LE in gait, however upon trial with use in the RUE, pt actually looked more secure & comfortable (with step through pattern). Pt was advised that although traditional way for use of cane is to place in the UE opposite to the side that requires additional support she can try both options due to feeling better today with the opposite way & can decide which feels most safe & steady if she opts to purchase (which pt is recommended to do - suggested [facility]). Cont to promote proper balance reactions with manual perturbations in gait with pt feeling more stable with ant perturbations & less secure with post perturbations. Despite additional treatment time today which includes more WB activity,  pain levels did not increase by end of visit, pain still 6/10 primarily to lower RLE/ foot. Pt was educated that it is normal for today & over the weekend if she has an increase in muscle soreness (including DOMS) and/ or even pain due to the increased standing activity today & duration of visit, however it is beneficial & medically necessary to challenge pt appropriately with the new TX today.       Goals: (to be met in 10 visits)   Pt will demonstrate good understanding of proper posture and body mechanics to decrease pain and improve spinal safety   Pt will improve lumbar spine AROM flexion to WNL to allow increase ease with bending forward to don shoes & socks  Pt will improve lumbar spine AROM R/L rot to WNL to allow ease with wiping after toileting  Pt will improve R knee ext (L3) strength to 5/5 to increase pt's comfort with standing in the shower  Pt will improve R ankle DF (L4), great toe ext (L5), & PF (S1) strength to a least 4+/5 for improving ease for driving longer distances  Pt will reduce VAL score from 54% (severe disability) to 21-40% (moderate disability) for improved overall function with ADLs including sitting & walking  Pt will be independent and compliant with comprehensive HEP to maintain progress achieved in PT     Plan: Cont with balance/ gait activities as able; ROM & LE strengthening to promote increased ease of home & community negotiation & self-care ADLs.  Date: 12/3/2024  TX#: 2/10 Date: 12/6/2024  TX#: 3/10 Date: 12/9/2024  TX#: 4/10 Date: 12/13/2024   TX#: 5/10 Date:   TX#: 6/   Therex: 43'  Standing lumbar ext X 10  Standing lumbar flex X 10  Standing at doorway R lumbar side glides 3 X 10 - HEP  -education: avoid \"testing\" it with repeated lumbar flex/ ext if side glides are centralizing  -education: goals for centralization, avoiding peripheralization  Crossover piriformis stretch R 10 X 10\" manual with instruction/ practice for HEP  LTR small range X 1' - HEP  Supine R ankle  pump in 0 deg 2 X 10, in 20 deg X 10 - HEP (for 0 deg)  SAQ round bolster X 10 B  Bed mobility: log rolling  -folded pillow for neutral pelvis - HEP Therex: 42'  NuStep L3 X 6' concurrent with pt update  Supine lumbar decompressive position 90/90 with traction stool X 4' - HEP  -avoid sleeping in this position/ max of 10' total stretch  Hooklying self-performed lumbar traction with UE presses X 5  Hooklying hip Add efren with yellow ball 10\" X 10  Hooklying hip Abd Efren with black pilates ring 10\" X 10   Supine R modified figure-4 piriformis stretch without added OP 3 X 30\" - HEP  Slantboard gastroc stretch heels on ground B 10\" X 4; R 10\" X 6  Standing B alt heel-toe raises X 10 with HHA - HEP  Seated B alt heel-toe raise X 10 - HEP    Next visit TBD: L sidelying R positional distraction Therex: 45'  NuStep L4 X 6'  HEP review  Log rolling/ bed mobility to reduce pain with getting out of bed (supine<>sit) with VC's, manual cues, video demo for maximizing learning ([employer]) - HEP  Sit to stand X 5 - HEP  -pt may be appropriate to use device for toilet for arm rests/ handrails for safety- check Amazon for options, many options available - pt should self-research/ read reviews  Education: kinetic chain connection back, hips, knees  Safety: discussed A.D. for gait: pt has a walker at home; pt should cont to use walker until sees PT again for additional gait training/ A.D. trial    Next visit TBD: gait training/ A.D. assessment,  L sidelying R positional distraction pending symptoms Therex: 18'  NuStep L5 X 6'  Squat taps tableside (to 30 deg) X 20 (with hip hinge)  Lateral walk in // bars: 1 lap no resistance, 2 laps Yellow TB (no HHA)  LTR X 20  RLE sciatic nerve glides via active ankle pump with PT holding RLE in 30 deg elevation supine X 10       Gait Trainin'  Walker height adjustment; Straight cane & SBQC fitting for training & potential home/ community use with instruction in proper form, how to adjust if  using  Level surface Amb with: Straight cane in LUE with step through pattern; SBQC in both LUE & RUE with step through pattern; with SBA->CGA X 300 ft       Neuro Re-ed: 14'  (With gait belt for the below activities)  Obstacle course: high step over cones X 10 ft, step over low hurdles (2) with CGA->min A  In // bars:  -SLS 2 X 10\" ea with intermittent HHA/ fingertip assist & CGA->min A   -Manual multidirectional trunk perturbations to promote correct balance reactions 2 X 30\"    HEP:   Access Code: [email or web address]  URL: [email or web address]  Date: 12/03/2024  Prepared by: Roopa Mena    Exercises  - Standing Side Glide Self Mobilization at Wall  - 2-3 x daily - 7 x weekly - 3 sets - 10 reps - 3 sec hold  - Supine Piriformis Stretch with Foot on Ground  - 1-2 x daily - 7 x weekly - 1 sets - 10 reps - 10 sec hold  - Supine Lower Trunk Rotation with PLB  - 1-1 x daily - 7 x weekly - 1-2 sets - 5 sec hold - 1 min duration  - Supine Active Ankle Pumps  - 1-2 x daily - 7 x weekly - 2-3 sets - 10 reps    Patient Education  - Sleep Positions  - Log Roll    Access Code: [email or web address]  URL: [email or web address]"/  Date: 12/06/2024  Prepared by: Roopa Mena    Program Notes  You do not need to complete both the seated & standing heel toe raises but feel free to mix & match - if you are feeling more tired & need to sit, complete them in sitting. Otherwise try in standing as long as you have something to hold on to.    Exercises  - Supine with Legs Supported at 90/90  - 1-2 x daily - 7 x weekly - 5-10 min hold  - Heel Toe Raises with Counter Support  - 1 x daily - 5 x weekly - 1-2 sets - 10 reps  - Seated Heel Toe Raises  - 1 x daily - 5 x weekly - 1-2 sets - 10 reps  - Supine Figure 4 Piriformis Stretch  - 1 x daily - 7 x weekly - 3 sets - 30 sec hold    Access Code: [email or web address]  URL: [email or web address]"/  Date: 12/09/2024  Prepared by: Roopa Mena    Exercises  - Sitting to Supine Roll   - 1 x daily - 7 x weekly - 1 sets - 3-5 reps  - Sit to Stand  - 1 x daily - 5-7 x weekly - 1 sets - 3-5 reps    Patient Education  - Log Roll  - Sitting Down & Standing Up    Charges: Therex X 1, Gait Train X 2, Neuro X 1      Total Timed Treatment: 58 min  Total Treatment Time: 58 min

## 2024-12-13 NOTE — PATIENT INSTRUCTIONS
Small (or narrow) base quad cane - ensure ambidextrous option & adjustable height (recommend with locking mechanism for added stability after adjustment)      Check [facility]

## 2024-12-16 ENCOUNTER — ORDER TRANSCRIPTION (OUTPATIENT)
Dept: ADMINISTRATIVE | Facility: HOSPITAL | Age: 64
End: 2024-12-16

## 2024-12-16 DIAGNOSIS — R41.0 CONFUSION: Primary | ICD-10-CM

## 2024-12-17 ENCOUNTER — HOSPITAL ENCOUNTER (OUTPATIENT)
Dept: MRI IMAGING | Facility: HOSPITAL | Age: 64
Discharge: HOME | End: 2024-12-17
Attending: STUDENT IN AN ORGANIZED HEALTH CARE EDUCATION/TRAINING PROGRAM
Payer: MEDICARE

## 2024-12-17 ENCOUNTER — TELEPHONE (OUTPATIENT)
Dept: PAIN CLINIC | Facility: CLINIC | Age: 64
End: 2024-12-17

## 2024-12-17 ENCOUNTER — OFFICE VISIT (OUTPATIENT)
Dept: PHYSICAL THERAPY | Age: 64
End: 2024-12-17
Attending: STUDENT IN AN ORGANIZED HEALTH CARE EDUCATION/TRAINING PROGRAM
Payer: MEDICARE

## 2024-12-17 DIAGNOSIS — M54.16 LUMBAR RADICULOPATHY: ICD-10-CM

## 2024-12-17 DIAGNOSIS — M54.16 LUMBAR RADICULOPATHY: Primary | ICD-10-CM

## 2024-12-17 PROCEDURE — 97110 THERAPEUTIC EXERCISES: CPT | Performed by: PHYSICAL THERAPIST

## 2024-12-17 PROCEDURE — 97116 GAIT TRAINING THERAPY: CPT | Performed by: PHYSICAL THERAPIST

## 2024-12-17 PROCEDURE — 72148 MRI LUMBAR SPINE W/O DYE: CPT | Performed by: STUDENT IN AN ORGANIZED HEALTH CARE EDUCATION/TRAINING PROGRAM

## 2024-12-17 NOTE — TELEPHONE ENCOUNTER
Patient called stating that provider requested that she call back to let provider know when she had her MRI done so that he can review the results. Patient stated that she had it done today 12/17.

## 2024-12-17 NOTE — PATIENT INSTRUCTIONS
Access Code: [email or web address]  URL: [email or web address]  Date: 12/17/2024  Prepared by: Roopa Mena    Exercises  - Seated Long Arc Quad  - 1 x daily - 5 x weekly - 1-2 sets - 10 reps - 1-2 sec hold    Patient Education  - Reciprocal Gait with Cane  - Reciprocal Gait with Four Wheel Walker

## 2024-12-17 NOTE — PROGRESS NOTES
Diagnosis:   Lumbar radiculopathy (M54.16)  Piriformis syndrome of right side (G57.01)  Osteoarthritis of spine with radiculopathy, lumbar region (M47.26)       Referring Provider: Colton Schaefer  Date of Evaluation:    11/27/2024    Precautions:   Pt reports some cognitive impairment/ cognitive functioning loss with slow processing speeds, some memory loss Next MD visit:   12/4: pain mgmt  12/17: lumbar MRI  12/19: ortho spine  Date of Surgery: n/a   Insurance Primary/Secondary: [employer] MCR / N/A     # Auth Visits: no visit limit, no auth            Subjective: Pt reports feeling a little rough after the MRI, had some involuntary muscle twitches while in there to the leg, maybe shoulder area, though primarily R side - thinks was due to trying to stay very still. Pt reports sore for laying still for 30 min. Pt reports after last visit, Sat & Sun was sore; was better Mon. Pt reports has used the cane a bit walking in, liking more now then when first started using    Pain: 4/10      Objective: See Flowsheet for details  12/17/2024: arrives with SBQC to RUE with cane placed increased distance away; pt with improved mechanics upon training; cane height is currently appropriate  12/13/2024: arrives with RW with mild increase in fwd/ ant trunk lean: more upright trunk posture after PT increased height of RW      Assessment: Provided pt with instruction/ cueing to avoid translating cane too far in front of pt during gait, in order to increase stability. Pt was able to replicate more proper form upon training as well as with using 1 HHA to parallel bars (with 1 HHA with cane). Emphasized that the most important concept with gait training is safety above all. Pt able to complete seated knee ext/ LAQ full excursion though with +referred pain with full knee ext though with 90% of range pt able to avoid pain.      Goals: (to be met in 10 visits)   Pt will demonstrate good understanding of proper posture and body mechanics to decrease  pain and improve spinal safety   Pt will improve lumbar spine AROM flexion to WNL to allow increase ease with bending forward to don shoes & socks  Pt will improve lumbar spine AROM R/L rot to WNL to allow ease with wiping after toileting  Pt will improve R knee ext (L3) strength to 5/5 to increase pt's comfort with standing in the shower  Pt will improve R ankle DF (L4), great toe ext (L5), & PF (S1) strength to a least 4+/5 for improving ease for driving longer distances  Pt will reduce VAL score from 54% (severe disability) to 21-40% (moderate disability) for improved overall function with ADLs including sitting & walking  Pt will be independent and compliant with comprehensive HEP to maintain progress achieved in PT     Plan: Cont with balance/ gait activities as able; ROM & LE strengthening to promote increased ease of home & community negotiation & self-care ADLs.  Date: 12/3/2024  TX#: 2/10 Date: 12/6/2024  TX#: 3/10 Date: 12/9/2024  TX#: 4/10 Date: 12/13/2024   TX#: 5/10 Date: 12/17/2024  TX#: 6/10   Therex: 43'  Standing lumbar ext X 10  Standing lumbar flex X 10  Standing at doorway R lumbar side glides 3 X 10 - HEP  -education: avoid \"testing\" it with repeated lumbar flex/ ext if side glides are centralizing  -education: goals for centralization, avoiding peripheralization  Crossover piriformis stretch R 10 X 10\" manual with instruction/ practice for HEP  LTR small range X 1' - HEP  Supine R ankle pump in 0 deg 2 X 10, in 20 deg X 10 - HEP (for 0 deg)  SAQ round bolster X 10 B  Bed mobility: log rolling  -folded pillow for neutral pelvis - HEP Therex: 42'  NuStep L3 X 6' concurrent with pt update  Supine lumbar decompressive position 90/90 with traction stool X 4' - HEP  -avoid sleeping in this position/ max of 10' total stretch  Hooklying self-performed lumbar traction with UE presses X 5  Hooklying hip Add efren with yellow ball 10\" X 10  Hooklying hip Abd Efren with black pilates ring 10\" X 10   Supine R  modified figure-4 piriformis stretch without added OP 3 X 30\" - HEP  Slantboard gastroc stretch heels on ground B 10\" X 4; R 10\" X 6  Standing B alt heel-toe raises X 10 with HHA - HEP  Seated B alt heel-toe raise X 10 - HEP    Next visit TBD: L sidelying R positional distraction Therex: 45'  NuStep L4 X 6'  HEP review  Log rolling/ bed mobility to reduce pain with getting out of bed (supine<>sit) with VC's, manual cues, video demo for maximizing learning ([place]) - HEP  Sit to stand X 5 - HEP  -pt may be appropriate to use device for toilet for arm rests/ handrails for safety- check Amazon for options, many options available - pt should self-research/ read reviews  Education: kinetic chain connection back, hips, knees  Safety: discussed A.D. for gait: pt has a walker at home; pt should cont to use walker until sees PT again for additional gait training/ A.D. trial    Next visit TBD: gait training/ A.D. assessment,  L sidelying R positional distraction pending symptoms Therex: 18'  NuStep L5 X 6'  Squat taps tableside (to 30 deg) X 20 (with hip hinge)  Lateral walk in // bars: 1 lap no resistance, 2 laps Yellow TB (no HHA)  LTR X 20  RLE sciatic nerve glides via active ankle pump with PT holding RLE in 30 deg elevation supine X 10 Therex: 24'  NuStep L5 X 6'  TKE to yellow ball at wall; 5\" X 10  Seated R LAQ (90% of range to avoid pain) X 20 - HEP  -with education in connection between muscle stretch & nerve tension  Hooklying hip Abd Efren with black pilates ring 10\" X 15        Gait Trainin'  Walker height adjustment; Straight cane & SBQC fitting for training & potential home/ community use with instruction in proper form, how to adjust if using  Level surface Amb with: Straight cane in LUE with step through pattern; SBQC in both LUE & RUE with step through pattern; with SBA->CGA X 300 ft Gait Trainin'  Cues for level surface amb SBQC X 10'  Level surface amb in // bars 1 HHA & cane in RUE X 6  laps  -pt should practice at home at [place]  Handouts provided re: gait pattern (see HEP)      Neuro Re-ed: 14'  (With gait belt for the below activities)  Obstacle course: high step over cones X 10 ft, step over low hurdles (2) with CGA->min A  In // bars:  -SLS 2 X 10\" ea with intermittent HHA/ fingertip assist & CGA->min A   -Manual multidirectional trunk perturbations to promote correct balance reactions 2 X 30\"    HEP:   Access Code: [email or web address]  URL: [email or web address]  Date: 12/03/2024  Prepared by: Roopa Mena    Exercises  - Standing Side Glide Self Mobilization at Wall  - 2-3 x daily - 7 x weekly - 3 sets - 10 reps - 3 sec hold  - Supine Piriformis Stretch with Foot on Ground  - 1-2 x daily - 7 x weekly - 1 sets - 10 reps - 10 sec hold  - Supine Lower Trunk Rotation with PLB  - 1-1 x daily - 7 x weekly - 1-2 sets - 5 sec hold - 1 min duration  - Supine Active Ankle Pumps  - 1-2 x daily - 7 x weekly - 2-3 sets - 10 reps    Patient Education  - Sleep Positions  - Log Roll    Access Code: [email or web address]  URL: [email or web address]  Date: 12/06/2024  Prepared by: Roopa Mena    Program Notes  You do not need to complete both the seated & standing heel toe raises but feel free to mix & match - if you are feeling more tired & need to sit, complete them in sitting. Otherwise try in standing as long as you have something to hold on to.    Exercises  - Supine with Legs Supported at 90/90  - 1-2 x daily - 7 x weekly - 5-10 min hold  - Heel Toe Raises with Counter Support  - 1 x daily - 5 x weekly - 1-2 sets - 10 reps  - Seated Heel Toe Raises  - 1 x daily - 5 x weekly - 1-2 sets - 10 reps  - Supine Figure 4 Piriformis Stretch  - 1 x daily - 7 x weekly - 3 sets - 30 sec hold    Access Code: [email or web address]  URL: [email or web address]  Date: 12/09/2024  Prepared by: Roopa Mena    Exercises  - Sitting to Supine Roll  - 1 x daily - 7 x weekly - 1 sets - 3-5 reps  - Sit  to Stand  - 1 x daily - 5-7 x weekly - 1 sets - 3-5 reps    Patient Education  - Log Roll  - Sitting Down & Standing Up    Access Code: [email or web address]  URL: [email or web address]  Date: 12/17/2024  Prepared by: Roopa Mena    Exercises  - Seated Long Arc Quad  - 1 x daily - 5 x weekly - 1-2 sets - 10 reps - 1-2 sec hold    Patient Education  - Reciprocal Gait with Cane  - Reciprocal Gait with Four Wheel Walker    Charges: Therex X 2, Gait Train X 1      Total Timed Treatment: 44 min  Total Treatment Time: 44 min

## 2024-12-17 NOTE — TELEPHONE ENCOUNTER
As we thought, there is impingement of the right L5 nerve at the L4-5, though the right sided disc may also abut the right L4 nerve.  We had discussed doing a right L5 TF-DIANA, and while I certainly continue to recommend that, may want to consider adding the L4 as well.  Please discussed with patient, and if you wanted do the two-level transforaminal we will be happy to place order.

## 2024-12-18 NOTE — TELEPHONE ENCOUNTER
Ishmael Sam PA  You39 minutes ago (12:52 PM)       Ok.  I went ahead and placed the order in the event she wants to proceed.

## 2024-12-18 NOTE — TELEPHONE ENCOUNTER
Contacted pt at this time and relayed the MRI results and recommendations. Pt asked about the injection. RN provided the info about TFESI. Pt param. Pt states that Dr. Perez ordered the MRI and wanted to know if Dr. Perez is ok with the injection. Advised pt that since Dr. Perez sent her here, he probably wants her to try the injection prior to suggesting surgery. Pt. Vu states she has a follow-up with Dr. Perez tomorrow and asks if she needs to go for her appointment. Advised pt that it's up to her, and if she wanted to discuss with Dr. Perez the recommended treatment. Pt PARAM and said she wanted to discuss this with her family also. Advised pt she can just call when she decides to proceed with the injection. RN provided the number to call.

## 2024-12-19 ENCOUNTER — OFFICE VISIT (OUTPATIENT)
Dept: ORTHOPEDICS CLINIC | Facility: CLINIC | Age: 64
End: 2024-12-19
Payer: MEDICARE

## 2024-12-19 ENCOUNTER — TELEPHONE (OUTPATIENT)
Dept: PAIN CLINIC | Facility: CLINIC | Age: 64
End: 2024-12-19

## 2024-12-19 DIAGNOSIS — M54.16 LUMBAR RADICULITIS: Primary | ICD-10-CM

## 2024-12-19 DIAGNOSIS — M54.16 LUMBAR RADICULOPATHY: Primary | ICD-10-CM

## 2024-12-19 PROCEDURE — 99215 OFFICE O/P EST HI 40 MIN: CPT | Performed by: STUDENT IN AN ORGANIZED HEALTH CARE EDUCATION/TRAINING PROGRAM

## 2024-12-19 PROCEDURE — G2211 COMPLEX E/M VISIT ADD ON: HCPCS | Performed by: STUDENT IN AN ORGANIZED HEALTH CARE EDUCATION/TRAINING PROGRAM

## 2024-12-19 NOTE — TELEPHONE ENCOUNTER
Patient advised of insurance approval to proceed with injections and is agreeable to scheduling. Patient scheduled for procedure, pre-procedure instructions reviewed. Patient prefers Local sedation. Reviewed sedation instructions including No Fasting & No  Required. Patient encouraged but not required to hold NSAIDs for 24 hours prior to procedure. Patient verbalized understanding of instructions, no further needs at this time.      [facility]  PRE-PROCEDURE INSTRUCTIONS WITHOUT SEDATION    Procedure: Right L4,L5 TLESI       Appointment Date: 01/02/2025  Check-In Time: 01:30 PM    Follow-Up Date/Time w/ONIEL Chapa : 01/16/2025 @ 01:30 PM    Prior to the procedure:  Please update us prior to the procedure if you are experiencing any symptoms of infection such as cough, fever, chills, urinary symptoms, or have recently been prescribed antibiotics, have open wounds, have recently had surgery or dental procedures.    Day of Procedure:  **Drivers will be required for patients who receive prescriptions for Valium.    NO FASTING REQUIRED  Please bring your Insurance Card, Photo ID, List of Current Medications and Referral (if applicable) to your appointment.  Please park in the [place] and follow the signs to the [facility].  Check in at [facility] ([address] [place] [address]) outpatient registration in the [facility].  Please note-No prescriptions will be written by Pain Clinic in [place] on the day of procedure. If you require a refill of medications, please contact the office 48 hours prior to your procedure.  If you have an implanted Spinal Cord or Peripheral Nerve Stimulator: Please remember to turn device off for procedure.        Medication Hold:    Number of days you need to be off for the following medications:    Aggrenox 10 days   Agrylin (Anagrelide) 10 days  Brilinta (Ticagrelor) 7 days  Imbruvica (Ibrutinib) 3 days   Enbrel (Etanercept) 24 hours   Fragmin  (Dalteparin) 24 hours   Pletal (Cilostazol) 7 days  Effient (Prasugrel) 7 days  Pradaxa 10 days  Trental 7 days  Eliquis (Apixaban) 3 days  Xarelto (Rivaroxaban) 3 days  Lovenox (Enoxaparin) 24 hours  Aspirin  Greater than 81mg but less than 325mg   5 days  325mg and greater                  7 days  Coumadin       5 days  Procedure may be cancelled if INR is elevated.   Excedrin (with aspirin) 7 days  Plavix (Clopidogrel)                            7 days    NSAIDs: 24 hours preferred      Ibuprofen (Motrin, Advil, Vicoprofen), Naproxen (Naprosyn, Aleve), Piroxcam (Feldene), Meloxicam (Mobic), Oxaprozin (Daypro), Diclofenac (Voltaren), Indomethacin (Indocin), Etodolac (Lodine), Nabumetone (Relafen), Celebrex (Celecoxib)           HERBAL SUPPLEMENTS  5 days preferred  Fish oil, krill oil, Omega-3, Vascepa, Vitamin E, Turmeric, Garlic                       Insurance Authorization:   Most insurances are now requiring a preauthorization for all procedures.  In the event that your insurance does not authorize your procedure within 48 hours of the scheduled date, your procedure will be cancelled and rescheduled to a later date.  Please contact your insurance carrier to determine what your financial responsibility will be for the procedure(s).      Cancellation/Rescheduling Appointment:   In the event you need to cancel or reschedule your appointment, you must notify the office 24 hours prior.    Post-procedure instructions:        Please schedule a follow up visit within 2 to 4 weeks after your last procedure date   Please call our office with any questions or concerns before or after your procedure at  [phone].  If you are a diabetic, please increase the frequency of your glucose monitoring after the procedure as this may cause a temporary increase in your blood sugar.  Contact your primary care physician if your blood sugar rises as you may require some medication adjustment.  It is normal to have increased pain at  injection site for up to 3-5 days after procedure, you can use heat or ice (20 minutes on 20 minutes off) for comfort.    **To hear a recorded version of these instructions, please call [phone] and follow the prompts.  **Para escuchar las instrucciones en Español, por favor de llamar el esme [phone] opción 4.

## 2024-12-19 NOTE — PROGRESS NOTES
[facility] - ORTHOPEDICS  [address] [place] [address], [address] [address]  [address] [place] [address]  [phone]     FOLLOW-UP PATIENT VISIT    Name: Cb Webster   MRN: [id number]  Date: 12/19/2024     CC: lumbar radiculopathy    INTERVAL HISTORY:   Cb Webster is a 64 year old female  follow-up patient whom I have been treating conservatively. Patient returns today for reevaluation of lumbar radiculopathy.     Patient is a 64-year-old female with a history of lumbar stenosis presenting with worsening of symptoms.  Patient started physical therapy this summer for knee pain and progressively started having worsening back pain that radiated down right posterior buttock and thigh, associated with numbness and tingling.  Patient has history of lumbar stenosis with epidural steroid injections in the past.  Patient recently had ketamine for depression with improvement in symptoms.     Patient was most recently seen in November and referred to physical therapy.  Patient continues to have significant radicular symptoms.    Bowel and bladder symptoms: absent.    The patient has not had issues with balance and/or hand dexterity problems such as changes in penmanship or the use of buttons or zippers.    We have tried the following interventions thus far: PT    ROS: No fever/chills or other constitutional issues.    PE:   There were no vitals filed for this visit.  Estimated body mass index is 33.74 kg/m² as calculated from the following:    Height as of 11/27/24: 5' 1.75\" (1.568 m).    Weight as of 12/4/24: 183 lb (83 kg).    On physical examination, she is awake, alert and oriented x 3 and in no acute distress. Mood, affect and language are normal. She appears well developed and well nourished.  She walks without a nonantalgic, nonmyelopathic, non-Trendelenburg gait. Motor strength testing of the lower extremities shows 5/5 strength in hip flexors, knee extensors, ankle  dorsiflexors, toe extensor, and gastroc-soleus complex.   Sensation is intact to light touch L2-S1 distributions bilaterally. Reflexes normal.     Radiographic Examination/Diagnostics:  XR and MRI personally viewed, independently interpreted and radiology report was reviewed.  X-ray of the lumbar spine demonstrates degenerative changes in the lower lumbar segments, particularly at L4-5 with asymmetric disc collapse   MRI of the lumbar spine demonstrates moderate to severe foraminal stenosis at L4-5 on the right    IMPRESSION: Cb Webster is a 64 year old female with L4-5 disc disease and lumbar radiculopathy    PLAN:   We had a detailed discussion outlining the etiology, anatomy, pathophysiology, and natural history of lumbar radiculopathy.    The patient has participated in extensive conservative management and has failed gain any significant improvement in her symptoms over the last 3 months.  The symptoms have failed to respond to conservative measures for greater than 3 months now, to include: prescription strength analgesics and active documented physical therapy or therapeutic exercises including stretching and strengthening.  The patient does not have any cognitive or behavioral issues requiring further evaluation or management.      In addition to the above, she has significant functional impairment and is unable to perform activities of daily living.      We discussed the risks, benefits, and alternatives of treatment.  The patient fully understands the nature of her medical condition and fully understands the nature of the proposed surgical treatment.  I have fully explained all possible alternative treatments or procedures and the patient understands the significant risks involved in the proposed treatment, as well as the risks involved and benefits of all alternative treatments and procedures.     Patient has scheduled injection. If symptoms do not improve, consider L4-5 MIS TLIF      Ismael ALBERT  MD Ana  Orthopedic Spine Surgeon  [facility]   [address], [address].[facility]  [email or web address]  t: [phone]   f: [phone]        This note was dictated using Dragon software.  While it was briefly proofread prior to completion, some grammatical, spelling, and word choice errors due to dictation may still occur.

## 2024-12-19 NOTE — TELEPHONE ENCOUNTER
Prior authorization request completed for: right L4/5,L5/S1 TLESI   Authorization #  [id number]  Authorization dates:  12/19/24-6/17/25  CPT codes approved: 76367,82134  Number of visits/dates of service approved: 1  Physician: ghada  Location: [facility]     Patient can be scheduled. Routed to Navigator.

## 2024-12-19 NOTE — PROGRESS NOTES
SURGERY SCHEDULING SHEET    Cb Webster  5/21/1960  [id number]    Procedure: L4-5 MIS TLIF    L4-5 Combined transforaminal lumbar interbody fusion and posterior fusion (21871)  L4-5 Interbody cage placement (21993)  L4-5 Posterior spinal instrumentation (94759)  L4-5 Bilateral laminectomy for decompression (24590)    Diagnosis:  Lumbar disc disease with radiculopathy    Anesthesia: General    Length of Surgery: 2 hrs    Disposition: Inpatient procedure    Assist: SONG De La Garza    OR Table: Sam    Position: Prone    Implant:  Ate 25766 InVictus MIS Screws, 83422 Calibrate expandable PSX , and 20930 Alphagraft DBM allograft     C-Arm: Yes    Special Equipment: None    Neuromonitoring: Yes    Pre-op Testing: PER ANESTHESIA GUIDELINES    Clearance: OTHER:  PCP    Post op: 2 weeks post op    Home health: Yes    Prehab: Yes    Ismael Perez MD  [facility]opedic Surgery  Phone: [phone]  Fax: [phone]

## 2024-12-20 ENCOUNTER — PROCEDURE VISIT (OUTPATIENT)
Dept: NEUROLOGY | Facility: CLINIC | Age: 64
End: 2024-12-20

## 2024-12-20 ENCOUNTER — NURSE ONLY (OUTPATIENT)
Dept: ELECTROPHYSIOLOGY | Facility: HOSPITAL | Age: 64
End: 2024-12-20
Attending: Other
Payer: MEDICARE

## 2024-12-20 DIAGNOSIS — R41.0 CONFUSION: Primary | ICD-10-CM

## 2024-12-20 PROCEDURE — 95819 EEG AWAKE AND ASLEEP: CPT

## 2024-12-21 DIAGNOSIS — G43.709 CHRONIC MIGRAINE WITHOUT AURA WITHOUT STATUS MIGRAINOSUS, NOT INTRACTABLE: ICD-10-CM

## 2024-12-23 LAB
ALBUMIN SERPL-MCNC: 4.2 G/DL (ref 3.2–4.8)
ALBUMIN/GLOB SERPL: 1.6 {RATIO} (ref 1–2)
ALP LIVER SERPL-CCNC: 65 U/L
ALT SERPL-CCNC: <7 U/L
ANION GAP SERPL CALC-SCNC: 7 MMOL/L (ref 0–18)
AST SERPL-CCNC: 19 U/L (ref ?–34)
BASOPHILS # BLD AUTO: 0.03 X10(3) UL (ref 0–0.2)
BASOPHILS NFR BLD AUTO: 0.4 %
BILIRUB SERPL-MCNC: 0.4 MG/DL (ref 0.2–1.1)
BUN BLD-MCNC: 20 MG/DL (ref 9–23)
CALCIUM BLD-MCNC: 9 MG/DL (ref 8.7–10.4)
CHLORIDE SERPL-SCNC: 106 MMOL/L (ref 98–112)
CO2 SERPL-SCNC: 27 MMOL/L (ref 21–32)
CREAT BLD-MCNC: 0.97 MG/DL
EGFRCR SERPLBLD CKD-EPI 2021: 66 ML/MIN/1.73M2 (ref 60–?)
EOSINOPHIL # BLD AUTO: 0.17 X10(3) UL (ref 0–0.7)
EOSINOPHIL NFR BLD AUTO: 2.2 %
ERYTHROCYTE [DISTWIDTH] IN BLOOD BY AUTOMATED COUNT: 12.5 %
GLOBULIN PLAS-MCNC: 2.6 G/DL (ref 2–3.5)
GLUCOSE BLD-MCNC: 119 MG/DL (ref 70–99)
HCT VFR BLD AUTO: 38 %
HGB BLD-MCNC: 12.6 G/DL
IMM GRANULOCYTES # BLD AUTO: 0.03 X10(3) UL (ref 0–1)
IMM GRANULOCYTES NFR BLD: 0.4 %
LIPASE SERPL-CCNC: 40 U/L (ref 12–53)
LYMPHOCYTES # BLD AUTO: 1.21 X10(3) UL (ref 1–4)
LYMPHOCYTES NFR BLD AUTO: 15.9 %
MCH RBC QN AUTO: 29.9 PG (ref 26–34)
MCHC RBC AUTO-ENTMCNC: 33.2 G/DL (ref 31–37)
MCV RBC AUTO: 90.3 FL
MONOCYTES # BLD AUTO: 0.75 X10(3) UL (ref 0.1–1)
MONOCYTES NFR BLD AUTO: 9.9 %
NEUTROPHILS # BLD AUTO: 5.41 X10 (3) UL (ref 1.5–7.7)
NEUTROPHILS # BLD AUTO: 5.41 X10(3) UL (ref 1.5–7.7)
NEUTROPHILS NFR BLD AUTO: 71.2 %
OSMOLALITY SERPL CALC.SUM OF ELEC: 294 MOSM/KG (ref 275–295)
PLATELET # BLD AUTO: 225 10(3)UL (ref 150–450)
POTASSIUM SERPL-SCNC: 4.1 MMOL/L (ref 3.5–5.1)
PROT SERPL-MCNC: 6.8 G/DL (ref 5.7–8.2)
RBC # BLD AUTO: 4.21 X10(6)UL
SODIUM SERPL-SCNC: 140 MMOL/L (ref 136–145)
WBC # BLD AUTO: 7.6 X10(3) UL (ref 4–11)

## 2024-12-23 PROCEDURE — 85025 COMPLETE CBC W/AUTO DIFF WBC: CPT | Performed by: EMERGENCY MEDICINE

## 2024-12-23 PROCEDURE — 99285 EMERGENCY DEPT VISIT HI MDM: CPT

## 2024-12-23 PROCEDURE — 83690 ASSAY OF LIPASE: CPT | Performed by: EMERGENCY MEDICINE

## 2024-12-23 PROCEDURE — 85025 COMPLETE CBC W/AUTO DIFF WBC: CPT

## 2024-12-23 PROCEDURE — 99284 EMERGENCY DEPT VISIT MOD MDM: CPT

## 2024-12-23 PROCEDURE — 80053 COMPREHEN METABOLIC PANEL: CPT

## 2024-12-23 PROCEDURE — 80053 COMPREHEN METABOLIC PANEL: CPT | Performed by: EMERGENCY MEDICINE

## 2024-12-23 PROCEDURE — 83690 ASSAY OF LIPASE: CPT

## 2024-12-23 RX ORDER — PROPRANOLOL HYDROCHLORIDE 80 MG/1
1 CAPSULE, EXTENDED RELEASE ORAL EVERY EVENING
Qty: 90 CAPSULE | Refills: 1 | Status: SHIPPED | OUTPATIENT
Start: 2024-12-23

## 2024-12-23 NOTE — TELEPHONE ENCOUNTER
Medication: PROPRANOLOL HCL ER 80 MG      Date of last refill: 4/24/2024 (#90/1)  [place]      Last office visit: 12/2/2024 Botox  Due back to clinic per last office note:  12 weeks  Date next office visit scheduled:    Future Appointments   Date Time Provider Department Center   12/30/2024  8:45 AM Roopa Mena, PT PF [facility] [place]   1/3/2025  2:45 PM Roopa Mena, PT [facility] [place]   1/16/2025  9:30 AM Colton Schaefer MD [facility] [address] [facility] [address]   1/16/2025  1:30 PM Ishmael Sam PA ENIPain [facility] [place]   2/24/2025  1:10 PM James Betancourt MD ENINAPER [facility] [place]   3/19/2025 12:20 PM Jackelin Maloney MD [facility] [address]           Last OV note recommendation:    Last progress note on 8/30/2024    Neurocognitive disorder  MRI brain and EEG performed negative  On memantine 10 mg daily for neurocognitive prophylaxis- patient wants to lower the dose of Memantine  Follow up with Psychiatry         2 On Botox and Propranolol for migraine prevention  Switch Nurtec's to Imitrex injection        Follow up in about 3 months

## 2024-12-23 NOTE — PROCEDURES
ELECTROENCEPHALOGRAM REPORT      Patient Name: Cb Webster            Chart ID: [id number]  Ordering Physician: James Betancourt MD Date of Test: 2024  Patient Diagnosis:   Encounter Diagnosis   Name Primary?    Confusion Yes       HISTORY  HISTORY: A 64 YEAR OLD FEMALE PRESENTS FOR EVALUATION OF COGNITIVE DECLINE.  PATIENT STATES, SYMPTOMS BEGAN APPROXIMATELY 10 YEARS AGO. SHE FEELS THEY HAVE GOTTEN PROGRESSIVELY WORSE. NO HISTORY OF SEIZURE  PAST MEDICAL HISTORY: CARDIAC CALCIFICATION, ANXIETY, MODE, DEPRESSION, MIGRAINES, OCD, PARKINSONISM, TREMOR    MEDICATIONS: CYCLOBENZAPINE, MEHTYLPHENIDATE, BUSPIRONE, MELOXICAM, GABAPENTIN, MEMANTINE, FLUOXETINE, PHENTERMINE, METOCLOPRAMIDE, DICYCLOMINE, SUMATRIPTAN, TRAZODONE, ROSUVASTATIN, SPRAVATO,  LORAZEPAM    PREVIOUS EE24 NORMAL AWAKE AND ASLEEP.       TECHNICAL SUMMARY  1. 10-20 International System.  2.  Bipolar and monopolar recording.  3.  Routine recording (awake and asleep).  4.  Portable - C.E.S.  5.  Impedance - 10 kilohms or less.    A routine EEG was obtained.  No sedation was given.    BACKGROUND:   Awake:   During wakefulness a well developed, reactive, posterior dominant rhythm consisting of 8-9 hertz potentials of medium amplitude is seen symmetrically.  Low voltage beta activity is seen with a frontal predominance.      Sleep:   Stage I and II demonstrated. Vertex waves and sleep spindles present.        EPILEPTIFORM ABNORMALITIES:  There is no epileptiform activity seen in this recording      ACTIVATION PROCEDURES:   Not performed     IMPRESSION:   This is a normal awake and asleep EEG study.  No epileptiform abnormalities seen.This however does not exclude the possibility of seizures.  Clinical correlation is advised.      Thank you for allowing us to participate in your patient's care.      James Betancourt MD  [facility] [place]

## 2024-12-24 ENCOUNTER — APPOINTMENT (OUTPATIENT)
Dept: CT IMAGING | Facility: HOSPITAL | Age: 64
End: 2024-12-24
Attending: EMERGENCY MEDICINE
Payer: MEDICARE

## 2024-12-24 ENCOUNTER — HOSPITAL ENCOUNTER (EMERGENCY)
Facility: HOSPITAL | Age: 64
Discharge: HOME | End: 2024-12-24
Attending: EMERGENCY MEDICINE
Payer: MEDICARE

## 2024-12-24 ENCOUNTER — APPOINTMENT (OUTPATIENT)
Dept: PHYSICAL THERAPY | Age: 64
End: 2024-12-24
Attending: STUDENT IN AN ORGANIZED HEALTH CARE EDUCATION/TRAINING PROGRAM
Payer: MEDICARE

## 2024-12-24 VITALS
WEIGHT: 185 LBS | HEIGHT: 61 IN | RESPIRATION RATE: 18 BRPM | OXYGEN SATURATION: 99 % | TEMPERATURE: 99 F | HEART RATE: 88 BPM | BODY MASS INDEX: 34.93 KG/M2 | DIASTOLIC BLOOD PRESSURE: 70 MMHG | SYSTOLIC BLOOD PRESSURE: 110 MMHG

## 2024-12-24 DIAGNOSIS — R10.84 ABDOMINAL PAIN, GENERALIZED: Primary | ICD-10-CM

## 2024-12-24 DIAGNOSIS — K31.84 GASTROPARESIS: ICD-10-CM

## 2024-12-24 DIAGNOSIS — N20.0 KIDNEY STONE: ICD-10-CM

## 2024-12-24 LAB
BILIRUB UR QL STRIP.AUTO: NEGATIVE
CLARITY UR REFRACT.AUTO: CLEAR
COLOR UR AUTO: YELLOW
GLUCOSE UR STRIP.AUTO-MCNC: NORMAL MG/DL
HYALINE CASTS #/AREA URNS AUTO: PRESENT /LPF
KETONES UR STRIP.AUTO-MCNC: NEGATIVE MG/DL
LEUKOCYTE ESTERASE UR QL STRIP.AUTO: 250
NITRITE UR QL STRIP.AUTO: NEGATIVE
PH UR STRIP.AUTO: 6.5 [PH] (ref 5–8)
PROT UR STRIP.AUTO-MCNC: 20 MG/DL
RBC #/AREA URNS AUTO: >10 /HPF
SP GR UR STRIP.AUTO: 1.02 (ref 1–1.03)
UROBILINOGEN UR STRIP.AUTO-MCNC: 4 MG/DL

## 2024-12-24 PROCEDURE — 87077 CULTURE AEROBIC IDENTIFY: CPT | Performed by: EMERGENCY MEDICINE

## 2024-12-24 PROCEDURE — 87086 URINE CULTURE/COLONY COUNT: CPT | Performed by: EMERGENCY MEDICINE

## 2024-12-24 PROCEDURE — 96375 TX/PRO/DX INJ NEW DRUG ADDON: CPT

## 2024-12-24 PROCEDURE — 81001 URINALYSIS AUTO W/SCOPE: CPT | Performed by: EMERGENCY MEDICINE

## 2024-12-24 PROCEDURE — 96374 THER/PROPH/DIAG INJ IV PUSH: CPT

## 2024-12-24 PROCEDURE — 74176 CT ABD & PELVIS W/O CONTRAST: CPT | Performed by: EMERGENCY MEDICINE

## 2024-12-24 RX ORDER — DIPHENHYDRAMINE HCL 25 MG
25 CAPSULE ORAL EVERY 8 HOURS PRN
Qty: 30 CAPSULE | Refills: 0 | Status: SHIPPED | OUTPATIENT
Start: 2024-12-24

## 2024-12-24 RX ORDER — METOCLOPRAMIDE HYDROCHLORIDE 5 MG/ML
10 INJECTION INTRAMUSCULAR; INTRAVENOUS ONCE
Status: COMPLETED | OUTPATIENT
Start: 2024-12-24 | End: 2024-12-24

## 2024-12-24 RX ORDER — TAMSULOSIN HYDROCHLORIDE 0.4 MG/1
0.4 CAPSULE ORAL DAILY
Qty: 7 CAPSULE | Refills: 0 | Status: SHIPPED | OUTPATIENT
Start: 2024-12-24 | End: 2024-12-31

## 2024-12-24 RX ORDER — DIPHENHYDRAMINE HYDROCHLORIDE 50 MG/ML
50 INJECTION INTRAMUSCULAR; INTRAVENOUS ONCE
Status: COMPLETED | OUTPATIENT
Start: 2024-12-24 | End: 2024-12-24

## 2024-12-24 RX ORDER — HYDROMORPHONE HYDROCHLORIDE 1 MG/ML
1 INJECTION, SOLUTION INTRAMUSCULAR; INTRAVENOUS; SUBCUTANEOUS ONCE
Status: COMPLETED | OUTPATIENT
Start: 2024-12-24 | End: 2024-12-24

## 2024-12-24 RX ORDER — KETOROLAC TROMETHAMINE 15 MG/ML
15 INJECTION, SOLUTION INTRAMUSCULAR; INTRAVENOUS ONCE
Status: COMPLETED | OUTPATIENT
Start: 2024-12-24 | End: 2024-12-24

## 2024-12-24 RX ORDER — ONDANSETRON 2 MG/ML
4 INJECTION INTRAMUSCULAR; INTRAVENOUS ONCE
Status: COMPLETED | OUTPATIENT
Start: 2024-12-24 | End: 2024-12-24

## 2024-12-24 RX ORDER — METOCLOPRAMIDE 5 MG/1
5 TABLET ORAL 3 TIMES DAILY PRN
Qty: 20 TABLET | Refills: 0 | Status: SHIPPED | OUTPATIENT
Start: 2024-12-24 | End: 2025-01-23

## 2024-12-24 RX ORDER — KETOROLAC TROMETHAMINE 15 MG/ML
15 INJECTION, SOLUTION INTRAMUSCULAR; INTRAVENOUS ONCE
Status: DISCONTINUED | OUTPATIENT
Start: 2024-12-24 | End: 2024-12-24

## 2024-12-24 RX ORDER — HYDROCODONE BITARTRATE AND ACETAMINOPHEN 5; 325 MG/1; MG/1
1-2 TABLET ORAL EVERY 6 HOURS PRN
Qty: 10 TABLET | Refills: 0 | Status: SHIPPED | OUTPATIENT
Start: 2024-12-24 | End: 2024-12-29

## 2024-12-24 RX ORDER — CEPHALEXIN 500 MG/1
500 CAPSULE ORAL 3 TIMES DAILY
Qty: 15 CAPSULE | Refills: 0 | Status: SHIPPED | OUTPATIENT
Start: 2024-12-24 | End: 2024-12-29

## 2024-12-24 NOTE — ED QUICK NOTES
Rounding Completed    Plan of Care reviewed. Waiting for results.  Elimination needs assessed.  Provided updates.    Bed is locked and in lowest position. Call light within reach.

## 2024-12-24 NOTE — ED PROVIDER NOTES
Patient endorsed by my partner.  Abdominal pain.  CT scan report reviewed.  Hydronephrosis with a hydroureter with an obstructing stone in the right mid ureter.  4 x 6 mm.  Patient is comfortable here.  Will check urine.  No signs of infection will discharge with pain medications, strain urine and follow-up with urology.  Will start Flomax.    Urine reviewed.  Some pyuria though with squamous cells.  No bacteria.  Will send for culture.  Prophylax with Keflex for 5 days.  Discussed with patient, if develops fevers or feels worse anyway return immediately the emergency room.  If not drink plenty fluids, strain urine.  Follow-up with urology.

## 2024-12-24 NOTE — DISCHARGE INSTRUCTIONS
Drink plenty of fluids, strain your urine.  Take ibuprofen 600 mg 3 times a day with food.  Can take 1-2 Norco every 6-8 hours if needed.  Do not take Tylenol with Norco.  Return if fevers, vomiting, new complaints.  If the stone does not pass in the next 2 to 3 days will need to follow-up with urology.

## 2024-12-24 NOTE — ED PROVIDER NOTES
Patient Seen in: [facility] Emergency Department      History     Chief Complaint   Patient presents with    Abdomen/Flank Pain     Stated Complaint:     Subjective:   Patient 64-year-old female presents emergency room for generalized abdominal pain.  Patient reports nausea.  She has a history of gastroparesis.  Patient denies any fevers.  Patient reports some improvement with Zofran.  She has not had Reglan that she knows of before but may have in the past.  No known sick contacts    The history is provided by the patient and the spouse.             Objective:     Past Medical History:    Anxiety    Aortic regurgitation    mild    Arthritis    Back pain    Calculus of kidney    Cardiac calcification (HCC) - CAC score of 5.24 seen on 12/2/22 CT Heart Scan protocol    Colon polyps    Constipation    Depression    Diarrhea, unspecified    Disorder of liver    Fatty liver    Diverticulosis    Dizziness    Fatigue    Flatulence/gas pain/belching    Food intolerance    Frequent urination    Gastroparesis    GERD (gastroesophageal reflux disease)    Headache disorder    Migraines    Heart palpitations    Heartburn    Hemorrhoids    History of depression    Major depressive disorder & anxiety    History of eating disorder    Binge eating    Hoarseness, chronic    Hyperlipidemia    Indigestion    Leg swelling    Loss of appetite    Migraines    Mouth sores    Triggered by stress or poor diet    Nausea    OCD (obsessive compulsive disorder)    MODE (obstructive sleep apnea)    Pain in joints    Painful swallowing    Parkinsonism (HCC)    Peptic ulcer disease    Problems with swallowing    Sleep disturbance    High serotonin    Stool incontinence    Soft barely formed stool, not detecting urgency    Stress    Syncope    Tendonitis of shoulder, right    Tremor    Vomiting blood    Wears glasses    Weight gain    10 lbs in 2 months - stop phentermine and decreased excercise after arthroscope of left knee    Weight loss     28 lb last year              Past Surgical History:   Procedure Laterality Date          x 2    Cholecystectomy      Colonoscopy      D & c      Ect provided  [id number]    Egd      Laparoscopic cholecystectomy      Needle biopsy right  2015    benign breast    Other surgical history      C3-C4 anterior discectomy    Other surgical history  2018    [facility]    Other surgical history  2019    radiofrequency abalsion lumbar    Removal gallbladder  2020    Skin surgery      Spine surgery procedure unlisted      Anterior cervical discectomy    Tonsillectomy  1966?                Social History     Socioeconomic History    Marital status:    Occupational History    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[employer]   Tobacco Use    Smoking status: Never    Smokeless tobacco: Never   Vaping Use    Vaping status: Never Used   Substance and Sexual Activity    Alcohol use: Not Currently     Comment: 6 drinks or less/year    Drug use: Never   Other Topics Concern    Caffeine Concern No    Exercise Yes    Seat Belt Yes    Special Diet No    Stress Concern Yes    Weight Concern Yes   Social History Narrative    Caring for grandson (Peter - aged 4 yo [2020])                  Physical Exam     ED Triage Vitals [24 2240]   /68   Pulse 100   Resp 18   Temp 98.5 °F (36.9 °C)   Temp src Temporal   SpO2 98 %   O2 Device None (Room air)       Current Vitals:   Vital Signs  BP: 110/70  Pulse: 88  Resp: 18  Temp: 98.5 °F (36.9 °C)  Temp src: Temporal    Oxygen Therapy  SpO2: 99 %  O2 Device: None (Room air)        Physical Exam  Vitals and nursing note reviewed.   Constitutional:       General: She is not in acute distress.     Appearance: She is well-developed. She is obese. She is not ill-appearing or toxic-appearing.   HENT:      Head: Normocephalic and atraumatic.   Eyes:      Extraocular Movements: Extraocular movements intact.      Pupils:  Pupils are equal, round, and reactive to light.   Cardiovascular:      Rate and Rhythm: Normal rate and regular rhythm.      Heart sounds: Normal heart sounds.   Pulmonary:      Effort: Pulmonary effort is normal.      Breath sounds: No wheezing.   Abdominal:      General: Bowel sounds are normal.      Palpations: Abdomen is soft.      Tenderness: There is no abdominal tenderness. There is no guarding or rebound.   Skin:     General: Skin is warm.      Capillary Refill: Capillary refill takes less than 2 seconds.   Neurological:      General: No focal deficit present.      Mental Status: She is alert and oriented to person, place, and time.   Psychiatric:         Mood and Affect: Mood normal.         Behavior: Behavior normal.             ED Course     Labs Reviewed   COMP METABOLIC PANEL (14) - Abnormal; Notable for the following components:       Result Value    Glucose 119 (*)     ALT <7 (*)     All other components within normal limits   URINALYSIS WITH CULTURE REFLEX - Abnormal; Notable for the following components:    Blood Urine 2+ (*)     Protein Urine 20 (*)     Urobilinogen Urine 4 (*)     Leukocyte Esterase Urine 250 (*)     WBC Urine 11-20 (*)     RBC Urine >10 (*)     Squamous Epi. Cells Few (*)     Hyaline Casts Present (*)     All other components within normal limits   LIPASE - Normal   CBC WITH DIFFERENTIAL WITH PLATELET   RAINBOW DRAW LAVENDER   RAINBOW DRAW LIGHT GREEN   URINE CULTURE, ROUTINE            CT ABDOMEN+PELVIS(CPT=74176)    Result Date: 12/24/2024  PROCEDURE:  CT ABDOMEN+PELVIS (CPT=74176)  COMPARISON:  [facility] , MR, MRI ABDOMEN&MRCP W/3D (W+W0)(CPT=74183/14181), 3/12/2021, 8:22 AM.  INDICATIONS:  right sided abd pain  TECHNIQUE:  Unenhanced multislice CT scanning was performed from the dome of the diaphragm to the pubic symphysis.  Dose reduction techniques were used. Dose information is transmitted to the ACR (American College of Radiology) NRDR (National Radiology Data Registry)  which includes the Dose Index Registry.  PATIENT STATED HISTORY:(As transcribed by Technologist)  right sided abd pain   CONTRAST USED:  100cc of Isovue 370  FINDINGS:  Sensitivity decreased without IV or oral contrast. LIVER:  Uniform parenchyma.  Smooth contours. BILIARY:  Absent gallbladder.  No biliary dilatation. PANCREAS:  Uniform parenchyma.  No ductal dilatation. SPLEEN:  Not enlarged. KIDNEYS:  Normal anatomic positions.  Proximal to mid right ureteral calculus measuring up to 0.6 cm.  Moderate hydronephrosis and perinephric stranding.  1.0 cm rounded low-attenuation focus left kidney with Hounsfield units reflecting fluid consistent with a small cortical cyst. ADRENALS:  Not enlarged. AORTA/VASCULAR:    No abdominal aortic aneurysm. RETROPERITONEUM:  No adenopathy. BOWEL/MESENTERY:  Normal bowel caliber.  Redundant colon.  Moderate to severe colonic diverticulosis.  No acute diverticulitis.  Normal appendix.  Large amount of stool scattered throughout the colon.  No free air or ascites. ABDOMINAL WALL:  Tiny fat containing umbilical hernia. URINARY BLADDER:  Distended.  Smooth contour.  No wall thickening or calculus within. PELVIC NODES:  None enlarged. PELVIC ORGANS:  Ovarian abnormality. BONES:  Mild to moderate degenerative changes.  Normal vertebral body heights.  No subluxation. LUNG BASES:  Minimal scarring and or atelectasis.  No pleural effusion. OTHER:  None.             CONCLUSION:  1. 0.6 cm proximal to mid right ureteral calculus with moderate obstruction. 2. Details as above.  Continued clinical correlation recommended.  Preliminary report given by [facility].    LOCATION:  [facility]   Dictated by (CST): Iron Harris MD on 12/24/2024 at 7:23 AM     Finalized by (CST): Iron Harris MD on 12/24/2024 at 7:28 AM        MDM      Social -negative tobacco, negative etoh, negative drugs  Family History-noncontributory  Past Medical History-migraines, anxiety, arthritis, parkinsonian is some, aortic  regurgitation, sleep apnea, gastroparesis, OCD, depression, kidney stones    Differential diagnosis before testing included x-ray paresis, food poisoning, biliary colic,    Co-morbidities that add to the complexity of management include: Patient had her gallbladder out 4 years ago    Testing ordered during this visit included CT scan of the abdomen baseline labs    Radiographic images  I personally reviewed the radiographs and my individual interpretation shows kidney stone  I also reviewed the official reports that showed CT ABDOMEN+PELVIS(CPT=74176)    Result Date: 12/24/2024  PROCEDURE:  CT ABDOMEN+PELVIS (CPT=74176)  COMPARISON:  [facility] , MR, MRI ABDOMEN&MRCP W/3D (W+W0)(CPT=74183/79619), 3/12/2021, 8:22 AM.  INDICATIONS:  right sided abd pain  TECHNIQUE:  Unenhanced multislice CT scanning was performed from the dome of the diaphragm to the pubic symphysis.  Dose reduction techniques were used. Dose information is transmitted to the ACR (American College of Radiology) NRDR (National Radiology Data Registry) which includes the Dose Index Registry.  PATIENT STATED HISTORY:(As transcribed by Technologist)  right sided abd pain   CONTRAST USED:  100cc of Isovue 370  FINDINGS:  Sensitivity decreased without IV or oral contrast. LIVER:  Uniform parenchyma.  Smooth contours. BILIARY:  Absent gallbladder.  No biliary dilatation. PANCREAS:  Uniform parenchyma.  No ductal dilatation. SPLEEN:  Not enlarged. KIDNEYS:  Normal anatomic positions.  Proximal to mid right ureteral calculus measuring up to 0.6 cm.  Moderate hydronephrosis and perinephric stranding.  1.0 cm rounded low-attenuation focus left kidney with Hounsfield units reflecting fluid consistent with a small cortical cyst. ADRENALS:  Not enlarged. AORTA/VASCULAR:    No abdominal aortic aneurysm. RETROPERITONEUM:  No adenopathy. BOWEL/MESENTERY:  Normal bowel caliber.  Redundant colon.  Moderate to severe colonic diverticulosis.  No acute diverticulitis.  Normal  appendix.  Large amount of stool scattered throughout the colon.  No free air or ascites. ABDOMINAL WALL:  Tiny fat containing umbilical hernia. URINARY BLADDER:  Distended.  Smooth contour.  No wall thickening or calculus within. PELVIC NODES:  None enlarged. PELVIC ORGANS:  Ovarian abnormality. BONES:  Mild to moderate degenerative changes.  Normal vertebral body heights.  No subluxation. LUNG BASES:  Minimal scarring and or atelectasis.  No pleural effusion. OTHER:  None.             CONCLUSION:  1. 0.6 cm proximal to mid right ureteral calculus with moderate obstruction. 2. Details as above.  Continued clinical correlation recommended.  Preliminary report given by [facility].    LOCATION:  [facility]   Dictated by (CST): Iron Harris MD on 12/24/2024 at 7:23 AM     Finalized by (CST): Iron Harris MD on 12/24/2024 at 7:28 AM       External chart review showed review of Care Everywhere in epic system shows no related comorbidities to current presentation other than patient already had her gallbladder out about 4 years ago    History obtained by an independent source included from patient, family    Discussion of management with patient, family    Social determinants of health that affect care include not applicable      Medications Provided: Reglan, Benadryl    Course of Events during Emergency Room Visit include 64-year-old female with history of gastroparesis presents for generalized abdominal pain patient's lab work shows no acute process will get CT scan of the abdomen will check baseline labs give him Benadryl will give prescription for this for home.  Patient follow-up with her primary care physician    Patient was signed over to morning physician pending results of CT if negative plan for discharge home with prescription for Reglan and Benadryl.  Patient to follow-up with primary care physician    Patient CT scan showed kidney stone she was started on prophylactic Keflex as there is some pyuria but  squamous cells present patient to follow-up with urology and give urine strainer by other physician.  Disposition:      Discharge  I have discussed with the patient the results of test, differential diagnosis, treatment plan, warning signs and symptoms which should prompt immediate return.  They expressed understanding of these instructions and agrees to the following plan provided.  They were given written discharge instructions and agrees to return for any concerns and voiced understanding and all questions were answered.           Medical Decision Making      Disposition and Plan     Clinical Impression:  1. Abdominal pain, generalized    2. Gastroparesis    3. Kidney stone         Disposition:  Discharge  12/24/2024  7:10 am    Follow-up:  Colton Schaefer MD  [address] [address]  [facility] [address]  [phone]    Schedule an appointment as soon as possible for a visit      Juliana Triana PA-C  [address]  [place] [address]  [phone]    Follow up in 3 day(s)      Ephraim Moore MD  [address]  [place] [address]  [phone]    Follow up in 3 day(s)            Medications Prescribed:  Discharge Medication List as of 12/24/2024  7:11 AM        START taking these medications    Details   diphenhydrAMINE 25 MG Oral Cap Take 1 capsule (25 mg total) by mouth every 8 (eight) hours as needed (when take reglan)., Normal, Disp-30 capsule, R-0      !! metoclopramide 5 MG Oral Tab Take 1 tablet (5 mg total) by mouth 3 (three) times daily as needed (nausea)., Normal, Disp-20 tablet, R-0      HYDROcodone-acetaminophen 5-325 MG Oral Tab Take 1-2 tablets by mouth every 6 (six) hours as needed for Pain., Normal, Disp-10 tablet, R-0      tamsulosin (FLOMAX) 0.4 MG Oral Cap Take 1 capsule (0.4 mg total) by mouth daily for 7 days., Normal, Disp-7 capsule, R-0      cephALEXin (KEFLEX) 500 MG Oral Cap Take 1 capsule (500 mg total) by mouth 3 (three) times daily for 5 days.,  Normal, Disp-15 capsule, R-0       !! - Potential duplicate medications found. Please discuss with provider.              Supplementary Documentation:

## 2024-12-24 NOTE — ED QUICK NOTES
Per ct, Ultrasound IV is infiltrated, ct was done but no contrast seen,MD aware and ok to send ct plain.

## 2024-12-27 ENCOUNTER — APPOINTMENT (OUTPATIENT)
Dept: PHYSICAL THERAPY | Age: 64
End: 2024-12-27
Attending: STUDENT IN AN ORGANIZED HEALTH CARE EDUCATION/TRAINING PROGRAM
Payer: MEDICARE

## 2024-12-30 ENCOUNTER — APPOINTMENT (OUTPATIENT)
Dept: PHYSICAL THERAPY | Age: 64
End: 2024-12-30
Attending: STUDENT IN AN ORGANIZED HEALTH CARE EDUCATION/TRAINING PROGRAM
Payer: MEDICARE

## 2024-12-31 ENCOUNTER — HOSPITAL ENCOUNTER (OUTPATIENT)
Facility: HOSPITAL | Age: 64
Setting detail: HOSPITAL OUTPATIENT SURGERY
Discharge: HOME | End: 2024-12-31
Attending: ANESTHESIOLOGY | Admitting: ANESTHESIOLOGY
Payer: MEDICARE

## 2024-12-31 ENCOUNTER — APPOINTMENT (OUTPATIENT)
Dept: GENERAL RADIOLOGY | Facility: HOSPITAL | Age: 64
End: 2024-12-31
Attending: ANESTHESIOLOGY
Payer: MEDICARE

## 2024-12-31 VITALS
OXYGEN SATURATION: 99 % | TEMPERATURE: 99 F | RESPIRATION RATE: 20 BRPM | HEART RATE: 72 BPM | DIASTOLIC BLOOD PRESSURE: 49 MMHG | SYSTOLIC BLOOD PRESSURE: 111 MMHG

## 2024-12-31 PROCEDURE — 3E0R33Z INTRODUCTION OF ANTI-INFLAMMATORY INTO SPINAL CANAL, PERCUTANEOUS APPROACH: ICD-10-PCS | Performed by: ANESTHESIOLOGY

## 2024-12-31 PROCEDURE — 64484 NJX AA&/STRD TFRM EPI L/S EA: CPT | Performed by: ANESTHESIOLOGY

## 2024-12-31 PROCEDURE — 3E0R3BZ INTRODUCTION OF ANESTHETIC AGENT INTO SPINAL CANAL, PERCUTANEOUS APPROACH: ICD-10-PCS | Performed by: ANESTHESIOLOGY

## 2024-12-31 PROCEDURE — 64483 NJX AA&/STRD TFRM EPI L/S 1: CPT | Performed by: ANESTHESIOLOGY

## 2024-12-31 RX ORDER — LIDOCAINE HYDROCHLORIDE 10 MG/ML
INJECTION, SOLUTION EPIDURAL; INFILTRATION; INTRACAUDAL; PERINEURAL
Status: DISCONTINUED | OUTPATIENT
Start: 2024-12-31 | End: 2024-12-31

## 2024-12-31 RX ORDER — DEXAMETHASONE SODIUM PHOSPHATE 10 MG/ML
INJECTION, SOLUTION INTRAMUSCULAR; INTRAVENOUS
Status: DISCONTINUED | OUTPATIENT
Start: 2024-12-31 | End: 2024-12-31

## 2024-12-31 RX ORDER — NALOXONE HYDROCHLORIDE 0.4 MG/ML
0.08 INJECTION, SOLUTION INTRAMUSCULAR; INTRAVENOUS; SUBCUTANEOUS AS NEEDED
Status: DISCONTINUED | OUTPATIENT
Start: 2024-12-31 | End: 2024-12-31

## 2024-12-31 RX ORDER — SODIUM CHLORIDE 9 MG/ML
INJECTION, SOLUTION INTRAMUSCULAR; INTRAVENOUS; SUBCUTANEOUS
Status: DISCONTINUED | OUTPATIENT
Start: 2024-12-31 | End: 2024-12-31

## 2024-12-31 NOTE — DISCHARGE INSTRUCTIONS
Home Care Instructions Following Your Pain Procedure     Cb,  It has been a pleasure to have you as our patient. To help you at home, you must follow these general discharge instructions. We will review these with you before you are discharged. It is our hope that you have a complete and uneventful recovery from our procedure.     General Instructions:  What to Expect:  Bandages from your procedure today can be removed when you get home.  Please avoid soaking and/or swimming for 24 hours.  Showering is okay  It is normal to have increased pain symptoms and/or pain at injection site for up to 3-5 days after procedure, you can use heat or ice (20 minutes on 20 minutes off) for comfort.  You may experience some temporary side effects which may include restlessness or insomnia, flushing of the face, or heart palpitations.  Please contact the provider if these symptoms do not resolve within 3-4 days.  Lightheadedness or nausea may occur and should resolve within 24 to 48 hours.  If you develop a headache after treatment, rest, drink fluids (with caffeine, if possible) and take mild over-the-counter pain medication.  If the headache does not improve with the above treatment, contact the physician.  Home Medications:  Resume all previously prescribed medication.  Please avoid taking NSAIDs (Non-Steriodal Anti-Inflammatory Drugs) such as:  Ibuprofen ( Advil, Motrin) Aleve (Naproxen), Diclofenac, Meloxicam for 6 hours after procedure.   If you are on Coumadin (Warfarin) or any other anti-coagulant (or \"blood thinning\") medication such as Plavix (Clopidogrel), Xarelto (Rivaroxaban), Eliquis (Apixaban), Effient (Prasugrel) etc., restart on the following day from the procedure unless otherwise directed by your provider.  If you are a diabetic, please increase the frequency of your glucose monitoring after the procedure as steroids may cause a temporary (2-3 day) increase in your blood sugar.  Contact your primary care  physician if your blood sugar remains elevated as you may require some medication adjustment.  Diet:  Resume your regular diet as tolerated.  Activity:  We recommend that you relax and rest during the rest of your procedure day.  If you feel weakness in your arms or legs do not drive.  Follow-up Appointment  Please schedule a follow-up visit within 3 to 4 weeks after your last procedure date.  Question or Concerns:  Feel free to call our office with any questions or concerns at [phone] (option #2)    Cb  Thank you for coming to [facility] for your procedure.  The nurses try very hard to make sure you receive the best care possible.  Your trust in them as well as us is greatly appreciated.    Thanks so much,   Dr. Magdaleno Fu

## 2024-12-31 NOTE — OPERATIVE REPORT
[facility]  Operative Report  2024     Cb Webster Patient Status:  Hospital Outpatient Surgery    1960 MRN [id number]   Location [facility] PAIN CENTER Attending Magdaleno Fu MD   Hosp Day # 0 PCP Colton Schaefer MD     Indication: Cb is a 64 year old female lumbar radiculitis    Preoperative Diagnosis:  Lumbar radiculitis [M54.16]    Postoperative Diagnosis: Same as above.    Procedure performed: right L4/5 and L5/S1 TF-DIANA (aka L4 and L5) TRANSFORAMINAL LUMBAR EPIDURAL STEROID INJECTION MULTIPLE LEVEL with local     Anesthesia: Local      EBL: Less than 1 ml.    Procedure Description:  After reviewing the patient's history and performing a focused physical examination, the diagnosis was confirmed and contraindications such as infection and coagulopathy were ruled out.  Following review of potential side effects and complications, including but not necessarily limited to infection, allergic reaction, local tissue breakdown, nerve injury, and paresis, the patient indicated they understood and agreed to proceed.  After obtaining the informed consent, the patient was brought to the procedure room and monitored.           In the prone position, following sterile prep and drape of the lumbar region,  the  L4 neural foramen was identified under fluoroscopy.  The skin and subcutaneous tissue was anesthetized via 25-gauge 1.5\" needle with approximately 2 cc of 1% lidocaine.  A 22-gauge 5\" Quincke spinal needle was introduced toward the inferior aspect of the junction between the transverse process and pedicle of the  L4 level atraumatically under fluoroscopic guidance. The needle was advanced into the anterior epidural space at this level. The needle position was confirmed under AP and lateral fluoroscopic view.  Following negative aspiration for CSF and blood, approximately 1 cc of Omnipaque 240 was injected.  An excellent contrast spread along the epidural space and the nerve  root was obtained.  At this point, 1cc of normal saline with 5 mg of dexamethasone was injected without complication.  The needle was withdrawn with stylet in situ after being flushed with 1 cc PF lidocaine.     The  L5 neural foramen was also identified under fluoroscopy.  The skin and subcutaneous tissue was anesthetized via 25-gauge 1.5\" needle with approximately 2 cc of 1% lidocaine.  A 22-gauge 5\" Quincke spinal needle was introduced toward the inferior aspect of the junction between the transverse process and pedicle of the L5 level atraumatically under fluoroscopic guidance. The needle was advanced into the anterior epidural space at this level. The needle position was confirmed under AP and lateral fluoroscopic view.  Following negative aspiration for CSF and blood, approximately 1 cc of Omnipaque 240 was injected.  An excellent contrast spread along the epidural space and the nerve root was obtained.  At this point, 1cc of normal saline with 5 mg of dexamethasone was injected without complication.  The needle was withdrawn with stylet in situ after being flushed with 1 cc PF lidocaine..  The patient tolerated procedure very well.  The patient was observed until discharge criteria met.  Discharge instructions were given and patient was released to a responsible adult.       Complications: None.    Follow up:  The patient was followed in the pain clinic as needed basis.        Magdaleno Fu MD

## 2024-12-31 NOTE — H&P
History & Physical Examination    Patient Name: Cb Webster  MRN: [id number]  CSN: [id number]  YOB: 1960    Pre-Operative Diagnosis:  Lumbar radiculitis [M54.16]    Present Illness: Lumbar radiculitis    ASA: 2  MP class: 1  Sedation: Local      Prescriptions Prior to Admission[1]  No current facility-administered medications for this encounter.       Allergies: Allergies[2]    Past Medical History:    Anxiety    Aortic regurgitation    mild    Arthritis    Back pain    Calculus of kidney    Cardiac calcification (HCC) - CAC score of 5.24 seen on 22 CT Heart Scan protocol    Colon polyps    Constipation    Depression    Diarrhea, unspecified    Disorder of liver    Fatty liver    Diverticulosis    Dizziness    Fatigue    Flatulence/gas pain/belching    Food intolerance    Frequent urination    Gastroparesis    GERD (gastroesophageal reflux disease)    Headache disorder    Migraines    Heart palpitations    Heartburn    Hemorrhoids    History of depression    Major depressive disorder & anxiety    History of eating disorder    Binge eating    Hoarseness, chronic    Hyperlipidemia    Indigestion    Leg swelling    Loss of appetite    Migraines    Mouth sores    Triggered by stress or poor diet    Nausea    OCD (obsessive compulsive disorder)    MODE (obstructive sleep apnea)    Pain in joints    Painful swallowing    Parkinsonism (HCC)    Peptic ulcer disease    Problems with swallowing    Sleep disturbance    High serotonin    Stool incontinence    Soft barely formed stool, not detecting urgency    Stress    Syncope    Tendonitis of shoulder, right    Tremor    Vomiting blood    Wears glasses    Weight gain    10 lbs in 2 months - stop phentermine and decreased excercise after arthroscope of left knee    Weight loss    28 lb last year     Past Surgical History:   Procedure Laterality Date          x 2    Cholecystectomy      Colonoscopy      D & c      Ect provided  [id number]    Egd       Laparoscopic cholecystectomy      Needle biopsy right  2015    benign breast    Other surgical history      C3-C4 anterior discectomy    Other surgical history  2018    [facility]    Other surgical history  2019    radiofrequency abalsion lumbar    Removal gallbladder  2020    Skin surgery      Spine surgery procedure unlisted      Anterior cervical discectomy    Tonsillectomy  1966?     Family History   Problem Relation Age of Onset    Hypertension Father     Heart Attack Father          at 48 years    Heart Disease Father     Alcohol and Other Disorders Associated Father     Depression Father     Other (ALS) Mother     Hypertension Mother         Diagnosed at 91 yo with ALS  at 91    Personality Disorder Daughter     Dementia Maternal Grandmother     Obesity Maternal Grandmother     Dementia Maternal Grandfather     Obesity Paternal Grandfather     Cancer Sister         Kidney, chronic lymp… leukemia    Heart Attack Sister         Kidney cancer, chronic lymphocytic leukemia    Ulcerative Colitis Brother     Cancer Brother         Kidney     Social History     Tobacco Use    Smoking status: Never    Smokeless tobacco: Never   Substance Use Topics    Alcohol use: Not Currently     Comment: 6 drinks or less/year       SYSTEM Check if Review is Normal Check if Physical Exam is Normal If not normal, please explain:   HEENT [x ] [x ]    NECK & BACK [x ] [x ]    HEART [x ] [x ]    LUNGS [x ] [x ]    ABDOMEN [x ] [x ]    UROGENITAL [x ] [x ]    EXTREMITIES [x ] [x ]    OTHER        [ x ] I have discussed the risks and benefits and alternatives with the patient/family.  They understand and agree to proceed with plan of care.  [ x ] I have reviewed the History and Physical done within the last 30 days.  Any changes noted above.    Magdaleno Fu MD              [1]   Facility-Administered Medications Prior to Admission   Medication Dose Route Frequency Provider Last Rate Last Admin     [COMPLETED] onabotulinumtoxinA (Botox) injection 200 Units  200 Units Injection Once James Betancourt MD   155 Units at 24 1320     Medications Prior to Admission   Medication Sig Dispense Refill Last Dose/Taking    Meloxicam 7.5 MG Oral Tab Take 1 tablet (7.5 mg total) by mouth daily.   Past Month    diphenhydrAMINE 25 MG Oral Cap Take 1 capsule (25 mg total) by mouth every 8 (eight) hours as needed (when take reglan). 30 capsule 0     metoclopramide 5 MG Oral Tab Take 1 tablet (5 mg total) by mouth 3 (three) times daily as needed (nausea). 20 tablet 0     [] HYDROcodone-acetaminophen 5-325 MG Oral Tab Take 1-2 tablets by mouth every 6 (six) hours as needed for Pain. 10 tablet 0     tamsulosin (FLOMAX) 0.4 MG Oral Cap Take 1 capsule (0.4 mg total) by mouth daily for 7 days. 7 capsule 0     [] cephALEXin (KEFLEX) 500 MG Oral Cap Take 1 capsule (500 mg total) by mouth 3 (three) times daily for 5 days. 15 capsule 0     PROPRANOLOL HCL ER 80 MG Oral Capsule SR 24 Hr TAKE 1 CAPSULE (80 MG TOTAL) BY MOUTH EVERY EVENING. 90 capsule 1     cyclobenzaprine 10 MG Oral Tab Take 1 tablet (10 mg total) by mouth 3 (three) times daily as needed for Muscle spasms. 90 tablet 0     methylphenidate 10 MG Oral Tab Take 1 tablet (10 mg total) by mouth 2 (two) times daily.       busPIRone 15 MG Oral Tab Take 1 tablet (15 mg total) by mouth 2 (two) times daily.       gabapentin 100 MG Oral Cap Take 1 capsule (100 mg total) by mouth 2 (two) times daily. 60 capsule 1     MEMANTINE 10 MG Oral Tab TAKE 1 TABLET BY MOUTH EVERY DAY 90 tablet 0     FLUoxetine 10 MG Oral Cap TAKE 1 CAPSULE BY MOUTH EVERY MORNING TAKE ALONG WITH THE 40MG CAPS FOR TOTAL DOSE OF 50MG DAILY       [START ON 2025] Phentermine HCl 15 MG Oral Cap Take 1 capsule (15 mg total) by mouth every morning. 30 capsule 0     FLUoxetine HCl 40 MG Oral Cap Take 1 capsule (40 mg total) by mouth daily.       pantoprazole 40 MG Oral Tab EC Take 1 tablet (40  mg total) by mouth before breakfast. 90 tablet 3     metoclopramide 5 MG Oral Tab Take 1 tablet (5 mg total) by mouth daily. 30 tablet 2     famotidine 40 MG Oral Tab Take 1 tablet (40 mg total) by mouth daily as needed for Heartburn. 90 tablet 3     dicyclomine 10 MG Oral Cap Take 1 capsule (10 mg total) by mouth in the morning and 1 capsule (10 mg total) before bedtime. 180 capsule 3     SUMAtriptan Succinate 6 MG/0.5ML Subcutaneous Solution Auto-injector Inject 0.5 mL (6 mg total) into the skin as needed (for moderate to severe migraine). 6 mL 2     albuterol 108 (90 Base) MCG/ACT Inhalation Aero Soln Inhale 2 puffs into the lungs every 4 (four) hours as needed for Wheezing or Shortness of Breath. 6.7 g 0     traZODone 100 MG Oral Tab Take 1 tablet (100 mg total) by mouth nightly.       rosuvastatin 5 MG Oral Tab Take 1 tablet (5 mg total) by mouth Every Monday, Wednesday, and Friday. 45 tablet 3     SPRAVATO, 84 MG DOSE, 28 MG/DEVICE Nasal Solution Therapy Pack 84 mg by Nasal route every 21 days.       triamcinolone 0.1 % External Cream Apply thin layer to affected area twice a day as needed 45 g 1     hydrocortisone 2.5 % External Cream Apply to AA of FACE BID x 2 weeks, hold 2 weeks, repeat PRN. 30 g 2     ketoconazole 2 % External Cream Apply to AA of FACE BID when not using Hydrocortisone Cream. 30 g 2     LORazepam 2 MG Oral Tab Take 1 tablet (2 mg total) by mouth as needed.      [2]   Allergies  Allergen Reactions    Sulfa Antibiotics NAUSEA ONLY

## 2025-01-02 ENCOUNTER — OFFICE VISIT (OUTPATIENT)
Dept: FAMILY MEDICINE CLINIC | Facility: CLINIC | Age: 65
End: 2025-01-02
Payer: MEDICARE

## 2025-01-02 VITALS
HEIGHT: 61 IN | HEART RATE: 84 BPM | TEMPERATURE: 98 F | DIASTOLIC BLOOD PRESSURE: 60 MMHG | RESPIRATION RATE: 16 BRPM | WEIGHT: 188.25 LBS | BODY MASS INDEX: 35.54 KG/M2 | SYSTOLIC BLOOD PRESSURE: 90 MMHG

## 2025-01-02 DIAGNOSIS — N12 PYELONEPHRITIS: ICD-10-CM

## 2025-01-02 DIAGNOSIS — N13.2 HYDRONEPHROSIS WITH URINARY OBSTRUCTION DUE TO RENAL CALCULUS: ICD-10-CM

## 2025-01-02 DIAGNOSIS — N28.1 RENAL CYST: ICD-10-CM

## 2025-01-02 DIAGNOSIS — M54.16 LUMBAR RADICULOPATHY: ICD-10-CM

## 2025-01-02 DIAGNOSIS — N20.1 RIGHT URETERAL STONE: Primary | ICD-10-CM

## 2025-01-02 LAB
APPEARANCE: CLEAR
GLUCOSE (URINE DIPSTICK): NEGATIVE MG/DL
MULTISTIX LOT#: ABNORMAL NUMERIC
NITRITE, URINE: NEGATIVE
PH, URINE: 6 (ref 4.5–8)
SPECIFIC GRAVITY: 1.02 (ref 1–1.03)
URINE-COLOR: YELLOW
UROBILINOGEN,SEMI-QN: 0.2 MG/DL (ref 0–1.9)

## 2025-01-02 PROCEDURE — 81003 URINALYSIS AUTO W/O SCOPE: CPT | Performed by: STUDENT IN AN ORGANIZED HEALTH CARE EDUCATION/TRAINING PROGRAM

## 2025-01-02 PROCEDURE — 87086 URINE CULTURE/COLONY COUNT: CPT | Performed by: STUDENT IN AN ORGANIZED HEALTH CARE EDUCATION/TRAINING PROGRAM

## 2025-01-02 PROCEDURE — 99214 OFFICE O/P EST MOD 30 MIN: CPT | Performed by: STUDENT IN AN ORGANIZED HEALTH CARE EDUCATION/TRAINING PROGRAM

## 2025-01-02 RX ORDER — METHYLPHENIDATE HYDROCHLORIDE 20 MG/1
20 TABLET ORAL 2 TIMES DAILY
COMMUNITY
Start: 2024-12-16

## 2025-01-02 RX ORDER — TAMSULOSIN HYDROCHLORIDE 0.4 MG/1
0.4 CAPSULE ORAL DAILY
Qty: 30 CAPSULE | Refills: 0 | Status: SHIPPED | OUTPATIENT
Start: 2025-01-02 | End: 2025-02-01

## 2025-01-02 RX ORDER — RIMEGEPANT SULFATE 75 MG/75MG
75 TABLET, ORALLY DISINTEGRATING ORAL AS NEEDED
COMMUNITY
Start: 2024-12-16

## 2025-01-02 RX ORDER — PHENTERMINE HYDROCHLORIDE 15 MG/1
15 CAPSULE ORAL EVERY MORNING
Qty: 30 CAPSULE | Refills: 0 | Status: CANCELLED | OUTPATIENT
Start: 2025-01-02 | End: 2025-02-01

## 2025-01-02 RX ORDER — CIPROFLOXACIN 250 MG/1
250 TABLET, FILM COATED ORAL 2 TIMES DAILY
Qty: 20 TABLET | Refills: 0 | Status: CANCELLED | OUTPATIENT
Start: 2025-01-02 | End: 2025-01-12

## 2025-01-02 RX ORDER — CIPROFLOXACIN 500 MG/1
500 TABLET, FILM COATED ORAL 2 TIMES DAILY
Qty: 14 TABLET | Refills: 0 | Status: SHIPPED | OUTPATIENT
Start: 2025-01-02 | End: 2025-01-09

## 2025-01-02 NOTE — PROGRESS NOTES
Subjective:      Chief Complaint   Patient presents with    ER F/U     From 12/23/24 - 12/24/24 for kidney stones     HISTORY OF PRESENT ILLNESS  HPI  HPI obtained per patient report.  Cb Webster is a pleasant 64 year old female presenting for a follow-up after a recent ER visit.   She was evaluated in the ER on 12/24/24 and found to have a R ureteral stone with obstruction causing hydronephrosis. She was discharged with flomax and norco and recommended follow-up with Urology.   She has noticed the passing of her stone. She currently reports dysuria and R flank pain but otherwise is feeling better since her ER visit. She is concerned that her ER imaging was limited by lack of contrast, as her brother and sister have a history of renal cancer and she was found to have a L kidney cyst. She inquires about the possibility of reattempting a contrast CT. She has a Urology follow-up scheduled on 1/10.    PAST PATIENT HISTORY  Past Medical History:    Anxiety    Aortic regurgitation    mild    Arthritis    Back pain    Calculus of kidney    Cardiac calcification (HCC) - CAC score of 5.24 seen on 12/2/22 CT Heart Scan protocol    Colon polyps    Constipation    Depression    Diarrhea, unspecified    Disorder of liver    Fatty liver    Diverticulosis    Dizziness    Fatigue    Flatulence/gas pain/belching    Food intolerance    Frequent urination    Gastroparesis    GERD (gastroesophageal reflux disease)    Headache disorder    Migraines    Heart palpitations    Heartburn    Hemorrhoids    History of depression    Major depressive disorder & anxiety    History of eating disorder    Binge eating    Hoarseness, chronic    Hyperlipidemia    Indigestion    Leg swelling    Loss of appetite    Migraines    Mouth sores    Triggered by stress or poor diet    Nausea    OCD (obsessive compulsive disorder)    MODE (obstructive sleep apnea)    Pain in joints    Painful swallowing    Parkinsonism (HCC)    Peptic ulcer disease     Problems with swallowing    Sleep disturbance    High serotonin    Stool incontinence    Soft barely formed stool, not detecting urgency    Stress    Syncope    Tendonitis of shoulder, right    Tremor    Vomiting blood    Wears glasses    Weight gain    10 lbs in 2 months - stop phentermine and decreased excercise after arthroscope of left knee    Weight loss    28 lb last year     Past Surgical History:   Procedure Laterality Date          x 2    Cholecystectomy      Colonoscopy      D & c      Ect provided  [id number]    Egd      Laparoscopic cholecystectomy      Needle biopsy right  2015    benign breast    Other surgical history      C3-C4 anterior discectomy    Other surgical history  2018    [facility]    Other surgical history  2019    radiofrequency abalsion lumbar    Removal gallbladder  2020    Skin surgery      Spine surgery procedure unlisted      Anterior cervical discectomy    Tonsillectomy  1966?       CURRENT MEDICATIONS  Medications Taking[1]    HEALTH MAINTENANCE  Immunization History   Administered Date(s) Administered    Covid-19 Vaccine Pfizer 30 mcg/0.3 ml 2021, 2021, 10/23/2021    Covid-19 Vaccine Pfizer Bivalent 30mcg/0.3mL 2022    Covid-19 Vaccine Pfizer Merrill-Sucrose 30 mcg/0.3 ml 2022    FLULAVAL 6 months & older 0.5 ml Prefilled syringe (54291) 10/23/2017, 2018, 10/01/2019, 10/07/2020, 2021, 10/28/2022    FLUZONE 6 months and older PFS 0.5 ml ([id number]) 10/23/2017, 2018, 10/13/2023    Influenza 2024    Pfizer Covid-19 Vaccine 30mcg/0.3ml 12yrs+ 10/13/2023, 2024    RSV, recombinant, RSVpreF, adjuvanted (Arexvy) 2023    TDAP 2018    Zoster Vaccine Recombinant Adjuvanted (Shingrix) 2021, 2021       ALLERGIES AND DRUG REACTIONS  Allergies[2]    Family History   Problem Relation Age of Onset    Hypertension Father     Heart Attack Father          at 48 years    Heart Disease Father      Alcohol and Other Disorders Associated Father     Depression Father     Other (ALS) Mother     Hypertension Mother         Diagnosed at 91 yo with ALS  at 91    Personality Disorder Daughter     Dementia Maternal Grandmother     Obesity Maternal Grandmother     Dementia Maternal Grandfather     Obesity Paternal Grandfather     Cancer Sister         Kidney, chronic lymp… leukemia    Heart Attack Sister         Kidney cancer, chronic lymphocytic leukemia    Ulcerative Colitis Brother     Cancer Brother         Kidney     Social History     Socioeconomic History    Marital status:    Occupational History    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[employer]   Tobacco Use    Smoking status: Never    Smokeless tobacco: Never   Vaping Use    Vaping status: Never Used   Substance and Sexual Activity    Alcohol use: Not Currently     Comment: 6 drinks or less/year    Drug use: Never   Other Topics Concern    Caffeine Concern No    Exercise Yes    Seat Belt Yes    Special Diet No    Stress Concern Yes    Weight Concern Yes   Social History Narrative    Caring for grandson (Peter - aged 4 yo [2020])       Review of Systems   Genitourinary:  Positive for dysuria and flank pain.   All other systems reviewed and are negative.         Objective:      BP 90/60   Pulse 84   Temp 97.8 °F (36.6 °C) (Temporal)   Resp 16   Ht 5' 1\" (1.549 m)   Wt 188 lb 4 oz (85.4 kg)   LMP  (LMP Unknown)   BMI 35.57 kg/m²   Body mass index is 35.57 kg/m².    Physical Exam  Vitals reviewed.   Constitutional:       General: She is not in acute distress.     Appearance: She is not ill-appearing, toxic-appearing or diaphoretic.   HENT:      Head: Normocephalic and atraumatic.   Eyes:      General: No scleral icterus.        Right eye: No discharge.         Left eye: No discharge.      Extraocular Movements: Extraocular movements intact.      Conjunctiva/sclera: Conjunctivae normal.    Cardiovascular:      Rate and Rhythm: Normal rate.   Pulmonary:      Effort: Pulmonary effort is normal.   Abdominal:      General: Bowel sounds are normal. There is no distension.      Palpations: Abdomen is soft. There is no mass.      Tenderness: There is abdominal tenderness (mild suprapubic tenderness). There is right CVA tenderness. There is no left CVA tenderness, guarding or rebound.      Hernia: No hernia is present.   Musculoskeletal:      Cervical back: Neck supple.      Right lower leg: No edema.      Left lower leg: No edema.   Neurological:      Mental Status: She is alert and oriented to person, place, and time.   Psychiatric:         Mood and Affect: Mood normal.            Assessment and Plan:      1. Right ureteral stone (Primary)  -     CT ABDOMEN+PELVIS(CONTRAST ONLY)(CPT=74177); Future; Expected date: 01/02/2025  -     Tamsulosin HCl; Take 1 capsule (0.4 mg total) by mouth daily.  Dispense: 30 capsule; Refill: 0  -     URINALYSIS, AUTO, W/O SCOPE  2. Hydronephrosis with urinary obstruction due to renal calculus  -     CT ABDOMEN+PELVIS(CONTRAST ONLY)(CPT=74177); Future; Expected date: 01/02/2025  3. Pyelonephritis  -     CT ABDOMEN+PELVIS(CONTRAST ONLY)(CPT=74177); Future; Expected date: 01/02/2025  -     Urine Culture, Routine; Future; Expected date: 01/02/2025  -     Ciprofloxacin HCl; Take 1 tablet (500 mg total) by mouth 2 (two) times daily for 7 days.  Dispense: 14 tablet; Refill: 0  -     Urine Culture, Routine  -     URINALYSIS, AUTO, W/O SCOPE  4. Renal cyst  -     CT ABDOMEN+PELVIS(CONTRAST ONLY)(CPT=74177); Future; Expected date: 01/02/2025    Return if symptoms worsen or fail to improve.    - her exam is concerning for an upper UTI, and her POC UA is concerning for UTI  - recommended course of ciprofloxacin as prescribed  - urine specimen sent for culture  - will extend course of flomax, as she has likely not yet passed her stone  - given the concern for pyelonephritis complicated  by urinary tract obstruction and not yet having passed her stone, it is reasonable to recheck a CT abdomen/pelvis with contrast. This order was provided to her today  - recommended continuing to strain urine and follow-up with Urology as scheduled    Patient verbalized understanding of assessment and recommendations. All questions and concerns were addressed.    Electronically signed by Colton Schaefer MD         [1]   Outpatient Medications Marked as Taking for the 1/2/25 encounter (Office Visit) with Colton Schaefer MD   Medication Sig Dispense Refill    NURTEC 75 MG Oral Tablet Dispersible Take 75 mg by mouth as needed.      methylphenidate 20 MG Oral Tab Take 1 tablet (20 mg total) by mouth 2 (two) times daily.      tamsulosin 0.4 MG Oral Cap Take 1 capsule (0.4 mg total) by mouth daily. 30 capsule 0    ciprofloxacin (CIPRO) 500 MG Oral Tab Take 1 tablet (500 mg total) by mouth 2 (two) times daily for 7 days. 14 tablet 0    diphenhydrAMINE 25 MG Oral Cap Take 1 capsule (25 mg total) by mouth every 8 (eight) hours as needed (when take reglan). 30 capsule 0    metoclopramide 5 MG Oral Tab Take 1 tablet (5 mg total) by mouth 3 (three) times daily as needed (nausea). 20 tablet 0    PROPRANOLOL HCL ER 80 MG Oral Capsule SR 24 Hr TAKE 1 CAPSULE (80 MG TOTAL) BY MOUTH EVERY EVENING. 90 capsule 1    busPIRone 15 MG Oral Tab Take 1 tablet (15 mg total) by mouth 2 (two) times daily.      gabapentin 100 MG Oral Cap Take 1 capsule (100 mg total) by mouth 2 (two) times daily. (Patient taking differently: Take 2 capsules (200 mg total) by mouth 2 (two) times daily.) 60 capsule 1    MEMANTINE 10 MG Oral Tab TAKE 1 TABLET BY MOUTH EVERY DAY 90 tablet 0    FLUoxetine 10 MG Oral Cap TAKE 1 CAPSULE BY MOUTH EVERY MORNING TAKE ALONG WITH THE 40MG CAPS FOR TOTAL DOSE OF 50MG DAILY      [START ON 1/23/2025] Phentermine HCl 15 MG Oral Cap Take 1 capsule (15 mg total) by mouth every morning. 30 capsule 0    FLUoxetine HCl 40 MG Oral Cap Take 1  capsule (40 mg total) by mouth daily.      pantoprazole 40 MG Oral Tab EC Take 1 tablet (40 mg total) by mouth before breakfast. 90 tablet 3    metoclopramide 5 MG Oral Tab Take 1 tablet (5 mg total) by mouth daily. 30 tablet 2    famotidine 40 MG Oral Tab Take 1 tablet (40 mg total) by mouth daily as needed for Heartburn. 90 tablet 3    dicyclomine 10 MG Oral Cap Take 1 capsule (10 mg total) by mouth in the morning and 1 capsule (10 mg total) before bedtime. 180 capsule 3    SUMAtriptan Succinate 6 MG/0.5ML Subcutaneous Solution Auto-injector Inject 0.5 mL (6 mg total) into the skin as needed (for moderate to severe migraine). 6 mL 2    albuterol 108 (90 Base) MCG/ACT Inhalation Aero Soln Inhale 2 puffs into the lungs every 4 (four) hours as needed for Wheezing or Shortness of Breath. 6.7 g 0    traZODone 100 MG Oral Tab Take 1 tablet (100 mg total) by mouth nightly.      rosuvastatin 5 MG Oral Tab Take 1 tablet (5 mg total) by mouth Every Monday, Wednesday, and Friday. 45 tablet 3    SPRAVATO, 84 MG DOSE, 28 MG/DEVICE Nasal Solution Therapy Pack 84 mg by Nasal route every 21 days.      triamcinolone 0.1 % External Cream Apply thin layer to affected area twice a day as needed 45 g 1    hydrocortisone 2.5 % External Cream Apply to AA of FACE BID x 2 weeks, hold 2 weeks, repeat PRN. 30 g 2    ketoconazole 2 % External Cream Apply to AA of FACE BID when not using Hydrocortisone Cream. 30 g 2    LORazepam 2 MG Oral Tab Take 1 tablet (2 mg total) by mouth as needed.     [2]   Allergies  Allergen Reactions    Sulfa Antibiotics NAUSEA ONLY

## 2025-01-03 ENCOUNTER — APPOINTMENT (OUTPATIENT)
Dept: PHYSICAL THERAPY | Age: 65
End: 2025-01-03
Attending: STUDENT IN AN ORGANIZED HEALTH CARE EDUCATION/TRAINING PROGRAM
Payer: MEDICARE

## 2025-01-03 ENCOUNTER — HOSPITAL ENCOUNTER (OUTPATIENT)
Dept: CT IMAGING | Facility: HOSPITAL | Age: 65
Discharge: HOME | End: 2025-01-03
Attending: STUDENT IN AN ORGANIZED HEALTH CARE EDUCATION/TRAINING PROGRAM
Payer: MEDICARE

## 2025-01-03 ENCOUNTER — ANESTHESIA (OUTPATIENT)
Dept: CT IMAGING | Facility: HOSPITAL | Age: 65
End: 2025-01-03

## 2025-01-03 DIAGNOSIS — N20.1 RIGHT URETERAL STONE: ICD-10-CM

## 2025-01-03 DIAGNOSIS — N13.2 HYDRONEPHROSIS WITH URINARY OBSTRUCTION DUE TO RENAL CALCULUS: ICD-10-CM

## 2025-01-03 DIAGNOSIS — N28.1 RENAL CYST: ICD-10-CM

## 2025-01-03 DIAGNOSIS — N12 PYELONEPHRITIS: ICD-10-CM

## 2025-01-03 PROCEDURE — 74177 CT ABD & PELVIS W/CONTRAST: CPT | Performed by: STUDENT IN AN ORGANIZED HEALTH CARE EDUCATION/TRAINING PROGRAM

## 2025-01-03 RX ORDER — GABAPENTIN 100 MG/1
200 CAPSULE ORAL 3 TIMES DAILY
Qty: 180 CAPSULE | Refills: 2 | Status: SHIPPED | OUTPATIENT
Start: 2025-01-03

## 2025-01-06 NOTE — PROGRESS NOTES
Izaiah Vivar,     Your CT with contrast was reassuring. You do still have your kidney stone and diverticulosis, but otherwise the scan was unremarkable. Please finish your antibiotic as prescribed and follow-up with your Urologist in a few days as scheduled.     Dr. Schaefer

## 2025-01-07 ENCOUNTER — ANESTHESIA EVENT (OUTPATIENT)
Dept: CT IMAGING | Facility: HOSPITAL | Age: 65
End: 2025-01-07

## 2025-01-07 NOTE — ANESTHESIA PREPROCEDURE EVALUATION
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        There is no height or weight on file to calculate BMI.    Current medications reviewed.  Hospital Medications:   [COMPLETED] iopamidol 76% (ISOVUE-370) injection for power injector  80 mL Intravenous ONCE PRN       Outpatient Medications:   (Not in a hospital admission)      Allergies: Sulfa antibiotics      Anesthesia Evaluation    Patient summary reviewed.    Anesthetic Complications  (-) history of anesthetic complications         GI/Hepatic/Renal    Negative GI/hepatic/renal ROS.                    Crohn's disease: OCD.         Cardiovascular            MET: >4    (+) obesity                                       Endo/Other                           (+) arthritis       Pulmonary    Negative pulmonary ROS.                       Neuro/Psych      (+) depression  (+) anxiety         (+) neuromuscular disease  (+) headaches  (+) psychiatric history         Back pain, received rfa        Past Surgical History:   Procedure Laterality Date          x 2    Cholecystectomy      Colonoscopy      D & c      Ect provided  [id number]    Egd      Laparoscopic cholecystectomy      Needle biopsy right  2015    benign breast    Other surgical history      C3-C4 anterior discectomy    Other surgical history  2018    [facility]    Other surgical history  2019    radiofrequency abalsion lumbar    Removal gallbladder  2020    Skin surgery       Spine surgery procedure unlisted  2008    Anterior cervical discectomy    Tonsillectomy  1966?     Social History     Socioeconomic History    Marital status:    Occupational History    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[employer]   Tobacco Use    Smoking status: Never    Smokeless tobacco: Never   Vaping Use    Vaping status: Never Used   Substance and Sexual Activity    Alcohol use: Not Currently     Comment: 6 drinks or less/year    Drug use: Never   Other Topics Concern    Caffeine Concern No    Exercise Yes    Seat Belt Yes    Special Diet No    Stress Concern Yes    Weight Concern Yes     History   Drug Use Unknown     Available pre-op labs reviewed.  Lab Results   Component Value Date    WBC 7.6 12/23/2024    RBC 4.21 12/23/2024    HGB 12.6 12/23/2024    HCT 38.0 12/23/2024    MCV 90.3 12/23/2024    MCH 29.9 12/23/2024    MCHC 33.2 12/23/2024    RDW 12.5 12/23/2024    .0 12/23/2024     Lab Results   Component Value Date     12/23/2024    K 4.1 12/23/2024     12/23/2024    CO2 27.0 12/23/2024    BUN 20 12/23/2024    CREATSERUM 0.97 12/23/2024     (H) 12/23/2024    CA 9.0 12/23/2024            Airway      Mallampati: II  Mouth opening: >3 FB  TM distance: 4 - 6 cm  Neck ROM: full Cardiovascular    Cardiovascular exam normal.         Dental    Dentition appears grossly intact         Pulmonary    Pulmonary exam normal.                 Other findings              ASA: 3   Plan: general  NPO status verified and patient meets guidelines.    Post-procedure pain management plan discussed with surgeon and patient.    Comment:                     Present on Admission:  **None**

## 2025-01-07 NOTE — ANESTHESIA PROCEDURE NOTES
Peripheral IV  Date/Time: 1/3/2025 5:00 PM  Inserted by: Gio Burnett MD    Placement  Needle size: 22 G  Laterality: left  Location: foot  Local anesthetic: none  Site prep: alcohol  Technique: anatomical landmarks  Attempts: previous attempt in left hand, right AC.

## 2025-01-07 NOTE — ANESTHESIA POSTPROCEDURE EVALUATION
[facility]    Cb Webster Patient Status:  Outpatient   Age/Gender 64 year old female MRN [id number]   Location [facility] CT Attending No att. providers found   Hosp Day # 0 PCP Colton Schaefer MD       Anesthesia Post-op Note        Procedure Summary       Date: 01/03/25 Room / Location: [facility] CT    Anesthesia Start: 1700 Anesthesia Stop: 1701    Procedure: CT ABDOMEN+PELVIS(CONTRAST ONLY)(CPT=74177) Diagnosis:       Right ureteral stone      Hydronephrosis with urinary obstruction due to renal calculus      Pyelonephritis      Renal cyst    Scheduled Providers:  Anesthesiologist: Gio Burnett MD    Anesthesia Type: general ASA Status: 3            Anesthesia Type: No value filed.          Comments: No anesthesia, IV placement only

## 2025-01-10 ENCOUNTER — OFFICE VISIT (OUTPATIENT)
Dept: SURGERY | Facility: CLINIC | Age: 65
End: 2025-01-10
Payer: COMMERCIAL

## 2025-01-10 DIAGNOSIS — Z80.51 FAMILY HISTORY OF RENAL CELL CARCINOMA: ICD-10-CM

## 2025-01-10 DIAGNOSIS — N28.1 RENAL CYST: ICD-10-CM

## 2025-01-10 DIAGNOSIS — R82.90 URINE FINDING: ICD-10-CM

## 2025-01-10 DIAGNOSIS — N20.0 NEPHROLITHIASIS: Primary | ICD-10-CM

## 2025-01-10 LAB
APPEARANCE: CLEAR
BILIRUBIN: NEGATIVE
GLUCOSE (URINE DIPSTICK): NEGATIVE MG/DL
KETONES (URINE DIPSTICK): NEGATIVE MG/DL
MULTISTIX LOT#: ABNORMAL NUMERIC
NITRITE, URINE: NEGATIVE
OCCULT BLOOD: NEGATIVE
PH, URINE: 5.5 (ref 4.5–8)
PROTEIN (URINE DIPSTICK): NEGATIVE MG/DL
SPECIFIC GRAVITY: >=1.03 (ref 1–1.03)
UROBILINOGEN,SEMI-QN: 0.2 MG/DL (ref 0–1.9)

## 2025-01-10 PROCEDURE — 99203 OFFICE O/P NEW LOW 30 MIN: CPT | Performed by: PHYSICIAN ASSISTANT

## 2025-01-10 PROCEDURE — 81003 URINALYSIS AUTO W/O SCOPE: CPT | Performed by: PHYSICIAN ASSISTANT

## 2025-01-10 PROCEDURE — 82365 CALCULUS SPECTROSCOPY: CPT | Performed by: PHYSICIAN ASSISTANT

## 2025-01-10 NOTE — PROGRESS NOTES
[facility], [facility]    Urology Consult Note    History of Present Illness:   Patient is a 64 year old female with hx of depression, anxiety, migraine, who presents today for consultation from Dr. Schaefer's office for ureteral stone.     Originally diagnosed with stone 12/24/24 after she presented to the ED for generalized abdominal pain. CT scan demonstrated 6mm mid right ureteral stone with hydronephrosis. Normal white count and Scr. UA abnormal, final culture alpha hemolytic strep.    Seen by PCP 1/2/25 for follow up to above. Repeat UA abnormal, culture multiple species. She was started on Cipro BID x 7 days.    Repeat CT scan 1/3/25 demonstrated persistent stone, but migrated to right UVJ with moderate right hydronephrosis.     Passed stone 1/7/24. Currently feeling well without any complaints. No fevers, pain, dysuria, gross hematuria.    Fluids: 48-64 oz of water  No lemon/lime in water  Low salt     Prior stone x 1, 5-10 years ago.  No surgical intervention.   Prior use of topamax.     FH +brother and sister with RCC     HISTORY:  Past Medical History:    Anxiety    Aortic regurgitation    mild    Arthritis    Back pain    Calculus of kidney    Cardiac calcification (HCC) - CAC score of 5.24 seen on 12/2/22 CT Heart Scan protocol    Colon polyps    Constipation    Depression    Diarrhea, unspecified    Disorder of liver    Fatty liver    Diverticulosis    Dizziness    Fatigue    Flatulence/gas pain/belching    Food intolerance    Frequent urination    Gastroparesis    GERD (gastroesophageal reflux disease)    Headache disorder    Migraines    Heart palpitations    Heartburn    Hemorrhoids    History of depression    Major depressive disorder & anxiety    History of eating disorder    Binge eating    Hoarseness, chronic    Hyperlipidemia    Indigestion    Leg swelling    Loss of appetite    Migraines    Mouth sores    Triggered by stress or poor diet    Nausea    OCD  (obsessive compulsive disorder)    MODE (obstructive sleep apnea)    Pain in joints    Painful swallowing    Parkinsonism (HCC)    Peptic ulcer disease    Problems with swallowing    Sleep disturbance    High serotonin    Stool incontinence    Soft barely formed stool, not detecting urgency    Stress    Syncope    Tendonitis of shoulder, right    Tremor    Vomiting blood    Wears glasses    Weight gain    10 lbs in 2 months - stop phentermine and decreased excercise after arthroscope of left knee    Weight loss    28 lb last year      Past Surgical History:   Procedure Laterality Date          x 2    Cholecystectomy      Colonoscopy      D & c      Ect provided  [id number]    Egd      Laparoscopic cholecystectomy      Needle biopsy right  2015    benign breast    Other surgical history      C3-C4 anterior discectomy    Other surgical history  2018    [facility]    Other surgical history  2019    radiofrequency abalsion lumbar    Removal gallbladder  2020    Skin surgery      Spine surgery procedure unlisted      Anterior cervical discectomy    Tonsillectomy  1966?      Family History   Problem Relation Age of Onset    Hypertension Father     Heart Attack Father          at 48 years    Heart Disease Father     Alcohol and Other Disorders Associated Father     Depression Father     Other (ALS) Mother     Hypertension Mother         Diagnosed at 89 yo with ALS  at 91    Personality Disorder Daughter     Dementia Maternal Grandmother     Obesity Maternal Grandmother     Dementia Maternal Grandfather     Obesity Paternal Grandfather     Cancer Sister         Kidney, chronic lymp… leukemia    Heart Attack Sister         Kidney cancer, chronic lymphocytic leukemia    Ulcerative Colitis Brother     Cancer Brother         Kidney      Social History:   Social History     Socioeconomic History    Marital status:    Occupational History    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 [employer]   Tobacco Use    Smoking status: Never    Smokeless tobacco: Never   Vaping Use    Vaping status: Never Used   Substance and Sexual Activity    Alcohol use: Not Currently     Comment: 6 drinks or less/year    Drug use: Never   Other Topics Concern    Caffeine Concern No    Exercise Yes    Seat Belt Yes    Special Diet No    Stress Concern Yes    Weight Concern Yes   Social History Narrative    Caring for grandson (Peter - aged 4 yo [1/2020])        Allergies  Allergies[1]    Review of Systems:   A 10-point review of systems was completed and is negative other than as noted above.    Physical Exam:   LMP  (LMP Unknown)     GENERAL APPEARANCE: well developed, well nourished, in no acute distress  NEUROLOGIC: no localizing neurologic signs, alert and oriented x 3, converses appropriately  HEAD: atraumatic, normocephalic  EYES: sclera non-icteric  ORAL CAVITY: mucosa moist  NECK/THYROID: no obvious masses or goiter  LUNGS: non-labored breathing  EXTREMITIES: warm, well-perfused. No clubbing, cyanosis or edema.  SKIN: no obvious rashes    Results:     Laboratory Data:  Lab Results   Component Value Date    WBC 7.6 12/23/2024    HGB 12.6 12/23/2024    .0 12/23/2024     Lab Results   Component Value Date     12/23/2024    K 4.1 12/23/2024     12/23/2024    CO2 27.0 12/23/2024    BUN 20 12/23/2024     (H) 12/23/2024    GFRAA 62 11/05/2021    AST 19 12/23/2024    ALT <7 (L) 12/23/2024    TP 6.8 12/23/2024    ALB 4.2 12/23/2024    CA 9.0 12/23/2024    MG 2.5 07/12/2019       Urinalysis Results (last three years):  Recent Labs     11/18/22  1457 09/07/23  0918 10/18/23  0850 11/27/24  1356 12/24/24  0613 01/02/25  1519   COLORUR Yellow Yellow Light-Yellow Yellow Yellow  --    CLARITY Hazy* Clear Clear Cloudy* Clear  --    SPECGRAVITY 1.021 1.012 1.010 >=1.030 1.023 1.025   PHURINE 5.0 6.0 5.5 5.5 6.5 6.0   PROUR Negative Negative Negative 30 mg/dL* 20*   --    GLUUR Negative Normal Normal Negative Normal  --    KETUR Negative 10* Negative Trace* Negative  --    BILUR Negative Negative Negative  --  Negative  --    BLOODURINE Negative Negative Negative Negative 2+*  --    NITRITE Negative Negative Negative Negative Negative Negative   UROBILINOGEN <2.0 3* Normal 0.2 4*  --    LEUUR Trace* 75* 250* Small* 250*  --    WBCUR 6-10* 1-5 11-20* >50* 11-20*  --    RBCUR 0-2 0-2 3-5* 3-5* >10*  --    BACUR Rare* None Seen Rare* 1+* None Seen  --        Urine Culture Results (last three years):  Lab Results   Component Value Date    URINECUL  01/02/2025     <10,000 cfu/ml Multiple species present- probable contamination.    URINECUL <10,000 CFU/ML Alpha hemolytic Streptococcus (A) 12/24/2024    URINECUL  11/27/2024     <10,000 cfu/ml Multiple species present- probable contamination.    URINECUL No Growth at 18-24 hrs. 09/07/2023    URINECUL (A) 06/15/2021     10,000 - 50,000 CFU/ML Streptococcus agalactiae (Group B beta strep)    URINECUL No Growth at 18-24 hrs. 05/08/2020    URINECUL No Growth at 18-24 hrs. 06/18/2019       Imaging  CT ABDOMEN+PELVIS(CONTRAST ONLY)(CPT=74177)    Result Date: 1/3/2025  PROCEDURE:  CT ABDOMEN+PELVIS (CONTRAST ONLY) (CPT=74177)  COMPARISON:  [facility], CT ABDOMEN+PELVIS(CPT=74176), 12/24/2024, 5:21 AM.  INDICATIONS:  N28.1 Renal cyst N12 Pyelonephritis N13.2 Hydronephrosis with urinary obstruction due to renal calculus N20.1 Right ureteral stone  TECHNIQUE:  CT scanning was performed from the dome of the diaphragm to the pubic symphysis with non-ionic intravenous contrast material. Post contrast coronal MPR imaging was performed.  Dose reduction techniques were used. Dose information is transmitted to the ACR (American College of Radiology) NRDR (National Radiology Data Registry) which includes the Dose Index Registry.  PATIENT STATED HISTORY:(As transcribed by Technologist)  Patient states right ureteral stone, family history of renal ca.    CONTRAST USED:  80cc of Isovue 370  FINDINGS:  LIVER:  No enlargement, atrophy, abnormal density, or significant focal lesion.  BILIARY:  There has been prior cholecystectomy. PANCREAS:  No lesion, fluid collection, ductal dilatation, or atrophy.  SPLEEN:  No enlargement or focal lesion.  KIDNEYS:  There is moderate right hydronephrosis.  There is a 5 mm stone at the right ureteral vesicle junction. ADRENALS:  No mass or enlargement.  AORTA/VASCULAR:  No aneurysm or dissection.  RETROPERITONEUM:  No mass or adenopathy.  BOWEL/MESENTERY:  There is diverticulosis of the colon.  There is no CT evidence of diverticulitis. ABDOMINAL WALL:  No mass or hernia.  URINARY BLADDER:  No visible focal wall thickening, lesion, or calculus.  PELVIC NODES:  No adenopathy.  PELVIC ORGANS:  No visible mass.  Pelvic organs appropriate for patient age.  BONES:  No bony lesion or fracture.  LUNG BASES:  No visible pulmonary or pleural disease.  OTHER:  Negative.             CONCLUSION:  1. 5 mm right UVJ stone causes moderate right hydronephrosis. 2. Diverticulosis of colon is noted without CT evidence of diverticulitis.    LOCATION:  [facility]   Dictated by (CST): Yazan Mcdonough MD on 1/03/2025 at 5:59 PM     Finalized by (CST): Yaazn Mcdonough MD on 1/03/2025 at 6:04 PM       XR PAIN CLINIC FLUOROSCOPY - N/C    Result Date: 12/31/2024  PROCEDURE:  XR PAIN CLINIC FLUOROSCOPY - N/C  TECHNIQUE:  Tech Time and fluoroscopy time were provided to the pain specialist during performance of a Pain Clinic Procedure. See Anesthesiologist note in Epic/Chart/Chart Review/Notes tab for details.  COMPARISON:  [facility] , XR, XR PAIN CLINIC FLUOROSCOPY - N/C, 9/23/2019, 9:39 AM.  INDICATIONS:  X-ray imaging assistance, and static imaging provided by the technologist in the OR for the purposes of operative planning during surgery.   HISTORY: (As transcribed by Technologist)  Left lumbar transforaminal epidural injection.   FLUORSCOPY IMAGES OBTAINED:  2  FLUOROSCOPY TIME:  12 seconds TECHNOLOGIST TIME:  8 minutes RADIATION DOSE (AIR KERMA PRODUCT):  2.32 mGy INDICATIONS:  Chronic pain requiring pain management. Fluoroscopic assistance, and imaging provided by the technologist in the OR for the purposes of operative planning during surgery.   TECHNIQUE:   Fluoroscopy was provided to the pain specialist physician, during performance of a Pain Clinic Procedure. Spot images were obtained for prodecure planning purposes.  See Anesthesiologist note in Epic/Chart/Chart Review/Notes tab for details.              CONCLUSION:  Imaging provided during pain management procedure as directed by the performing physician. .   LOCATION:  [facility]    Dictated by (CST): Coy Finn MD on 12/31/2024 at 1:22 PM     Finalized by (CST): Coy Finn MD on 12/31/2024 at 1:23 PM       CT ABDOMEN+PELVIS(CPT=74176)    Result Date: 12/24/2024  PROCEDURE:  CT ABDOMEN+PELVIS (CPT=74176)  COMPARISON:  [facility] , MR, MRI ABDOMEN&MRCP W/3D (W+W0)(CPT=74183/78412), 3/12/2021, 8:22 AM.  INDICATIONS:  right sided abd pain  TECHNIQUE:  Unenhanced multislice CT scanning was performed from the dome of the diaphragm to the pubic symphysis.  Dose reduction techniques were used. Dose information is transmitted to the ACR (American College of Radiology) NRDR (National Radiology Data Registry) which includes the Dose Index Registry.  PATIENT STATED HISTORY:(As transcribed by Technologist)  right sided abd pain   CONTRAST USED:  100cc of Isovue 370  FINDINGS:  Sensitivity decreased without IV or oral contrast. LIVER:  Uniform parenchyma.  Smooth contours. BILIARY:  Absent gallbladder.  No biliary dilatation. PANCREAS:  Uniform parenchyma.  No ductal dilatation. SPLEEN:  Not enlarged. KIDNEYS:  Normal anatomic positions.  Proximal to mid right ureteral calculus measuring up to 0.6 cm.  Moderate hydronephrosis and perinephric stranding.  1.0 cm rounded low-attenuation focus left kidney with Hounsfield units  reflecting fluid consistent with a small cortical cyst. ADRENALS:  Not enlarged. AORTA/VASCULAR:    No abdominal aortic aneurysm. RETROPERITONEUM:  No adenopathy. BOWEL/MESENTERY:  Normal bowel caliber.  Redundant colon.  Moderate to severe colonic diverticulosis.  No acute diverticulitis.  Normal appendix.  Large amount of stool scattered throughout the colon.  No free air or ascites. ABDOMINAL WALL:  Tiny fat containing umbilical hernia. URINARY BLADDER:  Distended.  Smooth contour.  No wall thickening or calculus within. PELVIC NODES:  None enlarged. PELVIC ORGANS:  Ovarian abnormality. BONES:  Mild to moderate degenerative changes.  Normal vertebral body heights.  No subluxation. LUNG BASES:  Minimal scarring and or atelectasis.  No pleural effusion. OTHER:  None.             CONCLUSION:  1. 0.6 cm proximal to mid right ureteral calculus with moderate obstruction. 2. Details as above.  Continued clinical correlation recommended.  Preliminary report given by [facility].    LOCATION:  [facility]   Dictated by (CST): Iron Harris MD on 12/24/2024 at 7:23 AM     Finalized by (CST): Iron Harris MD on 12/24/2024 at 7:28 AM       MRI SPINE LUMBAR (CPT=72148)    Result Date: 12/17/2024  PROCEDURE:  MRI SPINE LUMBAR (CPT=72148)  COMPARISON:  None.  INDICATIONS:  M54.16 Lumbar radiculopathy  TECHNIQUE:  Multiplanar T1 and T2 weighted images including fat suppression sequences.  Images acquired in sagittal and axial planes.   PATIENT STATED HISTORY: (As transcribed by Technologist)  Patient complains of lower back pain radiating to right buttocks and down right leg to foot.    FINDINGS: Alignment of the lumbar spine is normal. Vertebral body heights are maintained throughout the lumbar spine. Disc spaces are maintained throughout the lumbar spine. Marrow signal is unremarkable. The conus is at T12/L1. The visualized portion of the spinal cord is of normal caliber without focal signal abnormality. T12-L1: No disc  herniation, spinal canal or neuroforaminal stenosis. L1-L2: No disc herniation or spinal canal or neuroforaminal stenosis. L2-L3:  Left paracentral disc bulge effacing left lateral recess.  No spinal canal stenosis.  Mild left neural foraminal stenosis. L3-L4: No disc herniation, spinal canal or neuroforaminal stenosis. L4-L5:  Right paracentral disc herniation is effacing right lateral recess and abutting/impinging on the right L5 nerve root.  Mild spinal canal stenosis.  Right foraminal extension of the disc bulge may be minimally abutting the foraminal component of the right L4 nerve root. L5-S1:  Minimal disc bulge without spinal canal or neural foraminal stenosis.            CONCLUSION:   1. A right paracentral disc herniation at L4-L5 is effacing right lateral recess and impinging on the right L5 nerve root.  Foraminal extension of the disc bulge may be minimally abutting the foraminal component of the right L4 nerve root.  This disc herniation extends caudally 2.3 cm.    LOCATION:  [facility]   Dictated by (CST): Simeon Salgado MD on 12/17/2024 at 9:07 AM     Finalized by (CST): Simeon Salgado MD on 12/17/2024 at 9:11 AM           Impression:     Patient is a 64 year old female with hx of depression, anxiety, migraine, who presents today for consultation from Dr. Schaefer's office for ureteral stone.     Reviewed imaging with patient, no additional urolithiasis and simple appears renal cysts.     We discussed stone prevention strategies at today's visit and I provided and reviewed educational materials for this. I recommend drinking at least 40-60 ounces of water per day or enough water to keep urine clear. I also recommend the patient avoid a high sodium diet.     Prior topamax use.    Recommendations:  Send for analysis. Stone prevention handout provided. May follow up prn pending results. Consider repeat imaging in 6 months.     Thank you very much for this consult. Please call if there are any questions or  concerns.     Juliana Velásquez PA-C  Urology  [facility]    Date: 1/10/2025          [1]   Allergies  Allergen Reactions    Sulfa Antibiotics NAUSEA ONLY

## 2025-01-15 LAB
CAOX DIHYDRATE: 80 %
CAOX MONOHYDRATE: 20 %
WEIGHT-STONE: 34 MG

## 2025-01-16 ENCOUNTER — OFFICE VISIT (OUTPATIENT)
Dept: FAMILY MEDICINE CLINIC | Facility: CLINIC | Age: 65
End: 2025-01-16
Payer: MEDICARE

## 2025-01-16 ENCOUNTER — LAB ENCOUNTER (OUTPATIENT)
Dept: LAB | Facility: HOSPITAL | Age: 65
End: 2025-01-16
Attending: STUDENT IN AN ORGANIZED HEALTH CARE EDUCATION/TRAINING PROGRAM
Payer: MEDICARE

## 2025-01-16 ENCOUNTER — OFFICE VISIT (OUTPATIENT)
Dept: PAIN CLINIC | Facility: CLINIC | Age: 65
End: 2025-01-16
Payer: MEDICARE

## 2025-01-16 VITALS
BODY MASS INDEX: 35 KG/M2 | WEIGHT: 187 LBS | SYSTOLIC BLOOD PRESSURE: 124 MMHG | OXYGEN SATURATION: 98 % | HEART RATE: 66 BPM | DIASTOLIC BLOOD PRESSURE: 76 MMHG

## 2025-01-16 VITALS
SYSTOLIC BLOOD PRESSURE: 110 MMHG | WEIGHT: 187.13 LBS | BODY MASS INDEX: 35.33 KG/M2 | HEART RATE: 64 BPM | TEMPERATURE: 97 F | HEIGHT: 61 IN | OXYGEN SATURATION: 100 % | RESPIRATION RATE: 16 BRPM | DIASTOLIC BLOOD PRESSURE: 60 MMHG

## 2025-01-16 DIAGNOSIS — R15.9 INCONTINENCE OF FECES, UNSPECIFIED FECAL INCONTINENCE TYPE: ICD-10-CM

## 2025-01-16 DIAGNOSIS — M54.16 LUMBAR RADICULOPATHY: ICD-10-CM

## 2025-01-16 DIAGNOSIS — E66.812 CLASS 2 OBESITY DUE TO EXCESS CALORIES WITHOUT SERIOUS COMORBIDITY WITH BODY MASS INDEX (BMI) OF 39.0 TO 39.9 IN ADULT: ICD-10-CM

## 2025-01-16 DIAGNOSIS — E66.09 CLASS 2 OBESITY DUE TO EXCESS CALORIES WITHOUT SERIOUS COMORBIDITY WITH BODY MASS INDEX (BMI) OF 39.0 TO 39.9 IN ADULT: ICD-10-CM

## 2025-01-16 DIAGNOSIS — I25.10 CORONARY ARTERY CALCIFICATION: ICD-10-CM

## 2025-01-16 DIAGNOSIS — R41.3 MEMORY LOSS: ICD-10-CM

## 2025-01-16 DIAGNOSIS — Z00.00 WELL ADULT EXAM: Primary | ICD-10-CM

## 2025-01-16 DIAGNOSIS — D12.6 ADENOMATOUS POLYP OF COLON, UNSPECIFIED PART OF COLON: ICD-10-CM

## 2025-01-16 DIAGNOSIS — M51.26 HNP (HERNIATED NUCLEUS PULPOSUS), LUMBAR: Primary | ICD-10-CM

## 2025-01-16 DIAGNOSIS — R41.89 COGNITIVE DECLINE: ICD-10-CM

## 2025-01-16 DIAGNOSIS — Z23 NEED FOR VACCINATION: ICD-10-CM

## 2025-01-16 LAB
CHOLEST SERPL-MCNC: 199 MG/DL (ref ?–200)
EST. AVERAGE GLUCOSE BLD GHB EST-MCNC: 97 MG/DL (ref 68–126)
FASTING PATIENT LIPID ANSWER: YES
HBA1C MFR BLD: 5 % (ref ?–5.7)
HDLC SERPL-MCNC: 64 MG/DL (ref 40–59)
LDLC SERPL CALC-MCNC: 121 MG/DL (ref ?–100)
NONHDLC SERPL-MCNC: 135 MG/DL (ref ?–130)
TRIGL SERPL-MCNC: 76 MG/DL (ref 30–149)
TSI SER-ACNC: 1.59 UIU/ML (ref 0.55–4.78)
VIT B12 SERPL-MCNC: 411 PG/ML (ref 211–911)
VLDLC SERPL CALC-MCNC: 13 MG/DL (ref 0–30)

## 2025-01-16 PROCEDURE — 83036 HEMOGLOBIN GLYCOSYLATED A1C: CPT

## 2025-01-16 PROCEDURE — 36415 COLL VENOUS BLD VENIPUNCTURE: CPT

## 2025-01-16 PROCEDURE — 82607 VITAMIN B-12: CPT

## 2025-01-16 PROCEDURE — 80061 LIPID PANEL: CPT

## 2025-01-16 PROCEDURE — 84443 ASSAY THYROID STIM HORMONE: CPT

## 2025-01-16 PROCEDURE — 99214 OFFICE O/P EST MOD 30 MIN: CPT | Performed by: PHYSICIAN ASSISTANT

## 2025-01-16 RX ORDER — PHENTERMINE HYDROCHLORIDE 15 MG/1
15 CAPSULE ORAL EVERY MORNING
Qty: 30 CAPSULE | Refills: 0 | Status: SHIPPED | OUTPATIENT
Start: 2025-02-22 | End: 2025-01-20

## 2025-01-16 RX ORDER — PHENTERMINE HYDROCHLORIDE 15 MG/1
15 CAPSULE ORAL EVERY MORNING
Qty: 30 CAPSULE | Refills: 0 | Status: SHIPPED | OUTPATIENT
Start: 2025-03-23 | End: 2025-01-20

## 2025-01-16 NOTE — PROGRESS NOTES
Last procedure: right L4/5 and L5/S1 TF-DIANA (aka L4 and L5) TRANSFORAMINAL LUMBAR EPIDURAL STEROID INJECTION MULTIPLE LEVEL with local   Date: 12/31//24  Percentage of relief obtained: less than 50%  Duration of relief: current    Current Pain Score: 6

## 2025-01-16 NOTE — PROGRESS NOTES
HPI:   Cb Webster presents with complaints of Low back pain radiating to R LE .    The pain is described as moderate stabbing, shooting that is intermittent.  The patient’s activity level has remained the same since last visit.  The pain is worst unrelated to time of day.    Changes in condition/history since last visit: Patient is here today following right L4-5 and L5-S1 TF-DIANA #1 on 12/31/2024.  Procedure was well-tolerated and had no adverse effects.  Overall, reports minimal relief, perhaps 15%.  Has continued with significant low back and R LE radicular pain, and is here to discuss options.      Last procedure: Right L4-5 and L5-S1 TF-DIANA    date: 12/31/2024    Percentage of relief experienced from the procedure: 15%    Duration of the relief: sustained     The following activities will increase the patient’s pain: walking, standing    The following activities decrease the patient’s pain: limiting activity level    Functional Assessment: Patient reports that they are able to complete all of their ADL's such as eating, bathing, using the toilet, dressing and getting up from a bed or a chair independently.    Current Medications:  Current Outpatient Medications   Medication Sig Dispense Refill    [START ON 3/23/2025] Phentermine HCl 15 MG Oral Cap Take 1 capsule (15 mg total) by mouth every morning. 30 capsule 0    [START ON 2/22/2025] Phentermine HCl 15 MG Oral Cap Take 1 capsule (15 mg total) by mouth every morning. 30 capsule 0    gabapentin 100 MG Oral Cap Take 2 capsules (200 mg total) by mouth 3 (three) times daily. 180 capsule 2    NURTEC 75 MG Oral Tablet Dispersible Take 75 mg by mouth as needed.      methylphenidate 20 MG Oral Tab Take 1 tablet (20 mg total) by mouth 2 (two) times daily.      metoclopramide 5 MG Oral Tab Take 1 tablet (5 mg total) by mouth 3 (three) times daily as needed (nausea). 20 tablet 0    PROPRANOLOL HCL ER 80 MG Oral Capsule SR 24 Hr TAKE 1 CAPSULE (80 MG TOTAL) BY  MOUTH EVERY EVENING. 90 capsule 1    busPIRone 15 MG Oral Tab Take 1 tablet (15 mg total) by mouth 2 (two) times daily.      MEMANTINE 10 MG Oral Tab TAKE 1 TABLET BY MOUTH EVERY DAY 90 tablet 0    FLUoxetine 10 MG Oral Cap TAKE 1 CAPSULE BY MOUTH EVERY MORNING TAKE ALONG WITH THE 40MG CAPS FOR TOTAL DOSE OF 50MG DAILY      [START ON 1/23/2025] Phentermine HCl 15 MG Oral Cap Take 1 capsule (15 mg total) by mouth every morning. 30 capsule 0    FLUoxetine HCl 40 MG Oral Cap Take 1 capsule (40 mg total) by mouth daily.      pantoprazole 40 MG Oral Tab EC Take 1 tablet (40 mg total) by mouth before breakfast. 90 tablet 3    metoclopramide 5 MG Oral Tab Take 1 tablet (5 mg total) by mouth daily. 30 tablet 2    famotidine 40 MG Oral Tab Take 1 tablet (40 mg total) by mouth daily as needed for Heartburn. 90 tablet 3    dicyclomine 10 MG Oral Cap Take 1 capsule (10 mg total) by mouth in the morning and 1 capsule (10 mg total) before bedtime. 180 capsule 3    SUMAtriptan Succinate 6 MG/0.5ML Subcutaneous Solution Auto-injector Inject 0.5 mL (6 mg total) into the skin as needed (for moderate to severe migraine). 6 mL 2    albuterol 108 (90 Base) MCG/ACT Inhalation Aero Soln Inhale 2 puffs into the lungs every 4 (four) hours as needed for Wheezing or Shortness of Breath. 6.7 g 0    traZODone 100 MG Oral Tab Take 1 tablet (100 mg total) by mouth nightly.      rosuvastatin 5 MG Oral Tab Take 1 tablet (5 mg total) by mouth Every Monday, Wednesday, and Friday. 45 tablet 3    SPRAVATO, 84 MG DOSE, 28 MG/DEVICE Nasal Solution Therapy Pack 84 mg by Nasal route every 21 days.      triamcinolone 0.1 % External Cream Apply thin layer to affected area twice a day as needed 45 g 1    hydrocortisone 2.5 % External Cream Apply to AA of FACE BID x 2 weeks, hold 2 weeks, repeat PRN. 30 g 2    ketoconazole 2 % External Cream Apply to AA of FACE BID when not using Hydrocortisone Cream. 30 g 2    LORazepam 2 MG Oral Tab Take 1 tablet (2 mg  total) by mouth as needed.        Patient requires assistance with: No assistance required    Reviewed Patient History Dated: 12/31/24 no changes noted    Physical Exam:   /76   Pulse 66   Wt 187 lb (84.8 kg)   LMP  (LMP Unknown)   SpO2 98%   BMI 35.33 kg/m²   VAS Pain Score:  6/10  General Appearance: Well developed, well nourished, normal build, independent body habitus, no apparent physical disabilities, well groomed    Neurological Exam: WNL-Orientation to time, place and person, normal mood & effect, normal concentration & attention span  Inspection: antalgic, no acute distress   Radiology/Lab Test Reviewed: MRI L-spine  CONCLUSION:       1. A right paracentral disc herniation at L4-L5 is effacing right lateral recess and impinging on the right L5 nerve root.  Foraminal extension of the disc bulge may be minimally abutting the foraminal component of the right L4 nerve root.  This disc   herniation extends caudally 2.3 cm.     Lab Results   Component Value Date    WBC 7.6 12/23/2024    WBC 7.1 10/18/2023    WBC 8.3 09/07/2023     Lab Results   Component Value Date    HEMOGLOBIN 13.2 09/19/2020    HEMOGLOBIN 12.4 01/12/2020     Lab Results   Component Value Date    .0 12/23/2024     10/18/2023    .0 09/07/2023     Do you have any known blood/bleeding disorders?  No  Does patient currently take blood thinners?   None  Does patient currently take any antibiotics?   No  Patient educated and verbalized understanding.  Medical Decision Making:   Diagnosis:    Encounter Diagnoses   Name Primary?    HNP (herniated nucleus pulposus), lumbar Yes    Lumbar radiculopathy        Impression: Minimal relief with initial right L4-5 and L5-S1 TF-DIANA, reporting 50% improvement.  We discussed trying the injection via interlaminar approach versus return to surgery to discuss more definitive options.  At this time would like to consider surgical options and will follow-up with Dr. Perez.  Should she  decide to proceed with intralaminar DIANA, simply contact clinic and I will be happy to place the order.    Plan: Patient to follow up PRN.    No orders of the defined types were placed in this encounter.      Meds & Refills for this Visit:  Requested Prescriptions      No prescriptions requested or ordered in this encounter       Imaging & Consults:  None    The patient indicates understanding of these issues and agrees to the plan.    ONIEL Genao

## 2025-01-16 NOTE — PROGRESS NOTES
Subjective:      Chief Complaint   Patient presents with    Wellness Visit     [place] Supervisit    Immunization/Injection     Ok with getting pneumo vaccine today     HISTORY OF PRESENT ILLNESS  HPI  HPI obtained per patient report.  Cb Webster is a pleasant 64 year old female presenting for her medicare supervisit.   She updated neuropsychological testing in November due to memory problems and cognitive decline concerns.   Her insurance changed this year and she inquires about following up with her gastroenterologist for fecal incontinence. She has been seeing Dr. Valentine for this symptoms since last year.     Medicare Hearing Assessment:   Hearing Screening    Screening Method: Whisper Test  Whisper Test Result: Pass               General Health:  In the past six months, have you lost more than 10 pounds without trying?: (Patient-Rptd) 2 - No  Has your appetite been poor?: (Patient-Rptd) No  Type of Diet: (Patient-Rptd) Balanced;Low Salt  How does the patient maintain a good energy level?: (Patient-Rptd) Other  How would you describe your daily physical activity?: (Patient-Rptd) Light  How would you describe your current health state?: (Patient-Rptd) Fair  How do you maintain positive mental well-being?: (Patient-Rptd) Puzzles;Games;Visiting Family  On a scale of 0 to 10, with 0 being no pain and 10 being severe pain, what is your pain level?: (Patient-Rptd) 5 - (Moderate)  In the past six months, have you experienced urine leakage?: (Patient-Rptd) 1-Yes  At any time do you feel concerned for the safety/well-being of yourself and/or your children, in your home or elsewhere?: (Patient-Rptd) No  Have you had any immunizations at another office such as Influenza, Hepatitis B, Tetanus, or Pneumococcal?: (Patient-Rptd) Yes    Fall Risk Assessment:   She has been screened for Falls and is High Risk. Fall Prevention information provided to patient in After Visit Summary.    Do you feel unsteady when standing or  walking?: (Patient-Rptd) Yes  Do you worry about falling?: (Patient-Rptd) Yes  Have you fallen in the past year?: (Patient-Rptd) Yes  How many times have you fallen?: (Patient-Rptd) (P) 2  Were you injured?: (Patient-Rptd) (P) No    Cognitive Assessment:   She had a completely normal cognitive assessment - see flowsheet entries       Functional Ability/Status:   Cb Webster has some abnormal functions as listed below:  She has Dressing and/or Bathing issues based on screening of functional status.  Difficulty dressing or bathing?: (Patient-Rptd) Yes  Bathing or Showering: (Patient-Rptd) Able without help  Dressing: (Patient-Rptd) Able without help  She has Vision problems based on screening of functional status. She has Walking problems based on screening of functional status. She has problems with Daily Activities based on screening of functional status. She has problems with Memory based on screening of functional status.     Depression Screening (PHQ-2/PHQ-9): PHQ-2 SCORE: 1  , done 1/15/2025   Feeling down, depressed, or hopeless: 1    Last [place] Suicide Screening on 1/16/2025 was No Risk.    Advanced Directives:     She does have a Living Will but we do NOT have it on file in Epic.    She does have a POA but we do NOT have it on file in Epic.        PAST PATIENT HISTORY  Past Medical History:    Anxiety    Aortic regurgitation    mild    Arthritis    Back pain    Calculus of kidney    Cardiac calcification (HCC) - CAC score of 5.24 seen on 12/2/22 CT Heart Scan protocol    Colon polyps    Constipation    Depression    Diarrhea, unspecified    Disorder of liver    Fatty liver    Diverticulosis    Dizziness    Fatigue    Flatulence/gas pain/belching    Food intolerance    Frequent urination    Gastroparesis    GERD (gastroesophageal reflux disease)    Headache disorder    Migraines    Heart palpitations    Heartburn    Hemorrhoids    History of depression    Major depressive disorder & anxiety     History of eating disorder    Binge eating    Hoarseness, chronic    Hyperlipidemia    Indigestion    Leg swelling    Loss of appetite    Migraines    Mouth sores    Triggered by stress or poor diet    Nausea    OCD (obsessive compulsive disorder)    MODE (obstructive sleep apnea)    Pain in joints    Painful swallowing    Parkinsonism (HCC)    Peptic ulcer disease    Problems with swallowing    Sleep disturbance    High serotonin    Stool incontinence    Soft barely formed stool, not detecting urgency    Stress    Syncope    Tendonitis of shoulder, right    Tremor    Vomiting blood    Wears glasses    Weight gain    10 lbs in 2 months - stop phentermine and decreased excercise after arthroscope of left knee    Weight loss    28 lb last year     Past Surgical History:   Procedure Laterality Date          x 2    Cholecystectomy      Colonoscopy      D & c      Ect provided  [id number]    Egd      Laparoscopic cholecystectomy      Needle biopsy right  2015    benign breast    Other surgical history      C3-C4 anterior discectomy    Other surgical history      [facility]    Other surgical history  2019    radiofrequency abalsion lumbar    Removal gallbladder  2020    Skin surgery      Spine surgery procedure unlisted      Anterior cervical discectomy    Tonsillectomy  1966?       CURRENT MEDICATIONS  Medications Taking[1]    HEALTH MAINTENANCE  Immunization History   Administered Date(s) Administered    Covid-19 Vaccine Pfizer 30 mcg/0.3 ml 2021, 2021, 10/23/2021    Covid-19 Vaccine Pfizer Bivalent 30mcg/0.3mL 2022    Covid-19 Vaccine Pfizer Merrill-Sucrose 30 mcg/0.3 ml 2022    FLULAVAL 6 months & older 0.5 ml Prefilled syringe (81627) 10/23/2017, 2018, 10/01/2019, 10/07/2020, 2021, 10/28/2022    FLUZONE 6 months and older PFS 0.5 ml (42775) 10/23/2017, 2018, 10/13/2023    Influenza 2024    Pfizer Covid-19 Vaccine 30mcg/0.3ml 12yrs+ 10/13/2023,  2024    Pneumococcal Conjugate PCV20 2025    RSV, recombinant, RSVpreF, adjuvanted (Arexvy) 2023    TDAP 2018    Zoster Vaccine Recombinant Adjuvanted (Shingrix) 2021, 2021       ALLERGIES AND DRUG REACTIONS  Allergies[2]    Family History   Problem Relation Age of Onset    Hypertension Father     Heart Attack Father          at 48 years    Heart Disease Father     Alcohol and Other Disorders Associated Father     Depression Father     Other (ALS) Mother     Hypertension Mother         Diagnosed at 91 yo with ALS  at 91    Personality Disorder Daughter     Dementia Maternal Grandmother     Obesity Maternal Grandmother     Dementia Maternal Grandfather     Obesity Paternal Grandfather     Cancer Sister         Kidney, chronic lymp… leukemia    Heart Attack Sister         Kidney cancer, chronic lymphocytic leukemia    Ulcerative Colitis Brother     Cancer Brother         Kidney     Social History     Socioeconomic History    Marital status:    Occupational History    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[employer]   Tobacco Use    Smoking status: Never    Smokeless tobacco: Never   Vaping Use    Vaping status: Never Used   Substance and Sexual Activity    Alcohol use: Not Currently     Comment: 6 drinks or less/year    Drug use: Never   Other Topics Concern    Caffeine Concern No    Exercise No    Seat Belt Yes    Special Diet No    Stress Concern Yes    Weight Concern Yes     Comment: L125lb  G85lbs  L45lbs   Social History Narrative    Caring for grandson (Peter - aged 6 yo [2020])       Review of Systems   All other systems reviewed and are negative.         Objective:      /60   Pulse 64   Temp 97.2 °F (36.2 °C) (Temporal)   Resp 16   Ht 5' 1\" (1.549 m)   Wt 187 lb 2 oz (84.9 kg)   LMP  (LMP Unknown)   SpO2 100%   BMI 35.36 kg/m²   Body mass index is 35.36 kg/m².    Physical Exam  Vitals reviewed.    Constitutional:       General: She is not in acute distress.     Appearance: She is not ill-appearing, toxic-appearing or diaphoretic.   Cardiovascular:      Rate and Rhythm: Normal rate.   Pulmonary:      Effort: Pulmonary effort is normal.   Abdominal:      General: There is no distension.   Musculoskeletal:      Cervical back: Neck supple.   Neurological:      Mental Status: She is alert and oriented to person, place, and time.   Psychiatric:         Mood and Affect: Mood normal.            Assessment and Plan:      1. Well adult exam (Primary)  -     Prevnar 20 (PCV20) [89875]  2. Adenomatous polyp of colon, unspecified part of colon  3. Need for vaccination  -     Prevnar 20 (PCV20) [55080]  4. Memory loss  -     Vitamin B12; Future; Expected date: 01/16/2025  -     TSH W Reflex To Free T4; Future; Expected date: 01/16/2025  5. Cognitive decline  -     Vitamin B12; Future; Expected date: 01/16/2025  -     TSH W Reflex To Free T4; Future; Expected date: 01/16/2025  6. Coronary artery calcification  -     Lipid Panel; Future; Expected date: 01/16/2025  -     GASTRO - INTERNAL  7. Body mass index 39.0-39.9, adult  -     Lipid Panel; Future; Expected date: 01/16/2025  -     Phentermine HCl; Take 1 capsule (15 mg total) by mouth every morning.  Dispense: 30 capsule; Refill: 0  -     Phentermine HCl; Take 1 capsule (15 mg total) by mouth every morning.  Dispense: 30 capsule; Refill: 0  -     Hemoglobin A1C; Future; Expected date: 01/16/2025  8. Class 2 obesity due to excess calories without serious comorbidity with body mass index (BMI) of 39.0 to 39.9 in adult  -     Lipid Panel; Future; Expected date: 01/16/2025  -     Phentermine HCl; Take 1 capsule (15 mg total) by mouth every morning.  Dispense: 30 capsule; Refill: 0  -     Phentermine HCl; Take 1 capsule (15 mg total) by mouth every morning.  Dispense: 30 capsule; Refill: 0  -     Hemoglobin A1C; Future; Expected date: 01/16/2025  9. Incontinence of feces,  unspecified fecal incontinence type  -     GASTRO - INTERNAL    Return in about 3 months (around 4/16/2025) for follow-up.      Medicare Wellness Visit  - pap with cotesting was WNL 2/2024. Next due in 5 years    - next colonoscopy due 7/2030  - PCV20 was administered today with her consent. She is UTD on immunizations      Memory decline  - neuropsychological testing results 11/12/24 reviewed. She was evaluated to have unspecified neurocognitive disorder, anxiety, and depression   - we will add on TFTs and a B12 level to her lab orders to rule out other underlying medical contributors  - her brain MRI 12/22/23 showed stable trace chronic microvascular ischemic changes and coronary calcium score was 5.24 in 12/2022. She is taking rosuvastatin 5 mg 3 times weekly. We will focus on vascular risk factor management at this time with a goal LDL <70. Will update her lipid panel and A1C. She has not tried higher frequency/dose of rosuvastatin in the past so it may be an option to consider dose adjustment pending her lipid panel results     BMI>39  - tolerating phentermine 15mg well without adverse effects  - down 1lbs since LOV 2 weeks ago  - a shared decision was made to continue phentermine for another 3 months    Fecal incontinence  - lumbar spine MRI 12/2024 noncontributory  - will renew referral to her GI for further evaluation and management      Patient verbalized understanding of assessment and recommendations. All questions and concerns were addressed.    Electronically signed by Colton Schaefer MD       [1]   Outpatient Medications Marked as Taking for the 1/16/25 encounter (Office Visit) with Colton Schaefer MD   Medication Sig Dispense Refill    [START ON 3/23/2025] Phentermine HCl 15 MG Oral Cap Take 1 capsule (15 mg total) by mouth every morning. 30 capsule 0    [START ON 2/22/2025] Phentermine HCl 15 MG Oral Cap Take 1 capsule (15 mg total) by mouth every morning. 30 capsule 0    gabapentin 100 MG Oral Cap Take 2  capsules (200 mg total) by mouth 3 (three) times daily. 180 capsule 2    NURTEC 75 MG Oral Tablet Dispersible Take 75 mg by mouth as needed.      methylphenidate 20 MG Oral Tab Take 1 tablet (20 mg total) by mouth 2 (two) times daily.      metoclopramide 5 MG Oral Tab Take 1 tablet (5 mg total) by mouth 3 (three) times daily as needed (nausea). 20 tablet 0    PROPRANOLOL HCL ER 80 MG Oral Capsule SR 24 Hr TAKE 1 CAPSULE (80 MG TOTAL) BY MOUTH EVERY EVENING. 90 capsule 1    busPIRone 15 MG Oral Tab Take 1 tablet (15 mg total) by mouth 2 (two) times daily.      MEMANTINE 10 MG Oral Tab TAKE 1 TABLET BY MOUTH EVERY DAY 90 tablet 0    FLUoxetine 10 MG Oral Cap TAKE 1 CAPSULE BY MOUTH EVERY MORNING TAKE ALONG WITH THE 40MG CAPS FOR TOTAL DOSE OF 50MG DAILY      [START ON 1/23/2025] Phentermine HCl 15 MG Oral Cap Take 1 capsule (15 mg total) by mouth every morning. 30 capsule 0    FLUoxetine HCl 40 MG Oral Cap Take 1 capsule (40 mg total) by mouth daily.      pantoprazole 40 MG Oral Tab EC Take 1 tablet (40 mg total) by mouth before breakfast. 90 tablet 3    metoclopramide 5 MG Oral Tab Take 1 tablet (5 mg total) by mouth daily. 30 tablet 2    famotidine 40 MG Oral Tab Take 1 tablet (40 mg total) by mouth daily as needed for Heartburn. 90 tablet 3    dicyclomine 10 MG Oral Cap Take 1 capsule (10 mg total) by mouth in the morning and 1 capsule (10 mg total) before bedtime. 180 capsule 3    SUMAtriptan Succinate 6 MG/0.5ML Subcutaneous Solution Auto-injector Inject 0.5 mL (6 mg total) into the skin as needed (for moderate to severe migraine). 6 mL 2    albuterol 108 (90 Base) MCG/ACT Inhalation Aero Soln Inhale 2 puffs into the lungs every 4 (four) hours as needed for Wheezing or Shortness of Breath. 6.7 g 0    traZODone 100 MG Oral Tab Take 1 tablet (100 mg total) by mouth nightly.      rosuvastatin 5 MG Oral Tab Take 1 tablet (5 mg total) by mouth Every Monday, Wednesday, and Friday. 45 tablet 3    SPRAVATO, 84 MG DOSE,  28 MG/DEVICE Nasal Solution Therapy Pack 84 mg by Nasal route every 21 days.      triamcinolone 0.1 % External Cream Apply thin layer to affected area twice a day as needed 45 g 1    hydrocortisone 2.5 % External Cream Apply to AA of FACE BID x 2 weeks, hold 2 weeks, repeat PRN. 30 g 2    ketoconazole 2 % External Cream Apply to AA of FACE BID when not using Hydrocortisone Cream. 30 g 2    LORazepam 2 MG Oral Tab Take 1 tablet (2 mg total) by mouth as needed.     [2]   Allergies  Allergen Reactions    Sulfa Antibiotics NAUSEA ONLY

## 2025-01-16 NOTE — PATIENT INSTRUCTIONS

## 2025-01-17 ENCOUNTER — TELEPHONE (OUTPATIENT)
Dept: ORTHOPEDICS CLINIC | Facility: CLINIC | Age: 65
End: 2025-01-17

## 2025-01-17 ENCOUNTER — OFFICE VISIT (OUTPATIENT)
Facility: CLINIC | Age: 65
End: 2025-01-17
Payer: MEDICARE

## 2025-01-17 ENCOUNTER — TELEPHONE (OUTPATIENT)
Facility: CLINIC | Age: 65
End: 2025-01-17

## 2025-01-17 DIAGNOSIS — M54.16 LUMBAR RADICULOPATHY: Primary | ICD-10-CM

## 2025-01-17 PROCEDURE — 99215 OFFICE O/P EST HI 40 MIN: CPT | Performed by: STUDENT IN AN ORGANIZED HEALTH CARE EDUCATION/TRAINING PROGRAM

## 2025-01-17 NOTE — PROGRESS NOTES
[facility] - ORTHOPEDICS  [address], [address] [address]  [address] [place] [address]  [phone]     FOLLOW-UP PATIENT VISIT    Name: Cb Webster   MRN: [id number]  Date: 1/17/2025     CC: lumbar radiculopathy      INTERVAL HISTORY:   Cb Webster is a 64 year old female  follow-up patient whom I have been treating conservatively. Patient returns today for reevaluation of lumbar stenosis.     Patient is a 64-year-old female with a history of lumbar stenosis presenting with worsening of symptoms.  Patient started physical therapy this summer for knee pain and progressively started having worsening back pain that radiated down right posterior buttock and thigh, associated with numbness and tingling.  Patient has history of lumbar stenosis with epidural steroid injections in the past.  Patient recently had ketamine for depression with improvement in symptoms.      Patient was most recently seen in November and referred to physical therapy.  Patient continues to have significant radicular symptoms.    Patient was referred for epidural steroid injection, which provided only temporary relief of symptoms.    Bowel and bladder symptoms: absent.    We have tried the following interventions thus far: PT, DIANA    ROS: No fever/chills or other constitutional issues.    PE:   There were no vitals filed for this visit.  Estimated body mass index is 35.33 kg/m² as calculated from the following:    Height as of 1/16/25: 5' 1\" (1.549 m).    Weight as of 1/16/25: 187 lb (84.8 kg).    On physical examination, she is awake, alert and oriented x 3 and in no acute distress. Mood, affect and language are normal. She appears well developed and well nourished.  She walks without a nonantalgic, nonmyelopathic, non-Trendelenburg gait. Motor strength testing of the lower extremities shows 5/5 strength in hip flexors, knee extensors, ankle dorsiflexors, toe extensor, and  gastroc-soleus complex.   Sensation is intact to light touch L2-S1 distributions bilaterally. Reflexes normal    Radiographic Examination/Diagnostics:  XR and MRI personally viewed, independently interpreted and radiology report was reviewed.  X-ray of the lumbar spine demonstrates degenerative changes in the lower lumbar segments, particularly at L4-5 with asymmetric disc collapse   MRI of the lumbar spine demonstrates moderate to severe foraminal stenosis at L4-5 on the right      IMPRESSION: Cb Webster is a 64 year old female old female with L4-5 disc disease and lumbar radiculopathy     PLAN:   We had a detailed discussion outlining the etiology, anatomy, pathophysiology, and natural history of lumbar radiculopathy.     The patient has participated in extensive conservative management and has failed gain any significant improvement in her symptoms over the last 3 months.  The symptoms have failed to respond to conservative measures for greater than 3 months now, to include: prescription strength analgesics and active documented physical therapy or therapeutic exercises including stretching and strengthening.  The patient does not have any cognitive or behavioral issues requiring further evaluation or management.       In addition to the above, she has significant functional impairment and is unable to perform activities of daily living.       We discussed the risks, benefits, and alternatives of treatment.  The patient fully understands the nature of her medical condition and fully understands the nature of the proposed surgical treatment.  I have fully explained all possible alternative treatments or procedures and the patient understands the significant risks involved in the proposed treatment, as well as the risks involved and benefits of all alternative treatments and procedures.      Plan: L4-5 MIS TLIF.  Patient has asymmetric disc collapse at L4-5 with severe foraminal stenosis necessitating  removal of at least 50% of L4-5 facet joint on the right, necessitating fusion.    Ismael Perez MD  Orthopedic Spine Surgeon  [facility]   [address] [place] [address], [address] [place] [address].[email or web address]  t: [phone]   f: [phone]        This note was dictated using Dragon software.  While it was briefly proofread prior to completion, some grammatical, spelling, and word choice errors due to dictation may still occur.

## 2025-01-17 NOTE — TELEPHONE ENCOUNTER
Date of Surgery: 2/5/25       Post Op Appt: 2/20/25 @ 0900      Case ID: [id number]    Notes: [facility]: YES    Navigator Appt: 1/30/25 @ 1115      SURGERY SCHEDULING SHEET     Cb Webster  5/21/1960  [id number]     Procedure: L4-5 MIS TLIF     L4-5 Combined transforaminal lumbar interbody fusion and posterior fusion (57567)  L4-5 Interbody cage placement (87336)  L4-5 Posterior spinal instrumentation (64456)  L4-5 Bilateral laminectomy for decompression (82605)     Diagnosis:  Lumbar disc disease with radiculopathy     Anesthesia: General     Length of Surgery: 2 hrs     Disposition: Inpatient procedure     Assist: SONG De La Garza     OR Table: Sam     Position: Prone     Implant:  Atec [id number] InVictus MIS Screws, [id number] Calibrate expandable PSX , and [id number] Alphagraft DBM allograft      C-Arm: Yes     Special Equipment: None     Neuromonitoring: Yes     Pre-op Testing: PER ANESTHESIA GUIDELINES     Clearance: OTHER:  PCP     Post op: 2 weeks post op     Home health: Yes     Prehab: Yes     Ismael Perez MD  [facility]  Phone: [phone]  Fax: [phone]

## 2025-01-17 NOTE — H&P (VIEW-ONLY)
[facility] - ORTHOPEDICS  [address], [address] [address]  [address] [place] [id number]  [phone]     FOLLOW-UP PATIENT VISIT    Name: Cb Webster   MRN: [id number]  Date: 1/17/2025     CC: lumbar radiculopathy      INTERVAL HISTORY:   Cb Webster is a 64 year old female  follow-up patient whom I have been treating conservatively. Patient returns today for reevaluation of lumbar stenosis.     Patient is a 64-year-old female with a history of lumbar stenosis presenting with worsening of symptoms.  Patient started physical therapy this summer for knee pain and progressively started having worsening back pain that radiated down right posterior buttock and thigh, associated with numbness and tingling.  Patient has history of lumbar stenosis with epidural steroid injections in the past.  Patient recently had ketamine for depression with improvement in symptoms.      Patient was most recently seen in November and referred to physical therapy.  Patient continues to have significant radicular symptoms.    Patient was referred for epidural steroid injection, which provided only temporary relief of symptoms.    Bowel and bladder symptoms: absent.    We have tried the following interventions thus far: PT, DIANA    ROS: No fever/chills or other constitutional issues.    PE:   There were no vitals filed for this visit.  Estimated body mass index is 35.33 kg/m² as calculated from the following:    Height as of 1/16/25: 5' 1\" (1.549 m).    Weight as of 1/16/25: 187 lb (84.8 kg).    On physical examination, she is awake, alert and oriented x 3 and in no acute distress. Mood, affect and language are normal. She appears well developed and well nourished.  She walks without a nonantalgic, nonmyelopathic, non-Trendelenburg gait. Motor strength testing of the lower extremities shows 5/5 strength in hip flexors, knee extensors, ankle dorsiflexors, toe extensor, and  gastroc-soleus complex.   Sensation is intact to light touch L2-S1 distributions bilaterally. Reflexes normal    Radiographic Examination/Diagnostics:  XR and MRI personally viewed, independently interpreted and radiology report was reviewed.  X-ray of the lumbar spine demonstrates degenerative changes in the lower lumbar segments, particularly at L4-5 with asymmetric disc collapse   MRI of the lumbar spine demonstrates moderate to severe foraminal stenosis at L4-5 on the right      IMPRESSION: Cb Webster is a 64 year old female old female with L4-5 disc disease and lumbar radiculopathy     PLAN:   We had a detailed discussion outlining the etiology, anatomy, pathophysiology, and natural history of lumbar radiculopathy.     The patient has participated in extensive conservative management and has failed gain any significant improvement in her symptoms over the last 3 months.  The symptoms have failed to respond to conservative measures for greater than 3 months now, to include: prescription strength analgesics and active documented physical therapy or therapeutic exercises including stretching and strengthening.  The patient does not have any cognitive or behavioral issues requiring further evaluation or management.       In addition to the above, she has significant functional impairment and is unable to perform activities of daily living.       We discussed the risks, benefits, and alternatives of treatment.  The patient fully understands the nature of her medical condition and fully understands the nature of the proposed surgical treatment.  I have fully explained all possible alternative treatments or procedures and the patient understands the significant risks involved in the proposed treatment, as well as the risks involved and benefits of all alternative treatments and procedures.      Plan: L4-5 MIS TLIF.  Patient has asymmetric disc collapse at L4-5 with severe foraminal stenosis necessitating  removal of at least 50% of L4-5 facet joint on the right, necessitating fusion.    Ismael Perez MD  Orthopedic Spine Surgeon  [facility]   [address] [place] [address], [address] [place] [address].[email or web address]  [email or web address]  t: [phone]   f: [phone]        This note was dictated using Dragon software.  While it was briefly proofread prior to completion, some grammatical, spelling, and word choice errors due to dictation may still occur.

## 2025-01-17 NOTE — TELEPHONE ENCOUNTER
Spoke with Cb in regards to getting her scheduled for surgery. I did inform her that Dr. Perez would like to offer her a surgical date of 2/5/25, as she did elect to proceed with surgery on this date.     I confirmed that surgery will take place at [facility]. I also discussed the surgical protocol and scheduled her post op and spine navigator appt. I also explained that she will need to see her pcp Dr. Schaefer for surgical clearance 2-3 weeks prior to her surgical date. Also explained that she will need to speak with either her pcp or prescribing physician about her she will be proceeding with taking her prescribed medications going into surgery. She states understanding with no questions or concerns. Advised to call the office back if she has any questions or concerns.

## 2025-01-17 NOTE — TELEPHONE ENCOUNTER
To review with Pre admin nursing and prescribing provider prior to surgery.     No concern with the spine surgery, however may be a concern with anesthesia- reason for pre admission nursing to review.

## 2025-01-17 NOTE — TELEPHONE ENCOUNTER
Pt was provided sx packet at today's appt for L4-5 MIS TLIF: 02.05.25    Pt wondering for upcoming sx if she would need to discontinue her Spravato nasal spray or if this is something she can continue.

## 2025-01-20 ENCOUNTER — OFFICE VISIT (OUTPATIENT)
Dept: NEUROLOGY | Facility: CLINIC | Age: 65
End: 2025-01-20
Payer: MEDICARE

## 2025-01-20 VITALS
BODY MASS INDEX: 34.17 KG/M2 | RESPIRATION RATE: 16 BRPM | DIASTOLIC BLOOD PRESSURE: 70 MMHG | HEART RATE: 70 BPM | SYSTOLIC BLOOD PRESSURE: 114 MMHG | OXYGEN SATURATION: 97 % | HEIGHT: 61 IN | WEIGHT: 181 LBS

## 2025-01-20 DIAGNOSIS — G43.709 CHRONIC MIGRAINE WITHOUT AURA WITHOUT STATUS MIGRAINOSUS, NOT INTRACTABLE: ICD-10-CM

## 2025-01-20 DIAGNOSIS — F06.70 MILD NEUROCOGNITIVE DISORDER DUE TO MULTIPLE ETIOLOGIES: Primary | ICD-10-CM

## 2025-01-20 RX ORDER — DICYCLOMINE HYDROCHLORIDE 10 MG/1
10 CAPSULE ORAL 2 TIMES DAILY
COMMUNITY

## 2025-01-20 NOTE — PROGRESS NOTES
HPI:    Patient ID: Cb Webster is a 64 year old female.    Migraine     Neurologic Problem  Associated symptoms include headaches.          Cb Webster is a 64-year-old female who presented for follow-up and to discuss the neuropsychology evaluation report.  [facility] and recently had repeat neuropsychology evaluation done.  She reports had noticed since April May depression is flared up again due to some personal issues and noticed moments of disorientation and difficulty performing daily tasks.  Based on the neuropsychology evaluation report she was diagnosed with unspecified neurocognitive disorder secondary to anxiety and major depression.  She is on Fluoxetine and Buspirone and also receiving behavioral therapy with mild benefit                                              Office visit: Aug 2024  Cb Webster is a 64 year old female who presents today for follow up for migraine and for botox therapy  States migraines stable on Botox. States she is currently worried about her  who is also my patient and has Parkinsonism. States he is declining cognitively and has not completed the neuropsychology evaluation  This is stressing her out otherwise her depression and anxiety were under control.      Office visit: Dec 2023  Patient is a 63-year-old female with who presented with complaints of possible serotonin syndrome.  She states the past few months she has been experiencing agitation and restlessness, has more difficulty concentrating she is getting confused easily, also have nausea diarrhea sometime, poor co-ordination, palpitations.   She follows with psychiatry and on multiple medications and serotonin was suspected and she states there was some adjustment done with the dosage the dose of buspirone and trazodone was reduced also Spravato nasal spray was extended to every 3 weeks.  She also discontinue phentermine.  States there is some improvement  but continues to have above symptoms. Last week had sore throat, positive for Strep and got treated with antibiotics  Serotonin level checked at that time was normal  PCP recommended MRI brain and it is pending      Last office visit  Patient is a 63-year-old female who presented for follow-up for migraine headaches and for Botox injection. She reports Botox therapy was working fine and headaches are under control. For past several months has noticed palpitations even though not anxious or under stress. States she going to get cardiac event    States has also noticed some memory decline, word finding difficulty, difficulty managing bank account. She couldn't find Memantine bottle so has not taking Memantine for last month. She is taking her antidepressants regularly and not under any stress but frustrated with some health concerns      Neuro-psychology test performed by Shital Lawson in April 2020 shows WAIS-IV- average IQ, average verbal comprehension and perceptual reasoning and borderline low average working memory and low average processing speed. She was diagnosed with ( G31.09) Major neurocognitive disorder without behavioral issues, generalized anxiety disorder and major depressive disorder recurrent, mild, with struggling self esteem and dissatisfied with life related to physical and mental health  HISTORY:  Past Medical History:    Anxiety    Aortic regurgitation    mild    Arthritis    Back pain    Calculus of kidney    Cardiac calcification (HCC) - CAC score of 5.24 seen on 12/2/22 CT Heart Scan protocol    Colon polyps    Constipation    Depression    Diarrhea, unspecified    Disorder of liver    Fatty liver    Diverticulosis    Dizziness    Fatigue    Flatulence/gas pain/belching    Food intolerance    Frequent urination    Gastroparesis    GERD (gastroesophageal reflux disease)    Headache disorder    Migraines    Heart palpitations    Heartburn    Hemorrhoids    History of depression    Major  depressive disorder & anxiety    History of eating disorder    Binge eating    Hoarseness, chronic    Hyperlipidemia    Indigestion    Leg swelling    Loss of appetite    Migraines    Mouth sores    Triggered by stress or poor diet    Nausea    OCD (obsessive compulsive disorder)    MODE (obstructive sleep apnea)    Pain in joints    Painful swallowing    Parkinsonism (HCC)    Peptic ulcer disease    Problems with swallowing    Sleep disturbance    High serotonin    Stool incontinence    Soft barely formed stool, not detecting urgency    Stress    Syncope    Tendonitis of shoulder, right    Tremor    Vomiting blood    Wears glasses    Weight gain    10 lbs in 2 months - stop phentermine and decreased excercise after arthroscope of left knee    Weight loss    28 lb last year      Past Surgical History:   Procedure Laterality Date          x 2    Cholecystectomy      Colonoscopy      D & c      Ect provided  [id number]    Egd      Laparoscopic cholecystectomy      Needle biopsy right  2015    benign breast    Other surgical history      C3-C4 anterior discectomy    Other surgical history  2018    [facility]    Other surgical history  2019    radiofrequency abalsion lumbar    Removal gallbladder  2020    Skin surgery      Spine surgery procedure unlisted      Anterior cervical discectomy    Tonsillectomy  1966?      Family History   Problem Relation Age of Onset    Hypertension Father     Heart Attack Father          at 48 years    Heart Disease Father     Alcohol and Other Disorders Associated Father     Depression Father     Other (ALS) Mother     Hypertension Mother         Diagnosed at 89 yo with ALS  at 91    Personality Disorder Daughter     Dementia Maternal Grandmother     Obesity Maternal Grandmother     Dementia Maternal Grandfather     Obesity Paternal Grandfather     Cancer Sister         Kidney, chronic lymp… leukemia    Heart Attack Sister         Kidney cancer, chronic  lymphocytic leukemia    Ulcerative Colitis Brother     Cancer Brother         Kidney      Social History     Socioeconomic History    Marital status:    Occupational History    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[employer]   Tobacco Use    Smoking status: Never    Smokeless tobacco: Never   Vaping Use    Vaping status: Never Used   Substance and Sexual Activity    Alcohol use: Not Currently     Comment: 6 drinks or less/year    Drug use: Never   Other Topics Concern    Caffeine Concern Yes     Comment: [employer] Coffee    Exercise No    Seat Belt Yes    Special Diet No    Stress Concern Yes    Weight Concern Yes     Comment: L125lb  G85lbs  L45lbs   Social History Narrative    Caring for grandson (Peter - aged 6 yo [1/2020])        Review of Systems   Constitutional: Negative.    HENT: Negative.     Eyes: Negative.    Respiratory: Negative.     Cardiovascular: Negative.    Gastrointestinal: Negative.    Endocrine: Negative.    Genitourinary: Negative.    Musculoskeletal: Negative.    Allergic/Immunologic: Negative.    Neurological:  Positive for headaches.   Psychiatric/Behavioral: Negative.     All other systems reviewed and are negative.           Current Outpatient Medications   Medication Sig Dispense Refill    NURTEC 75 MG Oral Tablet Dispersible Take 75 mg by mouth as needed.      methylphenidate 20 MG Oral Tab Take 1 tablet (20 mg total) by mouth 2 (two) times daily.      metoclopramide 5 MG Oral Tab Take 1 tablet (5 mg total) by mouth 3 (three) times daily as needed (nausea). 20 tablet 0    PROPRANOLOL HCL ER 80 MG Oral Capsule SR 24 Hr TAKE 1 CAPSULE (80 MG TOTAL) BY MOUTH EVERY EVENING. 90 capsule 1    busPIRone 15 MG Oral Tab Take 1 tablet (15 mg total) by mouth 2 (two) times daily.      MEMANTINE 10 MG Oral Tab TAKE 1 TABLET BY MOUTH EVERY DAY 90 tablet 0    FLUoxetine 10 MG Oral Cap TAKE 1 CAPSULE BY MOUTH EVERY MORNING TAKE ALONG WITH THE 40MG CAPS FOR  TOTAL DOSE OF 50MG DAILY      [START ON 1/23/2025] Phentermine HCl 15 MG Oral Cap Take 1 capsule (15 mg total) by mouth every morning. 30 capsule 0    FLUoxetine HCl 40 MG Oral Cap Take 1 capsule (40 mg total) by mouth daily.      pantoprazole 40 MG Oral Tab EC Take 1 tablet (40 mg total) by mouth before breakfast. 90 tablet 3    famotidine 40 MG Oral Tab Take 1 tablet (40 mg total) by mouth daily as needed for Heartburn. 90 tablet 3    dicyclomine 10 MG Oral Cap Take 1 capsule (10 mg total) by mouth in the morning and 1 capsule (10 mg total) before bedtime. 180 capsule 3    SUMAtriptan Succinate 6 MG/0.5ML Subcutaneous Solution Auto-injector Inject 0.5 mL (6 mg total) into the skin as needed (for moderate to severe migraine). 6 mL 2    albuterol 108 (90 Base) MCG/ACT Inhalation Aero Soln Inhale 2 puffs into the lungs every 4 (four) hours as needed for Wheezing or Shortness of Breath. 6.7 g 0    traZODone 100 MG Oral Tab Take 1 tablet (100 mg total) by mouth nightly.      rosuvastatin 5 MG Oral Tab Take 1 tablet (5 mg total) by mouth Every Monday, Wednesday, and Friday. 45 tablet 3    SPRAVATO, 84 MG DOSE, 28 MG/DEVICE Nasal Solution Therapy Pack 84 mg by Nasal route every 21 days.      triamcinolone 0.1 % External Cream Apply thin layer to affected area twice a day as needed 45 g 1    hydrocortisone 2.5 % External Cream Apply to AA of FACE BID x 2 weeks, hold 2 weeks, repeat PRN. 30 g 2    ketoconazole 2 % External Cream Apply to AA of FACE BID when not using Hydrocortisone Cream. 30 g 2    LORazepam 2 MG Oral Tab Take 1 tablet (2 mg total) by mouth as needed.      gabapentin 100 MG Oral Cap Take 2 capsules (200 mg total) by mouth 3 (three) times daily. (Patient not taking: Reported on 1/20/2025) 180 capsule 2     Allergies:  Allergies   Allergen Reactions    Sulfa Antibiotics NAUSEA ONLY     PHYSICAL EXAM:   Physical Exam  Blood pressure 114/70, pulse 70, resp. rate 16, height 61\", weight 181 lb (82.1 kg), SpO2  97%, not currently breastfeeding.     General: well developed, well nourished  HEENT: Normocephalic and atraumatic. Moist mucus membrane  Cardiovascular: Normal rate, regular rhythm and normal heart sounds.    Pulmonary/Chest: Effort normal and breath sounds normal.   Abdominal: Soft. Bowel sounds are normal.   Skin: Skin is warm and dry.   Psychiatric:  normal mood and affect.   Neurological   Awake, alert and oriented to time, place and person. Speech is dysfluent with some stuttering.   Able to follow commands.  Intact naming and repetition. Normal attention and impaired short term memory.   Cranial nerves 2-12: grossly intact  Sensory : Intact to  light touch and pinprick  Motor: Normal tone in all extremities. Mild postural hand tremors Strength is 5/5 in all muscle groups  Reflexes: symmetric and present 2+ throughout  Coordination: Intact         ASSESSMENT/PLAN:       ICD-10-CM    1. Chronic migraine without aura without status migrainosus, not intractable  G43.709       2. Mild neurocognitive disorder due to multiple etiologies  F06.70           Neurocognitive disorder unspecified    I have reviewed the neuropsychology evaluation results in detail, [facility]   Continue optimal control of anxiety depression currently on fluoxetine, buspirone and trazodone  Encourage joining a class or group activities  MRI brain and EEG performed negative  On memantine 10 mg daily for neurocognitive prophylaxis        2 Chronic migraines, stable  On Botox and Propranolol for migraine prevention  Switch Nurtec's to Imitrex injection      Follow up in about 3 months  See orders and medications filed with this encounter. The patient indicates understanding of these issues and agrees with the plan.        James Betancourt MD  [facility] [place]      [facility] This Visit:  Requested Prescriptions      No prescriptions requested or ordered in this encounter       Imaging  & Referrals:  None     ID#[id number]

## 2025-01-20 NOTE — PATIENT INSTRUCTIONS
Refill policies:    Allow 2-3 business days for refills; controlled substances may take longer.  Contact your pharmacy at least 5 days prior to running out of medication and have them send an electronic request or submit request through the “request refill” option in your [employer] account.  Refills are not addressed on weekends; covering physicians do not authorize routine medications on weekends.  No narcotics or controlled substances are refilled after noon on Fridays or by on call physicians.  By law, narcotics must be electronically prescribed.  A 30 day supply with no refills is the maximum allowed.  If your prescription is due for a refill, you may be due for a follow up appointment.  To best provide you care, patients receiving routine medications need to be seen at least once a year.  Patients receiving narcotic/controlled substance medications need to be seen at least once every 3 months.  In the event that your preferred pharmacy does not have the requested medication in stock (e.g. Backordered), it is your responsibility to find another pharmacy that has the requested medication available.  We will gladly send a new prescription to that pharmacy at your request.    Scheduling Tests:    If your physician has ordered radiology tests such as MRI or CT scans, please contact Central Scheduling at [phone] right away to schedule the test.  Once scheduled, the [facility] Centralized Referral Team will work with your insurance carrier to obtain pre-certification or prior authorization.  Depending on your insurance carrier, approval may take 3-10 days.  It is highly recommended patients assure they have received an authorization before having a test performed.  If test is done without insurance authorization, patient may be responsible for the entire amount billed.      Precertification and Prior Authorizations:  If your physician has recommended that you have a procedure or additional testing performed the [facility]  Centralized Referral Team will contact your insurance carrier to obtain pre-certification or prior authorization.    You are strongly encouraged to contact your insurance carrier to verify that your procedure/test has been approved and is a COVERED benefit.  Although the [facility] Centralized Referral Team does its due diligence, the insurance carrier gives the disclaimer that \"Although the procedure is authorized, this does not guarantee payment.\"    Ultimately the patient is responsible for payment.   Thank you for your understanding in this matter.  Paperwork Completion:  If you require FMLA or disability paperwork for your recovery, please make sure to either drop it off or have it faxed to our office at [phone]. Be sure the form has your name and date of birth on it.  The form will be faxed to our Forms Department and they will complete it for you.  There is a 25$ fee for all forms that need to be filled out.  Please be aware there is a 10-14 day turnaround time.  You will need to sign a release of information (VLADIMIR) form if your paperwork does not come with one.  You may call the Forms Department with any questions at [phone].  Their fax number is [phone].

## 2025-01-21 DIAGNOSIS — E78.00 HYPERCHOLESTEREMIA: Primary | ICD-10-CM

## 2025-01-21 DIAGNOSIS — I25.10 CORONARY ARTERY CALCIFICATION: ICD-10-CM

## 2025-01-21 RX ORDER — ROSUVASTATIN CALCIUM 5 MG/1
5 TABLET, COATED ORAL NIGHTLY
Qty: 90 TABLET | Refills: 0 | Status: SHIPPED | OUTPATIENT
Start: 2025-01-21

## 2025-01-23 ENCOUNTER — LABORATORY ENCOUNTER (OUTPATIENT)
Dept: LAB | Facility: HOSPITAL | Age: 65
End: 2025-01-23
Attending: STUDENT IN AN ORGANIZED HEALTH CARE EDUCATION/TRAINING PROGRAM
Payer: MEDICARE

## 2025-01-23 ENCOUNTER — TELEPHONE (OUTPATIENT)
Dept: NEUROLOGY | Facility: CLINIC | Age: 65
End: 2025-01-23

## 2025-01-23 DIAGNOSIS — M54.16 LUMBAR RADICULOPATHY: ICD-10-CM

## 2025-01-23 LAB
ANTIBODY SCREEN: NEGATIVE
APTT PPP: 24.5 SECONDS (ref 23–36)
CHLORIDE SERPL-SCNC: 105 MMOL/L (ref 98–112)
CO2 SERPL-SCNC: 23 MMOL/L (ref 21–32)
INR BLD: 1.05 (ref 0.8–1.2)
POTASSIUM SERPL-SCNC: 3.9 MMOL/L (ref 3.5–5.1)
PROTHROMBIN TIME: 13.8 SECONDS (ref 11.6–14.8)
RH BLOOD TYPE: POSITIVE
SODIUM SERPL-SCNC: 140 MMOL/L (ref 136–145)

## 2025-01-23 PROCEDURE — 36415 COLL VENOUS BLD VENIPUNCTURE: CPT

## 2025-01-23 PROCEDURE — 80051 ELECTROLYTE PANEL: CPT

## 2025-01-23 PROCEDURE — 86900 BLOOD TYPING SEROLOGIC ABO: CPT

## 2025-01-23 PROCEDURE — 85610 PROTHROMBIN TIME: CPT

## 2025-01-23 PROCEDURE — 85730 THROMBOPLASTIN TIME PARTIAL: CPT

## 2025-01-23 PROCEDURE — 86850 RBC ANTIBODY SCREEN: CPT

## 2025-01-23 PROCEDURE — 93005 ELECTROCARDIOGRAM TRACING: CPT

## 2025-01-23 PROCEDURE — 87081 CULTURE SCREEN ONLY: CPT

## 2025-01-23 PROCEDURE — 93010 ELECTROCARDIOGRAM REPORT: CPT | Performed by: INTERNAL MEDICINE

## 2025-01-23 PROCEDURE — 86901 BLOOD TYPING SEROLOGIC RH(D): CPT

## 2025-01-24 ENCOUNTER — TELEPHONE (OUTPATIENT)
Dept: NEUROLOGY | Facility: CLINIC | Age: 65
End: 2025-01-24

## 2025-01-24 LAB
ATRIAL RATE: 77 BPM
P AXIS: 56 DEGREES
P-R INTERVAL: 142 MS
Q-T INTERVAL: 400 MS
QRS DURATION: 86 MS
QTC CALCULATION (BEZET): 452 MS
R AXIS: 39 DEGREES
T AXIS: 14 DEGREES
VENTRICULAR RATE: 77 BPM

## 2025-01-24 NOTE — TELEPHONE ENCOUNTER
Received authorization from [facility] for Botox.  Auth#[id number], case#[id number] from 02/08/25-02/08/26    This is buy and bill.

## 2025-01-28 ENCOUNTER — OFFICE VISIT (OUTPATIENT)
Dept: FAMILY MEDICINE CLINIC | Facility: CLINIC | Age: 65
End: 2025-01-28
Payer: MEDICARE

## 2025-01-28 ENCOUNTER — TELEPHONE (OUTPATIENT)
Dept: FAMILY MEDICINE CLINIC | Facility: CLINIC | Age: 65
End: 2025-01-28

## 2025-01-28 VITALS
TEMPERATURE: 97 F | BODY MASS INDEX: 35.12 KG/M2 | HEIGHT: 61 IN | HEART RATE: 72 BPM | SYSTOLIC BLOOD PRESSURE: 116 MMHG | WEIGHT: 186 LBS | DIASTOLIC BLOOD PRESSURE: 70 MMHG | OXYGEN SATURATION: 100 % | RESPIRATION RATE: 16 BRPM

## 2025-01-28 DIAGNOSIS — E78.00 HYPERCHOLESTEREMIA: ICD-10-CM

## 2025-01-28 DIAGNOSIS — I25.10 CORONARY ARTERY CALCIFICATION: ICD-10-CM

## 2025-01-28 DIAGNOSIS — G43.709 CHRONIC MIGRAINE WITHOUT AURA WITHOUT STATUS MIGRAINOSUS, NOT INTRACTABLE: ICD-10-CM

## 2025-01-28 DIAGNOSIS — F41.9 ANXIETY AND DEPRESSION: ICD-10-CM

## 2025-01-28 DIAGNOSIS — F32.A ANXIETY AND DEPRESSION: ICD-10-CM

## 2025-01-28 DIAGNOSIS — M48.061 SPINAL STENOSIS OF LUMBAR REGION, UNSPECIFIED WHETHER NEUROGENIC CLAUDICATION PRESENT: ICD-10-CM

## 2025-01-28 DIAGNOSIS — M54.16 LUMBAR RADICULOPATHY: ICD-10-CM

## 2025-01-28 DIAGNOSIS — G20.C PARKINSONISM, UNSPECIFIED PARKINSONISM TYPE (HCC): ICD-10-CM

## 2025-01-28 DIAGNOSIS — Z01.818 PREOPERATIVE EXAMINATION: Primary | ICD-10-CM

## 2025-01-28 PROCEDURE — 99214 OFFICE O/P EST MOD 30 MIN: CPT | Performed by: STUDENT IN AN ORGANIZED HEALTH CARE EDUCATION/TRAINING PROGRAM

## 2025-01-28 RX ORDER — METHYLPHENIDATE HYDROCHLORIDE 54 MG/1
54 TABLET ORAL EVERY MORNING
COMMUNITY
Start: 2025-01-27

## 2025-01-28 NOTE — PROGRESS NOTES
Subjective:      Chief Complaint   Patient presents with    Pre-Op Exam     HISTORY OF PRESENT ILLNESS  HPI  HPI obtained per patient report.  Cb Webster is a pleasant 64 year old female presenting for a preoperative examination.    L4-5 fusion is scheduled on 25.  Patient denies personal and family history of any adverse reactions to anesthesia.  Patient denies personal and family history of any bleeding diatheses and blood clots.    She has already reached out to her psychiatrist, neurologist, and GI to inquire about perioperative management of her medications.     PAST PATIENT HISTORY  Past Medical History:    Anxiety    Aortic regurgitation    mild    Back pain    Calculus of kidney    Cardiac calcification (HCC) - CAC score of 5.24 seen on 22 CT Heart Scan protocol    Colon polyps    Depression    Disorder of liver    Fatty liver    Diverticulosis    Gastroparesis    GERD (gastroesophageal reflux disease)    Hemorrhoids    High blood pressure    History of depression    Major depressive disorder & anxiety    History of eating disorder    Binge eating    Hyperlipidemia    Migraines    OCD (obsessive compulsive disorder)    MODE (obstructive sleep apnea)    Osteoarthritis    Parkinsonism (HCC)    Peptic ulcer disease    Stool incontinence    Soft barely formed stool, not detecting urgency    Tendonitis of shoulder, right     Past Surgical History:   Procedure Laterality Date          x 2    Cholecystectomy      Colonoscopy      D & c      Ect provided  [id number]    Egd      Laparoscopic cholecystectomy      Needle biopsy right  2015    benign breast    Other surgical history      C3-C4 anterior discectomy    Other surgical history  2018    [facility]    Other surgical history  2019    radiofrequency abalsion lumbar    Removal gallbladder  2020    Skin surgery      Spine surgery procedure unlisted      Anterior cervical discectomy    Tonsillectomy  1966?       CURRENT  MEDICATIONS  Medications Taking[1]    HEALTH MAINTENANCE  Immunization History   Administered Date(s) Administered    Covid-19 Vaccine Pfizer 30 mcg/0.3 ml 2021, 2021, 10/23/2021    Covid-19 Vaccine Pfizer Bivalent 30mcg/0.3mL 2022    Covid-19 Vaccine Pfizer Merrill-Sucrose 30 mcg/0.3 ml 2022    FLULAVAL 6 months & older 0.5 ml Prefilled syringe (07755) 10/23/2017, 2018, 10/01/2019, 10/07/2020, 2021, 10/28/2022    FLUZONE 6 months and older PFS 0.5 ml ([id number]) 10/23/2017, 2018, 10/13/2023    Influenza 2024    Pfizer Covid-19 Vaccine 30mcg/0.3ml 12yrs+ 10/13/2023, 2024    Pneumococcal Conjugate PCV20 2025    RSV, recombinant, RSVpreF, adjuvanted (Arexvy) 2023    TDAP 2018    Zoster Vaccine Recombinant Adjuvanted (Shingrix) 2021, 2021       ALLERGIES AND DRUG REACTIONS  Allergies[2]    Family History   Problem Relation Age of Onset    Hypertension Father     Heart Attack Father          at 48 years    Heart Disease Father     Alcohol and Other Disorders Associated Father     Depression Father     Other (ALS) Mother     Hypertension Mother         Diagnosed at 91 yo with ALS  at 91    Personality Disorder Daughter     Dementia Maternal Grandmother     Obesity Maternal Grandmother     Dementia Maternal Grandfather     Obesity Paternal Grandfather     Cancer Sister         Kidney, chronic lymp… leukemia    Heart Attack Sister         Kidney cancer, chronic lymphocytic leukemia    Ulcerative Colitis Brother     Cancer Brother         Kidney     Social History     Socioeconomic History    Marital status:    Occupational History    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[employer]   Tobacco Use    Smoking status: Never    Smokeless tobacco: Never   Vaping Use    Vaping status: Never Used   Substance and Sexual Activity    Alcohol use: Not Currently    Drug use: Never   Other Topics Concern     Caffeine Concern Yes     Comment: [employer]    Exercise No    Seat Belt Yes    Special Diet No    Stress Concern Yes    Weight Concern Yes     Comment: L125lb  G85lbs  L45lbs   Social History Narrative    Caring for grandson (Peter - aged 6 yo [1/2020])       Review of Systems   All other systems reviewed and are negative.         Objective:      /70   Pulse 72   Temp 97.3 °F (36.3 °C) (Temporal)   Resp 16   Ht 5' 1\" (1.549 m)   Wt 186 lb (84.4 kg)   LMP  (LMP Unknown)   SpO2 100%   BMI 35.14 kg/m²   Body mass index is 35.14 kg/m².    Physical Exam  Vitals reviewed.   Constitutional:       General: She is not in acute distress.     Appearance: She is not ill-appearing, toxic-appearing or diaphoretic.   HENT:      Head: Normocephalic and atraumatic.   Eyes:      General: No scleral icterus.        Right eye: No discharge.         Left eye: No discharge.      Extraocular Movements: Extraocular movements intact.      Conjunctiva/sclera: Conjunctivae normal.   Neck:      Thyroid: No thyroid mass, thyromegaly or thyroid tenderness.      Vascular: No carotid bruit.   Cardiovascular:      Rate and Rhythm: Normal rate.      Heart sounds: Normal heart sounds.   Pulmonary:      Effort: Pulmonary effort is normal.      Breath sounds: Normal breath sounds.   Abdominal:      General: Bowel sounds are normal. There is no distension.      Palpations: Abdomen is soft. There is no mass.      Tenderness: There is no abdominal tenderness. There is no guarding or rebound.      Hernia: No hernia is present.   Musculoskeletal:      Cervical back: Neck supple. No tenderness.      Right lower leg: No edema.      Left lower leg: No edema.   Lymphadenopathy:      Cervical: No cervical adenopathy.   Neurological:      General: No focal deficit present.      Mental Status: She is alert and oriented to person, place, and time.      Comments: Baseline chronic dysarthria and balance difficulty   Psychiatric:         Mood and  Affect: Mood normal.            Assessment and Plan:      1. Preoperative examination (Primary)  2. Spinal stenosis of lumbar region, unspecified whether neurogenic claudication present  3. Lumbar radiculopathy  4. Chronic migraine without aura without status migrainosus, not intractable  5. Parkinsonism, unspecified Parkinsonism type (HCC)  6. Anxiety and depression  7. Hypercholesteremia  8. Coronary artery calcification    No follow-ups on file.    - CBC, CMP 12/23/24 reviewed  - she states that her GI and neurologist recommended no changes in her medication management during her perioperative period  - she states that her psychiatrist is deferring perioperative management of her psychiatric medications to her surgery/anesthesiology team  - we discussed that she may continue her buspirone, fluoxetine, and trazodone normally during her perioperative period unless otherwise advised by her surgery/anesthesiology team  - recommended holding methylphenidate the day of surgery unless she experiences withdrawal symptoms with missed doses of this medication  - she takes lorazepam as needed, usually a few times per week. Recommended holding this medication for at least 24 hours prior to surgery   - we discussed that she may continue her rosuvastatin normally during her perioperative period unless otherwise advised by her surgery/anesthesiology team  - recommended discontinuation of OTC supplements and NSAIDs at least 1 week prior to surgery  - RCRI score of 0  - 30-day risk of a major adverse cardiac event based on RCRI score: <1% (low)  - Patient has been medically optimalized for surgery      Patient verbalized understanding of assessment and recommendations. All questions and concerns were addressed.    Electronically signed by Colton Schaefer MD         [1]   Outpatient Medications Marked as Taking for the 1/28/25 encounter (Office Visit) with Colton Schaefer MD   Medication Sig Dispense Refill    methylphenidate ER 54 MG Oral  Tab CR Take 1 tablet (54 mg total) by mouth every morning.      rosuvastatin 5 MG Oral Tab Take 1 tablet (5 mg total) by mouth nightly. 90 tablet 0    dicyclomine 10 MG Oral Cap Take 1 capsule (10 mg total) by mouth 2 (two) times daily.      PROPRANOLOL HCL ER 80 MG Oral Capsule SR 24 Hr TAKE 1 CAPSULE (80 MG TOTAL) BY MOUTH EVERY EVENING. 90 capsule 1    busPIRone 15 MG Oral Tab Take 1 tablet (15 mg total) by mouth 2 (two) times daily.      MEMANTINE 10 MG Oral Tab TAKE 1 TABLET BY MOUTH EVERY DAY 90 tablet 0    FLUoxetine HCl 40 MG Oral Cap Take 1 capsule (40 mg total) by mouth daily.      pantoprazole 40 MG Oral Tab EC Take 1 tablet (40 mg total) by mouth before breakfast. 90 tablet 3    famotidine 40 MG Oral Tab Take 1 tablet (40 mg total) by mouth daily as needed for Heartburn. 90 tablet 3    SUMAtriptan Succinate 6 MG/0.5ML Subcutaneous Solution Auto-injector Inject 0.5 mL (6 mg total) into the skin as needed (for moderate to severe migraine). 6 mL 2    albuterol 108 (90 Base) MCG/ACT Inhalation Aero Soln Inhale 2 puffs into the lungs every 4 (four) hours as needed for Wheezing or Shortness of Breath. 6.7 g 0    traZODone 100 MG Oral Tab Take 1 tablet (100 mg total) by mouth nightly.      SPRAVATO, 84 MG DOSE, 28 MG/DEVICE Nasal Solution Therapy Pack 84 mg by Nasal route every 14 (fourteen) days.      triamcinolone 0.1 % External Cream Apply thin layer to affected area twice a day as needed 45 g 1    hydrocortisone 2.5 % External Cream Apply to AA of FACE BID x 2 weeks, hold 2 weeks, repeat PRN. 30 g 2    ketoconazole 2 % External Cream Apply to AA of FACE BID when not using Hydrocortisone Cream. 30 g 2    LORazepam 2 MG Oral Tab Take 1 tablet (2 mg total) by mouth as needed.     [2]   Allergies  Allergen Reactions    Sulfa Antibiotics NAUSEA ONLY

## 2025-01-30 ENCOUNTER — NURSE ONLY (OUTPATIENT)
Age: 65
End: 2025-01-30
Payer: MEDICARE

## 2025-01-30 DIAGNOSIS — Z71.9 ENCOUNTER FOR EDUCATION: Primary | ICD-10-CM

## 2025-01-30 NOTE — PROGRESS NOTES
RN Spine Navigator Education for Cb Webster     If you have received instructions from your surgeon that are different from those listed below, please follow your physician's instructions.    You are scheduled for a Lumbar fusion with Dr. Perez on 2/5/25.      Patient Surgical Goals: Cb suffers from lower back that radiates to her right buttocks and down the right leg. These symptoms limit her significantly. Her main goal is pain relief, reduced symptoms so that she can become more active and improving her overall health. She would like to have a better quality of life.    Before Your Surgery    Choose a Care Partner  Patient attended spine navigator visit independently.  Care partner is her spouse Christopher. Your care partner(s) should be able to provide transportation to and from the hospital. They should be able to help at home for the first week after discharge, including helping you with meals, medication, and dressing changes.    Clearance Before Surgery-Clearance received 1/28/25  You will need to see your primary care provider within 30 days before surgery. Please make sure this appointment is at least a week before surgery as more testing or doctor visits may be ordered. Presurgical testing may include labs, nasal swab, imaging, EKG.   Make sure that you complete all preadmission testing so that surgery does not get delayed.    Home Environment  Assessed home status: bathroom and bedroom are upstairs. Suggested arrangements for help at home.  It is recommended that you prepare your home by putting clean sheets on your bed, freezing meals, and putting frequently used items at waist level.   Prevent falls by removing items that could cause you to trip, adding nightlights and adding a nonskid mat in shower.   Assistive devices can be purchased at a medical supply store or online including canes, walkers, toileting devices (commodes, raised toilet seats, toilet paper wiping aid), long handled sponge, shower  chair or tub transfer bench, grabber/reacher tool, sock aid, long handled shoehorn, if needed.      Many items to assist with recovery after surgery can be borrowed from loan closets for little or no cost.  We recommend that you start with checking your local [facility] website or you can try these other resources to see if they have something available for you.    [employer]   A National directory which enables anyone to easily find existing places to borrow or donate wheelchairs, walkers, hospital beds, shower safety equipment and more, at no cost.  https://[employer]/      [employer] [place]:  An [place]-based nonprofit organization that provides information and advocacy for people with disabilities.   They have a medical equipment reuse program that may be able to assist or provide referrals. ([phone] [phone]   https://www.[employer].org/    [place]  The Medical Loan Closet is a [facility]-provided service that provides medical equipment for a 90-day period at no cost.    [facility] operates a medical loan closet for [facility] residents, which operates solely on donations. Residents may borrow medical equipment such as wheelchairs, walkers, canes, crutches, tub transfer benches, shower chairs, and commodes. Equipment is loaned free of charge for a period of three months, and may be renewed once.  [phone]  https://[email or web address]/supervisor/medical-loan-closet.html      Tobacco, Nicotine and Marijuana Use   Not applicable    Medications to Stop   For 7-10 days before surgery do not take any blood thinning medications. This includes non-steroidal anti-inflammatories or NSAIDs (Advil, Aleve, Motrin, ibuprofen, naproxen, meloxicam, diclofenac, celecoxib, etc.), aspirin (unless told otherwise by your cardiologist or surgeon), herbal supplements and vitamins (garlic, turmeric, vitamin E, fish oil or krill oil, etc.). You may only take Tylenol or prescribed narcotic medication if needed for  pain.   Other medications to stop include: none    Leading Up to Day of Surgery  Five days before surgery wash with Hibiclens soap after your regular body soap every day. Do not put use Hibiclens on your face, hair or privates. Your last shower should be the night before surgery.  One-two business days before surgery, the preadmission testing staff will call you and let you know what time to arrive, where to park, when to take your Tylenol and Gatorade, and will provide any additional instructions.   After 11pm the day before surgery, do not eat or drink anything (including water, gum, or candies) except for Tylenol and Gatorade.   Drink 12 ounces of regular Gatorade (NOT RED) 12 hours prior to your scheduled surgery time. Drink your second 12 ounces of regular Gatorade and take 1000 mg of Tylenol (acetaminophen) 4 hours before your scheduled surgery time.     Items to Bring to the Hospital   Bring insurance card, ID, advanced directive, or medical power of  paperwork, loose fitting clothing, shoes with a back that can accommodate swollen feet, long phone charging cord, glasses.  Do not bring jewelry, valuables, or medications.   If you take an uncommon medication that the hospital may not have, it must be brought to the hospital in the original container, and you must notify the nurse of this medication.     In the Hospital     Operative Day and Hospital Stay  In the preoperative area, you will change into a gown, have an IV placed in your hand or arm by the nurse, and sign any consent paperwork that is needed for your procedure. You will speak to the surgeon and anesthesiologist. It is important to tell the doctors and nurses if you have had any significant side effects from pain medications or anesthesia such as a rash or severe nausea.    In the operating room, the anesthesiologist will attach monitors, give you oxygen through a mask, and give you medicine through the IV to fall asleep. After you are  sleeping, the breathing tube will be placed. The surgeon may use additional nerve monitoring during your surgery. This is to make sure that the muscles and nerves in your arms and legs are working normally as he operates. The equipment will be hooked up and removed while you are asleep. You will wake up on the hospital bed.     During the surgery, your care partner can sit in the surgical waiting area. There are TV screens in that area that keep them informed of your progress. If the procedure is at [facility], there is a person who can speak to them about your surgical progress.     In the recovery room, monitors will be attached that check your heart rate, blood pressure and oxygen levels. While you may not remember this part, a nurse is with you and constantly checking on your condition. Medications for pain and nausea will be given if you need them. You may have a mcclendon catheter to empty your bladder or a drain at your surgical site. Your family is not allowed in the recovery room. When you are ready to leave the recovery room, you will be transported on your bed to the inpatient unit accompanied by your family once a room is available.  On the inpatient unit, a team of doctors and advanced practice providers will manage your care. The spine care nurses will check your blood pressure, temperature, heartbeat, breathing, stomach sounds and your incision. They may assess the strength and sensation in your arms and legs. Medications that are given in the hospital include antibiotics, IV fluids, pain medications, muscle relaxers, stool softeners, and your home medications. You may get blood drawn and another x-ray. Physical and occupational therapists may come to your room to teach you how to move around safely after surgery.     Post Op Plan   The average length of hospital stay is one to three days. A  may visit you to help arrange extra care for you once you leave the hospital. Occasionally, it is  recommended that a home health nurse or therapist visit you in your home for a short time. The best place to recover is your own home, but if you need more assistance than home health and your care partner can provide, the  will help you and your family choose other facilities to help you recover your strength.    Preventing surgical complications  It is important to follow all instructions before and after surgery to decrease the risks of surgery and prevent complications.     Blood clots: walk, wear leg compression devices in the hospital, and do ankle pumps at home  Infection: wash with Hibiclens before surgery, wash your hands, don't touch your incision  Pneumonia: take deep breaths and cough and use the breathing exercise (incentive spirometer)   Nausea/vomiting: start with liquids and small meals and do not take pills on an empty stomach  Constipation: drink water and walk frequently  If you are prone to constipation, start an over the counter stool softener while taking your narcotic medication.  After surgery if you have gone 3-4 days without a bowel movement, we recommend you use either a suppository or an enema (follow the packaging instructions for use). The longer you stay constipated the more painful and difficult it will be to relieve your constipation.    Tell your nurse if you are experiencing nausea, vomiting or constipation as they have medications to help treat these.      At home     Understanding Pain After Surgery  We will do our best to manage your pain after surgery, but it is not possible to be completely pain-free. There will be operative pain in your back or neck. Pain in the arms or legs may be one of the first things to improve. Numbness, weakness, and tingling should improve over time. However, there can be a temporary increase in symptoms in the first few days due to inflammation from surgery.   Pain medications will be prescribed to take home at discharge. The goal of pain  medicine after surgery is to make your pain tolerable, not to make you pain free. We encourage you to use the medication prescribed to you after your surgery, but please take the lowest possible dose to manage your pain. Taking high doses of narcotics can cause side effects. Transition to plain Tylenol when your pain improves. At [facility], your prescriptions can be filled by our pharmacy and brought to you bedside. You may get more continuous pain relief by alternating between medications if you have multiple instead of taking them at the same time. Write down when you have taken a medication as it may be difficult to remember after a few doses.    Post operative medication   Tylenol (acetaminophen): take for pain. Do not take more than 3000 mg - 4000 mg in 24 hours because it can damage your liver.   Narcotics: take for moderate to severe pain. Do not drink alcohol or drive while taking narcotics. Some narcotic medications (Norco, Tylenol #3, Percocet, Vicodin) contain Tylenol (acetaminophen). Make sure to not exceed the maximum dose if you are taking additional Tylenol with these medications.  Muscle relaxers: take for muscle cramping. These can cause drowsiness.    NSAIDS (Advil, Aleve, Motrin, ibuprofen, naproxen, meloxicam, diclofenac, celecoxib, etc.) or aspirin: Do not take these unless your physician says it is ok. For a fusion, it may be several months before you can take NSAIDS.  Stool softeners: take to prevent constipation while you are taking narcotic medications. You can get these over the counter at the pharmacy. You may use laxatives, which are stronger, if needed.    If you believe you will need more of medication your surgeon has prescribed to you, request a refill through your pharmacy or through the refill request in [employer] (log in, go to medications, then select refill request) at least two business days before you run out of medication.     Nonpharmacologic pain management   You may use  ice on your incision for 20 minutes every hour to help with pain and swelling. Do not place ice directly on your skin. You can use heat to sooth your muscles but avoid placing heat directly on your incision. Make frequent position changes. You can do gentle stretching while avoiding significant bending or twisting. Use deep breathing techniques and distractions such as TV, music, reading, or games.     Movement restrictions  After surgery, no bending or twisting. Do not lift anything over 10lbs (about the weight of a gallon of milk) or lift anything over your shoulders. Avoid pulling or pushing. You may use stairs while holding the handrail.  It is ok to ride in the car but refrain from driving or traveling long distances until cleared to do so by your surgeon. You may not drive while taking narcotics or muscle relaxants. If you had a fracture or fusion surgery, your doctor may give you a brace. Braces are worn for comfort, when up and moving around, or constantly depending on your doctor's order. Wear your brace as instructed.     Post Op Exercise   Walk frequently. Start with walking short distances and increase as you start to feel better. Do ankle pumps (bending at your ankles, bring your feet towards your head then point them towards the ground) 15-20 times every hour when awake to help prevent blood clots.     Your surgeon will let you know at your post operative appointment if you are ready to decrease your movement restrictions and increase your exercise. If you have questions in between appointments about lessening your restrictions, please contact the office.     You and your doctor will discuss how you are feeling as you heal and decide if outpatient physical therapy or a medical fitness referral is needed.    Caring for your incision  Always wash your hands before touching your incision. Your incision will be closed with sutures under the skin and skin glue or Steri-Strips (thin white bandages) on top of  the skin. Do not attempt to peal off Skin glue/Steri-Strips as they will come off on their own. If the incision is closed with sutures or staples on top of the skin, they will be removed at a post op appointment. The incision may be covered with a gauze dressing that can come off after three days. Once the original gauze dressing is removed, we prefer that you leave your incision open to air (without a gauze dressing). If the incision has drainage or is rubbing against your clothes or brace, you may place gauze and medical tape over it. Change the gauze and medical tape daily. Look at your incision daily to check for signs of infection.   You can shower three days after your surgery or sooner if your surgeon allows. We recommend the care partner be present during the first shower for safety. Let soapy water run over the incision, but do not scrub it or spray it directly. Gently pat it dry after with a clean towel. Do not apply any creams or lotions to the incision. Do not soak in a tub, pool, or any body of water until your incision is fully healed.    Signs of Infection   Check your incision daily for swelling, redness, drainage, pus, bad smell, or opening of the incision.     When to Call for Assistance  Call the Ortho Spine Office ([phone] Option #3) if you experience signs of infection, opening of the incision, continuous nausea or vomiting, poor pain control despite using the pain medication as directed, a sudden increase in pain, temperature over 101F, or with any concerns, unanswered questions, or new problems, such as new numbness/weakness/tingling.  Call 911 or go to the nearest emergency room if you experience chest pain, difficulty breathing, loss of bowel or bladder control, extreme drowsiness, or any other life-threatening situation.     Please remember that the office of Dr. Perez is closed on the weekends, holidays, and there is no one in the office from 4:30 pm to 8 am. If you have an urgent  matter that needs attention you will need to go to the emergency room or immediate care for assistance. If it is an urgent matter during work hours please make sure to call the office of Dr. Perez as "[employer] messages are not meant for Urgent/Emergent situations. MyChart messages can take 5-7 days for a response.    Follow-up Plan   Appointments with Dr. Perez or Mini at 2, 6 and 12 weeks     If you would like clarification regarding anything discussed today, you can call your surgeons office and speak with one of the nursing staff. If you feel you require and additional appointment to review information again you can call the RN Spine Navigator at [phone] #4 to schedule. If you have questions about your upcoming surgery, changes in your symptoms, or if you need to refill your medications please do not call the RN spine navigator, you will need to speak with someone from your surgeons office.     Spine navigator spent 50 minutes conducting a virtual visit to provide education. Thank you for letting the RN Spine Navigator participate in your care.

## 2025-02-02 NOTE — DISCHARGE INSTRUCTIONS
Sometimes managing your health at home requires assistance.  The [facility] team has recognized your preference to use [facility].  They can be reached by phone at [phone].  The fax number for your reference is [phone].  A representative from the home health agency will contact you or your family to schedule your first visit.     Spine Surgery Postoperative Instructions    Wound / Dressing Care:    Before changing your dressing, wash your hands (or ask your helper to wash their hands for 20 seconds). Do not apply creams, solutions, or ointments to the incision.     You may remove the dressing 3 days after surgery, if there is no further drainage, and shower. If there is drainage, cover the wound with new dressing and wait until drainage is no longer present.    Apply sterile dressing to wound until 7 days after surgery, then you may leave wound open to air if there is no drainage.    Check the incision for increased warmth, redness, swelling, unexplained increase in pain, change in the drainage, or persistent drainage that is not decreasing. Call Dr. Perez's office [phone] if there are changes or concerns.     If you have Steri strips, please leave them on until they are loose and almost falling off. In this case, you may then gently lift off the Steri strips.          Showering:   Do no apply water direct over the wound. It's ok to let water run over the incision. Pat the incision dry with a clean towel and apply new dressing if less than 7 days.    Do not bath, swim, or soak in water until cleared by your surgeon.        Medications:  Please resume your pre-hospital medications unless otherwise instructed. If you usually take blood thinners, you will be given instructions about when to resume them.      Please inform your primary care physician about your admission to the hospital and if there has been a change in your usual medications, while you were hospitalized.        Managing pain:  You may have some ups and downs with your pain. You may be encouraged if you notice the gradual improvement in the pain as the days go by. You may be able to take the edge off the pain but not stop all your pain, however. Pain is part of the healing process after injury and surgery.      Please follow the plan below to help control your pain and reduce the amount of narcotics you require.   - Take 1000 mg of Acetaminophen (Tylenol) three times a day scheduled for first 5 days.  Do not exceed more than 3,000 mg in a 24 hour period or mix with other medications that contain acetaminophen. Take Tylenol as needed if no longer requiring supplemental narcotics  - Supplement with Oxycodone 5-10mg every 4 hours as needed  - For muscle spasm type pain take muscle relaxant every 8 hours as needed    As your pain improves, please decrease the amount of pain medication (by taking fewer tablets and/or increasing the time between doses). Do not increase the dose once you have dropped down to a lesser amount.     Hold your pain medication if you experience over sedation (sleeping too much), slurred speech, slow breathing, or hallucinations. If these symptoms occur, you will need to get advice on how and when it is safe to resume your pain medication. Please call for advice.     Please do not drink alcohol, drive, or operate heavy machinery while taking your pain or anti spasm medication.     For surgery related pain medication refills, if needed, please call the spine clinic directly [phone]  (please leave a message for call back) or your usual pain prescribing clinician.    Constipation:   Pain medication often causes constipation.    Try standing and moving for 1 minute each hour and work up to walking 30 minutes a day.    Drink fluids (32-64 ounces every day) unless patient has cardiac history    Minimize narcotic use    Use natural laxatives such as prune juice, but avoid coffee or caffeinated  products that may disturb sleep cycle    Have high fiber diet, have smaller meals throughout the day    Take Miralax mixed with water or juice BID and Senna-S nightly until regular BM. Hold for loose, frequent, or water stools    If no BM, take Senna-s twice a day       Smoking:   Failure of fusion is as high as 65% in smokers and nicotine users. Therefore, spine patients should not smoke or use nicotine for 6 months after surgery. This is your time to quit.      Activity:   Walking is encouraged.     Avoid heavy lifting (no more than 10 pounds). Do bend at the waist to lift anything. Avoid extreme twisting.    You may not drive a car until cleared to do so by the spine team. Please wear a seat belt.      Sexual Activity:   After 2 weeks, when comfortable and approved by your surgeon. Stop if causing pain.     When should you call the office: Call if  You have increased drainage and/or odor from you wound.     You have increased redness/swelling at the incision site or unexplained incisional pain.     You have a fever of greater than 101 degrees that lasts for several hours. Keep in mind that elevated temperature is common within the first several days after surgery. If available, take Tylenol and do deep breathing and coughing to reduce the temperature. If the fever persists after 5 days from surgery, then contact your surgeon.    You have new or unfamiliar pain or weakness in your arms or legs.     You have loss of control of urination or bowel movements, pain or numbness in the rectal, vaginal, or scrotal area.     Constipation is very common especially when taking pain medications. If stool softeners, laxatives, and other treatments do not work, contact your PCP or surgeon.    You have new tenderness in your calf, redness or discoloration of the leg, new shortness of breath, cough up blood, or have chest pain. These may be signs of a blood clot.     For life threatening emergency including difficulty breathing  or chest pain, please call 911.     Follow-up:  If you have questions regarding your appointment or if an appointment has not been made, please speak with your surgeon's staff [phone].          HOLD PROPRANOLOL UNTIL F/U WITH PCP.

## 2025-02-04 ENCOUNTER — ANESTHESIA EVENT (OUTPATIENT)
Dept: SURGERY | Facility: HOSPITAL | Age: 65
End: 2025-02-04
Payer: MEDICARE

## 2025-02-05 ENCOUNTER — ANESTHESIA (OUTPATIENT)
Dept: SURGERY | Facility: HOSPITAL | Age: 65
End: 2025-02-05
Payer: MEDICARE

## 2025-02-05 ENCOUNTER — HOSPITAL ENCOUNTER (INPATIENT)
Facility: HOSPITAL | Age: 65
LOS: 2 days | Discharge: HOME HEALTH | End: 2025-02-07
Attending: STUDENT IN AN ORGANIZED HEALTH CARE EDUCATION/TRAINING PROGRAM | Admitting: STUDENT IN AN ORGANIZED HEALTH CARE EDUCATION/TRAINING PROGRAM
Payer: MEDICARE

## 2025-02-05 ENCOUNTER — APPOINTMENT (OUTPATIENT)
Dept: GENERAL RADIOLOGY | Facility: HOSPITAL | Age: 65
End: 2025-02-05
Attending: STUDENT IN AN ORGANIZED HEALTH CARE EDUCATION/TRAINING PROGRAM
Payer: MEDICARE

## 2025-02-05 DIAGNOSIS — Z98.1 ARTHRODESIS STATUS: ICD-10-CM

## 2025-02-05 DIAGNOSIS — M54.16 LUMBAR RADICULOPATHY: Primary | ICD-10-CM

## 2025-02-05 PROCEDURE — 4A11X4G MONITORING OF PERIPHERAL NERVOUS ELECTRICAL ACTIVITY, INTRAOPERATIVE, EXTERNAL APPROACH: ICD-10-PCS | Performed by: STUDENT IN AN ORGANIZED HEALTH CARE EDUCATION/TRAINING PROGRAM

## 2025-02-05 PROCEDURE — 76000 FLUOROSCOPY <1 HR PHYS/QHP: CPT | Performed by: STUDENT IN AN ORGANIZED HEALTH CARE EDUCATION/TRAINING PROGRAM

## 2025-02-05 PROCEDURE — 0SG00AJ FUSION OF LUMBAR VERTEBRAL JOINT WITH INTERBODY FUSION DEVICE, POSTERIOR APPROACH, ANTERIOR COLUMN, OPEN APPROACH: ICD-10-PCS | Performed by: STUDENT IN AN ORGANIZED HEALTH CARE EDUCATION/TRAINING PROGRAM

## 2025-02-05 PROCEDURE — 99223 1ST HOSP IP/OBS HIGH 75: CPT | Performed by: HOSPITALIST

## 2025-02-05 PROCEDURE — 0SG0071 FUSION OF LUMBAR VERTEBRAL JOINT WITH AUTOLOGOUS TISSUE SUBSTITUTE, POSTERIOR APPROACH, POSTERIOR COLUMN, OPEN APPROACH: ICD-10-PCS | Performed by: STUDENT IN AN ORGANIZED HEALTH CARE EDUCATION/TRAINING PROGRAM

## 2025-02-05 DEVICE — CALIBRATE NANOTEC PSX, 7 X 10 X 30MM, 20°
Type: IMPLANTABLE DEVICE | Site: BACK | Status: FUNCTIONAL
Brand: CALIBRATE

## 2025-02-05 DEVICE — MODULAR EXTENDED TAB POLYAXIAL REDUCTION TULIP
Type: IMPLANTABLE DEVICE | Site: BACK | Status: FUNCTIONAL
Brand: INVICTUS

## 2025-02-05 DEVICE — SET SCREW
Type: IMPLANTABLE DEVICE | Site: BACK | Status: FUNCTIONAL
Brand: INVICTUS

## 2025-02-05 DEVICE — GRAFT BNE SUB 10CC ALLGRFT CANC W/ CORT: Type: IMPLANTABLE DEVICE | Site: BACK | Status: FUNCTIONAL

## 2025-02-05 DEVICE — TI, MIS LORDOTIC ROD, VI2, 5.5MM X 45MM
Type: IMPLANTABLE DEVICE | Site: BACK | Status: FUNCTIONAL
Brand: INVICTUS

## 2025-02-05 DEVICE — CANNULATED MODULAR SCREW SHANK 6.5MM X 45MM
Type: IMPLANTABLE DEVICE | Site: BACK | Status: FUNCTIONAL
Brand: INVICTUS

## 2025-02-05 RX ORDER — EPHEDRINE SULFATE 50 MG/ML
INJECTION INTRAVENOUS AS NEEDED
Status: DISCONTINUED | OUTPATIENT
Start: 2025-02-05 | End: 2025-02-05 | Stop reason: SURG

## 2025-02-05 RX ORDER — ACETAMINOPHEN 500 MG
1000 TABLET ORAL ONCE
Status: DISCONTINUED | OUTPATIENT
Start: 2025-02-05 | End: 2025-02-05 | Stop reason: HOSPADM

## 2025-02-05 RX ORDER — OXYCODONE HYDROCHLORIDE 5 MG/1
5 TABLET ORAL
Qty: 20 TABLET | Refills: 0 | Status: SHIPPED | OUTPATIENT
Start: 2025-02-05

## 2025-02-05 RX ORDER — PROPRANOLOL HCL 20 MG
20 TABLET ORAL 4 TIMES DAILY
Status: DISCONTINUED | OUTPATIENT
Start: 2025-02-05 | End: 2025-02-07

## 2025-02-05 RX ORDER — HYDROMORPHONE HYDROCHLORIDE 1 MG/ML
0.6 INJECTION, SOLUTION INTRAMUSCULAR; INTRAVENOUS; SUBCUTANEOUS EVERY 5 MIN PRN
Status: DISCONTINUED | OUTPATIENT
Start: 2025-02-05 | End: 2025-02-05 | Stop reason: HOSPADM

## 2025-02-05 RX ORDER — MIDAZOLAM HYDROCHLORIDE 1 MG/ML
INJECTION INTRAMUSCULAR; INTRAVENOUS AS NEEDED
Status: DISCONTINUED | OUTPATIENT
Start: 2025-02-05 | End: 2025-02-05 | Stop reason: SURG

## 2025-02-05 RX ORDER — SODIUM PHOSPHATE, DIBASIC AND SODIUM PHOSPHATE, MONOBASIC 7; 19 G/230ML; G/230ML
1 ENEMA RECTAL ONCE AS NEEDED
Status: DISCONTINUED | OUTPATIENT
Start: 2025-02-05 | End: 2025-02-07

## 2025-02-05 RX ORDER — HYDROMORPHONE HYDROCHLORIDE 1 MG/ML
INJECTION, SOLUTION INTRAMUSCULAR; INTRAVENOUS; SUBCUTANEOUS
Status: COMPLETED
Start: 2025-02-05 | End: 2025-02-05

## 2025-02-05 RX ORDER — HYDROCODONE BITARTRATE AND ACETAMINOPHEN 5; 325 MG/1; MG/1
2 TABLET ORAL ONCE AS NEEDED
Status: DISCONTINUED | OUTPATIENT
Start: 2025-02-05 | End: 2025-02-05 | Stop reason: HOSPADM

## 2025-02-05 RX ORDER — SENNOSIDES 8.6 MG
17.2 TABLET ORAL NIGHTLY PRN
Qty: 30 TABLET | Refills: 0 | Status: SHIPPED | OUTPATIENT
Start: 2025-02-05

## 2025-02-05 RX ORDER — HYDROMORPHONE HYDROCHLORIDE 1 MG/ML
0.2 INJECTION, SOLUTION INTRAMUSCULAR; INTRAVENOUS; SUBCUTANEOUS EVERY 5 MIN PRN
Status: DISCONTINUED | OUTPATIENT
Start: 2025-02-05 | End: 2025-02-05 | Stop reason: HOSPADM

## 2025-02-05 RX ORDER — HYDROMORPHONE HYDROCHLORIDE 1 MG/ML
0.4 INJECTION, SOLUTION INTRAMUSCULAR; INTRAVENOUS; SUBCUTANEOUS EVERY 2 HOUR PRN
Status: DISCONTINUED | OUTPATIENT
Start: 2025-02-05 | End: 2025-02-07

## 2025-02-05 RX ORDER — DIAZEPAM 10 MG/2ML
5 INJECTION, SOLUTION INTRAMUSCULAR; INTRAVENOUS ONCE
Status: DISCONTINUED | OUTPATIENT
Start: 2025-02-05 | End: 2025-02-05 | Stop reason: HOSPADM

## 2025-02-05 RX ORDER — PROCHLORPERAZINE EDISYLATE 5 MG/ML
5 INJECTION INTRAMUSCULAR; INTRAVENOUS EVERY 8 HOURS PRN
Status: DISCONTINUED | OUTPATIENT
Start: 2025-02-05 | End: 2025-02-05 | Stop reason: HOSPADM

## 2025-02-05 RX ORDER — BUSPIRONE HYDROCHLORIDE 5 MG/1
15 TABLET ORAL 2 TIMES DAILY
Status: DISCONTINUED | OUTPATIENT
Start: 2025-02-05 | End: 2025-02-07

## 2025-02-05 RX ORDER — ALBUTEROL SULFATE 90 UG/1
2 INHALANT RESPIRATORY (INHALATION) EVERY 4 HOURS PRN
Status: DISCONTINUED | OUTPATIENT
Start: 2025-02-05 | End: 2025-02-07

## 2025-02-05 RX ORDER — MEMANTINE HYDROCHLORIDE 10 MG/1
10 TABLET ORAL DAILY
Status: DISCONTINUED | OUTPATIENT
Start: 2025-02-05 | End: 2025-02-07

## 2025-02-05 RX ORDER — DOCUSATE SODIUM 100 MG/1
100 CAPSULE, LIQUID FILLED ORAL 2 TIMES DAILY
Status: DISCONTINUED | OUTPATIENT
Start: 2025-02-05 | End: 2025-02-07

## 2025-02-05 RX ORDER — HYDROMORPHONE HYDROCHLORIDE 1 MG/ML
0.4 INJECTION, SOLUTION INTRAMUSCULAR; INTRAVENOUS; SUBCUTANEOUS EVERY 5 MIN PRN
Status: DISCONTINUED | OUTPATIENT
Start: 2025-02-05 | End: 2025-02-05 | Stop reason: HOSPADM

## 2025-02-05 RX ORDER — BISACODYL 10 MG
10 SUPPOSITORY, RECTAL RECTAL
Status: DISCONTINUED | OUTPATIENT
Start: 2025-02-05 | End: 2025-02-07

## 2025-02-05 RX ORDER — ACETAMINOPHEN 500 MG
1000 TABLET ORAL ONCE AS NEEDED
Status: DISCONTINUED | OUTPATIENT
Start: 2025-02-05 | End: 2025-02-05 | Stop reason: HOSPADM

## 2025-02-05 RX ORDER — DIPHENHYDRAMINE HYDROCHLORIDE 50 MG/ML
25 INJECTION INTRAMUSCULAR; INTRAVENOUS EVERY 4 HOURS PRN
Status: DISCONTINUED | OUTPATIENT
Start: 2025-02-05 | End: 2025-02-07

## 2025-02-05 RX ORDER — METOCLOPRAMIDE HYDROCHLORIDE 5 MG/ML
INJECTION INTRAMUSCULAR; INTRAVENOUS AS NEEDED
Status: DISCONTINUED | OUTPATIENT
Start: 2025-02-05 | End: 2025-02-05 | Stop reason: SURG

## 2025-02-05 RX ORDER — ROSUVASTATIN CALCIUM 5 MG/1
5 TABLET, COATED ORAL NIGHTLY
Status: DISCONTINUED | OUTPATIENT
Start: 2025-02-05 | End: 2025-02-07

## 2025-02-05 RX ORDER — OXYCODONE HYDROCHLORIDE 10 MG/1
10 TABLET ORAL EVERY 4 HOURS PRN
Status: DISCONTINUED | OUTPATIENT
Start: 2025-02-05 | End: 2025-02-07

## 2025-02-05 RX ORDER — ONDANSETRON 2 MG/ML
INJECTION INTRAMUSCULAR; INTRAVENOUS AS NEEDED
Status: DISCONTINUED | OUTPATIENT
Start: 2025-02-05 | End: 2025-02-05 | Stop reason: SURG

## 2025-02-05 RX ORDER — DIAZEPAM 5 MG/1
5 TABLET ORAL NIGHTLY PRN
Qty: 20 TABLET | Refills: 0 | Status: SHIPPED | OUTPATIENT
Start: 2025-02-05

## 2025-02-05 RX ORDER — ACETAMINOPHEN 10 MG/ML
INJECTION, SOLUTION INTRAVENOUS
Status: COMPLETED
Start: 2025-02-05 | End: 2025-02-05

## 2025-02-05 RX ORDER — DICYCLOMINE HYDROCHLORIDE 10 MG/1
10 CAPSULE ORAL 2 TIMES DAILY
Status: DISCONTINUED | OUTPATIENT
Start: 2025-02-05 | End: 2025-02-07

## 2025-02-05 RX ORDER — DIPHENHYDRAMINE HCL 25 MG
25 CAPSULE ORAL EVERY 4 HOURS PRN
Status: DISCONTINUED | OUTPATIENT
Start: 2025-02-05 | End: 2025-02-07

## 2025-02-05 RX ORDER — CYCLOBENZAPRINE HCL 5 MG
5 TABLET ORAL 3 TIMES DAILY PRN
Qty: 30 TABLET | Refills: 0 | Status: SHIPPED | OUTPATIENT
Start: 2025-02-05

## 2025-02-05 RX ORDER — ACETAMINOPHEN 500 MG
1000 TABLET ORAL EVERY 8 HOURS PRN
Qty: 90 TABLET | Refills: 0 | Status: SHIPPED | OUTPATIENT
Start: 2025-02-05

## 2025-02-05 RX ORDER — TRAZODONE HYDROCHLORIDE 100 MG/1
100 TABLET ORAL NIGHTLY
Status: DISCONTINUED | OUTPATIENT
Start: 2025-02-05 | End: 2025-02-07

## 2025-02-05 RX ORDER — CYCLOBENZAPRINE HCL 5 MG
5 TABLET ORAL EVERY 6 HOURS PRN
Status: DISCONTINUED | OUTPATIENT
Start: 2025-02-05 | End: 2025-02-07

## 2025-02-05 RX ORDER — PROCHLORPERAZINE EDISYLATE 5 MG/ML
5 INJECTION INTRAMUSCULAR; INTRAVENOUS EVERY 8 HOURS PRN
Status: DISCONTINUED | OUTPATIENT
Start: 2025-02-05 | End: 2025-02-07

## 2025-02-05 RX ORDER — SENNOSIDES 8.6 MG
17.2 TABLET ORAL NIGHTLY
Status: DISCONTINUED | OUTPATIENT
Start: 2025-02-05 | End: 2025-02-07

## 2025-02-05 RX ORDER — DIAZEPAM 5 MG/1
5 TABLET ORAL EVERY 6 HOURS PRN
Status: DISCONTINUED | OUTPATIENT
Start: 2025-02-05 | End: 2025-02-07

## 2025-02-05 RX ORDER — LORAZEPAM 1 MG/1
2 TABLET ORAL NIGHTLY PRN
Status: DISCONTINUED | OUTPATIENT
Start: 2025-02-05 | End: 2025-02-05

## 2025-02-05 RX ORDER — METHOCARBAMOL 100 MG/ML
1000 INJECTION, SOLUTION INTRAMUSCULAR; INTRAVENOUS ONCE
Status: COMPLETED | OUTPATIENT
Start: 2025-02-05 | End: 2025-02-05

## 2025-02-05 RX ORDER — METHYLPHENIDATE HYDROCHLORIDE 10 MG/1
15 TABLET ORAL
Status: DISCONTINUED | OUTPATIENT
Start: 2025-02-05 | End: 2025-02-07

## 2025-02-05 RX ORDER — SODIUM CHLORIDE, SODIUM LACTATE, POTASSIUM CHLORIDE, CALCIUM CHLORIDE 600; 310; 30; 20 MG/100ML; MG/100ML; MG/100ML; MG/100ML
INJECTION, SOLUTION INTRAVENOUS CONTINUOUS
Status: DISCONTINUED | OUTPATIENT
Start: 2025-02-05 | End: 2025-02-05 | Stop reason: HOSPADM

## 2025-02-05 RX ORDER — ONDANSETRON 2 MG/ML
4 INJECTION INTRAMUSCULAR; INTRAVENOUS EVERY 6 HOURS PRN
Status: DISCONTINUED | OUTPATIENT
Start: 2025-02-05 | End: 2025-02-05 | Stop reason: HOSPADM

## 2025-02-05 RX ORDER — KETOROLAC TROMETHAMINE 30 MG/ML
INJECTION, SOLUTION INTRAMUSCULAR; INTRAVENOUS AS NEEDED
Status: DISCONTINUED | OUTPATIENT
Start: 2025-02-05 | End: 2025-02-05 | Stop reason: SURG

## 2025-02-05 RX ORDER — PANTOPRAZOLE SODIUM 40 MG/1
40 TABLET, DELAYED RELEASE ORAL
Status: DISCONTINUED | OUTPATIENT
Start: 2025-02-06 | End: 2025-02-07

## 2025-02-05 RX ORDER — BUPIVACAINE HYDROCHLORIDE AND EPINEPHRINE 2.5; 5 MG/ML; UG/ML
INJECTION, SOLUTION EPIDURAL; INFILTRATION; INTRACAUDAL; PERINEURAL AS NEEDED
Status: DISCONTINUED | OUTPATIENT
Start: 2025-02-05 | End: 2025-02-05 | Stop reason: HOSPADM

## 2025-02-05 RX ORDER — METHOCARBAMOL 100 MG/ML
INJECTION, SOLUTION INTRAMUSCULAR; INTRAVENOUS
Status: COMPLETED
Start: 2025-02-05 | End: 2025-02-05

## 2025-02-05 RX ORDER — TRANEXAMIC ACID 10 MG/ML
INJECTION, SOLUTION INTRAVENOUS AS NEEDED
Status: DISCONTINUED | OUTPATIENT
Start: 2025-02-05 | End: 2025-02-05 | Stop reason: SURG

## 2025-02-05 RX ORDER — ONDANSETRON 2 MG/ML
4 INJECTION INTRAMUSCULAR; INTRAVENOUS EVERY 6 HOURS PRN
Status: DISCONTINUED | OUTPATIENT
Start: 2025-02-05 | End: 2025-02-07

## 2025-02-05 RX ORDER — FAMOTIDINE 20 MG/1
40 TABLET, FILM COATED ORAL DAILY PRN
Status: DISCONTINUED | OUTPATIENT
Start: 2025-02-05 | End: 2025-02-07

## 2025-02-05 RX ORDER — ACETAMINOPHEN 10 MG/ML
1000 INJECTION, SOLUTION INTRAVENOUS ONCE
Status: COMPLETED | OUTPATIENT
Start: 2025-02-05 | End: 2025-02-05

## 2025-02-05 RX ORDER — POLYETHYLENE GLYCOL 3350 17 G/17G
17 POWDER, FOR SOLUTION ORAL DAILY PRN
Status: DISCONTINUED | OUTPATIENT
Start: 2025-02-05 | End: 2025-02-07

## 2025-02-05 RX ORDER — ACETAMINOPHEN 500 MG
1000 TABLET ORAL EVERY 8 HOURS SCHEDULED
Status: DISCONTINUED | OUTPATIENT
Start: 2025-02-05 | End: 2025-02-07

## 2025-02-05 RX ORDER — DEXAMETHASONE SODIUM PHOSPHATE 4 MG/ML
VIAL (ML) INJECTION AS NEEDED
Status: DISCONTINUED | OUTPATIENT
Start: 2025-02-05 | End: 2025-02-05 | Stop reason: SURG

## 2025-02-05 RX ORDER — HYDROCODONE BITARTRATE AND ACETAMINOPHEN 5; 325 MG/1; MG/1
1 TABLET ORAL ONCE AS NEEDED
Status: DISCONTINUED | OUTPATIENT
Start: 2025-02-05 | End: 2025-02-05 | Stop reason: HOSPADM

## 2025-02-05 RX ORDER — SODIUM CHLORIDE, SODIUM LACTATE, POTASSIUM CHLORIDE, CALCIUM CHLORIDE 600; 310; 30; 20 MG/100ML; MG/100ML; MG/100ML; MG/100ML
INJECTION, SOLUTION INTRAVENOUS CONTINUOUS
Status: DISCONTINUED | OUTPATIENT
Start: 2025-02-05 | End: 2025-02-07

## 2025-02-05 RX ORDER — SODIUM CHLORIDE 9 MG/ML
INJECTION, SOLUTION INTRAVENOUS CONTINUOUS
Status: DISCONTINUED | OUTPATIENT
Start: 2025-02-05 | End: 2025-02-07

## 2025-02-05 RX ORDER — HYDROMORPHONE HYDROCHLORIDE 1 MG/ML
0.8 INJECTION, SOLUTION INTRAMUSCULAR; INTRAVENOUS; SUBCUTANEOUS EVERY 2 HOUR PRN
Status: DISCONTINUED | OUTPATIENT
Start: 2025-02-05 | End: 2025-02-07

## 2025-02-05 RX ORDER — SODIUM CHLORIDE 9 MG/ML
INJECTION, SOLUTION INTRAVENOUS CONTINUOUS PRN
Status: DISCONTINUED | OUTPATIENT
Start: 2025-02-05 | End: 2025-02-05 | Stop reason: SURG

## 2025-02-05 RX ORDER — ONDANSETRON 4 MG/1
4 TABLET, FILM COATED ORAL EVERY 12 HOURS PRN
Qty: 10 TABLET | Refills: 0 | Status: SHIPPED | OUTPATIENT
Start: 2025-02-05

## 2025-02-05 RX ORDER — LIDOCAINE HYDROCHLORIDE 10 MG/ML
INJECTION, SOLUTION EPIDURAL; INFILTRATION; INTRACAUDAL; PERINEURAL AS NEEDED
Status: DISCONTINUED | OUTPATIENT
Start: 2025-02-05 | End: 2025-02-05 | Stop reason: SURG

## 2025-02-05 RX ORDER — NALOXONE HYDROCHLORIDE 0.4 MG/ML
0.08 INJECTION, SOLUTION INTRAMUSCULAR; INTRAVENOUS; SUBCUTANEOUS AS NEEDED
Status: DISCONTINUED | OUTPATIENT
Start: 2025-02-05 | End: 2025-02-05 | Stop reason: HOSPADM

## 2025-02-05 RX ORDER — ROCURONIUM BROMIDE 10 MG/ML
INJECTION, SOLUTION INTRAVENOUS AS NEEDED
Status: DISCONTINUED | OUTPATIENT
Start: 2025-02-05 | End: 2025-02-05 | Stop reason: SURG

## 2025-02-05 RX ORDER — PHENYLEPHRINE HCL 10 MG/ML
VIAL (ML) INJECTION AS NEEDED
Status: DISCONTINUED | OUTPATIENT
Start: 2025-02-05 | End: 2025-02-05 | Stop reason: SURG

## 2025-02-05 RX ORDER — OXYCODONE HYDROCHLORIDE 5 MG/1
5 TABLET ORAL EVERY 4 HOURS PRN
Status: DISCONTINUED | OUTPATIENT
Start: 2025-02-05 | End: 2025-02-07

## 2025-02-05 RX ADMIN — ROCURONIUM BROMIDE 10 MG: 10 INJECTION, SOLUTION INTRAVENOUS at 07:40:00

## 2025-02-05 RX ADMIN — SODIUM CHLORIDE: 9 INJECTION, SOLUTION INTRAVENOUS at 07:47:00

## 2025-02-05 RX ADMIN — DEXAMETHASONE SODIUM PHOSPHATE 8 MG: 4 MG/ML VIAL (ML) INJECTION at 07:53:00

## 2025-02-05 RX ADMIN — EPHEDRINE SULFATE 10 MG: 50 INJECTION INTRAVENOUS at 08:26:00

## 2025-02-05 RX ADMIN — SODIUM CHLORIDE, SODIUM LACTATE, POTASSIUM CHLORIDE, CALCIUM CHLORIDE: 600; 310; 30; 20 INJECTION, SOLUTION INTRAVENOUS at 10:05:00

## 2025-02-05 RX ADMIN — EPHEDRINE SULFATE 10 MG: 50 INJECTION INTRAVENOUS at 08:15:00

## 2025-02-05 RX ADMIN — LIDOCAINE HYDROCHLORIDE 50 MG: 10 INJECTION, SOLUTION EPIDURAL; INFILTRATION; INTRACAUDAL; PERINEURAL at 07:40:00

## 2025-02-05 RX ADMIN — KETOROLAC TROMETHAMINE 30 MG: 30 INJECTION, SOLUTION INTRAMUSCULAR; INTRAVENOUS at 09:40:00

## 2025-02-05 RX ADMIN — EPHEDRINE SULFATE 10 MG: 50 INJECTION INTRAVENOUS at 08:34:00

## 2025-02-05 RX ADMIN — PHENYLEPHRINE HCL 100 MCG: 10 MG/ML VIAL (ML) INJECTION at 07:48:00

## 2025-02-05 RX ADMIN — TRANEXAMIC ACID 1000 MG: 10 INJECTION, SOLUTION INTRAVENOUS at 07:55:00

## 2025-02-05 RX ADMIN — MIDAZOLAM HYDROCHLORIDE 2 MG: 1 INJECTION INTRAMUSCULAR; INTRAVENOUS at 07:36:00

## 2025-02-05 RX ADMIN — ONDANSETRON 4 MG: 2 INJECTION INTRAMUSCULAR; INTRAVENOUS at 09:40:00

## 2025-02-05 RX ADMIN — METOCLOPRAMIDE HYDROCHLORIDE 10 MG: 5 INJECTION INTRAMUSCULAR; INTRAVENOUS at 07:53:00

## 2025-02-05 NOTE — OPERATIVE REPORT
SURGEON: Ismael Perez MD    DATE OF SURGERY: 2/5/2025    PREOPERATIVE DIAGNOSIS:   Lumbar stenosis with radiculopathy    POSTOPERATIVE DIAGNOSIS:   Lumbar stenosis with radiculopathy    NAME OF OPERATION:  1. L4-5 posterior spinal instrumentation with Atec (29301)   2. L4-5 posterior interbody and posterolateral fusion (51274)  3. Insertion of biomechanical device (30535)  4. L4-5 laminectomy with medial facetectomy and foraminotomy (57197)  5. Use of allograft (69083)  6. Use of autograft (38060)    ANESTHESIA: General endotracheal.     ESTIMATED BLOOD LOSS: 50 mL.     DISPOSITION: Stable, extubated to PACU.     INDICATIONS: This patient is a 64 year old-year-old female with a long-standing history of lumbar radiculopathy . Imaging demonstrated degenerative disc disease at L4-5 with asymmetric disc collapse and severe foraminal stenosis. She had failed conservative measures of activity modification, oral medications, injection-type therapy, and having subjective, objective and corroborative imaging evidence of radiculopathy from spinal stenosis, surgical treatment was offered. The patient understood the benefits, alternatives and risks of the procedure with risks including but not limited to risk of anesthesia, infection, nerve damage, blood vessel damage, dural tear, blindness, need for further surgery, failure to relieve symptoms, adjacent level problems. She elected to proceed after undergoing preoperative medical clearance.          The risk of non union, junctional degeneration, the possible need for further surgery, the risk of instrumentation failure, as well as chronic pain were also explained.         DESCRIPTION OF PROCEDURE: The patient was identified in the preoperative holding area. The site of surgery and consent verified with the patient. Patient was then brought to the Operating Room. General anesthesia was administered. The patient was intubated without difficulty. WELLINGTON and SCD stockings were placed  for mechanical DVT prophylaxis. She was then positioned prone onto a Sam table with bony prominences well padded.  Abdomen was hanging free. Her back was prepped and draped in the usual sterile standard fashion. A hard stop surgical timeout performed with all members of the Operating Room staff, and IV Ancef given prior to surgical start.    We first began with the placement of pedicle screw instrumentation through a Amna type approach. AP fluoroscopy was utilized with metallic markers to garrett the percutaneous paths of the pedicle screws at L4 and L5. A 2-inch paraspinal incision was made bilaterally to connect the proposed entrance sites of the pedicles through a Amna incision. A Amna approach was performed bilaterally, incising the paraspinal lumbodorsal fascia and then the muscle fascia. The L4 and L5 transverse processes were palpated bilaterally. AP fluoroscopy was utilized to confirm the starting points of the pedicles with a guidewire. Jamshidi needle was  placed in the lateral border of the pedicle at the junction of the transverse process, mamillary process and supra-articular process. The Jamshidi needle was advanced bilaterally under AP fluoroscopy for approximately 25 mm and never violated the medial aspect of the pedicle on AP fluoroscopy. The C-arm was then placed in lateral position. The Jamshidi needles were identified at posterior 3rd of the body and guidewires were advanced further into the body.  We placed 6.5 x 45 mm cannulated screws bilaterally at L4 and L5. The guidewires were then removed, and screw placement was confirmed down the axis of the L4 and L5 pedicles with AP, lateral, and oblique fluoroscopy.     We then proceeded with the TLIF procedure on the right side, which was the more symptomatic side. The pedicle based retractor system was utilized, and the retractor was placed with medial retraction blade, which provided excellent visualization of the L4-5 facet joint, L4  lamina, and L4 pars interarticularis. Lamina and the infra-articular process of L4 was removed using a esha and osteotome, extending cephalad to the level of the pars and medial to the spinal laminar junction. The portion of the superior articular process that was cephalad to the pedicle was similarly removed with esha and osteotome. This was completed with #3 and #4 Kerrisons.      At this point, the dural sac lateral border of the traversing nerve root and exiting nerve root and disks were well visualized. We proceeded with discectomy. Bipolar electrocautery was utilized to control the epidural venous plexus. The L4-5 anulus was then well visualized. The dural sac was retracted with the root retractor. The 15 blade and a long handle was utilized to perform an annulotomy. I then began the discectomy with T-handle spreaders. I started with an 6 mm , and increased until the appropriate size was obtained. Curved curettes were utilized to remove the vast majority of the nucleus and prepare the endplates. The endplates were then well prepared and the discectomy was thorough. The anterior anulus was well visualized. I then selected the appropriately sized TLIF cage. At this point, I then thoroughly irrigated the disk space. We did this with over a liter of normal saline irrigation. I placed allograft in combination with local autograft in the anterior aspect of the disk space. I then placed an expandable cage under lateral fluoroscopy. The placement of the cage demonstrated good position and was expanded under fluorsoscopy. After the cage was placed, I then back-filled the posterior aspect of the disk space with local autograft as well as allograft. I did this and packed the material with an impactor device so that there was good fit of all the bone graft. At this point, I was happy with the placement of the cage and the preparation of the disk space and grafting of the disk space. I visualized and palpated the  thecal sac as well as L5 nerve root to ensure it was free.      I then turned my attention to the contralateral side. I utilized a esha to decorticate the left L4-5 facet joint thoroughly. There was excellent decortication. I then placed the remainder of the local autograft and allograft in a mixture inside the facet joint and packed this under pressure.I then removed the retractor and placed the modular screw heads on the screw shanks.      At this point, I was happy with the decompression and the position of the interbody cage and placement of the pedicle screws bilaterally. I then placed the appropriately sized rods through the Amna incisions to connect the pedicle screws bilaterally. The pedicle screws were compressed bilaterally and the set screws were tightened to allow for compression and increased lordosis, as well as to compress the interbody cage anteriorly. This was performed under lateral fluoroscopy. I then used the final  to final tightened all set screws with excellent torque. I then performed the final AP and lateral radiographs and was happy with position of the cage of the spine and all implants. I then closed the muscle fascia and lumbodorsal fascia independently with #1 interrupted Vicryl bilaterally, the dermis with 2-0 Vicryl, and the skin with 3-0 Monocryl and Dermabond.     Neuromonitoring was performed with SSEP and EMG, and there were no changes throughout the entirety of the case.    I attest I was present for and performed all key and critical aspects of the procedure.    The aid of assistant Mini De La Garza was needed for patient positioning, preparation, drape, retraction,  wound closure, dressing application and critical portions of the procedure.

## 2025-02-05 NOTE — CM/SW NOTE
02/05/25 1200   CM/SW Referral Data   Referral Source Social Work (self-referral)   Reason for Referral Discharge planning   Informant EMR;Clinical Staff Member   Discharge Needs   Anticipated D/C needs Home health care       Patient is a 63 y/o woman admitted s/p TLIF.  Pt with pre-operative plan for [facility] [place].  Therapy evals pending. Jody from [facility] confirmed pt accepted for [facility] services at [place].  Will follow for therapy evals to ensure HH as most appropriate DC plan.  No other DC needs/concerns identified at this time.  / to remain available for support and/or discharge planning.     Leticia Martins, [facility]  Discharge Planner  [phone]

## 2025-02-05 NOTE — INTERVAL H&P NOTE
Pre-op Diagnosis: Lumbar radiculopathy [M54.16]    The above referenced H&P was reviewed by KWAKU Pablo on 2/5/2025, the patient was examined and no significant changes have occurred in the patient's condition since the H&P was performed.  I discussed with the patient and/or legal representative the potential benefits, risks and side effects of this procedure; the likelihood of the patient achieving goals; and potential problems that might occur during recuperation.  I discussed reasonable alternatives to the procedure, including risks, benefits and side effects related to the alternatives and risks related to not receiving this procedure.  We will proceed with procedure as planned.

## 2025-02-05 NOTE — BRIEF OP NOTE
Pre-Operative Diagnosis: Lumbar radiculopathy [M54.16]     Post-Operative Diagnosis: Lumbar radiculopathy [M54.16]      Procedure Performed:   LUMBAR 4-LUMBAR 5 MINIMALLY INVASIVE SPINE TRANSFORAMINAL LUMBAR INTERBODY FUSION    Surgeons and Role:     * Ismael Perez MD - Primary    Assistant(s):  APN: Mini De La Garza APRN     Surgical Findings: lumbar stenosis     Specimen: none     Estimated Blood Loss: Blood Output: 50 mL (2/5/2025  9:38 AM)      Plan    Antibiotics: Cefazolin x 24 hours  Anticoagulation: SCDs and early ambulation  Drain: None  Activity: Out of bed as tolerated  Brace: None  Medical history, allergies, and medications:In EPIC  Pain control: Routine postop  Anticipated discharge: When pain control and activity tolerance allow. Should be 1-2 days  Consults: PT. Hospitalist  Other information: None  Xray: POD1 standing Lumbar spine AP/LAT/Spot LAT        KWAKU Pablo  2/5/2025  10:03 AM

## 2025-02-05 NOTE — PLAN OF CARE
A&Ox4. VSS. On room air. /IS. Teds/SCDs. Ankle pumps encouraged. Last BM 2/4. Voiding. Denies need for pain medication. Dressings x2 to back C/D/I, gel ice in place. Ambulating with min assist with walker. Plan is for PT/OT eval. Patient and family updated and in agreement with plan of care. Safety precautions in place. Instructed patient to call for assistance, call light within reach.

## 2025-02-05 NOTE — ANESTHESIA PREPROCEDURE EVALUATION
PRE-OP EVALUATION    Patient Name: Cb Webster    Admit Diagnosis: Lumbar radiculopathy [M54.16]    Pre-op Diagnosis: Lumbar radiculopathy [M54.16]    LUMBAR 4-LUMBAR 5 MINIMALLY INVASIVE SPINE TRANSFORAMINAL LUMBAR INTERBODY FUSION    Anesthesia Procedure: LUMBAR 4-LUMBAR 5 MINIMALLY INVASIVE SPINE TRANSFORAMINAL LUMBAR INTERBODY FUSION (Spine Lumbar)  INTRAOPERATIVE NEURO MONITORING    Surgeons and Role:     * Ismael Perez MD - Primary    Pre-op vitals reviewed.        Body mass index is 34.2 kg/m².    Current medications reviewed.  Hospital Medications:  No current facility-administered medications on file as of .       Outpatient Medications:   Prescriptions Prior to Admission[1]    Allergies: Sulfa antibiotics      Anesthesia Evaluation        Anesthetic Complications           GI/Hepatic/Renal      (+) GERD     (+) PUD     (+) liver disease                 Cardiovascular      ECG reviewed.          (+) obesity  (+) hypertension     (+) CAD                                Endo/Other    Negative endo/other ROS.                              Pulmonary                    (+) sleep apnea       Neuro/Psych      (+) depression  (+) anxiety           (+) headaches           11/2023 ECHO findings  Conclusions:     1. Left ventricle: The cavity size was normal. Wall thickness was normal.      Systolic function was normal. The estimated ejection fraction was 60-65%,      by 3D assessment. Wall motion is normal; there are no regional wall      motion abnormalities. Left ventricular diastolic function parameters were      normal.   2. Right ventricle: The cavity size was normal. Systolic function was      normal.   3. Left atrium: The left atrial volume was normal.   4. Aortic valve: The valve was trileaflet. There was mild regurgitation.   5. Aortic root: The aortic root was normal.   6. Ascending aorta: The ascending aorta was normal.   Impressions:  No previous study from [facility] was   available  for comparison.   *           Past Surgical History:   Procedure Laterality Date          x 2    Cholecystectomy      Colonoscopy      D & c      Ect provided  [id number]    Egd      Laparoscopic cholecystectomy      Needle biopsy right  2015    benign breast    Other surgical history      C3-C4 anterior discectomy    Other surgical history  2018    [facility]    Other surgical history  2019    radiofrequency abalsion lumbar    Removal gallbladder  2020    Skin surgery      Spine surgery procedure unlisted      Anterior cervical discectomy    Tonsillectomy  1966?     Social History     Socioeconomic History    Marital status:    Occupational History    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[employer]   Tobacco Use    Smoking status: Never    Smokeless tobacco: Never   Vaping Use    Vaping status: Never Used   Substance and Sexual Activity    Alcohol use: Not Currently    Drug use: Never   Other Topics Concern    Caffeine Concern Yes     Comment: Decaf Coffee    Exercise No    Seat Belt Yes    Special Diet No    Stress Concern Yes    Weight Concern Yes     Comment: L125lb  G85lbs  L45lbs     History   Drug Use Unknown     Available pre-op labs reviewed.  Lab Results   Component Value Date    WBC 7.6 2024    RBC 4.21 2024    HGB 12.6 2024    HCT 38.0 2024    MCV 90.3 2024    MCH 29.9 2024    MCHC 33.2 2024    RDW 12.5 2024    .0 2024     Lab Results   Component Value Date     2025    K 3.9 2025     2025    CO2 23.0 2025    BUN 20 2024    CREATSERUM 0.97 2024     (H) 2024    CA 9.0 2024     Lab Results   Component Value Date    INR 1.05 2025         Airway      Mallampati: III  Mouth opening: >3 FB  TM distance: 4 - 6 cm  Neck ROM: full Cardiovascular    Cardiovascular exam normal.         Dental    Dentition  appears grossly intact         Pulmonary    Pulmonary exam normal.                 Other findings              ASA: 3   Plan: general  NPO status verified and patient meets guidelines.        Comment: Invasive monitoring and additional PIV access r/b/a d/w patient.  Plan/risks discussed with: patient                Present on Admission:  **None**             [1]   No medications prior to admission.

## 2025-02-05 NOTE — ANESTHESIA PROCEDURE NOTES
Peripheral IV  Date/Time: 2/5/2025 7:47 AM  Inserted by: Yuki Thomason CRNA    Placement  Needle size: 20 G  Laterality: left  Location: hand  Local anesthetic: none  Site prep: alcohol  Technique: anatomical landmarks  Attempts: 1

## 2025-02-05 NOTE — ANESTHESIA POSTPROCEDURE EVALUATION
[facility]    Cb Webster Patient Status:  Inpatient   Age/Gender 64 year old female MRN [id number]   Location [facility] SURGERY Attending Ismael Perez MD   Hosp Day # 0 PCP Colton Schaefer MD       Anesthesia Post-op Note    LUMBAR 4-LUMBAR 5 MINIMALLY INVASIVE SPINE TRANSFORAMINAL LUMBAR INTERBODY FUSION    Procedure Summary       Date: 02/05/25 Room / Location:  MAIN OR  /  MAIN OR    Anesthesia Start: 0736 Anesthesia Stop: 1005    Procedures:       LUMBAR 4-LUMBAR 5 MINIMALLY INVASIVE SPINE TRANSFORAMINAL LUMBAR INTERBODY FUSION (Spine Lumbar)      INTRAOPERATIVE NEURO MONITORING (Spine Lumbar) Diagnosis:       Lumbar radiculopathy      (Lumbar radiculopathy [M54.16])    Surgeons: Ismael Perez MD Anesthesiologist: Cricket Castro MD    Anesthesia Type: general ASA Status: 3            Anesthesia Type: general    Vitals Value Taken Time   /58 02/05/25 1010   Temp 99.3 °F (37.4 °C) 02/05/25 1010   Pulse 69 02/05/25 1010   Resp 16 02/05/25 1010   SpO2 93 % 02/05/25 1010           Patient Location: PACU    Anesthesia Type: general    Airway Patency: patent    Postop Pain Control: adequate    Mental Status: preanesthetic baseline    Nausea/Vomiting: none    Cardiopulmonary/Hydration status: stable euvolemic    Complications: no apparent anesthesia related complications    Postop vital signs: stable    Dental Exam: Unchanged from Preop

## 2025-02-05 NOTE — CONSULTS
[place] HOSPITALIST  CONSULT     Cb Webster Patient Status:  Inpatient    1960 MRN [id number]   Location [facility] 3SW-A Attending Ismael Perez MD   Hosp Day # 0 PCP Colton Schaefer MD     Reason for consult: Medical management    Requested by: Dr. Perez    Subjective:   History of Present Illness:     Cb Webster is a 64 year old female with past medical history significant for CAD, depression, anxiety, OCD, ADHD, migraines, PUD who was admitted following an elective lumbar fusion.  Pt is currently post op day 0.  She tolerated the procedure well.  She denies cp, sob, nausea, vomiting, fever, chills, lightheadedness, dizziness.  Pain is well controlled.    History/Other:    Past Medical History:  Past Medical History:    Anxiety    Aortic regurgitation    mild    Back pain    Calculus of kidney    Cardiac calcification (HCC) - CAC score of 5.24 seen on 22 CT Heart Scan protocol    Colon polyps    Depression    Disorder of liver    Fatty liver    Diverticulosis    Gastroparesis    GERD (gastroesophageal reflux disease)    Hemorrhoids    High blood pressure    History of depression    Major depressive disorder & anxiety    History of eating disorder    Binge eating    Hyperlipidemia    Migraines    OCD (obsessive compulsive disorder)    MODE (obstructive sleep apnea)    Osteoarthritis    Parkinsonism (HCC)    Peptic ulcer disease    Stool incontinence    Soft barely formed stool, not detecting urgency    Tendonitis of shoulder, right     Past Surgical History:   Past Surgical History:   Procedure Laterality Date          x 2    Cholecystectomy      Colonoscopy      D & c      Ect provided  [id number]    Egd      Laparoscopic cholecystectomy      Needle biopsy right  2015    benign breast    Other surgical history      C3-C4 anterior discectomy    Other [facility] history  [facility]    Other surgical history  2019    radiofrequency abalsion lumbar    Removal  gallbladder  2020    Skin surgery      Spine surgery procedure unlisted      Anterior cervical discectomy    Tonsillectomy  1966?      Family History:   Family History   Problem Relation Age of Onset    Hypertension Father     Heart Attack Father          at 48 years    Heart Disease Father     Alcohol and Other Disorders Associated Father     Depression Father     Other (ALS) Mother     Hypertension Mother         Diagnosed at 89 yo with ALS  at 91    Personality Disorder Daughter     Dementia Maternal Grandmother     Obesity Maternal Grandmother     Dementia Maternal Grandfather     Obesity Paternal Grandfather     Cancer Sister         Kidney, chronic lymp… leukemia    Heart Attack Sister         Kidney cancer, chronic lymphocytic leukemia    Ulcerative Colitis Brother     Cancer Brother         Kidney     Social History:    reports that she has never smoked. She has never used smokeless tobacco. She reports that she does not currently use alcohol. She reports that she does not use drugs.     Allergies: Allergies[1]    Medications:  Medications Ordered Prior to Encounter[2]    Review of Systems:   A comprehensive review of systems was completed.    Pertinent positives and negatives noted in the HPI.    Objective:   Physical Exam:    /62 (BP Location: Left arm)   Pulse 66   Temp 98.2 °F (36.8 °C) (Oral)   Resp 18   Ht 5' 1\" (1.549 m)   Wt 187 lb (84.8 kg)   LMP  (LMP Unknown)   SpO2 99%   BMI 35.33 kg/m²   General: No acute distress, Alert  Respiratory: No rhonchi, no wheezes  Cardiovascular: S1, S2. Regular rate and rhythm  Abdomen: Soft, NT/ND, +BS  Neuro: No new focal deficits  Extremities: No edema      Results:    Labs:      Labs Last 24 Hours:  No results for input(s): \"WBC\", \"HGB\", \"MCV\", \"PLT\", \"BAND\", \"INR\" in the last 168 hours.    Invalid input(s): \"LYM#\", \"MONO#\", \"BASOS#\", \"EOSIN#\"    No results for input(s): \"GLU\", \"BUN\", \"CREATSERUM\", \"GFRAA\", \"GFRNAA\", \"CA\", \"ALB\",  \"NA\", \"K\", \"CL\", \"CO2\", \"ALKPHO\", \"AST\", \"ALT\", \"BILT\", \"TP\" in the last 168 hours.    No results for input(s): \"PTP\", \"INR\" in the last 168 hours.    No results for input(s): \"TROP\", \"CK\" in the last 168 hours.      Imaging: Imaging data reviewed in Epic.    Assessment & Plan:      #Lumbar stenosis with radiculopathy  S/p L4-L5 posterior spinal intstrumentionn with interbody and fusion, laminectomy with medial facetectomy and foraminotomy  Per orthospine   Pain control    #HTN, controlled    #DL, statin    #PUD/GERD  Protonix, Pepcid PRN    #ISHA, anxiety, ADHD, OCD  On Ritalin, Prozac, Buspar, Ativan PRN at home    #Migraines    #CAD, stable    #MODE, not on cpap, has lost > 100 lbs since dx, consider repeat sleep study if needed vs removing this dx from pt's chart    #Obesity, BMI 35      Plan of care discussed with pt and RN.    Vish Azevedo MD  2/5/2025    The 21st Century Cures Act makes medical notes like these available to patients in the interest of transparency. Please be advised this is a medical document. Medical documents are intended to carry relevant information, facts as evident, and the clinical opinion of the practitioner. The medical note is intended as peer to peer communication and may appear blunt or direct. It is written in medical language and may contain abbreviations or verbiage that are unfamiliar.            [1]   Allergies  Allergen Reactions    Sulfa Antibiotics NAUSEA ONLY   [2]   Current Facility-Administered Medications on File Prior to Encounter   Medication Dose Route Frequency Provider Last Rate Last Admin    [COMPLETED] iopamidol 76% (ISOVUE-370) injection for power injector  80 mL Intravenous ONCE PRN Colton Schaefer MD   80 mL at 01/03/25 5404    [COMPLETED] HYDROmorphone (Dilaudid) 1 MG/ML injection 1 mg  1 mg Intravenous Once Gila Ramirez DO   1 mg at 12/24/24 0231    [COMPLETED] ondansetron (Zofran) 4 MG/2ML injection 4 mg  4 mg Intravenous Once Gila Ramirez DO   4 mg at  12/24/24 0230    [COMPLETED] metoclopramide (Reglan) 5 mg/mL injection 10 mg  10 mg Intravenous Once Ashley, Gila, DO   10 mg at 12/24/24 0433    [COMPLETED] diphenhydrAMINE (Benadryl) 50 mg/mL  injection 50 mg  50 mg Intravenous Once AshleyGila, DO   50 mg at 12/24/24 0433    [COMPLETED] ketorolac (Toradol) 15 MG/ML injection 15 mg  15 mg Intramuscular Once Cesar Atkinson MD   15 mg at 12/24/24 0626    [COMPLETED] ketorolac (Toradol) 15 MG/ML injection 15 mg  15 mg Intramuscular Once Anabell Grimm APRN   15 mg at 11/27/24 1416    [COMPLETED] dexAMETHasone PF (Decadron) 10 MG/ML injection 10 mg  10 mg Intramuscular Once Anabell Grimm APRN   10 mg at 11/27/24 1417    [COMPLETED] predniSONE (Deltasone) tab 40 mg  40 mg Oral Once Yuki Haile APRN   40 mg at 11/14/24 1303     Current Outpatient Medications on File Prior to Encounter   Medication Sig Dispense Refill    dicyclomine 10 MG Oral Cap Take 1 capsule (10 mg total) by mouth 2 (two) times daily.      PROPRANOLOL HCL ER 80 MG Oral Capsule SR 24 Hr TAKE 1 CAPSULE (80 MG TOTAL) BY MOUTH EVERY EVENING. 90 capsule 1    busPIRone 15 MG Oral Tab Take 1 tablet (15 mg total) by mouth 2 (two) times daily.      MEMANTINE 10 MG Oral Tab TAKE 1 TABLET BY MOUTH EVERY DAY 90 tablet 0    FLUoxetine HCl 40 MG Oral Cap Take 1 capsule (40 mg total) by mouth daily.      pantoprazole 40 MG Oral Tab EC Take 1 tablet (40 mg total) by mouth before breakfast. 90 tablet 3    famotidine 40 MG Oral Tab Take 1 tablet (40 mg total) by mouth daily as needed for Heartburn. 90 tablet 3    albuterol 108 (90 Base) MCG/ACT Inhalation Aero Soln Inhale 2 puffs into the lungs every 4 (four) hours as needed for Wheezing or Shortness of Breath. 6.7 g 0    traZODone 100 MG Oral Tab Take 1 tablet (100 mg total) by mouth nightly.      SPRAVATO, 84 MG DOSE, 28 MG/DEVICE Nasal Solution Therapy Pack 84 mg by Nasal route every 14 (fourteen) days.      LORazepam 2 MG Oral Tab Take 1  tablet (2 mg total) by mouth nightly as needed for Anxiety.      SUMAtriptan Succinate 6 MG/0.5ML Subcutaneous Solution Auto-injector Inject 0.5 mL (6 mg total) into the skin as needed (for moderate to severe migraine). 6 mL 2    triamcinolone 0.1 % External Cream Apply thin layer to affected area twice a day as needed 45 g 1    hydrocortisone 2.5 % External Cream Apply to AA of FACE BID x 2 weeks, hold 2 weeks, repeat PRN. 30 g 2    ketoconazole 2 % External Cream Apply to AA of FACE BID when not using Hydrocortisone Cream. 30 g 2

## 2025-02-05 NOTE — PROGRESS NOTES
[facility]   part of [facility]     Hospitalist Progress Note     Cb Webster Patient Status:  Inpatient    1960 MRN [id number]   Location [facility] 3SW-A Attending Ismael Perez MD   Hosp Day # 1 PCP Colton Schaefer MD     Chief Complaint: Postop pain    Subjective:     Patient states pain is controlled with meds but increased after getting up with PT. No nausea or vomiting. No chest pain or SOB. She was noted to be hypotensive with SBP 60s when getting up but asymptomatic.     Objective:    Review of Systems:   A comprehensive review of systems was completed; pertinent positive and negatives stated in subjective.    Vital signs:  Temp:  [98.1 °F (36.7 °C)-98.9 °F (37.2 °C)] 98.7 °F (37.1 °C)  Pulse:  [62-71] 66  Resp:  [16-18] 17  BP: ()/(30-85) 91/49  SpO2:  [96 %-99 %] 97 %    Physical Exam:    General: No acute distress, awake and alert  Respiratory: No wheezes, no rhonchi  Cardiovascular: S1, S2, regular rate and rhythm  Abdomen: Soft, Non-tender, non-distended, positive bowel sounds  Neuro: ALEGRIA x 4  Extremities: No edema    Diagnostic Data:    Labs:  Recent Labs   Lab 25  0457   HGB 11.2*     No results for input(s): \"GLU\", \"BUN\", \"CREATSERUM\", \"GFRAA\", \"GFRNAA\", \"CA\", \"ALB\", \"NA\", \"K\", \"CL\", \"CO2\", \"ALKPHO\", \"AST\", \"ALT\", \"BILT\", \"TP\" in the last 168 hours.    eGFR cannot be calculated (Patient's most recent lab result is older than the maximum 7 days allowed.).    No results for input(s): \"TROP\", \"TROPHS\", \"CK\" in the last 168 hours.    No results for input(s): \"PTP\", \"INR\" in the last 168 hours.     Microbiology  No results found for this visit on 25.    Imaging: Reviewed in Epic.    Medications:    sennosides  17.2 mg Oral Nightly    docusate sodium  100 mg Oral BID    acetaminophen  1,000 mg Oral Q8H AMBER    busPIRone  15 mg Oral BID    dicyclomine  10 mg Oral BID    FLUoxetine HCl  40 mg Oral Daily    memantine  10 mg Oral Daily    methylphenidate  15 mg Oral  BID@0800,1200    pantoprazole  40 mg Oral Before breakfast    propranolol  20 mg Oral QID    rosuvastatin  5 mg Oral Nightly    [Held by provider] Esketamine HCl (84 MG Dose)  84 mg Nasal Q14 Days    traZODone  100 mg Oral Nightly       Assessment & Plan:      #Lumbar stenosis with radiculopathy s/p L4-L5 minimally invasive transforaminal lumbar interbody fusion on 2/5/25  -Spine surgery primary  -Lumbar spine x-rays ordered  -PT/OT, encourage IS  -Pain control    #Orthostatic hypotension, asymptomatic  -Wellington hose, abd binder  -IVF  -Hold propranolol if SBP < 110   -States her BP runs low at baseline     #Dyslipidemia  -Statin    #PUD/GERD  -Protonix, Pepcid PRN    #ISHA, ADHD, OCD  -Continue home Ritalin, Prozac, Buspar, Trazodone, PRN Ativan   -Also on Spravato nasal spray at home     #Migraines  -No acute issues  -Typically takes propranolol for this    #CAD  -No acute issues     #MODE, not on cpap, has lost > 100 lbs since dx, consider repeat sleep study if needed vs removing this dx from pt's chart     #Obesity  -Body mass index is 35.33 kg/m².        Jayme Ramirez DO    Supplementary Documentation:     Quality:  DVT Mechanical Prophylaxis: WELLINGTON hose, SCDs,    DVT Pharmacologic Prophylaxis   Medication   None     Code Status: Full Code  Murdock: No urinary catheter in place  DIANE: Tomorrow    Discharge is dependent on: Clinical state, spine surgery recs  At this point Ms. Webster is expected to be discharge to: Home    The 21st Century Cures Act makes medical notes like these available to patients in the interest of transparency. Please be advised this is a medical document. Medical documents are intended to carry relevant information, facts as evident, and the clinical opinion of the practitioner. The medical note is intended as peer to peer communication and may appear blunt or direct. It is written in medical language and may contain abbreviations or verbiage that are unfamiliar.

## 2025-02-05 NOTE — ANESTHESIA PROCEDURE NOTES
Arterial Line    Date/Time: 2/5/2025 7:49 AM    Performed by: Yuki Thomason CRNA  Authorized by: Cricket Castro MD    General Information and Staff    Procedure Start:  2/5/2025 7:49 AM  Procedure End:  2/5/2025 7:51 AM  Anesthesiologist:  Cricket Castro MD  CRNA:  Yuki Thomason CRNA  Performed By:  CRNA  Patient Location:  OR  Indication: continuous blood pressure monitoring and blood sampling needed    Site Identification: surface landmarks    Preanesthetic Checklist: 2 patient identifiers, IV checked, risks and benefits discussed, monitors and equipment checked, pre-op evaluation, timeout performed, anesthesia consent and sterile technique used    Procedure Details    Catheter Size:  20 G  Catheter Length:  1 and 3/4 inch  Catheter Type:  Arrow  Seldinger Technique?: Yes    Laterality:  Left  Site:  Radial artery  Site Prep: chlorhexidine    Line Secured:  Tape and Tegaderm    Assessment    Events: patient tolerated procedure well with no complications      Medications  2/5/2025 7:49 AM      Additional Comments

## 2025-02-05 NOTE — ANESTHESIA PROCEDURE NOTES
Airway  Date/Time: 2/5/2025 7:42 AM  Urgency: elective      General Information and Staff    Patient location during procedure: OR  Anesthesiologist: Cricket Castro MD  Resident/CRNA: Yuki Thomason CRNA  Performed: CRNA   Performed by: Yuki Thomason CRNA  Authorized by: Cricket Castro MD      Indications and Patient Condition  Indications for airway management: anesthesia  Sedation level: deep  Preoxygenated: yes  Patient position: sniffing  Mask difficulty assessment: 1 - vent by mask    Final Airway Details  Final airway type: endotracheal airway      Successful airway: ETT  Cuffed: yes   Successful intubation technique: direct laryngoscopy  Facilitating devices/methods: intubating stylet  Endotracheal tube insertion site: oral  Blade: Aric  Blade size: #3  ETT size (mm): 7.0    Cormack-Lehane Classification: grade IIB - view of arytenoids or posterior of glottis only  Placement verified by: capnometry   Measured from: lips  ETT to lips (cm): 23  Number of attempts at approach: 1

## 2025-02-06 LAB
HCT VFR BLD AUTO: 34.4 %
HGB BLD-MCNC: 11.2 G/DL

## 2025-02-06 PROCEDURE — 99232 SBSQ HOSP IP/OBS MODERATE 35: CPT | Performed by: INTERNAL MEDICINE

## 2025-02-06 RX ORDER — SODIUM CHLORIDE 9 MG/ML
INJECTION, SOLUTION INTRAVENOUS CONTINUOUS
Status: DISCONTINUED | OUTPATIENT
Start: 2025-02-06 | End: 2025-02-07

## 2025-02-06 NOTE — CM/SW NOTE
02/05/25 1200   Discharge disposition   Expected discharge disposition Home-Health   Post Acute Care Provider Residential

## 2025-02-06 NOTE — PLAN OF CARE
Assessment:     Healthy 25 m o  male child  1  Encounter for well child visit at 21 months of age  [de-identified] HIB IPV COMBINED VACCINE IM    HEPATITIS A VACCINE PEDIATRIC / ADOLESCENT 2 DOSE IM   2  Screening for early childhood developmental handicap     3  Encounter for administration and interpretation of Modified Checklist for Autism in Toddlers (M-CHAT)     4  Vaccine counseling     5  Bilateral chronic serous otitis media            Plan:Sees ENT next week for chronic serous otitis, MCHAT reviewed and low risk and will do vaccines-per ASQ keep an eye on communication and speech and language development         1  Anticipatory guidance discussed  Specific topics reviewed: avoid potential choking hazards (large, spherical, or coin shaped foods), avoid small toys (choking hazard), car seat issues, including proper placement and transition to toddler seat at 20 pounds, caution with possible poisons (including pills, plants, cosmetics), child-proof home with cabinet locks, outlet plugs, window guards, and stair safety greenberg, discipline issues (limit-setting, positive reinforcement), fluoride supplementation if unfluoridated water supply, importance of varied diet, never leave unattended, observe while eating; consider CPR classes, obtain and know how to use thermometer, phase out bottle-feeding, Poison Control phone number 9-864.202.5352, read together, risk of child pulling down objects on him/herself, set hot water heater less than 120 degrees F, smoke detectors, teach pedestrian safety, toilet training only possible after 3years old, use of transitional object (rebecca bear, etc ) to help with sleep, whole milk until 3years old then taper to low-fat or skim and wind-down activities to help with sleep  2  Development: appropriate for age    1  Autism screen completed  High risk for autism: no    4  Immunizations today: per orders    Discussed with: mother  The benefits, contraindication and side effects for the POD#0 TLIF. AxO4. VSS on RA. Teds/SCD. Denies nausea during shift, LBM 2/4. Voids w/o issue. Neurovasc intact. Dressing to lower back intact - telfa, tegaderm. Pain control w/ PRN pain meds, muscle relaxants. PT/OT to see. Preop [facility]. Updated on POC. Safety measures placed. Care ongoing.   following vaccines were reviewed: Tetanus, Diphtheria, pertussis, IPV and Hep A    5  Follow-up visit in 6 months for next well child visit, or sooner as needed  Developmental Screening:  Patient was screened for risk of developmental, behavorial, and social delays using the following standardized screening tool: Ages and Stages Questionnaire (ASQ)  Developmental screening result: Pass     Subjective:    Moon Loza is a 25 m o  male who is brought in for this well child visit  Current Issues: none  Current concerns include: none    Well Child Assessment:  History was provided by the mother  Latasha [place] lives with his mother, brother, grandmother and aunt  Nutrition  Types of intake include cow's milk, fish, eggs, cereals, fruits, juices, junk food, meats and vegetables  Junk food includes desserts, chips, fast food and candy  Dental  The patient has a dental home  Elimination  Elimination problems do not include constipation, diarrhea, gas or urinary symptoms  Behavioral  Behavioral issues include biting, hitting, stubbornness, throwing tantrums and waking up at night  Sleep  Sleep location: Pack and Play  Child falls asleep while on own  There are sleep problems  Safety  Home is child-proofed? yes  There is no smoking in the home  Home has working smoke alarms? yes  Home has working carbon monoxide alarms? yes  There is an appropriate car seat in use  Screening  Immunizations are up-to-date  There are risk factors for hearing loss  There are no risk factors for anemia  There are no risk factors for tuberculosis  Social  The caregiver enjoys the child  Childcare is provided at   The childcare provider is a  provider  The child spends 5 days per week at   The child spends 9 hours per day at   Sibling interactions are good         The following portions of the patient's history were reviewed and updated as appropriate: allergies, current medications, past family history, past medical history, past social history, past surgical history and problem list      Developmental 15 Months Appropriate     Questions Responses    Can walk alone or holding on to furniture Yes    Comment: Yes on 11/8/2021 (Age - 15mo)     Can play 'pat-a-cake' or wave 'bye-bye' without help Yes    Comment: Yes on 11/8/2021 (Age - 14mo)     Refers to parent by saying 'mama,' 'tawana,' or equivalent Yes    Comment: Yes on 11/8/2021 (Age - 14mo)     Can stand unsupported for 5 seconds Yes    Comment: Yes on 11/8/2021 (Age - 14mo)     Can stand unsupported for 30 seconds Yes    Comment: Yes on 11/8/2021 (Age - 14mo)     Can bend over to  an object on floor and stand up again without support Yes    Comment: Yes on 11/8/2021 (Age - 15mo)     Can indicate wants without crying/whining (pointing, etc ) Yes    Comment: Yes on 11/8/2021 (Age - 14mo)     Can walk across a large room without falling or wobbling from side to side Yes    Comment: Yes on 11/8/2021 (Age - 15mo)       Developmental 18 Months Appropriate     Questions Responses    If ball is rolled toward child, child will roll it back (not hand it back) Yes    Comment: Yes on 3/3/2022 (Age - 19mo)     Can drink from a regular cup (not one with a spout) without spilling Yes    Comment: Yes on 3/3/2022 (Age - 19mo)           M-CHAT-R Score      Most Recent Value   M-CHAT-R Score 1          Social Screening:  Autism screening: Autism screening completed today, is normal, and results were discussed with family  Screening Questions:  Risk factors for anemia: no          Objective:     Growth parameters are noted and are appropriate for age  Wt Readings from Last 1 Encounters:   03/03/22 14 2 kg (31 lb 4 oz) (99 %, Z= 2 18)*     * Growth percentiles are based on WHO (Boys, 0-2 years) data  Ht Readings from Last 1 Encounters:   03/03/22 35 5" (90 2 cm) (>99 %, Z= 2 57)*     * Growth percentiles are based on WHO (Boys, 0-2 years) data        Head Circumference: 47 cm (18 5")    Vitals:    03/03/22 1004   Temp: 97 4 °F (36 3 °C)   TempSrc: Temporal   Weight: 14 2 kg (31 lb 4 oz)   Height: 35 5" (90 2 cm)   HC: 47 cm (18 5")         Physical Exam  Constitutional:       General: He is active  Appearance: Normal appearance  HENT:      Head: Normocephalic and atraumatic  Right Ear: Tympanic membrane is erythematous  Left Ear: Tympanic membrane is erythematous  Ears:      Comments: Fluid behind both ear drums     Nose: Nose normal       Mouth/Throat:      Mouth: Mucous membranes are moist       Pharynx: Oropharynx is clear  Eyes:      Extraocular Movements: Extraocular movements intact  Pupils: Pupils are equal, round, and reactive to light  Cardiovascular:      Rate and Rhythm: Normal rate and regular rhythm  Heart sounds: Normal heart sounds  Pulmonary:      Effort: Pulmonary effort is normal       Breath sounds: Normal breath sounds  Abdominal:      General: Abdomen is flat  Palpations: Abdomen is soft  Genitourinary:     Penis: Normal and circumcised  Testes: Normal    Musculoskeletal:         General: Normal range of motion  Cervical back: Normal range of motion and neck supple  Skin:     General: Skin is warm  Capillary Refill: Capillary refill takes less than 2 seconds  Neurological:      General: No focal deficit present  Mental Status: He is alert and oriented for age

## 2025-02-06 NOTE — PROGRESS NOTES
VS while working with therapy today     02/06/25 1104 02/06/25 1109 02/06/25 1119   Vital Signs   Pulse 63 72 70   BP 90/54 (!) 65/54 94/52   MAP (mmHg) (!) 59 (!) 56 (!) 63   BP Location Right arm Right arm Right arm   BP Method Automatic Automatic Automatic   Patient Position Sitting Standing Lying

## 2025-02-06 NOTE — PROGRESS NOTES
[facility]  Progress Note    Cb Webster Patient Status:  Inpatient    1960 MRN [id number]   Location [facility] 3SW-A Attending Ismael Perez MD   Hosp Day # 1 PCP Colton Schaefer MD       SUBJECTIVE     POD#: 1     Subjective: Patient is doing well s/p surgery. Pain controlled on current medications. Pt reports incisional pain without radiation. Pt denies new onset numbness, tingling, or paresthesias. Denies chest pain, SOB, nausea, vomiting, calf pain. Currently with hypotension and getting a fluid bolus, denies symptoms.     Urination: well  BM: awaiting     OBJECTIVE     Vitals:   Blood pressure (!) 89/42, pulse 67, temperature 98.1 °F (36.7 °C), temperature source Oral, resp. rate 18, height 5' 1\" (1.549 m), weight 187 lb (84.8 kg), SpO2 98%, not currently breastfeeding.    Pain Assessment  Pain Assessment Tool: 0-10  Pasero Sedation Scale: Awake and alert  0-10 Scale: \"Mild\"  Pain Type: Surgical pain  Pain Location: Back  Pain Orientation: Lower  Pain Descriptors: Aching;Discomfort  Pain Intervention(s): Medication    Exam:   GENERAL APPEARANCE: Alert & oriented x 4; no acute distress.     PULM: respirations unlabored on O2 via RA   HEENT: Pupils are equal, round. No abnormal miosis. No ptosis  CV: cap refill < 2 seconds, extremities WWP x4    EXTREMITIES:     Right Lower Extremity:  Psoas 5/5   Quad 5/5  Gastroc 5/5  Anterior Tib 5/5  EHL 5/5  SILT     Left Lower Extremity:   Psoas 5/5   Quad 5/5  Gastroc 5/5  Anterior Tib 5/5  EHL 5/5  SILT    Calves soft supple, nontender.      INCISION: Lumbar spine C/D/I-       MEDICATIONS     Reviewed in EPIC      ASSESSMENT AND PLAN        Cb Webster is a 64 year old female POD#1 s/p L4-5 MIS TLIF .     Plan     Antibiotics: Cefazolin x 24 hours  Anticoagulation: SCDs and early ambulation  Drain: None  Activity: Out of bed as tolerated  Brace: None  Medical history, allergies, and medications:In EPIC  Pain control: Routine postop  Anticipated  discharge: When pain control and activity tolerance allow. Should be today  Consults: PT. Hospitalist  Other information: None  Xray: POD1 standing Lumbar spine AP/LAT/Spot LAT     At this point Ms. Webster  is expected to be discharged to:  Home with Home health services        KWAKU Pablo for Dr. Perez

## 2025-02-06 NOTE — PHYSICAL THERAPY NOTE
PHYSICAL THERAPY  EVALUATION - INPATIENT     Room Number: 357/357-A  Evaluation Date: 2/6/2025  Type of Evaluation: Initial  Physician Order: PT Eval and Treat    Presenting Problem: s/p L4-5 TLIF 2/5/25  Co-Morbidities : depression, anxiety, migraines, tremor  Reason for Therapy: Mobility Dysfunction and Discharge Planning    PHYSICAL THERAPY ASSESSMENT   Patient is a 64 year old female admitted 2/5/2025 for elective L4-5 TLIF.  Prior to admission, patient's baseline is independent.  Patient is currently functioning near baseline with bed mobility, transfers, and gait.  Patient is requiring contact guard assist as a result of the following impairments: decreased functional strength, pain, decreased muscular endurance, difficulty maintaining precautions, and medical status.  Physical Therapy will continue to follow for duration of hospitalization.    Patient will benefit from continued skilled PT Services at discharge to promote prior level of function.  Anticipate patient will return home with home health PT.    PLAN DURING HOSPITALIZATION  Nursing Mobility Recommendation : 1 Assist     PT Treatment Plan: Bed mobility;Endurance;Energy conservation;Patient education;Gait training;Balance training;Transfer training;Stair training  Rehab Potential : Good  Frequency (Obs): Daily     CURRENT GOALS  Goal #1  Patient is able to demonstrate supine - sit EOB @ level: supervision     Goal #2  Patient is able to demonstrate transfers Sit to/from Stand at assistance level: supervision   Goal #3    Patient is able to ambulate 150 feet with assistive device at assistance level: supervision   Goal #4    Patient will negotiate 4 stairs/one curb w/ assistive device and supervision   Goal #5    Patient verbalizes and/or demonstrates all precautions and safety concerns independently    Goal #6      Goal Comments: Goals established on 2/6/2025      HOME SITUATION  Type of Home: House   Home Layout: Two level              Lives With:  Spouse;Family  Drives: Yes  Patient Regularly Uses: Glasses    Prior Level of [place]: Pt lives with spouse in 2 story home. Pts daughter and 10 y.o. grandson stay with them at times as well. Pt independent with ADL and mobility. Pt does not use RW or cane at baseline. Pt is retired and worked as an academic .     SUBJECTIVE  \"I don't feel dizzy but I am getting some anxiety.\"     OBJECTIVE  Precautions: Spine;Bed/chair alarm  Fall Risk: Standard fall risk    WEIGHT BEARING RESTRICTION     PAIN ASSESSMENT  Ratin  Location: back  Management Techniques: Activity promotion;Repositioning    COGNITION  Overall Cognitive Status:  WFL - within functional limits    RANGE OF MOTION AND STRENGTH ASSESSMENT  Upper extremity ROM and strength are within functional limits     Lower extremity ROM is within functional limits     Lower extremity strength is within functional limits       BALANCE  Static Sitting: Good  Dynamic Sitting: Good  Static Standing: Fair -  Dynamic Standing: Poor +    ADDITIONAL TESTS                                    ACTIVITY TOLERANCE         BP sittin/54  BP standin/54  BP supine: 94/52                O2 WALK       NEUROLOGICAL FINDINGS                        AM-PAC '6-Clicks' INPATIENT SHORT FORM - BASIC MOBILITY  How much difficulty does the patient currently have...  Patient Difficulty: Turning over in bed (including adjusting bedclothes, sheets and blankets)?: A Little   Patient Difficulty: Sitting down on and standing up from a chair with arms (e.g., wheelchair, bedside commode, etc.): A Little   Patient Difficulty: Moving from lying on back to sitting on the side of the bed?: A Little   How much help from another person does the patient currently need...   Help from Another: Moving to and from a bed to a chair (including a wheelchair)?: A Little   Help from Another: Need to walk in hospital room?: A Little   Help from Another: Climbing 3-5 steps with a railing?: A Little        AM-PAC Score:  Raw Score: 18   Approx Degree of Impairment: 46.58%   Standardized Score (AM-PAC Scale): 43.63   CMS Modifier (G-Code): CK    FUNCTIONAL ABILITY STATUS  Gait Assessment   Functional Mobility/Gait Assessment  Gait Assistance: Contact guard assist  Distance (ft): 10  Assistive Device: Rolling walker    Skilled Therapy Provided  Received up in bedside chair.   VC for transfer set up.   Sit-stand to RW with CGA.   Stood statically with RW and supervision.   Seated rest break due to hypotension.   Pt denies dizziness.   Sit-stand to RW with CGA.   Ambulatory within room with RW and CGA for safety.   Returned to bedside. Limited distance due to hypotension.   VC for log roll.   Sit-supine in bed with MIN assist for LE support.     Bed Mobility:  Rolling: supervision  Supine to sit: NT   Sit to supine: MIN     Transfer Mobility:  Sit to stand: CGA   Stand to sit: CGA  Gait = CGA    Therapist's Comments:  Reviewed POC, log roll, spine precautions, and activity recommendations. Session limited by hypotension. See activity tolerance above.     Exercise/Education Provided:  Bed mobility  Energy conservation  Functional activity tolerated  Gait training  Posture  Strengthening  Transfer training    Patient End of Session: In bed;Needs met;All patient questions and concerns addressed;Hospital anti-slip socks;Alarm set;Family present    Patient Evaluation Complexity Level:  History Moderate - 1 or 2 personal factors and/or co-morbidities   Examination of body systems Low -  addressing 1-2 elements   Clinical Presentation Low- Stable   Clinical Decision Making Low Complexity       PT Session Time: 20 minutes  Gait Training:  minutes  Therapeutic Activity: 10 minutes  Neuromuscular Re-education:  minutes  Therapeutic Exercise:  minutes

## 2025-02-06 NOTE — OCCUPATIONAL THERAPY NOTE
OCCUPATIONAL THERAPY EVALUATION - INPATIENT     Room Number: 357/357-A  Evaluation Date: 2/6/2025  Type of Evaluation: Initial  Presenting Problem: s/p L4-L5 TLIF 2/5/25    Physician Order: IP Consult to Occupational Therapy  Reason for Therapy: ADL/IADL Dysfunction and Discharge Planning    OCCUPATIONAL THERAPY ASSESSMENT   Patient is currently functioning near baseline with toileting, lower body dressing, bed mobility, transfers, and dynamic standing balance. Prior to admission, patient's baseline is independent.  Patient is requiring CGA to min assist as a result of the following impairments: decreased functional reach, decreased endurance, pain, and impaired standing balance. Occupational Therapy will continue to follow for duration of hospitalization.    Patient will benefit from continued skilled OT Services at discharge to promote prior level of function and safety with additional support and return home with home health OT    History Related to Current Admission: Patient is a 64 year old female admitted on 2/5/2025 with Presenting Problem: s/p L4-L5 TLIF 2/5/25. Co-Morbidities : depression, anxiety, migraines, tremor    WEIGHT BEARING RESTRICTION       Recommendations for nursing staff:   Transfers: 1-person  Toileting location: commode or bed level until low BP resolved    EVALUATION SESSION:  Patient Start of Session: chair    FUNCTIONAL TRANSFER ASSESSMENT  Sit to Stand: Chair  Chair: Contact Guard Assist    BED MOBILITY  Sit to Supine (OT): Minimal Assist (via logroll)    BALANCE ASSESSMENT     FUNCTIONAL ADL ASSESSMENT  LB Dressing Standing: Contact Guard Assist (simulated clothing management to/from waist, unilateral hand support on RW)  Toileting Standing: Contact Guard Assist (simulated clothing management to/from waist, unilateral hand support on RW)    ACTIVITY TOLERANCE:   Limited by hypotension  BP in chair 90/54  BP standing 65/54, mild dizziness  BP in supine end of session 94/52                            O2 SATURATIONS       COGNITION  Overall Cognitive Status:  WFL - within functional limits    Upper Extremity   ROM: within functional limits   Strength: within functional limits     EDUCATION PROVIDED  Patient Education : Role of Occupational Therapy; Functional Transfer Techniques; Fall Prevention; Surgical Precautions; Posture/Positioning; Proper Body Mechanics; Bracing Education Provided  Patient's Response to Education: Verbalized Understanding; Requires Further Education ( also present)    Equipment used: RW  Demonstrates functional use, Would benefit from additional trial      Therapist comments: Patient motivated and participatory, primarily limited by hypotension this session. Patient educated on spine precautions and incorporation into ADLs and transfers as well as brace management techniques, followed with good return demo, will benefit from reinforcement. Functional mobility between chair and edge of bed via RW and CGA. See Functional Assessment sections above for additional information on patient's performance on ADLs and functional transfers this session. Will continue to follow.     Patient End of Session: In bed;Needs met;Call light within reach;RN aware of session/findings;All patient questions and concerns addressed;Hospital anti-slip socks;SCDs in place;Alarm set;Family present    OCCUPATIONAL PROFILE    HOME SITUATION  Type of Home: House  Home Layout: Two level  Lives With: Spouse (daughter and 9yo grandson also stay with patient part-time; reports daughter will be present to assist initially at discharge)    Toilet and Equipment: Standard height toilet  Shower/Tub and Equipment: Walk-in shower;Shower chair  Other Equipment: Toileting aid;Reacher    Occupation/Status: retired academic      Drives: Yes  Patient Regularly Uses: Glasses    Prior Level of Function: Patient reports independent in all BADLs and functional mobility without device prior to admission.   performs most IADLs. Patient reports they have guardianship of their 11yo grandson; he stays with them part-time, as does their daughter.     SUBJECTIVE   \"I'm a little dizzy\"    PAIN ASSESSMENT  Ratin  Location: back  Management Techniques: Activity promotion;Body mechanics;Repositioning;Ice    OBJECTIVE  Precautions: Spine;Bed/chair alarm  Fall Risk: Standard fall risk    ASSESSMENTS    AM-PAC ‘6-Clicks’ Inpatient Daily Activity Short Form  -   Putting on and taking off regular lower body clothing?: A Lot  -   Bathing (including washing, rinsing, drying)?: A Little  -   Toileting, which includes using toilet, bedpan or urinal? : A Little  -   Putting on and taking off regular upper body clothing?: None  -   Taking care of personal grooming such as brushing teeth?: None  -   Eating meals?: None    AM-PAC Score:  Score: 20  Approx Degree of Impairment: 38.32%  Standardized Score (AM-PAC Scale): 42.03    ADDITIONAL TESTS     NEUROLOGICAL FINDINGS      COGNITION ASSESSMENTS     PLAN  OT Device Recommendations: TBD  OT Treatment Plan: Balance activities;ADL training;Functional transfer training;Endurance training;Patient/Family education;Patient/Family training;Compensatory technique education  Rehab Potential : Good  Frequency: 3x/week  Number of Visits to Meet Established Goals: 2    ADL GOALS   Patient will perform Lower Body Dressing with supervision  Patient will perform Toileting with supervision    FUNCTIONAL TRANSFER GOALS   Patient will transfer Supine to Sit via logroll with supervision  Patient will transfer Sit to Supine via logroll with supervision  Patient will transfer to Toilet with supervision    ADDITIONAL GOALS  Patient will recall all spinal precautions and incorporate into ADLs    Patient Evaluation Complexity Level:   Occupational Profile/Medical History LOW - Brief history including review of medical or therapy records    Specific performance deficits impacting engagement in ADL/IADL LOW  1 -  3 performance deficits    Client Assessment/Performance Deficits LOW - No comorbidities nor modifications of tasks    Clinical Decision Making LOW - Analysis of occupational profile, problem-focused assessments, limited treatment options    Overall Complexity LOW     OT Session Time: 25 minutes  Therapeutic Activity: 10 minutes

## 2025-02-06 NOTE — PLAN OF CARE
Pt A&O. On room air. Encouraged use of incentive spirometer and ankle pump exercises every hour while awake. Teds/Scds to BLE. Tolerating diet with good appetite. Last BM 2/4/25. Miralax given this AM. Voiding w/o difficulty. Pain managed with current medications. Pt reminded to \"call; don't fall\". Participating in therapy as ordered. Up with min assist using walker and gait belt. Pt became hypotensive w/ SBP in the 60's today with therapy. They will come back and see pt again tomorrow. IVF bolus given earlier this morning for same. IVFs currently infusing. Pt asymptomatic. Dr Ramirez here. Suggested trying abdominal binder. Pt agreeable and abdominal binder applied. Back surgical incisions x2 w telfa tegaderm C/D/I. Gel ice packs as needed for pain/swelling. Pt plans to be dc'd w/ [facility] when cleared, most likely tomorrow. Pt's spouse, Isai, at bedside and updated with plan of care.     Pt and spouse watched discharge instruction video. All questions answered.     Pt had her Rx for dc delivered by [facility] today    Spoke to xray dept and plan will be for pt to get post op spine xray tomorrow.

## 2025-02-07 ENCOUNTER — APPOINTMENT (OUTPATIENT)
Dept: GENERAL RADIOLOGY | Facility: HOSPITAL | Age: 65
End: 2025-02-07
Attending: NURSE PRACTITIONER
Payer: MEDICARE

## 2025-02-07 VITALS
HEIGHT: 61 IN | TEMPERATURE: 98 F | RESPIRATION RATE: 14 BRPM | OXYGEN SATURATION: 98 % | HEART RATE: 71 BPM | SYSTOLIC BLOOD PRESSURE: 112 MMHG | WEIGHT: 187 LBS | DIASTOLIC BLOOD PRESSURE: 42 MMHG | BODY MASS INDEX: 35.3 KG/M2

## 2025-02-07 LAB
BILIRUB UR QL STRIP.AUTO: NEGATIVE
CLARITY UR REFRACT.AUTO: CLEAR
COLOR UR AUTO: COLORLESS
GLUCOSE UR STRIP.AUTO-MCNC: NORMAL MG/DL
KETONES UR STRIP.AUTO-MCNC: NEGATIVE MG/DL
LEUKOCYTE ESTERASE UR QL STRIP.AUTO: NEGATIVE
NITRITE UR QL STRIP.AUTO: NEGATIVE
PH UR STRIP.AUTO: 5.5 [PH] (ref 5–8)
PROT UR STRIP.AUTO-MCNC: NEGATIVE MG/DL
RBC UR QL AUTO: NEGATIVE
SP GR UR STRIP.AUTO: 1.01 (ref 1–1.03)
UROBILINOGEN UR STRIP.AUTO-MCNC: NORMAL MG/DL

## 2025-02-07 PROCEDURE — 72100 X-RAY EXAM L-S SPINE 2/3 VWS: CPT | Performed by: NURSE PRACTITIONER

## 2025-02-07 PROCEDURE — 99232 SBSQ HOSP IP/OBS MODERATE 35: CPT | Performed by: INTERNAL MEDICINE

## 2025-02-07 NOTE — PROGRESS NOTES
[facility]  Progress Note    Cb Webster Patient Status:  Inpatient    1960 MRN [id number]   Location [facility] 3SW-A Attending Ismael Perez MD   Hosp Day # 2 PCP Colton Schaefer MD       SUBJECTIVE     POD#: 2     Subjective: Patient is doing well s/p surgery. Pain controlled on current medications. 5-6/10 LBP area pain. Slight return of right radicular leg pain overnight to the great toe, states tolerable. Pt reports incisional pain without radiation. Pt denies new onset numbness, tingling, or paresthesias. Denies chest pain, SOB, nausea, vomiting, calf pain.     Urinating well  BM awaiting    OBJECTIVE     Vitals:   Blood pressure 103/58, pulse 75, temperature 98.1 °F (36.7 °C), temperature source Oral, resp. rate 18, height 5' 1\" (1.549 m), weight 187 lb (84.8 kg), SpO2 95%, not currently breastfeeding.    Pain Assessment  Pain Assessment Tool: No/denies pain  Pasero Sedation Scale: Awake and alert  0-10 Scale: \"Mild\"  Pain Type: Acute pain;Surgical pain  Pain Location: Back  Pain Orientation: Lower  Pain Descriptors: Aching;Discomfort  Pain Intervention(s): Non-Pharmacologic;Medication    Exam:   GENERAL APPEARANCE: Alert & oriented x 4; no acute distress.     PULM: respirations unlabored on O2 via RA  HEENT: Pupils are equal, round. No abnormal miosis. No ptosis  CV: cap refill < 2 seconds, extremities WWP x4    EXTREMITIES:   Right Lower Extremity:  Psoas 5/5   Quad 5/5  Gastroc 5/5  Anterior Tib 5/5  EHL 5/5  SILT     Left Lower Extremity:   Psoas 5/5   Quad 5/5  Gastroc 5/5  Anterior Tib 5/5  EHL 5/5  SILT    Calves soft supple, nontender.      INCISION: lumbar spine C/D/I-      MEDICATIONS     Reviewed in EPIC      ASSESSMENT AND PLAN        Cb Webster is a 64 year old female POD#2 s/p L4-L5 MIS TLIF .     Plan     Antibiotics: Complete   Anticoagulation: SCDs and early ambulation  Drain: None  Activity: Out of bed as tolerated  Brace: None  Medical history, allergies, and  medications:In EPIC  Pain control: Routine postop  Anticipated discharge: When pain control and activity tolerance allow. Should be today  Consults: PT. Hospitalist  Other information: None  Xray: POD1 standing Lumbar spine AP/LAT/Spot LAT     At this point Ms. Webster  is expected to be discharged to:  Home with Home health services        KWAKU Pablo for Dr. Perez

## 2025-02-07 NOTE — PLAN OF CARE
Pt A&O. On room air. Encouraged use of incentive spirometer and ankle pump exercises every hour while awake. Scds to BLE. Pt's teds were \"digging into\" pt's legs and were making pt uncomfortable. Pt measured for spanda- instead. Spanda- applied to BLE. Extra set given to pt. Tolerating diet with good appetite. Last BM 2/4/25. Miralax given again this AM. Voiding w/o difficulty. Pain managed with current medications. Pt reminded to \"call; don't fall\". Participating in therapy as ordered. Up with standby assist using walker and gait belt. Abdominal binder in place. Back surgical incisions x2 w telfa tegaderm C/D/I. Gel ice packs as needed for pain/swelling. Pt ready to be dc w/ [facility] today. Pt's daughter at bedside and updated with plan of care.     Pt and spouse watched discharge instruction video yesterday. All questions answered.     Pt had her Rx for dc delivered by [facility] yesterday    Post op xray completed today.    Cloth gait belt and drsg change kit given to pt.

## 2025-02-07 NOTE — OCCUPATIONAL THERAPY NOTE
OCCUPATIONAL THERAPY TREATMENT NOTE - INPATIENT     Room Number: 357/357-A  Session: 1   Number of Visits to Meet Established Goals: 2    Presenting Problem: s/p L4-L5 TLIF 2/5/25    HOME SITUATION  Type of Home: House  Home Layout: Two level  Lives With: Spouse (daughter and 9yo grandson also stay with patient part-time; reports daughter will be present to assist initially at discharge)     Toilet and Equipment: Standard height toilet  Shower/Tub and Equipment: Walk-in shower;Shower chair  Other Equipment: Toileting aid;Reacher     Occupation/Status: retired academic   Drives: Yes  Patient Regularly Uses: Glasses     Prior Level of Function: Patient reports independent in all BADLs and functional mobility without device prior to admission.  performs most IADLs. Patient reports they have guardianship of their 9yo grandson; he stays with them part-time, as does their daughter.     ASSESSMENT   Patient demonstrates good  progress this session, goals remain in progress.    Patient continues to function near baseline with toileting, lower body dressing, bed mobility, transfers, static standing balance, dynamic standing balance, functional standing tolerance, and energy conservation strategies.  Occupational Therapy will continue to follow patient for duration of hospitalization.    Patient continues to benefit from continued skilled OT services: at discharge to promote prior level of function and safety with additional support and return home with home health OT.     History: Patient is a 64 year old female admitted on 2/5/2025 with Presenting Problem: s/p L4-L5 TLIF 2/5/25. Co-Morbidities : depression, anxiety, migraines, tremor    WEIGHT BEARING RESTRICTION       Recommendations for nursing staff:   Transfers: 1 person  Toileting location: toilet    TREATMENT SESSION:  Patient Start of Session: seated in chair    FUNCTIONAL TRANSFER ASSESSMENT  Sit to Stand: Chair  Chair: Stand-by Assist  Toilet  Transfer: Stand-by Assist    BED MOBILITY  Sit to Supine (OT): Not Tested    BALANCE ASSESSMENT     FUNCTIONAL ADL ASSESSMENT  LB Dressing Seated: Minimal Assist  LB Dressing Standing: Stand-by Assist  Toileting Seated: Supervision  Toileting Standing: Stand-by Assist    ACTIVITY TOLERANCE: WFL                         O2 SATURATIONS       EDUCATION PROVIDED  Patient Education : Role of Occupational Therapy; Plan of Care; Discharge Recommendations; Functional Transfer Techniques; Fall Prevention; Surgical Precautions; Posture/Positioning; Energy Conservation; Proper Body Mechanics  Patient's Response to Education: Verbalized Understanding; Returned Demonstration    Equipment used: reacher, RW  Demonstrates functional use, Would benefit from additional trial    Therapist comments: Pt received seated in chair, pleasant and cooperative for OT session. Pt educated on use of AE to safely complete LB dressing while adhering to spinal precautions. Pt requires min A with use of reacher to thread pants through B LE and SBA to advance up past hips in standing. Sit to stand transfer performed at SBA and pt engages in functional mobility with RW in preparation for long distance mobility and standing based ADLs. Pt returns to room and transfers to toilet at SBA. Toileting performed at supervision/SBA. Pt left in chair with all needs.    Patient End of Session: Up in chair;Needs met;Call light within reach;RN aware of session/findings;All patient questions and concerns addressed;Hospital anti-slip socks;Alarm set;Family present    SUBJECTIVE  Pt pleasant and cooperative for OT.     PAIN ASSESSMENT  Rating: Unable to rate  Location: back  Management Techniques: Activity promotion;Body mechanics;Repositioning;Ice     OBJECTIVE  Precautions: Spine;Bed/chair alarm    AM-PAC ‘6-Clicks’ Inpatient Daily Activity Short Form  -   Putting on and taking off regular lower body clothing?: A Little  -   Bathing (including washing, rinsing,  drying)?: A Little  -   Toileting, which includes using toilet, bedpan or urinal? : A Little  -   Putting on and taking off regular upper body clothing?: None  -   Taking care of personal grooming such as brushing teeth?: None  -   Eating meals?: None    AM-PAC Score:  Score: 21  Approx Degree of Impairment: 32.79%  Standardized Score (AM-PAC Scale): 44.27    PLAN  OT Device Recommendations: TBD  OT Treatment Plan: Balance activities;ADL training;Functional transfer training;Endurance training;Patient/Family education;Patient/Family training;Compensatory technique education  Rehab Potential : Good  Frequency: 3x/week    OT Goals:     All goals ongoing 02/07    ADL GOALS   Patient will perform Lower Body Dressing with supervision  Patient will perform Toileting with supervision- goal met 02/07     FUNCTIONAL TRANSFER GOALS   Patient will transfer Supine to Sit via logroll with supervision  Patient will transfer Sit to Supine via logroll with supervision  Patient will transfer to Toilet with supervision     ADDITIONAL GOALS  Patient will recall all spinal precautions and incorporate into ADLs- goal met 02/07    OT Session Time: 30 minutes  Self-Care Home Management: 30 minutes

## 2025-02-07 NOTE — PROGRESS NOTES
[facility]   part of [facility]     Hospitalist Progress Note     Cb Webster Patient Status:  Inpatient    1960 MRN [id number]   Location [facility] 3SW-A Attending Ismael Perez MD   Hosp Day # 2 PCP Colton Schaefer MD     Chief Complaint: Postop pain    Subjective:     Patient working with PT. No hypotension. Pain controlled, worse when working with PT.     Objective:    Review of Systems:   A comprehensive review of systems was completed; pertinent positive and negatives stated in subjective.    Vital signs:  Temp:  [97.8 °F (36.6 °C)-99.5 °F (37.5 °C)] 98 °F (36.7 °C)  Pulse:  [63-79] 71  Resp:  [14-18] 14  BP: ()/(33-58) 112/42  SpO2:  [95 %-98 %] 98 %    Physical Exam:    General: No acute distress, awake and alert  Respiratory: No wheezes, no rhonchi  Cardiovascular: S1, S2, regular rate and rhythm  Abdomen: Soft, Non-tender, non-distended, positive bowel sounds  Neuro: ALEGRIA x 4  Extremities: No edema    Diagnostic Data:    Labs:  Recent Labs   Lab 25  0457   HGB 11.2*     No results for input(s): \"GLU\", \"BUN\", \"CREATSERUM\", \"GFRAA\", \"GFRNAA\", \"CA\", \"ALB\", \"NA\", \"K\", \"CL\", \"CO2\", \"ALKPHO\", \"AST\", \"ALT\", \"BILT\", \"TP\" in the last 168 hours.    eGFR cannot be calculated (Patient's most recent lab result is older than the maximum 7 days allowed.).    No results for input(s): \"TROP\", \"TROPHS\", \"CK\" in the last 168 hours.    No results for input(s): \"PTP\", \"INR\" in the last 168 hours.     Microbiology  No results found for this visit on 25.    Imaging: Reviewed in Epic.    Medications:    sennosides  17.2 mg Oral Nightly    docusate sodium  100 mg Oral BID    acetaminophen  1,000 mg Oral Q8H AMBER    busPIRone  15 mg Oral BID    dicyclomine  10 mg Oral BID    FLUoxetine HCl  40 mg Oral Daily    memantine  10 mg Oral Daily    methylphenidate  15 mg Oral BID@0800,1200    pantoprazole  40 mg Oral Before breakfast    propranolol  20 mg Oral QID    rosuvastatin  5 mg Oral Nightly     [Held by provider] Esketamine HCl (84 MG Dose)  84 mg Nasal Q14 Days    traZODone  100 mg Oral Nightly       Assessment & Plan:      #Lumbar stenosis with radiculopathy s/p L4-L5 minimally invasive transforaminal lumbar interbody fusion on 2/5/25  -Spine surgery primary  -Lumbar spine x-rays ordered  -PT/OT, encourage IS  -Pain control    #Orthostatic hypotension, asymptomatic  -Wellington hose, abd binder  -Improved with IVF  -Hold propranolol if SBP < 110   -States her BP runs low at baseline     #Dyslipidemia  -Statin    #PUD/GERD  -Protonix, Pepcid PRN    #ISHA, ADHD, OCD  -Continue home Ritalin, Prozac, Buspar, Trazodone, PRN Ativan   -Also on Spravato nasal spray at home     #Migraines  -No acute issues  -Typically takes propranolol for this    #CAD  -No acute issues     #MODE, not on cpap, has lost > 100 lbs since dx, consider repeat sleep study if needed vs removing this dx from pt's chart     #Obesity  -Body mass index is 35.33 kg/m².      Jayme Ramirez,     Supplementary Documentation:     Quality:  DVT Mechanical Prophylaxis: WELLINGTON hose, SCDs,    DVT Pharmacologic Prophylaxis   Medication   None     Code Status: Full Code  Murdock: No urinary catheter in place  DIANE: Today    Discharge is dependent on: Clinical state, spine surgery recs  At this point Ms. Webster is expected to be discharge to: Home    The 21st Century Cures Act makes medical notes like these available to patients in the interest of transparency. Please be advised this is a medical document. Medical documents are intended to carry relevant information, facts as evident, and the clinical opinion of the practitioner. The medical note is intended as peer to peer communication and may appear blunt or direct. It is written in medical language and may contain abbreviations or verbiage that are unfamiliar.

## 2025-02-07 NOTE — PHYSICAL THERAPY NOTE
PHYSICAL THERAPY TREATMENT NOTE - INPATIENT    Room Number: 357/357-A     Session: 1     Number of Visits to Meet Established Goals: 3    Presenting Problem: s/p L4-5 TLIF 2/5/25  Co-Morbidities : depression, anxiety, migraines, tremor    PHYSICAL THERAPY ASSESSMENT   Patient demonstrates good  progress this session, goals  remain in progress.      Patient is requiring contact guard assist as a result of the following impairments: decreased functional strength, decreased endurance/aerobic capacity, and decreased muscular endurance.     Patient continues to function below baseline with transfers, gait, stair negotiation, and standing prolonged periods.  Next session anticipate patient to progress gait.  Physical Therapy will continue to follow patient for duration of hospitalization.    Patient continues to benefit from continued skilled PT services: at discharge to promote prior level of function.  Anticipate patient will return home with home health PT.    PLAN DURING HOSPITALIZATION  Nursing Mobility Recommendation : 1 Assist     PT Treatment Plan: Bed mobility;Endurance;Energy conservation;Patient education;Gait training;Balance training;Transfer training;Stair training  Frequency (Obs): Daily     CURRENT GOALS       Goal #1  Patient is able to demonstrate supine - sit EOB @ level: supervision      Goal #2  Patient is able to demonstrate transfers Sit to/from Stand at assistance level: supervision   Goal #3     Patient is able to ambulate 150 feet with assistive device at assistance level: supervision   Goal #4     Patient will negotiate 4 stairs/one curb w/ assistive device and supervision   Goal #5     Patient verbalizes and/or demonstrates all precautions and safety concerns independently    Goal #6        Goal Comments: Goals established on 2/6/2025 2/7/2025 all goals ongoing     SUBJECTIVE  \"The transitions are the hardest\"     OBJECTIVE  Precautions: Spine;Bed/chair alarm    WEIGHT BEARING RESTRICTION      PAIN ASSESSMENT   Rating: Unable to rate  Location: back  Management Techniques: Activity promotion;Body mechanics;Breathing techniques;Relaxation;Repositioning    BALANCE                                                                                                                       Static Sitting: Good  Dynamic Sitting: Fair +           Static Standing: Fair -  Dynamic Standing: Fair -    ACTIVITY TOLERANCE                         O2 WALK       AM-PAC '6-Clicks' INPATIENT SHORT FORM - BASIC MOBILITY  How much difficulty does the patient currently have...  Patient Difficulty: Turning over in bed (including adjusting bedclothes, sheets and blankets)?: A Little   Patient Difficulty: Sitting down on and standing up from a chair with arms (e.g., wheelchair, bedside commode, etc.): None   Patient Difficulty: Moving from lying on back to sitting on the side of the bed?: A Little   How much help from another person does the patient currently need...   Help from Another: Moving to and from a bed to a chair (including a wheelchair)?: None   Help from Another: Need to walk in hospital room?: A Little   Help from Another: Climbing 3-5 steps with a railing?: A Little     AM-PAC Score:  Raw Score: 20   Approx Degree of Impairment: 35.83%   Standardized Score (AM-PAC Scale): 47.67   CMS Modifier (G-Code): CJ    FUNCTIONAL ABILITY STATUS  Gait Assessment   Functional Mobility/Gait Assessment  Gait Assistance: Contact guard assist;Supervision  Distance (ft): 75,80  Assistive Device: Rolling walker  Pattern: Within Functional Limits  Stairs: Stairs;Stoop/curb;Car transfer  How Many Stairs: 4  Device: 1 Rail  Assist: Contact guard assist  Pattern: Ascend and Descend  Ascend and Descend : Side step  Stoop/Curb: CGA  Car transfer: supervision    Skilled Therapy Provided  Pt presents in BS chair, daughter present  Therapist educated pt and daughter on spine precautions with integration into functional mobility, log roll, and  sequencing for safe t/f and stair negotiation  Pt was gait and t/f trained as noted .     Bed Mobility:  Rolling:    Supine<>Sit:   Sit<>Supine:      Transfer Mobility:  Sit<>Stand: CGA    Stand<>Sit: CGA cues for body mechanics    Gait: CGA to supervision c RW     Therapist's Comments: pt had abd binder donned   BP monitored with positional changes      /30 seated  112/45 standing   THERAPEUTIC EXERCISES  Lower Extremity      Upper Extremity      Position      Repetitions      Sets        Patient End of Session: Up in chair;Needs met;Call light within reach;RN aware of session/findings;All patient questions and concerns addressed;Hospital anti-slip socks;Alarm set;Family present    PT Session Time: 38 minutes  Gait Trainin minutes  Therapeutic Activity: 15 minutes  Therapeutic Exercise: minutes   Neuromuscular Re-education: 5 minutes

## 2025-02-07 NOTE — PLAN OF CARE
Received pt at 1900. Pt is A&O x 4; follows commands, POD 1 Lumbar fusion. Pt is on RA, VSS, reg diet.Pt's BP is still low after PT/OT, 0.9 NS @100 ml/hr still infusing. Pt is continent of bowel and bladder. Pt's pain is managed with scheduled and PRN medications. Pt ambulates with 1 assist and walker.Shift and neuro assessment performed, HS meds given. NOC safety plan in place; bed in low position, call light and personal items within reach, pt encouraged to call staff for assistance.    POC:  Discharge tomorrow after post-op xray

## 2025-02-08 NOTE — DISCHARGE SUMMARY
Discharge Summary     Cb Webster Patient Status:  Inpatient    1960 MRN [id number]   Location [facility] 3SW-A Attending No att. providers found   Hosp Day # 2 PCP Colton Schaefer MD     Date of Admission: 2025  Date of Discharge: 2025  Discharge Disposition: Home Health Care Services    Discharge Diagnosis: Lumbar radiculopathy [M54.16]      History of Present Illness: Patient is a 64-year-old female with a history of lumbar stenosis presenting with worsening of symptoms.  Patient started physical therapy this summer for knee pain and progressively started having worsening back pain that radiated down right posterior buttock and thigh, associated with numbness and tingling.  Patient has history of lumbar stenosis with epidural steroid injections in the past.  Patient recently had ketamine for depression with improvement in symptoms.      Patient was most recently seen in November and referred to physical therapy.  Patient continues to have significant radicular symptoms.     Patient was referred for epidural steroid injection, which provided only temporary relief of symptoms.     Bowel and bladder symptoms: absent.     We have tried the following interventions thus far: PT, DIANA      Brief Synopsis: The patient was admitted  for the above mentioned procedure. For full details please refer to the operative note.  The patient's post-op course was uncomplicated. The patient's pain was controlled with IV and PO pain medications. The patient received perioperative antibiotic prophylaxis. The patient's diet was advanced as tolerated.  The patient received DVT prophylaxis with pneumoboots.  The patient was seen by Physical Therapy who deemed the patient ready for discharge.  At the time of discharge the patient was afebrile, tolerating a regular diet without nausea or vomiting, and voiding appropriately.  The patient's pain was well-controlled on oral pain medications.   The dressing was noted to  be clean, dry and intact. The patient will follow-up as scheduled with Dr. Perez in 2-3 weeks     Lace+ Score: 51  59-90 High Risk  29-58 Medium Risk  0-28   Low Risk       TCM Follow-Up Recommendation:  LACE 29-58: Moderate Risk of readmission after discharge from the hospital.    Procedures during hospitalization:   L4-5 MIS TLIF    Incidental or significant findings and recommendations (brief descriptions):  none    Lab/Test results pending at Discharge:   none    Consultants:  Hospitalist, PT/OT    Discharge Medication List:     Discharge Medications        START taking these medications        Instructions Prescription details   acetaminophen 500 MG Tabs  Commonly known as: Tylenol Extra Strength  Notes to patient: Take this for mild pain.       Take 2 tablets (1,000 mg total) by mouth every 8 (eight) hours as needed for Pain.   Quantity: 90 tablet  Refills: 0     cyclobenzaprine 5 MG Tabs  Commonly known as: Flexeril  Notes to patient: This is your muscle relaxer. Recommend that you take this medication scheduled for the next 24 hrs, then take as needed for muscle spasms.      Take 1 tablet (5 mg total) by mouth 3 (three) times daily as needed for Muscle spasms. NO driving   Quantity: 30 tablet  Refills: 0     diazePAM 5 MG Tabs  Commonly known as: Valium  Notes to patient: Recommend that you take this medication tonight at bedtime instead of taking the flexeril.       Take 1 tablet (5 mg total) by mouth nightly as needed. For muscle spasm. No driving   Quantity: 20 tablet  Refills: 0     ondansetron 4 mg tablet  Commonly known as: Zofran      Take 1 tablet (4 mg total) by mouth every 12 (twelve) hours as needed for Nausea.   Quantity: 10 tablet  Refills: 0     oxyCODONE 5 MG Tabs  Notes to patient: Take this medication as needed for incisional pain not relieved with tylenol alone.  Recommend that you take stool softeners/laxatives while taking narcotics to help prevent constipation.       Take 1 tablet (5 mg  total) by mouth every 4 to 6 hours as needed for Pain.   Quantity: 20 tablet  Refills: 0     sennosides 8.6 MG Tabs  Commonly known as: Senokot  Notes to patient: This is your stool softener/laxative. Recommend that you take daily while taking narcotics to help prevent constipation.       Take 2 tablets (17.2 mg total) by mouth nightly as needed (opiate induced constipation).   Quantity: 30 tablet  Refills: 0            CONTINUE taking these medications        Instructions Prescription details   albuterol 108 (90 Base) MCG/ACT Aers  Commonly known as: Ventolin HFA      Inhale 2 puffs into the lungs every 4 (four) hours as needed for Wheezing or Shortness of Breath.   Quantity: 6.7 g  Refills: 0     busPIRone 15 MG Tabs  Commonly known as: Buspar      Take 1 tablet (15 mg total) by mouth 2 (two) times daily.   Refills: 0     dicyclomine 10 MG Caps  Commonly known as: Bentyl      Take 1 capsule (10 mg total) by mouth 2 (two) times daily.   Refills: 0     famotidine 40 MG Tabs  Commonly known as: Pepcid      Take 1 tablet (40 mg total) by mouth daily as needed for Heartburn.   Quantity: 90 tablet  Refills: 3     FLUoxetine HCl 40 MG Caps  Commonly known as: PROZAC      Take 1 capsule (40 mg total) by mouth daily.   Refills: 0     hydrocortisone 2.5 % Crea      Apply to AA of FACE BID x 2 weeks, hold 2 weeks, repeat PRN.   Quantity: 30 g  Refills: 2     ketoconazole 2 % Crea  Commonly known as: Nizoral      Apply to AA of FACE BID when not using Hydrocortisone Cream.   Quantity: 30 g  Refills: 2     LORazepam 2 MG Tabs  Commonly known as: Ativan      Take 1 tablet (2 mg total) by mouth nightly as needed for Anxiety.   Refills: 0     memantine 10 MG Tabs  Commonly known as: Namenda      TAKE 1 TABLET BY MOUTH EVERY DAY   Quantity: 90 tablet  Refills: 0     methylphenidate ER 54 MG Tbcr  Commonly known as: Concerta      Take 1 tablet (54 mg total) by mouth every morning.   Refills: 0     pantoprazole 40 MG Tbec  Commonly  known as: Protonix      Take 1 tablet (40 mg total) by mouth before breakfast.   Quantity: 90 tablet  Refills: 3     rosuvastatin 5 MG Tabs  Commonly known as: Crestor      Take 1 tablet (5 mg total) by mouth nightly.   Quantity: 90 tablet  Refills: 0     Spravato (84 MG Dose) 28 MG/DEVICE Sopk  Generic drug: Esketamine HCl (84 MG Dose)      84 mg by Nasal route every 14 (fourteen) days.   Refills: 0     SUMAtriptan Succinate 6 MG/0.5ML Soaj  Commonly known as: IMITREX      Inject 0.5 mL (6 mg total) into the skin as needed (for moderate to severe migraine).   Quantity: 6 mL  Refills: 2     traZODone 100 MG Tabs  Commonly known as: Desyrel      Take 1 tablet (100 mg total) by mouth nightly.   Refills: 0     triamcinolone 0.1 % Crea  Commonly known as: Kenalog      Apply thin layer to affected area twice a day as needed   Quantity: 45 g  Refills: 1            STOP taking these medications      Propranolol HCl ER 80 MG Cp24  Commonly known as: INDERAL LA                  Where to Get Your Medications        These medications were sent to [place] [address] [phone], [phone]  [address] [place] [address]      Phone: [phone]   acetaminophen 500 MG Tabs  cyclobenzaprine 5 MG Tabs  diazePAM 5 MG Tabs  ondansetron 4 mg tablet  oxyCODONE 5 MG Tabs  sennosides 8.6 MG Tabs         Follow-up appointment:   Ismael Perez MD  [address] [address]  [phone]    Follow up in 2 week(s)  Post op check with Radha AMES in 2-3 weeks    Colton Schaefer MD  [address] [place] [address] [phone]    Follow up in 1 week(s)      Appointments for Next 30 Days 2/8/2025 - 3/10/2025        Date Arrival Time Visit Type Length Department Provider     2/13/2025  9:30 AM  POST OP VISIT [6183] 30 min [facility], [address] , Colton Barba MD    Patient Instructions:          Location Instructions:     Your appointment is located at [address] [place] [address]. The facility is approximately 1/2 mile west of [address] on [address] [place] [address] at the corner of [address] [place] [address] and [facility]. Please park in the Black Lot, enter through Building [address] and follow the posted signs for guidance.  Masks are optional for all patients and visitors, unless otherwise indicated.               2/20/2025  9:00 AM  POST OP VISIT [5673] 30 min [facility], [address] [place], [place] [address]Mini Perez APRN    Patient Instructions:         Location Instructions:     Masks are optional for all patients and visitors, unless otherwise indicated.               2/24/2025  1:10 PM  BOTOX PROCEDURE [1958] 40 min [facility]    Patient Instructions:         Location Instructions:     Masks are optional for all patients and visitors, unless otherwise indicated.                      Supplementary Documentation:   [place] reviewed[place]    Vital signs:       Physical Exam:    General:  NAD  Cardiovascular:  S1, S2    -----------------------------------------------------------------------------------------------  PATIENT DISCHARGE INSTRUCTIONS: See electronic chart    Tip: Documentation requirements: For split shared discharge, BOTH providers need to document specific floor, unit, and time spent on the discharge.  The note needs to be signed by the provider with > 50% of time and bill under their NPI.   Time spent:  15min         KWAKU Pablo for Dr. Perez

## 2025-02-10 ENCOUNTER — TELEPHONE (OUTPATIENT)
Dept: ORTHOPEDICS CLINIC | Facility: CLINIC | Age: 65
End: 2025-02-10

## 2025-02-10 NOTE — TELEPHONE ENCOUNTER
POST OP: 2/5/2025 L4-5 MIS TLIF    Called patient post op. Patient with complaint of pain 5-6/10 on a scale of 1 to 10 in the low back and lateral bilateral thighs. Pain in the thighs is reduced with ICE.    Incision: daily dressing changes Patient states that incision is without redness, swelling and drainage.  Taking pain medications: meds as prescribed.       HHRN and PT started.     Denies nausea/ vomiting / fevers.  Denies constipation.   Denies any incontinence of urine or bowel.   Denies SOB/CP/Calf pain  Denies any new numbness, tingling or weakness in lower extremities/upper extremities.      Patient states that they are up walking. Encouraged 4 short walks in the home daily, increase as tolerated.   Patient encouraged to increase fluids and fiber intake. Continue spine precautions. Encouraged patient to call with any questions or concerns.    Has post op follow up scheduled:     Future Appointments   Date Time Provider Department Center   2/13/2025  9:30 AM Colton Schaefer MD [facility] [address] [facility] [address]   2/20/2025  9:00 AM Mini De La Garza APRN [facility]THO 75 [facility]   2/24/2025  1:10 PM James Betancourt MD ENINAPER [facility] [place]   3/19/2025 12:20 PM Jackelin Maloney MD [facility] [address]   4/24/2025  9:00 AM James Betancourt MD ENIBOLINGBRK [facility] [place]   1/9/2026  9:00 AM Juliana Triana, PATINO [id number]  N[address]

## 2025-02-10 NOTE — PAYOR COMM NOTE
--------------  DISCHARGE REVIEW    Payor: [employer]  Subscriber #:  [id number]  Authorization Number: [id number]    Admit date: 25  Admit time:   5:20 AM  Discharge Date: 2025 12:41 PM     Admitting Physician: Ismael Perez MD  Attending Physician:  No att. providers found  Primary Care Physician: Colton Schaefer MD          Discharge Summary Notes        Discharge Summary signed by Mini De La Garza APRN at 2025  4:52 PM       Author: Mini De La Garza APRN Specialty: Nurse Practitioner Author Type: Nurse Practitioner    Filed: 2025  4:52 PM Date of Service: 2025  4:48 PM Status: Signed    : Mini De La Garza APRN (Nurse Practitioner)            Discharge Summary     Cb Webster Patient Status:  Inpatient    1960 MRN [id number]   [facility] 3SW-A Attending No att. providers found   Hosp Day # 2 PCP Colton Schaefer MD     Date of Admission: 2025  Date of Discharge: 2025  Discharge Disposition: Home Health Care Services    Discharge Diagnosis: Lumbar radiculopathy [M54.16]      History of Present Illness: Patient is a 64-year-old female with a history of lumbar stenosis presenting with worsening of symptoms.  Patient started physical therapy this summer for knee pain and progressively started having worsening back pain that radiated down right posterior buttock and thigh, associated with numbness and tingling.  Patient has history of lumbar stenosis with epidural steroid injections in the past.  Patient recently had ketamine for depression with improvement in symptoms.      Patient was most recently seen in November and referred to physical therapy.  Patient continues to have significant radicular symptoms.     Patient was referred for epidural steroid injection, which provided only temporary relief of symptoms.     Bowel and bladder symptoms: absent.     We have tried the following interventions thus far: PT, DIANA      Brief Synopsis: The patient was  admitted  for the above mentioned procedure. For full details please refer to the operative note.  The patient's post-op course was uncomplicated. The patient's pain was controlled with IV and PO pain medications. The patient received perioperative antibiotic prophylaxis. The patient's diet was advanced as tolerated.  The patient received DVT prophylaxis with pneumoboots.  The patient was seen by Physical Therapy who deemed the patient ready for discharge.  At the time of discharge the patient was afebrile, tolerating a regular diet without nausea or vomiting, and voiding appropriately.  The patient's pain was well-controlled on oral pain medications.   The dressing was noted to be clean, dry and intact. The patient will follow-up as scheduled with Dr. Perez in 2-3 weeks     Lace+ Score: 51  59-90 High Risk  29-58 Medium Risk  0-28   Low Risk       TCM Follow-Up Recommendation:  LACE 29-58: Moderate Risk of readmission after discharge from the hospital.    Procedures during hospitalization:   L4-5 MIS TLIF    Incidental or significant findings and recommendations (brief descriptions):  none    Lab/Test results pending at Discharge:   none    Consultants:  Hospitalist, PT/OT    Discharge Medication List:     Discharge Medications        START taking these medications        Instructions Prescription details   acetaminophen 500 MG Tabs  Commonly known as: Tylenol Extra Strength  Notes to patient: Take this for mild pain.       Take 2 tablets (1,000 mg total) by mouth every 8 (eight) hours as needed for Pain.   Quantity: 90 tablet  Refills: 0     cyclobenzaprine 5 MG Tabs  Commonly known as: Flexeril  Notes to patient: This is your muscle relaxer. Recommend that you take this medication scheduled for the next 24 hrs, then take as needed for muscle spasms.      Take 1 tablet (5 mg total) by mouth 3 (three) times daily as needed for Muscle spasms. NO driving   Quantity: 30 tablet  Refills: 0     diazePAM 5 MG  Tabs  Commonly known as: Valium  Notes to patient: Recommend that you take this medication tonight at bedtime instead of taking the flexeril.       Take 1 tablet (5 mg total) by mouth nightly as needed. For muscle spasm. No driving   Quantity: 20 tablet  Refills: 0     ondansetron 4 mg tablet  Commonly known as: Zofran      Take 1 tablet (4 mg total) by mouth every 12 (twelve) hours as needed for Nausea.   Quantity: 10 tablet  Refills: 0     oxyCODONE 5 MG Tabs  Notes to patient: Take this medication as needed for incisional pain not relieved with tylenol alone.  Recommend that you take stool softeners/laxatives while taking narcotics to help prevent constipation.       Take 1 tablet (5 mg total) by mouth every 4 to 6 hours as needed for Pain.   Quantity: 20 tablet  Refills: 0     sennosides 8.6 MG Tabs  Commonly known as: Senokot  Notes to patient: This is your stool softener/laxative. Recommend that you take daily while taking narcotics to help prevent constipation.       Take 2 tablets (17.2 mg total) by mouth nightly as needed (opiate induced constipation).   Quantity: 30 tablet  Refills: 0            CONTINUE taking these medications        Instructions Prescription details   albuterol 108 (90 Base) MCG/ACT Aers  Commonly known as: Ventolin HFA      Inhale 2 puffs into the lungs every 4 (four) hours as needed for Wheezing or Shortness of Breath.   Quantity: 6.7 g  Refills: 0     busPIRone 15 MG Tabs  Commonly known as: Buspar      Take 1 tablet (15 mg total) by mouth 2 (two) times daily.   Refills: 0     dicyclomine 10 MG Caps  Commonly known as: Bentyl      Take 1 capsule (10 mg total) by mouth 2 (two) times daily.   Refills: 0     famotidine 40 MG Tabs  Commonly known as: Pepcid      Take 1 tablet (40 mg total) by mouth daily as needed for Heartburn.   Quantity: 90 tablet  Refills: 3     FLUoxetine HCl 40 MG Caps  Commonly known as: PROZAC      Take 1 capsule (40 mg total) by mouth daily.   Refills: 0      hydrocortisone 2.5 % Crea      Apply to AA of FACE BID x 2 weeks, hold 2 weeks, repeat PRN.   Quantity: 30 g  Refills: 2     ketoconazole 2 % Crea  Commonly known as: Nizoral      Apply to AA of FACE BID when not using Hydrocortisone Cream.   Quantity: 30 g  Refills: 2     LORazepam 2 MG Tabs  Commonly known as: Ativan      Take 1 tablet (2 mg total) by mouth nightly as needed for Anxiety.   Refills: 0     memantine 10 MG Tabs  Commonly known as: Namenda      TAKE 1 TABLET BY MOUTH EVERY DAY   Quantity: 90 tablet  Refills: 0     methylphenidate ER 54 MG Tbcr  Commonly known as: Concerta      Take 1 tablet (54 mg total) by mouth every morning.   Refills: 0     pantoprazole 40 MG Tbec  Commonly known as: Protonix      Take 1 tablet (40 mg total) by mouth before breakfast.   Quantity: 90 tablet  Refills: 3     rosuvastatin 5 MG Tabs  Commonly known as: Crestor      Take 1 tablet (5 mg total) by mouth nightly.   Quantity: 90 tablet  Refills: 0     Spravato (84 MG Dose) 28 MG/DEVICE Sopk  Generic drug: Esketamine HCl (84 MG Dose)      84 mg by Nasal route every 14 (fourteen) days.   Refills: 0     SUMAtriptan Succinate 6 MG/0.5ML Soaj  Commonly known as: IMITREX      Inject 0.5 mL (6 mg total) into the skin as needed (for moderate to severe migraine).   Quantity: 6 mL  Refills: 2     traZODone 100 MG Tabs  Commonly known as: Desyrel      Take 1 tablet (100 mg total) by mouth nightly.   Refills: 0     triamcinolone 0.1 % Crea  Commonly known as: Kenalog      Apply thin layer to affected area twice a day as needed   Quantity: 45 g  Refills: 1            STOP taking these medications      Propranolol HCl ER 80 MG Cp24  Commonly known as: INDERAL LA                  Where to Get Your Medications        These medications were sent to [address], [address] [phone], [phone]  [address] [place] [address], [address] [address]      Phone: [phone]   acetaminophen 500 MG  Tabs  cyclobenzaprine 5 MG Tabs  diazePAM 5 MG Tabs  ondansetron 4 mg tablet  oxyCODONE 5 MG Tabs  sennosides 8.6 MG Tabs         Follow-up appointment:   Ismael Perez MD  [address] [place] [address]  [facility] [address]  [phone]    Follow up in 2 week(s)  Post op check with Radha AMES in 2-3 weeks    Colton Schaefer MD  [address] [place] [address] [address]  [facility] [address]  [phone]    Follow up in 1 week(s)      Appointments for Next 30 Days 2/8/2025 - 3/10/2025        Date Arrival Time Visit Type Length Department Provider     2/13/2025  9:30 AM  POST OP VISIT [2853] 30 min [facility], [address] [place] [address] Colton Schaefer MD    Patient Instructions:         Location Instructions:     Your appointment is located at [address] [place] [address]. The facility is approximately 1/2 mile west of [address] on [address] [place] [address] at the corner of [address] [place] [address] and [facility]. Please park in the Black Lot, enter through Building [address] and follow the posted signs for guidance.  Masks are optional for all patients and visitors, unless otherwise indicated.               2/20/2025  9:00 AM  POST OP VISIT [8743] 30 min [address] Mini De La Garza APRN    Patient Instructions:         Location Instructions:     Masks are optional for all patients and visitors, unless otherwise indicated.               2/24/2025  1:10 PM  BOTOX PROCEDURE [1958] 40 min [facility] James Betancourt MD    Patient Instructions:         Location Instructions:     Masks are optional for all patients and visitors, unless otherwise indicated.                      Supplementary Documentation:   [facility] reviewed: yes    Vital signs:       Physical Exam:    General:  NAD  Cardiovascular:  S1, S2    -----------------------------------------------------------------------------------------------  PATIENT DISCHARGE INSTRUCTIONS:  See electronic chart    Tip: Documentation requirements: For split shared discharge, BOTH providers need to document specific floor, unit, and time spent on the discharge.  The note needs to be signed by the provider with > 50% of time and bill under their NPI.   Time spent:  15min        KWAKU Pablo for Dr. Perez          Electronically signed by Mini De La Garza APRN on 2/8/2025  4:52 PM         REVIEWER COMMENTS

## 2025-02-10 NOTE — PAYOR COMM NOTE
CONTINUED STAY REVIEW    Payor: [employer]  Subscriber #:  [id number]  Authorization Number: [id number]    Admit date: 2/5/25  Admit time:  5:20 AM    REVIEW DOCUMENTATION:      2/5 OP note       Mini De La Garza APRN   Nurse Practitioner  Orthopedics     Brief Op Note     Signed     Date of Service: 2/5/2025 10:03 AM     Signed         Pre-Operative Diagnosis: Lumbar radiculopathy [M54.16]     Post-Operative Diagnosis: Lumbar radiculopathy [M54.16]      Procedure Performed:   LUMBAR 4-LUMBAR 5 MINIMALLY INVASIVE SPINE TRANSFORAMINAL LUMBAR INTERBODY FUSION     Surgeons and Role:     * Ismael Perez MD - Primary     Assistant(s):  APN: Mini De La Garza APRN     Surgical Findings: lumbar stenosis     Specimen: none     Estimated Blood Loss: Blood Output: 50 mL (2/5/2025  9:38 AM)        Plan     Antibiotics: Cefazolin x 24 hours  Anticoagulation: SCDs and early ambulation  Drain: None  Activity: Out of bed as tolerated  Brace: None  Medical history, allergies, and medications:In EPIC  Pain control: Routine postop  Anticipated discharge: When pain control and activity tolerance allow. Should be 1-2 days  Consults: PT. Hospitalist  Other information: None  Xray: POD1 standing Lumbar spine AP/LAT/Spot LAT                        2/6 IM        Chief Complaint: Postop pain        Subjective:  Patient states pain is controlled with meds but increased after getting up with PT. No nausea or vomiting. No chest pain or SOB. She was noted to be hypotensive with SBP 60s when getting up but asymptomatic.        Vital signs:  Temp:  [98.1 °F (36.7 °C)-98.9 °F (37.2 °C)] 98.7 °F (37.1 °C)  Pulse:  [62-71] 66  Resp:  [16-18] 17  BP: ()/(30-85) 91/49  SpO2:  [96 %-99 %] 97 %     Physical Exam:    General: No acute distress, awake and alert  Respiratory: No wheezes, no rhonchi  Cardiovascular: S1, S2, regular rate and rhythm  Abdomen: Soft, Non-tender, non-distended, positive bowel sounds  Neuro: ALEGRIA x 4          Assessment & Plan:  #Lumbar stenosis with radiculopathy s/p L4-L5 minimally invasive transforaminal lumbar interbody fusion on 2/5/25  -Spine surgery primary  -Lumbar spine x-rays ordered  -PT/OT, encourage IS  -Pain control     #Orthostatic hypotension, asymptomatic  -Eriberto hose, abd binder  -IVF  -Hold propranolol if SBP < 110   -States her BP runs low at baseline     #Dyslipidemia  -Statin    #PUD/GERD  -Protonix, Pepcid PRN    #ISHA, ADHD, OCD  -Continue home Ritalin, Prozac, Buspar, Trazodone, PRN Ativan   -Also on Spravato nasal spray at home     #Migraines  -No acute issues  -Typically takes propranolol for this     #CAD  -No acute issues     #MODE, not on cpap, has lost > 100 lbs since dx, consider repeat sleep study if needed vs removing this dx from pt's chart     #Obesity  -Body mass index is 35.33 kg/m².             MEDICATIONS ADMINISTERED IN LAST 1 DAY:  Administration History           sodium chloride 0.9 % IV bolus 500 mL  Dose: 500 mL  Freq: Once Route: IV  Last Dose: 500 mL (02/06/25 0614)  Start: 02/06/25 0615 End: 02/06/25 0714 0614 CE-New Bag         Followed by   sodium chloride 0.9% infusion  Rate: 100 mL/hr  Freq: Continuous Route: IV  Start: 02/06/25 0712 End: 02/07/25 1441 0720 CE-New Bag     1130 JA-New Bag     2100 LV-New Bag      1441-D/C'd             sodium chloride 0.9 % IV bolus 500 mL  Dose: 500 mL  Freq: Once Route: IV  Last Dose: 500 mL (02/06/25 0614)  Start: 02/06/25 0615 End: 02/06/25 0714 0614 CE-New Bag         Followed by   sodium chloride 0.9% infusion  Rate: 100 mL/hr  Freq: Continuous Route: IV  Start: 02/06/25 0712 End: 02/07/25 1441 0720 CE-New Bag     1130 JA-New Bag     2100 LV-New Bag      1441-D/C'd           oxyCODONE immediate release tab 5 mg  Dose: 5 mg  Freq: Every 4 hours PRN Route: OR  PRN Reason: moderate pain  Start: 02/05/25 1207 End: 02/07/25 1441   Admin Instructions:   Use PRN reason as a guide and follow range  order policy           1348 AB-Given     1633 AB-Given           1130 JA-Given     1454 JA-Given      0926 JA-Given     1441-D/C'd          sodium chloride 0.9 % IV bolus 500 mL  Dose: 500 mL  Freq: Once as needed Route: IV  PRN Comment: once only for symptomatic hypotension  Last Dose: Stopped (02/06/25 0609)  Start: 02/05/25 1207 End: 02/07/25 0006   Admin Instructions:   - give one time over 30-60 minutes post op. Notify PCP/Hospitalist if not resolved            0507 CE-New Bag     0609 CE-Stopped      0006-D/C'd              Vitals (last day) before discharge       Date/Time Temp Pulse Resp BP SpO2 Weight O2 Device O2 Flow Rate (L/min) [facility]    02/07/25 0748 -- 71 -- -- 98 % -- -- --     02/07/25 0747 98 °F (36.7 °C) 71 14 112/42 98 % -- None (Room air) --     02/07/25 0456 98.1 °F (36.7 °C) 75 18 103/58 95 % -- -- --     02/07/25 0000 98.5 °F (36.9 °C) 71 18 104/53 97 % -- None (Room air) --     02/06/25 2000 98.7 °F (37.1 °C) 79 18 91/33 97 % -- None (Room air) -- AF    02/06/25 1533 99.5 °F (37.5 °C) 79 16 112/54 96 % -- None (Room air) -- MB    02/06/25 1119 97.8 °F (36.6 °C) -- 16 -- 97 % -- None (Room air) -- MB    02/06/25 1119 -- 70 -- 94/52 -- -- -- -- JA    02/06/25 1109 -- 72 -- 65/54 -- -- -- -- JA    02/06/25 1104 -- 63 -- 90/54 -- -- -- --     02/06/25 0850 98.7 °F (37.1 °C) 66 17 91/49 97 % -- None (Room air) -- MB    02/06/25 0722 -- 67 -- 89/42 98 % -- -- -- CE    02/06/25 0602 -- 69 18 95/41 96 % -- None (Room air) 0 L/min     02/06/25 0400 98.1 °F (36.7 °C) 62 18 90/30 97 % -- None (Room air) 0 L/min     02/06/25 0000 98.3 °F (36.8 °C) 71 18 99/50 96 % -- None (Room air) 0 L/min

## 2025-02-13 ENCOUNTER — OFFICE VISIT (OUTPATIENT)
Dept: FAMILY MEDICINE CLINIC | Facility: CLINIC | Age: 65
End: 2025-02-13
Payer: MEDICARE

## 2025-02-13 VITALS
DIASTOLIC BLOOD PRESSURE: 68 MMHG | RESPIRATION RATE: 16 BRPM | SYSTOLIC BLOOD PRESSURE: 92 MMHG | OXYGEN SATURATION: 97 % | HEART RATE: 97 BPM | BODY MASS INDEX: 35.3 KG/M2 | TEMPERATURE: 97 F | WEIGHT: 187 LBS | HEIGHT: 61 IN

## 2025-02-13 DIAGNOSIS — R41.0 CONFUSION: ICD-10-CM

## 2025-02-13 DIAGNOSIS — K57.92 DIVERTICULITIS: Primary | ICD-10-CM

## 2025-02-13 DIAGNOSIS — G89.18 PAIN AT SURGICAL SITE: ICD-10-CM

## 2025-02-13 PROCEDURE — 99213 OFFICE O/P EST LOW 20 MIN: CPT | Performed by: STUDENT IN AN ORGANIZED HEALTH CARE EDUCATION/TRAINING PROGRAM

## 2025-02-13 RX ORDER — MEMANTINE HYDROCHLORIDE 10 MG/1
10 TABLET ORAL DAILY
Qty: 90 TABLET | Refills: 0 | Status: SHIPPED | OUTPATIENT
Start: 2025-02-13

## 2025-02-13 RX ORDER — CIPROFLOXACIN 500 MG/1
500 TABLET, FILM COATED ORAL 2 TIMES DAILY
Qty: 20 TABLET | Refills: 0 | Status: SHIPPED | OUTPATIENT
Start: 2025-02-13 | End: 2025-02-23

## 2025-02-13 RX ORDER — METRONIDAZOLE 500 MG/1
500 TABLET ORAL 3 TIMES DAILY
Qty: 30 TABLET | Refills: 0 | Status: SHIPPED | OUTPATIENT
Start: 2025-02-13 | End: 2025-02-23

## 2025-02-13 NOTE — PROGRESS NOTES
Subjective:      Chief Complaint   Patient presents with    Post-Op     Lumbar fusion      HISTORY OF PRESENT ILLNESS  HPI  HPI obtained per patient report.  Cb Webster is a pleasant 64 year old female presenting for follow-up after her recent surgery.   She is here with her daughter today.  She is s/p L4-5 fusion on 25 and was discharged home with home health. She has a post-op visit with her surgical team on .  Her surgical site pain is manageable with her current regimen.   She notes worsening LLQ pain over the past 5 days, however. She denies any other GI symptoms, fever, chills. She reports preceding consumption of granola bars with nuts and seeds.       PAST PATIENT HISTORY  Past Medical History:    Anxiety    Aortic regurgitation    mild    Back pain    Calculus of kidney    Cardiac calcification (HCC) - CAC score of 5.24 seen on 22 CT Heart Scan protocol    Colon polyps    Depression    Disorder of liver    Fatty liver    Diverticulosis    Gastroparesis    GERD (gastroesophageal reflux disease)    Hemorrhoids    High blood pressure    History of depression    Major depressive disorder & anxiety    History of eating disorder    Binge eating    Hyperlipidemia    Migraines    OCD (obsessive compulsive disorder)    MODE (obstructive sleep apnea)    Osteoarthritis    Parkinsonism (HCC)    Peptic ulcer disease    Stool incontinence    Soft barely formed stool, not detecting urgency    Tendonitis of shoulder, right     Past Surgical History:   Procedure Laterality Date          x 2    Cholecystectomy      Colonoscopy      D & c      Ect provided  [id number]    Egd      Laparoscopic cholecystectomy      Lumbar spine fusion combined  2025    Needle biopsy right  2015    benign breast    Other surgical history      C3-C4 anterior discectomy    Other [facility]  [facility]    Other surgical history  2019    radiofrequency abalsion lumbar    Removal gallbladder   2020    Skin surgery      Spine surgery procedure unlisted      Anterior cervical discectomy    Tonsillectomy  1966?       CURRENT MEDICATIONS  Medications Taking[1]    HEALTH MAINTENANCE  Immunization History   Administered Date(s) Administered    Covid-19 Vaccine Pfizer 30 mcg/0.3 ml 2021, 2021, 10/23/2021    Covid-19 Vaccine Pfizer Bivalent 30mcg/0.3mL 2022    Covid-19 Vaccine Pfizer Merrill-Sucrose 30 mcg/0.3 ml 2022    FLULAVAL 6 months & older 0.5 ml Prefilled syringe ([id number]) 10/23/2017, 2018, 10/01/2019, 10/07/2020, 2021, 10/28/2022    FLUZONE 6 months and older PFS 0.5 ml ([id number]) 10/23/2017, 2018, 10/13/2023    Influenza 2024    Pfizer Covid-19 Vaccine 30mcg/0.3ml 12yrs+ 10/13/2023, 2024    Pneumococcal Conjugate PCV20 2025    RSV, recombinant, RSVpreF, adjuvanted (Arexvy) 2023    TDAP 2018    Zoster Vaccine Recombinant Adjuvanted (Shingrix) 2021, 2021       ALLERGIES AND DRUG REACTIONS  Allergies[2]    Family History   Problem Relation Age of Onset    Hypertension Father     Heart Attack Father          at 48 years    Heart Disease Father     Alcohol and Other Disorders Associated Father     Depression Father     Other (ALS) Mother     Hypertension Mother         Diagnosed at 91 yo with ALS  at 91    Personality Disorder Daughter     Dementia Maternal Grandmother     Obesity Maternal Grandmother     Dementia Maternal Grandfather     Obesity Paternal Grandfather     Cancer Sister         Kidney, chronic lymp… leukemia    Heart Attack Sister         Kidney cancer, chronic lymphocytic leukemia    Ulcerative Colitis Brother     Cancer Brother         Kidney     Social History     Socioeconomic History    Marital status:    Occupational History    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[employer]   Tobacco Use    Smoking status: Never     Passive exposure: Never     Smokeless tobacco: Never   Vaping Use    Vaping status: Never Used   Substance and Sexual Activity    Alcohol use: Not Currently    Drug use: Never   Other Topics Concern    Caffeine Concern Yes     Comment: Decaf Coffee    Exercise No    Seat Belt Yes    Special Diet No    Stress Concern Yes    Weight Concern Yes     Comment: L125lb  G85lbs  L45lbs   Social History Narrative    Caring for grandson (Petre - aged 6 yo [1/2020])     Social Drivers of Health     Food Insecurity: No Food Insecurity (2/5/2025)    NCSS - Food Insecurity     Worried About Running Out of Food in the Last Year: No     Ran Out of Food in the Last Year: No   Transportation Needs: No Transportation Needs (2/5/2025)    NCSS - Transportation     Lack of Transportation: No   Housing Stability: Not At Risk (2/5/2025)    NCSS - Housing/Utilities     Has Housing: Yes     Worried About Losing Housing: No     Unable to Get Utilities: No       Review of Systems   Gastrointestinal:  Positive for abdominal pain.   Musculoskeletal:  Positive for back pain.   All other systems reviewed and are negative.         Objective:      BP 92/68   Pulse 97   Temp 97 °F (36.1 °C) (Temporal)   Resp 16   Ht 5' 1\" (1.549 m)   Wt 187 lb (84.8 kg)   LMP  (LMP Unknown)   SpO2 97%   BMI 35.33 kg/m²   Body mass index is 35.33 kg/m².    Physical Exam  Vitals reviewed.   Constitutional:       General: She is not in acute distress.     Appearance: She is not ill-appearing, toxic-appearing or diaphoretic.   HENT:      Head: Normocephalic and atraumatic.      Right Ear: Tympanic membrane, ear canal and external ear normal.      Left Ear: Tympanic membrane and external ear normal.      Mouth/Throat:      Mouth: Mucous membranes are moist.      Pharynx: Oropharynx is clear.   Eyes:      General: No scleral icterus.        Right eye: No discharge.         Left eye: No discharge.      Extraocular Movements: Extraocular movements intact.      Conjunctiva/sclera: Conjunctivae  normal.   Cardiovascular:      Rate and Rhythm: Normal rate and regular rhythm.      Heart sounds: Normal heart sounds.   Pulmonary:      Effort: Pulmonary effort is normal.      Breath sounds: Normal breath sounds.   Abdominal:      General: There is no distension.      Palpations: Abdomen is soft. There is no mass.      Tenderness: There is abdominal tenderness (mild LLQ tenderness). There is no guarding or rebound.      Hernia: No hernia is present.   Musculoskeletal:      Cervical back: Neck supple.      Right lower leg: No edema.      Left lower leg: No edema.   Skin:     General: Skin is warm and dry.      Coloration: Skin is not jaundiced or pale.      Findings: No bruising, erythema or rash.      Comments: Dressing in place on low back without strike-through or surrounding erythema/edema/increased warmth     Neurological:      Mental Status: She is alert and oriented to person, place, and time.      Gait: Gait abnormal (ambulation with walker).   Psychiatric:         Mood and Affect: Mood normal.            Assessment and Plan:      1. Diverticulitis (Primary)  -     metroNIDAZOLE; Take 1 tablet (500 mg total) by mouth 3 (three) times daily for 10 days.  Dispense: 30 tablet; Refill: 0  -     Ciprofloxacin HCl; Take 1 tablet (500 mg total) by mouth 2 (two) times daily for 10 days.  Dispense: 20 tablet; Refill: 0  2. Pain at surgical site    Return if symptoms worsen or fail to improve.    - LLQ pain is  likely 2/2 mild initial signs of diverticulitis  - recommended clear liquid diet then advancement as tolerated  - if her symptom does not improve with dietary modifications within the next few days or if her symptom worsens, recommended taking a course of flagyl and ciprofloxacin as prescribed  - her sedating pain medications are likely contributing to her low-normal BP. Recommended using these medications with caution and monitoring her BP at home to ensure it is remaining >90/60     Patient verbalized  understanding of assessment and recommendations. All questions and concerns were addressed.    Electronically signed by Colton Schaefer MD         [1]   Outpatient Medications Marked as Taking for the 2/13/25 encounter (Office Visit) with Colton Schaefer MD   Medication Sig Dispense Refill    metroNIDAZOLE 500 MG Oral Tab Take 1 tablet (500 mg total) by mouth 3 (three) times daily for 10 days. 30 tablet 0    ciprofloxacin 500 MG Oral Tab Take 1 tablet (500 mg total) by mouth 2 (two) times daily for 10 days. 20 tablet 0    acetaminophen 500 MG Oral Tab Take 2 tablets (1,000 mg total) by mouth every 8 (eight) hours as needed for Pain. 90 tablet 0    cyclobenzaprine 5 MG Oral Tab Take 1 tablet (5 mg total) by mouth 3 (three) times daily as needed for Muscle spasms. NO driving 30 tablet 0    ondansetron (ZOFRAN) 4 mg tablet Take 1 tablet (4 mg total) by mouth every 12 (twelve) hours as needed for Nausea. 10 tablet 0    oxyCODONE 5 MG Oral Tab Take 1 tablet (5 mg total) by mouth every 4 to 6 hours as needed for Pain. 20 tablet 0    diazePAM (VALIUM) 5 MG Oral Tab Take 1 tablet (5 mg total) by mouth nightly as needed. For muscle spasm. No driving 20 tablet 0    sennosides 8.6 MG Oral Tab Take 2 tablets (17.2 mg total) by mouth nightly as needed (opiate induced constipation). 30 tablet 0    methylphenidate ER 54 MG Oral Tab CR Take 1 tablet (54 mg total) by mouth every morning.      rosuvastatin 5 MG Oral Tab Take 1 tablet (5 mg total) by mouth nightly. 90 tablet 0    dicyclomine 10 MG Oral Cap Take 1 capsule (10 mg total) by mouth 2 (two) times daily.      busPIRone 15 MG Oral Tab Take 1 tablet (15 mg total) by mouth 2 (two) times daily.      MEMANTINE 10 MG Oral Tab TAKE 1 TABLET BY MOUTH EVERY DAY 90 tablet 0    FLUoxetine HCl 40 MG Oral Cap Take 1 capsule (40 mg total) by mouth daily.      pantoprazole 40 MG Oral Tab EC Take 1 tablet (40 mg total) by mouth before breakfast. 90 tablet 3    famotidine 40 MG Oral Tab Take 1 tablet  (40 mg total) by mouth daily as needed for Heartburn. 90 tablet 3    SUMAtriptan Succinate 6 MG/0.5ML Subcutaneous Solution Auto-injector Inject 0.5 mL (6 mg total) into the skin as needed (for moderate to severe migraine). 6 mL 2    albuterol 108 (90 Base) MCG/ACT Inhalation Aero Soln Inhale 2 puffs into the lungs every 4 (four) hours as needed for Wheezing or Shortness of Breath. 6.7 g 0    traZODone 100 MG Oral Tab Take 1 tablet (100 mg total) by mouth nightly.      SPRAVATO, 84 MG DOSE, 28 MG/DEVICE Nasal Solution Therapy Pack 84 mg by Nasal route every 14 (fourteen) days.     [2]   Allergies  Allergen Reactions    Sulfa Antibiotics NAUSEA ONLY

## 2025-02-13 NOTE — TELEPHONE ENCOUNTER
Medication:  MEMANTINE 10 MG Oral Tab      Date of last refill: 11/18/2024 (#90/0)  [place]     Last office visit: 1/20/2025  Due back to clinic per last office note:  3 Months   Date next office visit scheduled:    Future Appointments   Date Time Provider Department Center   2/20/2025  9:00 AM Mini De La Garza APRN [facility]   2/24/2025  1:10 PM James Betancourt MD ENINAPER [facility]   3/6/2025  8:40 AM Ismael Perez MD [facility]   3/19/2025 12:20 PM Jackelin Maloney MD [facility]   4/24/2025  9:00 AM James Betancourt MD ENIBOLINGBRK [facility]   1/9/2026  9:00 AM Juliana Triana PA-C [id number]           Last OV note recommendation:    ASSESSMENT/PLAN:          ICD-10-CM     1. Chronic migraine without aura without status migrainosus, not intractable  G43.709         2. Mild neurocognitive disorder due to multiple etiologies  F06.70               Neurocognitive disorder unspecified     I have reviewed the neuropsychology evaluation results in detail, done by [facility].   Continue optimal control of anxiety depression currently on fluoxetine, buspirone and trazodone  Encourage joining a class or group activities  MRI brain and EEG performed negative  On memantine 10 mg daily for neurocognitive prophylaxis           2 Chronic migraines, stable  On Botox and Propranolol for migraine prevention  Switch Nurtec's to Imitrex injection        Follow up in about 3 months  See orders and medications filed with this encounter. The patient indicates understanding of these issues and agrees with the plan.           James Betancourt MD  [facility]

## 2025-02-20 ENCOUNTER — OFFICE VISIT (OUTPATIENT)
Dept: ORTHOPEDICS CLINIC | Facility: CLINIC | Age: 65
End: 2025-02-20
Payer: MEDICARE

## 2025-02-20 DIAGNOSIS — Z98.1 ARTHRODESIS STATUS: Primary | ICD-10-CM

## 2025-02-20 PROCEDURE — 99024 POSTOP FOLLOW-UP VISIT: CPT | Performed by: NURSE PRACTITIONER

## 2025-02-20 NOTE — PATIENT INSTRUCTIONS
Start tylenol over the counter as directed on package. Okay to take Tylenol 1000mg up to three times per day. Do not exceed 3000mg of tylenol per day.   Use over the counter SalonPas 4% lidocaine patch or Tiger balm over the counter lidocaine product as directed on the package.   Use heat and ice as needed and as tolerated.   Continue your home exercise program.   Follow up in clinic as discussed.         Sciatic nerve glides:  This exercise will keep your nerves moving to prevent scarring around the nerves.  While seated, extend your neck (look up), straighten your knee, and dorsiflex your ankle by pointing your toes at your head. Then flex your neck (look down) and plantarflex your ankle by pointing your toes away from you. Do your left side then right side. Do this 12 times on each side.  Do this exercise 3 times per day.    “Seated sciatic nerve floss” (Kip, 2021)

## 2025-02-20 NOTE — PROGRESS NOTES
Post op follow up    CC:   Chief Complaint   Patient presents with    Post-Op     2WK L-5 MIS TLIF, SX: 02.05.25       HPI:   Cb Webster is a 64 year old female with chief complaint of   Chief Complaint   Patient presents with    Post-Op     2WK L-5 MIS TLIF, SX: 02.05.25       Post op: 2/5/2025 L4-5 MIS TLIF    Reports with improvement of leg symptoms, has some left gluteal area pain that radiates to the left lateral hip. Denies right leg symptoms. Back pain with tightness that is worse in the mornings.   Reduced oxy to 1/2 tab BID last 2 days and none today, stopped valium, stopped cyclobenzaprine. Taking tylenol  Severity is moderate.   + walker- has been weaning off in the home.    Incision: denies concerns  Had bilateral upper calf pain on POD1 attributed to the WELLINGTON hose rolling and causing rubber band effect. Discontinued the WELLINGTON hose and has seen that the calf area pain has improved, continues with slight tenderness.     Denies CHEST PAIN/SOB Denies fever/chills/nausea/vomiting.     Old records, summarized above in HPI, which were reviewed:  PCP note    Current Medications:  Current Outpatient Medications   Medication Sig Dispense Refill    MEMANTINE 10 MG Oral Tab TAKE 1 TABLET BY MOUTH EVERY DAY 90 tablet 0    metroNIDAZOLE 500 MG Oral Tab Take 1 tablet (500 mg total) by mouth 3 (three) times daily for 10 days. 30 tablet 0    ciprofloxacin 500 MG Oral Tab Take 1 tablet (500 mg total) by mouth 2 (two) times daily for 10 days. 20 tablet 0    oxyCODONE 5 MG Oral Tab Take 1 tablet (5 mg total) by mouth every 4 to 6 hours as needed for Pain. 20 tablet 0    acetaminophen 500 MG Oral Tab Take 2 tablets (1,000 mg total) by mouth every 8 (eight) hours as needed for Pain. 90 tablet 0    diazePAM (VALIUM) 5 MG Oral Tab Take 1 tablet (5 mg total) by mouth nightly as needed. For muscle spasm. No driving 20 tablet 0    sennosides 8.6 MG Oral Tab Take 2 tablets (17.2 mg total) by mouth nightly as needed (opiate  induced constipation). 30 tablet 0    methylphenidate ER 54 MG Oral Tab CR Take 1 tablet (54 mg total) by mouth every morning.      rosuvastatin 5 MG Oral Tab Take 1 tablet (5 mg total) by mouth nightly. 90 tablet 0    dicyclomine 10 MG Oral Cap Take 1 capsule (10 mg total) by mouth 2 (two) times daily.      busPIRone 15 MG Oral Tab Take 1 tablet (15 mg total) by mouth 2 (two) times daily.      FLUoxetine HCl 40 MG Oral Cap Take 1 capsule (40 mg total) by mouth daily.      pantoprazole 40 MG Oral Tab EC Take 1 tablet (40 mg total) by mouth before breakfast. 90 tablet 3    famotidine 40 MG Oral Tab Take 1 tablet (40 mg total) by mouth daily as needed for Heartburn. 90 tablet 3    SUMAtriptan Succinate 6 MG/0.5ML Subcutaneous Solution Auto-injector Inject 0.5 mL (6 mg total) into the skin as needed (for moderate to severe migraine). 6 mL 2    albuterol 108 (90 Base) MCG/ACT Inhalation Aero Soln Inhale 2 puffs into the lungs every 4 (four) hours as needed for Wheezing or Shortness of Breath. 6.7 g 0    traZODone 100 MG Oral Tab Take 1 tablet (100 mg total) by mouth nightly.      SPRAVATO, 84 MG DOSE, 28 MG/DEVICE Nasal Solution Therapy Pack 84 mg by Nasal route every 14 (fourteen) days.      LORazepam 2 MG Oral Tab Take 1 tablet (2 mg total) by mouth nightly as needed for Anxiety.      cyclobenzaprine 5 MG Oral Tab Take 1 tablet (5 mg total) by mouth 3 (three) times daily as needed for Muscle spasms. NO driving (Patient not taking: Reported on 2/20/2025) 30 tablet 0    ondansetron (ZOFRAN) 4 mg tablet Take 1 tablet (4 mg total) by mouth every 12 (twelve) hours as needed for Nausea. 10 tablet 0      HISTORY:  Past Medical History:    Anxiety    Aortic regurgitation    mild    Back pain    Calculus of kidney    Cardiac calcification (HCC) - CAC score of 5.24 seen on 12/2/22 CT Heart Scan protocol    Colon polyps    Depression    Disorder of liver    Fatty liver    Diverticulosis    Gastroparesis    GERD (gastroesophageal  reflux disease)    Hemorrhoids    High blood pressure    History of depression    Major depressive disorder & anxiety    History of eating disorder    Binge eating    Hyperlipidemia    Migraines    OCD (obsessive compulsive disorder)    MODE (obstructive sleep apnea)    Osteoarthritis    Parkinsonism (HCC)    Peptic ulcer disease    Stool incontinence    Soft barely formed stool, not detecting urgency    Tendonitis of shoulder, right      Past Surgical History:   Procedure Laterality Date    Back surgery  2025    L5 MIS TLIF W/ MACIE ELLIS MD          x 2    Cholecystectomy      Colonoscopy      D & c      Ect provided  [id number]    Egd      Laparoscopic cholecystectomy      Lumbar spine fusion combined  2025    Needle biopsy right  2015    benign breast    Other surgical history      C3-C4 anterior discectomy    Other surgical history  2018    [facility]    Other surgical history  2019    radiofrequency abalsion lumbar    Removal gallbladder  2020    Skin surgery      Spine surgery procedure unlisted      Anterior cervical discectomy    Tonsillectomy  1966?      Family History   Problem Relation Age of Onset    Hypertension Father     Heart Attack Father          at 48 years    Heart Disease Father     Alcohol and Other Disorders Associated Father     Depression Father     Other (ALS) Mother     Hypertension Mother         Diagnosed at 89 yo with ALS  at 91    Personality Disorder Daughter     Dementia Maternal Grandmother     Obesity Maternal Grandmother     Dementia Maternal Grandfather     Obesity Paternal Grandfather     Cancer Sister         Kidney, chronic lymp… leukemia    Heart Attack Sister         Kidney cancer, chronic lymphocytic leukemia    Ulcerative Colitis Brother     Cancer Brother         Kidney      Social History     Socioeconomic History    Marital status:    Occupational History    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[employer]   Tobacco Use    Smoking status: Never     Passive exposure: Never    Smokeless tobacco: Never   Vaping Use    Vaping status: Never Used   Substance and Sexual Activity    Alcohol use: Not Currently    Drug use: Never   Other Topics Concern    Caffeine Concern Yes     Comment: Decaf Coffee    Exercise No    Seat Belt Yes    Special Diet No    Stress Concern Yes    Weight Concern Yes     Comment: L125lb  G85lbs  L45lbs   Social History Narrative    Caring for grandson (Peter - aged 6 yo [1/2020])     Social Drivers of Health     Food Insecurity: No Food Insecurity (2/5/2025)    NCSS - Food Insecurity     Worried About Running Out of Food in the Last Year: No     Ran Out of Food in the Last Year: No   Transportation Needs: No Transportation Needs (2/5/2025)    NCSS - Transportation     Lack of Transportation: No   Housing Stability: Not At Risk (2/5/2025)    NCSS - Housing/Utilities     Has Housing: Yes     Worried About Losing Housing: No     Unable to Get Utilities: No        Exam     A&Ox4 in no acute distress. Seated on exam table    non Antalgic gait, able to heel and toe walk with walker. Incision is well approximated without drainage, swelling or erythema. Steri strips removed and patient tolerated well. Strength is 5/5 bilateral in the lower extremities. SLR is negative bilaterally, reports low back pain. Sensation is intact to light touch to the bilateral lower extremities. Denies calf pain. No edema in the lower extremities. Dorsalis pedis pulses are intact bilaterally.       IMAGING STUDIES:  Post op xray reviewed.     Medical Decision Making:   Impression:   1. Arthrodesis status  - OP REFERRAL TO [facility]      Plan:  Overall progressing well.   Encourage to monitor bilateral calf sensation post WELLINGTON hose concerns, if worsening to return for an assessment. If with SOB/CP to report to the ED. Patient verbalized understanding.   Activity: Continue spine  precautions. Increase ambulation as tolerated. To transition to outpatient physical therapy once discharged from home health physical therapy.  Order provided. Continue walker and reviewed working on transition away from the walker with PT, Continue gentle nerve glides.   Incision: Okay to shower normal in 24 hours. Wait until full healing for submersion, usually in next 1-2 weeks. Okay for over the counter scar creams as needed. Reviewed gentle scar mobilization.   Medications: Continue to wean of oxycodone as pain reduces, okay for one additional partial refill to assist with wean off if needed (reviewed 10 additional tabs). With goal to be off opiate class medications by the 6 week post operative follow up visit.     Follow up in 4 weeks for next routine post operative follow up.   Xray: needs routine 6 week post operative imaging at follow up,     Review treatment plan with the patient.  Return in about 4 weeks (around 3/20/2025), or if symptoms worsen or fail to improve.    Patient educated and verbalized understanding.  The patient indicates understanding of these issues and agrees to the plan.    Mini De La Garza, ASHLEYP-BC, RNFA  Collaborative: Ismael Perez MD  Orthopedic Spine Surgeon  [facility]   [address] [place] [address], [address] [place], [place] [id number] [place] [id number]   t: [phone]   f: [phone]

## 2025-02-24 ENCOUNTER — OFFICE VISIT (OUTPATIENT)
Dept: NEUROLOGY | Facility: CLINIC | Age: 65
End: 2025-02-24
Payer: MEDICARE

## 2025-02-24 VITALS
DIASTOLIC BLOOD PRESSURE: 76 MMHG | HEART RATE: 106 BPM | BODY MASS INDEX: 36.44 KG/M2 | WEIGHT: 193 LBS | SYSTOLIC BLOOD PRESSURE: 120 MMHG | HEIGHT: 61 IN | RESPIRATION RATE: 16 BRPM | OXYGEN SATURATION: 100 %

## 2025-02-24 DIAGNOSIS — G43.709 CHRONIC MIGRAINE WITHOUT AURA WITHOUT STATUS MIGRAINOSUS, NOT INTRACTABLE: Primary | ICD-10-CM

## 2025-02-24 NOTE — PATIENT INSTRUCTIONS
Refill policies:    Allow 2-3 business days for refills; controlled substances may take longer.  Contact your pharmacy at least 5 days prior to running out of medication and have them send an electronic request or submit request through the “request refill” option in your [facility] account.  Refills are not addressed on weekends; covering physicians do not authorize routine medications on weekends.  No narcotics or controlled substances are refilled after noon on Fridays or by on call physicians.  By law, narcotics must be electronically prescribed.  A 30 day supply with no refills is the maximum allowed.  If your prescription is due for a refill, you may be due for a follow up appointment.  To best provide you care, patients receiving routine medications need to be seen at least once a year.  Patients receiving narcotic/controlled substance medications need to be seen at least once every 3 months.  In the event that your preferred pharmacy does not have the requested medication in stock (e.g. Backordered), it is your responsibility to find another pharmacy that has the requested medication available.  We will gladly send a new prescription to that pharmacy at your request.    Scheduling Tests:    If your physician has ordered radiology tests such as MRI or CT scans, please contact Central Scheduling at [phone] right away to schedule the test.  Once scheduled, the [facility] Centralized Referral Team will work with your insurance carrier to obtain pre-certification or prior authorization.  Depending on your insurance carrier, approval may take 3-10 days.  It is highly recommended patients assure they have received an authorization before having a test performed.  If test is done without insurance authorization, patient may be responsible for the entire amount billed.      Precertification and Prior Authorizations:  If your physician has recommended that you have a procedure or additional testing performed the [facility]  Centralized Referral Team will contact your insurance carrier to obtain pre-certification or prior authorization.    You are strongly encouraged to contact your insurance carrier to verify that your procedure/test has been approved and is a COVERED benefit.  Although the [facility] Centralized Referral Team does its due diligence, the insurance carrier gives the disclaimer that \"Although the procedure is authorized, this does not guarantee payment.\"    Ultimately the patient is responsible for payment.   Thank you for your understanding in this matter.  Paperwork Completion:  If you require FMLA or disability paperwork for your recovery, please make sure to either drop it off or have it faxed to our office at [phone]. Be sure the form has your name and date of birth on it.  The form will be faxed to our Forms Department and they will complete it for you.  There is a 25$ fee for all forms that need to be filled out.  Please be aware there is a 10-14 day turnaround time.  You will need to sign a release of information (VLADIMIR) form if your paperwork does not come with one.  You may call the Forms Department with any questions at [phone].  Their fax number is [phone].

## 2025-02-24 NOTE — PROGRESS NOTES

## 2025-03-04 ENCOUNTER — OFFICE VISIT (OUTPATIENT)
Dept: FAMILY MEDICINE CLINIC | Facility: CLINIC | Age: 65
End: 2025-03-04
Payer: MEDICARE

## 2025-03-04 VITALS
WEIGHT: 189 LBS | DIASTOLIC BLOOD PRESSURE: 70 MMHG | OXYGEN SATURATION: 98 % | HEART RATE: 92 BPM | RESPIRATION RATE: 16 BRPM | HEIGHT: 61 IN | TEMPERATURE: 98 F | BODY MASS INDEX: 35.68 KG/M2 | SYSTOLIC BLOOD PRESSURE: 122 MMHG

## 2025-03-04 DIAGNOSIS — R05.9 COUGH, UNSPECIFIED TYPE: Primary | ICD-10-CM

## 2025-03-04 DIAGNOSIS — K57.92 DIVERTICULITIS: ICD-10-CM

## 2025-03-04 PROCEDURE — G2211 COMPLEX E/M VISIT ADD ON: HCPCS | Performed by: FAMILY MEDICINE

## 2025-03-04 PROCEDURE — 87637 SARSCOV2&INF A&B&RSV AMP PRB: CPT | Performed by: FAMILY MEDICINE

## 2025-03-04 PROCEDURE — 99214 OFFICE O/P EST MOD 30 MIN: CPT | Performed by: FAMILY MEDICINE

## 2025-03-04 RX ORDER — OSELTAMIVIR PHOSPHATE 75 MG/1
75 CAPSULE ORAL 2 TIMES DAILY
Qty: 10 CAPSULE | Refills: 0 | Status: SHIPPED | OUTPATIENT
Start: 2025-03-04 | End: 2025-03-09

## 2025-03-04 RX ORDER — CODEINE PHOSPHATE AND GUAIFENESIN 10; 100 MG/5ML; MG/5ML
5 SOLUTION ORAL EVERY 6 HOURS PRN
Qty: 118 ML | Refills: 0 | Status: SHIPPED | OUTPATIENT
Start: 2025-03-04

## 2025-03-04 NOTE — PROGRESS NOTES
Subjective:   Patient ID: Cb Webster is a 64 year old female.    HPI  Ms. Webster is a pleasant 64-year-old female with history of MCI, depression, lumbar radiculopathy status post lumbar surgery 4 weeks ago, recent diverticulitis currently on day 2 of ciprofloxacin presenting today for cough, body aches, congestion fatigue and chills.  This has been ongoing for the past 3 days.  She has been exposed to family members with the flu.  She has had chest tightness and mild shortness of breath.  Cough is incessant and worse at nighttime.  Because of the pain she has had some back pain.    I had reviewed past medical and family histories together with allergy and medication lists documented.        History/Other:   Review of Systems   Constitutional:  Positive for fatigue. Negative for fever.   HENT:  Positive for congestion and sore throat. Negative for trouble swallowing and voice change.    Respiratory:  Negative for wheezing.    Gastrointestinal:  Negative for constipation, diarrhea, nausea and vomiting.   Genitourinary:  Negative for difficulty urinating.   Neurological:  Positive for weakness.     Current Outpatient Medications   Medication Sig Dispense Refill    oseltamivir 75 MG Oral Cap Take 1 capsule (75 mg total) by mouth 2 (two) times daily for 5 days. 10 capsule 0    guaiFENesin-codeine 100-10 MG/5ML Oral Solution Take 5 mL by mouth every 6 (six) hours as needed. 118 mL 0    MEMANTINE 10 MG Oral Tab TAKE 1 TABLET BY MOUTH EVERY DAY 90 tablet 0    acetaminophen 500 MG Oral Tab Take 2 tablets (1,000 mg total) by mouth every 8 (eight) hours as needed for Pain. 90 tablet 0    diazePAM (VALIUM) 5 MG Oral Tab Take 1 tablet (5 mg total) by mouth nightly as needed. For muscle spasm. No driving 20 tablet 0    sennosides 8.6 MG Oral Tab Take 2 tablets (17.2 mg total) by mouth nightly as needed (opiate induced constipation). 30 tablet 0    methylphenidate ER 54 MG Oral Tab CR Take 1 tablet (54 mg total) by  mouth every morning.      rosuvastatin 5 MG Oral Tab Take 1 tablet (5 mg total) by mouth nightly. 90 tablet 0    dicyclomine 10 MG Oral Cap Take 1 capsule (10 mg total) by mouth 2 (two) times daily.      busPIRone 15 MG Oral Tab Take 1 tablet (15 mg total) by mouth 2 (two) times daily.      FLUoxetine HCl 40 MG Oral Cap Take 1 capsule (40 mg total) by mouth daily.      pantoprazole 40 MG Oral Tab EC Take 1 tablet (40 mg total) by mouth before breakfast. 90 tablet 3    famotidine 40 MG Oral Tab Take 1 tablet (40 mg total) by mouth daily as needed for Heartburn. 90 tablet 3    SUMAtriptan Succinate 6 MG/0.5ML Subcutaneous Solution Auto-injector Inject 0.5 mL (6 mg total) into the skin as needed (for moderate to severe migraine). 6 mL 2    albuterol 108 (90 Base) MCG/ACT Inhalation Aero Soln Inhale 2 puffs into the lungs every 4 (four) hours as needed for Wheezing or Shortness of Breath. 6.7 g 0    traZODone 100 MG Oral Tab Take 1 tablet (100 mg total) by mouth nightly.      SPRAVATO, 84 MG DOSE, 28 MG/DEVICE Nasal Solution Therapy Pack 84 mg by Nasal route every 14 (fourteen) days.      LORazepam 2 MG Oral Tab Take 1 tablet (2 mg total) by mouth nightly as needed for Anxiety.      cyclobenzaprine 5 MG Oral Tab Take 1 tablet (5 mg total) by mouth 3 (three) times daily as needed for Muscle spasms. NO driving (Patient not taking: Reported on 3/4/2025) 30 tablet 0     Allergies:Allergies[1]    Objective:   Physical Exam  Vitals reviewed.   Constitutional:       General: She is not in acute distress.  HENT:      Right Ear: Tympanic membrane and ear canal normal.      Left Ear: Ear canal normal.      Nose: Congestion present. No rhinorrhea.      Mouth/Throat:      Mouth: Mucous membranes are moist.      Pharynx: Oropharynx is clear. No oropharyngeal exudate or posterior oropharyngeal erythema.   Eyes:      General: No scleral icterus.        Right eye: No discharge.         Left eye: No discharge.      Conjunctiva/sclera:  Conjunctivae normal.   Cardiovascular:      Rate and Rhythm: Normal rate and regular rhythm.      Heart sounds: Normal heart sounds. No murmur heard.  Pulmonary:      Effort: Pulmonary effort is normal. No respiratory distress.      Breath sounds: Normal breath sounds. No wheezing or rales.   Abdominal:      General: Bowel sounds are normal. There is no distension.      Palpations: Abdomen is soft. There is no mass.      Tenderness: There is abdominal tenderness in the left lower quadrant. There is no guarding or rebound.       Musculoskeletal:      Cervical back: Neck supple.      Right lower leg: No edema.      Left lower leg: No edema.   Lymphadenopathy:      Cervical: No cervical adenopathy.   Skin:     General: Skin is warm.   Neurological:      General: No focal deficit present.      Mental Status: She is alert.   Psychiatric:         Mood and Affect: Mood normal.         Behavior: Behavior normal.         Assessment & Plan:   1. Cough, unspecified type   -Likely due to viral URI, possible influenza due to recent exposure to known family members.  The flu  - Will treat with Tamiflu  - Keep hydrated  - May take Tylenol as needed for fever pain  - Go to the emergency room if with respiratory distress and/or severe dehydration  - Will send for COVID-19, influenza and RSV culture and will notify once we get test results hopefully by tomorrow  - Will prescribe guaifenesin with codeine as needed for cough suppression   2. Diverticulitis   -Stable  - Continue ciprofloxacin   Call or come in sooner if symptoms worsen or persist      This note was prepared using [employer] voice recognition dictation software. As a result errors may occur. When identified these errors have been corrected. While every attempt is made to correct errors during dictation discrepancies may still exist            Orders Placed This Encounter   Procedures    SARS-CoV-2/Flu A and B/RSV by PCR ([facility]) [E]       Meds This Visit:  Requested  Prescriptions     Signed Prescriptions Disp Refills    oseltamivir 75 MG Oral Cap 10 capsule 0     Sig: Take 1 capsule (75 mg total) by mouth 2 (two) times daily for 5 days.    guaiFENesin-codeine 100-10 MG/5ML Oral Solution 118 mL 0     Sig: Take 5 mL by mouth every 6 (six) hours as needed.       Imaging & Referrals:  None         [1]   Allergies  Allergen Reactions    Sulfa Antibiotics NAUSEA ONLY

## 2025-03-04 NOTE — PATIENT INSTRUCTIONS
Thank you for choosing Edwin Ramos MD at [facility]  To Do: Cb Webster  1. Please see below   Call [phone] to schedule the appointment.   Please signup for [employer], which is electronic access to your record if you have not done so.  All your results will post on there.  [email or web address]   You can NOW use [employer] to book your appointments with us, or consider using open access scheduling which is through the [employer] website [email or web address] and type in Edwin Ramos MD and follow the links for \"Schedule Online Now\"    To schedule Imaging or tests at [facility] call Central Scheduling [phone], Go to [facility] ER Building (For example: CT scans, X rays, Ultrasound, MRI)  Cardiac Testing in ER building Building A second floor Cardiac Testing [phone] (For example: Holter Monitor, Cardiac Stress tests,Event Monitor, or 2D Echocardiograms)  [facility] call [phone] usually in [facility]  Walk in Clinic in [place] at [address] Mon-Fri at 8am-7:30 p.m., and Sat/Sun 9:00a.m.-4:30 p.m.  Also at [address] [place] [address]  Call [phone] for info     Please call our office about any questions regarding your treatment/medicines/tests as a result of today's visit.  For your safety, read the entire package insert of all medicines prescribed to you and be aware of all of the risks of treatment even beyond those discussed today.  All therapies have potential risk of harm or side effects or medication interactions.  It is your duty and for your safety to discuss with the pharmacist and our office with questions, and to notify us and stop treatment if problems arise, but know that our intention is that the benefits outweigh those potential risks and we strive to make you healthier and to improve your quality of life.    Referrals, and Radiology Information:    If your insurance requires a referral to a specialist, please allow 5 business days to  process your referral request.    If Edwin Ramos MD orders a CT or MRI, it may take up to 10 business days to receive approval from your insurance company. Once our office has called informing you that the insurance company approved your testing, please call Central Scheduling at [phone]  Please allow our office 5 business days to contact you regarding any testing results.    Refill policies:   Allow 3 business days for refills; controlled substances may take longer and must be picked up from the office in person.  Narcotic medications can only be filled in 30 day increments and must be refilled at an office visit only.  If your prescription is due for a refill, you may be due for a follow-up appointment.  We cannot refill your maintenance medications at a preventative wellness visit.  To best provide you care, patients receiving maintenance medications need to be seen at least twice a year.

## 2025-03-05 LAB
FLUAV + FLUBV RNA SPEC NAA+PROBE: NOT DETECTED
FLUAV + FLUBV RNA SPEC NAA+PROBE: NOT DETECTED
RSV RNA SPEC NAA+PROBE: NOT DETECTED
SARS-COV-2 RNA RESP QL NAA+PROBE: NOT DETECTED

## 2025-03-07 ENCOUNTER — HOSPITAL ENCOUNTER (EMERGENCY)
Facility: HOSPITAL | Age: 65
Discharge: HOME | End: 2025-03-07
Attending: EMERGENCY MEDICINE
Payer: MEDICARE

## 2025-03-07 ENCOUNTER — APPOINTMENT (OUTPATIENT)
Dept: CT IMAGING | Facility: HOSPITAL | Age: 65
End: 2025-03-07
Attending: EMERGENCY MEDICINE
Payer: MEDICARE

## 2025-03-07 VITALS
SYSTOLIC BLOOD PRESSURE: 126 MMHG | BODY MASS INDEX: 34.93 KG/M2 | WEIGHT: 185 LBS | TEMPERATURE: 98 F | DIASTOLIC BLOOD PRESSURE: 68 MMHG | HEIGHT: 61 IN | HEART RATE: 76 BPM | RESPIRATION RATE: 16 BRPM | OXYGEN SATURATION: 100 %

## 2025-03-07 DIAGNOSIS — R10.9 ABDOMINAL PAIN OF UNKNOWN ETIOLOGY: Primary | ICD-10-CM

## 2025-03-07 LAB
ALBUMIN SERPL-MCNC: 4.8 G/DL (ref 3.2–4.8)
ALBUMIN/GLOB SERPL: 1.8 {RATIO} (ref 1–2)
ALP LIVER SERPL-CCNC: 71 U/L
ALT SERPL-CCNC: 9 U/L
ANION GAP SERPL CALC-SCNC: 8 MMOL/L (ref 0–18)
AST SERPL-CCNC: 35 U/L (ref ?–34)
BASOPHILS # BLD AUTO: 0.03 X10(3) UL (ref 0–0.2)
BASOPHILS NFR BLD AUTO: 0.5 %
BILIRUB SERPL-MCNC: 0.4 MG/DL (ref 0.2–1.1)
BILIRUB UR QL STRIP.AUTO: NEGATIVE
BUN BLD-MCNC: 17 MG/DL (ref 9–23)
CALCIUM BLD-MCNC: 9.6 MG/DL (ref 8.7–10.6)
CHLORIDE SERPL-SCNC: 103 MMOL/L (ref 98–112)
CLARITY UR REFRACT.AUTO: CLEAR
CO2 SERPL-SCNC: 28 MMOL/L (ref 21–32)
COLOR UR AUTO: COLORLESS
CREAT BLD-MCNC: 0.97 MG/DL
EGFRCR SERPLBLD CKD-EPI 2021: 66 ML/MIN/1.73M2 (ref 60–?)
EOSINOPHIL # BLD AUTO: 0.15 X10(3) UL (ref 0–0.7)
EOSINOPHIL NFR BLD AUTO: 2.4 %
ERYTHROCYTE [DISTWIDTH] IN BLOOD BY AUTOMATED COUNT: 13.7 %
GLOBULIN PLAS-MCNC: 2.7 G/DL (ref 2–3.5)
GLUCOSE BLD-MCNC: 90 MG/DL (ref 70–99)
GLUCOSE UR STRIP.AUTO-MCNC: NORMAL MG/DL
HCT VFR BLD AUTO: 40.7 %
HGB BLD-MCNC: 12.9 G/DL
IMM GRANULOCYTES # BLD AUTO: 0.02 X10(3) UL (ref 0–1)
IMM GRANULOCYTES NFR BLD: 0.3 %
LEUKOCYTE ESTERASE UR QL STRIP.AUTO: NEGATIVE
LIPASE SERPL-CCNC: 34 U/L (ref 12–53)
LYMPHOCYTES # BLD AUTO: 1.35 X10(3) UL (ref 1–4)
LYMPHOCYTES NFR BLD AUTO: 21.6 %
MCH RBC QN AUTO: 28.2 PG (ref 26–34)
MCHC RBC AUTO-ENTMCNC: 31.7 G/DL (ref 31–37)
MCV RBC AUTO: 89.1 FL
MONOCYTES # BLD AUTO: 0.76 X10(3) UL (ref 0.1–1)
MONOCYTES NFR BLD AUTO: 12.2 %
NEUTROPHILS # BLD AUTO: 3.94 X10 (3) UL (ref 1.5–7.7)
NEUTROPHILS # BLD AUTO: 3.94 X10(3) UL (ref 1.5–7.7)
NEUTROPHILS NFR BLD AUTO: 63 %
NITRITE UR QL STRIP.AUTO: NEGATIVE
OSMOLALITY SERPL CALC.SUM OF ELEC: 289 MOSM/KG (ref 275–295)
PH UR STRIP.AUTO: 6 [PH] (ref 5–8)
PLATELET # BLD AUTO: 230 10(3)UL (ref 150–450)
POTASSIUM SERPL-SCNC: 3.5 MMOL/L (ref 3.5–5.1)
PROT SERPL-MCNC: 7.5 G/DL (ref 5.7–8.2)
PROT UR STRIP.AUTO-MCNC: NEGATIVE MG/DL
RBC # BLD AUTO: 4.57 X10(6)UL
RBC UR QL AUTO: NEGATIVE
SODIUM SERPL-SCNC: 139 MMOL/L (ref 136–145)
SP GR UR STRIP.AUTO: 1.02 (ref 1–1.03)
UROBILINOGEN UR STRIP.AUTO-MCNC: NORMAL MG/DL
WBC # BLD AUTO: 6.3 X10(3) UL (ref 4–11)

## 2025-03-07 PROCEDURE — 85025 COMPLETE CBC W/AUTO DIFF WBC: CPT | Performed by: EMERGENCY MEDICINE

## 2025-03-07 PROCEDURE — 80053 COMPREHEN METABOLIC PANEL: CPT | Performed by: EMERGENCY MEDICINE

## 2025-03-07 PROCEDURE — 99284 EMERGENCY DEPT VISIT MOD MDM: CPT

## 2025-03-07 PROCEDURE — 81003 URINALYSIS AUTO W/O SCOPE: CPT | Performed by: EMERGENCY MEDICINE

## 2025-03-07 PROCEDURE — 83690 ASSAY OF LIPASE: CPT | Performed by: EMERGENCY MEDICINE

## 2025-03-07 PROCEDURE — 74177 CT ABD & PELVIS W/CONTRAST: CPT | Performed by: EMERGENCY MEDICINE

## 2025-03-07 PROCEDURE — 36415 COLL VENOUS BLD VENIPUNCTURE: CPT

## 2025-03-07 NOTE — ED INITIAL ASSESSMENT (HPI)
Pt arrives to ED for evaluation of abdominal pain in her lower to mid to right abdomen. Pt had lumbar fusion about 4 weeks ago. Pt states she saw her PCP after the constipation subsided, told her PCP she thought she might have diverticulitis and so her PCP gave her medication for diverticulitis and for pain, which she started 2/27/24. Pt appears upset, pt has a pad of paper with notes that she states she was trying to prepare for this visit. Pt denies n/v/d today.

## 2025-03-07 NOTE — ED PROVIDER NOTES
Patient Seen in: [facility] Emergency Department      History     Chief Complaint   Patient presents with    Abdomen/Flank Pain     Stated Complaint: left sided abd pain. 4 weeks post op lumbar fusion    Subjective:   HPI      64-year-old female 4 weeks post lumbar surgery presents to the emergency department complaining of a 2 to 3-week history of what was intermittent left-sided abdominal pain that is now constant.  Pain is currently 6/10 in severity but the patient is declining analgesics.  She has had some nausea and decreased appetite but no vomiting.  She has been constipated due to opioid analgesics and has been using laxatives.  Last bowel movement was small and was this afternoon.  Past surgical history is remarkable for cholecystectomy and 2 C-sections.  No fever.  No urinary complaints.    Objective:     Past Medical History:    Anxiety    Aortic regurgitation    mild    Back pain    Calculus of kidney    Cardiac calcification (HCC) - CAC score of 5.24 seen on 22 CT Heart Scan protocol    Colon polyps    Depression    Disorder of liver    Fatty liver    Diverticulosis    Gastroparesis    GERD (gastroesophageal reflux disease)    Hemorrhoids    High blood pressure    History of depression    Major depressive disorder & anxiety    History of eating disorder    Binge eating    Hyperlipidemia    Migraines    OCD (obsessive compulsive disorder)    MODE (obstructive sleep apnea)    Osteoarthritis    Parkinsonism (HCC)    Peptic ulcer disease    Stool incontinence    Soft barely formed stool, not detecting urgency    Tendonitis of shoulder, right              Past Surgical History:   Procedure Laterality Date    Back surgery  2025    L5 MIS TLIF W/ MACIE ELLIS MD          x 2    Cholecystectomy      Colonoscopy      D & c      Ect provided  [id number]    Egd      Laparoscopic cholecystectomy      Lumbar spine fusion combined  2025    Needle biopsy right  2015    benign breast     Other surgical history      C3-C4 anterior discectomy    Other surgical history  2018    [facility]    Other surgical history  09/09/2019    radiofrequency abalsion lumbar    Removal gallbladder  02/2020    Skin surgery      Spine surgery procedure unlisted  2008    Anterior cervical discectomy    Tonsillectomy  1966?                Social History     Socioeconomic History    Marital status:    Occupational History    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[employer]   Tobacco Use    Smoking status: Never     Passive exposure: Never    Smokeless tobacco: Never   Vaping Use    Vaping status: Never Used   Substance and Sexual Activity    Alcohol use: Not Currently    Drug use: Never   Other Topics Concern    Caffeine Concern No     Comment: [employer]    Exercise Yes    Seat Belt Yes    Special Diet No    Stress Concern Yes    Weight Concern Yes     Comment: L125lb  G85lbs  L45lbs   Social History Narrative    Caring for grandson (Peter - aged 4 yo [1/2020])     Social Drivers of Health     Food Insecurity: No Food Insecurity (2/5/2025)    NCSS - Food Insecurity     Worried About Running Out of Food in the Last Year: No     Ran Out of Food in the Last Year: No   Transportation Needs: No Transportation Needs (2/5/2025)    NCSS - Transportation     Lack of Transportation: No   Housing Stability: Not At Risk (2/5/2025)    NCSS - Housing/Utilities     Has Housing: Yes     Worried About Losing Housing: No     Unable to Get Utilities: No                  Physical Exam     ED Triage Vitals [03/07/25 1355]   /79   Pulse 94   Resp 16   Temp 97.8 °F (36.6 °C)   Temp src Temporal   SpO2 93 %   O2 Device None (Room air)       Current Vitals:   Vital Signs  BP: 126/68  Pulse: 76  Resp: 16  Temp: 97.8 °F (36.6 °C)  Temp src: Temporal  MAP (mmHg): 85    Oxygen Therapy  SpO2: 100 %  O2 Device: None (Room air)        Physical Exam     General Appearance: This is a middle-aged  female lying on a gurney.  Vital signs were reviewed per nurses notes.  HEENT: Normocephalic/atraumatic.  Anicteric sclera.  Oral mucosa is moist.  Oropharynx is normal.  Neck: No adenopathy or thyromegaly.  Lungs are clear to auscultation.  Heart exam: Normal S1-S2 without extra sounds or murmurs.  Regular rate and rhythm.  Abdomen: NABS.  Flat, soft with mild left periumbilical tenderness and guarding without masses or rebound.  Skin is dry without rashes or lesions.  Extremities are atraumatic.  Neuroexam: Awake, conversive and moving all 4 extremities well.  ED Course     Labs Reviewed   COMP METABOLIC PANEL (14) - Abnormal; Notable for the following components:       Result Value    AST 35 (*)     ALT 9 (*)     All other components within normal limits   URINALYSIS WITH CULTURE REFLEX - Abnormal; Notable for the following components:    Urine Color Colorless (*)     Ketones Urine Trace (*)     All other components within normal limits   LIPASE - Normal   CBC WITH DIFFERENTIAL WITH PLATELET   RAINBOW DRAW BLUE   RAINBOW DRAW GOLD            Intravenous access was obtained.  Laboratory studies were drawn.  IV fluids were administered.    CT ABDOMEN+PELVIS(CONTRAST ONLY)(CPT=74177)    Result Date: 3/7/2025  PROCEDURE:  CT ABDOMEN+PELVIS (CONTRAST ONLY) (CPT=74177)  COMPARISON:  [facility] , CT, CT ABDOMEN+PELVIS(CONTRAST ONLY)(CPT=74177), 1/03/2025, 5:39 PM.  [facility] , CT, CT ABDOMEN+PELVIS(CPT=74176), 12/24/2024, 5:21 AM.  INDICATIONS:  left sided abd pain. 4 weeks post op lumbar fusion  TECHNIQUE:  CT scanning was performed from the dome of the diaphragm to the pubic symphysis with non-ionic intravenous contrast material. Post contrast coronal MPR imaging was performed.  Dose reduction techniques were used. Dose information is transmitted to the ACR (American College of Radiology) NRDR (National Radiology Data Registry) which includes the Dose Index Registry.  PATIENT STATED HISTORY:(As transcribed by Technologist)   Patient stated she's had left sided abdominal pain and that she is four weeks post op from lumbar fusion surgery.   CONTRAST USED:  100cc of Isovue 370  FINDINGS:  LUNG BASES:  Dependent atelectasis bilaterally. LIVER:  Normal in shape and contour. BILIARY:  No intrahepatic biliary dilatation.. SPLEEN:  Normal.  No enlargement or focal lesion. PANCREAS:  No kathrine-pancreatic inflammatory stranding ADRENALS:  Normal.  No mass or enlargement.  KIDNEYS:  Kidneys are symmetrical in size without evidence of hydronephrosis. BOWEL/MESENTERY:  Bowel is normal in caliber. No evidence of obstruction.  Colonic diverticulosis without evidence of diverticulitis.  Small fat containing umbilical hernia. PELVIS:  Bladder is unremarkable in appearance.  Calcified phleboliths within the pelvis.   AORTA/VASCULAR:  Aorta is normal in caliber. BONES:  Postoperative changes with posterior fusion of L4-L5.  There are 2 fluid collections within the posterior subcutaneous fat measuring 6.1 x 2.7 x 8.2 cm and 4.5 x 2.0 x 5.3 cm. These collections may communicate.  No acute fractures.            CONCLUSION:  1. Postoperative changes with posterior fusion of L4-L5.  There are 2 fluid collections as described above which may communicate within the posterior subcutaneous fat.  These are likely seroma as but correlation clinical symptoms to exclude infectious etiology. 2. Colonic diverticulosis without evidence of diverticulitis.    LOCATION:  [id number]   Dictated by (CST): Loraine Simpson MD on 3/07/2025 at 5:51 PM     Finalized by (CST): Loraine Simpson MD on 3/07/2025 at 5:55 PM       I personally reviewed the images myself and went over results with patient.    I viewed the CT abdomen pelvis films myself.  No acute intra-abdominal or retroperitoneal abnormality.  Postoperative changes noted in the lumbar spine.    Test results and treatment plan were discussed with the patient prior to discharge.     MDM      #1.  Abdominal pain.  May be a  result of constipation.  Laxatives recommended.  No other intra-abdominal or retroperitoneal pathology including no evidence of urinary tract infection, kidney stone, diverticulitis or appendicitis.        Medical Decision Making      Disposition and Plan     Clinical Impression:  1. Abdominal pain of unknown etiology         Disposition:  Discharge  3/7/2025  6:52 pm    Follow-up:  Colton Schaefer MD  [address] [address] [address]  [phone]    Call in 3 day(s)            Medications Prescribed:  Discharge Medication List as of 3/7/2025  6:56 PM              Supplementary Documentation:

## 2025-03-07 NOTE — ED QUICK NOTES
Request: This RN was asked by patient's primary ED RN to evaluate patient for possible US IV placement.     Indication: Patient required placement of an ultrasound-guided peripheral venous catheter for laboratory evaluation, IV fluid, and medication administration.  Several peripheral attempts were unsuccessful by ED staff.     Technique: Using the linear probe covered and sterile technique, a short axis view of the basilic vein was obtained.  This was noted to be completely compressible and was identified as separate from any adjacent noncompressible arterial structures. Under real-time guidance, the angiocatheter was observed to tent the vein, then to puncture it.

## 2025-03-08 NOTE — DISCHARGE INSTRUCTIONS
You may use ibuprofen 400 mg up to 3 times a day with food for pain.    Take MiraLAX, Dulcolax or milk of magnesia to have a good bowel movement.    Physician follow-up this week.    Return for new or worsening symptoms.

## 2025-03-11 ENCOUNTER — TELEPHONE (OUTPATIENT)
Dept: ORTHOPEDICS CLINIC | Facility: CLINIC | Age: 65
End: 2025-03-11

## 2025-03-11 NOTE — TELEPHONE ENCOUNTER
Patient tried sleeping in her bed 10 days ago and woke up very stiff and uncomfortable.  Patient then went back to the recliner.  Patient tried sleeping in bed again last night with a pillow between her legs and woke up after 4 hours with the pillow not there.  Patient decided to stand for a few minutes before she laid back down on her back.  Patient would like to know how to cope with the sleeping patterns she is experiencing. Patient gentle massage the areas she can reach.  Patient states it takes her about 35 minutes to get out of bed.   Patient has several other questions and will addressed when a nurse calls her.  Please advise.

## 2025-03-11 NOTE — TELEPHONE ENCOUNTER
Concern: sleeping issues due to stiffness, muscle tension   DOS: 02/05/25  - LUMBAR 4-LUMBAR 5 MINIMALLY INVASIVE SPINE TRANSFORAMINAL LUMBAR INTERBODY FUSION     - Patient given time to express herself.  She states she has 1 tablet of cyclobenzaprine left.  She is going to take the cyclobenzaprine before bed to see if it helps with the muscle stiffness.     She feels the first few steps she takes are hesitant due to the leg pain and it causes a change in gait.  She has some leg pain and she feels it may be effecting her   She ordered - compressions socks and is elevating.  Sleeping in the recliner was again discussed and discussing ways to work on getting in and out of bed with PT was also recommended.  She will continue to work on the nerve flossing and will try doing a few prior to getting out of bed to get her muscles warmed up.

## 2025-03-19 DIAGNOSIS — Z98.1 ARTHRODESIS STATUS: Primary | ICD-10-CM

## 2025-03-20 ENCOUNTER — HOSPITAL ENCOUNTER (OUTPATIENT)
Dept: GENERAL RADIOLOGY | Age: 65
Discharge: HOME | End: 2025-03-20
Attending: STUDENT IN AN ORGANIZED HEALTH CARE EDUCATION/TRAINING PROGRAM
Payer: MEDICARE

## 2025-03-20 ENCOUNTER — OFFICE VISIT (OUTPATIENT)
Dept: ORTHOPEDICS CLINIC | Facility: CLINIC | Age: 65
End: 2025-03-20
Payer: MEDICARE

## 2025-03-20 DIAGNOSIS — Z98.1 ARTHRODESIS STATUS: ICD-10-CM

## 2025-03-20 DIAGNOSIS — Z98.1 ARTHRODESIS STATUS: Primary | ICD-10-CM

## 2025-03-20 PROCEDURE — 72100 X-RAY EXAM L-S SPINE 2/3 VWS: CPT | Performed by: STUDENT IN AN ORGANIZED HEALTH CARE EDUCATION/TRAINING PROGRAM

## 2025-03-20 PROCEDURE — 99024 POSTOP FOLLOW-UP VISIT: CPT | Performed by: STUDENT IN AN ORGANIZED HEALTH CARE EDUCATION/TRAINING PROGRAM

## 2025-03-20 NOTE — PROGRESS NOTES
6 weeks status post lumbar decompression and fusion    DOS: 2/5 L4-5 MIS TLIF    Patient is now 6 weeks out from lumbar decompression and fusion and doing well.  Improvement in preoperative leg pain and paresthesias is sustained. Patient is mobilizing well without any ambulatory aids. She is participating in physiotherapy and continuing to make progress with mobility and strengthening.     Physical examination    Lumbar spine demonstrates a healed surgical incision. Bilateral lower extremity exam demonstrates 5 out of 5 strength in the quadriceps, tibialis anterior, EHL, and gastrocsoleus bilaterally.  Sensation is intact to light touch from L2-S1.  Negative nerve tension signs    Imaging    XR of the lumbar spine does not demonstrate any instability at the operative levels    Assessment and Plan    6 weeks status post decompression and fusion doing well.  Improvement in preoperative radiculitis and radiculopathy. Wound is healed.  -Start outpatient lumbar physiotherapy  -Follow-up in 6 weeks for repeat clinical evaluation and lumbar XR (AP/Flex/Ext)    Ismael Perez MD  Orthopedic Spine Surgeon  [facility]   [address] [place] [address].org  [email or web address]  t: [phone]   f: [phone]        This note was dictated using Dragon software.  While it was briefly proofread prior to completion, some grammatical, spelling, and word choice errors due to dictation may still occur.

## 2025-04-01 ENCOUNTER — TELEPHONE (OUTPATIENT)
Dept: PHYSICAL THERAPY | Facility: HOSPITAL | Age: 65
End: 2025-04-01

## 2025-04-08 ENCOUNTER — APPOINTMENT (OUTPATIENT)
Dept: PHYSICAL THERAPY | Age: 65
End: 2025-04-08
Attending: NURSE PRACTITIONER
Payer: MEDICARE

## 2025-04-10 ENCOUNTER — OFFICE VISIT (OUTPATIENT)
Dept: PHYSICAL THERAPY | Age: 65
End: 2025-04-10
Attending: NURSE PRACTITIONER
Payer: MEDICARE

## 2025-04-10 PROCEDURE — 97110 THERAPEUTIC EXERCISES: CPT | Performed by: PHYSICAL THERAPIST

## 2025-04-10 PROCEDURE — 97162 PT EVAL MOD COMPLEX 30 MIN: CPT | Performed by: PHYSICAL THERAPIST

## 2025-04-10 NOTE — PROGRESS NOTES
SPINE EVALUATION:     Diagnosis:   Arthrodesis status (Z98.1), S/P L4-5 MIS TLIF Patient:  Cb Webster (64 year old, female)        Referring Provider: Mini De La Garza  Today's Date   4/10/2025    Precautions:  -- (Cognitive impairment and slow with processing)   Date of Evaluation: 04/10/25  Next MD visit: May, 2025  Date of Surgery: 02/05/2025     PATIENT SUMMARY   Summary of chief complaints: L LB, L buttock pain, B knees (front and back, and sides) pain, difficulty with puttin on shoes and socks, pain on the R>L calf, inner ankle pain is new, reaching, and bending  History of current condition: PAtient had L4-L5 MIS TLIF in February, 2025. Had follow with Dr. Perez in March, she felt that she did not get a lot of directions and guidance for resuming activities, except for returning to drive. Patient reports that she has difficulty with putting on her socks and shoes, it is umcofortable after doing it. She ices her LB on the L to help. Recently she also noticed piriformis pain on the L buttock, as she is increasing her walking , now  she does1 1/2 miles, her knees have been causing pain also. She took 1 muscle relaxer for the piriformis pain on Sunday and Monday. Did PT for knees last year, and triggered the back pain, and pain was very intense in December pain level was 8s and 9s. Patient denies numbness or tingling sensation on the LE. Has had swelling on her knees, and this morning she was happy because if was down, but after walking, going up and down the stairs, the swelling came back. Noticed bruising were she had some swelling. Sleeping on her back with pillows under her knees, still could not find a comfotable position to sleep. Her balance was good after surgery, but now that she is walking again, she noticed that is not as good  and tends to veer off to the side.   Pain level: current 2 /10 (Knees - 2/10), at best 2 /10 (2/10), at worst 6 /10 (Knees - 6/10, walking stiff knees)  Description  of symptoms: Aching pain on the piriformis, can get sharp.   Occupation: Not working, retired   Leisure activities/Hobbies: walking 1 - 1 1/2 miles almost daily   Prior level of function: Independent with ADLs, was doing TM a lot. Patient reports that she is not using the cane anymore, standing in the shower now, riser on toilet  Current limitations: Walking, LE dressing (shoes, socks), transfers to a higher car, reaching, bed mobility (uses rail)  Pt goals: She wants to lead a more active life, increased mobility, paly with grandchildren, wants to do 5K  Red flag signs/symptoms: Pt denies dizziness, drop attacks, dysphagia, dysarthria, diplopia; Pt denies pain that wakes in sleep, fever, recent trauma, history of CA, pain unchanged with movement/activity (Is more constipated recently, but is working more on eating 4 meals. Loss Bladder control happened before surgery, after surgery, would have difficulty with controlling loose stool, almost will not make it to the bathroom)    Past medical history was reviewed with Cb.  Significant findings include:    Imaging/Tests:     Cb  has a past medical history of Anxiety, Aortic regurgitation, Back pain, Calculus of kidney, Cardiac calcification (HCC) - CAC score of 5.24 seen on 22 CT Heart Scan protocol (2023), Colon polyps, Depression, Disorder of liver, Diverticulosis, Gastroparesis, GERD (gastroesophageal reflux disease), Hemorrhoids, High blood pressure, History of depression (), History of eating disorder (), Hyperlipidemia, Migraines, OCD (obsessive compulsive disorder), MODE (obstructive sleep apnea) (), Osteoarthritis, Parkinsonism (HCC), Peptic ulcer disease, Stool incontinence (10/23), and Tendonitis of shoulder, right (2019).  She  has a past surgical history that includes ; needle biopsy right (2015); d & c; other surgical history; other surgical history (); colonoscopy; egd; other surgical history (2019); ect  provided ([id number]); Laparoscopic cholecystectomy; removal gallbladder (02/2020); cholecystectomy; spine surgery procedure unlisted (2008); tonsillectomy (1966?); skin surgery; lumbar spine fusion combined (01/05/2025); and back surgery (02/05/2025).    ASSESSMENT  Cb presents to physical therapy evaluation with primary c/o L LB, L buttock pain, B knees (front and back, and sides) pain, difficulty with puttin on shoes and socks, pain on the R>L calf, inner ankle pain is new, reaching, and bending. The results of the objective tests and measures show Postural deviation, balance deficits, gait deviation, decreased muscles flexibility, hips and abdominal muscles strength, poor core muscles strength, limited mobility, decreased activity tolerance. Functional deficits include but are not limited to Walking, LE dressing (shoes, socks), transfers to a higher car, reaching, bed mobility (uses rail). Signs and symptoms are consistent with diagnosis of Arthrodesis status (Z98.1), S/P L4-5 MIS TLIF. Pt and PT discussed evaluation findings, pathology, POC and HEP.  Pt voiced understanding and performs HEP correctly without reported pain. Skilled Physical Therapy is medically necessary to address the above impairments and reach functional goals.    OBJECTIVE:    Musculoskeletal:  Observation/Posture: decreased cervical lordosis; forward head posture; rounded shoulders; posterior pelvic tilt (Decreased lumbar lordosis)   Accessory Motion:     Palpation: TTP on lumbar spine, TTP on surgical scars  B sides, decreased scar mobility     Special tests:   5 sTST - 10 secs, TUG - 10 secs     ROM and Strength:  (* denotes performed with pain)  Trunk ROM MMT (-/5)     Flex Fingertips below the knees 2/5 (Transversus abdominis 2/5)     Ext 100% LOM 2/5 (Multifidi 2/5)    R L R L     Side bend 52 cm pull on L piriformis 52 cm         Rotation           ,   Hip   ROM MMT (-/5)    R L R L     Flex (L2)     3+/5 with pain on lumbar region  4/5 with pain on lumbar region     Ext      3+/5 3+/5     Abd     3+/5 3+/5     ER WNL mod LOM 4/5 with pain on lateral thigh to the hip and gluteal area 3+/5 with pain on lateral thigh to the hip and gluteal area     IR     3+/5 3+/5 with pain on lateral thigh to the hip and gluteal area        Flexibility:  LE Flexibility R L     Hip Flexor         Hamstrings min restricted mod restricted     ITB mod restricted mod restricted     Piriformis min restricted significantly restricted     Quads WNL WNL     Gastroc-soleus mod restricted mod restricted       Neurological:  Sensation: grossly intact to light touch KARMEN UE/LE     Deep Tendon Reflexes: grossly intact KARMEN UE/LE     UMN:      Crump's:       Clonus:       Babinski:       Peripheral Neurodynamic: WNL except     Balance and Functional Mobility:  Gait: pt ambulates on level ground with trendelenburg/waddle (L lumbar antalgia).  Balance: SLS: EO R 0 sec, EO L 0 sec          Today's Treatment and Response:   Pt education was provided on exam findings, treatment diagnosis, treatment plan, expectations, and prognosis.  Today's Treatment       4/10/2025   Spine Treatment   Therapeutic Exercise SKTC 1 x 10 secs  Piriformis stretches 1x 10 secs  Cues and instructions, to stop if there is onset of pain  Patient demonstrated sciatic nerve flossing in seated    Therapeutic Exercise Minutes 8   Evaluation Minutes 35   Total Time Of Timed Procedures 8   Total Time Of Service-Based Procedures 35   Total Treatment Time 43   HEP Access Code: [email or web address]  URL: [email or web address]  Date: 04/10/2025  Prepared by: Felicia Harris    Exercises  - Supine Single Knee to Chest Stretch  - 2 x daily - 7 x weekly - 5 reps - 10 hold  - Supine Figure 4 Piriformis Stretch  - 1 x daily - 7 x weekly - 1 sets - 5 reps - 10 hold        Patient was instructed in and issued a HEP for: Access Code: [email or web address]  URL: [email or web address]  Date: 04/10/2025  Prepared  by: Felicia Harris    Exercises  - Supine Single Knee to Chest Stretch  - 2 x daily - 7 x weekly - 5 reps - 10 hold  - Supine Figure 4 Piriformis Stretch  - 1 x daily - 7 x weekly - 1 sets - 5 reps - 10 hold    Charges:  PT EVAL:  , Thera ex -1  In agreement with evaluation findings and clinical rationale, this evaluation involved MODERATE COMPLEXITY decision making due to 1-2 personal factors/comorbidities, 3 or more body structures involved/activity limitations, and evolving symptoms as documented in the evaluation.                                                                         PLAN OF CARE:    Goals: (to be met in 12 visits)    Not Met Progress  Toward Partially  Met Met   Pt will improve transversus abdominis recruitment to perform proper isometric contraction without requiring verbal or tactile cuing to promote advancement of therex, improvement of core muscles strength to promote lumbar stability with activities that include bending, walking, reaching, transfers and increased LE activity [] [] [] []   Pt will demonstrate good understanding of proper posture and body mechanics to decrease pain and improve spinal safety [] [] [] []   Pt will have improvement of muscles flexibility to improve lumbar spine and L hip to WNL  to allow increase ease with movement and improve positional tolerance [] [] [] []   Pt will have decreased paraspinal mm tension to tolerate sitting, standing and walking unrestricted   [] [] [] []   Pt will demonstrate improved core strength, hips and abdominal muscles strength to 4/5 and improve kinsesthetic and proprioceptive awareness to be able to decrease pain to <2/10 with sitting, standing, walking, bending, carrying and lifting, transfers and LE dressing [] [] [] []   Pt will be independent and compliant with comprehensive HEP to maintain progress achieved in PT, and be able to utilize HEP to manage future exacerbations    [] [] [] []         Frequency / Duration: Patient will  be seen 2x/week or a total of 12  visits over a 90 day period. Treatment will include: Gait training; Manual Therapy; Neuromuscular Re-education; Therapeutic Activities; Therapeutic Exercise; Home Exercise Program instruction    Education or treatment limitation: Cognition (needs visual aids/ cues, clear and concise instructions)   Rehab Potential: good     Oswestry Disability Index Score  Score: (Patient-Rptd) 52 % (4/8/2025  1:27 PM)      Patient/Family/Caregiver was advised of these findings, precautions, and treatment options and has agreed to actively participate in planning and for this course of care.    Thank you for your referral. Please co-sign or sign and return this letter via fax as soon as possible to [phone]. If you have any questions, please contact me at Dept: [phone]    Sincerely,  Electronically signed by therapist: Felicia Harris PT  Physician's certification required: Yes  I certify the need for these services furnished under this plan of treatment and while under my care.    X___________________________________________________ Date____________________    Certification From: 4/10/2025  To:7/9/2025

## 2025-04-12 DIAGNOSIS — I25.10 CORONARY ARTERY CALCIFICATION: ICD-10-CM

## 2025-04-12 DIAGNOSIS — E78.00 HYPERCHOLESTEREMIA: ICD-10-CM

## 2025-04-14 ENCOUNTER — TELEPHONE (OUTPATIENT)
Dept: ORTHOPEDICS CLINIC | Facility: CLINIC | Age: 65
End: 2025-04-14

## 2025-04-14 RX ORDER — ROSUVASTATIN CALCIUM 5 MG/1
5 TABLET, COATED ORAL NIGHTLY
Qty: 90 TABLET | Refills: 0 | Status: SHIPPED | OUTPATIENT
Start: 2025-04-14

## 2025-04-14 NOTE — TELEPHONE ENCOUNTER
Spoke with patient  who stated she is having pain knee pain, hip pain and ankle swelling.  Patient was wondering if she should stick to her spine now or what she should do.   This writer saw that she was with Mari 8/2024 for her knee and got a cortisone injection.  Advised that she can see multiple providers for multiple body part issues at the same time.Discussed icing, elevating and compression.  Patient taking tylenol and isn't able to take ibuprofen due to hx of stomach ulcers. Patient is not asking for pain medications.  Patient verbalized understanding.       DOS  2/5/25 L-5 MIS TLIF      Future Appointments   Date Time Provider Department Center   4/15/2025  8:30 AM Roopa Mena, PT [facility] [place]   4/17/2025  5:15 PM Felicia Harris, PT [facility] [place]   4/22/2025  8:30 AM Roopa Mena, PT [facility] [place]   4/24/2025  3:00 PM Felicia Harris, PT [facility] [place]   4/29/2025  8:30 AM Roopa Mena, PT [facility] [place]   5/1/2025  8:00 AM Ismael Perez MD [facility]   5/1/2025  9:30 AM Felicia Harris, PT [facility] [place]   5/6/2025  8:30 AM Roopa Mena, PT [facility] [place]   5/8/2025  9:30 AM Felicia Harris, PT [facility] [place]   5/13/2025  8:30 AM Roopa Mena, PT [facility] [place]   5/15/2025  9:30 AM Felicia Harris, PT [facility] [place]   5/20/2025  3:00 PM Felicia Harris, PT [facility] [place]   5/28/2025 10:20 AM James Betancourt MD [facility]   9/11/2025 10:20 AM Jackelin Maloney MD [facility]   1/9/2026  9:00 AM Juliana Triana, CHARU [id number] [facility]          Patient called and stated after adding walking in, patient is having knee and ankle pain and swelling and left lower back pain. Patient starts outpatient PT tomorrow and is anxious about the pain she is in. Please advise

## 2025-04-14 NOTE — TELEPHONE ENCOUNTER
Patient called and stated after adding walking in, patient is having knee and ankle pain and swelling and left lower back pain. Patient starts outpatient PT tomorrow and is anxious about the pain she is in. Please advise

## 2025-04-14 NOTE — TELEPHONE ENCOUNTER
Requested Renewals     Name from pharmacy: ROSUVASTATIN CALCIUM 5 MG TAB         Will file in chart as: ROSUVASTATIN 5 MG Oral Tab    Sig: TAKE 1 TABLET BY MOUTH EVERY DAY AT NIGHT    Disp: 90 tablet    Refills: 0 (Pharmacy requested: Not specified)    Start: 4/12/2025    Class: Normal    Non-formulary For: Hypercholesteremia, Coronary artery calcification    Last ordered: 2 months ago (1/21/2025) by Colton Schaefer MD    Last refill: 1/21/2025    Rx #: [id number]    Cholesterol Medication Protocol Amgjbo3804/12/2025 03:45 PM   Protocol Details ALT < 80    ALT resulted within past year    Lipid panel within past 12 months    In person appointment or virtual visit in the past 12 mos or appointment in next 3 mos    Medication is active on med list             Future Appointments   Date Time Provider Department Center   4/15/2025  8:30 AM Roopa Mena, PT [facility] [place]   4/17/2025  5:15 PM Felicia Harris, PT [facility] [place]   4/22/2025  8:30 AM Roopa Mena, PT [facility] [place]   4/24/2025  3:00 PM Felicia Harris, PT [facility] [place]   4/29/2025  8:30 AM Roopa Mena, PT [facility] [place]   5/1/2025  8:00 AM Ismael Perez MD [facility]   5/1/2025  9:30 AM Felicia Harris, PT [facility] [place]   5/6/2025  8:30 AM Roopa Mena, PT [facility] [place]   5/8/2025  9:30 AM Felicia Harrsi, PT [facility] [place]   5/13/2025  8:30 AM Roopa Mena, PT [facility] [place]   5/15/2025  9:30 AM Felicia Harris, PT [facility] [place]   5/20/2025  3:00 PM Felicia Harris, PT [facility] [place]   5/28/2025 10:20 AM James Betancourt MD [facility]   9/11/2025 10:20 AM Jackelin Maloney MD [facility]   1/9/2026  9:00 AM Juliana Triana, PA-C [id number]     LOV: 3/4/25  RX sent.

## 2025-04-15 ENCOUNTER — OFFICE VISIT (OUTPATIENT)
Dept: PHYSICAL THERAPY | Age: 65
End: 2025-04-15
Attending: NURSE PRACTITIONER
Payer: MEDICARE

## 2025-04-15 PROCEDURE — 97110 THERAPEUTIC EXERCISES: CPT | Performed by: PHYSICAL THERAPIST

## 2025-04-15 NOTE — PATIENT INSTRUCTIONS
Managing swelling:  -contact surgeon office today re: clarify if you should continue to wear the compression stockings, tell them you are having the swelling & you would like to be seen (they may advise you to see you primary care, this is fine too & then call Dr. Schaefer)  -if you should wear them still: ensure that they are not rolling down, take off if pain, numbness, tingling noted. Elevation the legs 30 degrees on your back (pillows). Perform ankle pumps 10-20x, 2x/ day  -consider increasing water intake from 48 oz slowly: by 4-8 oz at a time until goal of approximately 64 oz (or pending physician goals if different goals)      Check [employer] shoes for gym shoes that you can slip on without needing to adjust the laces (check sales if you are not locked into a specific color)        What are any concerning findings/ when to go to ER:  -signs of a blood clot: increased severe calf pain like a charley horse usually high calf/ behind the knee, hard/ firm calf like a balloon ready to pop, change in temperature between each leg (either very warm or very cold); increased swelling more in 1 leg vs the other, if you experience these go to the ER immediately. If you develop chest pain, shortness of breath/ trouble breathing, go to the ER immediately.

## 2025-04-15 NOTE — PROGRESS NOTES
Patient: Cb Webster (64 year old, female) Referring Provider:  Insurance:   Diagnosis: Arthrodesis status (Z98.1), S/P L4-5 MIS TLIF Mini Donatogler  KOLTONNA TEO   Date of Surgery: 02/05/2025 Next MD visit:  N/A   Precautions:  -- (Cognitive impairment and slow with processing) May, 2025 Referral Information:    Date of Evaluation: Req: 0, Auth: 0, Exp:     04/10/25 POC Auth Visits:  12       Today's Date   4/15/2025    Subjective  Pt reports she is pleased that she had the surgery done, is pleased with her progress. Pt reports as she is doing more walking the knees are coming into play, reports B knee pain, B ankle pain, & swelling (over last 10 days to 2 weeks), as well as some L hip pain, L piriformis pain. Pt reports she knew the knees would return. Not sure what is going on with the ankles. Thinks she hip is from walking- not bending as much at the knee & walking more peg-legged. Pt reports she talked to a nurse yesterday. Said she can discuss with the ortho, possibly knee/ hip specialist too while she is managing her spine therapy. Pt brought up compression socks- realized she was not wearing properly. Wears them on/ off. Pt reports she is walking up to 1.5 mi at this time. Denies chest pain, SOB. Water intake ok: about 48 oz/ day.       Pain: 4/10 (L piriformis/ hip 4/10, lower L back 3/10, 1-2/10 B ankles & top of foot)     Objective  B pedal pulses intact & symmetrical, B calf pain-free including in deep venous circulation, no pitting edema B, symmetrical/ WNL temp B, no abnormal skin color changes to lower leg (no erythema or increased pallor)       Able to demo log rolling without cues     Assessment  Pt re-assured no obvious concerning findings upon examination of lower leg/ ankle however was advised if red flag symptoms noted to go to ER. Pt also instructed to contact surgeon's office to get examined. Reviewed HEP to ensure proper form. Pt cued with SKTC to hold underneath the knee, not on top  of the knee, for better knee mechanics. Pt cued on NuStep to avoid any increasing pain, also to go a bit slower. Pt educated in normal soreness potential following TX today.    Goals (to be met in 12 visits)    Not Met Progress  Toward Partially  Met Met   Pt will improve transversus abdominis recruitment to perform proper isometric contraction without requiring verbal or tactile cuing to promote advancement of therex, improvement of core muscles strength to promote lumbar stability with activities that include bending, walking, reaching, transfers and increased LE activity [] [] [] []   Pt will demonstrate good understanding of proper posture and body mechanics to decrease pain and improve spinal safety [] [] [] []   Pt will have improvement of muscles flexibility to improve lumbar spine and L hip to WNL  to allow increase ease with movement and improve positional tolerance [] [] [] []   Pt will have decreased paraspinal mm tension to tolerate sitting, standing and walking unrestricted   [] [] [] []   Pt will demonstrate improved core strength, hips and abdominal muscles strength to 4/5 and improve kinsesthetic and proprioceptive awareness to be able to decrease pain to <2/10 with sitting, standing, walking, bending, carrying and lifting, transfers and LE dressing [] [] [] []   Pt will be independent and compliant with comprehensive HEP to maintain progress achieved in PT, and be able to utilize HEP to manage future exacerbations    [] [] [] []      Plan  Cont gentle neutral based strengthening & mobility work avoiding pain provocation.    Treatment Last 4 Visits  Treatment Day: 2       4/10/2025 4/15/2025   Spine Treatment   Therapeutic Exercise SKTC 1 x 10 secs  Piriformis stretches 1x 10 secs  Cues and instructions, to stop if there is onset of pain  Patient demonstrated sciatic nerve flossing in seated  NuStep L1 X 5' BLE only (seat 5-6)    Pt education:   -compression stockings, swelling (water intake, light  elevation to 30 deg supine) -ankle pumps 10-20x, 2x/ day  -pt should contact surgeon's office again/ possibly PCP- to get examined for swelling  -s/s DVT: ER if noted  -shoe wear  ^handout provided    HEP review:  -SKTC 5 X 10\" ea (contralateral LE bent)  -supine figure-4 piriformis stretch foot on ground 5 X 10\" ea   Therapeutic Exercise Minutes 8 42   Evaluation Minutes 35    Total Time Of Timed Procedures 8 42   Total Time Of Service-Based Procedures 35 0   Total Treatment Time 43 42   HEP Access Code: [email or web address]  URL: [email or web address]  Date: 04/10/2025  Prepared by: Felicia Harris    Exercises  - Supine Single Knee to Chest Stretch  - 2 x daily - 7 x weekly - 5 reps - 10 hold  - Supine Figure 4 Piriformis Stretch  - 1 x daily - 7 x weekly - 1 sets - 5 reps - 10 hold         HEP  Access Code: [email or web address]  URL: [email or web address]  Date: 04/10/2025  Prepared by: Feliciaeaston Harris    Exercises  - Supine Single Knee to Chest Stretch  - 2 x daily - 7 x weekly - 5 reps - 10 hold  - Supine Figure 4 Piriformis Stretch  - 1 x daily - 7 x weekly - 1 sets - 5 reps - 10 hold    Charges  3TE

## 2025-04-17 ENCOUNTER — OFFICE VISIT (OUTPATIENT)
Dept: PHYSICAL THERAPY | Age: 65
End: 2025-04-17
Attending: NURSE PRACTITIONER
Payer: MEDICARE

## 2025-04-17 PROCEDURE — 97112 NEUROMUSCULAR REEDUCATION: CPT | Performed by: PHYSICAL THERAPIST

## 2025-04-17 PROCEDURE — 97110 THERAPEUTIC EXERCISES: CPT | Performed by: PHYSICAL THERAPIST

## 2025-04-18 NOTE — PROGRESS NOTES
Patient: Cb Webster (64 year old, female) Referring Provider:  Insurance:   Diagnosis: Arthrodesis status (Z98.1), S/P L4-5 MIS TLIF Mini Donatogerri BRUCE   Date of Surgery: 02/05/2025 Next MD visit:  N/A   Precautions:  -- (Cognitive impairment and slow with processing) May, 2025 Referral Information:    Date of Evaluation: Req: 0, Auth: 0, Exp:     04/10/25 POC Auth Visits:  12       Today's Date   4/17/2025    Subjective  Patient reports that the NP did a home visit and was informed that its ok for her to wear her compression stockings, she was informed that her pulses were good, but she may have lipedemia. Patient reports that she is scheduled to see her PCP in 10 days. States that she noticed changes in her symptoms, she noticed that the pain shifted from the L buttock area to the R buttoch and middle low back. She first noticed it an hour after she did her HEP, the pain increased from 1/10 to 2/10, and with walking, the pain increase from 3/10 to 5/10. She also had to limit her walking, she used to walk 1 1/2 miles, but she  noticed that there was also onset of pain on her R knee aside from the L knee pain and also on the ankles, pain increased from 1/10 to 3/10 on B areas. She is concerned about her limited range on her ankles and the different sensations on her thighs.       Pain: 2/10     Objective  Noted swelling on B ankles. Gait deviation, balance deficits. B ankle DF and eversion are limited.            Assessment  Assessment of ankle ROM performed, discussed with patient about decrease in ROM for eversion and dorsiflexion which the ankle swelling can be a factor, also discussed about how normally, eversion is less compared to inversion, as patient demonstrated how much movement she can do with inversion compared to eversion. Provided patient with AROM exercises for ankle to also promote movement of fluid from LE and decrease swelling. Provided patient with new exercises to promote core  muscles strength and lumbar stability. Also discussed with patient about progressing the treatment which will include bending and lifting with weights. Updated HEP for visual learning and aid due to cognitive deficits.    Goals (to be met in 12 visits)      Not Met Progress  Toward Partially  Met Met   Pt will improve transversus abdominis recruitment to perform proper isometric contraction without requiring verbal or tactile cuing to promote advancement of therex, improvement of core muscles strength to promote lumbar stability with activities that include bending, walking, reaching, transfers and increased LE activity [] [] [] []   Pt will demonstrate good understanding of proper posture and body mechanics to decrease pain and improve spinal safety [] [] [] []   Pt will have improvement of muscles flexibility to improve lumbar spine and L hip to WNL  to allow increase ease with movement and improve positional tolerance [] [] [] []   Pt will have decreased paraspinal mm tension to tolerate sitting, standing and walking unrestricted   [] [] [] []   Pt will demonstrate improved core strength, hips and abdominal muscles strength to 4/5 and improve kinsesthetic and proprioceptive awareness to be able to decrease pain to <2/10 with sitting, standing, walking, bending, carrying and lifting, transfers and LE dressing [] [] [] []   Pt will be independent and compliant with comprehensive HEP to maintain progress achieved in PT, and be able to utilize HEP to manage future exacerbations    [] [] [] []             Plan  Continue PT as perestbalished POC. Core activation and strengthening, functional activities, monitor pain    Treatment Last 4 Visits  Treatment Day: 3       4/10/2025 4/15/2025 4/17/2025   Spine Treatment   Therapeutic Exercise SKTC 1 x 10 secs  Piriformis stretches 1x 10 secs  Cues and instructions, to stop if there is onset of pain  Patient demonstrated sciatic nerve flossing in seated  NuStep L1 X 5' BLE  only (seat 5-6)    Pt education:   -compression stockings, swelling (water intake, light elevation to 30 deg supine) -ankle pumps 10-20x, 2x/ day  -pt should contact surgeon's office again/ possibly PCP- to get examined for swelling  -s/s DVT: ER if noted  -shoe wear  ^handout provided    HEP review:  -SKTC 5 X 10\" ea (contralateral LE bent)  -supine figure-4 piriformis stretch foot on ground 5 X 10\" ea Nu step L3, 6 mins  Calf stretches, lowest setting 3x 30 secs  Seated lumbar flexion x 10  SKTC 5 x 10   Supine piriformis stretches (ER/IR) 5x 10 secs each  Supine TA brace x 10 with 5 secs  Seated TA brace x 10 with 5 secs     Neuro Re-Education   Had patient demonstrate proper bending in a more stable stance, with TA activation and moving to a more tolerable range,and also used mat table as gauge to amount of bending that she can start with.   Reviewed ankle motions and exercises, increasing awareness for isolating ankle movements instead of movements from the hips.   Therapeutic Exercise Minutes 8 42 25   Neuro Re-Educ Minutes   20   Evaluation Minutes 35     Total Time Of Timed Procedures 8 42 45   Total Time Of Service-Based Procedures 35 0 0   Total Treatment Time 43 42 45   HEP Access Code: [email or web address]  URL: [email or web address]  Date: 04/10/2025  Prepared by: Felicia Harris    Exercises  - Supine Single Knee to Chest Stretch  - 2 x daily - 7 x weekly - 5 reps - 10 hold  - Supine Figure 4 Piriformis Stretch  - 1 x daily - 7 x weekly - 1 sets - 5 reps - 10 hold  Access Code: [email or web address]  URL: [email or web address]  Date: 04/17/2025  Prepared by: Felicia Harris    Exercises  - Supine Single Knee to Chest Stretch  - 2 x daily - 7 x weekly - 5 reps - 10 hold  - Supine Figure 4 Piriformis Stretch  - 2 x daily - 7 x weekly - 1 sets - 5 reps - 10 hold  - Supine Piriformis Stretch with Foot on Ground  - 2 x daily - 7 x weekly - 1 sets - 5 reps - 10 hold  - Seated Flexion Stretch  - 2 x  daily - 7 x weekly - 1 sets - 10 reps  - Supine Transversus Abdominis Bracing - Hands on Ground  - 2 x daily - 7 x weekly - 1-2 sets - 10 reps - 5 hold  - Seated Transversus Abdominis Bracing  - 2 x daily - 7 x weekly - 1 sets - 10 reps - 5 hold  - Seated Ankle Inversion Eversion AROM  - 2 x daily - 7 x weekly - 1-2 sets - 10 reps  - Seated Ankle Alphabet  - 2 x daily - 7 x weekly - 1 sets - 1 reps        HEP  Access Code: [email or web address]  URL: [email or web address]  Date: 04/17/2025  Prepared by: Felicia Harris    Exercises  - Supine Single Knee to Chest Stretch  - 2 x daily - 7 x weekly - 5 reps - 10 hold  - Supine Figure 4 Piriformis Stretch  - 2 x daily - 7 x weekly - 1 sets - 5 reps - 10 hold  - Supine Piriformis Stretch with Foot on Ground  - 2 x daily - 7 x weekly - 1 sets - 5 reps - 10 hold  - Seated Flexion Stretch  - 2 x daily - 7 x weekly - 1 sets - 10 reps  - Supine Transversus Abdominis Bracing - Hands on Ground  - 2 x daily - 7 x weekly - 1-2 sets - 10 reps - 5 hold  - Seated Transversus Abdominis Bracing  - 2 x daily - 7 x weekly - 1 sets - 10 reps - 5 hold  - Seated Ankle Inversion Eversion AROM  - 2 x daily - 7 x weekly - 1-2 sets - 10 reps  - Seated Ankle Alphabet  - 2 x daily - 7 x weekly - 1 sets - 1 reps    Charges  Thera ex -2, NMR -1

## 2025-04-22 ENCOUNTER — OFFICE VISIT (OUTPATIENT)
Dept: PHYSICAL THERAPY | Age: 65
End: 2025-04-22
Attending: NURSE PRACTITIONER
Payer: MEDICARE

## 2025-04-22 PROCEDURE — 97110 THERAPEUTIC EXERCISES: CPT | Performed by: PHYSICAL THERAPIST

## 2025-04-22 NOTE — PROGRESS NOTES
Patient: Cb Webster (64 year old, female) Referring Provider:  Insurance:   Diagnosis: Arthrodesis status (Z98.1), S/P L4-5 MIS TLIF Mini De La Garza  BEVERLY BRUCE   Date of Surgery: 02/05/2025 Next MD visit:  N/A   Precautions:  -- (Cognitive impairment and slow with processing) PCP (Dr. Schaefer) 4/24 Referral Information:    Date of Evaluation: Req: 0, Auth: 0, Exp:     04/10/25 POC Auth Visits:  12       Today's Date   4/22/2025    Subjective  Less swelling in the legs. Sees PCP Thurs (Dr. Schaefer). Took yesterday off from the walking & stretches - had side effects from a medication, felt wiped out. Today feels a little stiffer (hasn't been up moving much yet). Not in significant pain.       Pain: 3/10     Objective  see flowsheet for details       Assessment  Pt originally was started with Yellow theraband for hip abduction isometric though this proved to be too easy for pt, able to progress to Green theraband which was appropriate resistance level for pt for today. Ensured pt understanding of new HEP update & that it is normal if pt experiences DOMS from new exercises due to more strengthening, including in standing, incorporated today. No increased pain noted during or at end of visit.    Goals (to be met in 12 visits)      Not Met Progress  Toward Partially  Met Met   Pt will improve transversus abdominis recruitment to perform proper isometric contraction without requiring verbal or tactile cuing to promote advancement of therex, improvement of core muscles strength to promote lumbar stability with activities that include bending, walking, reaching, transfers and increased LE activity [] [] [] []   Pt will demonstrate good understanding of proper posture and body mechanics to decrease pain and improve spinal safety [] [] [] []   Pt will have improvement of muscles flexibility to improve lumbar spine and L hip to WNL  to allow increase ease with movement and improve positional tolerance [] [] [] []   Pt will  have decreased paraspinal mm tension to tolerate sitting, standing and walking unrestricted   [] [] [] []   Pt will demonstrate improved core strength, hips and abdominal muscles strength to 4/5 and improve kinsesthetic and proprioceptive awareness to be able to decrease pain to <2/10 with sitting, standing, walking, bending, carrying and lifting, transfers and LE dressing [] [] [] []   Pt will be independent and compliant with comprehensive HEP to maintain progress achieved in PT, and be able to utilize HEP to manage future exacerbations    [] [] [] []          Plan  May increase resistance from Green to Blue on hip abduction isometric.    Treatment Last 4 Visits  Treatment Day: 4       4/10/2025 4/15/2025 4/17/2025 4/22/2025   Spine Treatment   Therapeutic Exercise SKTC 1 x 10 secs  Piriformis stretches 1x 10 secs  Cues and instructions, to stop if there is onset of pain  Patient demonstrated sciatic nerve flossing in seated  NuStep L1 X 5' BLE only (seat 5-6)    Pt education:   -compression stockings, swelling (water intake, light elevation to 30 deg supine) -ankle pumps 10-20x, 2x/ day  -pt should contact surgeon's office again/ possibly PCP- to get examined for swelling  -s/s DVT: ER if noted  -shoe wear  ^handout provided    HEP review:  -SKTC 5 X 10\" ea (contralateral LE bent)  -supine figure-4 piriformis stretch foot on ground 5 X 10\" ea Nu step L3, 6 mins  Calf stretches, lowest setting 3x 30 secs  Seated lumbar flexion x 10  SKTC 5 x 10   Supine piriformis stretches (ER/IR) 5x 10 secs each  Supine TA brace x 10 with 5 secs  Seated TA brace x 10 with 5 secs   NuStep L3 X 6' BLE only  Supine:  -Hooklying submax (<25%) hip add whit yellow ball 5\" 2 X 10  -Hooklying hip abd whit Yellow->Green TB 5\" 2 X 10 (Green TB dispensed for home)  -B hip flex submax (<25%) whit w/ BLE on SB 5\" X 10  -supine figure-4 piriformis stretch foot on ground 5 X 10\" ea  Standing:  -alt B heel-toe raise w/ HHA 2 X 10  -3-way hip w/  HHA X 10 ea R/L  -slantboard gastroc stretch heels on ground 5 X 20\" B  Pt education: normal DOMS   Neuro Re-Education   Had patient demonstrate proper bending in a more stable stance, with TA activation and moving to a more tolerable range,and also used mat table as gauge to amount of bending that she can start with.   Reviewed ankle motions and exercises, increasing awareness for isolating ankle movements instead of movements from the hips.    Therapeutic Exercise Minutes 8 42 25 41   Neuro Re-Educ Minutes   20    Evaluation Minutes 35      Total Time Of Timed Procedures 8 42 45 41   Total Time Of Service-Based Procedures 35 0 0 0   Total Treatment Time 43 42 45 41   HEP Access Code: [email or web address]  URL: [email or web address]  Date: 04/10/2025  Prepared by: Felicia Harris    Exercises  - Supine Single Knee to Chest Stretch  - 2 x daily - 7 x weekly - 5 reps - 10 hold  - Supine Figure 4 Piriformis Stretch  - 1 x daily - 7 x weekly - 1 sets - 5 reps - 10 hold  Access Code: [email or web address]  URL: [email or web address]  Date: 04/17/2025  Prepared by: Felicia Harris    Exercises  - Supine Single Knee to Chest Stretch  - 2 x daily - 7 x weekly - 5 reps - 10 hold  - Supine Figure 4 Piriformis Stretch  - 2 x daily - 7 x weekly - 1 sets - 5 reps - 10 hold  - Supine Piriformis Stretch with Foot on Ground  - 2 x daily - 7 x weekly - 1 sets - 5 reps - 10 hold  - Seated Flexion Stretch  - 2 x daily - 7 x weekly - 1 sets - 10 reps  - Supine Transversus Abdominis Bracing - Hands on Ground  - 2 x daily - 7 x weekly - 1-2 sets - 10 reps - 5 hold  - Seated Transversus Abdominis Bracing  - 2 x daily - 7 x weekly - 1 sets - 10 reps - 5 hold  - Seated Ankle Inversion Eversion AROM  - 2 x daily - 7 x weekly - 1-2 sets - 10 reps  - Seated Ankle Alphabet  - 2 x daily - 7 x weekly - 1 sets - 1 reps Access Code: [email or web address]  URL: [email or web address]  Date: 04/22/2025  Prepared by: Roopa  Trina    Exercises  - Supine Single Knee to Chest Stretch  - 2 x daily - 7 x weekly - 5 reps - 10 hold  - Supine Figure 4 Piriformis Stretch  - 2 x daily - 7 x weekly - 1 sets - 5 reps - 10 hold  - Supine Piriformis Stretch with Foot on Ground  - 2 x daily - 7 x weekly - 1 sets - 5 reps - 10 hold  - Seated Flexion Stretch  - 2 x daily - 7 x weekly - 1 sets - 10 reps  - Supine Transversus Abdominis Bracing - Hands on Ground  - 2 x daily - 7 x weekly - 1-2 sets - 10 reps - 5 hold  - Seated Transversus Abdominis Bracing  - 2 x daily - 7 x weekly - 1 sets - 10 reps - 5 hold  - Seated Ankle Inversion Eversion AROM  - 2 x daily - 7 x weekly - 1-2 sets - 10 reps  - Seated Ankle Alphabet  - 2 x daily - 7 x weekly - 1 sets - 1 reps  - Supine Hip Adduction Isometric with Ball  - 1 x daily - 3-4 x weekly - 2 sets - 10 reps - 5 sec hold  - Hooklying Clamshell with Resistance  - 1 x daily - 3-4 x weekly - 2 sets - 10 reps - 5 sec hold  - Heel Toe Raises with Counter Support  - 1 x daily - 3-4 x weekly - 1-2 sets - 10 reps - 1 sec hold        HEP  Access Code: [email or web address]  URL: [email or web address]  Date: 04/22/2025  Prepared by: Roopa Mena    Exercises  - Supine Single Knee to Chest Stretch  - 2 x daily - 7 x weekly - 5 reps - 10 hold  - Supine Figure 4 Piriformis Stretch  - 2 x daily - 7 x weekly - 1 sets - 5 reps - 10 hold  - Supine Piriformis Stretch with Foot on Ground  - 2 x daily - 7 x weekly - 1 sets - 5 reps - 10 hold  - Seated Flexion Stretch  - 2 x daily - 7 x weekly - 1 sets - 10 reps  - Supine Transversus Abdominis Bracing - Hands on Ground  - 2 x daily - 7 x weekly - 1-2 sets - 10 reps - 5 hold  - Seated Transversus Abdominis Bracing  - 2 x daily - 7 x weekly - 1 sets - 10 reps - 5 hold  - Seated Ankle Inversion Eversion AROM  - 2 x daily - 7 x weekly - 1-2 sets - 10 reps  - Seated Ankle Alphabet  - 2 x daily - 7 x weekly - 1 sets - 1 reps  - Supine Hip Adduction Isometric with Ball  - 1 x daily -  3-4 x weekly - 2 sets - 10 reps - 5 sec hold  - Hooklying Clamshell with Resistance  - 1 x daily - 3-4 x weekly - 2 sets - 10 reps - 5 sec hold  - Heel Toe Raises with Counter Support  - 1 x daily - 3-4 x weekly - 1-2 sets - 10 reps - 1 sec hold    Charges  3TE

## 2025-04-24 ENCOUNTER — OFFICE VISIT (OUTPATIENT)
Dept: FAMILY MEDICINE CLINIC | Facility: CLINIC | Age: 65
End: 2025-04-24
Payer: MEDICARE

## 2025-04-24 ENCOUNTER — OFFICE VISIT (OUTPATIENT)
Dept: PHYSICAL THERAPY | Age: 65
End: 2025-04-24
Attending: NURSE PRACTITIONER
Payer: MEDICARE

## 2025-04-24 VITALS
BODY MASS INDEX: 34.06 KG/M2 | SYSTOLIC BLOOD PRESSURE: 122 MMHG | HEART RATE: 77 BPM | HEIGHT: 61 IN | WEIGHT: 180.38 LBS | TEMPERATURE: 98 F | RESPIRATION RATE: 16 BRPM | OXYGEN SATURATION: 97 % | DIASTOLIC BLOOD PRESSURE: 88 MMHG

## 2025-04-24 DIAGNOSIS — M70.52 SUPRAPATELLAR BURSITIS OF LEFT KNEE: ICD-10-CM

## 2025-04-24 DIAGNOSIS — M70.52 INFRAPATELLAR BURSITIS OF LEFT KNEE: ICD-10-CM

## 2025-04-24 DIAGNOSIS — H69.93 DYSFUNCTION OF BOTH EUSTACHIAN TUBES: ICD-10-CM

## 2025-04-24 DIAGNOSIS — R60.9 LIPEDEMA: ICD-10-CM

## 2025-04-24 DIAGNOSIS — M94.0 COSTOCHONDRITIS: ICD-10-CM

## 2025-04-24 DIAGNOSIS — R79.82 ELEVATED C-REACTIVE PROTEIN (CRP): ICD-10-CM

## 2025-04-24 DIAGNOSIS — M25.50 MULTIPLE JOINT PAIN: Primary | ICD-10-CM

## 2025-04-24 DIAGNOSIS — M17.10 ARTHRITIS OF KNEE: ICD-10-CM

## 2025-04-24 DIAGNOSIS — M23.204 OLD TEAR OF MEDIAL MENISCUS OF LEFT KNEE, UNSPECIFIED TEAR TYPE: ICD-10-CM

## 2025-04-24 PROCEDURE — 99214 OFFICE O/P EST MOD 30 MIN: CPT | Performed by: STUDENT IN AN ORGANIZED HEALTH CARE EDUCATION/TRAINING PROGRAM

## 2025-04-24 PROCEDURE — 97112 NEUROMUSCULAR REEDUCATION: CPT | Performed by: PHYSICAL THERAPIST

## 2025-04-24 PROCEDURE — 97110 THERAPEUTIC EXERCISES: CPT | Performed by: PHYSICAL THERAPIST

## 2025-04-24 NOTE — PROGRESS NOTES
Subjective:      Chief Complaint   Patient presents with    Back Pain     Recently had back surgery    Knee Pain     Bilateral and ankle pain      Leg Swelling     HISTORY OF PRESENT ILLNESS  HPI  HPI obtained per patient report.  Cb Webster is a pleasant 64 year old female presenting with B/L knee and ankle pain, as well as B/L leg swelling.     Her pain was overall better after her L4-L5 fusion and with PT but recently worsened in her B/L knees and ankles. Her knee pain is located throughout her knees and is exacerbated by walking for extended periods of time. She reports associated anterior chest wall tenderness and B/L LE swelling.     She also notes B/L ear pressure and a tender lymph nodes on the right side of her neck for at least 1 month.       PAST PATIENT HISTORY  Past Medical History[1]  Past Surgical History[2]    CURRENT MEDICATIONS  Medications Taking[3]    HEALTH MAINTENANCE  Immunization History   Administered Date(s) Administered    Covid-19 Vaccine Pfizer 30 mcg/0.3 ml 03/24/2021, 04/14/2021, 10/23/2021    Covid-19 Vaccine Pfizer Bivalent 30mcg/0.3mL 09/24/2022    Covid-19 Vaccine Pfizer Merrill-Sucrose 30 mcg/0.3 ml 05/11/2022    FLULAVAL 6 months & older 0.5 ml Prefilled syringe ([id number]) 10/23/2017, 09/20/2018, 10/01/2019, 10/07/2020, 09/30/2021, 10/28/2022    FLUZONE 6 months and older PFS 0.5 ml ([id number]) 10/23/2017, 09/20/2018, 10/13/2023    Influenza 09/18/2024    Pfizer Covid-19 Vaccine 30mcg/0.3ml 12yrs+ 10/13/2023, 09/18/2024    Pneumococcal Conjugate PCV20 01/16/2025    RSV, recombinant, RSVpreF, adjuvanted (Arexvy) 11/30/2023    TDAP 12/05/2018    Zoster Vaccine Recombinant Adjuvanted (Shingrix) 02/16/2021, 05/18/2021       ALLERGIES AND DRUG REACTIONS  Allergies[4]    Family History[5]  Short Social Hx on File[6]    Review of Systems   Cardiovascular:  Positive for chest pain and leg swelling.   Musculoskeletal:  Positive for arthralgias.   All other systems reviewed and are  negative.         Objective:      /88   Pulse 77   Temp 97.6 °F (36.4 °C) (Temporal)   Resp 16   Ht 5' 1\" (1.549 m)   Wt 180 lb 6 oz (81.8 kg)   LMP  (LMP Unknown)   SpO2 97%   BMI 34.08 kg/m²   Body mass index is 34.08 kg/m².    Physical Exam  Vitals reviewed.   Constitutional:       General: She is not in acute distress.     Appearance: She is not ill-appearing, toxic-appearing or diaphoretic.   HENT:      Head: Normocephalic and atraumatic.      Right Ear: Tympanic membrane, ear canal and external ear normal.      Left Ear: Tympanic membrane, ear canal and external ear normal.      Ears:      Comments: R TM is mildly bulging      Nose: Nose normal.   Eyes:      General: No scleral icterus.        Right eye: No discharge.         Left eye: No discharge.      Extraocular Movements: Extraocular movements intact.      Conjunctiva/sclera: Conjunctivae normal.   Cardiovascular:      Rate and Rhythm: Normal rate.   Pulmonary:      Effort: Pulmonary effort is normal.   Chest:      Chest wall: Tenderness (B/L sternocostal joint tenderness) present.   Musculoskeletal:         General: Tenderness present. No deformity. Normal range of motion.      Cervical back: Neck supple.      Right lower leg: Edema present.      Left lower leg: Edema present.      Comments: LLE exam:   No erythema/edema/increased warmth  Lipedema of B/L ankles present   Moderate tenderness over medial joint line, over tibial tubercle, and just proximal to patella   Mild tenderness throughout posterior calf without erythema/edema/increased warmth    RLE exam:   As noted above for LLE exam except without significant tenderness     Lymphadenopathy:      Cervical: Cervical adenopathy (tender R submandibular LAD) present.   Skin:     General: Skin is warm and dry.      Coloration: Skin is not jaundiced or pale.      Findings: No bruising, erythema or rash.   Neurological:      Mental Status: She is alert and oriented to person, place, and time.    Psychiatric:         Mood and Affect: Mood normal.            Assessment and Plan:      1. Multiple joint pain (Primary)  -     Anti-Nuclear Antibody (BRITTANY) by IFA, Reflex Titer + Specific Antibodies; Future; Expected date: 04/24/2025  -     C-Reactive Protein; Future; Expected date: 04/24/2025  -     Rheumatoid Arthritis Factor; Future; Expected date: 04/24/2025  -     Sed Rate, Sam (Automated); Future; Expected date: 04/24/2025  2. Elevated high sensitivity C-reactive protein (hs-CRP)  3. Arthritis of knee  4. Old tear of medial meniscus of left knee, unspecified tear type  5. Suprapatellar bursitis of left knee  6. Infrapatellar bursitis of left knee  7. Lipedema  8. Costochondritis  9. Dysfunction of both eustachian tubes    Return in about 3 months (around 7/24/2025) for follow-up.    Multiple joint pain  - with a history of elevated CRP for the past few years  - at this time her history and exam are more consistent with associated with recent increased physical activity levels, but we discussed that she may obtain labs to R/O autoimmune arthritis if her symptoms do not start improving within the next 1-2 weeks with the below-discussed treatment    B/L knee arthritis, L knee medial meniscal tear  - likely additional L suprapatellar and infrapatellar bursitis with or without L quadriceps tendonitis   - recommended activity modification, LE elevation, compression stockings as tolerated, topical voltaren gel application as needed, and/or tylenol as needed   - oral NSAIDs should be avoided due to her history of PUD  - she declined norco prescription at this time  - if her symptoms do not improve significantly with the above, recommended consultation with her orthopedist for joint injections. If unable to follow-up with her orthopedist soon, we discussed that she is welcome to call or send a message for a systemic steroid course     Lipedema of B/L LE  - we will continue our weight management plan of  care    Costochondritis  - recommended activity modification, local ice application, and tylenol as needed    Eustachian tube dysfunction, R>L  - recommended flonase or nasonex nasal spray scheduled once daily for at least 1-2 weeks  - if no significant improvement with the above, we discussed that she may add on Afrin nasal spray once daily for up to 5 days consecutively  - if still no significant improvement, we discussed that she is welcome to call or send a message for a steroid taper     Patient verbalized understanding of assessment and recommendations. All questions and concerns were addressed.    Electronically signed by Colton Schaefer MD         [1]   Past Medical History:   Anxiety    Aortic regurgitation    mild    Back pain    Calculus of kidney    Cardiac calcification (HCC) - CAC score of 5.24 seen on 22 CT Heart Scan protocol    Colon polyps    Depression    Disorder of liver    Fatty liver    Diverticulosis    Gastroparesis    GERD (gastroesophageal reflux disease)    Hemorrhoids    High blood pressure    History of depression    Major depressive disorder & anxiety    History of eating disorder    Binge eating    Hyperlipidemia    Migraines    OCD (obsessive compulsive disorder)    MODE (obstructive sleep apnea)    Osteoarthritis    Parkinsonism (HCC)    Peptic ulcer disease    Stool incontinence    Soft barely formed stool, not detecting urgency    Tendonitis of shoulder, right   [2]   Past Surgical History:  Procedure Laterality Date    Back surgery  2025    L5 MIS TLIF W/ MACIE ELLIS MD          x 2    Cholecystectomy      Colonoscopy      D & c      Ect provided  [id number]    Egd      Laparoscopic cholecystectomy      Lumbar spine fusion combined  2025    Needle biopsy right  2015    benign breast    Other surgical history      C3-C4 anterior discectomy    Other [facility] history  [facility]    Other surgical history  2019    radiofrequency abalsion lumbar     Removal gallbladder  2020    Skin surgery      Spine surgery procedure unlisted      Anterior cervical discectomy    Tonsillectomy  1966?   [3]   Outpatient Medications Marked as Taking for the 25 encounter (Office Visit) with Colton Schaefer MD   Medication Sig Dispense Refill    ROSUVASTATIN 5 MG Oral Tab TAKE 1 TABLET BY MOUTH EVERY DAY AT NIGHT 90 tablet 0    MEMANTINE 10 MG Oral Tab TAKE 1 TABLET BY MOUTH EVERY DAY 90 tablet 0    methylphenidate ER 54 MG Oral Tab CR Take 1 tablet (54 mg total) by mouth every morning.      dicyclomine 10 MG Oral Cap Take 1 capsule (10 mg total) by mouth 2 (two) times daily.      busPIRone 15 MG Oral Tab Take 1 tablet (15 mg total) by mouth 2 (two) times daily.      FLUoxetine HCl 40 MG Oral Cap Take 1 capsule (40 mg total) by mouth daily.      pantoprazole 40 MG Oral Tab EC Take 1 tablet (40 mg total) by mouth before breakfast. 90 tablet 3    famotidine 40 MG Oral Tab Take 1 tablet (40 mg total) by mouth daily as needed for Heartburn. 90 tablet 3    SUMAtriptan Succinate 6 MG/0.5ML Subcutaneous Solution Auto-injector Inject 0.5 mL (6 mg total) into the skin as needed (for moderate to severe migraine). 6 mL 2    albuterol 108 (90 Base) MCG/ACT Inhalation Aero Soln Inhale 2 puffs into the lungs every 4 (four) hours as needed for Wheezing or Shortness of Breath. 6.7 g 0    traZODone 100 MG Oral Tab Take 1 tablet (100 mg total) by mouth nightly.      SPRAVATO, 84 MG DOSE, 28 MG/DEVICE Nasal Solution Therapy Pack 84 mg by Nasal route every 14 (fourteen) days.      LORazepam 2 MG Oral Tab Take 1 tablet (2 mg total) by mouth nightly as needed for Anxiety.     [4]   Allergies  Allergen Reactions    Sulfa Antibiotics NAUSEA ONLY   [5]   Family History  Problem Relation Age of Onset    Hypertension Father     Heart Attack Father          at 48 years    Heart Disease Father     Alcohol and Other Disorders Associated Father     Depression Father     Other (ALS) Mother      Hypertension Mother         Diagnosed at 91 yo with ALS  at 91    Personality Disorder Daughter     Dementia Maternal Grandmother     Obesity Maternal Grandmother     Dementia Maternal Grandfather     Obesity Paternal Grandfather     Cancer Sister         Kidney, chronic lymp… leukemia    Heart Attack Sister         Kidney cancer, chronic lymphocytic leukemia    Heart Disease Sister         Pacemaker kicks in when pressure is low    Ulcerative Colitis Brother     Cancer Brother         Kidney   [6]   Social History  Socioeconomic History    Marital status:    Occupational History    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[employer]   Tobacco Use    Smoking status: Never     Passive exposure: Never    Smokeless tobacco: Never   Vaping Use    Vaping status: Never Used   Substance and Sexual Activity    Alcohol use: Not Currently    Drug use: Never   Other Topics Concern    Caffeine Concern No    Exercise No    Seat Belt Yes    Special Diet No    Stress Concern Yes    Weight Concern Yes     Comment: L125lb  G85lbs  L45lbs   Social History Narrative    Caring for grandson (Peter - aged 6 yo [2020])     Social Drivers of Health     Food Insecurity: No Food Insecurity (2025)    NCSS - Food Insecurity     Worried About Running Out of Food in the Last Year: No     Ran Out of Food in the Last Year: No   Transportation Needs: No Transportation Needs (2025)    NCSS - Transportation     Lack of Transportation: No   Housing Stability: Not At Risk (2025)    NCSS - Housing/Utilities     Has Housing: Yes     Worried About Losing Housing: No     Unable to Get Utilities: No

## 2025-04-24 NOTE — PROGRESS NOTES
Patient: Cb Webster (64 year old, female) Referring Provider:  Insurance:   Diagnosis: Arthrodesis status (Z98.1), S/P L4-5 MIS TLIF Mini De La Garza  BEVERLY BRUCE   Date of Surgery: 02/05/2025 Next MD visit:  N/A   Precautions:  -- (Cognitive impairment and slow with processing) PCP (Dr. Schaefer) 4/24 Referral Information:    Date of Evaluation: Req: 0, Auth: 0, Exp:     04/10/25 POC Auth Visits:  12       Today's Date   4/24/2025    Subjective  She just saw her PCP and was informermed that she has lipedema, just to monitor it at this time, will need some shots for the knees for pain relief. She was not able to do her home exercises yet today.       Pain: 3/10     Objective  see flowsheet for details. Swelling on B LE            Assessment  Patient is progressing well with PT, was able to tolerate increase in volume and intensity of exercises. Added exercises that promote improvement with gait, stability and posture. Discussed with patient about importance of exercises in relation to her function. Patient undesrtood.    Goals (to be met in 12 visits)      Not Met Progress  Toward Partially  Met Met   Pt will improve transversus abdominis recruitment to perform proper isometric contraction without requiring verbal or tactile cuing to promote advancement of therex, improvement of core muscles strength to promote lumbar stability with activities that include bending, walking, reaching, transfers and increased LE activity [] [] [] []   Pt will demonstrate good understanding of proper posture and body mechanics to decrease pain and improve spinal safety [] [] [] []   Pt will have improvement of muscles flexibility to improve lumbar spine and L hip to WNL  to allow increase ease with movement and improve positional tolerance [] [] [] []   Pt will have decreased paraspinal mm tension to tolerate sitting, standing and walking unrestricted   [] [] [] []   Pt will demonstrate improved core strength, hips and abdominal  muscles strength to 4/5 and improve kinsesthetic and proprioceptive awareness to be able to decrease pain to <2/10 with sitting, standing, walking, bending, carrying and lifting, transfers and LE dressing [] [] [] []   Pt will be independent and compliant with comprehensive HEP to maintain progress achieved in PT, and be able to utilize HEP to manage future exacerbations    [] [] [] []         Plan  Continue PT as per established POC. Core activation exercises    Treatment Last 4 Visits  Treatment Day: 5       4/15/2025 4/17/2025 4/22/2025 4/24/2025   Spine Treatment   Therapeutic Exercise NuStep L1 X 5' BLE only (seat 5-6)    Pt education:   -compression stockings, swelling (water intake, light elevation to 30 deg supine) -ankle pumps 10-20x, 2x/ day  -pt should contact surgeon's office again/ possibly PCP- to get examined for swelling  -s/s DVT: ER if noted  -shoe wear  ^handout provided    HEP review:  -SKTC 5 X 10\" ea (contralateral LE bent)  -supine figure-4 piriformis stretch foot on ground 5 X 10\" ea Nu step L3, 6 mins  Calf stretches, lowest setting 3x 30 secs  Seated lumbar flexion x 10  SKTC 5 x 10   Supine piriformis stretches (ER/IR) 5x 10 secs each  Supine TA brace x 10 with 5 secs  Seated TA brace x 10 with 5 secs   NuStep L3 X 6' BLE only  Supine:  -Hooklying submax (<25%) hip add whit yellow ball 5\" 2 X 10  -Hooklying hip abd whit Yellow->Green TB 5\" 2 X 10 (Green TB dispensed for home)  -B hip flex submax (<25%) whit w/ BLE on SB 5\" X 10  -supine figure-4 piriformis stretch foot on ground 5 X 10\" ea  Standing:  -alt B heel-toe raise w/ HHA 2 X 10  -3-way hip w/ HHA X 10 ea R/L  -slantboard gastroc stretch heels on ground 5 X 20\" B  Pt education: normal DOMS NuStep L3 X 6' BLE only   Supine:   -Hooklying submax (<25%) hip add whit yellow ball 5\" 2 X 10   -Hooklying hip abd whit Green TB 5\" 2 X 10 (Green TB dispensed for home)   -B hip flex submax (<25%) whit w/ BLE on SB 5\" X 10   -supine figure-4  piriformis stretch foot on ground 5 X 10\" ea       -slantboard gastroc stretch heels on ground 5 X 20\" B      Neuro Re-Education  Had patient demonstrate proper bending in a more stable stance, with TA activation and moving to a more tolerable range,and also used mat table as gauge to amount of bending that she can start with.   Reviewed ankle motions and exercises, increasing awareness for isolating ankle movements instead of movements from the hips.  Bending and lifting 5# DB with mat at lowest height, cues for TA activation and standing back up straight  Standing:   -alt B heel-toe raise w/ HHA 2 X 10   -3-way hip w/ HHA X 10 ea R/L     Theraband rows and extensions x 10, red TB - tactile cues for scapular retractions    Discussed about importance of doing heel toe exercises in standing to facilitate fransisco DF/PF needed for gait   Therapeutic Exercise Minutes 42 25 41 30   Neuro Re-Educ Minutes  20  15   Total Time Of Timed Procedures 42 45 41 45   Total Time Of Service-Based Procedures 0 0 0 0   Total Treatment Time 42 45 41 45   HEP  Access Code: [email or web address]  URL: [email or web address]  Date: 04/17/2025  Prepared by: Felicia Harris    Exercises  - Supine Single Knee to Chest Stretch  - 2 x daily - 7 x weekly - 5 reps - 10 hold  - Supine Figure 4 Piriformis Stretch  - 2 x daily - 7 x weekly - 1 sets - 5 reps - 10 hold  - Supine Piriformis Stretch with Foot on Ground  - 2 x daily - 7 x weekly - 1 sets - 5 reps - 10 hold  - Seated Flexion Stretch  - 2 x daily - 7 x weekly - 1 sets - 10 reps  - Supine Transversus Abdominis Bracing - Hands on Ground  - 2 x daily - 7 x weekly - 1-2 sets - 10 reps - 5 hold  - Seated Transversus Abdominis Bracing  - 2 x daily - 7 x weekly - 1 sets - 10 reps - 5 hold  - Seated Ankle Inversion Eversion AROM  - 2 x daily - 7 x weekly - 1-2 sets - 10 reps  - Seated Ankle Alphabet  - 2 x daily - 7 x weekly - 1 sets - 1 reps Access Code: [email or web address]  URL:  [email or web address]  Date: 04/22/2025  Prepared by: Roopa Mena    Exercises  - Supine Single Knee to Chest Stretch  - 2 x daily - 7 x weekly - 5 reps - 10 hold  - Supine Figure 4 Piriformis Stretch  - 2 x daily - 7 x weekly - 1 sets - 5 reps - 10 hold  - Supine Piriformis Stretch with Foot on Ground  - 2 x daily - 7 x weekly - 1 sets - 5 reps - 10 hold  - Seated Flexion Stretch  - 2 x daily - 7 x weekly - 1 sets - 10 reps  - Supine Transversus Abdominis Bracing - Hands on Ground  - 2 x daily - 7 x weekly - 1-2 sets - 10 reps - 5 hold  - Seated Transversus Abdominis Bracing  - 2 x daily - 7 x weekly - 1 sets - 10 reps - 5 hold  - Seated Ankle Inversion Eversion AROM  - 2 x daily - 7 x weekly - 1-2 sets - 10 reps  - Seated Ankle Alphabet  - 2 x daily - 7 x weekly - 1 sets - 1 reps  - Supine Hip Adduction Isometric with Ball  - 1 x daily - 3-4 x weekly - 2 sets - 10 reps - 5 sec hold  - Hooklying Clamshell with Resistance  - 1 x daily - 3-4 x weekly - 2 sets - 10 reps - 5 sec hold  - Heel Toe Raises with Counter Support  - 1 x daily - 3-4 x weekly - 1-2 sets - 10 reps - 1 sec hold Access Code: [email or web address]  URL: [email or web address]  Date: 04/24/2025  Prepared by: Felicia Harris    Exercises  - Supine Single Knee to Chest Stretch  - 2 x daily - 7 x weekly - 5 reps - 10 hold  - Supine Figure 4 Piriformis Stretch  - 2 x daily - 7 x weekly - 1 sets - 5 reps - 10 hold  - Supine Piriformis Stretch with Foot on Ground  - 2 x daily - 7 x weekly - 1 sets - 5 reps - 10 hold  - Seated Flexion Stretch  - 2 x daily - 7 x weekly - 1 sets - 10 reps  - Supine Transversus Abdominis Bracing - Hands on Ground  - 2 x daily - 7 x weekly - 1-2 sets - 10 reps - 5 hold  - Seated Transversus Abdominis Bracing  - 2 x daily - 7 x weekly - 1 sets - 10 reps - 5 hold  - Seated Ankle Inversion Eversion AROM  - 2 x daily - 7 x weekly - 1-2 sets - 10 reps  - Seated Ankle Alphabet  - 2 x daily - 7 x weekly - 1 sets  - 1 reps  - Supine Hip Adduction Isometric with Ball  - 1 x daily - 3-4 x weekly - 2 sets - 10 reps - 5 sec hold  - Hooklying Clamshell with Resistance  - 1 x daily - 3-4 x weekly - 2 sets - 10 reps - 5 sec hold  - Heel Toe Raises with Counter Support  - 1 x daily - 3-4 x weekly - 1-2 sets - 10 reps - 1 sec hold  - Standing Row with Anchored Resistance  - 1 x daily - 7 x weekly - 1 sets - 10 reps  - Shoulder extension with resistance - Neutral  - 1 x daily - 7 x weekly - 1 sets - 10 reps        HEP  Access Code: [email or web address]  URL: [email or web address]  Date: 04/24/2025  Prepared by: Felicia Harris    Exercises  - Supine Single Knee to Chest Stretch  - 2 x daily - 7 x weekly - 5 reps - 10 hold  - Supine Figure 4 Piriformis Stretch  - 2 x daily - 7 x weekly - 1 sets - 5 reps - 10 hold  - Supine Piriformis Stretch with Foot on Ground  - 2 x daily - 7 x weekly - 1 sets - 5 reps - 10 hold  - Seated Flexion Stretch  - 2 x daily - 7 x weekly - 1 sets - 10 reps  - Supine Transversus Abdominis Bracing - Hands on Ground  - 2 x daily - 7 x weekly - 1-2 sets - 10 reps - 5 hold  - Seated Transversus Abdominis Bracing  - 2 x daily - 7 x weekly - 1 sets - 10 reps - 5 hold  - Seated Ankle Inversion Eversion AROM  - 2 x daily - 7 x weekly - 1-2 sets - 10 reps  - Seated Ankle Alphabet  - 2 x daily - 7 x weekly - 1 sets - 1 reps  - Supine Hip Adduction Isometric with Ball  - 1 x daily - 3-4 x weekly - 2 sets - 10 reps - 5 sec hold  - Hooklying Clamshell with Resistance  - 1 x daily - 3-4 x weekly - 2 sets - 10 reps - 5 sec hold  - Heel Toe Raises with Counter Support  - 1 x daily - 3-4 x weekly - 1-2 sets - 10 reps - 1 sec hold  - Standing Row with Anchored Resistance  - 1 x daily - 7 x weekly - 1 sets - 10 reps  - Shoulder extension with resistance - Neutral  - 1 x daily - 7 x weekly - 1 sets - 10 reps    Charges  Thera ex -2, NMR -1

## 2025-04-25 ENCOUNTER — TELEPHONE (OUTPATIENT)
Dept: FAMILY MEDICINE CLINIC | Facility: CLINIC | Age: 65
End: 2025-04-25

## 2025-04-25 NOTE — TELEPHONE ENCOUNTER
Patient calling, PCP recommended OTC Flonase during visit yesterday. Patient has prescription for Azelastine HCL, was wondering if this can substitute for Flonase. Patient willing to still buy OTC, just wanted to check with doctor first.

## 2025-04-29 ENCOUNTER — PATIENT MESSAGE (OUTPATIENT)
Dept: FAMILY MEDICINE CLINIC | Facility: CLINIC | Age: 65
End: 2025-04-29

## 2025-04-29 ENCOUNTER — OFFICE VISIT (OUTPATIENT)
Dept: PHYSICAL THERAPY | Age: 65
End: 2025-04-29
Attending: NURSE PRACTITIONER
Payer: MEDICARE

## 2025-04-29 PROCEDURE — 97110 THERAPEUTIC EXERCISES: CPT | Performed by: PHYSICAL THERAPIST

## 2025-04-29 NOTE — PROGRESS NOTES
Patient: Cb Webster (64 year old, female) Referring Provider:  Insurance:   Diagnosis: Arthrodesis status (Z98.1), S/P L4-5 MIS TLIF Mini BRUCE   Date of Surgery: 02/05/2025 Next MD visit:  N/A   Precautions:  -- (Cognitive impairment and slow with processing) PCP (Dr. Schaefer) 4/24 Referral Information:    Date of Evaluation: Req: 0, Auth: 0, Exp:     04/10/25 POC Auth Visits:  12       Today's Date   4/29/2025    Subjective  Pt reports feeling pretty well just some muscle soreness not high level of pain.       Pain: 2/10     Objective  see flowsheet for details       Assessment  Able to cont with WB progressions without increasing pain levels. Provided verbal cueing & demo with new pilates ring walk & squeeze in order to facilitate proper coordination of stepping & ring squeeze with hands. Pt was able to replicate proper form upon cueing by PT. Blue TheraBand dispensed for home to reflect progressions. Deferred HEP update today, though will cont to address at future visits.    Goals (to be met in 12 visits)      Not Met Progress  Toward Partially  Met Met   Pt will improve transversus abdominis recruitment to perform proper isometric contraction without requiring verbal or tactile cuing to promote advancement of therex, improvement of core muscles strength to promote lumbar stability with activities that include bending, walking, reaching, transfers and increased LE activity [] [] [] []   Pt will demonstrate good understanding of proper posture and body mechanics to decrease pain and improve spinal safety [] [] [] []   Pt will have improvement of muscles flexibility to improve lumbar spine and L hip to WNL  to allow increase ease with movement and improve positional tolerance [] [] [] []   Pt will have decreased paraspinal mm tension to tolerate sitting, standing and walking unrestricted   [] [] [] []   Pt will demonstrate improved core strength, hips and abdominal muscles strength to 4/5  and improve kinsesthetic and proprioceptive awareness to be able to decrease pain to <2/10 with sitting, standing, walking, bending, carrying and lifting, transfers and LE dressing [] [] [] []   Pt will be independent and compliant with comprehensive HEP to maintain progress achieved in PT, and be able to utilize HEP to manage future exacerbations    [] [] [] []            Plan  May add Theraband resistance for standing hip 3-way & for sidestepping/ monster walk.    Treatment Last 4 Visits  Treatment Day: 6 4/17/2025 4/22/2025 4/24/2025 4/29/2025   Spine Treatment   Therapeutic Exercise Nu step L3, 6 mins  Calf stretches, lowest setting 3x 30 secs  Seated lumbar flexion x 10  SKTC 5 x 10   Supine piriformis stretches (ER/IR) 5x 10 secs each  Supine TA brace x 10 with 5 secs  Seated TA brace x 10 with 5 secs   NuStep L3 X 6' BLE only  Supine:  -Hooklying submax (<25%) hip add whit yellow ball 5\" 2 X 10  -Hooklying hip abd whit Yellow->Green TB 5\" 2 X 10 (Green TB dispensed for home)  -B hip flex submax (<25%) whit w/ BLE on SB 5\" X 10  -supine figure-4 piriformis stretch foot on ground 5 X 10\" ea  Standing:  -alt B heel-toe raise w/ HHA 2 X 10  -3-way hip w/ HHA X 10 ea R/L  -slantboard gastroc stretch heels on ground 5 X 20\" B  Pt education: normal DOMS NuStep L3 X 6' BLE only   Supine:   -Hooklying submax (<25%) hip add whit yellow ball 5\" 2 X 10   -Hooklying hip abd whit Green TB 5\" 2 X 10 (Green TB dispensed for home)   -B hip flex submax (<25%) whit w/ BLE on SB 5\" X 10   -supine figure-4 piriformis stretch foot on ground 5 X 10\" ea       -slantboard gastroc stretch heels on ground 5 X 20\" B    NuStep L3 X 9' BLE only  Demo how to properly utilize timer for keeping track for home  Seated:  -Seated lumbar flexion X 10 w/ SB  Standing:  -slantboard gastroc & soleus stretch heels on ground 5 X 20\" B ea  -3-way hip w/ HHA 2 X 10 ea R/L  -alt LE march w/ HHA 2 X 10 R/L  -mini squat to 45 deg w/ hip hinge & HHA  X 10  -sidestepping (lat walk) in //bars X 4 lap no HHA  -monster walk (fwd only) in //bars X 4 lap no HHA  -Pilates ring (green) walk & squeeze (small IR pulse) for multifidi: 20 ft X 8    Neuro Re-Education Had patient demonstrate proper bending in a more stable stance, with TA activation and moving to a more tolerable range,and also used mat table as gauge to amount of bending that she can start with.   Reviewed ankle motions and exercises, increasing awareness for isolating ankle movements instead of movements from the hips.  Bending and lifting 5# DB with mat at lowest height, cues for TA activation and standing back up straight  Standing:   -alt B heel-toe raise w/ HHA 2 X 10   -3-way hip w/ HHA X 10 ea R/L     Theraband rows and extensions x 10, red TB - tactile cues for scapular retractions    Discussed about importance of doing heel toe exercises in standing to facilitate fransisco DF/PF needed for gait    Therapeutic Exercise Minutes 25 41 30 44   Neuro Re-Educ Minutes 20  15    Total Time Of Timed Procedures 45 41 45 44   Total Time Of Service-Based Procedures 0 0 0 0   Total Treatment Time 45 41 45 44   HEP Access Code: [email or web address]  URL: [email or web address]  Date: 04/17/2025  Prepared by: Felicia Harris    Exercises  - Supine Single Knee to Chest Stretch  - 2 x daily - 7 x weekly - 5 reps - 10 hold  - Supine Figure 4 Piriformis Stretch  - 2 x daily - 7 x weekly - 1 sets - 5 reps - 10 hold  - Supine Piriformis Stretch with Foot on Ground  - 2 x daily - 7 x weekly - 1 sets - 5 reps - 10 hold  - Seated Flexion Stretch  - 2 x daily - 7 x weekly - 1 sets - 10 reps  - Supine Transversus Abdominis Bracing - Hands on Ground  - 2 x daily - 7 x weekly - 1-2 sets - 10 reps - 5 hold  - Seated Transversus Abdominis Bracing  - 2 x daily - 7 x weekly - 1 sets - 10 reps - 5 hold  - Seated Ankle Inversion Eversion AROM  - 2 x daily - 7 x weekly - 1-2 sets - 10 reps  - Seated Ankle Alphabet  - 2 x daily - 7 x  weekly - 1 sets - 1 reps Access Code: [email or web address]  URL: [email or web address]  Date: 04/22/2025  Prepared by: Roopa Mena    Exercises  - Supine Single Knee to Chest Stretch  - 2 x daily - 7 x weekly - 5 reps - 10 hold  - Supine Figure 4 Piriformis Stretch  - 2 x daily - 7 x weekly - 1 sets - 5 reps - 10 hold  - Supine Piriformis Stretch with Foot on Ground  - 2 x daily - 7 x weekly - 1 sets - 5 reps - 10 hold  - Seated Flexion Stretch  - 2 x daily - 7 x weekly - 1 sets - 10 reps  - Supine Transversus Abdominis Bracing - Hands on Ground  - 2 x daily - 7 x weekly - 1-2 sets - 10 reps - 5 hold  - Seated Transversus Abdominis Bracing  - 2 x daily - 7 x weekly - 1 sets - 10 reps - 5 hold  - Seated Ankle Inversion Eversion AROM  - 2 x daily - 7 x weekly - 1-2 sets - 10 reps  - Seated Ankle Alphabet  - 2 x daily - 7 x weekly - 1 sets - 1 reps  - Supine Hip Adduction Isometric with Ball  - 1 x daily - 3-4 x weekly - 2 sets - 10 reps - 5 sec hold  - Hooklying Clamshell with Resistance  - 1 x daily - 3-4 x weekly - 2 sets - 10 reps - 5 sec hold  - Heel Toe Raises with Counter Support  - 1 x daily - 3-4 x weekly - 1-2 sets - 10 reps - 1 sec hold Access Code: [email or web address]  URL: [email or web address]  Date: 04/24/2025  Prepared by: Felicia Harris    Exercises  - Supine Single Knee to Chest Stretch  - 2 x daily - 7 x weekly - 5 reps - 10 hold  - Supine Figure 4 Piriformis Stretch  - 2 x daily - 7 x weekly - 1 sets - 5 reps - 10 hold  - Supine Piriformis Stretch with Foot on Ground  - 2 x daily - 7 x weekly - 1 sets - 5 reps - 10 hold  - Seated Flexion Stretch  - 2 x daily - 7 x weekly - 1 sets - 10 reps  - Supine Transversus Abdominis Bracing - Hands on Ground  - 2 x daily - 7 x weekly - 1-2 sets - 10 reps - 5 hold  - Seated Transversus Abdominis Bracing  - 2 x daily - 7 x weekly - 1 sets - 10 reps - 5 hold  - Seated Ankle Inversion Eversion AROM  - 2 x daily - 7 x weekly - 1-2 sets - 10 reps  -  Seated Ankle Alphabet  - 2 x daily - 7 x weekly - 1 sets - 1 reps  - Supine Hip Adduction Isometric with Ball  - 1 x daily - 3-4 x weekly - 2 sets - 10 reps - 5 sec hold  - Hooklying Clamshell with Resistance  - 1 x daily - 3-4 x weekly - 2 sets - 10 reps - 5 sec hold  - Heel Toe Raises with Counter Support  - 1 x daily - 3-4 x weekly - 1-2 sets - 10 reps - 1 sec hold  - Standing Row with Anchored Resistance  - 1 x daily - 7 x weekly - 1 sets - 10 reps  - Shoulder extension with resistance - Neutral  - 1 x daily - 7 x weekly - 1 sets - 10 reps         HEP  Access Code: [email or web address]  URL: [email or web address]  Date: 04/24/2025  Prepared by: Felicia Harris    Exercises  - Supine Single Knee to Chest Stretch  - 2 x daily - 7 x weekly - 5 reps - 10 hold  - Supine Figure 4 Piriformis Stretch  - 2 x daily - 7 x weekly - 1 sets - 5 reps - 10 hold  - Supine Piriformis Stretch with Foot on Ground  - 2 x daily - 7 x weekly - 1 sets - 5 reps - 10 hold  - Seated Flexion Stretch  - 2 x daily - 7 x weekly - 1 sets - 10 reps  - Supine Transversus Abdominis Bracing - Hands on Ground  - 2 x daily - 7 x weekly - 1-2 sets - 10 reps - 5 hold  - Seated Transversus Abdominis Bracing  - 2 x daily - 7 x weekly - 1 sets - 10 reps - 5 hold  - Seated Ankle Inversion Eversion AROM  - 2 x daily - 7 x weekly - 1-2 sets - 10 reps  - Seated Ankle Alphabet  - 2 x daily - 7 x weekly - 1 sets - 1 reps  - Supine Hip Adduction Isometric with Ball  - 1 x daily - 3-4 x weekly - 2 sets - 10 reps - 5 sec hold  - Hooklying Clamshell with Resistance  - 1 x daily - 3-4 x weekly - 2 sets - 10 reps - 5 sec hold  - Heel Toe Raises with Counter Support  - 1 x daily - 3-4 x weekly - 1-2 sets - 10 reps - 1 sec hold  - Standing Row with Anchored Resistance  - 1 x daily - 7 x weekly - 1 sets - 10 reps  - Shoulder extension with resistance - Neutral  - 1 x daily - 7 x weekly - 1 sets - 10 reps    Charges  3TE

## 2025-04-30 ENCOUNTER — HOSPITAL ENCOUNTER (OUTPATIENT)
Dept: GENERAL RADIOLOGY | Age: 65
Discharge: HOME | End: 2025-04-30
Attending: STUDENT IN AN ORGANIZED HEALTH CARE EDUCATION/TRAINING PROGRAM
Payer: MEDICARE

## 2025-04-30 DIAGNOSIS — Z98.1 ARTHRODESIS STATUS: ICD-10-CM

## 2025-04-30 DIAGNOSIS — Z98.1 ARTHRODESIS STATUS: Primary | ICD-10-CM

## 2025-04-30 PROCEDURE — 72110 X-RAY EXAM L-2 SPINE 4/>VWS: CPT | Performed by: STUDENT IN AN ORGANIZED HEALTH CARE EDUCATION/TRAINING PROGRAM

## 2025-05-01 ENCOUNTER — OFFICE VISIT (OUTPATIENT)
Dept: ORTHOPEDICS CLINIC | Facility: CLINIC | Age: 65
End: 2025-05-01
Payer: MEDICARE

## 2025-05-01 ENCOUNTER — OFFICE VISIT (OUTPATIENT)
Dept: PHYSICAL THERAPY | Age: 65
End: 2025-05-01
Attending: NURSE PRACTITIONER
Payer: MEDICARE

## 2025-05-01 VITALS — HEIGHT: 61 IN | WEIGHT: 180 LBS | BODY MASS INDEX: 33.99 KG/M2

## 2025-05-01 DIAGNOSIS — Z98.1 STATUS POST LUMBAR SPINAL FUSION: Primary | ICD-10-CM

## 2025-05-01 PROCEDURE — 97140 MANUAL THERAPY 1/> REGIONS: CPT | Performed by: PHYSICAL THERAPIST

## 2025-05-01 PROCEDURE — 97112 NEUROMUSCULAR REEDUCATION: CPT | Performed by: PHYSICAL THERAPIST

## 2025-05-01 PROCEDURE — 97110 THERAPEUTIC EXERCISES: CPT | Performed by: PHYSICAL THERAPIST

## 2025-05-01 PROCEDURE — 99024 POSTOP FOLLOW-UP VISIT: CPT | Performed by: STUDENT IN AN ORGANIZED HEALTH CARE EDUCATION/TRAINING PROGRAM

## 2025-05-01 NOTE — PROGRESS NOTES
[facility] - ORTHOPEDICS  [address], [address] [address]  [address] [place] [address]  [phone]     FOLLOW-UP PATIENT VISIT    Name: Cb Webster   MRN: [id number]  Date: 5/1/2025     CC: 3 month s/p L4-5 MIS TLIF      INTERVAL HISTORY:   Cb Webster is a 64 year old female  follow-up patient 3 months s/p L4-5 MIS TLIF.     Patient has done well since surgery with resolution of radiculopathy.  Patient has been working with physical therapy.  Progress has been limited by knee swelling and pain as well as ankle pain.  Patient has appointment with Dr. Blank coming up.    Bowel and bladder symptoms: absent.    The patient has not had issues with balance and/or hand dexterity problems such as changes in penmanship or the use of buttons or zippers.    We have tried the following interventions thus far: PT    ROS: No fever/chills or other constitutional issues.    PE:   Vitals:    05/01/25 0803   Weight: 180 lb (81.6 kg)   Height: 5' 1\" (1.549 m)     Estimated body mass index is 34.01 kg/m² as calculated from the following:    Height as of this encounter: 5' 1\" (1.549 m).    Weight as of this encounter: 180 lb (81.6 kg).    On physical examination, she is awake, alert and oriented x 3 and in no acute distress. Mood, affect and language are normal. She appears well developed and well nourished.  She walks without a nonantalgic, nonmyelopathic, non-Trendelenburg gait. Motor strength testing of the lower extremities shows 5/5 strength in hip flexors, knee extensors, ankle dorsiflexors, toe extensor, and gastroc-soleus complex.   Sensation is intact to light touch L2-S1 distributions bilaterally. Reflexes normal.     Radiographic Examination/Diagnostics:  XR personally viewed, independently interpreted and radiology report was reviewed.  X-ray of the lumbar spine demonstrates intact hardware      IMPRESSION: Cb Webster is a 64 year old female  3-month status post L4-5 MIS TLIF. Doing ell    PLAN:   - Continue PT  - Follow up in 3 months    FOLLOW-UP:  We will see her back in follow-up in 3 months, or sooner if any problems arise. Patient understands and agrees with plan.    Ismael Perez MD  Orthopedic Spine Surgeon  [facility]   [address] [place] [address].[facility]  [email or web address]  t: [phone]   f: [phone]        This note was dictated using Dragon software.  While it was briefly proofread prior to completion, some grammatical, spelling, and word choice errors due to dictation may still occur.

## 2025-05-02 ENCOUNTER — TELEPHONE (OUTPATIENT)
Dept: ORTHOPEDICS CLINIC | Facility: CLINIC | Age: 65
End: 2025-05-02

## 2025-05-02 DIAGNOSIS — M25.562 LEFT KNEE PAIN, UNSPECIFIED CHRONICITY: ICD-10-CM

## 2025-05-02 DIAGNOSIS — M25.561 RIGHT KNEE PAIN, UNSPECIFIED CHRONICITY: Primary | ICD-10-CM

## 2025-05-02 NOTE — TELEPHONE ENCOUNTER
XR ordered per ortho protocol. XR scheduled and patient was notified via [employer] to let them know that they should arrive 15-20 minutes early, in order for them to complete imaging.

## 2025-05-06 ENCOUNTER — OFFICE VISIT (OUTPATIENT)
Dept: PHYSICAL THERAPY | Age: 65
End: 2025-05-06
Attending: NURSE PRACTITIONER
Payer: MEDICARE

## 2025-05-06 PROCEDURE — 97110 THERAPEUTIC EXERCISES: CPT | Performed by: PHYSICAL THERAPIST

## 2025-05-06 PROCEDURE — 97140 MANUAL THERAPY 1/> REGIONS: CPT | Performed by: PHYSICAL THERAPIST

## 2025-05-06 NOTE — PROGRESS NOTES
Patient: Cb Webster (64 year old, female) Referring Provider:  Insurance:   Diagnosis: Arthrodesis status (Z98.1), S/P L4-5 MIS TLIF Mini BRUCE   Date of Surgery: 02/05/2025 Next MD visit:  N/A   Precautions:  -- (Cognitive impairment and slow with processing)) No data recorded Referral Information:    Date of Evaluation: Req: 0, Auth: 0, Exp:     No data recorded POC Auth Visits:          Today's Date   5/6/2025    Subjective  Pt reports feeling a little worn out, had follow up w/ surgeon, happy where I am; has appt w/ ortho tomorrow; feels she was overdoing it w/ exercise so has modified it; icing up to 3x/ day. Pt reports limited w/ reaching something higher- describes reaching out & up a challenge. Does report hx something nonop R RC in the past. Pt reports limitations w/ bending down for tieing shoes (wearing [employer]), lifting, getting in/ out of car. Though better able to hold in ab muscles w/ walking & exercise.       Pain: 3/10 (3/10 behind R knee, 2/10 low back)    Objective  mild STRs B lumbar paraspinals & QL; scars healing WNL; B shld flex MMT 5/5       Assessment  Addressing pt's current functional limitations with strengthening exercise: working with ability to get in/ put of car (standing hip flex w/ ER). reaching up (sport cord shld flex & SB wall walkup). Pt reported more diffuse discomfort (felt better) post manual therapy. Reviewed normalcy for soreness from TX.    Goals (to be met in 12 visits)      Not Met Progress  Toward Partially  Met Met   Pt will improve transversus abdominis recruitment to perform proper isometric contraction without requiring verbal or tactile cuing to promote advancement of therex, improvement of core muscles strength to promote lumbar stability with activities that include bending, walking, reaching, transfers and increased LE activity [] [] [] []   Pt will demonstrate good understanding of proper posture and body mechanics to decrease pain and  improve spinal safety [] [] [] []   Pt will have improvement of muscles flexibility to improve lumbar spine and L hip to WNL  to allow increase ease with movement and improve positional tolerance [] [] [] []   Pt will have decreased paraspinal mm tension to tolerate sitting, standing and walking unrestricted   [] [] [] []   Pt will demonstrate improved core strength, hips and abdominal muscles strength to 4/5 and improve kinsesthetic and proprioceptive awareness to be able to decrease pain to <2/10 with sitting, standing, walking, bending, carrying and lifting, transfers and LE dressing [] [] [] []   Pt will be independent and compliant with comprehensive HEP to maintain progress achieved in PT, and be able to utilize HEP to manage future exacerbations    [] [] [] []              Plan  Cont to address lifting ability    Treatment Last 4 Visits  Treatment Day: 8       2025   Spine Treatment   Therapeutic Exercise NuStep L3 X 6' BLE only   Supine:   -Hooklying submax (<25%) hip add whit yellow ball 5\" 2 X 10   -Hooklying hip abd whit Green TB 5\" 2 X 10 (Green TB dispensed for home)   -B hip flex submax (<25%) whit w/ BLE on SB 5\" X 10   -supine figure-4 piriformis stretch foot on ground 5 X 10\" ea       -slantboard gastroc stretch heels on ground 5 X 20\" B    NuStep L3 X 9' BLE only  Demo how to properly utilize timer for keeping track for home  Seated:  -Seated lumbar flexion X 10 w/ SB  Standing:  -slantboard gastroc & soleus stretch heels on ground 5 X 20\" B ea  -3-way hip w/ HHA 2 X 10 ea R/L  -alt LE march w/ HHA 2 X 10 R/L  -mini squat to 45 deg w/ hip hinge & HHA X 10  -sidestepping (lat walk) in //bars X 4 lap no HHA  -monster walk (fwd only) in //bars X 4 lap no HHA  -Pilates ring (green) walk & squeeze (small IR pulse) for multifidi: 20 ft X 8  NuStep L5 X 2' BLE only, then  the resistance to L3 for 5 mins, pt stated it was too much resistance  Standing:   -slantboard  gastroc & soleus stretch heels on ground 5 X 20\" B ea   -3-way hip w/ HHA 2 X 10 ea R/L, yellow TB   -alt LE march no HHA 2 X 10 R/L , control   -mini squat to 45 deg w/ hip hinge & HHA X 10   -sidestepping (lat walk) in //bars X 4 rounds no HHA   -monster walk (fwd only) in //bars X 4  rounds no HHA    NuStep L3 up to L4 (starting at 3') X 6' BLE only  Standing:   -slantboard gastroc & soleus stretch heels on ground 3 X 30\" B ea  -sidestepping (lat walk) in //bars X 4 rounds no HHA w/ YTB  -monster walk (fwd only) in //bars X 4 rounds no HHA w/ YTB  -3-way hip w/ HHA 2 X 10 ea R/L, YTB  -hip flex w/ ER no resistance (similar to standing clam) 2 X 5 R/L, w/ HHA  -SB walk up/ roll up wall X 20  -sport cord shld flex (band anchored low posterior), X 15 R/L   Neuro Re-Education Bending and lifting 5# DB with mat at lowest height, cues for TA activation and standing back up straight  Standing:   -alt B heel-toe raise w/ HHA 2 X 10   -3-way hip w/ HHA X 10 ea R/L     Theraband rows and extensions x 10, red TB - tactile cues for scapular retractions    Discussed about importance of doing heel toe exercises in standing to facilitate fransisco DF/PF needed for gait  Bending and lifting 5# DB with mat at lowest height, cues for TA activation and standing back up straight, and scapular retractions 2x 10,    Reaching forward with 2 kb ball x 10, cues for TA activation      Theraband rows and extensions 2x 10, red TB - tactile cues for scapular retractions     Discussed about exercises that facilitate functional movements, including bending, lifting, LE dressing, reaching      Manual Therapy   STM for paralumbars to decrease muscles tightness, improve soft tissues mobility STM (w/ Free Up) B lumbar paraspinals, QL, & surgical scars prone over 1 pillow, concurrent w/ pain science education   Therapeutic Exercise Minutes 30 44 20 34   Neuro Re-Educ Minutes 15  15    Manual Therapy Minutes   10 12   Total Time Of Timed Procedures 45 44  45 46   Total Time Of Service-Based Procedures 0 0 0 0   Total Treatment Time 45 44 45 46   HEP Access Code: [email or web address]  URL: [email or web address]  Date: 04/24/2025  Prepared by: Felicia Harris    Exercises  - Supine Single Knee to Chest Stretch  - 2 x daily - 7 x weekly - 5 reps - 10 hold  - Supine Figure 4 Piriformis Stretch  - 2 x daily - 7 x weekly - 1 sets - 5 reps - 10 hold  - Supine Piriformis Stretch with Foot on Ground  - 2 x daily - 7 x weekly - 1 sets - 5 reps - 10 hold  - Seated Flexion Stretch  - 2 x daily - 7 x weekly - 1 sets - 10 reps  - Supine Transversus Abdominis Bracing - Hands on Ground  - 2 x daily - 7 x weekly - 1-2 sets - 10 reps - 5 hold  - Seated Transversus Abdominis Bracing  - 2 x daily - 7 x weekly - 1 sets - 10 reps - 5 hold  - Seated Ankle Inversion Eversion AROM  - 2 x daily - 7 x weekly - 1-2 sets - 10 reps  - Seated Ankle Alphabet  - 2 x daily - 7 x weekly - 1 sets - 1 reps  - Supine Hip Adduction Isometric with Ball  - 1 x daily - 3-4 x weekly - 2 sets - 10 reps - 5 sec hold  - Hooklying Clamshell with Resistance  - 1 x daily - 3-4 x weekly - 2 sets - 10 reps - 5 sec hold  - Heel Toe Raises with Counter Support  - 1 x daily - 3-4 x weekly - 1-2 sets - 10 reps - 1 sec hold  - Standing Row with Anchored Resistance  - 1 x daily - 7 x weekly - 1 sets - 10 reps  - Shoulder extension with resistance - Neutral  - 1 x daily - 7 x weekly - 1 sets - 10 reps           HEP  Access Code: [email or web address]  URL: [email or web address]  Date: 04/24/2025  Prepared by: Felicia Harris    Exercises  - Supine Single Knee to Chest Stretch  - 2 x daily - 7 x weekly - 5 reps - 10 hold  - Supine Figure 4 Piriformis Stretch  - 2 x daily - 7 x weekly - 1 sets - 5 reps - 10 hold  - Supine Piriformis Stretch with Foot on Ground  - 2 x daily - 7 x weekly - 1 sets - 5 reps - 10 hold  - Seated Flexion Stretch  - 2 x daily - 7 x weekly - 1 sets - 10 reps  - Supine Transversus Abdominis  Bracing - Hands on Ground  - 2 x daily - 7 x weekly - 1-2 sets - 10 reps - 5 hold  - Seated Transversus Abdominis Bracing  - 2 x daily - 7 x weekly - 1 sets - 10 reps - 5 hold  - Seated Ankle Inversion Eversion AROM  - 2 x daily - 7 x weekly - 1-2 sets - 10 reps  - Seated Ankle Alphabet  - 2 x daily - 7 x weekly - 1 sets - 1 reps  - Supine Hip Adduction Isometric with Ball  - 1 x daily - 3-4 x weekly - 2 sets - 10 reps - 5 sec hold  - Hooklying Clamshell with Resistance  - 1 x daily - 3-4 x weekly - 2 sets - 10 reps - 5 sec hold  - Heel Toe Raises with Counter Support  - 1 x daily - 3-4 x weekly - 1-2 sets - 10 reps - 1 sec hold  - Standing Row with Anchored Resistance  - 1 x daily - 7 x weekly - 1 sets - 10 reps  - Shoulder extension with resistance - Neutral  - 1 x daily - 7 x weekly - 1 sets - 10 reps    Charges  2TE, 1MT

## 2025-05-07 ENCOUNTER — HOSPITAL ENCOUNTER (OUTPATIENT)
Dept: GENERAL RADIOLOGY | Age: 65
Discharge: HOME | End: 2025-05-07
Attending: ORTHOPAEDIC SURGERY
Payer: MEDICARE

## 2025-05-07 ENCOUNTER — OFFICE VISIT (OUTPATIENT)
Dept: ORTHOPEDICS CLINIC | Facility: CLINIC | Age: 65
End: 2025-05-07
Payer: MEDICARE

## 2025-05-07 VITALS — WEIGHT: 180 LBS | BODY MASS INDEX: 33.99 KG/M2 | HEIGHT: 61 IN

## 2025-05-07 DIAGNOSIS — M25.562 LEFT KNEE PAIN, UNSPECIFIED CHRONICITY: ICD-10-CM

## 2025-05-07 DIAGNOSIS — M25.561 RIGHT KNEE PAIN, UNSPECIFIED CHRONICITY: ICD-10-CM

## 2025-05-07 DIAGNOSIS — M17.0 PRIMARY OSTEOARTHRITIS OF BOTH KNEES: Primary | ICD-10-CM

## 2025-05-07 PROCEDURE — 73564 X-RAY EXAM KNEE 4 OR MORE: CPT | Performed by: ORTHOPAEDIC SURGERY

## 2025-05-07 PROCEDURE — 99214 OFFICE O/P EST MOD 30 MIN: CPT | Performed by: ORTHOPAEDIC SURGERY

## 2025-05-07 NOTE — PROGRESS NOTES
[facility] Patient Note      Chief Complaint   Patient presents with    Follow - Up     BILATERAL KNEE  -Both knees hurt the same       HPI: The patient is a 64 year old female who presents with complaints of left greater than right leg heaviness with prolonged standing and walking.  She is enrolled in physical therapy after undergoing a lumbar spinal fusion by Dr. Ismael Perez approximately 12 weeks ago.  She is very satisfied with her progress and recovery but has been urged to continue with walking for exercise as part of her rehab.  He is experiencing anterior bilateral knee pain and generalized heaviness although her leg pain associated with her lumbar diagnosis is reportedly improved.  She also has a previous diagnosis of knee osteoarthritis and underwent a corticosteroid injection to the right knee provided by Mari in August 2024.  Overall her knees have been adequately controlled with conservative measures with no recent flareups.  She denies swelling, catching, locking about the knees although she has been recently diagnosed with lymphedema with some associated lower leg swelling.    Past Medical History[1]  Past Surgical History[2]  Current Medications[3]  Allergies[4]  Family History[5]  Social History     Occupational History    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[employer]   Tobacco Use    Smoking status: Never     Passive exposure: Never    Smokeless tobacco: Never   Vaping Use    Vaping status: Never Used   Substance and Sexual Activity    Alcohol use: Not Currently    Drug use: Never    Sexual activity: Not on file        ROS:  Complete ROS reviewed by me and non-contributory to the chief complaint except as mentioned above.    Physical Exam:    Ht 5' 1\" (1.549 m)   Wt 180 lb (81.6 kg)   LMP  (LMP Unknown)   BMI 34.01 kg/m²   Constitutional: Well developed, well nourished 64 year old female  Psychological: NAD, alert and  appropriate  Respiratory: Breathing comfortably on room air with RR of 10-14  Cardiac: Palpable distal pulses with pink warm extremities  Gait is nonantalgic.  Inspection reveals no discoloration, swelling or palpable effusion about either knee.  Bilateral patellofemoral crepitus and medial joint line tenderness is present.  She has full extension and about 105 degrees of flexion bilaterally.  Knees are stable with no palpable calf tenderness and negative Homans.  No obvious distal edema is notable today.  Neurovascular status is intact on sensory, motor and perfusion assessment distally.    Imaging: Multiple views right and left knees obtained today and personally viewed, demonstrating bilateral moderate osteoarthritic changes primarily at the medial and patellofemoral compartments with no acute bony abnormalities.  Arthritic changes are slightly worse on the left than the right.    Assessment/Diagnoses:  Diagnoses and all orders for this visit:    Primary osteoarthritis of both knees      Plan:  I reviewed imaging and exam findings with the patient.  The diagnosis is degenerative joint disease of the knees.  Symptoms are likely increasing associated with her better tolerance of standing and walking following a lumbar fusion.  This will be important to continue as part of her rehab.  Treatments include activity modification, weight loss, anti-inflammatory use and possible injections.  Perhaps she may benefit from crosstraining to the stationary bike or aqua exercise as she continues to rehab from her lumbar procedure.  Her arthritic changes are mild to moderate and there are no signs of synovitis or effusion.  We discussed the option for knee corticosteroid injection along with the potential risks, benefits and alternatives.  Given that her pain is fairly mild and her greater concern may be some heaviness or weakness I suggested ongoing conservative care.  She understands and agrees and will follow-up if she has  any objective changes in terms of increased swelling, stiffness or escalating pain.  She was encouraged to continue on her weight loss journey which will only protect her lumbar spine as well as her knees.  All questions were answered and she verbalized understanding and appreciation.  Follow-up with us as needed.      Carol Blank MD, FAAOS  Orthopaedic Surgery   Sports Medicine/Knee and Shoulder  [facility]  t: [phone]  f: [phone]           This document was partially prepared using Dragon Medical voice recognition software.  Although every attempt is made to correct errors during dictation, discrepancies may still exist.    Carol Blank MD  [facility]      This document was partially prepared using Dragon Medical voice recognition software.            [1]   Past Medical History:   Anxiety    Aortic regurgitation    mild    Back pain    Calculus of kidney    Cardiac calcification (HCC) - CAC score of 5.24 seen on 22 CT Heart Scan protocol    Colon polyps    Depression    Disorder of liver    Fatty liver    Diverticulosis    Gastroparesis    GERD (gastroesophageal reflux disease)    Hemorrhoids    High blood pressure    History of depression    Major depressive disorder & anxiety    History of eating disorder    Binge eating    Hyperlipidemia    Migraines    OCD (obsessive compulsive disorder)    MODE (obstructive sleep apnea)    Osteoarthritis    Parkinsonism (HCC)    Peptic ulcer disease    Stool incontinence    Soft barely formed stool, not detecting urgency    Tendonitis of shoulder, right   [2]   Past Surgical History:  Procedure Laterality Date    Back surgery  2025    L5 MIS TLIF W/ MACIE ELLIS MD          x 2    Cholecystectomy      Colonoscopy      D & c      Ect provided  [id number]    Egd      Laparoscopic cholecystectomy      Lumbar spine fusion combined  2025    Needle biopsy right  2015    benign breast     Other surgical history      C3-C4 anterior discectomy    Other surgical history  2018    [facility]    Other surgical history  09/09/2019    radiofrequency abalsion lumbar    Removal gallbladder  02/2020    Skin surgery      Spine surgery procedure unlisted  2008    Anterior cervical discectomy    Tonsillectomy  1966?   [3]   Current Outpatient Medications   Medication Sig Dispense Refill    ROSUVASTATIN 5 MG Oral Tab TAKE 1 TABLET BY MOUTH EVERY DAY AT NIGHT 90 tablet 0    MEMANTINE 10 MG Oral Tab TAKE 1 TABLET BY MOUTH EVERY DAY 90 tablet 0    methylphenidate ER 54 MG Oral Tab CR Take 1 tablet (54 mg total) by mouth every morning.      dicyclomine 10 MG Oral Cap Take 1 capsule (10 mg total) by mouth 2 (two) times daily.      busPIRone 15 MG Oral Tab Take 1 tablet (15 mg total) by mouth 2 (two) times daily.      FLUoxetine HCl 40 MG Oral Cap Take 1 capsule (40 mg total) by mouth daily.      pantoprazole 40 MG Oral Tab EC Take 1 tablet (40 mg total) by mouth before breakfast. 90 tablet 3    famotidine 40 MG Oral Tab Take 1 tablet (40 mg total) by mouth daily as needed for Heartburn. 90 tablet 3    SUMAtriptan Succinate 6 MG/0.5ML Subcutaneous Solution Auto-injector Inject 0.5 mL (6 mg total) into the skin as needed (for moderate to severe migraine). 6 mL 2    albuterol 108 (90 Base) MCG/ACT Inhalation Aero Soln Inhale 2 puffs into the lungs every 4 (four) hours as needed for Wheezing or Shortness of Breath. 6.7 g 0    traZODone 100 MG Oral Tab Take 1 tablet (100 mg total) by mouth nightly.      SPRAVATO, 84 MG DOSE, 28 MG/DEVICE Nasal Solution Therapy Pack 84 mg by Nasal route every 14 (fourteen) days.      LORazepam 2 MG Oral Tab Take 1 tablet (2 mg total) by mouth nightly as needed for Anxiety.      guaiFENesin-codeine 100-10 MG/5ML Oral Solution Take 5 mL by mouth every 6 (six) hours as needed. (Patient not taking: Reported on 5/7/2025) 118 mL 0   [4]   Allergies  Allergen Reactions    Sulfa Antibiotics NAUSEA  ONLY   [5]   Family History  Problem Relation Age of Onset    Hypertension Father     Heart Attack Father          at 48 years    Heart Disease Father     Alcohol and Other Disorders Associated Father     Depression Father     Other (ALS) Mother     Hypertension Mother         Diagnosed at 89 yo with ALS  at 91    Personality Disorder Daughter     Dementia Maternal Grandmother     Obesity Maternal Grandmother     Dementia Maternal Grandfather     Obesity Paternal Grandfather     Cancer Sister         Kidney, chronic lymp… leukemia    Heart Attack Sister         Kidney cancer, chronic lymphocytic leukemia    Heart Disease Sister         Pacemaker kicks in when pressure is low    Ulcerative Colitis Brother     Cancer Brother         Kidney

## 2025-05-08 ENCOUNTER — OFFICE VISIT (OUTPATIENT)
Dept: PHYSICAL THERAPY | Age: 65
End: 2025-05-08
Attending: NURSE PRACTITIONER
Payer: MEDICARE

## 2025-05-08 PROCEDURE — 97112 NEUROMUSCULAR REEDUCATION: CPT | Performed by: PHYSICAL THERAPIST

## 2025-05-08 PROCEDURE — 97110 THERAPEUTIC EXERCISES: CPT | Performed by: PHYSICAL THERAPIST

## 2025-05-08 NOTE — PROGRESS NOTES
Patient: Cb Webster (64 year old, female) Referring Provider:  Insurance:   Diagnosis: Arthrodesis status (Z98.1), S/P L4-5 MIS TLIF Mini Donatogler  TEDDYTNA TEO   Date of Surgery: 02/05/2025 Next MD visit:  N/A   Precautions:  -- (Cognitive impairment and slow with processing)) No data recorded Referral Information:    Date of Evaluation: Req: 0, Auth: 0, Exp:     No data recorded POC Auth Visits:          Today's Date   5/8/2025    Subjective  She tweaked her back yesterday as he was getting upfrom x -ray table, but not as bad. Saw ortho MD for her knees and was informed to back off the walking and go for water exercises or recumbent bike.       Pain: 3/10 (LB)     Objective  see flowsheet for details. TTP pn the R SIJ, R lateral gluteal and L QL.            Assessment  Patient is progressing well with PT, noted improving balance, decreasing need for UE support with dynamic strengthening exercises with resistance. Occasional cues provided for TA activation and proper respiration with exercises. Added new exercises to promote core strength and balance. Minimal soreness at end of session.    Goals (to be met in 12 visits)      Not Met Progress  Toward Partially  Met Met   Pt will improve transversus abdominis recruitment to perform proper isometric contraction without requiring verbal or tactile cuing to promote advancement of therex, improvement of core muscles strength to promote lumbar stability with activities that include bending, walking, reaching, transfers and increased LE activity [] [] [] []   Pt will demonstrate good understanding of proper posture and body mechanics to decrease pain and improve spinal safety [] [] [] []   Pt will have improvement of muscles flexibility to improve lumbar spine and L hip to WNL  to allow increase ease with movement and improve positional tolerance [] [] [] []   Pt will have decreased paraspinal mm tension to tolerate sitting, standing and walking unrestricted   []  [] [] []   Pt will demonstrate improved core strength, hips and abdominal muscles strength to 4/5 and improve kinsesthetic and proprioceptive awareness to be able to decrease pain to <2/10 with sitting, standing, walking, bending, carrying and lifting, transfers and LE dressing [] [] [] []   Pt will be independent and compliant with comprehensive HEP to maintain progress achieved in PT, and be able to utilize HEP to manage future exacerbations    [] [] [] []                 Plan  Continue PT as per established POC. Functional activities and dynamic balance exercises    Treatment Last 4 Visits  Treatment Day: 2025   Spine Treatment   Therapeutic Exercise NuStep L3 X 9' BLE only  Demo how to properly utilize timer for keeping track for home  Seated:  -Seated lumbar flexion X 10 w/ SB  Standing:  -slantboard gastroc & soleus stretch heels on ground 5 X 20\" B ea  -3-way hip w/ HHA 2 X 10 ea R/L  -alt LE march w/ HHA 2 X 10 R/L  -mini squat to 45 deg w/ hip hinge & HHA X 10  -sidestepping (lat walk) in //bars X 4 lap no HHA  -monster walk (fwd only) in //bars X 4 lap no HHA  -Pilates ring (green) walk & squeeze (small IR pulse) for multifidi: 20 ft X 8  NuStep L5 X 2' BLE only, then  the resistance to L3 for 5 mins, pt stated it was too much resistance  Standing:   -slantboard gastroc & soleus stretch heels on ground 5 X 20\" B ea   -3-way hip w/ HHA 2 X 10 ea R/L, yellow TB   -alt LE march no HHA 2 X 10 R/L , control   -mini squat to 45 deg w/ hip hinge & HHA X 10   -sidestepping (lat walk) in //bars X 4 rounds no HHA   -monster walk (fwd only) in //bars X 4  rounds no HHA    NuStep L3 up to L4 (starting at 3') X 6' BLE only  Standing:   -slantboard gastroc & soleus stretch heels on ground 3 X 30\" B ea  -sidestepping (lat walk) in //bars X 4 rounds no HHA w/ YTB  -monster walk (fwd only) in //bars X 4 rounds no HHA w/ YTB  -3-way hip w/ HHA 2 X 10 ea R/L, YTB  -hip flex w/  ER no resistance (similar to standing clam) 2 X 5 R/L, w/ HHA  -SB walk up/ roll up wall X 20  -sport cord shld flex (band anchored low posterior), X 15 R/L   NuStep L5 for 2 mins, then decreased to  L4 , total of 6 mins BLE only  Standing:  -slantboard gastroc & soleus stretch heels on ground 5 X 30\" B ea  -sidestepping (lat walk) in //bars X 4 rounds no HHA w/ YTB  -monster walk (fwd only) in //bars X 4 rounds no HHA w/ YTB  -3-way hip w/ HHA 2 X 10 ea R/L, YTB  -hip flex w/ ER with YTB (similar to standing clam) 2 X 10  R/L, w/ HHA  -SB walk up/ roll up wall X 20  -sport cord shld flex (band anchored low posterior),X 20 gray tube        Neuro Re-Education  Bending and lifting 5# DB with mat at lowest height, cues for TA activation and standing back up straight, and scapular retractions 2x 10,    Reaching forward with 2 kb ball x 10, cues for TA activation      Theraband rows and extensions 2x 10, red TB - tactile cues for scapular retractions     Discussed about exercises that facilitate functional movements, including bending, lifting, LE dressing, reaching    Balance board 1 min  each a/p, lat  Shoulder extensions and lats pull down, small gray tube x 20  Bending down with blue MB x 10   Reaching forward with blue MB x 10 - more difficult  Sit to stand mod height mat x 10      Manual Therapy  STM for paralumbars to decrease muscles tightness, improve soft tissues mobility STM (w/ Free Up) B lumbar paraspinals, QL, & surgical scars prone over 1 pillow, concurrent w/ pain science education STM for paralumbars and gluteals    Therapeutic Exercise Minutes 44 20 34 30   Neuro Re-Educ Minutes  15  10   Manual Therapy Minutes  10 12 5   Total Time Of Timed Procedures 44 45 46 45   Total Time Of Service-Based Procedures 0 0 0 0   Total Treatment Time 44 45 46 45        HEP  Access Code: [email or web address]  URL: [email or web address]  Date: 04/24/2025  Prepared by: Felicia Harris    Exercises  - Supine Single  Knee to Chest Stretch  - 2 x daily - 7 x weekly - 5 reps - 10 hold  - Supine Figure 4 Piriformis Stretch  - 2 x daily - 7 x weekly - 1 sets - 5 reps - 10 hold  - Supine Piriformis Stretch with Foot on Ground  - 2 x daily - 7 x weekly - 1 sets - 5 reps - 10 hold  - Seated Flexion Stretch  - 2 x daily - 7 x weekly - 1 sets - 10 reps  - Supine Transversus Abdominis Bracing - Hands on Ground  - 2 x daily - 7 x weekly - 1-2 sets - 10 reps - 5 hold  - Seated Transversus Abdominis Bracing  - 2 x daily - 7 x weekly - 1 sets - 10 reps - 5 hold  - Seated Ankle Inversion Eversion AROM  - 2 x daily - 7 x weekly - 1-2 sets - 10 reps  - Seated Ankle Alphabet  - 2 x daily - 7 x weekly - 1 sets - 1 reps  - Supine Hip Adduction Isometric with Ball  - 1 x daily - 3-4 x weekly - 2 sets - 10 reps - 5 sec hold  - Hooklying Clamshell with Resistance  - 1 x daily - 3-4 x weekly - 2 sets - 10 reps - 5 sec hold  - Heel Toe Raises with Counter Support  - 1 x daily - 3-4 x weekly - 1-2 sets - 10 reps - 1 sec hold  - Standing Row with Anchored Resistance  - 1 x daily - 7 x weekly - 1 sets - 10 reps  - Shoulder extension with resistance - Neutral  - 1 x daily - 7 x weekly - 1 sets - 10 reps    Charges  Thera ex - 2, NMR -1

## 2025-05-09 ENCOUNTER — LAB ENCOUNTER (OUTPATIENT)
Dept: LAB | Facility: HOSPITAL | Age: 65
End: 2025-05-09
Attending: STUDENT IN AN ORGANIZED HEALTH CARE EDUCATION/TRAINING PROGRAM
Payer: MEDICARE

## 2025-05-09 DIAGNOSIS — M25.50 MULTIPLE JOINT PAIN: ICD-10-CM

## 2025-05-09 LAB
CRP SERPL-MCNC: <0.4 MG/DL (ref ?–0.5)
ERYTHROCYTE [SEDIMENTATION RATE] IN BLOOD: 7 MM/HR (ref 0–30)
RHEUMATOID FACT SERPL-ACNC: <3.5 IU/ML (ref ?–14)

## 2025-05-09 PROCEDURE — 36415 COLL VENOUS BLD VENIPUNCTURE: CPT

## 2025-05-09 PROCEDURE — 86235 NUCLEAR ANTIGEN ANTIBODY: CPT

## 2025-05-09 PROCEDURE — 85652 RBC SED RATE AUTOMATED: CPT

## 2025-05-09 PROCEDURE — 86225 DNA ANTIBODY NATIVE: CPT

## 2025-05-09 PROCEDURE — 86140 C-REACTIVE PROTEIN: CPT

## 2025-05-09 PROCEDURE — 86038 ANTINUCLEAR ANTIBODIES: CPT

## 2025-05-09 PROCEDURE — 86039 ANTINUCLEAR ANTIBODIES (ANA): CPT

## 2025-05-09 PROCEDURE — 86431 RHEUMATOID FACTOR QUANT: CPT

## 2025-05-11 ENCOUNTER — PATIENT MESSAGE (OUTPATIENT)
Dept: ORTHOPEDICS CLINIC | Facility: CLINIC | Age: 65
End: 2025-05-11

## 2025-05-11 DIAGNOSIS — M17.0 PRIMARY OSTEOARTHRITIS OF BOTH KNEES: Primary | ICD-10-CM

## 2025-05-11 DIAGNOSIS — R41.0 CONFUSION: ICD-10-CM

## 2025-05-12 NOTE — TELEPHONE ENCOUNTER
Medication: MEMANTINE 10 MG Oral Tab      Date of last refill: 02/13/2025 (#90/0)  [place]     Last office visit: 02/24/2025 BOTOX//01/20/2025  Due back to clinic per last office note:  3 Months   Date next office visit scheduled:    Future Appointments   Date Time Provider Department Center   5/13/2025  8:30 AM Roopa Mena, PT [facility]   5/15/2025  9:30 AM Felicia Harris, PT [facility]   5/20/2025  3:00 PM Felicia Harris, PT [facility]   5/28/2025 10:20 AM James Betancourt MD ENINAPER [facility]   7/16/2025  8:30 AM Monalisa Luis APRN SGINP [facility]   7/21/2025  9:00 AM Colton Schaefer MD EM[facility] [address] [facility] [address]   7/31/2025  8:00 AM Ismael Perez MD EM[facility]   9/11/2025 10:20 AM Jackelin Maloney MD EMGWEI [facility] [address]   1/9/2026  9:00 AM Juliana Triana, PATINO [id number] [facility] [address]           Last OV note recommendation:    ASSESSMENT/PLAN:          ICD-10-CM     1. Chronic migraine without aura without status migrainosus, not intractable  G43.709         2. Mild neurocognitive disorder due to multiple etiologies  F06.70               Neurocognitive disorder unspecified     I have reviewed the neuropsychology evaluation results in detail, [facility].   Continue optimal control of anxiety depression currently on fluoxetine, buspirone and trazodone  Encourage joining a class or group activities  MRI brain and EEG performed negative  On memantine 10 mg daily for neurocognitive prophylaxis           2 Chronic migraines, stable  On Botox and Propranolol for migraine prevention  Switch Nurtec's to Imitrex injection        Follow up in about 3 months  See orders and medications filed with this encounter. The patient indicates understanding of these issues and agrees with the plan.           James Betancourt MD  [facility]        Meds This Visit:  Requested Prescriptions        No  prescriptions requested or ordered in this encounter         Imaging & Referrals:  None      risk factors

## 2025-05-12 NOTE — TELEPHONE ENCOUNTER
Medical Fitness Program referral pending.  Thank you!        [facility] Clinical Pool (supporting Mari Damon PA-C)13 hours ago (9:15 PM)        I saw Dr. Blank last week for worsening bilateral knee pain. The pain ramped up as I rehab w/ PT, HEP and increased walking following a lumbar fusion on 2/5/25 w/ Dr. Perez.” Dr. Blank recommended I ease up on the walking in favor of low or no impact activities like aquatics and riding the incumbant bike. I asked him about a program at the [facility] in [place] that requires a medical referral but he was unfamiliar with it. He encouraged me to followup with you and provide more information. I’m sending an attachment with more info. in hopes you will provide the necessary referral. Thanks for your time!   Attachments   [facility] Medical Fitness Program.pdf     [facility]  Medical Fitness Program.pdf

## 2025-05-13 ENCOUNTER — OFFICE VISIT (OUTPATIENT)
Dept: PHYSICAL THERAPY | Age: 65
End: 2025-05-13
Attending: NURSE PRACTITIONER
Payer: MEDICARE

## 2025-05-13 LAB
ANA NUCLEOLAR TITR SER IF: 160 {TITER}
DSDNA AB TITR SER: <10 {TITER}
NUCLEAR IGG TITR SER IF: POSITIVE {TITER}

## 2025-05-13 PROCEDURE — 97112 NEUROMUSCULAR REEDUCATION: CPT | Performed by: PHYSICAL THERAPIST

## 2025-05-13 PROCEDURE — 97110 THERAPEUTIC EXERCISES: CPT | Performed by: PHYSICAL THERAPIST

## 2025-05-13 RX ORDER — MEMANTINE HYDROCHLORIDE 10 MG/1
10 TABLET ORAL DAILY
Qty: 90 TABLET | Refills: 0 | Status: SHIPPED | OUTPATIENT
Start: 2025-05-13

## 2025-05-13 NOTE — PROGRESS NOTES
Patient: Cb Webster (64 year old, female) Referring Provider:  Insurance:   Diagnosis: Arthrodesis status (Z98.1), S/P L4-5 MIS TLIF Mini De La Garza  BEVERLY BRUCE   Date of Surgery: 02/05/2025 Next MD visit:  N/A   Precautions:  -- (Cognitive impairment and slow with processing)) No data recorded Referral Information:    Date of Evaluation: Req: 0, Auth: 0, Exp:     No data recorded POC Auth Visits:  16       Progress Summary  Pt has attended 10 visits in Physical Therapy.      Today's Date   5/13/2025    Subjective  Pt reports she is \"feeling ok\" today w/ a little knee pain, but not really feeling pain to the back. Pt reports the orthopedic is in favor of aquatics & to limit walking & impact. Pt reached out to medical fitness program at [facility]. Pt feels she has definitely made progress, the knee pain has hijacked the spine rehab a bit. Trying to be more accepting of discomfort. Less concerned about pain more about mobility. Pt reports her mobility is about 30-40% of what she feels she should be at. Very invested in this. Knows she needs to back off the walking a bit but this is important to her. Wants to cont to improve ability to bend to the floor without thinking about it, reaching high while leaning forward (getting something high on a second cabinet shelf).       Pain: 3/10 (R knee). Pain at worst for the back 4-5/10.  Current limitations: Walking, LE dressing (shoes, socks) - improved, transfers to a higher car - min improved, reaching  My balance has improved- didn't think I would be able to do the walks without holding on. Walking 1/2 mi one day, & then 1 mi the next day. Would like to do more.      Objective  see flowsheet for details       Musculoskeletal:  Observation/Posture: decreased cervical lordosis; reduced forward head posture; reduced rounded shoulders; more neutral pelvic tilt  Palpation: mild TTP & tension to B LPSM in stance which reduces in prone    Special tests:   5 sTST - 10  secs     Strength:  (* denotes performed with pain)  Hip   MMT (-/5)    R L     Flex (L2) 4+/5 5/5     Ext  4-/5 4-/5     Abd 4-/5 4-/5     ER 4/5 4/5     IR 5/5 5/5      Flexibility:  LE Flexibility R L     Hip Flexor Min restricted Min restricted     Hamstrings Min restricted Min restricted     ITB Min restricted Min restricted     Piriformis Min restricted Mod restricted     Gastroc-soleus Min restricted Min restricted      Balance and Functional Mobility:  Gait: pt ambulates on level ground with mild trendelenburg/waddle, decreased trunk rot R/L, no antalgia  Balance: SLS: EO R 3 sec, EO L 3 sec       Assessment  Pt has completed 10 visits of skilled PT. Pt has improved in strength, flexibility, & function. Working to advance post-op lumbar rehab while avoiding pain provocation to knees 2/2 DJD. Pt will cont to benefit from skilled PT to further address functional limitations to enable ability to be discharged to comprehensive [facility].      Goals (to be met in 16 visits)      Not Met Progress  Toward Partially  Met Met   Pt will improve transversus abdominis recruitment to perform proper isometric contraction without requiring verbal or tactile cuing to promote advancement of therex, improvement of core muscles strength to promote lumbar stability with activities that include bending, walking, reaching, transfers and increased LE activity [] [] [x] []   Pt will demonstrate good understanding of proper posture and body mechanics to decrease pain and improve spinal safety [] [x] [] []   Pt will have improvement of muscles flexibility to improve lumbar spine and L hip to WNL to allow increase ease with movement and improve positional tolerance [] [x] [] []   Pt will have decreased paraspinal mm tension to tolerate sitting, standing and walking unrestricted   [] [x] [] []   Pt will demonstrate improved core strength, hips and abdominal muscles strength to 4/5 and improve kinsesthetic and proprioceptive awareness to be  able to decrease pain to <2/10 with sitting, standing, walking, bending, carrying and lifting, transfers and LE dressing [] [x] [] []   Pt will be independent and compliant with comprehensive HEP to maintain progress achieved in PT, and be able to utilize HEP to manage future exacerbations    [] [x] [] []        Plan    Continue skilled Physical Therapy 1-2 x/week or a total of 6 visits over a 90 day period. Treatment will include: Gait training; Manual Therapy; Neuromuscular Re-education; Therapeutic Activities; Therapeutic Exercise; Home Exercise Program instruction       Patient/Family/Caregiver was advised of these findings, precautions, and treatment options and has agreed to actively participate in planning and for this course of care.    Thank you for your referral. If you have any questions, please contact me at Dept: [phone].    Sincerely,  Electronically signed by therapist: Roopa Mena PT     Physician's certification required:  Yes  Please co-sign or sign and return this letter via fax as soon as possible to [phone].   I certify the need for these services furnished under this plan of treatment and while under my care.    X___________________________________________________ Date____________________    Certification From: 2025  To:2025       Treatment Last 4 Visits  Treatment Day: 10       2025 2025 2025 2025   Spine Treatment   Therapeutic Exercise NuStep L5 X 2' BLE only, then  the resistance to L3 for 5 mins, pt stated it was too much resistance  Standing:   -slantboard gastroc & soleus stretch heels on ground 5 X 20\" B ea   -3-way hip w/ HHA 2 X 10 ea R/L, yellow TB   -alt  no HHA 2 X 10 R/L , control   -mini squat to 45 deg w/ hip hinge & HHA X 10   -sidestepping (lat walk) in //bars X 4 rounds no HHA   -monster walk (fwd only) in //bars X 4  rounds no HHA    NuStep L3 up to L4 (starting at 3') X 6' BLE only  Standing:   -slantboard gastroc &  soleus stretch heels on ground 3 X 30\" B ea  -sidestepping (lat walk) in //bars X 4 rounds no HHA w/ YTB  -monster walk (fwd only) in //bars X 4 rounds no HHA w/ YTB  -3-way hip w/ HHA 2 X 10 ea R/L, YTB  -hip flex w/ ER no resistance (similar to standing clam) 2 X 5 R/L, w/ HHA  -SB walk up/ roll up wall X 20  -sport cord shld flex (band anchored low posterior), X 15 R/L   NuStep L5 for 2 mins, then decreased to  L4 , total of 6 mins BLE only  Standing:  -slantboard gastroc & soleus stretch heels on ground 5 X 30\" B ea  -sidestepping (lat walk) in //bars X 4 rounds no HHA w/ YTB  -monster walk (fwd only) in //bars X 4 rounds no HHA w/ YTB  -3-way hip w/ HHA 2 X 10 ea R/L, YTB  -hip flex w/ ER with YTB (similar to standing clam) 2 X 10  R/L, w/ HHA  -SB walk up/ roll up wall X 20  -sport cord shld flex (band anchored low posterior),X 20 gray tube      NuStep L4 (BLE only) X 6'  Re-assessment, goal status completion  HEP intensity recommendations   Neuro Re-Education Bending and lifting 5# DB with mat at lowest height, cues for TA activation and standing back up straight, and scapular retractions 2x 10,    Reaching forward with 2 kb ball x 10, cues for TA activation      Theraband rows and extensions 2x 10, red TB - tactile cues for scapular retractions     Discussed about exercises that facilitate functional movements, including bending, lifting, LE dressing, reaching    Balance board 1 min  each a/p, lat  Shoulder extensions and lats pull down, small gray tube x 20  Bending down with blue MB x 10   Reaching forward with blue MB x 10 - more difficult  Sit to stand mod height mat x 10    TA brace w/ alt LE march 3 X 10 R/L  TA brace w/ BKFO 2 X 10 R/L   Manual Therapy STM for paralumbars to decrease muscles tightness, improve soft tissues mobility STM (w/ Free Up) B lumbar paraspinals, QL, & surgical scars prone over 1 pillow, concurrent w/ pain science education STM for paralumbars and gluteals     Therapeutic  Exercise Minutes 20 34 30 30   Neuro Re-Educ Minutes 15  10 10   Manual Therapy Minutes 10 12 5    Total Time Of Timed Procedures 45 46 45 40   Total Time Of Service-Based Procedures 0 0 0 0   Total Treatment Time 45 46 45 40        HEP  Access Code: [email or web address]  URL: [email or web address]  Date: 04/24/2025  Prepared by: Felicia Harris    Exercises  - Supine Single Knee to Chest Stretch  - 2 x daily - 7 x weekly - 5 reps - 10 hold  - Supine Figure 4 Piriformis Stretch  - 2 x daily - 7 x weekly - 1 sets - 5 reps - 10 hold  - Supine Piriformis Stretch with Foot on Ground  - 2 x daily - 7 x weekly - 1 sets - 5 reps - 10 hold  - Seated Flexion Stretch  - 2 x daily - 7 x weekly - 1 sets - 10 reps  - Supine Transversus Abdominis Bracing - Hands on Ground  - 2 x daily - 7 x weekly - 1-2 sets - 10 reps - 5 hold  - Seated Transversus Abdominis Bracing  - 2 x daily - 7 x weekly - 1 sets - 10 reps - 5 hold  - Seated Ankle Inversion Eversion AROM  - 2 x daily - 7 x weekly - 1-2 sets - 10 reps  - Seated Ankle Alphabet  - 2 x daily - 7 x weekly - 1 sets - 1 reps  - Supine Hip Adduction Isometric with Ball  - 1 x daily - 3-4 x weekly - 2 sets - 10 reps - 5 sec hold  - Hooklying Clamshell with Resistance  - 1 x daily - 3-4 x weekly - 2 sets - 10 reps - 5 sec hold  - Heel Toe Raises with Counter Support  - 1 x daily - 3-4 x weekly - 1-2 sets - 10 reps - 1 sec hold  - Standing Row with Anchored Resistance  - 1 x daily - 7 x weekly - 1 sets - 10 reps  - Shoulder extension with resistance - Neutral  - 1 x daily - 7 x weekly - 1 sets - 10 reps    Charges  2TE, 1NMR

## 2025-05-15 ENCOUNTER — OFFICE VISIT (OUTPATIENT)
Dept: PHYSICAL THERAPY | Age: 65
End: 2025-05-15
Attending: NURSE PRACTITIONER
Payer: MEDICARE

## 2025-05-15 LAB
CENTROMERE IGG SER-ACNC: <0.4 U/ML (ref ?–7)
ENA JO1 AB SER IA-ACNC: <0.3 U/ML (ref ?–7)
ENA RNP IGG SER IA-ACNC: 0.9 U/ML (ref ?–7)
ENA SCL70 IGG SER IA-ACNC: 0.6 U/ML (ref ?–7)
ENA SM IGG SER IA-ACNC: <0.7 U/ML (ref ?–7)
ENA SS-A IGG SER IA-ACNC: <0.4 U/ML (ref ?–7)
ENA SS-B IGG SER IA-ACNC: <0.4 U/ML (ref ?–7)
U1 SNRNP IGG SER IA-ACNC: 1.4 U/ML (ref ?–5)

## 2025-05-15 PROCEDURE — 97110 THERAPEUTIC EXERCISES: CPT | Performed by: PHYSICAL THERAPIST

## 2025-05-15 PROCEDURE — 97140 MANUAL THERAPY 1/> REGIONS: CPT | Performed by: PHYSICAL THERAPIST

## 2025-05-15 NOTE — PROGRESS NOTES
Patient: Cb Webster (64 year old, female) Referring Provider:  Insurance:   Diagnosis: Arthrodesis status (Z98.1), S/P L4-5 MIS TLIF Mini Donatogerri BRUCE   Date of Surgery: 02/05/2025 Next MD visit:  N/A   Precautions:  -- (Cognitive impairment and slow with processing)) No data recorded Referral Information:    Date of Evaluation: Req: 0, Auth: 0, Exp:     No data recorded POC Auth Visits:  16       Today's Date   5/15/2025    Subjective  Patient reports that she feels there is more achiness today, yesterday, pain was 4/10, she did not do a lot of floor exercises, she did some, but today her pain is inching up. She took a muscle relaxer and Tylenol yesterday.       Pain: 5/10     Objective  see flowsheet for details          Assessment  Discussed about proper body mechanics when bending and reaching, patient understood. Modified treatment today due to increase in pain. Discussed about decreasing intensity of exercises and adding stretches when she notices the pain. Patient verablized understanding. Encouraged mobility to tolerance.    Goals (to be met in 16 visits)      Not Met Progress  Toward Partially  Met Met   Pt will improve transversus abdominis recruitment to perform proper isometric contraction without requiring verbal or tactile cuing to promote advancement of therex, improvement of core muscles strength to promote lumbar stability with activities that include bending, walking, reaching, transfers and increased LE activity [] [] [] []   Pt will demonstrate good understanding of proper posture and body mechanics to decrease pain and improve spinal safety [] [] [] []   Pt will have improvement of muscles flexibility to improve lumbar spine and L hip to WNL  to allow increase ease with movement and improve positional tolerance [] [] [] []   Pt will have decreased paraspinal mm tension to tolerate sitting, standing and walking unrestricted   [] [] [] []   Pt will demonstrate improved core  strength, hips and abdominal muscles strength to 4/5 and improve kinsesthetic and proprioceptive awareness to be able to decrease pain to <2/10 with sitting, standing, walking, bending, carrying and lifting, transfers and LE dressing [] [] [] []   Pt will be independent and compliant with comprehensive HEP to maintain progress achieved in PT, and be able to utilize HEP to manage future exacerbations    [] [] [] []                     Plan  Continue PT as per established POC. Functional activities and dynamic balance exercises    Treatment Last 4 Visits  Treatment Day: 11       5/6/2025 5/8/2025 5/13/2025 5/15/2025   Spine Treatment   Therapeutic Exercise NuStep L3 up to L4 (starting at 3') X 6' BLE only  Standing:   -slantboard gastroc & soleus stretch heels on ground 3 X 30\" B ea  -sidestepping (lat walk) in //bars X 4 rounds no HHA w/ YTB  -monster walk (fwd only) in //bars X 4 rounds no HHA w/ YTB  -3-way hip w/ HHA 2 X 10 ea R/L, YTB  -hip flex w/ ER no resistance (similar to standing clam) 2 X 5 R/L, w/ HHA  -SB walk up/ roll up wall X 20  -sport cord shld flex (band anchored low posterior), X 15 R/L   NuStep L5 for 2 mins, then decreased to  L4 , total of 6 mins BLE only  Standing:  -slantboard gastroc & soleus stretch heels on ground 5 X 30\" B ea  -sidestepping (lat walk) in //bars X 4 rounds no HHA w/ YTB  -monster walk (fwd only) in //bars X 4 rounds no HHA w/ YTB  -3-way hip w/ HHA 2 X 10 ea R/L, YTB  -hip flex w/ ER with YTB (similar to standing clam) 2 X 10  R/L, w/ HHA  -SB walk up/ roll up wall X 20  -sport cord shld flex (band anchored low posterior),X 20 gray tube      NuStep L4 (BLE only) X 6'  Re-assessment, goal status completion  HEP intensity recommendations NuStep L3 for 6 mins, BLE only    Seated lumbar flexion x 10  SKTC x 5 with 5 secs hold  Piriformis stretches 5x 5 secs hold   Posterior pelvic tilts x 5 with 5 secs hold  Standing:  -slantboard gastroc & soleus stretch heels on ground 3 X  30\" B ea  -sidestepping (lat walk) in  (half) //bars X 8 rounds no HHA w/ YTB  -monster walk (fwd only) in (half) //bars X 8 rounds no HHA w/ YTB  -3-way hip w/ HHA 2 X 10 ea R/L, YTB  -hip flex w/ ER  (similar to standing clam) 2 X 10  R/L, w/ HHA     Neuro Re-Education  Balance board 1 min  each a/p, lat  Shoulder extensions and lats pull down, small gray tube x 20  Bending down with blue MB x 10   Reaching forward with blue MB x 10 - more difficult  Sit to stand mod height mat x 10    TA brace w/ alt LE march 3 X 10 R/L  TA brace w/ BKFO 2 X 10 R/L Balance board a/p, lateral, 2 mins, intermittent support   Manual Therapy STM (w/ Free Up) B lumbar paraspinals, QL, & surgical scars prone over 1 pillow, concurrent w/ pain science education STM for paralumbars and gluteals      Therapeutic Exercise Minutes 34 30 30 30   Neuro Re-Educ Minutes  10 10 4   Manual Therapy Minutes 12 5  10   Total Time Of Timed Procedures 46 45 40 44   Total Time Of Service-Based Procedures 0 0 0 0   Total Treatment Time 46 45 40 44        HEP  Access Code: [email or web address]  URL: [email or web address]  Date: 04/24/2025  Prepared by: Felicia Harris    Exercises  - Supine Single Knee to Chest Stretch  - 2 x daily - 7 x weekly - 5 reps - 10 hold  - Supine Figure 4 Piriformis Stretch  - 2 x daily - 7 x weekly - 1 sets - 5 reps - 10 hold  - Supine Piriformis Stretch with Foot on Ground  - 2 x daily - 7 x weekly - 1 sets - 5 reps - 10 hold  - Seated Flexion Stretch  - 2 x daily - 7 x weekly - 1 sets - 10 reps  - Supine Transversus Abdominis Bracing - Hands on Ground  - 2 x daily - 7 x weekly - 1-2 sets - 10 reps - 5 hold  - Seated Transversus Abdominis Bracing  - 2 x daily - 7 x weekly - 1 sets - 10 reps - 5 hold  - Seated Ankle Inversion Eversion AROM  - 2 x daily - 7 x weekly - 1-2 sets - 10 reps  - Seated Ankle Alphabet  - 2 x daily - 7 x weekly - 1 sets - 1 reps  - Supine Hip Adduction Isometric with Ball  - 1 x daily - 3-4 x  weekly - 2 sets - 10 reps - 5 sec hold  - Hooklying Clamshell with Resistance  - 1 x daily - 3-4 x weekly - 2 sets - 10 reps - 5 sec hold  - Heel Toe Raises with Counter Support  - 1 x daily - 3-4 x weekly - 1-2 sets - 10 reps - 1 sec hold  - Standing Row with Anchored Resistance  - 1 x daily - 7 x weekly - 1 sets - 10 reps  - Shoulder extension with resistance - Neutral  - 1 x daily - 7 x weekly - 1 sets - 10 reps    Charges  Thera ex -2, Man Therapy -1

## 2025-05-20 ENCOUNTER — OFFICE VISIT (OUTPATIENT)
Dept: PHYSICAL THERAPY | Age: 65
End: 2025-05-20
Attending: NURSE PRACTITIONER
Payer: MEDICARE

## 2025-05-20 PROCEDURE — 97110 THERAPEUTIC EXERCISES: CPT | Performed by: PHYSICAL THERAPIST

## 2025-05-20 PROCEDURE — 97112 NEUROMUSCULAR REEDUCATION: CPT | Performed by: PHYSICAL THERAPIST

## 2025-05-20 NOTE — PROGRESS NOTES
Patient: Cb Webster (64 year old, female) Referring Provider:  Insurance:   Diagnosis: Arthrodesis status (Z98.1), S/P L4-5 MIS TLIF Mini Holligerri BRUCE   Date of Surgery: 02/05/2025 Next MD visit:  N/A   Precautions:  -- (Cognitive impairment and slow with processing)) No data recorded Referral Information:    Date of Evaluation: Req: 0, Auth: 0, Exp:     No data recorded POC Auth Visits:  16       Today's Date   5/20/2025    Subjective  Patient reports that she started experiencing some nerve pain on the Back, some on the side and the top of her L thigh, it started yesterday. Also still has R LBP,yesterday it was rated 2/10.       Pain: 1/10 (R LB)     Objective  see flowsheet for details          Assessment  Patient is progressing well with PT treatments, increased volume and inrtsnity of strengthening exercises, functional exercises and was tolerated well. Monitored for onset or increase in pain. Reported 3/10 pain after ball rolls on wall. Patient was able to perform exercises with tactile cues, had mild LOB with step out paloff, required 1 CGA from PT.  Instructed patient to use heat or ice as needed. Patient understood.    Goals (to be met in 16 visits)      Not Met Progress  Toward Partially  Met Met   Pt will improve transversus abdominis recruitment to perform proper isometric contraction without requiring verbal or tactile cuing to promote advancement of therex, improvement of core muscles strength to promote lumbar stability with activities that include bending, walking, reaching, transfers and increased LE activity [] [] [] []   Pt will demonstrate good understanding of proper posture and body mechanics to decrease pain and improve spinal safety [] [] [] []   Pt will have improvement of muscles flexibility to improve lumbar spine and L hip to WNL  to allow increase ease with movement and improve positional tolerance [] [] [] []   Pt will have decreased paraspinal mm tension to tolerate  sitting, standing and walking unrestricted   [] [] [] []   Pt will demonstrate improved core strength, hips and abdominal muscles strength to 4/5 and improve kinsesthetic and proprioceptive awareness to be able to decrease pain to <2/10 with sitting, standing, walking, bending, carrying and lifting, transfers and LE dressing [] [] [] []   Pt will be independent and compliant with comprehensive HEP to maintain progress achieved in PT, and be able to utilize HEP to manage future exacerbations    [] [] [] []                         Plan  Continue PT as per established POC. Functional activities and dynamic balance exercises    Treatment Last 4 Visits  Treatment Day: 12       5/8/2025 5/13/2025 5/15/2025 5/20/2025   Spine Treatment   Therapeutic Exercise   NuStep L5 for 2 mins, then decreased to  L4 , total of 6 mins BLE only  Standing:  -slantboard gastroc & soleus stretch heels on ground 5 X 30\" B ea  -sidestepping (lat walk) in //bars X 4 rounds no HHA w/ YTB  -monster walk (fwd only) in //bars X 4 rounds no HHA w/ YTB  -3-way hip w/ HHA 2 X 10 ea R/L, YTB  -hip flex w/ ER with YTB (similar to standing clam) 2 X 10  R/L, w/ HHA  -SB walk up/ roll up wall X 20  -sport cord shld flex (band anchored low posterior),X 20 gray tube      NuStep L4 (BLE only) X 6'  Re-assessment, goal status completion  HEP intensity recommendations NuStep L3 for 6 mins, BLE only    Seated lumbar flexion x 10  SKTC x 5 with 5 secs hold  Piriformis stretches 5x 5 secs hold   Posterior pelvic tilts x 5 with 5 secs hold  Standing:  -slantboard gastroc & soleus stretch heels on ground 3 X 30\" B ea  -sidestepping (lat walk) in  (half) //bars X 8 rounds no HHA w/ YTB  -monster walk (fwd only) in (half) //bars X 8 rounds no HHA w/ YTB  -3-way hip w/ HHA 2 X 10 ea R/L, YTB  -hip flex w/ ER  (similar to standing clam) 2 X 10  R/L, w/ HHA   NuStep L3 for 6 mins, BLE only     Seated lumbar flexion x 10   SKTC x 5 with 5 secs hold   Piriformis stretches  5x 5 secs hold    Posterior pelvic tilts x 5 with 5 secs hold   Standing:   -slantboard gastroc & soleus stretch heels on ground 3 X 30\" B ea   -sidestepping (lat walk) in //bars X 4 rounds no HHA w/ YTB   -monster walk (fwd only) in bars X 4 rounds no HHA w/ YTB   -3-way hip w/ HHA 2 X 10 ea R/L, YTB   -hip flex w/ ER  (similar to standing clam) 2 X 10  R/L, w/ HHA   Roll up yellow ball on wall x 10  Lumbar flexion seated, reach for ankles 2x 10   Neuro Re-Education Balance board 1 min  each a/p, lat  Shoulder extensions and lats pull down, small gray tube x 20  Bending down with blue MB x 10   Reaching forward with blue MB x 10 - more difficult  Sit to stand mod height mat x 10    TA brace w/ alt LE march 3 X 10 R/L  TA brace w/ BKFO 2 X 10 R/L Balance board a/p, lateral, 2 mins, intermittent support Balance board 1 min  each a/p, lat   Shoulder extensions, rows and lats pull down, small gray tube x 20   Step out paloff press x 20, red tube  Bending down with 5# DB, touch 8\" step x 10   Squat and reach down on the cabinet 15x reach with L, 5x reach with R  Sit to stand chair x 10      Manual Therapy STM for paralumbars and gluteals       Therapeutic Exercise Minutes 30 30 30 25   Neuro Re-Educ Minutes 10 10 4 18   Manual Therapy Minutes 5  10    Total Time Of Timed Procedures 45 40 44 43   Total Time Of Service-Based Procedures 0 0 0 0   Total Treatment Time 45 40 44 43        HEP  Access Code: [email or web address]  URL: [email or web address]  Date: 04/24/2025  Prepared by: Felicia Harris    Exercises  - Supine Single Knee to Chest Stretch  - 2 x daily - 7 x weekly - 5 reps - 10 hold  - Supine Figure 4 Piriformis Stretch  - 2 x daily - 7 x weekly - 1 sets - 5 reps - 10 hold  - Supine Piriformis Stretch with Foot on Ground  - 2 x daily - 7 x weekly - 1 sets - 5 reps - 10 hold  - Seated Flexion Stretch  - 2 x daily - 7 x weekly - 1 sets - 10 reps  - Supine Transversus Abdominis Bracing - Hands on Ground  - 2 x  daily - 7 x weekly - 1-2 sets - 10 reps - 5 hold  - Seated Transversus Abdominis Bracing  - 2 x daily - 7 x weekly - 1 sets - 10 reps - 5 hold  - Seated Ankle Inversion Eversion AROM  - 2 x daily - 7 x weekly - 1-2 sets - 10 reps  - Seated Ankle Alphabet  - 2 x daily - 7 x weekly - 1 sets - 1 reps  - Supine Hip Adduction Isometric with Ball  - 1 x daily - 3-4 x weekly - 2 sets - 10 reps - 5 sec hold  - Hooklying Clamshell with Resistance  - 1 x daily - 3-4 x weekly - 2 sets - 10 reps - 5 sec hold  - Heel Toe Raises with Counter Support  - 1 x daily - 3-4 x weekly - 1-2 sets - 10 reps - 1 sec hold  - Standing Row with Anchored Resistance  - 1 x daily - 7 x weekly - 1 sets - 10 reps  - Shoulder extension with resistance - Neutral  - 1 x daily - 7 x weekly - 1 sets - 10 reps    Charges  Thera ex - 2, NMR -1

## 2025-05-22 NOTE — TELEPHONE ENCOUNTER
Future Appointments   Date Time Provider Department Center   10/29/2024  2:45 PM Roopa Mena, PT [facility] [place]   10/31/2024 10:00 AM Jackelin Maloney MD [facility] [address]   11/5/2024 12:30 PM Colton Schaefer MD [facility] [address] [facility] [address]   12/2/2024  1:10 PM James Betancourt MD [facility] [place]        Patient offers no complaints at this time, side rail up, call bell within reach       Eldon Borja RN  02/14/23 [id number] Your presentation, findings and exam showed dry rash over the left side of the neck ,Concerning for likely eczema versus yeast dermatitis.  We discussed treatment options, recommend to start using topical Lotrisone as prescribed for few days.  You report having been prescribed Diflucan in the past for similar rash and prreferred to get prescription for the same.  As discussed I am giving you the prescription for oral Diflucan that you may use if the topical medication does not seem to be helpful in the next few days.  Keep the area clean and dry, avoid scratching or picking on the area.  Follow with primary care physician or come back if not better in few days or sooner if worsening.

## 2025-05-28 ENCOUNTER — OFFICE VISIT (OUTPATIENT)
Dept: NEUROLOGY | Facility: CLINIC | Age: 65
End: 2025-05-28
Payer: MEDICARE

## 2025-05-28 VITALS
WEIGHT: 182 LBS | HEART RATE: 96 BPM | RESPIRATION RATE: 16 BRPM | SYSTOLIC BLOOD PRESSURE: 130 MMHG | OXYGEN SATURATION: 96 % | BODY MASS INDEX: 33.49 KG/M2 | HEIGHT: 62 IN | DIASTOLIC BLOOD PRESSURE: 60 MMHG

## 2025-05-28 DIAGNOSIS — G43.709 CHRONIC MIGRAINE WITHOUT AURA WITHOUT STATUS MIGRAINOSUS, NOT INTRACTABLE: Primary | ICD-10-CM

## 2025-05-28 PROCEDURE — 64615 CHEMODENERV MUSC MIGRAINE: CPT | Performed by: OTHER

## 2025-05-28 RX ORDER — FLUOXETINE 10 MG/1
CAPSULE ORAL
COMMUNITY
Start: 2025-05-19

## 2025-05-28 NOTE — PROGRESS NOTES
Paperwork noting that patient may bear financial responsibility for procedure(s) performed in clinic today signed prior to proceeding with procedure(s).    Furthermore, patient notified that they should contact their insurer to verify that your procedure/test has been approved and is a COVERED benefit.  Although the [facility] staff does its due diligence, the insurance carrier gives the disclaimer that \"Although the procedure is authorized, this does not guarantee payment.\"    Ultimately the patient is responsible for payment.    Botox is:  [x] Buy and Bill  [] Patient Supplied      Botox Reauthorization Questions:  Has the patient experienced a reduction in frequency of migraines since starting Botox? yes  If yes, by what percentage? 75%  Has the intensity of migraines decreased since starting Botox? yes  If yes, by what percentage? 75%    [x] I have discussed with patient that if their insurance changes they must contact the office right away with that information so that a new prior authorization can be completed.  Patient verbalized understanding that Botox cannot be performed without a current prior authorization in place with correct insurance.

## 2025-05-28 NOTE — PATIENT INSTRUCTIONS
Refill policies:    Allow 2-3 business days for refills; controlled substances may take longer.  Contact your pharmacy at least 5 days prior to running out of medication and have them send an electronic request or submit request through the “request refill” option in your [employer] account.  Refills are not addressed on weekends; covering physicians do not authorize routine medications on weekends.  No narcotics or controlled substances are refilled after noon on Fridays or by on call physicians.  By law, narcotics must be electronically prescribed.  A 30 day supply with no refills is the maximum allowed.  If your prescription is due for a refill, you may be due for a follow up appointment.  To best provide you care, patients receiving routine medications need to be seen at least once a year.  Patients receiving narcotic/controlled substance medications need to be seen at least once every 3 months.  In the event that your preferred pharmacy does not have the requested medication in stock (e.g. Backordered), it is your responsibility to find another pharmacy that has the requested medication available.  We will gladly send a new prescription to that pharmacy at your request.    Scheduling Tests:    If your physician has ordered radiology tests such as MRI or CT scans, please contact Central Scheduling at [phone] right away to schedule the test.  Once scheduled, the [facility] Centralized Referral Team will work with your insurance carrier to obtain pre-certification or prior authorization.  Depending on your insurance carrier, approval may take 3-10 days.  It is highly recommended patients assure they have received an authorization before having a test performed.  If test is done without insurance authorization, patient may be responsible for the entire amount billed.      Precertification and Prior Authorizations:  If your physician has recommended that you have a procedure or additional testing performed the [facility]  Centralized Referral Team will contact your insurance carrier to obtain pre-certification or prior authorization.    You are strongly encouraged to contact your insurance carrier to verify that your procedure/test has been approved and is a COVERED benefit.  Although the [facility] Centralized Referral Team does its due diligence, the insurance carrier gives the disclaimer that \"Although the procedure is authorized, this does not guarantee payment.\"    Ultimately the patient is responsible for payment.   Thank you for your understanding in this matter.  Paperwork Completion:  If you require FMLA or disability paperwork for your recovery, please make sure to either drop it off or have it faxed to our office at [phone]. Be sure the form has your name and date of birth on it.  The form will be faxed to our Forms Department and they will complete it for you.  There is a 25$ fee for all forms that need to be filled out.  Please be aware there is a 10-14 day turnaround time.  You will need to sign a release of information (VLADIMIR) form if your paperwork does not come with one.  You may call the Forms Department with any questions at [phone].  Their fax number is [phone].

## 2025-06-06 ENCOUNTER — OFFICE VISIT (OUTPATIENT)
Dept: PHYSICAL THERAPY | Age: 65
End: 2025-06-06
Attending: NURSE PRACTITIONER
Payer: MEDICARE

## 2025-06-06 PROCEDURE — 97112 NEUROMUSCULAR REEDUCATION: CPT | Performed by: PHYSICAL THERAPIST

## 2025-06-06 PROCEDURE — 97110 THERAPEUTIC EXERCISES: CPT | Performed by: PHYSICAL THERAPIST

## 2025-06-10 ENCOUNTER — OFFICE VISIT (OUTPATIENT)
Dept: PHYSICAL THERAPY | Age: 65
End: 2025-06-10
Attending: NURSE PRACTITIONER
Payer: MEDICARE

## 2025-06-10 PROCEDURE — 97112 NEUROMUSCULAR REEDUCATION: CPT | Performed by: PHYSICAL THERAPIST

## 2025-06-10 PROCEDURE — 97110 THERAPEUTIC EXERCISES: CPT | Performed by: PHYSICAL THERAPIST

## 2025-06-10 NOTE — PROGRESS NOTES
Patient: Cb Webster (65 year old, female) Referring Provider:  Insurance:   Diagnosis: Arthrodesis status (Z98.1), S/P L4-5 MIS TLIF Mini BRUCE   Date of Surgery: 02/05/2025 Next MD visit:  N/A   Precautions:  -- (Cognitive impairment and slow with processing)) 7/31/2025 Referral Information:    Date of Evaluation: Req: 0, Auth: 0, Exp:     04/10/25 POC Auth Visits:  [id number]       Today's Date   6/10/2025    Subjective  Pt curious if can go over the HEP, & discuss medical fitness center. Awakened early this morning w/ R knee pain. 3/10 w/ walking. Yesterday went up/ down stairs more. Wore a shoe not as supportive as these. Also lower R side of back. Has utilized Voltaren, muscle relaxant (Sat pm).       Pain: 3/10     Objective  see flowsheet for details. Initial increased chest & neck rise w/ diaphragmatic breathing in sitting.       Assessment  Pt in agreement for PT to provide pt w/ understanding of & awareness of how pain science is incorporated to symptoms & w/ beginning education in how to implement for TX: worked w/ diaphragmatic breathing today emphasizing slow outbreath w/ cues for balloon analogy for 360 abdominal expansion. Pt responded positively & favorably to education today.    Goals (to be met in 16 visits)    Not Met Progress  Toward Partially  Met Met   Pt will improve transversus abdominis recruitment to perform proper isometric contraction without requiring verbal or tactile cuing to promote advancement of therex, improvement of core muscles strength to promote lumbar stability with activities that include bending, walking, reaching, transfers and increased LE activity [] [] [x] []   Pt will demonstrate good understanding of proper posture and body mechanics to decrease pain and improve spinal safety [] [x] [] []   Pt will have improvement of muscles flexibility to improve lumbar spine and L hip to WNL to allow increase ease with movement and improve positional tolerance []  [x] [] []   Pt will have decreased paraspinal mm tension to tolerate sitting, standing and walking unrestricted   [] [x] [] []   Pt will demonstrate improved core strength, hips and abdominal muscles strength to 4/5 and improve kinsesthetic and proprioceptive awareness to be able to decrease pain to <2/10 with sitting, standing, walking, bending, carrying and lifting, transfers and LE dressing [] [x] [] []   Pt will be independent and compliant with comprehensive HEP to maintain progress achieved in PT, and be able to utilize HEP to manage future exacerbations    [] [x] [] []        Plan  May cont w/ pain science edu for strategies at mitigating nervous system regulation- for improved function & comfort/ confidence w/ movement & progressions w/ reduced pain levels    Treatment Last 4 Visits  Treatment Day: 14       5/15/2025 5/20/2025 6/6/2025 6/10/2025   Spine Treatment   Therapeutic Exercise NuStep L3 for 6 mins, BLE only    Seated lumbar flexion x 10  SKTC x 5 with 5 secs hold  Piriformis stretches 5x 5 secs hold   Posterior pelvic tilts x 5 with 5 secs hold  Standing:  -slantboard gastroc & soleus stretch heels on ground 3 X 30\" B ea  -sidestepping (lat walk) in  (half) //bars X 8 rounds no HHA w/ YTB  -monster walk (fwd only) in (half) //bars X 8 rounds no HHA w/ YTB  -3-way hip w/ HHA 2 X 10 ea R/L, YTB  -hip flex w/ ER  (similar to standing clam) 2 X 10  R/L, w/ HHA   NuStep L3 for 6 mins, BLE only     Seated lumbar flexion x 10   SKTC x 5 with 5 secs hold   Piriformis stretches 5x 5 secs hold    Posterior pelvic tilts x 5 with 5 secs hold   Standing:   -slantboard gastroc & soleus stretch heels on ground 3 X 30\" B ea   -sidestepping (lat walk) in //bars X 4 rounds no HHA w/ YTB   -monster walk (fwd only) in bars X 4 rounds no HHA w/ YTB   -3-way hip w/ HHA 2 X 10 ea R/L, YTB   -hip flex w/ ER  (similar to standing clam) 2 X 10  R/L, w/ HHA   Roll up yellow ball on wall x 10  Lumbar flexion seated, reach for  ankles 2x 10 NuStep L4 X 6' BLE only  Pt edu: HEP on floor vs on bed: see SOAP note  Standing:   -slantboard gastroc & soleus stretch heels on ground 3 X 30\" B ea  -SB roll up wall 2 X 10  -Sport cord unilateral row w/ rot X 10 R/L green, X 10 R/L blue  -3-way hip w/ HHA 2 X 10 ea R/L, RTB  -monster walk (fwd-bwd) in bars X 4 no HHA w/ RTB HEP review  -hold older given program (knee). Will work w/ most current from [facility]  Pt edu:   -review w/ medical fitness program- may pause for now? Until concluded w/ PT; for consistency  -discuss water based/ aquatics (pt inquiry); could be appropriate- would want guidance/ instruction vs solely self-driven. Safety w/ transfers in/ out. TBD?  -focus on daily goals: incorporate movement; gauge based on how you are feeling each day. Set realistic goals   Neuro Re-Education Balance board a/p, lateral, 2 mins, intermittent support Balance board 1 min  each a/p, lat   Shoulder extensions, rows and lats pull down, small gray tube x 20   Step out paloff press x 20, red tube  Bending down with 5# DB, touch 8\" step x 10   Squat and reach down on the cabinet 15x reach with L, 5x reach with R  Sit to stand chair x 10    Standing:  -Step out paloff press X 10 R/L, red sport cord (single->double, 2 step out w/ single, 1 step out w/ double)  -rockerboard 2 X 1' ea A-P & M-L balance w/ intermittent HHA  -tandem walk on airex balance beam X 4 w/ intermittent HHA  -sidestep on airex balance beam X 4 intermittent HHA Pt edu: nervous system regulation w/ connection to symptoms/ POC:  -pain science connection: utilized drawing for pt to explain pain sensitivity, tolerance, threshold, amplification: w/ edu how pain hypersensitivity can occur & why it is important to manage for pt  -Diaphragmatic Breathing 4/7/8 - HEP   Therapeutic Exercise Minutes 30 25 34 20   Neuro Re-Educ Minutes 4 18 14 24   Manual Therapy Minutes 10      Total Time Of Timed Procedures 44 43 48 44   Total Time Of  Service-Based Procedures 0 0 0 0   Total Treatment Time 44 43 48 44   HEP    Access Code: [email or web address]  URL: [email or web address]  Date: 06/10/2025  Prepared by: Roopa Mena    Exercises  - Supine Single Knee to Chest Stretch  - 2 x daily - 7 x weekly - 5 reps - 10 hold  - Supine Figure 4 Piriformis Stretch  - 2 x daily - 7 x weekly - 1 sets - 5 reps - 10 hold  - Supine Piriformis Stretch with Foot on Ground  - 2 x daily - 7 x weekly - 1 sets - 5 reps - 10 hold  - Seated Flexion Stretch  - 2 x daily - 7 x weekly - 1 sets - 10 reps  - Supine Transversus Abdominis Bracing - Hands on Ground  - 2 x daily - 7 x weekly - 1-2 sets - 10 reps - 5 hold  - Seated Transversus Abdominis Bracing  - 2 x daily - 7 x weekly - 1 sets - 10 reps - 5 hold  - Seated Ankle Inversion Eversion AROM  - 2 x daily - 7 x weekly - 1-2 sets - 10 reps  - Seated Ankle Alphabet  - 2 x daily - 7 x weekly - 1 sets - 1 reps  - Supine Hip Adduction Isometric with Ball  - 1 x daily - 3-4 x weekly - 2 sets - 10 reps - 5 sec hold  - Hooklying Clamshell with Resistance  - 1 x daily - 3-4 x weekly - 2 sets - 10 reps - 5 sec hold  - Heel Toe Raises with Counter Support  - 1 x daily - 3-4 x weekly - 1-2 sets - 10 reps - 1 sec hold  - Standing Row with Anchored Resistance  - 1 x daily - 7 x weekly - 1 sets - 10 reps  - Shoulder extension with resistance - Neutral  - 1 x daily - 7 x weekly - 1 sets - 10 reps  - Supine Diaphragmatic Breathing  - 1-2 x daily - 7 x weekly - 4-7 sec hold - 4 sec inhale - 8 sec exhale - 3-5 min duration        HEP  Access Code: [email or web address]  URL: [email or web address]  Date: 06/10/2025  Prepared by: Roopa Mena    Exercises  - Supine Single Knee to Chest Stretch  - 2 x daily - 7 x weekly - 5 reps - 10 hold  - Supine Figure 4 Piriformis Stretch  - 2 x daily - 7 x weekly - 1 sets - 5 reps - 10 hold  - Supine Piriformis Stretch with Foot on Ground  - 2 x daily - 7 x weekly - 1 sets - 5 reps - 10 hold  -  Seated Flexion Stretch  - 2 x daily - 7 x weekly - 1 sets - 10 reps  - Supine Transversus Abdominis Bracing - Hands on Ground  - 2 x daily - 7 x weekly - 1-2 sets - 10 reps - 5 hold  - Seated Transversus Abdominis Bracing  - 2 x daily - 7 x weekly - 1 sets - 10 reps - 5 hold  - Seated Ankle Inversion Eversion AROM  - 2 x daily - 7 x weekly - 1-2 sets - 10 reps  - Seated Ankle Alphabet  - 2 x daily - 7 x weekly - 1 sets - 1 reps  - Supine Hip Adduction Isometric with Ball  - 1 x daily - 3-4 x weekly - 2 sets - 10 reps - 5 sec hold  - Hooklying Clamshell with Resistance  - 1 x daily - 3-4 x weekly - 2 sets - 10 reps - 5 sec hold  - Heel Toe Raises with Counter Support  - 1 x daily - 3-4 x weekly - 1-2 sets - 10 reps - 1 sec hold  - Standing Row with Anchored Resistance  - 1 x daily - 7 x weekly - 1 sets - 10 reps  - Shoulder extension with resistance - Neutral  - 1 x daily - 7 x weekly - 1 sets - 10 reps  - Supine Diaphragmatic Breathing  - 1-2 x daily - 7 x weekly - 4-7 sec hold - 4 sec inhale - 8 sec exhale - 3-5 min duration    Charges  1TE, 1NMR

## 2025-06-18 ENCOUNTER — PATIENT MESSAGE (OUTPATIENT)
Dept: FAMILY MEDICINE CLINIC | Facility: CLINIC | Age: 65
End: 2025-06-18

## 2025-06-23 ENCOUNTER — APPOINTMENT (OUTPATIENT)
Dept: PHYSICAL THERAPY | Age: 65
End: 2025-06-23
Attending: NURSE PRACTITIONER
Payer: MEDICARE

## 2025-06-23 NOTE — PROCEDURES
Procedure: Botox injection -chemodenervation  Consent: obtained after explanation of procedure detail, benefits and risks     Indication:   -chronic migraine patient who suffers 15 or more days with headache lasting 4 hours a day or longer.      Procedu vomiting

## 2025-06-25 ENCOUNTER — OFFICE VISIT (OUTPATIENT)
Dept: PHYSICAL THERAPY | Age: 65
End: 2025-06-25
Attending: NURSE PRACTITIONER
Payer: MEDICARE

## 2025-06-25 PROCEDURE — 97110 THERAPEUTIC EXERCISES: CPT | Performed by: PHYSICAL THERAPIST

## 2025-06-25 PROCEDURE — 97112 NEUROMUSCULAR REEDUCATION: CPT | Performed by: PHYSICAL THERAPIST

## 2025-06-25 NOTE — PROGRESS NOTES
Patient: Cb Webster (65 year old, female) Referring Provider:  Insurance:   Diagnosis: Arthrodesis status (Z98.1), S/P L4-5 MIS TLIF Minimartin Donatogerri BRUCE   Date of Surgery: 02/05/2025 Next MD visit:  N/A   Precautions:  -- (Cognitive impairment and slow with processing)) 7/31/2025 Referral Information:    Date of Evaluation: Req: 0, Auth: 0, Exp:     04/10/25 POC Auth Visits:  16       Today's Date   6/25/2025    Subjective  Pt feels the discussion about pain was really helpful last time. Pt reports feeling maybe a plateau. Pt reports R sided LBP, B medial knee pain, R foot pain. Pt may look into getting a new mattress, feels she needs it. Pt feels she has range of motion; pt feels some limitation by pain level- has yet to reduce it from 3/10 to lower. Pt reports she has not yet pushed herself to lift heavier weight/ amount - still working w/ lighter weight around 15#.       Pain: 3/10     Objective  see flowsheet for details       Assessment  Pt cued to keep shoulders down with lifting 5# dumbbell from table. Improved mechanics noted with cues. Pt reported min increase of pain to 4/10 post shuttle ex's however back down to 2/10 after seated piriformis stretch.    Goals (to be met in 16 visits)      Not Met Progress  Toward Partially  Met Met   Pt will improve transversus abdominis recruitment to perform proper isometric contraction without requiring verbal or tactile cuing to promote advancement of therex, improvement of core muscles strength to promote lumbar stability with activities that include bending, walking, reaching, transfers and increased LE activity [] [] [x] []   Pt will demonstrate good understanding of proper posture and body mechanics to decrease pain and improve spinal safety [] [x] [] []   Pt will have improvement of muscles flexibility to improve lumbar spine and L hip to WNL to allow increase ease with movement and improve positional tolerance [] [x] [] []   Pt will have  decreased paraspinal mm tension to tolerate sitting, standing and walking unrestricted   [] [x] [] []   Pt will demonstrate improved core strength, hips and abdominal muscles strength to 4/5 and improve kinsesthetic and proprioceptive awareness to be able to decrease pain to <2/10 with sitting, standing, walking, bending, carrying and lifting, transfers and LE dressing [] [x] [] []   Pt will be independent and compliant with comprehensive HEP to maintain progress achieved in PT, and be able to utilize HEP to manage future exacerbations    [] [x] [] []          Plan  1 more visit scheduled & planned. Will perform re-assessment next visit for possible discharge after next session.    Treatment Last 4 Visits  Treatment Day: 15       5/20/2025 6/6/2025 6/10/2025 6/25/2025   Spine Treatment   Therapeutic Exercise NuStep L3 for 6 mins, BLE only     Seated lumbar flexion x 10   SKTC x 5 with 5 secs hold   Piriformis stretches 5x 5 secs hold    Posterior pelvic tilts x 5 with 5 secs hold   Standing:   -slantboard gastroc & soleus stretch heels on ground 3 X 30\" B ea   -sidestepping (lat walk) in //bars X 4 rounds no HHA w/ YTB   -monster walk (fwd only) in bars X 4 rounds no HHA w/ YTB   -3-way hip w/ HHA 2 X 10 ea R/L, YTB   -hip flex w/ ER  (similar to standing clam) 2 X 10  R/L, w/ HHA   Roll up yellow ball on wall x 10  Lumbar flexion seated, reach for ankles 2x 10 NuStep L4 X 6' BLE only  Pt edu: HEP on floor vs on bed: see SOAP note  Standing:   -slantboard gastroc & soleus stretch heels on ground 3 X 30\" B ea  -SB roll up wall 2 X 10  -Sport cord unilateral row w/ rot X 10 R/L green, X 10 R/L blue  -3-way hip w/ HHA 2 X 10 ea R/L, RTB  -monster walk (fwd-bwd) in bars X 4 no HHA w/ RTB HEP review  -hold older given program (knee). Will work w/ most current from [facility]  Pt edu:   -review w/ medical fitness program- may pause for now? Until concluded w/ PT; for consistency  -discuss water based/ aquatics (pt  inquiry); could be appropriate- would want guidance/ instruction vs solely self-driven. Safety w/ transfers in/ out. TBD?  -focus on daily goals: incorporate movement; gauge based on how you are feeling each day. Set realistic goals Discharge planning: Pt edu to self-assess before next visit  NuStep L4 X 6'  Pt edu: mattress, plateau, healing/ recovery times/ norms  Shuttle:  -DLP: w/ hip Add whit yellow ball 25#-31# 2 X 10  -DLP: w/ hip Abd whit Blue TB above knee 31# 2 X 10  Seated:  -figure-4 piriformis stretch 2 X 30\" R   Neuro Re-Education Balance board 1 min  each a/p, lat   Shoulder extensions, rows and lats pull down, small gray tube x 20   Step out paloff press x 20, red tube  Bending down with 5# DB, touch 8\" step x 10   Squat and reach down on the cabinet 15x reach with L, 5x reach with R  Sit to stand chair x 10    Standing:  -Step out paloff press X 10 R/L, red sport cord (single->double, 2 step out w/ single, 1 step out w/ double)  -rockerboard 2 X 1' ea A-P & M-L balance w/ intermittent HHA  -tandem walk on airex balance beam X 4 w/ intermittent HHA  -sidestep on airex balance beam X 4 intermittent HHA Pt edu: nervous system regulation w/ connection to symptoms/ POC:  -pain science connection: utilized drawing for pt to explain pain sensitivity, tolerance, threshold, amplification: w/ edu how pain hypersensitivity can occur & why it is important to manage for pt  -Diaphragmatic Breathing 4/7/8 - HEP Lifting & breathing mechanics: \"exhale w/ exertion\", w/ abdominal bracing:  -lift low tableside: squat w/ hip hinge 5# DB X 15; w/ 10# (2 5# DB's) X 15  Cues to bring self to weight to keep small distance between self & weight  -advance to lift & walk 20 ft holding 2 5# DB's w/ abdominal bracing & breathing mechanics sustained; X 3 laps (from low tableside to chair)   Therapeutic Exercise Minutes 25 34 20 27   Neuro Re-Educ Minutes 18 14 24 16   Total Time Of Timed Procedures 43 48 44 43   Total Time Of  Service-Based Procedures 0 0 0 0   Total Treatment Time 43 48 44 43   HEP   Access Code: [email or web address]  URL: [email or web address]  Date: 06/10/2025  Prepared by: Roopa Mena    Exercises  - Supine Single Knee to Chest Stretch  - 2 x daily - 7 x weekly - 5 reps - 10 hold  - Supine Figure 4 Piriformis Stretch  - 2 x daily - 7 x weekly - 1 sets - 5 reps - 10 hold  - Supine Piriformis Stretch with Foot on Ground  - 2 x daily - 7 x weekly - 1 sets - 5 reps - 10 hold  - Seated Flexion Stretch  - 2 x daily - 7 x weekly - 1 sets - 10 reps  - Supine Transversus Abdominis Bracing - Hands on Ground  - 2 x daily - 7 x weekly - 1-2 sets - 10 reps - 5 hold  - Seated Transversus Abdominis Bracing  - 2 x daily - 7 x weekly - 1 sets - 10 reps - 5 hold  - Seated Ankle Inversion Eversion AROM  - 2 x daily - 7 x weekly - 1-2 sets - 10 reps  - Seated Ankle Alphabet  - 2 x daily - 7 x weekly - 1 sets - 1 reps  - Supine Hip Adduction Isometric with Ball  - 1 x daily - 3-4 x weekly - 2 sets - 10 reps - 5 sec hold  - Hooklying Clamshell with Resistance  - 1 x daily - 3-4 x weekly - 2 sets - 10 reps - 5 sec hold  - Heel Toe Raises with Counter Support  - 1 x daily - 3-4 x weekly - 1-2 sets - 10 reps - 1 sec hold  - Standing Row with Anchored Resistance  - 1 x daily - 7 x weekly - 1 sets - 10 reps  - Shoulder extension with resistance - Neutral  - 1 x daily - 7 x weekly - 1 sets - 10 reps  - Supine Diaphragmatic Breathing  - 1-2 x daily - 7 x weekly - 4-7 sec hold - 4 sec inhale - 8 sec exhale - 3-5 min duration         HEP  Access Code: [email or web address]  URL: [email or web address]  Date: 06/10/2025  Prepared by: Roopa Mena    Exercises  - Supine Single Knee to Chest Stretch  - 2 x daily - 7 x weekly - 5 reps - 10 hold  - Supine Figure 4 Piriformis Stretch  - 2 x daily - 7 x weekly - 1 sets - 5 reps - 10 hold  - Supine Piriformis Stretch with Foot on Ground  - 2 x daily - 7 x weekly - 1 sets - 5 reps - 10 hold  -  Seated Flexion Stretch  - 2 x daily - 7 x weekly - 1 sets - 10 reps  - Supine Transversus Abdominis Bracing - Hands on Ground  - 2 x daily - 7 x weekly - 1-2 sets - 10 reps - 5 hold  - Seated Transversus Abdominis Bracing  - 2 x daily - 7 x weekly - 1 sets - 10 reps - 5 hold  - Seated Ankle Inversion Eversion AROM  - 2 x daily - 7 x weekly - 1-2 sets - 10 reps  - Seated Ankle Alphabet  - 2 x daily - 7 x weekly - 1 sets - 1 reps  - Supine Hip Adduction Isometric with Ball  - 1 x daily - 3-4 x weekly - 2 sets - 10 reps - 5 sec hold  - Hooklying Clamshell with Resistance  - 1 x daily - 3-4 x weekly - 2 sets - 10 reps - 5 sec hold  - Heel Toe Raises with Counter Support  - 1 x daily - 3-4 x weekly - 1-2 sets - 10 reps - 1 sec hold  - Standing Row with Anchored Resistance  - 1 x daily - 7 x weekly - 1 sets - 10 reps  - Shoulder extension with resistance - Neutral  - 1 x daily - 7 x weekly - 1 sets - 10 reps  - Supine Diaphragmatic Breathing  - 1-2 x daily - 7 x weekly - 4-7 sec hold - 4 sec inhale - 8 sec exhale - 3-5 min duration    Charges  2TE, 1NMR

## 2025-06-27 ENCOUNTER — OFFICE VISIT (OUTPATIENT)
Dept: PHYSICAL THERAPY | Age: 65
End: 2025-06-27
Attending: NURSE PRACTITIONER
Payer: MEDICARE

## 2025-06-27 PROCEDURE — 97110 THERAPEUTIC EXERCISES: CPT | Performed by: PHYSICAL THERAPIST

## 2025-06-27 NOTE — PROGRESS NOTES
Patient: Cb Webster (65 year old, female) Referring Provider:  Insurance:   Diagnosis: Arthrodesis status (Z98.1), S/P L4-5 MIS TLIF Mini De [place]za  BEVERLY BRUCE   Date of Surgery: 02/05/2025 Next MD visit:  N/A   Precautions:  -- (Cognitive impairment and slow with processing)) 7/31/2025 Referral Information:    Date of Evaluation: Req: 0, Auth: 0, Exp:     04/10/25 POC Auth Visits:  20       Progress Summary  Pt has attended 16 visits in Physical Therapy.     Today's Date   6/27/2025    Subjective  Pt reports feeling better than when she came in on Weds. Pt paid attention to how she was feeling, what she was doing last couple of days. Realized she cannot kneel 2/2 B knee pain or bend down to pull weeds 2/2 knee pain. The longer she is bending down at the waist it is bothersome to the back. Pt bends her knees when she bends. More on the inner knees where she feels the pain. \"One movement I make that is painful w/ sitting down or getting into bed.\" Is able to walk 1.5 mi though is more limited by knees than back. Able to engage in more repetitive movements such as w/ , & reaching. Will be traveling leaving 7/3 coming back around the 15th, expected to be in the car 6-8 hrs. Pt would be more confident closing out if she did not have the upcoming trip.       Pain: pain not reported    Objective  see flowsheet for details      Musculoskeletal:  Observation/Posture: overall improved upright posture noted in seated & stance  Palpation: no significant restriction or abnormal tension to B LPSM in prone    Strength:  (* denotes performed with pain)  Hip   MMT (-/5)    R L     Ext  4/5 4/5     Abd 4/5 4/5      Flexibility:  LE Flexibility R L     Hamstrings Min restricted Min restricted     Piriformis Min restricted Min restricted     Gastroc-soleus Min restricted Min restricted      Balance and Functional Mobility:  Gait: pt ambulates on level ground with mild trendelenburg/waddle, decreased trunk rot R/L,  no antalgia     Assessment  Pt has responded favorably to skilled PT & currently is approaching readiness for discharge for continued self-management via independent HEP. Pt has some hesitancy w/ discharge before her upcoming travel. In light of this, in order to provide pt w/ the confidence & full understanding of self-management of symptoms, pt will benefit from consult w/ PT upon return home in order to confirm pt is still appropriate for upcoming discharge from lumbar POC.    Goals (to be met in 20 visits)    Not Met Progress  Toward Partially  Met Met   Pt will improve transversus abdominis recruitment to perform proper isometric contraction without requiring verbal or tactile cuing to promote advancement of therex, improvement of core muscles strength to promote lumbar stability with activities that include bending, walking, reaching, transfers and increased LE activity [] [] [x] []   Pt will demonstrate good understanding of proper posture and body mechanics to decrease pain and improve spinal safety [] [] [] [x]   Pt will have improvement of muscles flexibility to improve lumbar spine and L hip to WNL to allow increase ease with movement and improve positional tolerance [] [x] [] []   Pt will have decreased paraspinal mm tension to tolerate sitting, standing and walking unrestricted   [] [] [x] []   Pt will demonstrate improved core strength, hips and abdominal muscles strength to 4/5 and improve kinsesthetic and proprioceptive awareness to be able to decrease pain to <2/10 with sitting, standing, walking, bending, carrying and lifting, transfers and LE dressing [] [] [x] []   Pt will be independent and compliant with comprehensive HEP to maintain progress achieved in PT, and be able to utilize HEP to manage future exacerbations    [] [] [x] []        Plan  Pt traveling for several weeks. Approaching discharge readiness. Will use time traveling as a \"trial discharge\" to test pt readiness & ability to  maintain & manage symptoms via comprehensive HEP. Pt advised she may reach out to PTs via [facility] during time away from PT if she has non-urgent questions/ concerns. Pt advised if she feels she does not require any PT upon coming home, may cancel next 2 planned visits. Otherwise, pending pt feedback & presentation next visit, anticipate re-assessment next visit w/ likely discharge from lumbar POC next 1-2 visits. Discussed possible knee POC pending pt follow up w/ knee orthopedic physician.    Continue skilled Physical Therapy 1-2 x/week or a total of 2-4 visits over a 90 day period. Treatment will include: Gait training; Manual Therapy; Neuromuscular Re-education; Therapeutic Activities; Therapeutic Exercise; Home Exercise Program instruction       Patient/Family/Caregiver was advised of these findings, precautions, and treatment options and has agreed to actively participate in planning and for this course of care.    Thank you for your referral. If you have any questions, please contact me at Dept: [phone].    Sincerely,  Electronically signed by therapist: Roopa Mena, PT     Physician's certification required:  Yes  Please co-sign or sign and return this letter via fax as soon as possible to [phone].   I certify the need for these services furnished under this plan of treatment and while under my care.    X___________________________________________________ Date____________________    Certification From: 6/27/2025  To:9/25/2025     Treatment Last 4 Visits  Treatment Day: 16       6/6/2025 6/10/2025 6/25/2025 6/27/2025   Spine Treatment   Therapeutic Exercise NuStep L4 X 6' BLE only  Pt edu: HEP on floor vs on bed: see SOAP note  Standing:   -slantboard gastroc & soleus stretch heels on ground 3 X 30\" B ea  -SB roll up wall 2 X 10  -Sport cord unilateral row w/ rot X 10 R/L green, X 10 R/L blue  -3-way hip w/ HHA 2 X 10 ea R/L, RTB  -monster walk (fwd-bwd) in bars X 4 no HHA w/ RTB HEP review  -hold  older given program (knee). Will work w/ most current from Roopa Duarte  Pt edu:   -review w/ medical fitness program- may pause for now? Until concluded w/ PT; for consistency  -discuss water based/ aquatics (pt inquiry); could be appropriate- would want guidance/ instruction vs solely self-driven. Safety w/ transfers in/ out. TBD?  -focus on daily goals: incorporate movement; gauge based on how you are feeling each day. Set realistic goals Discharge planning: Pt edu to self-assess before next visit  NuStep L4 X 6'  Pt edu: mattress, plateau, healing/ recovery times/ norms  Shuttle:  -DLP: w/ hip Add whit yellow ball 25#-31# 2 X 10  -DLP: w/ hip Abd whit Blue TB above knee 31# 2 X 10  Seated:  -figure-4 piriformis stretch 2 X 30\" R Re-assessment  -including review of pt transitional movement that creates pain w/ pt edu recommending avoiding/ limiting sitting on the LE  Discharge planning  -pt edu: potential next steps from a knee & back perspective. Refer to ortho for knee, possible PT (needs to discuss w/ physician)?  Pt edu: managing long car ride.   -Making sure she has clearance: Pt reports she was cleared by her back surgeon to travel.  -Avoid sitting in car > 2 hrs. Get up at least every 2 hrs. May need to be sooner pending pt symptoms: maybe every 1 hr. Ankle pumps while sitting in the car. When gets out pt should complete self stretches w/ small amount of walking and/ or heel-toe raise. Stay hydrated. Plan to rest, ice, elevate BLE if need be for \"recovery\". Ask physician about compression stocking wear. Complete HEP as able (build it in). Pt realistic expectations: potential stiffness/ soreness from the travel.   Neuro Re-Education Standing:  -Step out paloff press X 10 R/L, red sport cord (single->double, 2 step out w/ single, 1 step out w/ double)  -rockerboard 2 X 1' ea A-P & M-L balance w/ intermittent HHA  -tandem walk on airex balance beam X 4 w/ intermittent HHA  -sidestep on airex balance beam X 4  intermittent HHA Pt edu: nervous system regulation w/ connection to symptoms/ POC:  -pain science connection: utilized drawing for pt to explain pain sensitivity, tolerance, threshold, amplification: w/ edu how pain hypersensitivity can occur & why it is important to manage for pt  -Diaphragmatic Breathing 4/7/8 - HEP Lifting & breathing mechanics: \"exhale w/ exertion\", w/ abdominal bracing:  -lift low tableside: squat w/ hip hinge 5# DB X 15; w/ 10# (2 5# DB's) X 15  Cues to bring self to weight to keep small distance between self & weight  -advance to lift & walk 20 ft holding 2 5# DB's w/ abdominal bracing & breathing mechanics sustained; X 3 laps (from low tableside to chair)    Therapeutic Exercise Minutes 34 20 27 43   Neuro Re-Educ Minutes 14 24 16    Total Time Of Timed Procedures 48 44 43 43   Total Time Of Service-Based Procedures 0 0 0 0   Total Treatment Time 48 44 43 43   HEP  Access Code: [email or web address]  URL: [email or web address]  Date: 06/10/2025  Prepared by: Roopa Mena    Exercises  - Supine Single Knee to Chest Stretch  - 2 x daily - 7 x weekly - 5 reps - 10 hold  - Supine Figure 4 Piriformis Stretch  - 2 x daily - 7 x weekly - 1 sets - 5 reps - 10 hold  - Supine Piriformis Stretch with Foot on Ground  - 2 x daily - 7 x weekly - 1 sets - 5 reps - 10 hold  - Seated Flexion Stretch  - 2 x daily - 7 x weekly - 1 sets - 10 reps  - Supine Transversus Abdominis Bracing - Hands on Ground  - 2 x daily - 7 x weekly - 1-2 sets - 10 reps - 5 hold  - Seated Transversus Abdominis Bracing  - 2 x daily - 7 x weekly - 1 sets - 10 reps - 5 hold  - Seated Ankle Inversion Eversion AROM  - 2 x daily - 7 x weekly - 1-2 sets - 10 reps  - Seated Ankle Alphabet  - 2 x daily - 7 x weekly - 1 sets - 1 reps  - Supine Hip Adduction Isometric with Ball  - 1 x daily - 3-4 x weekly - 2 sets - 10 reps - 5 sec hold  - Hooklying Clamshell with Resistance  - 1 x daily - 3-4 x weekly - 2 sets - 10 reps - 5 sec hold  -  Heel Toe Raises with Counter Support  - 1 x daily - 3-4 x weekly - 1-2 sets - 10 reps - 1 sec hold  - Standing Row with Anchored Resistance  - 1 x daily - 7 x weekly - 1 sets - 10 reps  - Shoulder extension with resistance - Neutral  - 1 x daily - 7 x weekly - 1 sets - 10 reps  - Supine Diaphragmatic Breathing  - 1-2 x daily - 7 x weekly - 4-7 sec hold - 4 sec inhale - 8 sec exhale - 3-5 min duration          HEP  Access Code: [email or web address]  URL: [email or web address]  Date: 06/10/2025  Prepared by: Roopa Mena    Exercises  - Supine Single Knee to Chest Stretch  - 2 x daily - 7 x weekly - 5 reps - 10 hold  - Supine Figure 4 Piriformis Stretch  - 2 x daily - 7 x weekly - 1 sets - 5 reps - 10 hold  - Supine Piriformis Stretch with Foot on Ground  - 2 x daily - 7 x weekly - 1 sets - 5 reps - 10 hold  - Seated Flexion Stretch  - 2 x daily - 7 x weekly - 1 sets - 10 reps  - Supine Transversus Abdominis Bracing - Hands on Ground  - 2 x daily - 7 x weekly - 1-2 sets - 10 reps - 5 hold  - Seated Transversus Abdominis Bracing  - 2 x daily - 7 x weekly - 1 sets - 10 reps - 5 hold  - Seated Ankle Inversion Eversion AROM  - 2 x daily - 7 x weekly - 1-2 sets - 10 reps  - Seated Ankle Alphabet  - 2 x daily - 7 x weekly - 1 sets - 1 reps  - Supine Hip Adduction Isometric with Ball  - 1 x daily - 3-4 x weekly - 2 sets - 10 reps - 5 sec hold  - Hooklying Clamshell with Resistance  - 1 x daily - 3-4 x weekly - 2 sets - 10 reps - 5 sec hold  - Heel Toe Raises with Counter Support  - 1 x daily - 3-4 x weekly - 1-2 sets - 10 reps - 1 sec hold  - Standing Row with Anchored Resistance  - 1 x daily - 7 x weekly - 1 sets - 10 reps  - Shoulder extension with resistance - Neutral  - 1 x daily - 7 x weekly - 1 sets - 10 reps  - Supine Diaphragmatic Breathing  - 1-2 x daily - 7 x weekly - 4-7 sec hold - 4 sec inhale - 8 sec exhale - 3-5 min duration    Charges  3TE

## 2025-07-09 DIAGNOSIS — E78.00 HYPERCHOLESTEREMIA: ICD-10-CM

## 2025-07-09 DIAGNOSIS — I25.10 CORONARY ARTERY CALCIFICATION: ICD-10-CM

## 2025-07-09 RX ORDER — ROSUVASTATIN CALCIUM 5 MG/1
5 TABLET, COATED ORAL NIGHTLY
Qty: 90 TABLET | Refills: 0 | Status: SHIPPED | OUTPATIENT
Start: 2025-07-09

## 2025-07-09 NOTE — TELEPHONE ENCOUNTER
Name from pharmacy: ROSUVASTATIN CALCIUM 5 MG TAB         Will file in chart as: ROSUVASTATIN 5 MG Oral Tab    Sig: TAKE 1 TABLET BY MOUTH EVERY DAY AT NIGHT    Disp: 90 tablet    Refills: 0 (Pharmacy requested: Not specified)    Start: 7/9/2025    Class: Normal    Non-formulary For: Hypercholesteremia, Coronary artery calcification    Last ordered: 2 months ago (4/14/2025) by Colton Schaefer MD    Last refill: 4/14/2025    Rx #: [id number]    Cholesterol Medication Protocol [id number]/09/2025 07:31 AM   Protocol Details ALT < 80    ALT resulted within past year    Lipid panel within past 12 months    In person appointment or virtual visit in the past 12 mos or appointment in next 3 mos    Medication is active on med list      To be filled at: [facility]/pharmacy #[id number] - [place], [place] - [address] AT [facility], [phone], [phone]

## 2025-07-14 ENCOUNTER — PATIENT MESSAGE (OUTPATIENT)
Dept: NEUROLOGY | Facility: CLINIC | Age: 65
End: 2025-07-14

## 2025-07-14 DIAGNOSIS — G43.709 CHRONIC MIGRAINE WITHOUT AURA WITHOUT STATUS MIGRAINOSUS, NOT INTRACTABLE: Primary | ICD-10-CM

## 2025-07-14 RX ORDER — PROPRANOLOL HYDROCHLORIDE 80 MG/1
80 CAPSULE, EXTENDED RELEASE ORAL NIGHTLY
Qty: 30 CAPSULE | Refills: 0 | Status: SHIPPED | OUTPATIENT
Start: 2025-07-14 | End: 2025-08-13

## 2025-07-14 NOTE — TELEPHONE ENCOUNTER
Medication: Propranolol ER 80mg      Date of last refill)over one year ago see 4/24/24 TE  [place]     Last office visit: 5/28/25  Due back to clinic per last office note:  3  months  Date next office visit scheduled:    Future Appointments   Date Time Provider Department Center   7/16/2025  8:30 AM Last, [facility]   7/21/2025  9:00 AM Colton Schaefer MD [facility] [address] [facility] [address]   7/31/2025  8:00 AM Ismael Perez MD [facility] ORTHO 75 [facility]   7/31/2025  3:45 PM Felicia Harris, PT PF PT [place]   8/5/2025 10:00 AM Roopa Mena, PT PF [facility] [place]   8/29/2025  9:30 AM James Betancourt MD ENINAPER [facility] [place]   9/11/2025 10:20 AM Jackelin Maloney MD [facility] [address]   1/9/2026  9:00 AM Juliana Triana, PAGarettC [id number] [facility] [address]           Last OV note recommendation:  Last office note was botox injections 5/28/25

## 2025-07-16 PROBLEM — J02.9 ACUTE PHARYNGITIS, UNSPECIFIED: Status: RESOLVED | Noted: 2024-08-12 | Resolved: 2025-07-16

## 2025-07-16 PROBLEM — R60.9 LIPEDEMA: Status: ACTIVE | Noted: 2025-04-01

## 2025-07-16 PROBLEM — S06.9X9S UNSPECIFIED INTRACRANIAL INJURY WITH LOSS OF CONSCIOUSNESS OF UNSPECIFIED DURATION, SEQUELA: Status: RESOLVED | Noted: 2024-08-12 | Resolved: 2025-07-16

## 2025-07-16 PROBLEM — N30.00 ACUTE CYSTITIS WITHOUT HEMATURIA: Status: ACTIVE | Noted: 2025-07-16

## 2025-07-16 PROBLEM — J18.9 PNEUMONIA, UNSPECIFIED ORGANISM: Status: RESOLVED | Noted: 2024-08-12 | Resolved: 2025-07-16

## 2025-07-16 PROBLEM — B07.9 VIRAL WART, UNSPECIFIED: Status: RESOLVED | Noted: 2024-08-12 | Resolved: 2025-07-16

## 2025-07-17 ENCOUNTER — LAB ENCOUNTER (OUTPATIENT)
Dept: LAB | Facility: HOSPITAL | Age: 65
End: 2025-07-17
Attending: STUDENT IN AN ORGANIZED HEALTH CARE EDUCATION/TRAINING PROGRAM
Payer: MEDICARE

## 2025-07-17 DIAGNOSIS — I25.10 CORONARY ARTERY CALCIFICATION: ICD-10-CM

## 2025-07-17 DIAGNOSIS — R10.13 EPIGASTRIC PAIN: ICD-10-CM

## 2025-07-17 DIAGNOSIS — E78.00 HYPERCHOLESTEREMIA: ICD-10-CM

## 2025-07-17 DIAGNOSIS — R10.12 LUQ PAIN: ICD-10-CM

## 2025-07-17 DIAGNOSIS — R10.33 PERIUMBILICAL PAIN: ICD-10-CM

## 2025-07-17 DIAGNOSIS — R10.11 RUQ PAIN: ICD-10-CM

## 2025-07-17 LAB
ALBUMIN SERPL-MCNC: 4.3 G/DL (ref 3.2–4.8)
ALBUMIN/GLOB SERPL: 1.8 {RATIO} (ref 1–2)
ALP LIVER SERPL-CCNC: 70 U/L (ref 50–130)
ALT SERPL-CCNC: <7 U/L (ref 10–49)
ANION GAP SERPL CALC-SCNC: 5 MMOL/L (ref 0–18)
AST SERPL-CCNC: 19 U/L (ref ?–34)
BASOPHILS # BLD AUTO: 0.02 X10(3) UL (ref 0–0.2)
BASOPHILS NFR BLD AUTO: 0.3 %
BILIRUB SERPL-MCNC: 0.5 MG/DL (ref 0.2–1.1)
BUN BLD-MCNC: 13 MG/DL (ref 9–23)
CALCIUM BLD-MCNC: 8.5 MG/DL (ref 8.7–10.6)
CHLORIDE SERPL-SCNC: 105 MMOL/L (ref 98–112)
CHOLEST SERPL-MCNC: 147 MG/DL (ref ?–200)
CO2 SERPL-SCNC: 30 MMOL/L (ref 21–32)
CREAT BLD-MCNC: 0.91 MG/DL (ref 0.55–1.02)
EGFRCR SERPLBLD CKD-EPI 2021: 70 ML/MIN/1.73M2 (ref 60–?)
EOSINOPHIL # BLD AUTO: 0.13 X10(3) UL (ref 0–0.7)
EOSINOPHIL NFR BLD AUTO: 1.7 %
ERYTHROCYTE [DISTWIDTH] IN BLOOD BY AUTOMATED COUNT: 13.6 %
FASTING PATIENT LIPID ANSWER: NO
FASTING STATUS PATIENT QL REPORTED: NO
GLOBULIN PLAS-MCNC: 2.4 G/DL (ref 2–3.5)
GLUCOSE BLD-MCNC: 83 MG/DL (ref 70–99)
HCT VFR BLD AUTO: 37.2 % (ref 35–48)
HDLC SERPL-MCNC: 77 MG/DL (ref 40–59)
HGB BLD-MCNC: 12.1 G/DL (ref 12–16)
IMM GRANULOCYTES # BLD AUTO: 0.02 X10(3) UL (ref 0–1)
IMM GRANULOCYTES NFR BLD: 0.3 %
LDLC SERPL CALC-MCNC: 56 MG/DL (ref ?–100)
LYMPHOCYTES # BLD AUTO: 1.57 X10(3) UL (ref 1–4)
LYMPHOCYTES NFR BLD AUTO: 21 %
MCH RBC QN AUTO: 29 PG (ref 26–34)
MCHC RBC AUTO-ENTMCNC: 32.5 G/DL (ref 31–37)
MCV RBC AUTO: 89.2 FL (ref 80–100)
MONOCYTES # BLD AUTO: 0.81 X10(3) UL (ref 0.1–1)
MONOCYTES NFR BLD AUTO: 10.8 %
NEUTROPHILS # BLD AUTO: 4.92 X10 (3) UL (ref 1.5–7.7)
NEUTROPHILS # BLD AUTO: 4.92 X10(3) UL (ref 1.5–7.7)
NEUTROPHILS NFR BLD AUTO: 65.9 %
NONHDLC SERPL-MCNC: 70 MG/DL (ref ?–130)
OSMOLALITY SERPL CALC.SUM OF ELEC: 289 MOSM/KG (ref 275–295)
PLATELET # BLD AUTO: 222 10(3)UL (ref 150–450)
POTASSIUM SERPL-SCNC: 3.8 MMOL/L (ref 3.5–5.1)
PROT SERPL-MCNC: 6.7 G/DL (ref 5.7–8.2)
RBC # BLD AUTO: 4.17 X10(6)UL (ref 3.8–5.3)
SODIUM SERPL-SCNC: 140 MMOL/L (ref 136–145)
TRIGL SERPL-MCNC: 69 MG/DL (ref 30–149)
VLDLC SERPL CALC-MCNC: 10 MG/DL (ref 0–30)
WBC # BLD AUTO: 7.5 X10(3) UL (ref 4–11)

## 2025-07-17 PROCEDURE — 80061 LIPID PANEL: CPT

## 2025-07-17 PROCEDURE — 36415 COLL VENOUS BLD VENIPUNCTURE: CPT

## 2025-07-17 PROCEDURE — 80053 COMPREHEN METABOLIC PANEL: CPT

## 2025-07-17 PROCEDURE — 85025 COMPLETE CBC W/AUTO DIFF WBC: CPT

## 2025-07-18 DIAGNOSIS — Z98.1 STATUS POST LUMBAR SPINAL FUSION: Primary | ICD-10-CM

## 2025-07-21 ENCOUNTER — OFFICE VISIT (OUTPATIENT)
Dept: FAMILY MEDICINE CLINIC | Facility: CLINIC | Age: 65
End: 2025-07-21
Payer: MEDICARE

## 2025-07-21 VITALS
BODY MASS INDEX: 36.25 KG/M2 | HEIGHT: 61 IN | DIASTOLIC BLOOD PRESSURE: 70 MMHG | RESPIRATION RATE: 16 BRPM | SYSTOLIC BLOOD PRESSURE: 118 MMHG | TEMPERATURE: 97 F | WEIGHT: 192 LBS | HEART RATE: 70 BPM

## 2025-07-21 DIAGNOSIS — R60.9 LIPEDEMA: Primary | ICD-10-CM

## 2025-07-21 DIAGNOSIS — E66.812 CLASS 2 OBESITY DUE TO EXCESS CALORIES WITHOUT SERIOUS COMORBIDITY WITH BODY MASS INDEX (BMI) OF 39.0 TO 39.9 IN ADULT: ICD-10-CM

## 2025-07-21 DIAGNOSIS — E78.00 HYPERCHOLESTEREMIA: ICD-10-CM

## 2025-07-21 DIAGNOSIS — I25.10 CORONARY ARTERY CALCIFICATION: ICD-10-CM

## 2025-07-21 DIAGNOSIS — E66.09 CLASS 2 OBESITY DUE TO EXCESS CALORIES WITHOUT SERIOUS COMORBIDITY WITH BODY MASS INDEX (BMI) OF 39.0 TO 39.9 IN ADULT: ICD-10-CM

## 2025-07-21 PROCEDURE — 99214 OFFICE O/P EST MOD 30 MIN: CPT | Performed by: STUDENT IN AN ORGANIZED HEALTH CARE EDUCATION/TRAINING PROGRAM

## 2025-07-21 RX ORDER — DEUTETRABENAZINE 36 MG/1
TABLET, FILM COATED, EXTENDED RELEASE ORAL
COMMUNITY
Start: 2025-07-14

## 2025-07-21 RX ORDER — PHENTERMINE HYDROCHLORIDE 15 MG/1
15 CAPSULE ORAL EVERY MORNING
Qty: 30 CAPSULE | Refills: 0 | Status: SHIPPED | OUTPATIENT
Start: 2025-09-19 | End: 2025-10-19

## 2025-07-21 RX ORDER — PHENTERMINE HYDROCHLORIDE 15 MG/1
15 CAPSULE ORAL EVERY MORNING
Qty: 30 CAPSULE | Refills: 0 | Status: SHIPPED | OUTPATIENT
Start: 2025-07-21 | End: 2025-08-20

## 2025-07-21 RX ORDER — PHENTERMINE HYDROCHLORIDE 15 MG/1
15 CAPSULE ORAL EVERY MORNING
Qty: 30 CAPSULE | Refills: 0 | Status: SHIPPED | OUTPATIENT
Start: 2025-08-20 | End: 2025-09-19

## 2025-07-21 NOTE — PROGRESS NOTES
Subjective:      Chief Complaint   Patient presents with    Lab Results    Leg Swelling     Lymphedema f/up     HISTORY OF PRESENT ILLNESS  HPI  HPI obtained per patient report.  Cb Webster is a pleasant 65 year old female presenting for follow-up on her lipedema and to review her recent lab results.     She has been experiencing a difficult time recently with her depression symptoms. She denies SI. Her 's Parkinson's disease symptoms have been progressing. She is following closely with her mental health specialists.     PAST PATIENT HISTORY  Past Medical History[1]  Past Surgical History[2]    CURRENT MEDICATIONS  Medications Taking[3]    HEALTH MAINTENANCE  Immunization History   Administered Date(s) Administered    Covid-19 Vaccine Pfizer 30 mcg/0.3 ml 03/24/2021, 04/14/2021, 10/23/2021    Covid-19 Vaccine Pfizer Bivalent 30mcg/0.3mL 09/24/2022    Covid-19 Vaccine Pfizer Merrill-Sucrose 30 mcg/0.3 ml 05/11/2022    FLULAVAL 6 months & older 0.5 ml Prefilled syringe ([id number]) 10/23/2017, 09/20/2018, 10/01/2019, 10/07/2020, 09/30/2021, 10/28/2022    FLUZONE 6 months and older PFS 0.5 ml ([id number]) 10/23/2017, 09/20/2018, 10/13/2023    Influenza 09/18/2024    Pfizer Covid-19 Vaccine 30mcg/0.3ml 12yrs+ 10/13/2023, 09/18/2024    Pneumococcal Conjugate PCV20 01/16/2025    RSV, recombinant, RSVpreF, adjuvanted (Arexvy) 11/30/2023    TDAP 12/05/2018    Zoster Vaccine Recombinant Adjuvanted (Shingrix) 02/16/2021, 05/18/2021       ALLERGIES AND DRUG REACTIONS  Allergies[4]    Family History[5]  Short Social Hx on File[6]    Review of Systems   All other systems reviewed and are negative.         Objective:      /70   Pulse 70   Temp 97.1 °F (36.2 °C) (Temporal)   Resp 16   Ht 5' 1\" (1.549 m)   Wt 192 lb (87.1 kg)   LMP  (LMP Unknown)   BMI 36.28 kg/m²   Body mass index is 36.28 kg/m².    Physical Exam  Vitals reviewed.   Constitutional:       General: She is not in acute distress.     Appearance: She is  not ill-appearing, toxic-appearing or diaphoretic.   Cardiovascular:      Rate and Rhythm: Normal rate.   Pulmonary:      Effort: Pulmonary effort is normal.   Musculoskeletal:      Cervical back: Neck supple.   Neurological:      Mental Status: She is alert and oriented to person, place, and time.   Psychiatric:      Comments: Tearful affect            Assessment and Plan:      1. Lipedema (Primary)  -     Phentermine HCl; Take 1 capsule (15 mg total) by mouth every morning.  Dispense: 30 capsule; Refill: 0  -     Phentermine HCl; Take 1 capsule (15 mg total) by mouth every morning.  Dispense: 30 capsule; Refill: 0  -     Phentermine HCl; Take 1 capsule (15 mg total) by mouth every morning.  Dispense: 30 capsule; Refill: 0  2. Class 2 obesity due to excess calories without serious comorbidity with body mass index (BMI) of 39.0 to 39.9 in adult  -     Phentermine HCl; Take 1 capsule (15 mg total) by mouth every morning.  Dispense: 30 capsule; Refill: 0  -     Phentermine HCl; Take 1 capsule (15 mg total) by mouth every morning.  Dispense: 30 capsule; Refill: 0  -     Phentermine HCl; Take 1 capsule (15 mg total) by mouth every morning.  Dispense: 30 capsule; Refill: 0  3. Hypercholesteremia  -     Phentermine HCl; Take 1 capsule (15 mg total) by mouth every morning.  Dispense: 30 capsule; Refill: 0  -     Phentermine HCl; Take 1 capsule (15 mg total) by mouth every morning.  Dispense: 30 capsule; Refill: 0  -     Phentermine HCl; Take 1 capsule (15 mg total) by mouth every morning.  Dispense: 30 capsule; Refill: 0  4. Coronary artery calcification  -     Phentermine HCl; Take 1 capsule (15 mg total) by mouth every morning.  Dispense: 30 capsule; Refill: 0  -     Phentermine HCl; Take 1 capsule (15 mg total) by mouth every morning.  Dispense: 30 capsule; Refill: 0  -     Phentermine HCl; Take 1 capsule (15 mg total) by mouth every morning.  Dispense: 30 capsule; Refill: 0    Return in about 3 months (around  10/21/2025) for follow-up.      - CMP, lipid panel 7/17/25 were WNL with levels within goal  - recommended continuation of current regimen including phentermine and rosuvastatin   - she was encouraged to continue close follow-up with her mental health specialists and to call me if need anything     Patient verbalized understanding of assessment and recommendations. All questions and concerns were addressed.    Electronically signed by Colton Schaefer MD         [1]   Past Medical History:   Abdominal distention    Abdominal hernia    Abdominal pain    Anxiety    Aortic regurgitation    mild    Arthritis    Back pain    Bad breath    Bleeding nose    Bloating    Calculus of kidney    Cardiac calcification (HCC) - CAC score of 5.24 seen on 12/2/22 CT Heart Scan protocol    Chronic cough    Colon polyps    Constipation    Depression    Diarrhea, unspecified    Disorder of liver    Fatty liver    Diverticulosis    Dizziness    Easy bruising    Fatigue    Feeling lonely    Flatulence/gas pain/belching    Food intolerance    Frequent urination    Gastroparesis    GERD (gastroesophageal reflux disease)    Headache disorder    Migraines    Heart palpitations    Heartburn    Hemorrhoids    High blood pressure    High cholesterol    History of depression    Major depressive disorder & anxiety    History of eating disorder    Binge eating    Hoarseness, chronic    Hyperlipidemia    Indigestion    Irregular bowel habits    Itch of skin    Jaundice    Leaking of urine    Leg swelling    Loss of appetite    Migraines    Mouth sores    Triggered by stress or poor diet    Nausea    Night sweats    Obesity    OCD (obsessive compulsive disorder)    MODE (obstructive sleep apnea)    Osteoarthritis    Pain in joints    Painful swallowing    Painful urination    Parkinsonism (HCC)    Peptic ulcer disease    Positive BRITTANY (antinuclear antibody)    Problems with swallowing    Shortness of breath    Sleep disturbance    High serotonin    Spitting  up blood    Sputum production    Stool incontinence    Soft barely formed stool, not detecting urgency    Stress    Syncope    Tendonitis of shoulder, right    Vomiting    Vomiting blood    Wears glasses    Weight gain    10 lbs in 2 months - stop phentermine and decreased excercise after arthroscope of left knee    Weight loss    28 lb last year   [2]   Past Surgical History:  Procedure Laterality Date    Back surgery  2025    L5 MIS TLIF W/ MACIE ELLIS MD          x 2    Cholecystectomy      Colonoscopy      D & c      Ect provided  [id number]    Egd      Laparoscopic cholecystectomy      Lumbar spine fusion combined  2025    Needle biopsy right  2015    benign breast    Other surgical history      C3-C4 anterior discectomy    [facility]    Other surgical history  2019    radiofrequency abalsion lumbar    Removal gallbladder  2020    Skin surgery      Spine surgery procedure unlisted      Anterior cervical discectomy    Tonsillectomy  ?   [3]   Outpatient Medications Marked as Taking for the 25 encounter (Office Visit) with Colton Schaefer MD   Medication Sig Dispense Refill    AUSTEDO XR 36 MG Oral Tablet 24 Hr       [START ON 2025] Phentermine HCl 15 MG Oral Cap Take 1 capsule (15 mg total) by mouth every morning. 30 capsule 0    [START ON 2025] Phentermine HCl 15 MG Oral Cap Take 1 capsule (15 mg total) by mouth every morning. 30 capsule 0    Phentermine HCl 15 MG Oral Cap Take 1 capsule (15 mg total) by mouth every morning. 30 capsule 0    rifAXIMin (XIFAXAN) 550 MG Oral Tab Take 1 tablet (550 mg total) by mouth 3 (three) times daily. 42 tablet 1    dicyclomine 20 MG Oral Tab Take 1 tablet (20 mg total) by mouth 2 (two) times daily as needed. 180 tablet 3    Propranolol HCl ER 80 MG Oral Capsule SR 24 Hr Take 1 capsule (80 mg total) by mouth at bedtime. 30 capsule 0    ROSUVASTATIN 5 MG Oral Tab TAKE 1 TABLET BY MOUTH EVERY DAY  AT NIGHT 90 tablet 0    FLUoxetine 10 MG Oral Cap TAKE 1 CAPSULE BY MOUTH ONCE A DAY IN ADDITION TO ALREADY PRESCRIBED 40 MG (50 MG TOTAL)      MEMANTINE 10 MG Oral Tab TAKE 1 TABLET BY MOUTH EVERY DAY 90 tablet 0    guaiFENesin-codeine 100-10 MG/5ML Oral Solution Take 5 mL by mouth every 6 (six) hours as needed. 118 mL 0    methylphenidate ER 54 MG Oral Tab CR Take 1 tablet (54 mg total) by mouth every morning.      dicyclomine 10 MG Oral Cap Take 1 capsule (10 mg total) by mouth 2 (two) times daily.      busPIRone 15 MG Oral Tab Take 1 tablet (15 mg total) by mouth 2 (two) times daily.      FLUoxetine HCl 40 MG Oral Cap Take 1 capsule (40 mg total) by mouth daily.      pantoprazole 40 MG Oral Tab EC Take 1 tablet (40 mg total) by mouth before breakfast. 90 tablet 3    famotidine 40 MG Oral Tab Take 1 tablet (40 mg total) by mouth daily as needed for Heartburn. 90 tablet 3    SUMAtriptan Succinate 6 MG/0.5ML Subcutaneous Solution Auto-injector Inject 0.5 mL (6 mg total) into the skin as needed (for moderate to severe migraine). 6 mL 2    albuterol 108 (90 Base) MCG/ACT Inhalation Aero Soln Inhale 2 puffs into the lungs every 4 (four) hours as needed for Wheezing or Shortness of Breath. 6.7 g 0    traZODone 100 MG Oral Tab Take 1 tablet (100 mg total) by mouth nightly.      SPRAVATO, 84 MG DOSE, 28 MG/DEVICE Nasal Solution Therapy Pack 84 mg by Nasal route every 14 (fourteen) days.      LORazepam 2 MG Oral Tab Take 1 tablet (2 mg total) by mouth nightly as needed for Anxiety.     [4]   Allergies  Allergen Reactions    Sulfa Antibiotics NAUSEA ONLY   [5]   Family History  Problem Relation Age of Onset    Hypertension Father     Heart Attack Father          at 48 years    Heart Disease Father          at age 48 of an acute coronary oclusion    Alcohol and Other Disorders Associated Father     Depression Father     Other (ALS) Mother     Hypertension Mother         Diagnosed at 89 yo with ALS  at 91     Personality Disorder Daughter     Dementia Maternal Grandmother     Obesity Maternal Grandmother     Dementia Maternal Grandfather     Obesity Paternal Grandfather     Cancer Sister         Kidney, chronic lymp… leukemia    Heart Attack Sister         Kidney cancer, chronic lymphocytic leukemia    Heart Disease Sister         Pacemaker kicks in when pressure is low    Ulcerative Colitis Brother     Cancer Brother         Kidney cancer, ulcerative colitus   [6]   Social History  Socioeconomic History    Marital status:    Occupational History    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[employer]   Tobacco Use    Smoking status: Never     Passive exposure: Never    Smokeless tobacco: Never   Vaping Use    Vaping status: Never Used   Substance and Sexual Activity    Alcohol use: Not Currently    Drug use: Never   Other Topics Concern    Caffeine Concern No     Comment: Decaf    Exercise Yes    Seat Belt Yes    Special Diet No    Stress Concern Yes    Weight Concern Yes     Comment: L125lb  G85lbs  L45lbs   Social History Narrative    Caring for grandson (Peter - aged 4 yo [1/2020])     Social Drivers of Health     Food Insecurity: No Food Insecurity (2/5/2025)    NCSS - Food Insecurity     Worried About Running Out of Food in the Last Year: No     Ran Out of Food in the Last Year: No   Transportation Needs: No Transportation Needs (2/5/2025)    NCSS - Transportation     Lack of Transportation: No   Housing Stability: Not At Risk (2/5/2025)    NCSS - Housing/Utilities     Has Housing: Yes     Worried About Losing Housing: No     Unable to Get Utilities: No

## 2025-07-29 PROBLEM — R10.9 PAIN, ABDOMINAL: Status: ACTIVE | Noted: 2025-07-29

## 2025-07-29 PROBLEM — R14.1 ABDOMINAL GAS PAIN: Status: ACTIVE | Noted: 2025-07-29

## 2025-07-29 PROBLEM — R13.10 DYSPHAGIA: Status: ACTIVE | Noted: 2025-07-29

## 2025-07-29 PROBLEM — K31.7 GASTRIC POLYP: Status: ACTIVE | Noted: 2025-07-29

## 2025-07-29 PROBLEM — Z86.0101 PERSONAL HISTORY OF ADENOMATOUS AND SERRATED COLON POLYPS: Status: ACTIVE | Noted: 2019-01-08

## 2025-07-31 ENCOUNTER — OFFICE VISIT (OUTPATIENT)
Dept: PHYSICAL THERAPY | Age: 65
End: 2025-07-31
Attending: NURSE PRACTITIONER
Payer: MEDICARE

## 2025-07-31 ENCOUNTER — HOSPITAL ENCOUNTER (OUTPATIENT)
Dept: GENERAL RADIOLOGY | Age: 65
Discharge: HOME | End: 2025-07-31
Attending: STUDENT IN AN ORGANIZED HEALTH CARE EDUCATION/TRAINING PROGRAM

## 2025-07-31 ENCOUNTER — OFFICE VISIT (OUTPATIENT)
Dept: ORTHOPEDICS CLINIC | Facility: CLINIC | Age: 65
End: 2025-07-31
Payer: MEDICARE

## 2025-07-31 VITALS — HEIGHT: 61 IN | BODY MASS INDEX: 35.3 KG/M2 | WEIGHT: 187 LBS

## 2025-07-31 DIAGNOSIS — Z98.1 STATUS POST LUMBAR SPINAL FUSION: ICD-10-CM

## 2025-07-31 DIAGNOSIS — Z98.1 STATUS POST LUMBAR SPINAL FUSION: Primary | ICD-10-CM

## 2025-07-31 PROCEDURE — 97110 THERAPEUTIC EXERCISES: CPT | Performed by: PHYSICAL THERAPIST

## 2025-07-31 PROCEDURE — 97112 NEUROMUSCULAR REEDUCATION: CPT | Performed by: PHYSICAL THERAPIST

## 2025-07-31 PROCEDURE — 99213 OFFICE O/P EST LOW 20 MIN: CPT | Performed by: STUDENT IN AN ORGANIZED HEALTH CARE EDUCATION/TRAINING PROGRAM

## 2025-07-31 PROCEDURE — G2211 COMPLEX E/M VISIT ADD ON: HCPCS | Performed by: STUDENT IN AN ORGANIZED HEALTH CARE EDUCATION/TRAINING PROGRAM

## 2025-07-31 PROCEDURE — 72110 X-RAY EXAM L-2 SPINE 4/>VWS: CPT | Performed by: STUDENT IN AN ORGANIZED HEALTH CARE EDUCATION/TRAINING PROGRAM

## 2025-08-05 ENCOUNTER — OFFICE VISIT (OUTPATIENT)
Dept: PHYSICAL THERAPY | Age: 65
End: 2025-08-05
Attending: NURSE PRACTITIONER

## 2025-08-05 PROCEDURE — 97110 THERAPEUTIC EXERCISES: CPT | Performed by: PHYSICAL THERAPIST

## 2025-08-05 PROCEDURE — 97112 NEUROMUSCULAR REEDUCATION: CPT | Performed by: PHYSICAL THERAPIST

## 2025-08-06 DIAGNOSIS — G43.709 CHRONIC MIGRAINE WITHOUT AURA WITHOUT STATUS MIGRAINOSUS, NOT INTRACTABLE: ICD-10-CM

## 2025-08-07 RX ORDER — PROPRANOLOL HYDROCHLORIDE 80 MG/1
80 CAPSULE, EXTENDED RELEASE ORAL NIGHTLY
Qty: 90 CAPSULE | Refills: 1 | Status: SHIPPED | OUTPATIENT
Start: 2025-08-07 | End: 2026-02-03

## 2025-08-08 DIAGNOSIS — R41.0 CONFUSION: ICD-10-CM

## 2025-08-08 RX ORDER — MEMANTINE HYDROCHLORIDE 10 MG/1
10 TABLET ORAL DAILY
Qty: 90 TABLET | Refills: 0 | Status: SHIPPED | OUTPATIENT
Start: 2025-08-08

## 2025-08-29 ENCOUNTER — OFFICE VISIT (OUTPATIENT)
Dept: NEUROLOGY | Facility: CLINIC | Age: 65
End: 2025-08-29

## 2025-08-29 VITALS
DIASTOLIC BLOOD PRESSURE: 60 MMHG | WEIGHT: 201 LBS | RESPIRATION RATE: 16 BRPM | BODY MASS INDEX: 37.95 KG/M2 | HEIGHT: 61 IN | HEART RATE: 74 BPM | OXYGEN SATURATION: 96 % | SYSTOLIC BLOOD PRESSURE: 90 MMHG

## 2025-08-29 DIAGNOSIS — G43.709 CHRONIC MIGRAINE WITHOUT AURA WITHOUT STATUS MIGRAINOSUS, NOT INTRACTABLE: Primary | ICD-10-CM

## 2025-08-29 PROCEDURE — 64615 CHEMODENERV MUSC MIGRAINE: CPT | Performed by: OTHER

## 2025-08-29 RX ORDER — CEFUROXIME AXETIL 250 MG/1
6 TABLET ORAL AS NEEDED
Qty: 6 ML | Refills: 5 | Status: SHIPPED | OUTPATIENT
Start: 2025-08-29

## (undated) DIAGNOSIS — G31.84 MILD NEUROCOGNITIVE DISORDER DUE TO MULTIPLE ETIOLOGIES: ICD-10-CM

## (undated) DIAGNOSIS — G43.709 CHRONIC MIGRAINE WITHOUT AURA WITHOUT STATUS MIGRAINOSUS, NOT INTRACTABLE: ICD-10-CM

## (undated) DEVICE — CADIERE FORCEPS: Brand: ENDOWRIST

## (undated) DEVICE — PAD,NON-ADHERENT,3X8,STERILE,LF,1/PK: Brand: MEDLINE

## (undated) DEVICE — TROCAR: Brand: KII FIOS FIRST ENTRY

## (undated) DEVICE — #11 STERILE BLADE: Brand: POLYMER COATED BLADES

## (undated) DEVICE — PERMANENT CAUTERY HOOK: Brand: ENDOWRIST

## (undated) DEVICE — MARKER SKIN 2 TIP

## (undated) DEVICE — REMOVER DURAPREP 3M

## (undated) DEVICE — NEEDLE SPINAL 22X3-1/2 BLK

## (undated) DEVICE — SUT ETHILON 3-0 PS-1 1663H

## (undated) DEVICE — NEEDLE SPNL 22GA L5IN BLK HUB QNCKE BVL DISP

## (undated) DEVICE — DISPOSABLE TOURNIQUET CUFF SINGLE BLADDER, DUAL PORT AND QUICK CONNECT CONNECTOR: Brand: COLOR CUFF

## (undated) DEVICE — GLOVE SURG SENSICARE SZ 7-1/2

## (undated) DEVICE — PADDING CAST COTTON  4

## (undated) DEVICE — NEEDLE SPINAL 22X5 405148

## (undated) DEVICE — RF CANNULA, CVD: Brand: VENOM

## (undated) DEVICE — SYRINGE 10ML LL CONTRL SYRINGE

## (undated) DEVICE — POWER T HANDLE, STERILE: Brand: INVICTUS

## (undated) DEVICE — CLIP HEMOLOK MEDIUM GREEN

## (undated) DEVICE — DRAPE C-ARM UNIVERSAL

## (undated) DEVICE — SPONGE 4X4 10PK

## (undated) DEVICE — WRAP THERAPEUTIC BACK WO GEL P

## (undated) DEVICE — GLOVE SURG SENSICARE SZ 6-1/2

## (undated) DEVICE — PAD GROUND ELECTRODE 1.5M

## (undated) DEVICE — BANDAID COVERLET 1X3

## (undated) DEVICE — ANTISEPTIC 4OZ 70% ISO ALC

## (undated) DEVICE — Device: Brand: JELCO

## (undated) DEVICE — SHEET, T, LAPAROTOMY, STERILE: Brand: MEDLINE

## (undated) DEVICE — MONITORING NEUROPHYSIOLOGICAL

## (undated) DEVICE — MEDI-VAC SUCTION HANDLE REGULAR CAPACITY: Brand: CARDINAL HEALTH

## (undated) DEVICE — GOWN AERO CHROME XXL

## (undated) DEVICE — COVER,LIGHT,CAMERA,HARD,1/PK,STRL: Brand: MEDLINE

## (undated) DEVICE — LAMINECTOMY CDS: Brand: MEDLINE INDUSTRIES, INC.

## (undated) DEVICE — SUT COAT VCRL+ 2-0 18IN CP-2 ABSRB UD ANTIBAC

## (undated) DEVICE — 3M™ STERI-DRAPE™ U-DRAPE 1015: Brand: STERI-DRAPE™

## (undated) DEVICE — 3M™ RED DOT™ MONITORING ELECTRODE WITH FOAM TAPE AND STICKY GEL, 50/BAG, 20/CASE, 72/PLT 2570: Brand: RED DOT™

## (undated) DEVICE — GLOVE SURG SENSICARE SZ 7

## (undated) DEVICE — 3M™ TEGADERM™ TRANSPARENT FILM DRESSING FRAME STYLE, 1626W, 4 IN X 4-3/4 IN (10 CM X 12 CM), 50/CT 4CT/CASE: Brand: 3M™ TEGADERM™

## (undated) DEVICE — PLUMEPORT ACTIV LAPAROSCOPIC SMOKE FILTRATION DEVICE: Brand: PLUMEPORT ACTIVE

## (undated) DEVICE — GAMMEX® NON-LATEX SIZE 8, STERILE NEOPRENE POWDER-FREE SURGICAL GLOVE: Brand: GAMMEX

## (undated) DEVICE — GLOVE SUR 8 SENSICARE PI PIP GRN PWD F

## (undated) DEVICE — ARCUS GUIDED ACCESS SYSTEM - BEVEL TIP, STERILE: Brand: ARCUS

## (undated) DEVICE — MONOFILAMENT ABSORBABLE SUTURE: Brand: MAXON

## (undated) DEVICE — Device

## (undated) DEVICE — PAIN TRAY: Brand: MEDLINE INDUSTRIES, INC.

## (undated) DEVICE — SUTURE MONOCRYL 4-0 PS-2

## (undated) DEVICE — STERILE COTTON ROLL 1LB 1X8.5

## (undated) DEVICE — KIT DRN 1/8IN PVC 3 SPRG EVAC

## (undated) DEVICE — 1200CC GUARDIAN II: Brand: GUARDIAN

## (undated) DEVICE — C-ARMOR C-ARM EQUIPMENT COVERS CLEAR STERILE UNIVERSAL FIT 12 PER CASE: Brand: C-ARMOR

## (undated) DEVICE — DRAPE,LAPAROTOMY,PCH,STERILE: Brand: MEDLINE

## (undated) DEVICE — SEAL

## (undated) DEVICE — FILTERLINE NASAL ADULT O2/CO2

## (undated) DEVICE — ARM DRAPE

## (undated) DEVICE — GENERAL LAPAROS CDS-LF: Brand: MEDLINE INDUSTRIES, INC.

## (undated) DEVICE — HEMOSTAT KIT SURGIFLO THROM 8ML

## (undated) DEVICE — KNEE ARTHROSCOPY CDS: Brand: MEDLINE INDUSTRIES, INC.

## (undated) DEVICE — PAD,ABDOMINAL,8"X7.5",ST,LF,20/BX: Brand: MEDLINE INDUSTRIES, INC.

## (undated) DEVICE — CURAD OIL EMLUSION GAUZE 3X16

## (undated) DEVICE — SLEEVE COMPR MD KNEE LEN SGL USE KENDALL SCD

## (undated) DEVICE — SOLUTION IRRIG 1000ML 0.9% NACL USP BTL

## (undated) DEVICE — SLEEVE KENDALL SCD EXPRESS MED

## (undated) DEVICE — MENISCECTOMY ELECTRODE MINI KIT; STANDARD DESIGN

## (undated) DEVICE — SUT MCRYL 3-0 27IN ABSRB UD 19MM PS-2 3/8

## (undated) DEVICE — TROCAR TIP NITINOL GUIDEWIRE 18": Brand: INVICTUS

## (undated) DEVICE — ADHESIVE SKIN TOP FOR WND CLSR DERMBND ADV

## (undated) DEVICE — AGGRESSIVE PLUS, ANGLED CUTTER: Brand: FORMULA

## (undated) DEVICE — MIS ILLUMINATION SYSTEM, STERILE: Brand: ATEC LIGHT CABLE SYSTEM

## (undated) DEVICE — GOWN,SIRUS,FABRIC-REINFORCED,X-LARGE: Brand: MEDLINE

## (undated) DEVICE — COVER,MAYO STAND,STERILE: Brand: MEDLINE

## (undated) DEVICE — ENDOSCOPY PACK UPPER: Brand: MEDLINE INDUSTRIES, INC.

## (undated) DEVICE — C-ARM: Brand: UNBRANDED

## (undated) DEVICE — REMOVER LOT 4OZ N IRRIG UNSCNT SFT MOIST LIQ

## (undated) DEVICE — KNIFE SRG LACEY BAYONET L170MM

## (undated) DEVICE — ELECTRODE ES 2.75IN PTFE BLDE MOD E-Z CLN

## (undated) DEVICE — GLOVE SUR 7.5 SENSICARE PIP WHT PWD F

## (undated) DEVICE — 4-PORT MANIFOLD: Brand: NEPTUNE 2

## (undated) DEVICE — MEDI-VAC NON-CONDUCTIVE SUCTION TUBING: Brand: CARDINAL HEALTH

## (undated) DEVICE — APPLICATOR PREP 26ML CHG 2% ISO ALC 70%

## (undated) DEVICE — TISSUE RETRIEVAL SYSTEM: Brand: INZII RETRIEVAL SYSTEM

## (undated) DEVICE — CANNULA SEAL

## (undated) DEVICE — FRAZIER SUCTION INSTRUMENT 12 FR W/CONTROL VENT & OBTURATOR: Brand: FRAZIER

## (undated) DEVICE — GLOVE,SURG,SENSICARE,ALOE,LF,PF,7: Brand: MEDLINE

## (undated) DEVICE — SOL LACT RINGERS 3000ML

## (undated) DEVICE — COLUMN DRAPE

## (undated) DEVICE — SUT COAT VCRL+ 0 27IN CT-1 ABSRB VLT ANTIBACT

## (undated) DEVICE — STERILE POLYISOPRENE POWDER-FREE SURGICAL GLOVES: Brand: PROTEXIS

## (undated) DEVICE — ENDOPATH ULTRA VERESS INSUFFLATION NEEDLES WITH LUER LOCK CONNECTORS: Brand: ENDOPATH

## (undated) DEVICE — BANDAGE ADH 1INX3IN NAT FAB N ADH PD CURAD

## (undated) DEVICE — [AGGRESSIVE PLUS CUTTER, ARTHROSCOPIC SHAVER BLADE,  DO NOT RESTERILIZE,  DO NOT USE IF PACKAGE IS DAMAGED,  KEEP DRY,  KEEP AWAY FROM SUNLIGHT]: Brand: FORMULA

## (undated) DEVICE — KENDALL SCD EXPRESS SLEEVES, KNEE LENGTH, MEDIUM: Brand: KENDALL SCD

## (undated) DEVICE — BLADELESS OBTURATOR: Brand: WECK VISTA

## (undated) DEVICE — GLOVE SUR 7.5 SENSICARE PI PIP CRM PWD F

## (undated) DEVICE — FORCEPS BPLR LAT IF DISP

## (undated) DEVICE — 1010 S-DRAPE TOWEL DRAPE 10/BX: Brand: STERI-DRAPE™

## (undated) DEVICE — Device: Brand: DEFENDO AIR/WATER/SUCTION AND BIOPSY VALVE

## (undated) DEVICE — 3M™ IOBAN™ 2 ANTIMICROBIAL INCISE DRAPE 6650EZ: Brand: IOBAN™ 2

## (undated) DEVICE — 10K/24K ARTHROSCOPY INFLOW TUBE SET: Brand: 10K/24K

## (undated) DEVICE — DERMABOND LIQUID ADHESIVE

## (undated) DEVICE — COVER,TABLE,44X90,STERILE: Brand: MEDLINE

## (undated) DEVICE — MEDIUM-LARGE CLIP APPLIER: Brand: ENDOWRIST

## (undated) DEVICE — REDUCER: Brand: ENDOWRIST

## (undated) DEVICE — SYRINGE 30ML LL TIP

## (undated) DEVICE — OIL CARTRIDGE: Brand: CORE, MAESTRO

## (undated) DEVICE — SPONGE,NEURO,0.5"X1.5",XR,STRL,LF,10/PK: Brand: MEDLINE

## (undated) DEVICE — DRAPE,TOWEL,LARGE,INVISISHIELD: Brand: MEDLINE

## (undated) NOTE — LETTER
01/15/20    Patient: Kvng Monte  : 1960 Visit date: 1/15/2020    Dear  Dr. Nani Epps MD,    Thank you for referring Kvng Monte to my practice. Please find my assessment and plan below.              Assessment   Epigastric abdominal p

## (undated) NOTE — LETTER
19    Patient: Wolf Lora  : 1960 Visit date: 2019    Dear  Dr. Pelon Pacheco MD,    Thank you for referring Wolf Lora to my practice. Please find my assessment and plan below.                 Assessment   Encounter for colono

## (undated) NOTE — Clinical Note
Josias Valentin! She is cleared for 3/9/23 surgery. Any idea if she'll be your first case on that date?   Ruy Bundy

## (undated) NOTE — Clinical Note
She is such a trooper! We set her up for aquacise class at [facility] specifically for weight loss patients so she will be with a really supportive group. Hope all is well! Happy Holidays! Jackelin

## (undated) NOTE — LETTER
10/23/18        Tammy Done [address] [facility] [id number]      Dear Stiven Sanchez,    [facility] records indicate that you have outstanding lab work and or testing that was ordered for you and has not yet been completed:  Orders Placed This Encounte

## (undated) NOTE — LETTER
10/14/19    To Whom It May Concern: This letter is being written at the patient's request.    Rossi Byers has been treated at the [facility] for a medical condition.  This patient was advised to suspend her regular exercise ro

## (undated) NOTE — Clinical Note
Please have Roberta Gary see me again in 3 months for follow up. Hopefully, this can be an in-office assessment otherwise a virtual visit is fine too. Dr. Bruce Abarca

## (undated) NOTE — LETTER
09/03/20        Iraida Pro [address] [place] [address]      Dear Sara Mayo,    [facility] records indicate that you have outstanding lab work and or testing that was ordered for you and has not yet been completed:  Orders Placed This Encounte

## (undated) NOTE — LETTER
[address]  [address] [place] [address]  [address] [place] [address]: [phone]  FAX: [phone]   Consent to Procedure/Sedation    Date: __7/16/2019_____    Time: ___12:56 PM ___    1.  I authorize the performanc Signature of Patient:  ___________________________    Signature of person authorized to consent for patient: Relationship to patient:  ___________________________    ___________________    Witness: __Yulissacy [id number] RN__________________  Date: _7/16/19 1:30

## (undated) NOTE — LETTER
Teresa Haskins [address] [facility]  Darleen, [address] [place] [address]    Consent for Operation  Date: ____________                                Time: _______________    1.  I authorize the performance upon Early Tim the following operation:  Procedure(s 6. I consent to the photographing or videotaping of the operations or procedures to be performed, including appropriate portions of my body for medical, scientific, or educational purposes, provided my identity is not revealed by the pictures or by descrip 10. If I have a Do Not Resuscitate order in place, the surgeon and I (or the individual authorized to consent on my behalf) will discuss and agree as to whether the Do Not Resuscitate order will remain in effect or will be discontinued during the performan a. Allow the anesthesiologist (anesthesia doctor) to give me medicine and do additional procedures as necessary.  Some examples are: Starting or using an “IV” to give me medicine, fluids or blood during my procedure, and having a breathing tube placed to he 7. Regional Anesthesia (“spinal”, “epidural”, & “nerve blocks”): I understand that rare but potential complications include headache, bleeding, infection, seizure, irregular heart rhythms, and nerve injury.     I can change my mind about having anesthesia

## (undated) NOTE — LETTER
9/3/2019    Patient Name: Terry Jenkins        [facility]  Attn: Membership Services     To Whom It May Concern:     Terry Jenkins ( 62) is a patient currently under my care for a medical condition which limits her a

## (undated) NOTE — Clinical Note
Sandra Leiva - I saw Doc Kiko today with urinary sx. I've recommended bladder testing. I will work to manage her sx. I appreciate the opportunity to participate in her care.  Thanks, [employer]

## (undated) NOTE — LETTER
25    Orthopedic Surgery   Pre-Operative Clearance Request    Patient Name:   Cb Webster             :   1960    Surgeon: Dr. Perez             Date of Surgery: 25    Surgical Procedure: L4-5 MINIMALLY INVASIVE SPINE TRANSFORAMINAL LUMBAR INTERBODY FUSION       MUST COMPLETE ALL OF THE FOLLOWING 2-3 WEEKS PRIOR TO YOUR SURGERY TO AVOID CANCELLATION, DUE TO THE RULE THEIR WILL BE NO EXCEPTIONS!      [x]  History and Physical        [x]  Medical  Clearance                     Required pre-op testing to be ordered:                        [x]  CBC W/Diff                                                                   [x]  BMP                                                                                                                                                                            [x]  PT/INR    [x]  PTT     [x]  Type and Screen                         **Please fax test results, H&P, and clearance to [phone] and to P.[facility] at [phone]**

## (undated) NOTE — Clinical Note
Hayley Hernández! We've never met, but I am sending you a copy of my completed note from Cb's visit with me on Monday as part of her Executive Physical. I know she has transitioned her primary care needs to you. Thank you for taking her on as a patient.   Kind regards, Marc Payton

## (undated) NOTE — LETTER
Patient Name: Ion Wilkinson  YOB: 1960          MRN :  [id number]  Date:  12/12/2019  Referring Physician:  Louise Raymundo    Progress Summary  Pt has attended 2 visits in Physical Therapy.      Subjective: Patient reports since she wa I certify the need for these services furnished under this plan of treatment and while under my care.     X___________________________________________________ Date____________________    Certification From: 70/28/6274  To:3/11/2020

## (undated) NOTE — LETTER
04/17/20  To Whom It May Concern, Ms. Jacklyn Vann is my patient and I am her primary care physician. Nancy Francis has been a patient in my practice since November 2017 and she follows up with me roughly every 3-6 months.  I treat Nancy Francis for a variety of medical conditions and sometimes do so in [employer] job title. Critical, cognitive reasoning does not occur. Her ability to recall recently learned information lags behind that of others and then wanes away. She cannot manage her finances.  It's even difficult for her to follow a simple recipe to c

## (undated) NOTE — LETTER
08/25/20        Krystian Meraz [address] [facility] [address]      Dear Luis Alberto Butts,    [facility] records indicate that you have outstanding lab work and or testing that was ordered for you and has not yet been completed:      NOEMY STEVEN 2D+3D SCREENIN

## (undated) NOTE — ED AVS SNAPSHOT
Terry Jenkins   MRN: [id number]    Department:  [facility] Emergency Department   Date of Visit:  11/28/2017           Disclosure     Insurance plans vary and the physician(s) referred by the ER may not be covered by your plan.  Please contac tell this physician (or your personal doctor if your instructions are to return to your personal doctor) about any new or lasting problems. The primary care or specialist physician will see patients referred from the [facility] Emergency Department.  Tashia Fontaine

## (undated) NOTE — Clinical Note
Zelda, just CHEN on this patient. I think she's set to see your PA soon. 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 It's a start though. She did ask me if there's a Psych component to her symptoms.  I said only possibly

## (undated) NOTE — Clinical Note
Saw José Guerrero today. Told her I had no major suggestions outside of what you had tried with her besides offering bariatric surgery options but I donot feel she is at a stable place right now to consider it. Kristal Addison would be awesome as well but she is medicare. Referring to emotional eating support group and health coaching as a place to get started.    Jackelin